# Patient Record
Sex: FEMALE | Race: WHITE | Employment: OTHER | URBAN - METROPOLITAN AREA
[De-identification: names, ages, dates, MRNs, and addresses within clinical notes are randomized per-mention and may not be internally consistent; named-entity substitution may affect disease eponyms.]

---

## 2017-01-05 ENCOUNTER — OFFICE VISIT (OUTPATIENT)
Dept: FAMILY MEDICINE CLINIC | Age: 62
End: 2017-01-05

## 2017-01-05 VITALS
TEMPERATURE: 97.7 F | HEART RATE: 76 BPM | HEIGHT: 65 IN | DIASTOLIC BLOOD PRESSURE: 100 MMHG | BODY MASS INDEX: 28.89 KG/M2 | SYSTOLIC BLOOD PRESSURE: 142 MMHG | WEIGHT: 173.4 LBS

## 2017-01-05 DIAGNOSIS — J01.00 ACUTE NON-RECURRENT MAXILLARY SINUSITIS: Primary | ICD-10-CM

## 2017-01-05 DIAGNOSIS — E78.5 HYPERLIPIDEMIA, UNSPECIFIED HYPERLIPIDEMIA TYPE: ICD-10-CM

## 2017-01-05 DIAGNOSIS — I10 ESSENTIAL HYPERTENSION: ICD-10-CM

## 2017-01-05 PROCEDURE — 99214 OFFICE O/P EST MOD 30 MIN: CPT | Performed by: FAMILY MEDICINE

## 2017-01-05 RX ORDER — ATENOLOL AND CHLORTHALIDONE TABLET 50; 25 MG/1; MG/1
1 TABLET ORAL DAILY
Qty: 90 TABLET | Refills: 0 | Status: SHIPPED | OUTPATIENT
Start: 2017-01-05 | End: 2017-05-01

## 2017-01-05 RX ORDER — SIMVASTATIN 40 MG
40 TABLET ORAL NIGHTLY
Qty: 90 TABLET | Refills: 0 | Status: SHIPPED | OUTPATIENT
Start: 2017-01-05 | End: 2017-02-01 | Stop reason: SDUPTHER

## 2017-01-05 RX ORDER — AMOXICILLIN AND CLAVULANATE POTASSIUM 875; 125 MG/1; MG/1
1 TABLET, FILM COATED ORAL 2 TIMES DAILY
Qty: 20 TABLET | Refills: 0 | Status: SHIPPED | OUTPATIENT
Start: 2017-01-05 | End: 2017-02-01 | Stop reason: SDUPTHER

## 2017-01-05 ASSESSMENT — ENCOUNTER SYMPTOMS
SINUS PAIN: 1
CHEST TIGHTNESS: 0
SHORTNESS OF BREATH: 0
COUGH: 1
SORE THROAT: 1
RHINORRHEA: 1

## 2017-02-01 ENCOUNTER — OFFICE VISIT (OUTPATIENT)
Dept: FAMILY MEDICINE CLINIC | Age: 62
End: 2017-02-01

## 2017-02-01 ENCOUNTER — TELEPHONE (OUTPATIENT)
Dept: FAMILY MEDICINE CLINIC | Age: 62
End: 2017-02-01

## 2017-02-01 VITALS
WEIGHT: 173.8 LBS | DIASTOLIC BLOOD PRESSURE: 98 MMHG | BODY MASS INDEX: 28.96 KG/M2 | HEIGHT: 65 IN | TEMPERATURE: 98.1 F | HEART RATE: 68 BPM | SYSTOLIC BLOOD PRESSURE: 128 MMHG

## 2017-02-01 DIAGNOSIS — J01.01 ACUTE RECURRENT MAXILLARY SINUSITIS: ICD-10-CM

## 2017-02-01 DIAGNOSIS — Z12.39 SCREENING BREAST EXAMINATION: ICD-10-CM

## 2017-02-01 DIAGNOSIS — Z12.11 SCREEN FOR COLON CANCER: ICD-10-CM

## 2017-02-01 DIAGNOSIS — Z13.1 SCREENING FOR DIABETES MELLITUS: ICD-10-CM

## 2017-02-01 DIAGNOSIS — I10 ESSENTIAL HYPERTENSION: ICD-10-CM

## 2017-02-01 DIAGNOSIS — E78.5 HYPERLIPIDEMIA, UNSPECIFIED HYPERLIPIDEMIA TYPE: ICD-10-CM

## 2017-02-01 DIAGNOSIS — E78.5 HYPERLIPIDEMIA, UNSPECIFIED HYPERLIPIDEMIA TYPE: Primary | ICD-10-CM

## 2017-02-01 DIAGNOSIS — Z11.59 NEED FOR HEPATITIS C SCREENING TEST: ICD-10-CM

## 2017-02-01 LAB
CHOLESTEROL, TOTAL: 191 MG/DL (ref 0–199)
HDLC SERPL-MCNC: 37 MG/DL (ref 40–60)
LDL CHOLESTEROL CALCULATED: 111 MG/DL
TRIGL SERPL-MCNC: 215 MG/DL (ref 0–150)
TSH SERPL DL<=0.05 MIU/L-ACNC: 2.34 UIU/ML (ref 0.27–4.2)
VLDLC SERPL CALC-MCNC: 43 MG/DL

## 2017-02-01 PROCEDURE — 36415 COLL VENOUS BLD VENIPUNCTURE: CPT | Performed by: FAMILY MEDICINE

## 2017-02-01 PROCEDURE — 99214 OFFICE O/P EST MOD 30 MIN: CPT | Performed by: FAMILY MEDICINE

## 2017-02-01 PROCEDURE — 93000 ELECTROCARDIOGRAM COMPLETE: CPT | Performed by: FAMILY MEDICINE

## 2017-02-01 RX ORDER — AMOXICILLIN AND CLAVULANATE POTASSIUM 875; 125 MG/1; MG/1
1 TABLET, FILM COATED ORAL 2 TIMES DAILY
Qty: 14 TABLET | Refills: 0 | Status: SHIPPED | OUTPATIENT
Start: 2017-02-01 | End: 2019-11-05 | Stop reason: SDUPTHER

## 2017-02-01 RX ORDER — SIMVASTATIN 40 MG
40 TABLET ORAL NIGHTLY
Qty: 90 TABLET | Refills: 3 | Status: SHIPPED | OUTPATIENT
Start: 2017-02-01 | End: 2018-02-16

## 2017-02-01 RX ORDER — AMOXICILLIN AND CLAVULANATE POTASSIUM 875; 125 MG/1; MG/1
1 TABLET, FILM COATED ORAL 2 TIMES DAILY
Qty: 14 TABLET | Refills: 0 | Status: SHIPPED | OUTPATIENT
Start: 2017-02-01 | End: 2017-02-01 | Stop reason: SDUPTHER

## 2017-02-01 RX ORDER — ATENOLOL AND CHLORTHALIDONE TABLET 100; 25 MG/1; MG/1
1 TABLET ORAL DAILY
Qty: 90 TABLET | Refills: 0 | Status: SHIPPED | OUTPATIENT
Start: 2017-02-01 | End: 2017-05-01 | Stop reason: SDUPTHER

## 2017-02-01 ASSESSMENT — PATIENT HEALTH QUESTIONNAIRE - PHQ9
1. LITTLE INTEREST OR PLEASURE IN DOING THINGS: 0
SUM OF ALL RESPONSES TO PHQ QUESTIONS 1-9: 0
2. FEELING DOWN, DEPRESSED OR HOPELESS: 0
SUM OF ALL RESPONSES TO PHQ9 QUESTIONS 1 & 2: 0

## 2017-02-01 ASSESSMENT — ENCOUNTER SYMPTOMS
SINUS PAIN: 1
COUGH: 1
SHORTNESS OF BREATH: 0
RHINORRHEA: 1

## 2017-02-02 LAB
ESTIMATED AVERAGE GLUCOSE: 111.2 MG/DL
HBA1C MFR BLD: 5.5 %
HEPATITIS C ANTIBODY INTERPRETATION: NORMAL

## 2017-02-24 ENCOUNTER — OFFICE VISIT (OUTPATIENT)
Dept: FAMILY MEDICINE CLINIC | Age: 62
End: 2017-02-24

## 2017-02-24 VITALS
TEMPERATURE: 98.1 F | HEIGHT: 65 IN | DIASTOLIC BLOOD PRESSURE: 84 MMHG | WEIGHT: 174.4 LBS | HEART RATE: 74 BPM | BODY MASS INDEX: 29.06 KG/M2 | SYSTOLIC BLOOD PRESSURE: 128 MMHG

## 2017-02-24 DIAGNOSIS — J20.9 ACUTE BRONCHITIS, UNSPECIFIED ORGANISM: Primary | ICD-10-CM

## 2017-02-24 DIAGNOSIS — Z72.0 TOBACCO ABUSE: ICD-10-CM

## 2017-02-24 PROCEDURE — 99213 OFFICE O/P EST LOW 20 MIN: CPT | Performed by: FAMILY MEDICINE

## 2017-02-24 RX ORDER — VARENICLINE TARTRATE 25 MG
KIT ORAL
Qty: 42 TABLET | Refills: 0 | Status: SHIPPED | OUTPATIENT
Start: 2017-02-24 | End: 2017-11-07

## 2017-02-24 RX ORDER — AZITHROMYCIN 250 MG/1
TABLET, FILM COATED ORAL
Qty: 1 PACKET | Refills: 0 | Status: SHIPPED | OUTPATIENT
Start: 2017-02-24 | End: 2017-03-06

## 2017-02-24 RX ORDER — VARENICLINE TARTRATE 1 MG/1
1 TABLET, FILM COATED ORAL 2 TIMES DAILY
Qty: 60 TABLET | Refills: 5 | Status: SHIPPED | OUTPATIENT
Start: 2017-02-24 | End: 2017-11-07

## 2017-02-24 ASSESSMENT — ENCOUNTER SYMPTOMS: COUGH: 1

## 2017-03-21 ENCOUNTER — HOSPITAL ENCOUNTER (OUTPATIENT)
Dept: GENERAL RADIOLOGY | Age: 62
Discharge: HOME | End: 2017-03-21
Attending: FAMILY MEDICINE | Admitting: FAMILY MEDICINE

## 2017-03-21 ENCOUNTER — OFFICE VISIT (OUTPATIENT)
Dept: FAMILY MEDICINE CLINIC | Age: 62
End: 2017-03-21

## 2017-03-21 ENCOUNTER — TELEPHONE (OUTPATIENT)
Dept: FAMILY MEDICINE CLINIC | Age: 62
End: 2017-03-21

## 2017-03-21 VITALS
BODY MASS INDEX: 29.26 KG/M2 | HEART RATE: 78 BPM | SYSTOLIC BLOOD PRESSURE: 120 MMHG | WEIGHT: 175.6 LBS | HEIGHT: 65 IN | TEMPERATURE: 98.4 F | DIASTOLIC BLOOD PRESSURE: 78 MMHG | RESPIRATION RATE: 20 BRPM

## 2017-03-21 DIAGNOSIS — B00.1 COLD SORE: ICD-10-CM

## 2017-03-21 DIAGNOSIS — J20.9 ACUTE BRONCHITIS, UNSPECIFIED ORGANISM: Primary | ICD-10-CM

## 2017-03-21 DIAGNOSIS — R06.2 WHEEZING: ICD-10-CM

## 2017-03-21 DIAGNOSIS — J20.9 ACUTE BRONCHITIS, UNSPECIFIED ORGANISM: ICD-10-CM

## 2017-03-21 DIAGNOSIS — R04.0 EPISTAXIS: ICD-10-CM

## 2017-03-21 PROCEDURE — 99213 OFFICE O/P EST LOW 20 MIN: CPT | Performed by: FAMILY MEDICINE

## 2017-03-21 RX ORDER — LEVOFLOXACIN 500 MG/1
500 TABLET, FILM COATED ORAL DAILY
Qty: 10 TABLET | Refills: 0 | Status: SHIPPED | OUTPATIENT
Start: 2017-03-21 | End: 2017-03-31

## 2017-03-21 ASSESSMENT — ENCOUNTER SYMPTOMS
WHEEZING: 1
SHORTNESS OF BREATH: 0
COUGH: 1

## 2017-04-06 ENCOUNTER — TELEPHONE (OUTPATIENT)
Dept: GASTROENTEROLOGY | Age: 62
End: 2017-04-06

## 2017-04-13 ENCOUNTER — TELEPHONE (OUTPATIENT)
Dept: FAMILY MEDICINE CLINIC | Age: 62
End: 2017-04-13

## 2017-05-01 ENCOUNTER — OFFICE VISIT (OUTPATIENT)
Dept: FAMILY MEDICINE CLINIC | Age: 62
End: 2017-05-01

## 2017-05-01 VITALS
HEART RATE: 70 BPM | BODY MASS INDEX: 29.89 KG/M2 | WEIGHT: 179.4 LBS | HEIGHT: 65 IN | SYSTOLIC BLOOD PRESSURE: 112 MMHG | DIASTOLIC BLOOD PRESSURE: 70 MMHG

## 2017-05-01 DIAGNOSIS — Z91.09 ENVIRONMENTAL ALLERGIES: ICD-10-CM

## 2017-05-01 DIAGNOSIS — J42 CHRONIC BRONCHITIS, UNSPECIFIED CHRONIC BRONCHITIS TYPE (HCC): ICD-10-CM

## 2017-05-01 DIAGNOSIS — I10 ESSENTIAL HYPERTENSION: ICD-10-CM

## 2017-05-01 DIAGNOSIS — E78.5 HYPERLIPIDEMIA, UNSPECIFIED HYPERLIPIDEMIA TYPE: Primary | ICD-10-CM

## 2017-05-01 PROCEDURE — 99214 OFFICE O/P EST MOD 30 MIN: CPT | Performed by: FAMILY MEDICINE

## 2017-05-01 RX ORDER — ATENOLOL AND CHLORTHALIDONE TABLET 100; 25 MG/1; MG/1
1 TABLET ORAL DAILY
Qty: 90 TABLET | Refills: 1 | Status: SHIPPED | OUTPATIENT
Start: 2017-05-01 | End: 2017-11-07 | Stop reason: SDUPTHER

## 2017-05-01 RX ORDER — ALBUTEROL SULFATE 90 UG/1
2 AEROSOL, METERED RESPIRATORY (INHALATION) EVERY 6 HOURS PRN
Qty: 1 INHALER | Refills: 2 | Status: SHIPPED | OUTPATIENT
Start: 2017-05-01 | End: 2017-11-07 | Stop reason: SDUPTHER

## 2017-05-01 ASSESSMENT — ENCOUNTER SYMPTOMS
COUGH: 1
RHINORRHEA: 1
SHORTNESS OF BREATH: 0

## 2017-07-17 ENCOUNTER — TELEPHONE (OUTPATIENT)
Dept: FAMILY MEDICINE CLINIC | Age: 62
End: 2017-07-17

## 2017-07-17 DIAGNOSIS — I10 ESSENTIAL HYPERTENSION: Primary | ICD-10-CM

## 2017-07-17 RX ORDER — ATENOLOL AND CHLORTHALIDONE TABLET 100; 25 MG/1; MG/1
1 TABLET ORAL DAILY
Qty: 30 TABLET | Refills: 0 | Status: SHIPPED | OUTPATIENT
Start: 2017-07-17 | End: 2017-11-07

## 2017-10-30 ENCOUNTER — HOSPITAL ENCOUNTER (OUTPATIENT)
Dept: MAMMOGRAPHY | Age: 62
Discharge: HOME | End: 2017-10-31
Attending: FAMILY MEDICINE | Admitting: FAMILY MEDICINE

## 2017-10-30 DIAGNOSIS — Z12.39 SCREENING BREAST EXAMINATION: ICD-10-CM

## 2017-11-07 ENCOUNTER — OFFICE VISIT (OUTPATIENT)
Dept: FAMILY MEDICINE CLINIC | Age: 62
End: 2017-11-07

## 2017-11-07 VITALS
HEART RATE: 60 BPM | DIASTOLIC BLOOD PRESSURE: 86 MMHG | WEIGHT: 180 LBS | HEIGHT: 65 IN | BODY MASS INDEX: 29.99 KG/M2 | TEMPERATURE: 98 F | SYSTOLIC BLOOD PRESSURE: 134 MMHG

## 2017-11-07 DIAGNOSIS — Z23 NEEDS FLU SHOT: ICD-10-CM

## 2017-11-07 DIAGNOSIS — R04.2 HEMOPTYSIS: Primary | ICD-10-CM

## 2017-11-07 DIAGNOSIS — I10 ESSENTIAL HYPERTENSION: ICD-10-CM

## 2017-11-07 DIAGNOSIS — Z12.2 ENCOUNTER FOR SCREENING FOR LUNG CANCER: ICD-10-CM

## 2017-11-07 DIAGNOSIS — J44.1 COPD EXACERBATION (HCC): ICD-10-CM

## 2017-11-07 DIAGNOSIS — Z23 NEED FOR PNEUMOCOCCAL VACCINE: ICD-10-CM

## 2017-11-07 DIAGNOSIS — J42 CHRONIC BRONCHITIS, UNSPECIFIED CHRONIC BRONCHITIS TYPE (HCC): ICD-10-CM

## 2017-11-07 DIAGNOSIS — Z72.0 TOBACCO ABUSE: ICD-10-CM

## 2017-11-07 PROCEDURE — 99214 OFFICE O/P EST MOD 30 MIN: CPT | Performed by: FAMILY MEDICINE

## 2017-11-07 PROCEDURE — 90686 IIV4 VACC NO PRSV 0.5 ML IM: CPT | Performed by: FAMILY MEDICINE

## 2017-11-07 PROCEDURE — 90471 IMMUNIZATION ADMIN: CPT | Performed by: FAMILY MEDICINE

## 2017-11-07 PROCEDURE — 94640 AIRWAY INHALATION TREATMENT: CPT | Performed by: FAMILY MEDICINE

## 2017-11-07 PROCEDURE — 90732 PPSV23 VACC 2 YRS+ SUBQ/IM: CPT | Performed by: FAMILY MEDICINE

## 2017-11-07 PROCEDURE — 90472 IMMUNIZATION ADMIN EACH ADD: CPT | Performed by: FAMILY MEDICINE

## 2017-11-07 RX ORDER — ATENOLOL AND CHLORTHALIDONE TABLET 100; 25 MG/1; MG/1
1 TABLET ORAL DAILY
Qty: 90 TABLET | Refills: 1 | Status: SHIPPED | OUTPATIENT
Start: 2017-11-07 | End: 2018-06-18 | Stop reason: SDUPTHER

## 2017-11-07 RX ORDER — ALBUTEROL SULFATE 90 UG/1
2 AEROSOL, METERED RESPIRATORY (INHALATION) EVERY 6 HOURS PRN
Qty: 1 INHALER | Refills: 2 | Status: SHIPPED | OUTPATIENT
Start: 2017-11-07 | End: 2018-02-16

## 2017-11-07 RX ORDER — ALBUTEROL SULFATE 90 UG/1
2 AEROSOL, METERED RESPIRATORY (INHALATION) EVERY 6 HOURS PRN
Qty: 1 INHALER | Refills: 2 | Status: SHIPPED | OUTPATIENT
Start: 2017-11-07 | End: 2017-11-07 | Stop reason: SDUPTHER

## 2017-11-07 RX ORDER — AZITHROMYCIN 250 MG/1
TABLET, FILM COATED ORAL
Qty: 1 PACKET | Refills: 0 | Status: SHIPPED | OUTPATIENT
Start: 2017-11-07 | End: 2017-11-17

## 2017-11-07 RX ORDER — ALBUTEROL SULFATE 2.5 MG/3ML
2.5 SOLUTION RESPIRATORY (INHALATION) EVERY 6 HOURS PRN
Qty: 30 EACH | Refills: 2 | Status: SHIPPED | OUTPATIENT
Start: 2017-11-07 | End: 2018-06-18 | Stop reason: SDUPTHER

## 2017-11-07 RX ORDER — ALBUTEROL SULFATE 2.5 MG/3ML
2.5 SOLUTION RESPIRATORY (INHALATION) ONCE
Status: COMPLETED | OUTPATIENT
Start: 2017-11-07 | End: 2017-11-07

## 2017-11-07 RX ORDER — GUAIFENESIN 600 MG/1
600 TABLET, EXTENDED RELEASE ORAL 2 TIMES DAILY
Qty: 25 TABLET | Refills: 0 | Status: SHIPPED | OUTPATIENT
Start: 2017-11-07 | End: 2018-04-23

## 2017-11-07 RX ADMIN — ALBUTEROL SULFATE 2.5 MG: 2.5 SOLUTION RESPIRATORY (INHALATION) at 11:02

## 2017-11-07 ASSESSMENT — COPD QUESTIONNAIRES: COPD: 1

## 2017-11-07 ASSESSMENT — ENCOUNTER SYMPTOMS: SINUS COMPLAINT: 1

## 2017-11-08 ENCOUNTER — TELEPHONE (OUTPATIENT)
Dept: FAMILY MEDICINE CLINIC | Age: 62
End: 2017-11-08

## 2017-11-08 ASSESSMENT — ENCOUNTER SYMPTOMS
SINUS PRESSURE: 1
COUGH: 1
DIFFICULTY BREATHING: 1

## 2017-11-08 NOTE — TELEPHONE ENCOUNTER
Called and spoke with [facility] Mammography Department [phone]. Patient was at the Mobile mammogram unit and they are waiting for the mammogram results from [facility] to compare results.

## 2017-11-09 NOTE — PROGRESS NOTES
Pharmacy did receive and insurance preferred [employer] so that is what was filled.
Vaccine Information Sheet, \"Influenza - Inactivated\"  given to Niharika Peguero, or parent/legal guardian of  Niharika Peguero and verbalized understanding. Patient responses:    Have you ever had a reaction to a flu vaccine? No  Are you able to eat eggs without adverse effects? Yes  Do you have any current illness? Yes  Have you ever had Guillian [place] Syndrome? No    Flu vaccine given per order. Please see immunization tab.
4. Chronic bronchitis, unspecified chronic bronchitis type (HCC)  albuterol sulfate HFA (VENTOLIN HFA) 108 (90 Base) MCG/ACT inhaler    guaiFENesin (MUCINEX) 600 MG extended release tablet    azithromycin (ZITHROMAX) 250 MG tablet    albuterol (PROVENTIL) (2.5 MG/3ML) 0.083% nebulizer solution    Nebulizers (AIRIAL COMPACT MINI NEBULIZER) MISC    DISCONTINUED: albuterol sulfate HFA (VENTOLIN HFA) 108 (90 Base) MCG/ACT inhaler   5. Essential hypertension  atenolol-chlorthalidone (TENORETIC) 100-25 MG per tablet   6. Needs flu shot  INFLUENZA, QUADV, 3 YRS AND OLDER, IM, PF, PREFILL SYR OR SDV, 0.5ML (FLUZONE QUADV, PF)   7.  Need for pneumococcal vaccine  Pneumococcal polysaccharide vaccine 23-valent >= 3yo subcutaneous/IM (PNEUMOVAX 23)           Plan:      See orders    Recommend she quit smoking    See orders    Hypertension stable continue current medication

## 2017-11-13 ENCOUNTER — TELEPHONE (OUTPATIENT)
Dept: FAMILY MEDICINE CLINIC | Age: 62
End: 2017-11-13

## 2017-11-14 PROBLEM — R92.8 ABNORMAL MAMMOGRAM OF LEFT BREAST: Status: ACTIVE | Noted: 2017-11-14

## 2017-11-15 ENCOUNTER — TELEPHONE (OUTPATIENT)
Dept: FAMILY MEDICINE CLINIC | Age: 62
End: 2017-11-15

## 2017-11-15 NOTE — TELEPHONE ENCOUNTER
An order was faxed to [facility] today for Ct lung Screening. Per [facility], patient notified them was coughing up blood.

## 2017-11-15 NOTE — TELEPHONE ENCOUNTER
Ct lung screening can only be done if patient is asymptomatic. Patient is having symptoms.     [facility]-[phone]            NFP-[phone]

## 2017-11-15 NOTE — TELEPHONE ENCOUNTER
----- Message from Wiliam Wesley MD sent at 11/15/2017  3:02 PM EST -----  I ordered xray chest instead

## 2017-11-16 ENCOUNTER — TELEPHONE (OUTPATIENT)
Dept: FAMILY MEDICINE CLINIC | Age: 62
End: 2017-11-16

## 2017-11-16 NOTE — TELEPHONE ENCOUNTER
In regards to her mammogram results that she asked about yesterday, looks like 2 densities were seen in her left breast.  They will be calling her back to get further views. They may have called her by the time of this telephone call.

## 2017-11-20 ENCOUNTER — TELEPHONE (OUTPATIENT)
Dept: FAMILY MEDICINE CLINIC | Age: 62
End: 2017-11-20

## 2017-11-20 DIAGNOSIS — R91.8 MASS OF MIDDLE LOBE OF RIGHT LUNG: Primary | ICD-10-CM

## 2017-11-20 NOTE — TELEPHONE ENCOUNTER
Called [facility], they are pre-certing  CT. All information faxed to [facility]. They will call patient to schedule once pre-cert done.

## 2017-11-20 NOTE — TELEPHONE ENCOUNTER
Called patient. Gave her results of probable lung cancer on xray lungs. She understands    Will order CT chest [facility]. Please set this up and have her come in for f/u the next day.

## 2017-11-21 ENCOUNTER — TELEPHONE (OUTPATIENT)
Dept: FAMILY MEDICINE CLINIC | Age: 62
End: 2017-11-21

## 2017-11-21 DIAGNOSIS — J20.9 ACUTE BRONCHITIS, UNSPECIFIED ORGANISM: Primary | ICD-10-CM

## 2017-11-21 RX ORDER — LEVOFLOXACIN 500 MG/1
500 TABLET, FILM COATED ORAL DAILY
Qty: 10 TABLET | Refills: 0 | Status: SHIPPED | OUTPATIENT
Start: 2017-11-21 | End: 2017-12-01

## 2017-11-21 RX ORDER — LEVOFLOXACIN 500 MG/1
500 TABLET, FILM COATED ORAL DAILY
Qty: 10 TABLET | Refills: 0 | Status: SHIPPED | OUTPATIENT
Start: 2017-11-21 | End: 2017-11-21 | Stop reason: SDUPTHER

## 2017-11-30 ENCOUNTER — TELEPHONE (OUTPATIENT)
Dept: FAMILY MEDICINE CLINIC | Age: 62
End: 2017-11-30

## 2017-11-30 LAB
BUN / CREAT RATIO: NORMAL
BUN BLDV-MCNC: 20 MG/DL
CREAT SERPL-MCNC: 1.4 MG/DL

## 2017-11-30 NOTE — TELEPHONE ENCOUNTER
Valencia with [facility] called stating patient's GFR was too low and Creatinine too high to do CT with contrast so they went ahead and so they went ahead and did it without contrast.  We should expect results of labs and Ct scan today or tomorrow.

## 2017-12-01 ENCOUNTER — OFFICE VISIT (OUTPATIENT)
Dept: FAMILY MEDICINE CLINIC | Age: 62
End: 2017-12-01

## 2017-12-01 VITALS
DIASTOLIC BLOOD PRESSURE: 82 MMHG | BODY MASS INDEX: 29.32 KG/M2 | HEART RATE: 63 BPM | WEIGHT: 176 LBS | HEIGHT: 65 IN | SYSTOLIC BLOOD PRESSURE: 120 MMHG

## 2017-12-01 DIAGNOSIS — R79.89 ABNORMAL SERUM CREATININE LEVEL: ICD-10-CM

## 2017-12-01 DIAGNOSIS — R91.8 LUNG MASS: Primary | ICD-10-CM

## 2017-12-01 PROCEDURE — 99213 OFFICE O/P EST LOW 20 MIN: CPT | Performed by: FAMILY MEDICINE

## 2017-12-01 ASSESSMENT — ENCOUNTER SYMPTOMS: COUGH: 1

## 2017-12-01 NOTE — PROGRESS NOTES
Patient ID: Kenn Neighbours 1955      HPI here for results of CAT scan: Concerned since she is a smoker and has coughed up blood in the past.  She is also concerned that she was told her creatinine was abnormal.  The technician told her that this was probably because she was dehydrated. The end result was that they could not do the CAT scan with contrast yesterday. Patient admits that she was probably dehydrated. She has since been drinking a lot more water. Patient Active Problem List   Diagnosis    Hyperlipidemia    Osteoarthritis of finger    Sciatica    Shingles (herpes zoster) polyneuropathy    Essential hypertension    Abnormal mammogram of left breast    Mass of middle lobe of right lung       Past Medical History:   Diagnosis Date    Anxiety     Chronic back pain     low back    Diverticulosis 6/2013    Extensive per CT    HTN (hypertension) 7/29/2013    Hyperlipidemia 2009    Hypertension 2009       Past Surgical History:   Procedure Laterality Date    ELBOW SURGERY      FOOT SURGERY      TONSILLECTOMY      TUBAL LIGATION         Review of Systems   Constitutional: Negative. Respiratory: Positive for cough. Objective:   Physical Exam   Constitutional: She appears well-developed. No distress. Neurological:   No facial droop. Moving all extremities   Psychiatric: Her mood appears anxious. Cognition and memory are normal.     Vitals:    12/01/17 1033   BP: 120/82   Site: Right Arm   Position: Sitting   Cuff Size: Large Adult   Pulse: 63   Weight: 176 lb (79.8 kg)   Height: 5' 4.5\" (1.638 m)     Body mass index is 29.74 kg/m². Wt Readings from Last 3 Encounters:   12/01/17 176 lb (79.8 kg)   11/07/17 180 lb (81.6 kg)   05/01/17 179 lb 6.4 oz (81.4 kg)     BP Readings from Last 3 Encounters:   12/01/17 120/82   11/07/17 134/86   05/01/17 112/70      See scanned CAT scan      Assessment:      1. Lung mass  [facility] Pulmonology- Rosamaria Barajas MD   2. Abnormal serum creatinine level  Creatinine    BUN           Plan:      Discussed results. I think it is best that she get a bronchoscopy. I agree with the technician that she was probably dehydrated. I'm asking her to drink more fluids and we'll recheck her blood tests next week.

## 2017-12-06 ENCOUNTER — INITIAL CONSULT (OUTPATIENT)
Dept: PULMONOLOGY | Age: 62
End: 2017-12-06

## 2017-12-06 VITALS
HEART RATE: 56 BPM | RESPIRATION RATE: 20 BRPM | SYSTOLIC BLOOD PRESSURE: 130 MMHG | HEIGHT: 66 IN | WEIGHT: 177 LBS | OXYGEN SATURATION: 94 % | DIASTOLIC BLOOD PRESSURE: 76 MMHG | BODY MASS INDEX: 28.45 KG/M2

## 2017-12-06 DIAGNOSIS — J44.9 MODERATE COPD (CHRONIC OBSTRUCTIVE PULMONARY DISEASE) (HCC): ICD-10-CM

## 2017-12-06 DIAGNOSIS — R04.2 HEMOPTYSIS: ICD-10-CM

## 2017-12-06 DIAGNOSIS — R91.8 MASS OF MIDDLE LOBE OF RIGHT LUNG: Primary | ICD-10-CM

## 2017-12-06 LAB
DLCO %PRED: NORMAL
DLCO PRE: NORMAL
FEF 25-75%-POST: 1.08
FEF 25-75%-PRE: 0.91
FEV1-POST: 1.64
FEV1-PRE: 1.61
FEV1/FVC-POST: 59.3
FEV1/FVC-PRE: 60.4
FVC-POST: 2.77
FVC-PRE: 2.67
MEP: NORMAL
MIP: NORMAL
TLC %PRED: NORMAL
TLC PRE: NORMAL

## 2017-12-06 PROCEDURE — 99204 OFFICE O/P NEW MOD 45 MIN: CPT | Performed by: INTERNAL MEDICINE

## 2017-12-06 ASSESSMENT — PULMONARY FUNCTION TESTS
FVC_PRE: 2.67
FEV1/FVC_POST: 59.3
FVC_POST: 2.77
FEV1/FVC_PRE: 60.4
FEV1_PRE: 1.61
FEV1_POST: 1.64

## 2017-12-06 NOTE — PROGRESS NOTES
Subjective:   CHIEF COMPLAINT / HPI: Angeli Moon is a 70-year-old female referred here with an abnormal CT chest and hemoptysis. She states that almost a year ago she developed very bad nosebleed and continue to have problems with nosebleed through the early portion of 2017. In March 2017 she was diagnosed with either bronchitis or pneumonia and treated with antibiotics. She had no problems for a good portion year but then started noticing increasing cough and chest congestion and had blood streaking or sputum this fall and internal early winter. She has had some mild right sided lateral thoracic pain which is at times mildly pleuritic. This pain was in the same area where she had shingles several years ago. Although she is not lost weight she does complain of mild anorexia. She is on albuterol but does not have a previous diagnosis of COPD or asthma. She was a half pack a day smoker most of her adult life but quit smoking several years ago. 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   She is not aware of a family history of chronic lung disease or lung cancer  She does carry history of anxiety, chronic back pain and diverticulosis         Past Medical History:  Past Medical History:   Diagnosis Date    Anxiety     Chronic back pain     low back    Diverticulosis 6/2013    Extensive per CT    Hemoptysis 12/6/2017    HTN (hypertension) 7/29/2013    Hyperlipidemia 2009    Hypertension 2009    Moderate COPD [facility] obstructive [facility] disease) ([facility] 12/6/2017       Current Medications:    Current Facility-Administered Medications: promethazine (PHENERGAN) injection 25 mg, 25 mg, Intramuscular, Q6H PRN    Allergies   Allergen Reactions    Paroxetine Nausea Only    Vicodin [Hydrocodone-Acetaminophen] Nausea Only    Lipitor Other (See Comments)     Muscle aches       Social History:    Social History     Social History    Marital status:      Spouse name: N/A    Number of children: N/A    Years of education: N/A     Social History Main Topics    Smoking status: Former Smoker     Packs/day: 0.50     Years: 25.00     Types: Cigarettes     Quit date: 3/10/2014    Smokeless tobacco: Never Used      Comment: electronic cigarette    Alcohol use None    Drug use: No    Sexual activity: Not Asked     Other Topics Concern    None     Social History Narrative    None       Family History:    Family History   Problem Relation Age of Onset    Osteoporosis Mother     Stroke Father          REVIEW OF SYSTEMS:    CONSTITUTIONAL:  negative for fevers, chills, diaphoresis, activity change,  night sweats and unexpected weight change.    HEENT:  negative for hearing loss,  sinus pressure, nasal congestion  RESPIRATORY:  See HPI  CARDIOVASCULAR:  Negative for  palpitations, exertional chest pressure/discomfort, edema, syncope  GASTROINTESTINAL: negative for nausea, vomiting, diarrhea, constipation, blood in stool and abdominal pain  GENITOURINARY:  negative for frequency, dysuria and hematuria  HEMATOLOGIC/LYMPHATIC:  negative for easy bruising, bleeding and lymphadenopathy  ALLERGIC/IMMUNOLOGIC:  negative for recurrent infections, angioedema, anaphylaxis and drug reaction  MUSCULOSKELETAL:  negative for  pain, joint swelling, decreased range of motion and muscle weakness    Objective:   PHYSICAL EXAM:      VITALS:    Vitals:    12/06/17 1016   BP: 130/76   Pulse: 56   Resp: 20   SpO2: 94%   Weight: 177 lb (80.3 kg)   Height: 5' 5.5\" (1.664 m)         CONSTITUTIONAL:  awake, alert, cooperative, no apparent distress, and appears stated age  NECK:  Supple and nontender,  trachea midline, no adenopathy, thyroid nl, no JVD, no wheezing or stridor over neck  CHEST: Chest expansion equal and symmetrical, no intercostal retraction, no increased work of breathing  No chest wall tenderness on palpation  LUNGS: No wheezing or rhonchi noted and there are no pleural rubs   CARDIOVASCULAR: Normal S1 and S2 , no murmurs or gallops ,no pericardial rubs  ABDOMEN:  normal bowel sounds, non-distended and no masses palpated, and no tenderness to palpation. No hepatospleenomegaly  LYMPHADENOPATHY:  no axillary or supraclavicular adenopathy. No cervical adnenopathy  RIGHT AND LEFT LOWER EXTREMITIES: No edema, no inflammation, no tenderness. RADIOLOGY: Chest x-ray at [facility] on 11/17/17 demonstrated right peritracheal density most concerning for medial lung mass or adenopathy along with a medial right upper lung density likely related to atelectasis or partial consolidation  Chest CT at [facility] on 11/30/17Demonstrated an endobronchial mass obstructing the right upper lobe rhonchus resulting in postobstructive collapse of the right upper lobe along with right peritracheal and suprahilar adenopathy  PFT: Office spirometry demonstrates a moderate obstructive defect. Post Bronchodilator studies demonstrate a significant response in her small airways    Assessment:     1. Mass of middle lobe of right lung    2. Hemoptysis    3. Moderate COPD (chronic obstructive pulmonary disease) ([facility]        Plan:   I have recommended and scheduled her for bronchoscopy in the very near future. I suspect this does represent a neoplastic process consistent with lung cancer. I also will start her on Symbicort 160/4.5 2 puffs every 12 hours and then gargle for her moderate COPD and encouraged her to use her rescue albuterol inhaler in between as needed. I will continue to follow her  Return in about 1 week (around 12/13/2017) for Hemoptysis, recheck for lung mass. This dictation was performed with a verbal recognition program and it was checked for errors. It is possible that there are still dictated errors within this office note. Any errors should be brought immediately to my attention for correction. All efforts were made to ensure that this office note is accurate.

## 2017-12-11 ENCOUNTER — OFFICE VISIT (OUTPATIENT)
Dept: PULMONOLOGY | Age: 62
End: 2017-12-11

## 2017-12-11 VITALS
OXYGEN SATURATION: 98 % | HEIGHT: 65 IN | SYSTOLIC BLOOD PRESSURE: 114 MMHG | DIASTOLIC BLOOD PRESSURE: 76 MMHG | WEIGHT: 176 LBS | BODY MASS INDEX: 29.32 KG/M2 | HEART RATE: 69 BPM

## 2017-12-11 DIAGNOSIS — J44.9 MODERATE COPD (CHRONIC OBSTRUCTIVE PULMONARY DISEASE) (HCC): ICD-10-CM

## 2017-12-11 DIAGNOSIS — C34.91 SQUAMOUS CELL LUNG CANCER, RIGHT (HCC): Primary | ICD-10-CM

## 2017-12-11 PROCEDURE — 99213 OFFICE O/P EST LOW 20 MIN: CPT | Performed by: INTERNAL MEDICINE

## 2017-12-11 RX ORDER — ALBUTEROL SULFATE 90 UG/1
2 AEROSOL, METERED RESPIRATORY (INHALATION) EVERY 6 HOURS PRN
Qty: 1 INHALER | Refills: 3 | Status: SHIPPED | OUTPATIENT
Start: 2017-12-11 | End: 2018-02-16

## 2017-12-11 NOTE — PROGRESS NOTES
SUBJECTIVE:Chief complaintsquamous cell lung cancer with complete obstruction right upper lobe bronchus     Diana Aguillon underwent bronchoscopy several days ago and I noted a large exophytic mass completely obstructing the right upper lobe bronchus with extension into the distal portion of the right mainstem bronchus. The right middle lobe and right lower lobe bronchus was visualized and open. Multiple biopsies demonstrated squamous cell carcinoma of the lung. She's been made aware of the diagnosis and referral to oncology and radiation oncology/therapy. OBJECTIVE:  /76 (Site: Right Arm, Position: Sitting, Cuff Size: Medium Adult)   Pulse 69   Ht 5' 5\" (1.651 m)   Wt 176 lb (79.8 kg)   LMP 01/06/2009   SpO2 98%   Breastfeeding? No   BMI 29.29 kg/m²      Physical Exam:  Constitutional:  She appears well developed and well-nourished. Neck:  Supple, No palpable lymphadenopathy, No JVD  Cardiovascular:  S1, S2 Normal, Regular rhythm, no murmurs or gallops, No pericardial  rubs. Pulmonary:  No change on chest exam with slightly diminished breath sounds in the right compared to left and a few scattered expiratory wheezes  Abdomen: No epigastric pain, No distention. No organomegaly   Extremities: no edema, No DVT  Neurologic:  Awake and Alert, No focal deficits    Radiology: Post-bronchoscopy chest x-ray 12/7/17  FINDINGS:   Normal cardiac size.  Increased opacity medial right upper lobe paratracheal   region.  No pleural effusion or pneumothorax.  Bones grossly intact.           Impression   New or worsened medial right upper lobe opacity.  Contrast-enhanced CT chest   suggested. PFT: Office spirometry demonstrated a moderate obstructive defect with a significant response to bronchodilators in her small airways        ASSESSMENT:    1. Squamous cell lung cancer, right ([address]    2.  Moderate COPD (chronic obstructive pulmonary disease) (HCC)          PLAN:  I have made a referral to both oncology and radiation oncology. I think she'll be a candidate for radiation therapy to the right upper lobe obstructing mass/Squamous cell carcinoma. She will need to undergo a metastatic workup. I also continued her Symbicort 160/4.5 2 puffs every 12 hours and called in Proventil HFA 2 puffs every 4-6 hours as needed. I will continue to follow her  Return in about 3 months (around 3/11/2018) for Recheck for [address] [place] [address] for COPD. This dictation was performed with a verbal recognition program and it was checked for errors. It is possible that there are still dictated errors within this office note. Any errors should be brought immediately to my attention for correction. All efforts were made to ensure that this office note is accurate.

## 2017-12-12 ENCOUNTER — HOSPITAL ENCOUNTER (OUTPATIENT)
Dept: OTHER | Age: 62
Discharge: HOME | End: 2017-12-12
Attending: INTERNAL MEDICINE | Admitting: INTERNAL MEDICINE

## 2017-12-12 LAB
ALBUMIN SERPL-MCNC: 4.5 GM/DL (ref 3.4–5)
ALP BLD-CCNC: 106 IU/L (ref 40–129)
ALT SERPL-CCNC: 14 U/L (ref 10–40)
ANION GAP SERPL CALCULATED.3IONS-SCNC: 14 MMOL/L (ref 4–16)
AST SERPL-CCNC: 15 IU/L (ref 15–37)
BILIRUB SERPL-MCNC: 0.3 MG/DL (ref 0–1)
BUN BLDV-MCNC: 15 MG/DL (ref 6–23)
CALCIUM SERPL-MCNC: 9.8 MG/DL (ref 8.3–10.6)
CHLORIDE BLD-SCNC: 96 MMOL/L (ref 99–110)
CO2: 27 MMOL/L (ref 21–32)
CREAT SERPL-MCNC: 1.2 MG/DL (ref 0.6–1.1)
GFR AFRICAN AMERICAN: 55 ML/MIN/1.73M2
GFR NON-AFRICAN AMERICAN: 46 ML/MIN/1.73M2
GLUCOSE BLD-MCNC: 84 MG/DL (ref 70–99)
POTASSIUM SERPL-SCNC: 4.8 MMOL/L (ref 3.5–5.1)
SODIUM BLD-SCNC: 137 MMOL/L (ref 135–145)
TOTAL PROTEIN: 7.6 GM/DL (ref 6.4–8.2)

## 2018-01-22 ENCOUNTER — HOSPITAL ENCOUNTER (OUTPATIENT)
Dept: PET IMAGING | Age: 63
Discharge: HOME | End: 2018-01-22
Attending: INTERNAL MEDICINE | Admitting: INTERNAL MEDICINE

## 2018-01-22 ENCOUNTER — HOSPITAL ENCOUNTER (OUTPATIENT)
Dept: CT IMAGING | Age: 63
Discharge: HOME | End: 2018-01-22
Attending: INTERNAL MEDICINE | Admitting: INTERNAL MEDICINE

## 2018-01-22 DIAGNOSIS — C34.11 MALIGNANT NEOPLASM OF RIGHT UPPER LOBE OF LUNG (HCC): ICD-10-CM

## 2018-01-22 DIAGNOSIS — C34.11 CANCER OF BRONCHUS OF RIGHT UPPER LOBE (HCC): ICD-10-CM

## 2018-01-22 DIAGNOSIS — C34.11 MALIGNANT NEOPLASM OF UPPER LOBE, RIGHT BRONCHUS OR LUNG (HCC): ICD-10-CM

## 2018-01-22 LAB
GFR AFRICAN AMERICAN: 39 ML/MIN/1.73M2
GFR NON-AFRICAN AMERICAN: 32 ML/MIN/1.73M2
POC CREATININE: 1.6 MG/DL (ref 0.6–1.1)

## 2018-01-25 ENCOUNTER — TELEPHONE (OUTPATIENT)
Dept: FAMILY MEDICINE CLINIC | Age: 63
End: 2018-01-25

## 2018-01-25 NOTE — TELEPHONE ENCOUNTER
Tried to call [facility] and held about [facility]. Kept getting a recording that my call would be answered in approximately 1 minute but was never answered.

## 2018-01-26 NOTE — TELEPHONE ENCOUNTER
[place] will need an order faxed for diagnostic and ultrasound of left breast and also wants the 10-3-17 mammo report faxed with it.

## 2018-01-29 DIAGNOSIS — R92.8 ABNORMAL MAMMOGRAM OF LEFT BREAST: Primary | ICD-10-CM

## 2018-02-16 ENCOUNTER — HOSPITAL ENCOUNTER (OUTPATIENT)
Dept: PREADMISSION TESTING | Age: 63
Discharge: HOME | End: 2018-02-16
Attending: THORACIC SURGERY (CARDIOTHORACIC VASCULAR SURGERY) | Admitting: THORACIC SURGERY (CARDIOTHORACIC VASCULAR SURGERY)

## 2018-02-16 VITALS
WEIGHT: 176.25 LBS | OXYGEN SATURATION: 99 % | SYSTOLIC BLOOD PRESSURE: 135 MMHG | HEART RATE: 56 BPM | HEIGHT: 64 IN | BODY MASS INDEX: 30.09 KG/M2 | RESPIRATION RATE: 16 BRPM | TEMPERATURE: 97.2 F | DIASTOLIC BLOOD PRESSURE: 63 MMHG

## 2018-02-16 LAB
ANION GAP SERPL CALCULATED.3IONS-SCNC: 13 MMOL/L (ref 4–16)
APTT: 30.8 SECONDS (ref 21.2–33)
BACTERIA: ABNORMAL /HPF
BASOPHILS ABSOLUTE: 0.1 K/CU MM
BASOPHILS RELATIVE PERCENT: 0.6 % (ref 0–1)
BILIRUBIN URINE: NEGATIVE MG/DL
BLOOD, URINE: NEGATIVE
BUN BLDV-MCNC: 21 MG/DL (ref 6–23)
CHLORIDE BLD-SCNC: 100 MMOL/L (ref 99–110)
CLARITY: ABNORMAL
CO2: 31 MMOL/L (ref 21–32)
COLOR: YELLOW
CREAT SERPL-MCNC: 1.3 MG/DL (ref 0.6–1.1)
DIFFERENTIAL TYPE: ABNORMAL
EKG ATRIAL RATE: 52 BPM
EKG DIAGNOSIS: NORMAL
EKG P AXIS: 53 DEGREES
EKG P-R INTERVAL: 170 MS
EKG Q-T INTERVAL: 414 MS
EKG QRS DURATION: 88 MS
EKG QTC CALCULATION (BAZETT): 385 MS
EKG R AXIS: 10 DEGREES
EKG T AXIS: 51 DEGREES
EKG VENTRICULAR RATE: 52 BPM
EOSINOPHILS ABSOLUTE: 0.1 K/CU MM
EOSINOPHILS RELATIVE PERCENT: 1.5 % (ref 0–3)
GFR AFRICAN AMERICAN: 51 ML/MIN/1.73M2
GFR NON-AFRICAN AMERICAN: 42 ML/MIN/1.73M2
GLUCOSE BLD-MCNC: 133 MG/DL (ref 70–99)
GLUCOSE, URINE: NEGATIVE MG/DL
HCT VFR BLD CALC: 37 % (ref 37–47)
HEMOGLOBIN: 11.6 GM/DL (ref 12.5–16)
IMMATURE NEUTROPHIL %: 0.3 % (ref 0–0.43)
INR BLD: 1.08 INDEX
KETONES, URINE: NEGATIVE MG/DL
LEUKOCYTE ESTERASE, URINE: ABNORMAL
LYMPHOCYTES ABSOLUTE: 2.7 K/CU MM
LYMPHOCYTES RELATIVE PERCENT: 28.2 % (ref 24–44)
MCH RBC QN AUTO: 30.8 PG (ref 27–31)
MCHC RBC AUTO-ENTMCNC: 31.4 % (ref 32–36)
MCV RBC AUTO: 98.1 FL (ref 78–100)
MONOCYTES ABSOLUTE: 0.6 K/CU MM
MONOCYTES RELATIVE PERCENT: 6.7 % (ref 0–4)
MUCUS: ABNORMAL HPF
NITRITE URINE, QUANTITATIVE: NEGATIVE
NUCLEATED RBC %: 0 %
PDW BLD-RTO: 13.2 % (ref 11.7–14.9)
PH, URINE: 7 (ref 5–8)
PLATELET # BLD: 378 K/CU MM (ref 140–440)
PMV BLD AUTO: 9.5 FL (ref 7.5–11.1)
POTASSIUM SERPL-SCNC: 4.3 MMOL/L (ref 3.5–5.1)
PROTEIN UA: NEGATIVE MG/DL
PROTHROMBIN TIME: 12.3 SECONDS (ref 9.12–12.5)
RBC # BLD: 3.77 M/CU MM (ref 4.2–5.4)
RBC URINE: 1 /HPF (ref 0–6)
SEGMENTED NEUTROPHILS ABSOLUTE COUNT: 5.9 K/CU MM
SEGMENTED NEUTROPHILS RELATIVE PERCENT: 62.7 % (ref 36–66)
SODIUM BLD-SCNC: 144 MMOL/L (ref 135–145)
SPECIFIC GRAVITY UA: 1.02 (ref 1–1.03)
SQUAMOUS EPITHELIAL: 10 /HPF
TOTAL IMMATURE NEUTOROPHIL: 0.03 K/CU MM
TOTAL NUCLEATED RBC: 0 K/CU MM
TRICHOMONAS: ABNORMAL /HPF
UROBILINOGEN, URINE: NORMAL MG/DL (ref 0.2–1)
WBC # BLD: 9.4 K/CU MM (ref 4–10.5)
WBC UA: 1 /HPF (ref 0–5)

## 2018-02-16 RX ORDER — BUDESONIDE AND FORMOTEROL FUMARATE DIHYDRATE 160; 4.5 UG/1; UG/1
2 AEROSOL RESPIRATORY (INHALATION) 2 TIMES DAILY
COMMUNITY
End: 2018-06-18

## 2018-02-16 RX ORDER — ALBUTEROL SULFATE 90 UG/1
2 AEROSOL, METERED RESPIRATORY (INHALATION) PRN
COMMUNITY
End: 2018-12-11 | Stop reason: SDUPTHER

## 2018-02-16 RX ORDER — ASPIRIN 325 MG
325 TABLET ORAL PRN
COMMUNITY
End: 2018-03-27

## 2018-02-16 RX ORDER — ONDANSETRON HYDROCHLORIDE 8 MG/1
8 TABLET, FILM COATED ORAL PRN
COMMUNITY
End: 2019-06-04

## 2018-02-16 ASSESSMENT — PAIN SCALES - GENERAL: PAINLEVEL_OUTOF10: 0

## 2018-02-19 ENCOUNTER — OFFICE VISIT (OUTPATIENT)
Dept: FAMILY MEDICINE CLINIC | Age: 63
End: 2018-02-19

## 2018-02-19 VITALS
SYSTOLIC BLOOD PRESSURE: 100 MMHG | HEART RATE: 69 BPM | BODY MASS INDEX: 29.59 KG/M2 | WEIGHT: 177.6 LBS | HEIGHT: 65 IN | DIASTOLIC BLOOD PRESSURE: 60 MMHG

## 2018-02-19 DIAGNOSIS — F41.9 ANXIETY: Primary | ICD-10-CM

## 2018-02-19 DIAGNOSIS — F41.8 SITUATIONAL ANXIETY: ICD-10-CM

## 2018-02-19 DIAGNOSIS — C34.91 SQUAMOUS CELL LUNG CANCER, RIGHT (HCC): ICD-10-CM

## 2018-02-19 DIAGNOSIS — F51.01 PRIMARY INSOMNIA: ICD-10-CM

## 2018-02-19 PROCEDURE — 99213 OFFICE O/P EST LOW 20 MIN: CPT | Performed by: FAMILY MEDICINE

## 2018-02-19 RX ORDER — ZOLPIDEM TARTRATE 10 MG/1
10 TABLET ORAL NIGHTLY PRN
Qty: 14 TABLET | Refills: 0 | Status: SHIPPED | OUTPATIENT
Start: 2018-02-19 | End: 2018-03-05

## 2018-02-19 NOTE — PROGRESS NOTES
Patient ID: Heladio Overall 1955      HPI lung cancer: Has been seeing her pulmonologist and her oncologist for several months now. She has not started any treatment as of yet. Per patient, there concerned about her lung cancer being very close to her bronchial tube. She is scheduled for an outpatient bronchoscopy on Wednesday with possible biopsy for lymph nodes check. She is nervous about this. She is not sure if she wants to get chemotherapy and radiation yet. She is having chest pain at the site of her lung cancer. She is been taking ibuprofen and Advil for the pain there. But has been told she could not take anymore until after her surgical procedure. She was told that she would be going home after her procedure. Anxiety/insomnia: Has difficulty staying asleep since being diagnosed with lung cancer. Is able to fall asleep easily. There is nervous about her upcoming treatments. Review of Systems   Psychiatric/Behavioral: Positive for sleep disturbance. The patient is nervous/anxious.         Patient Active Problem List   Diagnosis    Hyperlipidemia    Osteoarthritis of finger    Sciatica    Shingles (herpes zoster) polyneuropathy    Essential hypertension    Abnormal mammogram of left breast    Mass of upper lobe of right lung    Hemoptysis    Moderate COPD (chronic obstructive pulmonary disease) (HCC)    Squamous cell lung cancer, right (HCC)    Situational anxiety    Primary insomnia       Past Medical History:   Diagnosis Date    Anxiety     Arthritis     \"Fingers, Back\"    Bronchitis Last Episode 11-17    Chronic back pain     COPD (chronic obstructive pulmonary disease) ([facility]     Sees Dr. Neeru Castillo    Depression     Diverticulosis 06/2013    Extensive per CT    Hemoptysis 12/06/2017    Hyperlipidemia 2009    Hypertension 2009    Lung mass     rul- scheduled to bronch 12/74/2017    Panic attacks     Pneumonia Dx 1-17    Right Lung Cancer Dx 10-17    Shingles Dx

## 2018-03-07 ENCOUNTER — HOSPITAL ENCOUNTER (OUTPATIENT)
Dept: NUCLEAR MEDICINE | Age: 63
Discharge: HOME | End: 2018-03-07
Attending: THORACIC SURGERY (CARDIOTHORACIC VASCULAR SURGERY) | Admitting: THORACIC SURGERY (CARDIOTHORACIC VASCULAR SURGERY)

## 2018-03-07 DIAGNOSIS — C34.91 MALIGNANT NEOPLASM OF UNSPECIFIED PART OF RIGHT BRONCHUS OR LUNG (HCC): ICD-10-CM

## 2018-03-07 DIAGNOSIS — C34.91 PRIMARY LUNG CANCER WITH METASTASIS FROM LUNG TO OTHER SITE, RIGHT (HCC): ICD-10-CM

## 2018-03-07 RX ORDER — KIT FOR THE PREPARATION OF TECHNETIUM TC 99M PENTETATE 20 MG/1
3 INJECTION, POWDER, LYOPHILIZED, FOR SOLUTION INTRAVENOUS; RESPIRATORY (INHALATION)
Status: COMPLETED | OUTPATIENT
Start: 2018-03-07 | End: 2018-03-07

## 2018-03-07 RX ADMIN — Medication 5 MILLICURIE: at 12:10

## 2018-03-07 RX ADMIN — KIT FOR THE PREPARATION OF TECHNETIUM TC 99M PENTETATE 3 MILLICURIE: 20 INJECTION, POWDER, LYOPHILIZED, FOR SOLUTION INTRAVENOUS; RESPIRATORY (INHALATION) at 11:50

## 2018-03-12 ENCOUNTER — OFFICE VISIT (OUTPATIENT)
Dept: PULMONOLOGY | Age: 63
End: 2018-03-12

## 2018-03-12 VITALS
OXYGEN SATURATION: 95 % | DIASTOLIC BLOOD PRESSURE: 80 MMHG | HEART RATE: 65 BPM | RESPIRATION RATE: 20 BRPM | HEIGHT: 65 IN | WEIGHT: 175 LBS | SYSTOLIC BLOOD PRESSURE: 126 MMHG | BODY MASS INDEX: 29.16 KG/M2

## 2018-03-12 DIAGNOSIS — J44.9 MODERATE COPD (CHRONIC OBSTRUCTIVE PULMONARY DISEASE) (HCC): ICD-10-CM

## 2018-03-12 DIAGNOSIS — C34.91 SQUAMOUS CELL LUNG CANCER, RIGHT (HCC): Primary | ICD-10-CM

## 2018-03-12 PROCEDURE — 99213 OFFICE O/P EST LOW 20 MIN: CPT | Performed by: INTERNAL MEDICINE

## 2018-03-12 NOTE — PROGRESS NOTES
SUBJECTIVE: Chief complaintsquamous cell carcinoma lung breath upper lobe with moderate COPD    Fortunately Mrs. Alcantar has not had any recent bronchitic infections. She denies hemoptysis or chest pain. She is still being evaluated for possible surgical intervention which would probably include a total pneumonectomy on the right side. She has not received preoperative chemo or radiation therapy. She has undergone EBUS home mediastinal lymph node biopsy which is negative for metastatic cancer  OBJECTIVE:  /80   Pulse 65   Resp 20   Ht 5' 5\" (1.651 m)   Wt 175 lb (79.4 kg)   LMP 01/06/2009   SpO2 95%   BMI 29.12 kg/m²      Physical Exam:  Constitutional:  She appears well developed and well-nourished. Neck:  Supple, No palpable lymphadenopathy, No JVD  Cardiovascular:  S1, S2 Normal, Regular rhythm, no murmurs or gallops, No pericardial  rubs. Pulmonary:  Breath sounds are mildly diminished on the right as compared to left but not much change. There are a few scattered bilateral rhonchi. No wheezing is noted  Abdomen: No epigastric pain, No distention. No organomegaly   Extremities: no edema, No DVT  Neurologic:  Awake and Alert, No focal deficits    Radiology: Chest x-ray 3/7/18  FINDINGS:   Previously noted right upper lobe opacity has improved with improving   aeration.  The left lung is clear.  Stable cardiomediastinal silhouette.  No   acute fracture, dislocation, or bony destructive process.           Impression   Improving right upper lobe mass with volume loss. PFT: Spirometry done at [facility] on 2/26/18 demonstrated a moderate obstructive airway disease with no significant response to bronchodilators        ASSESSMENT:    1. Squamous cell lung cancer, right ([address]    2.  Moderate COPD (chronic obstructive pulmonary disease) (HCC)          PLAN:  I do think she could tolerate a right-sided pneumonectomy since she has an FEV1 of 1.99 L even though she does show a moderate to severe

## 2018-03-13 ENCOUNTER — HOSPITAL ENCOUNTER (OUTPATIENT)
Dept: NUCLEAR MEDICINE | Age: 63
Discharge: HOME | End: 2018-03-13
Admitting: THORACIC SURGERY (CARDIOTHORACIC VASCULAR SURGERY)

## 2018-03-13 DIAGNOSIS — C34.90 MALIGNANT NEOPLASM OF LUNG, UNSPECIFIED LATERALITY, UNSPECIFIED PART OF LUNG (HCC): ICD-10-CM

## 2018-03-27 ENCOUNTER — HOSPITAL ENCOUNTER (OUTPATIENT)
Dept: PREADMISSION TESTING | Age: 63
Discharge: HOME | End: 2018-03-27
Attending: THORACIC SURGERY (CARDIOTHORACIC VASCULAR SURGERY) | Admitting: THORACIC SURGERY (CARDIOTHORACIC VASCULAR SURGERY)

## 2018-03-27 VITALS
DIASTOLIC BLOOD PRESSURE: 74 MMHG | WEIGHT: 179.5 LBS | HEART RATE: 62 BPM | RESPIRATION RATE: 16 BRPM | BODY MASS INDEX: 31.8 KG/M2 | TEMPERATURE: 97.6 F | SYSTOLIC BLOOD PRESSURE: 141 MMHG | HEIGHT: 63 IN | OXYGEN SATURATION: 99 %

## 2018-03-27 LAB
ANION GAP SERPL CALCULATED.3IONS-SCNC: 10 MMOL/L (ref 4–16)
APTT: 29.1 SECONDS (ref 21.2–33)
BACTERIA: ABNORMAL /HPF
BASOPHILS ABSOLUTE: 0.1 K/CU MM
BASOPHILS RELATIVE PERCENT: 0.9 % (ref 0–1)
BILIRUBIN URINE: NEGATIVE MG/DL
BLOOD, URINE: NEGATIVE
BUN BLDV-MCNC: 24 MG/DL (ref 6–23)
CHLORIDE BLD-SCNC: 101 MMOL/L (ref 99–110)
CLARITY: CLEAR
CO2: 30 MMOL/L (ref 21–32)
COLOR: YELLOW
CREAT SERPL-MCNC: 1.2 MG/DL (ref 0.6–1.1)
DIFFERENTIAL TYPE: ABNORMAL
EKG ATRIAL RATE: 54 BPM
EKG DIAGNOSIS: NORMAL
EKG P AXIS: 56 DEGREES
EKG P-R INTERVAL: 182 MS
EKG Q-T INTERVAL: 410 MS
EKG QRS DURATION: 84 MS
EKG QTC CALCULATION (BAZETT): 388 MS
EKG R AXIS: -2 DEGREES
EKG T AXIS: 38 DEGREES
EKG VENTRICULAR RATE: 54 BPM
EOSINOPHILS ABSOLUTE: 0.1 K/CU MM
EOSINOPHILS RELATIVE PERCENT: 1.5 % (ref 0–3)
GFR AFRICAN AMERICAN: 55 ML/MIN/1.73M2
GFR NON-AFRICAN AMERICAN: 46 ML/MIN/1.73M2
GLUCOSE BLD-MCNC: 107 MG/DL (ref 70–99)
GLUCOSE, URINE: NEGATIVE MG/DL
HCT VFR BLD CALC: 40.8 % (ref 37–47)
HEMOGLOBIN: 13.2 GM/DL (ref 12.5–16)
IMMATURE NEUTROPHIL %: 0.2 % (ref 0–0.43)
INR BLD: 1.05 INDEX
KETONES, URINE: NEGATIVE MG/DL
LEUKOCYTE ESTERASE, URINE: NEGATIVE
LYMPHOCYTES ABSOLUTE: 2.7 K/CU MM
LYMPHOCYTES RELATIVE PERCENT: 31 % (ref 24–44)
MCH RBC QN AUTO: 31.6 PG (ref 27–31)
MCHC RBC AUTO-ENTMCNC: 32.4 % (ref 32–36)
MCV RBC AUTO: 97.6 FL (ref 78–100)
MONOCYTES ABSOLUTE: 0.5 K/CU MM
MONOCYTES RELATIVE PERCENT: 6 % (ref 0–4)
NITRITE URINE, QUANTITATIVE: NEGATIVE
NUCLEATED RBC %: 0 %
PDW BLD-RTO: 13.8 % (ref 11.7–14.9)
PH, URINE: 6 (ref 5–8)
PLATELET # BLD: 429 K/CU MM (ref 140–440)
PMV BLD AUTO: 8.9 FL (ref 7.5–11.1)
POTASSIUM SERPL-SCNC: 5 MMOL/L (ref 3.5–5.1)
PROTEIN UA: NEGATIVE MG/DL
PROTHROMBIN TIME: 12 SECONDS (ref 9.12–12.5)
RBC # BLD: 4.18 M/CU MM (ref 4.2–5.4)
RBC URINE: <1 /HPF (ref 0–6)
SEGMENTED NEUTROPHILS ABSOLUTE COUNT: 5.2 K/CU MM
SEGMENTED NEUTROPHILS RELATIVE PERCENT: 60.4 % (ref 36–66)
SODIUM BLD-SCNC: 141 MMOL/L (ref 135–145)
SPECIFIC GRAVITY UA: 1.01 (ref 1–1.03)
SQUAMOUS EPITHELIAL: <1 /HPF
TOTAL IMMATURE NEUTOROPHIL: 0.02 K/CU MM
TOTAL NUCLEATED RBC: 0 K/CU MM
TRICHOMONAS: ABNORMAL /HPF
UROBILINOGEN, URINE: NORMAL MG/DL (ref 0.2–1)
WBC # BLD: 8.6 K/CU MM (ref 4–10.5)
WBC UA: ABNORMAL /HPF (ref 0–5)

## 2018-03-27 ASSESSMENT — PAIN SCALES - GENERAL: PAINLEVEL_OUTOF10: 0

## 2018-03-27 NOTE — PRE-PROCEDURE INSTRUCTIONS
See scanned instruction form, instructed to bring Inhalers, instructed to take Tenoretic 100-25 mg with just a sip of water only the morning of surgery per anesthesia department

## 2018-04-11 PROBLEM — C34.11 PRIMARY CANCER OF RIGHT UPPER LOBE OF LUNG (HCC): Status: ACTIVE | Noted: 2018-04-11

## 2018-04-18 ENCOUNTER — TELEPHONE (OUTPATIENT)
Dept: FAMILY MEDICINE CLINIC | Age: 63
End: 2018-04-18

## 2018-04-18 ENCOUNTER — CARE COORDINATION (OUTPATIENT)
Dept: CASE MANAGEMENT | Age: 63
End: 2018-04-18

## 2018-04-18 DIAGNOSIS — C34.11 PRIMARY CANCER OF RIGHT UPPER LOBE OF LUNG (HCC): Primary | ICD-10-CM

## 2018-04-23 ENCOUNTER — OFFICE VISIT (OUTPATIENT)
Dept: FAMILY MEDICINE CLINIC | Age: 63
End: 2018-04-23

## 2018-04-23 VITALS
HEIGHT: 63 IN | SYSTOLIC BLOOD PRESSURE: 130 MMHG | HEART RATE: 54 BPM | DIASTOLIC BLOOD PRESSURE: 80 MMHG | WEIGHT: 179 LBS | BODY MASS INDEX: 31.71 KG/M2

## 2018-04-23 DIAGNOSIS — Z09 HOSPITAL DISCHARGE FOLLOW-UP: Primary | ICD-10-CM

## 2018-04-23 DIAGNOSIS — C34.91 SQUAMOUS CELL LUNG CANCER, RIGHT (HCC): ICD-10-CM

## 2018-04-23 DIAGNOSIS — Z91.048 ALLERGY TO ADHESIVE TAPE: ICD-10-CM

## 2018-04-23 PROCEDURE — 1111F DSCHRG MED/CURRENT MED MERGE: CPT | Performed by: FAMILY MEDICINE

## 2018-04-23 PROCEDURE — 99215 OFFICE O/P EST HI 40 MIN: CPT | Performed by: FAMILY MEDICINE

## 2018-04-23 ASSESSMENT — PATIENT HEALTH QUESTIONNAIRE - PHQ9
2. FEELING DOWN, DEPRESSED OR HOPELESS: 1
1. LITTLE INTEREST OR PLEASURE IN DOING THINGS: 1
SUM OF ALL RESPONSES TO PHQ QUESTIONS 1-9: 2
SUM OF ALL RESPONSES TO PHQ9 QUESTIONS 1 & 2: 2

## 2018-04-27 ENCOUNTER — HOSPITAL ENCOUNTER (OUTPATIENT)
Dept: GENERAL RADIOLOGY | Age: 63
Discharge: HOME | End: 2018-04-27
Attending: PHYSICIAN ASSISTANT | Admitting: PHYSICIAN ASSISTANT

## 2018-04-27 DIAGNOSIS — C34.91 SQUAMOUS CELL LUNG CANCER, RIGHT (HCC): ICD-10-CM

## 2018-05-03 NOTE — TELEPHONE ENCOUNTER
Left message at imaging [phone] [facility] mobile unit to call back regarding results. (2) very limited

## 2018-06-01 ENCOUNTER — HOSPITAL ENCOUNTER (OUTPATIENT)
Dept: GENERAL RADIOLOGY | Age: 63
Discharge: HOME | End: 2018-06-01
Attending: FAMILY MEDICINE | Admitting: THORACIC SURGERY (CARDIOTHORACIC VASCULAR SURGERY)

## 2018-06-01 DIAGNOSIS — R91.8 MASS OF UPPER LOBE OF RIGHT LUNG: ICD-10-CM

## 2018-06-07 ENCOUNTER — TELEPHONE (OUTPATIENT)
Dept: FAMILY MEDICINE CLINIC | Age: 63
End: 2018-06-07

## 2018-06-11 ENCOUNTER — OFFICE VISIT (OUTPATIENT)
Dept: PULMONOLOGY | Age: 63
End: 2018-06-11

## 2018-06-11 VITALS
WEIGHT: 185.2 LBS | HEIGHT: 65 IN | HEART RATE: 71 BPM | BODY MASS INDEX: 30.86 KG/M2 | SYSTOLIC BLOOD PRESSURE: 136 MMHG | DIASTOLIC BLOOD PRESSURE: 74 MMHG | OXYGEN SATURATION: 98 %

## 2018-06-11 DIAGNOSIS — C34.11 PRIMARY CANCER OF RIGHT UPPER LOBE OF LUNG (HCC): Primary | ICD-10-CM

## 2018-06-11 DIAGNOSIS — C34.91 SQUAMOUS CELL LUNG CANCER, RIGHT (HCC): ICD-10-CM

## 2018-06-11 DIAGNOSIS — J44.9 MODERATE COPD (CHRONIC OBSTRUCTIVE PULMONARY DISEASE) (HCC): ICD-10-CM

## 2018-06-11 PROCEDURE — 99213 OFFICE O/P EST LOW 20 MIN: CPT | Performed by: INTERNAL MEDICINE

## 2018-06-18 ENCOUNTER — OFFICE VISIT (OUTPATIENT)
Dept: FAMILY MEDICINE CLINIC | Age: 63
End: 2018-06-18

## 2018-06-18 VITALS
BODY MASS INDEX: 31.28 KG/M2 | DIASTOLIC BLOOD PRESSURE: 84 MMHG | HEART RATE: 82 BPM | WEIGHT: 188 LBS | SYSTOLIC BLOOD PRESSURE: 122 MMHG

## 2018-06-18 DIAGNOSIS — I10 ESSENTIAL HYPERTENSION: ICD-10-CM

## 2018-06-18 DIAGNOSIS — C34.91 SQUAMOUS CELL LUNG CANCER, RIGHT (HCC): ICD-10-CM

## 2018-06-18 DIAGNOSIS — M54.50 CHRONIC RIGHT-SIDED LOW BACK PAIN WITHOUT SCIATICA: ICD-10-CM

## 2018-06-18 DIAGNOSIS — G89.29 CHRONIC RIGHT-SIDED LOW BACK PAIN WITHOUT SCIATICA: ICD-10-CM

## 2018-06-18 DIAGNOSIS — Z23 NEED FOR SHINGLES VACCINE: ICD-10-CM

## 2018-06-18 DIAGNOSIS — J44.9 MODERATE COPD (CHRONIC OBSTRUCTIVE PULMONARY DISEASE) (HCC): Primary | ICD-10-CM

## 2018-06-18 PROBLEM — F41.8 SITUATIONAL ANXIETY: Status: RESOLVED | Noted: 2018-02-19 | Resolved: 2018-06-18

## 2018-06-18 PROBLEM — R04.2 HEMOPTYSIS: Status: RESOLVED | Noted: 2017-12-06 | Resolved: 2018-06-18

## 2018-06-18 PROCEDURE — 99214 OFFICE O/P EST MOD 30 MIN: CPT | Performed by: FAMILY MEDICINE

## 2018-06-18 RX ORDER — IBUPROFEN 800 MG/1
800 TABLET ORAL EVERY 6 HOURS PRN
Qty: 60 TABLET | Refills: 1 | Status: SHIPPED | OUTPATIENT
Start: 2018-06-18 | End: 2018-12-11 | Stop reason: SDUPTHER

## 2018-06-18 RX ORDER — ALBUTEROL SULFATE 2.5 MG/3ML
2.5 SOLUTION RESPIRATORY (INHALATION) EVERY 6 HOURS PRN
Qty: 30 EACH | Refills: 2 | Status: SHIPPED | OUTPATIENT
Start: 2018-06-18 | End: 2019-06-04 | Stop reason: SDUPTHER

## 2018-06-18 RX ORDER — ATENOLOL AND CHLORTHALIDONE TABLET 100; 25 MG/1; MG/1
1 TABLET ORAL DAILY
Qty: 90 TABLET | Refills: 1 | Status: SHIPPED | OUTPATIENT
Start: 2018-06-18 | End: 2018-12-11 | Stop reason: SDUPTHER

## 2018-06-18 ASSESSMENT — ENCOUNTER SYMPTOMS
BACK PAIN: 1
DIFFICULTY BREATHING: 1
SHORTNESS OF BREATH: 1

## 2018-06-18 ASSESSMENT — COPD QUESTIONNAIRES: COPD: 1

## 2018-06-19 ENCOUNTER — TELEPHONE (OUTPATIENT)
Dept: PULMONOLOGY | Age: 63
End: 2018-06-19

## 2018-06-19 RX ORDER — FLUTICASONE FUROATE AND VILANTEROL 100; 25 UG/1; UG/1
1 POWDER RESPIRATORY (INHALATION) DAILY
Qty: 1 EACH | Refills: 11 | Status: SHIPPED | OUTPATIENT
Start: 2018-06-19 | End: 2019-06-08 | Stop reason: SDUPTHER

## 2018-06-29 ENCOUNTER — HOSPITAL ENCOUNTER (OUTPATIENT)
Dept: OTHER | Age: 63
Discharge: HOME | End: 2018-06-30
Attending: THORACIC SURGERY (CARDIOTHORACIC VASCULAR SURGERY) | Admitting: THORACIC SURGERY (CARDIOTHORACIC VASCULAR SURGERY)

## 2018-07-01 ENCOUNTER — HOSPITAL ENCOUNTER (OUTPATIENT)
Dept: OTHER | Age: 63
Discharge: HOME | End: 2018-07-31
Attending: THORACIC SURGERY (CARDIOTHORACIC VASCULAR SURGERY) | Admitting: THORACIC SURGERY (CARDIOTHORACIC VASCULAR SURGERY)

## 2018-07-05 ENCOUNTER — HOSPITAL ENCOUNTER (OUTPATIENT)
Dept: OTHER | Age: 63
Discharge: HOME | End: 2018-07-05
Attending: INTERNAL MEDICINE | Admitting: INTERNAL MEDICINE

## 2018-07-05 LAB
ALBUMIN SERPL-MCNC: 4.2 GM/DL (ref 3.4–5)
ALP BLD-CCNC: 113 IU/L (ref 40–128)
ALT SERPL-CCNC: 14 U/L (ref 10–40)
ANION GAP SERPL CALCULATED.3IONS-SCNC: 17 MMOL/L (ref 4–16)
AST SERPL-CCNC: 17 IU/L (ref 15–37)
BILIRUB SERPL-MCNC: 0.2 MG/DL (ref 0–1)
BUN BLDV-MCNC: 20 MG/DL (ref 6–23)
CALCIUM SERPL-MCNC: 9.9 MG/DL (ref 8.3–10.6)
CHLORIDE BLD-SCNC: 101 MMOL/L (ref 99–110)
CO2: 25 MMOL/L (ref 21–32)
CREAT SERPL-MCNC: 1.2 MG/DL (ref 0.6–1.1)
GFR AFRICAN AMERICAN: 55 ML/MIN/1.73M2
GFR NON-AFRICAN AMERICAN: 46 ML/MIN/1.73M2
GLUCOSE BLD-MCNC: 127 MG/DL (ref 70–99)
POTASSIUM SERPL-SCNC: 4.6 MMOL/L (ref 3.5–5.1)
SODIUM BLD-SCNC: 143 MMOL/L (ref 135–145)
TOTAL PROTEIN: 6.9 GM/DL (ref 6.4–8.2)

## 2018-07-10 ENCOUNTER — HOSPITAL ENCOUNTER (OUTPATIENT)
Dept: OTHER | Age: 63
Discharge: HOME | End: 2018-07-10
Attending: NURSE PRACTITIONER | Admitting: NURSE PRACTITIONER

## 2018-07-10 LAB
ALBUMIN SERPL-MCNC: 4.3 GM/DL (ref 3.4–5)
ALP BLD-CCNC: 111 IU/L (ref 40–128)
ALT SERPL-CCNC: 14 U/L (ref 10–40)
ANION GAP SERPL CALCULATED.3IONS-SCNC: 14 MMOL/L (ref 4–16)
AST SERPL-CCNC: 18 IU/L (ref 15–37)
BILIRUB SERPL-MCNC: 0.2 MG/DL (ref 0–1)
BUN BLDV-MCNC: 16 MG/DL (ref 6–23)
CALCIUM SERPL-MCNC: 9.5 MG/DL (ref 8.3–10.6)
CHLORIDE BLD-SCNC: 102 MMOL/L (ref 99–110)
CO2: 27 MMOL/L (ref 21–32)
CREAT SERPL-MCNC: 1.1 MG/DL (ref 0.6–1.1)
GFR AFRICAN AMERICAN: >60 ML/MIN/1.73M2
GFR NON-AFRICAN AMERICAN: 50 ML/MIN/1.73M2
GLUCOSE BLD-MCNC: 91 MG/DL (ref 70–99)
POTASSIUM SERPL-SCNC: 4.8 MMOL/L (ref 3.5–5.1)
SODIUM BLD-SCNC: 143 MMOL/L (ref 135–145)
TOTAL PROTEIN: 6.9 GM/DL (ref 6.4–8.2)

## 2018-07-24 ENCOUNTER — HOSPITAL ENCOUNTER (OUTPATIENT)
Dept: OTHER | Age: 63
Discharge: HOME | End: 2018-07-24
Attending: INTERNAL MEDICINE | Admitting: INTERNAL MEDICINE

## 2018-07-24 LAB
ALBUMIN SERPL-MCNC: 4.4 GM/DL (ref 3.4–5)
ALP BLD-CCNC: 94 IU/L (ref 40–128)
ALT SERPL-CCNC: 26 U/L (ref 10–40)
ANION GAP SERPL CALCULATED.3IONS-SCNC: 16 MMOL/L (ref 4–16)
AST SERPL-CCNC: 20 IU/L (ref 15–37)
BILIRUB SERPL-MCNC: 0.2 MG/DL (ref 0–1)
BUN BLDV-MCNC: 20 MG/DL (ref 6–23)
CALCIUM SERPL-MCNC: 9.2 MG/DL (ref 8.3–10.6)
CHLORIDE BLD-SCNC: 103 MMOL/L (ref 99–110)
CO2: 24 MMOL/L (ref 21–32)
CREAT SERPL-MCNC: 1.3 MG/DL (ref 0.6–1.1)
GFR AFRICAN AMERICAN: 50 ML/MIN/1.73M2
GFR NON-AFRICAN AMERICAN: 42 ML/MIN/1.73M2
GLUCOSE BLD-MCNC: 119 MG/DL (ref 70–99)
POTASSIUM SERPL-SCNC: 4.2 MMOL/L (ref 3.5–5.1)
SODIUM BLD-SCNC: 143 MMOL/L (ref 135–145)
TOTAL PROTEIN: 7.1 GM/DL (ref 6.4–8.2)

## 2018-08-14 ENCOUNTER — HOSPITAL ENCOUNTER (OUTPATIENT)
Dept: OTHER | Age: 63
Discharge: HOME | End: 2018-08-14
Attending: INTERNAL MEDICINE | Admitting: INTERNAL MEDICINE

## 2018-08-14 LAB
ALBUMIN SERPL-MCNC: 4 GM/DL (ref 3.4–5)
ALP BLD-CCNC: 114 IU/L (ref 40–128)
ALT SERPL-CCNC: 13 U/L (ref 10–40)
ANION GAP SERPL CALCULATED.3IONS-SCNC: 17 MMOL/L (ref 4–16)
AST SERPL-CCNC: 15 IU/L (ref 15–37)
BILIRUB SERPL-MCNC: 0.3 MG/DL (ref 0–1)
BUN BLDV-MCNC: 25 MG/DL (ref 6–23)
CALCIUM SERPL-MCNC: 9.7 MG/DL (ref 8.3–10.6)
CHLORIDE BLD-SCNC: 102 MMOL/L (ref 99–110)
CO2: 24 MMOL/L (ref 21–32)
CREAT SERPL-MCNC: 1.1 MG/DL (ref 0.6–1.1)
GFR AFRICAN AMERICAN: >60 ML/MIN/1.73M2
GFR NON-AFRICAN AMERICAN: 50 ML/MIN/1.73M2
GLUCOSE BLD-MCNC: 127 MG/DL (ref 70–99)
POTASSIUM SERPL-SCNC: 4.3 MMOL/L (ref 3.5–5.1)
SODIUM BLD-SCNC: 143 MMOL/L (ref 135–145)
TOTAL PROTEIN: 6.4 GM/DL (ref 6.4–8.2)

## 2018-08-21 ENCOUNTER — HOSPITAL ENCOUNTER (OUTPATIENT)
Dept: GENERAL RADIOLOGY | Age: 63
Discharge: HOME | End: 2018-08-21
Attending: NURSE PRACTITIONER | Admitting: NURSE PRACTITIONER

## 2018-08-21 DIAGNOSIS — R06.02 SOB (SHORTNESS OF BREATH): ICD-10-CM

## 2018-08-21 DIAGNOSIS — C34.11 CANCER OF BRONCHUS OF RIGHT UPPER LOBE (HCC): ICD-10-CM

## 2018-08-23 ENCOUNTER — HOSPITAL ENCOUNTER (OUTPATIENT)
Dept: OTHER | Age: 63
Discharge: HOME | End: 2018-08-23
Attending: INTERNAL MEDICINE | Admitting: INTERNAL MEDICINE

## 2018-08-23 LAB
ALBUMIN SERPL-MCNC: 4.3 GM/DL (ref 3.4–5)
ALP BLD-CCNC: 92 IU/L (ref 40–128)
ALT SERPL-CCNC: 22 U/L (ref 10–40)
ANION GAP SERPL CALCULATED.3IONS-SCNC: 21 MMOL/L (ref 4–16)
AST SERPL-CCNC: 18 IU/L (ref 15–37)
BILIRUB SERPL-MCNC: 0.2 MG/DL (ref 0–1)
BUN BLDV-MCNC: 27 MG/DL (ref 6–23)
CALCIUM SERPL-MCNC: 9.9 MG/DL (ref 8.3–10.6)
CHLORIDE BLD-SCNC: 95 MMOL/L (ref 99–110)
CO2: 24 MMOL/L (ref 21–32)
CREAT SERPL-MCNC: 1.4 MG/DL (ref 0.6–1.1)
GFR AFRICAN AMERICAN: 46 ML/MIN/1.73M2
GFR NON-AFRICAN AMERICAN: 38 ML/MIN/1.73M2
GLUCOSE BLD-MCNC: 126 MG/DL (ref 70–99)
POTASSIUM SERPL-SCNC: 4.3 MMOL/L (ref 3.5–5.1)
SODIUM BLD-SCNC: 140 MMOL/L (ref 135–145)
TOTAL PROTEIN: 6.6 GM/DL (ref 6.4–8.2)

## 2018-10-08 ENCOUNTER — HOSPITAL ENCOUNTER (OUTPATIENT)
Dept: CARDIAC REHAB | Age: 63
Setting detail: THERAPIES SERIES
Discharge: HOME | End: 2018-10-08
Payer: COMMERCIAL

## 2018-10-08 PROCEDURE — G0424 PULMONARY REHAB W EXER: HCPCS

## 2018-10-09 ENCOUNTER — HOSPITAL ENCOUNTER (OUTPATIENT)
Dept: CARDIAC REHAB | Age: 63
Setting detail: THERAPIES SERIES
Discharge: HOME | End: 2018-10-09
Payer: COMMERCIAL

## 2018-10-09 PROCEDURE — G0424 PULMONARY REHAB W EXER: HCPCS

## 2018-10-11 ENCOUNTER — APPOINTMENT (OUTPATIENT)
Dept: CARDIAC REHAB | Age: 63
End: 2018-10-11
Payer: COMMERCIAL

## 2018-10-15 ENCOUNTER — HOSPITAL ENCOUNTER (OUTPATIENT)
Dept: CARDIAC REHAB | Age: 63
Setting detail: THERAPIES SERIES
Discharge: HOME | End: 2018-10-15
Payer: COMMERCIAL

## 2018-10-15 PROCEDURE — G0424 PULMONARY REHAB W EXER: HCPCS

## 2018-10-16 ENCOUNTER — HOSPITAL ENCOUNTER (OUTPATIENT)
Dept: CARDIAC REHAB | Age: 63
Setting detail: THERAPIES SERIES
Discharge: HOME | End: 2018-10-16
Payer: COMMERCIAL

## 2018-10-16 PROCEDURE — G0424 PULMONARY REHAB W EXER: HCPCS

## 2018-10-18 ENCOUNTER — HOSPITAL ENCOUNTER (OUTPATIENT)
Dept: CARDIAC REHAB | Age: 63
Setting detail: THERAPIES SERIES
Discharge: HOME | End: 2018-10-18
Payer: COMMERCIAL

## 2018-10-18 PROCEDURE — G0424 PULMONARY REHAB W EXER: HCPCS

## 2018-10-22 ENCOUNTER — HOSPITAL ENCOUNTER (OUTPATIENT)
Dept: CARDIAC REHAB | Age: 63
Setting detail: THERAPIES SERIES
Discharge: HOME | End: 2018-10-22
Payer: COMMERCIAL

## 2018-10-22 PROCEDURE — 93798 PHYS/QHP OP CAR RHAB W/ECG: CPT

## 2018-10-22 PROCEDURE — G0424 PULMONARY REHAB W EXER: HCPCS

## 2018-10-23 ENCOUNTER — HOSPITAL ENCOUNTER (OUTPATIENT)
Dept: CARDIAC REHAB | Age: 63
Setting detail: THERAPIES SERIES
Discharge: HOME | End: 2018-10-23
Payer: COMMERCIAL

## 2018-10-23 PROCEDURE — G0424 PULMONARY REHAB W EXER: HCPCS

## 2018-10-25 ENCOUNTER — APPOINTMENT (OUTPATIENT)
Dept: CARDIAC REHAB | Age: 63
End: 2018-10-25
Payer: COMMERCIAL

## 2018-10-29 ENCOUNTER — HOSPITAL ENCOUNTER (OUTPATIENT)
Dept: CARDIAC REHAB | Age: 63
Setting detail: THERAPIES SERIES
Discharge: HOME | End: 2018-10-29
Payer: COMMERCIAL

## 2018-10-29 PROCEDURE — G0424 PULMONARY REHAB W EXER: HCPCS

## 2018-10-30 ENCOUNTER — HOSPITAL ENCOUNTER (OUTPATIENT)
Dept: CARDIAC REHAB | Age: 63
Setting detail: THERAPIES SERIES
Discharge: HOME | End: 2018-10-30
Payer: COMMERCIAL

## 2018-10-30 PROCEDURE — G0424 PULMONARY REHAB W EXER: HCPCS

## 2018-11-01 ENCOUNTER — HOSPITAL ENCOUNTER (OUTPATIENT)
Dept: PET IMAGING | Age: 63
Discharge: HOME | End: 2018-11-01
Payer: COMMERCIAL

## 2018-11-05 ENCOUNTER — HOSPITAL ENCOUNTER (OUTPATIENT)
Dept: PET IMAGING | Age: 63
Discharge: HOME | End: 2018-11-05
Payer: COMMERCIAL

## 2018-11-05 ENCOUNTER — APPOINTMENT (OUTPATIENT)
Dept: CARDIAC REHAB | Age: 63
End: 2018-11-05
Payer: COMMERCIAL

## 2018-11-05 DIAGNOSIS — C34.11 CANCER OF BRONCHUS OF RIGHT UPPER LOBE (HCC): ICD-10-CM

## 2018-11-05 PROCEDURE — A9552 F18 FDG: HCPCS | Performed by: INTERNAL MEDICINE

## 2018-11-05 PROCEDURE — 3430000000 HC RX DIAGNOSTIC RADIOPHARMACEUTICAL: Performed by: INTERNAL MEDICINE

## 2018-11-05 PROCEDURE — 78815 PET IMAGE W/CT SKULL-THIGH: CPT

## 2018-11-05 RX ORDER — FLUDEOXYGLUCOSE F 18 200 MCI/ML
14.88 INJECTION, SOLUTION INTRAVENOUS
Status: COMPLETED | OUTPATIENT
Start: 2018-11-05 | End: 2018-11-05

## 2018-11-05 RX ADMIN — FLUDEOXYGLUCOSE F 18 14.88 MILLICURIE: 200 INJECTION, SOLUTION INTRAVENOUS at 08:06

## 2018-11-06 ENCOUNTER — APPOINTMENT (OUTPATIENT)
Dept: CARDIAC REHAB | Age: 63
End: 2018-11-06
Payer: COMMERCIAL

## 2018-11-08 ENCOUNTER — HOSPITAL ENCOUNTER (OUTPATIENT)
Dept: CARDIAC REHAB | Age: 63
Setting detail: THERAPIES SERIES
End: 2018-11-08
Payer: COMMERCIAL

## 2018-11-12 ENCOUNTER — HOSPITAL ENCOUNTER (OUTPATIENT)
Dept: CARDIAC REHAB | Age: 63
Setting detail: THERAPIES SERIES
Discharge: HOME | End: 2018-11-12
Payer: COMMERCIAL

## 2018-11-12 PROCEDURE — G0424 PULMONARY REHAB W EXER: HCPCS

## 2018-11-13 ENCOUNTER — HOSPITAL ENCOUNTER (OUTPATIENT)
Dept: CARDIAC REHAB | Age: 63
Setting detail: THERAPIES SERIES
Discharge: HOME | End: 2018-11-13
Payer: COMMERCIAL

## 2018-11-13 PROCEDURE — G0424 PULMONARY REHAB W EXER: HCPCS

## 2018-11-15 ENCOUNTER — APPOINTMENT (OUTPATIENT)
Dept: CARDIAC REHAB | Age: 63
End: 2018-11-15
Payer: COMMERCIAL

## 2018-11-19 ENCOUNTER — APPOINTMENT (OUTPATIENT)
Dept: CARDIAC REHAB | Age: 63
End: 2018-11-19
Payer: COMMERCIAL

## 2018-11-20 ENCOUNTER — HOSPITAL ENCOUNTER (OUTPATIENT)
Dept: CARDIAC REHAB | Age: 63
Setting detail: THERAPIES SERIES
Discharge: HOME | End: 2018-11-20
Payer: COMMERCIAL

## 2018-11-22 ENCOUNTER — APPOINTMENT (OUTPATIENT)
Dept: CARDIAC REHAB | Age: 63
End: 2018-11-22
Payer: COMMERCIAL

## 2018-11-26 ENCOUNTER — APPOINTMENT (OUTPATIENT)
Dept: CARDIAC REHAB | Age: 63
End: 2018-11-26
Payer: COMMERCIAL

## 2018-11-27 ENCOUNTER — APPOINTMENT (OUTPATIENT)
Dept: CARDIAC REHAB | Age: 63
End: 2018-11-27
Payer: COMMERCIAL

## 2018-11-29 ENCOUNTER — APPOINTMENT (OUTPATIENT)
Dept: CARDIAC REHAB | Age: 63
End: 2018-11-29
Payer: COMMERCIAL

## 2018-12-11 ENCOUNTER — OFFICE VISIT (OUTPATIENT)
Dept: FAMILY MEDICINE CLINIC | Age: 63
End: 2018-12-11
Payer: COMMERCIAL

## 2018-12-11 VITALS
SYSTOLIC BLOOD PRESSURE: 120 MMHG | BODY MASS INDEX: 33.19 KG/M2 | DIASTOLIC BLOOD PRESSURE: 70 MMHG | HEART RATE: 53 BPM | TEMPERATURE: 98.1 F | WEIGHT: 194.4 LBS | HEIGHT: 64 IN

## 2018-12-11 DIAGNOSIS — Z12.11 SCREEN FOR COLON CANCER: ICD-10-CM

## 2018-12-11 DIAGNOSIS — N81.89 OTHER FEMALE GENITAL PROLAPSE: ICD-10-CM

## 2018-12-11 DIAGNOSIS — I10 ESSENTIAL HYPERTENSION: ICD-10-CM

## 2018-12-11 DIAGNOSIS — Z23 NEEDS FLU SHOT: ICD-10-CM

## 2018-12-11 DIAGNOSIS — J20.9 ACUTE BRONCHITIS, UNSPECIFIED ORGANISM: Primary | ICD-10-CM

## 2018-12-11 PROCEDURE — 90471 IMMUNIZATION ADMIN: CPT | Performed by: FAMILY MEDICINE

## 2018-12-11 PROCEDURE — 90686 IIV4 VACC NO PRSV 0.5 ML IM: CPT | Performed by: FAMILY MEDICINE

## 2018-12-11 PROCEDURE — 99214 OFFICE O/P EST MOD 30 MIN: CPT | Performed by: FAMILY MEDICINE

## 2018-12-11 RX ORDER — ONDANSETRON 4 MG/1
1 TABLET, FILM COATED ORAL DAILY PRN
Refills: 2 | COMMUNITY
Start: 2018-12-05 | End: 2018-12-11

## 2018-12-11 RX ORDER — AZITHROMYCIN 250 MG/1
250 TABLET, FILM COATED ORAL SEE ADMIN INSTRUCTIONS
Qty: 6 TABLET | Refills: 0 | Status: SHIPPED | OUTPATIENT
Start: 2018-12-11 | End: 2018-12-16

## 2018-12-11 RX ORDER — DULOXETIN HYDROCHLORIDE 30 MG/1
CAPSULE, DELAYED RELEASE ORAL
Refills: 0 | COMMUNITY
Start: 2018-10-25 | End: 2019-05-14

## 2018-12-11 RX ORDER — DEXAMETHASONE 4 MG/1
1 TABLET ORAL DAILY
Refills: 0 | COMMUNITY
Start: 2018-10-25 | End: 2019-05-14

## 2018-12-11 RX ORDER — IBUPROFEN 800 MG/1
800 TABLET ORAL EVERY 6 HOURS PRN
Qty: 60 TABLET | Refills: 1 | Status: SHIPPED | OUTPATIENT
Start: 2018-12-11 | End: 2019-07-09

## 2018-12-11 RX ORDER — ATENOLOL AND CHLORTHALIDONE TABLET 100; 25 MG/1; MG/1
1 TABLET ORAL DAILY
Qty: 90 TABLET | Refills: 1 | Status: SHIPPED | OUTPATIENT
Start: 2018-12-11 | End: 2019-06-04 | Stop reason: SDUPTHER

## 2018-12-11 RX ORDER — GABAPENTIN 100 MG/1
1 CAPSULE ORAL 3 TIMES DAILY
Refills: 3 | COMMUNITY
Start: 2018-11-10 | End: 2019-12-04 | Stop reason: SDUPTHER

## 2018-12-11 RX ORDER — ALBUTEROL SULFATE 90 UG/1
2 AEROSOL, METERED RESPIRATORY (INHALATION) PRN
Qty: 1 INHALER | Refills: 2 | Status: SHIPPED | OUTPATIENT
Start: 2018-12-11 | End: 2019-06-04 | Stop reason: SDUPTHER

## 2018-12-11 RX ORDER — BENZONATATE 100 MG/1
1 CAPSULE ORAL 3 TIMES DAILY PRN
Refills: 1 | COMMUNITY
Start: 2018-12-05 | End: 2019-05-14

## 2018-12-11 RX ORDER — MIRTAZAPINE 15 MG/1
1 TABLET, FILM COATED ORAL EVERY EVENING
Refills: 0 | COMMUNITY
Start: 2018-10-15 | End: 2019-11-05

## 2018-12-11 ASSESSMENT — ENCOUNTER SYMPTOMS
COUGH: 1
CONSTIPATION: 0
BLOOD IN STOOL: 0
DIFFICULTY BREATHING: 1
CHEST TIGHTNESS: 0
SHORTNESS OF BREATH: 1
DIARRHEA: 0

## 2018-12-11 ASSESSMENT — COPD QUESTIONNAIRES: COPD: 1

## 2018-12-11 NOTE — PROGRESS NOTES
Vaccine Information Sheet, \"Influenza - Inactivated\"  given to Lesly Hillman, or parent/legal guardian of  Lesly Hillman and verbalized understanding. Patient responses:    Have you ever had a reaction to a flu vaccine? No  Are you able to eat eggs without adverse effects? Yes  Do you have any current illness? Yes  Have you ever had Guillian [place] Syndrome? No    Flu vaccine given per order. Please see immunization tab.

## 2018-12-11 NOTE — PROGRESS NOTES
Patient ID: Francis Marti 1955    . Hypertension   This is a chronic problem. The current episode started more than 1 year ago. The problem is uncontrolled. Associated symptoms include shortness of breath. Pertinent negatives include no chest pain or palpitations. Risk factors for coronary artery disease include post-menopausal state and dyslipidemia. Past treatments include beta blockers and diuretics. There are no compliance problems. COPD   She complains of cough, difficulty breathing and shortness of breath. This is a chronic problem. The problem has been gradually improving. Pertinent negatives include no chest pain. Her symptoms are aggravated by strenuous activity. Her symptoms are not alleviated by steroid inhaler and beta-agonist. Her past medical history is significant for COPD. Cough   This is a new problem. Episode onset: 2 weeks. Associated symptoms include shortness of breath. Pertinent negatives include no chest pain. Her past medical history is significant for COPD. Lung cancer: states she is now \"cancer free\"    Genital prolapse: Now with incontinence when she coughs. Knows this is due to her pelvic prolapse (dx) at a previous pelvic exam    Review of Systems   Respiratory: Positive for cough and shortness of breath. Negative for chest tightness. Cardiovascular: Negative for chest pain and palpitations. Gastrointestinal: Negative for blood in stool, constipation and diarrhea.    Genitourinary:        Urinary incont       Patient Active Problem List   Diagnosis    Hyperlipidemia    Osteoarthritis of finger    Sciatica    Essential hypertension    Abnormal mammogram of left breast    Mass of upper lobe of right lung    Moderate COPD (chronic obstructive pulmonary disease) (HCC)    Squamous cell lung cancer, right (HCC)    Primary insomnia    Primary cancer of right upper lobe of lung ([address]    Other female genital prolapse       Past Medical History:   Diagnosis Date Glucose 08/14/2018 127*    Calcium 08/14/2018 9.7     Alb 08/14/2018 4.0     Total Protein 08/14/2018 6.4     Total Bilirubin 08/14/2018 0.3     ALT 08/14/2018 13     AST 08/14/2018 15     Alkaline Phosphatase 08/14/2018 114     GFR Non- 08/14/2018 50*    GFR  08/14/2018 >60     Anion Gap 08/14/2018 17*   Admission on 07/31/2018, Discharged on 07/31/2018   Component Date Value    WBC 07/31/2018 7.1     RBC 07/31/2018 3.91*    Hemoglobin 07/31/2018 12.1*    Hematocrit 07/31/2018 37.1     MCV 07/31/2018 94.9     MCH 07/31/2018 30.9     MCHC 07/31/2018 32.6     RDW 07/31/2018 13.4     Platelets 62/38/0017 339     MPV 07/31/2018 8.6    Hospital Outpatient Visit on 07/24/2018   Component Date Value    Sodium 07/24/2018 143     Potassium 07/24/2018 4.2     Chloride 07/24/2018 103     CO2 07/24/2018 24     BUN 07/24/2018 20     CREATININE 07/24/2018 1.3*    Glucose 07/24/2018 119*    Calcium 07/24/2018 9.2     Alb 07/24/2018 4.4     Total Protein 07/24/2018 7.1     Total Bilirubin 07/24/2018 0.2     ALT 07/24/2018 26     AST 07/24/2018 20     Alkaline Phosphatase 07/24/2018 94     GFR Non- 07/24/2018 42*    GFR  07/24/2018 50*    Anion Gap 07/24/2018 [address] Outpatient Visit on 07/10/2018   Component Date Value    Sodium 07/10/2018 143     Potassium 07/10/2018 4.8     Chloride 07/10/2018 102     CO2 07/10/2018 27     BUN 07/10/2018 16     CREATININE 07/10/2018 1.1     Glucose 07/10/2018 91     Calcium 07/10/2018 9.5     Alb 07/10/2018 4.3     Total Protein 07/10/2018 6.9     Total Bilirubin 07/10/2018 0.2     ALT 07/10/2018 14     AST 07/10/2018 18     Alkaline Phosphatase 07/10/2018 111     GFR Non- 07/10/2018 50*    GFR  07/10/2018 >60     Anion Gap 07/10/2018 14    Hospital Outpatient Visit on 07/05/2018   Component Date Value    Sodium 07/05/2018 143    

## 2019-01-24 ENCOUNTER — TELEPHONE (OUTPATIENT)
Dept: FAMILY MEDICINE CLINIC | Age: 64
End: 2019-01-24

## 2019-05-01 ENCOUNTER — HOSPITAL ENCOUNTER (OUTPATIENT)
Age: 64
Setting detail: SPECIMEN
Discharge: HOME | End: 2019-05-01
Payer: COMMERCIAL

## 2019-05-01 LAB
ALBUMIN SERPL-MCNC: 4.3 GM/DL (ref 3.4–5)
ALP BLD-CCNC: 99 IU/L (ref 40–129)
ALT SERPL-CCNC: 11 U/L (ref 10–40)
ANION GAP SERPL CALCULATED.3IONS-SCNC: 13 MMOL/L (ref 4–16)
AST SERPL-CCNC: 14 IU/L (ref 15–37)
BILIRUB SERPL-MCNC: 0.2 MG/DL (ref 0–1)
BUN BLDV-MCNC: 19 MG/DL (ref 6–23)
CALCIUM SERPL-MCNC: 9.5 MG/DL (ref 8.3–10.6)
CHLORIDE BLD-SCNC: 101 MMOL/L (ref 99–110)
CO2: 25 MMOL/L (ref 21–32)
CREAT SERPL-MCNC: 1.2 MG/DL (ref 0.6–1.1)
GFR AFRICAN AMERICAN: 55 ML/MIN/1.73M2
GFR NON-AFRICAN AMERICAN: 46 ML/MIN/1.73M2
GLUCOSE BLD-MCNC: 97 MG/DL (ref 70–99)
POTASSIUM SERPL-SCNC: 4.2 MMOL/L (ref 3.5–5.1)
SODIUM BLD-SCNC: 139 MMOL/L (ref 135–145)
TOTAL PROTEIN: 6.6 GM/DL (ref 6.4–8.2)

## 2019-05-01 PROCEDURE — 80053 COMPREHEN METABOLIC PANEL: CPT

## 2019-05-02 ENCOUNTER — HOSPITAL ENCOUNTER (OUTPATIENT)
Dept: CT IMAGING | Age: 64
Discharge: HOME | End: 2019-05-02
Payer: COMMERCIAL

## 2019-05-02 DIAGNOSIS — C34.11 CANCER OF BRONCHUS OF RIGHT UPPER LOBE (HCC): ICD-10-CM

## 2019-05-02 LAB
GFR AFRICAN AMERICAN: 49 ML/MIN/1.73M2
GFR NON-AFRICAN AMERICAN: 41 ML/MIN/1.73M2
POC CREATININE: 1.3 MG/DL (ref 0.6–1.1)

## 2019-05-02 PROCEDURE — 71260 CT THORAX DX C+: CPT

## 2019-05-02 PROCEDURE — 6360000004 HC RX CONTRAST MEDICATION: Performed by: INTERNAL MEDICINE

## 2019-05-02 RX ADMIN — IOPAMIDOL 75 ML: 755 INJECTION, SOLUTION INTRAVENOUS at 09:16

## 2019-05-15 ENCOUNTER — ANESTHESIA EVENT (OUTPATIENT)
Dept: OPERATING ROOM | Age: 64
End: 2019-05-15
Payer: COMMERCIAL

## 2019-05-16 ENCOUNTER — ANESTHESIA (OUTPATIENT)
Dept: OPERATING ROOM | Age: 64
End: 2019-05-16
Payer: COMMERCIAL

## 2019-05-16 ENCOUNTER — HOSPITAL ENCOUNTER (OUTPATIENT)
Age: 64
Setting detail: OUTPATIENT SURGERY
Discharge: HOME | End: 2019-05-16
Attending: THORACIC SURGERY (CARDIOTHORACIC VASCULAR SURGERY) | Admitting: THORACIC SURGERY (CARDIOTHORACIC VASCULAR SURGERY)
Payer: COMMERCIAL

## 2019-05-16 VITALS
RESPIRATION RATE: 17 BRPM | TEMPERATURE: 97.6 F | DIASTOLIC BLOOD PRESSURE: 64 MMHG | SYSTOLIC BLOOD PRESSURE: 122 MMHG | HEIGHT: 63 IN | OXYGEN SATURATION: 100 % | WEIGHT: 186 LBS | BODY MASS INDEX: 32.96 KG/M2 | HEART RATE: 64 BPM

## 2019-05-16 VITALS — SYSTOLIC BLOOD PRESSURE: 108 MMHG | DIASTOLIC BLOOD PRESSURE: 61 MMHG | OXYGEN SATURATION: 95 %

## 2019-05-16 LAB
HCT VFR BLD CALC: 38.4 % (ref 37–47)
HEMOGLOBIN: 12.1 GM/DL (ref 12.5–16)
MCH RBC QN AUTO: 29.7 PG (ref 27–31)
MCHC RBC AUTO-ENTMCNC: 31.5 % (ref 32–36)
MCV RBC AUTO: 94.3 FL (ref 78–100)
PDW BLD-RTO: 13.3 % (ref 11.7–14.9)
PLATELET # BLD: 379 K/CU MM (ref 140–440)
PMV BLD AUTO: 9.2 FL (ref 7.5–11.1)
RBC # BLD: 4.07 M/CU MM (ref 4.2–5.4)
WBC # BLD: 6 K/CU MM (ref 4–10.5)

## 2019-05-16 PROCEDURE — 85027 COMPLETE CBC AUTOMATED: CPT

## 2019-05-16 PROCEDURE — 3700000000 HC ANESTHESIA ATTENDED CARE: Performed by: THORACIC SURGERY (CARDIOTHORACIC VASCULAR SURGERY)

## 2019-05-16 PROCEDURE — 2580000003 HC RX 258: Performed by: THORACIC SURGERY (CARDIOTHORACIC VASCULAR SURGERY)

## 2019-05-16 PROCEDURE — 6360000002 HC RX W HCPCS: Performed by: THORACIC SURGERY (CARDIOTHORACIC VASCULAR SURGERY)

## 2019-05-16 PROCEDURE — 2580000003 HC RX 258

## 2019-05-16 PROCEDURE — 3600000012 HC SURGERY LEVEL 2 ADDTL 15MIN: Performed by: THORACIC SURGERY (CARDIOTHORACIC VASCULAR SURGERY)

## 2019-05-16 PROCEDURE — 2580000003 HC RX 258: Performed by: ANESTHESIOLOGY

## 2019-05-16 PROCEDURE — 6360000002 HC RX W HCPCS: Performed by: NURSE ANESTHETIST, CERTIFIED REGISTERED

## 2019-05-16 PROCEDURE — 7100000010 HC PHASE II RECOVERY - FIRST 15 MIN: Performed by: THORACIC SURGERY (CARDIOTHORACIC VASCULAR SURGERY)

## 2019-05-16 PROCEDURE — 3600000002 HC SURGERY LEVEL 2 BASE: Performed by: THORACIC SURGERY (CARDIOTHORACIC VASCULAR SURGERY)

## 2019-05-16 PROCEDURE — 2500000003 HC RX 250 WO HCPCS: Performed by: THORACIC SURGERY (CARDIOTHORACIC VASCULAR SURGERY)

## 2019-05-16 PROCEDURE — 3700000001 HC ADD 15 MINUTES (ANESTHESIA): Performed by: THORACIC SURGERY (CARDIOTHORACIC VASCULAR SURGERY)

## 2019-05-16 PROCEDURE — 2709999900 HC NON-CHARGEABLE SUPPLY: Performed by: THORACIC SURGERY (CARDIOTHORACIC VASCULAR SURGERY)

## 2019-05-16 PROCEDURE — 7100000011 HC PHASE II RECOVERY - ADDTL 15 MIN: Performed by: THORACIC SURGERY (CARDIOTHORACIC VASCULAR SURGERY)

## 2019-05-16 RX ORDER — CEFAZOLIN SODIUM 2 G/50ML
SOLUTION INTRAVENOUS PRN
Status: DISCONTINUED | OUTPATIENT
Start: 2019-05-16 | End: 2019-05-16 | Stop reason: SDUPTHER

## 2019-05-16 RX ORDER — SODIUM CHLORIDE, SODIUM LACTATE, POTASSIUM CHLORIDE, CALCIUM CHLORIDE 600; 310; 30; 20 MG/100ML; MG/100ML; MG/100ML; MG/100ML
500 INJECTION, SOLUTION INTRAVENOUS CONTINUOUS
Status: DISCONTINUED | OUTPATIENT
Start: 2019-05-16 | End: 2019-05-16 | Stop reason: HOSPADM

## 2019-05-16 RX ORDER — LIDOCAINE HYDROCHLORIDE 10 MG/ML
INJECTION, SOLUTION EPIDURAL; INFILTRATION; INTRACAUDAL; PERINEURAL
Status: COMPLETED | OUTPATIENT
Start: 2019-05-16 | End: 2019-05-16

## 2019-05-16 RX ORDER — SODIUM CHLORIDE, SODIUM LACTATE, POTASSIUM CHLORIDE, CALCIUM CHLORIDE 600; 310; 30; 20 MG/100ML; MG/100ML; MG/100ML; MG/100ML
INJECTION, SOLUTION INTRAVENOUS
Status: COMPLETED
Start: 2019-05-16 | End: 2019-05-16

## 2019-05-16 RX ORDER — ONDANSETRON 2 MG/ML
INJECTION INTRAMUSCULAR; INTRAVENOUS PRN
Status: DISCONTINUED | OUTPATIENT
Start: 2019-05-16 | End: 2019-05-16 | Stop reason: SDUPTHER

## 2019-05-16 RX ORDER — LIDOCAINE HYDROCHLORIDE 20 MG/ML
INJECTION, SOLUTION INTRAVENOUS PRN
Status: DISCONTINUED | OUTPATIENT
Start: 2019-05-16 | End: 2019-05-16 | Stop reason: SDUPTHER

## 2019-05-16 RX ORDER — PROPOFOL 10 MG/ML
INJECTION, EMULSION INTRAVENOUS PRN
Status: DISCONTINUED | OUTPATIENT
Start: 2019-05-16 | End: 2019-05-16 | Stop reason: SDUPTHER

## 2019-05-16 RX ORDER — FENTANYL CITRATE 50 UG/ML
INJECTION, SOLUTION INTRAMUSCULAR; INTRAVENOUS PRN
Status: DISCONTINUED | OUTPATIENT
Start: 2019-05-16 | End: 2019-05-16 | Stop reason: SDUPTHER

## 2019-05-16 RX ADMIN — SODIUM CHLORIDE, POTASSIUM CHLORIDE, SODIUM LACTATE AND CALCIUM CHLORIDE: 600; 310; 30; 20 INJECTION, SOLUTION INTRAVENOUS at 12:29

## 2019-05-16 RX ADMIN — PROPOFOL 40 MG: 10 INJECTION, EMULSION INTRAVENOUS at 12:22

## 2019-05-16 RX ADMIN — PROPOFOL 40 MG: 10 INJECTION, EMULSION INTRAVENOUS at 12:19

## 2019-05-16 RX ADMIN — SODIUM CHLORIDE, POTASSIUM CHLORIDE, SODIUM LACTATE AND CALCIUM CHLORIDE 1000 ML: 600; 310; 30; 20 INJECTION, SOLUTION INTRAVENOUS at 08:45

## 2019-05-16 RX ADMIN — PROPOFOL 30 MG: 10 INJECTION, EMULSION INTRAVENOUS at 12:17

## 2019-05-16 RX ADMIN — CEFAZOLIN SODIUM 2 G: 2 SOLUTION INTRAVENOUS at 12:20

## 2019-05-16 RX ADMIN — FENTANYL CITRATE 50 MCG: 50 INJECTION INTRAMUSCULAR; INTRAVENOUS at 12:22

## 2019-05-16 RX ADMIN — FENTANYL CITRATE 25 MCG: 50 INJECTION INTRAMUSCULAR; INTRAVENOUS at 12:19

## 2019-05-16 RX ADMIN — LIDOCAINE HYDROCHLORIDE 100 MG: 20 INJECTION, SOLUTION INTRAVENOUS at 12:17

## 2019-05-16 RX ADMIN — ONDANSETRON 4 MG: 2 INJECTION INTRAMUSCULAR; INTRAVENOUS at 12:17

## 2019-05-16 RX ADMIN — FENTANYL CITRATE 25 MCG: 50 INJECTION INTRAMUSCULAR; INTRAVENOUS at 12:17

## 2019-05-16 ASSESSMENT — PULMONARY FUNCTION TESTS
PIF_VALUE: 0
PIF_VALUE: 1
PIF_VALUE: 1
PIF_VALUE: 0
PIF_VALUE: 0

## 2019-05-16 ASSESSMENT — ENCOUNTER SYMPTOMS: SHORTNESS OF BREATH: 1

## 2019-05-16 NOTE — ANESTHESIA PRE PROCEDURE
Department of Anesthesiology  Preprocedure Note       Name:  Luzmaria Baptiste   Age:  60 y.o.  :  1955                                          MRN:  [id number]         Date:  2019      Surgeon: Rachelle Oshea):  Pipe Bruce MD    Procedure: PORT REMOVAL (N/A )    Medications prior to admission:   Prior to Admission medications    Medication Sig Start Date End Date Taking? Authorizing Provider   rivaroxaban (XARELTO) 15 MG TABS tablet Take 15 mg by mouth 2 times daily (with meals)   Yes Historical Provider, MD   mirtazapine (REMERON) 15 MG tablet Take 1 tablet by mouth every evening 10/15/18  Yes Historical Provider, MD   gabapentin (NEURONTIN) 100 MG capsule Take 1 capsule by mouth 3 times daily. . 11/10/18  Yes Historical Provider, MD   atenolol-chlorthalidone (TENORETIC) 100-25 MG per tablet Take 1 tablet by mouth daily 18  Yes Janessa Jensen MD   albuterol sulfate HFA (PROAIR HFA) 108 (90 Base) MCG/ACT inhaler Inhale 2 puffs into the lungs as needed for Wheezing 18  Yes Janessa Jensen MD   fluticasone-vilanterol (BREO ELLIPTA) 100-25 MCG/INH AEPB inhaler Inhale 1 puff into the lungs daily After inhalation then gargle 18  Yes Tahir Tripp MD   albuterol (PROVENTIL) (2.5 MG/3ML) 0.083% nebulizer solution Take 3 mLs by nebulization every 6 hours as needed for Wheezing 18  Yes Janessa Jensen MD   Acetaminophen (TYLENOL PO) Take by mouth as needed Over The Counter   Yes Historical Provider, MD   ibuprofen (ADVIL;MOTRIN) 800 MG tablet Take 1 tablet by mouth every 6 hours as needed for Pain 18   Janessa Jensen MD   ondansetron (ZOFRAN) 8 MG tablet Take 8 mg by mouth as needed for Nausea or Vomiting    Historical Provider, MD       Current medications:    Current Facility-Administered Medications   Medication Dose Route Frequency Provider Last Rate Last Dose    lactated ringers infusion 500 mL  500 mL Intravenous Continuous Clotilda Franklin, DO 50 mL/hr at 19 0845 1,000 mL at 05/16/19 0845       Allergies:     Allergies   Allergen Reactions    Lipitor      \"Makes Me Hurt So Bad I Can't Stand It\"    Vicodin [Hydrocodone-Acetaminophen] Nausea Only    Adhesive Tape Rash       Problem List:    Patient Active Problem List   Diagnosis Code    Hyperlipidemia E78.5    Osteoarthritis of finger M19.049    Sciatica M54.30    Essential hypertension I10    Abnormal mammogram of left breast R92.8    Mass of upper lobe of right lung R91.8    Moderate COPD (chronic obstructive pulmonary disease) ([facility]) J44.9    Squamous cell lung cancer, right ([facility]) C34.91    Primary insomnia F51.01    Primary cancer of right upper lobe of lung ([facility] [address] C34.11    Other female genital prolapse N81.89       Past Medical History:        Diagnosis Date    Anemia     Anxiety     Arthritis     \"Fingers, Back\"    Bronchitis Last Episode 11-17    Chronic back pain     COPD (chronic obstructive pulmonary disease) ([facility] [address]     Sees Dr. Daria Najera Depression     Diverticulosis 06/2013    Extensive per CT    Hemoptysis 12/06/2017    Hx of blood clots     \"found I have a clot near my mediport so they are taking it out 5/16/2019- put me on Xarelto    Hyperlipidemia 2009    Hypertension 2009    Low back pain     \"better than it use to be- had therapy in the past- originally hurt back at work 20+ yrs ago\"    Lung mass     rul- scheduled to bronch 12/74/2017    Panic attacks     Pneumonia Dx 1-17    Right Lung Cancer Dx 10-17    tx with chemo following with Dr Linda Galdamez Dx 2014    \"Right Side\"    Shortness of breath on exertion     Teeth missing     Upper And Lower    Urinary tract infection In Past    No Current Symptoms    Wears dentures     Full Upper, Partial Lower    Wears glasses        Past Surgical History:        Procedure Laterality Date    BRONCHOSCOPY  12/07/2017 2/21/2018- done     BUNIONECTOMY Bilateral 1990's    \"Done At Different Times\"    COLONOSCOPY  2000's    DILATION AND CURETTAGE OF UTERUS      ELBOW SURGERY Left     \"Tendon Repair\"    LUNG SURGERY  2018    per old chart had thoracoscopy and RUL lobectomy , bronchoscopy and lymph node bx     MEDIASTINOSCOPY  2018    Bronchoscopy    TONSILLECTOMY  1978    TUBAL LIGATION  1988    TUNNELED VENOUS PORT PLACEMENT Left 2018       Social History:    Social History     Tobacco Use    Smoking status: Former Smoker     Packs/day: 0.50     Years: 30.00     Pack years: 15.00     Types: Cigarettes     Start date:      Last attempt to quit: 2017     Years since quittin.7    Smokeless tobacco: Never Used   Substance Use Topics    Alcohol use: No                                Counseling given: Not Answered      Vital Signs (Current):   Vitals:    19 1111 19 0830   BP:  (!) 156/80   Pulse:  66   Resp:  17   Temp:  36.6 °C (97.9 °F)   TempSrc:  Temporal   SpO2:  97%   Weight: 186 lb (84.4 kg) 186 lb (84.4 kg)   Height: 5' 3\" (1.6 m) 5' 3\" (1.6 m)                                              BP Readings from Last 3 Encounters:   19 (!) 156/80   18 120/70   18 126/74       NPO Status: Time of last liquid consumption:                         Time of last solid consumption:                         Date of last liquid consumption: 19                        Date of last solid food consumption: 05/15/19    BMI:   Wt Readings from Last 3 Encounters:   19 186 lb (84.4 kg)   18 194 lb 6.4 oz (88.2 kg)   18 184 lb (83.5 kg)     Body mass index is 32.95 kg/m².     CBC:   Lab Results   Component Value Date    WBC 6.0 2019    RBC 4.07 2019    HGB 12.1 2019    HCT 38.4 2019    MCV 94.3 2019    RDW 13.3 2019     2019       CMP:   Lab Results   Component Value Date     2019    K 4.2 2019     2019    CO2 25 2019    BUN 19 2019    CREATININE 1.3 2019    CREATININE 1.2 05/01/2019    GFRAA 49 05/02/2019    AGRATIO 1.8 12/30/2013    LABGLOM 41 05/02/2019    GLUCOSE 97 05/01/2019    PROT 6.6 05/01/2019    CALCIUM 9.5 05/01/2019    BILITOT 0.2 05/01/2019    ALKPHOS 99 05/01/2019    AST 14 05/01/2019    ALT 11 05/01/2019       POC Tests: No results for input(s): POCGLU, POCNA, POCK, POCCL, POCBUN, POCHEMO, POCHCT in the last 72 hours. Coags:   Lab Results   Component Value Date    PROTIME 12.0 03/27/2018    INR 1.05 03/27/2018    APTT 29.1 03/27/2018       HCG (If Applicable): No results found for: PREGTESTUR, PREGSERUM, HCG, HCGQUANT     ABGs: No results found for: PHART, PO2ART, QXT3JDF, WKJ9HUE, BEART, W9WKAUGT     Type & Screen (If Applicable):  No results found for: LABABO, [address]    Anesthesia Evaluation  Patient summary reviewed  Airway: Mallampati: II  TM distance: >3 FB   Neck ROM: full  Mouth opening: > = 3 FB Dental:    (+) edentulous      Pulmonary:normal exam    (+) COPD:  shortness of breath:                             Cardiovascular:  Exercise tolerance: good (>4 METS),   (+) hypertension:, BLACK:,       ECG reviewed               Beta Blocker:  Not on Beta Blocker         Neuro/Psych:   (+) depression/anxiety             GI/Hepatic/Renal: Neg GI/Hepatic/Renal ROS            Endo/Other: Negative Endo/Other ROS             Pt had no PAT visit       Abdominal:   (+) obese,         Vascular:                                        Anesthesia Plan      MAC     ASA 3       Induction: intravenous. Anesthetic plan and risks discussed with patient. Plan discussed with CRNA.                   Sander Whalen, APRN - CRNA   5/16/2019

## 2019-05-16 NOTE — ANESTHESIA POSTPROCEDURE EVALUATION
Department of Anesthesiology  Postprocedure Note    Patient: Aris Crews  MRN: [id number]  YOB: 1955  Date of evaluation: 5/16/2019  Time:  1:18 PM     Procedure Summary     Date:  05/16/19 Room / Location:  [address] / [facility] OR    Anesthesia Start:  8209 Anesthesia Stop:  1243    Procedure:  PORT REMOVAL (Left Chest) Diagnosis:  (blood clot)    Surgeon:  Kailey Zuleta MD Responsible Provider:  Ander Gilliland MD    Anesthesia Type:  MAC ASA Status:  3          Anesthesia Type: MAC    Papo Phase I:      Papo Phase II:      Last vitals: Reviewed and per EMR flowsheets.        Anesthesia Post Evaluation    Patient location during evaluation: bedside  Patient participation: complete - patient participated  Level of consciousness: awake and alert  Pain score: 0  Airway patency: patent  Nausea & Vomiting: no nausea and no vomiting  Complications: no  Cardiovascular status: hemodynamically stable  Respiratory status: acceptable  Hydration status: euvolemic

## 2019-05-16 NOTE — H&P
Subjective:   CC: need for port removal    Patient is a 60 y.o.  female who presents for port removal.  She is now cancer free but was found to have a clot over the brachiocephalic vein.  Pt seen at the request of oncology  Regarding port removal     Patient Active Problem List    Diagnosis Date Noted    Primary insomnia 02/19/2018     Priority: High    Other female genital prolapse 12/11/2018    Primary cancer of right upper lobe of lung ([facility] [address] 04/11/2018    Squamous cell lung cancer, right ([address] 12/11/2017    Moderate COPD (chronic obstructive pulmonary disease) ([address] 12/06/2017    Mass of upper lobe of right lung 11/20/2017    Abnormal mammogram of left breast 11/14/2017    Essential hypertension 02/01/2017    Osteoarthritis of finger 05/05/2014    Sciatica 05/05/2014    Hyperlipidemia 06/19/2013     Past Medical History:   Diagnosis Date    Anemia     Anxiety     Arthritis     \"Fingers, Back\"    Bronchitis Last Episode 11-17    Chronic back pain     COPD (chronic obstructive pulmonary disease) ([facility] [address]     Sees Dr. Beatriz Benitez Diverticulosis 06/2013    Extensive per CT    Hemoptysis 12/06/2017    Hx of blood clots     \"found I have a clot near my mediport so they are taking it out 5/16/2019- put me on Xarelto    Hyperlipidemia 2009    Hypertension 2009    Low back pain     \"better than it use to be- had therapy in the past- originally hurt back at work 20+ yrs ago\"    Lung mass     rul- scheduled to bronch 12/74/2017    Panic attacks     Pneumonia Dx 1-17    Right Lung Cancer Dx 10-17    tx with chemo following with Dr Jason Pain Dx 2014    \"Right Side\"    Shortness of breath on exertion     Teeth missing     Upper And Lower    Urinary tract infection In Past    No Current Symptoms    Wears dentures     Full Upper, Partial Lower    Wears glasses       Past Surgical History:   Procedure Laterality Date    BRONCHOSCOPY  12/07/2017 2/21/2018- done     BUNIONECTOMY Bilateral     \"Done At Different Times\"    COLONOSCOPY      DILATION AND CURETTAGE OF UTERUS      ELBOW SURGERY Left     \"Tendon Repair\"    LUNG SURGERY  2018    per old chart had thoracoscopy and RUL lobectomy , bronchoscopy and lymph node bx     MEDIASTINOSCOPY  2018    Bronchoscopy    TONSILLECTOMY  1978    TUBAL LIGATION  1988    TUNNELED VENOUS PORT PLACEMENT Left 2018      Medications Prior to Admission: rivaroxaban (XARELTO) 15 MG TABS tablet, Take 15 mg by mouth 2 times daily (with meals)  mirtazapine (REMERON) 15 MG tablet, Take 1 tablet by mouth every evening  gabapentin (NEURONTIN) 100 MG capsule, Take 1 capsule by mouth 3 times daily. [facility]   atenolol-chlorthalidone (TENORETIC) 100-25 MG per tablet, Take 1 tablet by mouth daily  albuterol sulfate HFA (PROAIR HFA) 108 (90 Base) MCG/ACT inhaler, Inhale 2 puffs into the lungs as needed for Wheezing  fluticasone-vilanterol (BREO ELLIPTA) 100-25 MCG/INH AEPB inhaler, Inhale 1 puff into the lungs daily After inhalation then gargle  albuterol (PROVENTIL) (2.5 MG/3ML) 0.083% nebulizer solution, Take 3 mLs by nebulization every 6 hours as needed for Wheezing  Acetaminophen (TYLENOL PO), Take by mouth as needed Over The Counter  ibuprofen (ADVIL;MOTRIN) 800 MG tablet, Take 1 tablet by mouth every 6 hours as needed for Pain  ondansetron (ZOFRAN) 8 MG tablet, Take 8 mg by mouth as needed for Nausea or Vomiting  Allergies   Allergen Reactions    Lipitor      \"Makes Me Hurt So Bad I Can't Stand It\"    Vicodin [Hydrocodone-Acetaminophen] Nausea Only    Adhesive Tape Rash      Social History     Tobacco Use    Smoking status: Former Smoker     Packs/day: 0.50     Years: 30.00     Pack years: 15.00     Types: Cigarettes     Start date:      Last attempt to quit: 2017     Years since quittin.7    Smokeless tobacco: Never Used   Substance Use Topics    Alcohol use: No      Family History   Problem Relation Age of Onset    Cancer Mother         Multiple Myeloma    Depression Mother     Diabetes Mother     High Blood Pressure Mother     High Cholesterol Mother     Heart Disease Father         CHF      Review of Systems  10 point review of systems performed with all pertinent positives noted in HPI      Objective:     Patient Vitals for the past 8 hrs:   BP Temp Temp src Pulse Resp SpO2 Height Weight   05/16/19 0830 (!) 156/80 97.9 °F (36.6 °C) Temporal 66 17 97 % 5' 3\" (1.6 m) 186 lb (84.4 kg)     CONSTITUTIONAL:  awake, alert, cooperative, no apparent distress, and appears stated age  NECK:  Supple, symmetrical, trachea midline, no adenopathy, thyroid symmetric, not enlarged and no tenderness, skin normal  HEMATOLOGIC/LYMPHATICS:  no cervical lymphadenopathy and no supraclavicular lymphadenopathy  LUNGS:  No increased work of breathing, good air exchange, clear to auscultation bilaterally, no crackles or wheezing  CARDIOVASCULAR:  Normal apical impulse, regular rate and rhythm, normal S1 and S2, no S3 or S4, and no murmur noted  CHEST/BREASTS:  symmetric  ABDOMEN: soft nt nd, no pulsitile masses  MUSCULOSKELETAL:  There is no redness, warmth, or swelling of the joints. Full range of motion noted. Motor strength is 5 out of 5 all extremities bilaterally. Tone is normal.  NEUROLOGIC:  Awake, alert, oriented to name, place and time. Cranial nerves II-XII are grossly intact. Motor is 5 out of 5 bilaterally.      SKIN:  no bruising or bleeding and normal skin color, texture, turgor  VASC: 1+ femoral pulses        Data Review  CBC:   Lab Results   Component Value Date    WBC 6.0 05/16/2019    RBC 4.07 05/16/2019     BMP:   Lab Results   Component Value Date    GLUCOSE 97 05/01/2019    CO2 25 05/01/2019    BUN 19 05/01/2019    CREATININE 1.3 05/02/2019    CREATININE 1.2 05/01/2019    CALCIUM 9.5 05/01/2019     Coagulation:   Lab Results   Component Value Date    INR 1.05 03/27/2018    APTT 29.1 03/27/2018

## 2019-05-17 NOTE — OP NOTE
[address] [place] [address], [address] [place] [address]                                OPERATIVE REPORT    PATIENT NAME: Hannah Martin                   :        1955  MED REC NO:   [id number]                          ROOM:  ACCOUNT NO:   [de-identified]                           ADMIT DATE: 2019  PROVIDER:     Hazel Fonseca MD    DATE OF PROCEDURE:  2019    PREOPERATIVE DIAGNOSIS:  Pericatheter clot. POSTOPERATIVE DIAGNOSIS:  Pericatheter clot. PROCEDURE:  Removal of left subclavian Infusaport. SURGEON:  Hazel Fonseca MD    ASSISTANT:  Kenny Hylton PA-C    PREOP:  This is a 72-year-old female who underwent lobectomy and  postoperative chemotherapy. She did excellent from all of this and is  now cancer free. She presents for removal of her Infusaport. Her last  CT scan did note that there was a clot in the brachiocephalic vein  around her catheter and it was recommended that the port be removed. Risks and benefits of procedure were discussed in detail with the  patient. She understood and agreed to proceed. FINDINGS:  The clot was removed completely intact. DESCRIPTION OF PROCEDURE:  The patient was identified and brought to the  operating room and laid in supine position. She was sedated, prepped  and draped in the normal sterile fashion. Prior to incision, a time-out  was performed and antibiotics were given. We anesthetized the location,  incised the previous incision, dissected down onto the Infusaport. Infusaport was grasped with a hemostat. Sutures were removed and the  catheter was removed completely intact. A suture was placed at the  tunnel site of the port. The wound was irrigated copiously and  hemostasis was achieved and wound was closed in a multilayer fashion. Sponge count, needle count, instrument count were correct. The patient  tolerated the procedure well.   The plan is to transport her to the  recovery area in stable condition.         Thelbert Runner, MD    D: 05/16/2019 12:36:37       T: 05/16/2019 21:59:36     [email or web address]  Job#: [id number]     Doc#: [id number]    CC:  <>

## 2019-06-04 ENCOUNTER — OFFICE VISIT (OUTPATIENT)
Dept: FAMILY MEDICINE CLINIC | Age: 64
End: 2019-06-04
Payer: COMMERCIAL

## 2019-06-04 VITALS
TEMPERATURE: 99 F | SYSTOLIC BLOOD PRESSURE: 128 MMHG | HEART RATE: 56 BPM | BODY MASS INDEX: 33.46 KG/M2 | DIASTOLIC BLOOD PRESSURE: 76 MMHG | WEIGHT: 196 LBS | HEIGHT: 64 IN

## 2019-06-04 DIAGNOSIS — R79.89 CREATININE ELEVATION: ICD-10-CM

## 2019-06-04 DIAGNOSIS — J44.9 MODERATE COPD (CHRONIC OBSTRUCTIVE PULMONARY DISEASE) (HCC): ICD-10-CM

## 2019-06-04 DIAGNOSIS — Z85.118 HISTORY OF LUNG CANCER: ICD-10-CM

## 2019-06-04 DIAGNOSIS — I10 ESSENTIAL HYPERTENSION: Primary | ICD-10-CM

## 2019-06-04 PROBLEM — R91.8 MASS OF UPPER LOBE OF RIGHT LUNG: Status: RESOLVED | Noted: 2017-11-20 | Resolved: 2019-06-04

## 2019-06-04 PROCEDURE — 99214 OFFICE O/P EST MOD 30 MIN: CPT | Performed by: FAMILY MEDICINE

## 2019-06-04 RX ORDER — ATENOLOL AND CHLORTHALIDONE TABLET 100; 25 MG/1; MG/1
1 TABLET ORAL DAILY
Qty: 90 TABLET | Refills: 1 | Status: SHIPPED | OUTPATIENT
Start: 2019-06-04 | End: 2019-12-04 | Stop reason: SDUPTHER

## 2019-06-04 RX ORDER — ALBUTEROL SULFATE 90 UG/1
2 AEROSOL, METERED RESPIRATORY (INHALATION) PRN
Qty: 1 INHALER | Refills: 2 | Status: SHIPPED | OUTPATIENT
Start: 2019-06-04 | End: 2019-12-04 | Stop reason: SDUPTHER

## 2019-06-04 RX ORDER — ALBUTEROL SULFATE 2.5 MG/3ML
2.5 SOLUTION RESPIRATORY (INHALATION) EVERY 6 HOURS PRN
Qty: 30 EACH | Refills: 2 | Status: SHIPPED | OUTPATIENT
Start: 2019-06-04 | End: 2020-06-17 | Stop reason: SDUPTHER

## 2019-06-04 ASSESSMENT — PATIENT HEALTH QUESTIONNAIRE - PHQ9
1. LITTLE INTEREST OR PLEASURE IN DOING THINGS: 1
SUM OF ALL RESPONSES TO PHQ QUESTIONS 1-9: 1
2. FEELING DOWN, DEPRESSED OR HOPELESS: 0
SUM OF ALL RESPONSES TO PHQ QUESTIONS 1-9: 1
SUM OF ALL RESPONSES TO PHQ9 QUESTIONS 1 & 2: 1

## 2019-06-04 ASSESSMENT — ENCOUNTER SYMPTOMS
SHORTNESS OF BREATH: 1
COUGH: 1
DIFFICULTY BREATHING: 1

## 2019-06-04 ASSESSMENT — COPD QUESTIONNAIRES: COPD: 1

## 2019-06-04 NOTE — PROGRESS NOTES
06/2013    Extensive per CT    Hemoptysis 12/06/2017    Hx of blood clots     \"found I have a clot near my mediport so they are taking it out 5/16/2019- put me on Xarelto    Hyperlipidemia 2009    Hypertension 2009    Low back pain     \"better than it use to be- had therapy in the past- originally hurt back at work 20+ yrs ago\"    Lung mass     rul- scheduled to bronch 12/74/2017    Panic attacks     Pneumonia Dx 1-17    Right Lung Cancer Dx 10-17    tx with chemo following with Dr Alejandro Godfrey Dx 2014    \"Right Side\"    Shortness of breath on exertion     Teeth missing     Upper And Lower    Urinary tract infection In Past    No Current Symptoms    Wears dentures     Full Upper, Partial Lower    Wears glasses        Past Surgical History:   Procedure Laterality Date    BRONCHOSCOPY  12/07/2017 2/21/2018- done     BUNIONECTOMY Bilateral 1990's    \"Done At Different Times\"    CATHETER REMOVAL Left 5/16/2019    PORT REMOVAL [facility]  [facility]'s   [address] Left 1980    \"Tendon Repair\"    LUNG SURGERY  04/12/2018    per old chart had thoracoscopy and RUL lobectomy , bronchoscopy and lymph node bx     MEDIASTINOSCOPY  02/21/2018    Bronchoscopy    TONSILLECTOMY  1978    TUBAL LIGATION  1988    TUNNELED VENOUS PORT PLACEMENT Left 07/31/2018       Family History   Problem Relation Age of Onset    Cancer Mother         Multiple Myeloma    Depression Mother     Diabetes Mother     High Blood Pressure Mother     High Cholesterol Mother     Heart Disease Father         CHF       Current Outpatient Medications on File Prior to Visit   Medication Sig Dispense Refill    mirtazapine (REMERON) 15 MG tablet Take 1 tablet by mouth every evening  0    gabapentin (NEURONTIN) 100 MG capsule Take 1 capsule by mouth 3 times daily. .  3    fluticasone-vilanterol (BREO ELLIPTA) 100-25 MCG/INH AEPB inhaler Inhale 1 puff into the lungs daily After inhalation then gargle 1 each 11    Acetaminophen (TYLENOL PO) Take by mouth as needed Over The Counter      ibuprofen (ADVIL;MOTRIN) 800 MG tablet Take 1 tablet by mouth every 6 hours as needed for Pain 60 tablet 1     No current facility-administered medications on file prior to visit. Objective:     Physical Exam   Constitutional: She is oriented to person, place, and time. She appears well-developed and well-nourished. No distress. Obese   HENT:   Head: Normocephalic and atraumatic. Neck: Neck supple. Cardiovascular: Normal rate, regular rhythm, S1 normal, S2 normal and normal heart sounds. Pulmonary/Chest: Effort normal and breath sounds normal. No respiratory distress. She has no wheezes. Musculoskeletal:        Right lower leg: She exhibits no edema. Left lower leg: She exhibits no edema. Neurological: She is alert and oriented to person, place, and time. .     Skin: Skin is warm, dry and intact. Psychiatric: She has a normal mood and affect. Her speech is normal and behavior is normal. Judgment and thought content normal. She is not actively hallucinating. Cognition and memory are normal. She is attentive. Nursing note and vitals reviewed. Vitals:    06/04/19 0834   BP: 128/76   Pulse: 56   Temp: 99 °F (37.2 °C)   Weight: 196 lb (88.9 kg)   Height: 5' 4\" (1.626 m)     Body mass index is 33.64 kg/m². Vitals:    06/04/19 0834   BP: 128/76   Pulse: 56   Temp: 99 °F (37.2 °C)   Weight: 196 lb (88.9 kg)   Height: 5' 4\" (1.626 m)     Body mass index is 33.64 kg/m². Wt Readings from Last 3 Encounters:   06/04/19 196 lb (88.9 kg)   05/16/19 186 lb (84.4 kg)   12/11/18 194 lb 6.4 oz (88.2 kg)     BP Readings from Last 3 Encounters:   06/04/19 128/76   05/16/19 108/61   05/16/19 122/64            No results found for this visit on 06/04/19.   The 10-year ASCVD risk score (Tahira Montoya, et al., 2013) is: 6.6%    Values used to calculate the score:      Age: 60 years      Sex: Female      Is Non- : No      Diabetic: No      Tobacco smoker: No      Systolic Blood Pressure: 409 mmHg      Is BP treated: Yes      HDL Cholesterol: 37 mg/dL      Total Cholesterol: 191 mg/dL  Lab Review   Admission on 05/16/2019, Discharged on 05/16/2019   Component Date Value    WBC 05/16/2019 6.0     RBC 05/16/2019 4.07*    Hemoglobin 05/16/2019 12.1*    Hematocrit 05/16/2019 38.4     MCV 05/16/2019 94.3     MCH 05/16/2019 29.7     MCHC 05/16/2019 31.5*    RDW 05/16/2019 13.3     Platelets 33/17/9371 379     MPV 05/16/2019 9.2    Hospital Outpatient Visit on 05/02/2019   Component Date Value    POC Creatinine 05/02/2019 1.3*    GFR Non- 05/02/2019 41*    GFR  05/02/2019 [address] Outpatient Visit on 05/01/2019   Component Date Value    Sodium 05/01/2019 139     Potassium 05/01/2019 4.2     Chloride 05/01/2019 101     CO2 05/01/2019 25     BUN 05/01/2019 19     CREATININE 05/01/2019 1.2*    Glucose 05/01/2019 97     Calcium 05/01/2019 9.5     Alb 05/01/2019 4.3     Total Protein 05/01/2019 6.6     Total Bilirubin 05/01/2019 0.2     ALT 05/01/2019 11     AST 05/01/2019 14*    Alkaline Phosphatase 05/01/2019 99     GFR Non- 05/01/2019 46*    GFR  05/01/2019 55*    Anion Gap 05/01/2019 13        Status post right upper lobectomy.  No evidence of recurrent or residual   disease.       Abnormal filling defect in the left brachiocephalic vein compatible with   pericatheter thrombus. Assessment:       Diagnosis Orders   1. Essential hypertension  atenolol-chlorthalidone (TENORETIC) 100-25 MG per tablet   2. Moderate COPD (chronic obstructive pulmonary disease) ([facility])  albuterol (PROVENTIL) (2.5 MG/3ML) 0.083% nebulizer solution   3. Creatinine elevation     4. History of lung cancer             Plan:      No NSAIDS!!!    HTN stable. Continue current medications    Remains tobacco free--continue meds for COPD as needed.     30 minutes brisk walking per day    Needs pap soon    Shingles vaccine needed at pharmacy

## 2019-06-04 NOTE — PATIENT INSTRUCTIONS
Patient Education        Learning About Eating More Fruits and Vegetables  What are some quick tips for eating more fruits and vegetables? We're all encouraged to eat more fruits and vegetables. Yet it can seem like one more chore on the daily to-do list. But you can add color and crunch to your meals--and lots of nutrition--with these quick tips. · Brighten up your breakfast.  ? Add sliced fruit or frozen berries to your yogurt, pancakes, or cereal.  ? Blend fresh or frozen fruit, veggies, and yogurt with a little fruit juice, and you've got a tasty smoothie. ? Make your scrambled eggs a gourmet treat by adding onions, celery, and bell peppers. ? Bake up some bran muffins with grated carrots added into the mix. · Make a livelier lunch. ? Jazz up tuna or chicken salad with apple chunks, celery, or grapes--or all of them! ? Add cucumbers, avocado slices, tomatoes, and lettuce to your sandwiches. ? Kick up the flavor of grilled cheese sandwiches with spinach and tomatoes. ? Puree some potatoes or squash to add to tomato soup. · Add delicious veggies to dinner. ? Give more color and taste to salads. Stir in red cabbage, carrots, and bell peppers. Top salads with dried cranberries or raisins. \"Frost\" your salad with orange sections or strawberries. ? Keep a bag or two of frozen vegetables ready to pull out of the freezer for a side dish. ? Spice up spaghetti and meatballs with mushrooms and bell peppers. ? Roast vegetables like cauliflower or squash in the oven with olive oil to bring out their flavor. ? Season your veggie dish with herbs like basil and ana maria and a splash of lemon juice and olive oil. ? If you've got a main dish in the oven, stick in a potato to round out your meal.  · Grab some healthy snacks on the go. ? Scoop up an apple, banana, or plum for a quick snack. ? Cut up raw fruits and veggies to keep in your fridge. Grapes, oranges, carrots, and celery are great choices.  They'll be ready for a quick snack or an after-school treat. ? Dip raw vegetables in hummus or peanut butter. ? Keep dried fruit on hand for an easy \"take with you\" snack. · Make something sweet--and healthy. ? Try baked apples or pears topped with cinnamon and honey for a delicious dessert. ? Make chocolate chip cookies even better with grated carrots added to the mix. Where can you learn more? Go to https://Sproutkin.Enpocket. org and sign in to your [employer] account. Enter F050 in the [employer] box to learn more about \"Learning About Eating More Fruits and Vegetables. \"     If you do not have an account, please click on the \"Sign Up Now\" link. Current as of: November 7, 2018  Content Version: 12.0  © 7078-2164 [employer]. Care instructions adapted under license by [facility] ([facility]). If you have questions about a medical condition or this instruction, always ask your healthcare professional. [place] [address] any warranty or liability for your use of this information. Patient Education        Learning About Dietary Guidelines  What are the Dietary Guidelines for Americans? Dietary Guidelines for Americans provide tips for eating well and staying healthy. This helps reduce the risk for long-term (chronic) diseases. These adult guidelines from [place] recommend that you:  · Eat lots of fruits, vegetables, whole grains, and low-fat or nonfat dairy products. · Try to balance your eating with your activity. This helps you stay at a healthy weight. · Drink alcohol in moderation, if at all. · Limit foods high in salt, saturated fat, trans fat, and added sugar. What is MyPlate? MyPlate is the U.[place] government's food guide. It can help you make your own well-balanced eating plan. A balanced eating plan means that you eat enough, but not too much, and that your food gives you the nutrients you need to stay healthy.   MyPlate focuses on eating plenty of whole grains, fruits, and vegetables, and on limiting fat and sugar. It is available online at www. ChooseMyPlate.gov. How can you get started? MyPlate suggests that most adults eat certain amounts from the different food groups:  Grains  Eat 5 to 8 ounces of grains each day. Half of those should be whole grains. Choose whole-grain breads, cold and cooked cereals and grains, pasta (without creamy sauces), hard rolls, or low-fat or fat-free crackers. Vegetables  Eat 2 to 3 cups of vegetables every day. They contain little if any fat. And they have lots of nutrients that help protect against heart disease. Fruits  Eat 1½ to 2 cups of fruits every day. Fruits contain very little fat but lots of nutrients. Protein foods  Most adults need 5 to 6½ ounces each day. Choose fish and lean poultry more often. Eat red meat and fried meats less often. Dried beans, tofu, and nuts are also good sources of protein. Dairy  Most adults need 3 cups of milk and milk products a day. Choose low-fat or fat-free products from this food group. If you have problems digesting milk, try eating cheese or yogurt instead. Limit fats and oils, including those used in cooking. When you do use fats, choose oils that are liquid at room temperature (unsaturated fats). These include canola oil and olive oil. Avoid foods with trans fats, such as many fried foods, cookies, and snack foods. Where can you learn more? Go to https://MediVisioncachorro.healthMuzooka. org and sign in to your [employer] account. Enter V021 in the [facility] box to learn more about \"Learning About Dietary Guidelines. \"     If you do not have an account, please click on the \"Sign Up Now\" link. Current as of: November 7, 2018  Content Version: 12.0  © 5390-1614 [employer]. Care instructions adapted under license by [facility] ([facility]).  If you have questions about a medical condition or this instruction, always ask your healthcare professional. [employer] disclaims any warranty or liability for your use of this information.

## 2019-06-12 RX ORDER — FLUTICASONE FUROATE AND VILANTEROL TRIFENATATE 100; 25 UG/1; UG/1
POWDER RESPIRATORY (INHALATION)
Qty: 1 EACH | Refills: 11 | Status: SHIPPED | OUTPATIENT
Start: 2019-06-12 | End: 2020-06-17 | Stop reason: SDUPTHER

## 2019-07-09 ENCOUNTER — OFFICE VISIT (OUTPATIENT)
Dept: FAMILY MEDICINE CLINIC | Age: 64
End: 2019-07-09
Payer: COMMERCIAL

## 2019-07-09 VITALS
WEIGHT: 193.2 LBS | HEART RATE: 61 BPM | TEMPERATURE: 98.2 F | SYSTOLIC BLOOD PRESSURE: 120 MMHG | RESPIRATION RATE: 16 BRPM | DIASTOLIC BLOOD PRESSURE: 84 MMHG | HEIGHT: 64 IN | OXYGEN SATURATION: 97 % | BODY MASS INDEX: 32.98 KG/M2

## 2019-07-09 DIAGNOSIS — J01.00 ACUTE MAXILLARY SINUSITIS, RECURRENCE NOT SPECIFIED: Primary | ICD-10-CM

## 2019-07-09 DIAGNOSIS — J01.10 ACUTE NON-RECURRENT FRONTAL SINUSITIS: ICD-10-CM

## 2019-07-09 PROCEDURE — 99213 OFFICE O/P EST LOW 20 MIN: CPT | Performed by: FAMILY MEDICINE

## 2019-07-09 RX ORDER — DULOXETIN HYDROCHLORIDE 60 MG/1
1 CAPSULE, DELAYED RELEASE ORAL DAILY
COMMUNITY
Start: 2019-05-10 | End: 2019-11-05

## 2019-07-09 RX ORDER — AMOXICILLIN 875 MG/1
875 TABLET, COATED ORAL 2 TIMES DAILY
Qty: 20 TABLET | Refills: 0 | Status: SHIPPED | OUTPATIENT
Start: 2019-07-09 | End: 2019-07-19

## 2019-07-09 RX ORDER — BENZONATATE 200 MG/1
200 CAPSULE ORAL 3 TIMES DAILY PRN
Qty: 30 CAPSULE | Refills: 0 | Status: SHIPPED | OUTPATIENT
Start: 2019-07-09 | End: 2020-06-17

## 2019-07-09 NOTE — PROGRESS NOTES
OFFICE VISIT      Patient ID: Mary Vangie 1955  Chief Complaint   Patient presents with    URI     1 week neck pain, sinus pain, cough, wheezing, running and stuffy nose, ear pain, sore throat,        HPI . HPIper chief complaint    Review of Systems   Constitutional: Positive for diaphoresis. Negative for chills and fever. .  ROS per HPI.   ROS is otherwise negative    Patient Active Problem List   Diagnosis    Osteoarthritis of finger    Sciatica    Essential hypertension    Abnormal mammogram of left breast    Moderate COPD (chronic obstructive pulmonary disease) (HCC)    Squamous cell lung cancer, right ([address]    Primary insomnia    Primary cancer of right upper lobe of lung ([address]    Other female genital prolapse    Creatinine elevation    History of lung cancer       Past Medical History:   Diagnosis Date    Anemia     Anxiety     Arthritis     \"Fingers, Back\"    Bronchitis Last Episode 11-17    Chronic back pain     COPD [facility]chronic obstructive pulmonary disease) [facility]     Sees Dr. Ho Ramirez Depression     Diverticulosis 06/2013    Extensive per CT    Hemoptysis 12/06/2017    Hx of blood clots     \"found I have a clot near my mediport so they are taking it out 5/16/2019- put me on Xarelto    Hyperlipidemia 2009    Hypertension 2009    Low back pain     \"better than it use to be- had therapy in the past- originally hurt back at work 20+ yrs ago\"    Lung mass     rul- scheduled to bronch 12/74/2017    Panic attacks     Pneumonia Dx 1-17    Right Lung Cancer Dx 10-17    tx with chemo following with Dr Elier Mandujano Dx 2014    \"Right Side\"    Shortness of breath on exertion     Teeth missing     Upper And Lower    Urinary tract infection In Past    No Current Symptoms    Wears dentures     Full Upper, Partial Lower    Wears glasses        Past Surgical History:   Procedure Laterality Date    BRONCHOSCOPY  12/07/2017 2/21/2018- done     BUNIONECTOMY Bilateral (TESSALON) 200 MG capsule   2. Acute non-recurrent frontal sinusitis  amoxicillin (AMOXIL) 875 MG tablet    benzonatate (TESSALON) 200 MG capsule           Plan:              Increase fluids. Get plenty of rest.  Tylenol or Ibuprofen for fever or muscle aches. Wash hands frequently since you are contagious.

## 2019-07-09 NOTE — PATIENT INSTRUCTIONS
Walking for Exercise: Care Instructions  Your Care Instructions    Walking is one of the easiest ways to get the exercise you need for good health. A brisk, 30-minute walk each day can help you feel better and have more energy. It can help you lower your risk of disease. Walking can help you keep your bones strong and your heart healthy. Check with your doctor before you start a walking plan if you have heart problems, other health issues, or you have not been active in a long time. Follow your doctor's instructions for safe levels of exercise. Follow-up care is a key part of your treatment and safety. Be sure to make and go to all appointments, and call your doctor if you are having problems. It's also a good idea to know your test results and keep a list of the medicines you take. How can you care for yourself at home? Getting started  · Start slowly and set a short-term goal. For example, walk for 5 or 10 minutes every day. · Bit by bit, increase the amount you walk every day. Try for at least 30 minutes on most days of the week. You also may want to swim, bike, or do other activities. · If finding enough time is a problem, it is fine to be active in blocks of 10 minutes or more throughout your day and week. · To get the heart-healthy benefits of walking, you need to walk briskly enough to increase your heart rate and breathing, but not so fast that you cannot talk comfortably. · Wear comfortable shoes that fit well and provide good support for your feet and ankles. Staying with your plan  · After you've made walking a habit, set a longer-term goal. You may want to set a goal of walking briskly for longer or walking farther. Experts say to do 2½ hours of moderate activity a week. A faster heartbeat is what defines moderate-level activity. · To stay motivated, walk with friends, coworkers, or pets. · Use a phone vilma or pedometer to track your steps each day. Set a goal to increase your steps.  Once

## 2019-08-12 ENCOUNTER — OFFICE VISIT (OUTPATIENT)
Dept: FAMILY MEDICINE CLINIC | Age: 64
End: 2019-08-12
Payer: COMMERCIAL

## 2019-08-12 ENCOUNTER — HOSPITAL ENCOUNTER (OUTPATIENT)
Dept: GENERAL RADIOLOGY | Age: 64
Discharge: HOME | End: 2019-08-12
Payer: COMMERCIAL

## 2019-08-12 ENCOUNTER — HOSPITAL ENCOUNTER (OUTPATIENT)
Age: 64
Discharge: HOME | End: 2019-08-12
Payer: COMMERCIAL

## 2019-08-12 VITALS
HEART RATE: 67 BPM | BODY MASS INDEX: 32.98 KG/M2 | WEIGHT: 193.2 LBS | HEIGHT: 64 IN | DIASTOLIC BLOOD PRESSURE: 80 MMHG | TEMPERATURE: 98.2 F | SYSTOLIC BLOOD PRESSURE: 110 MMHG

## 2019-08-12 DIAGNOSIS — R14.3 FLATULENCE: ICD-10-CM

## 2019-08-12 DIAGNOSIS — R14.0 FLATULENCE/GAS PAIN/BELCHING: Primary | ICD-10-CM

## 2019-08-12 DIAGNOSIS — R07.89 OTHER CHEST PAIN: ICD-10-CM

## 2019-08-12 PROCEDURE — 93000 ELECTROCARDIOGRAM COMPLETE: CPT | Performed by: FAMILY MEDICINE

## 2019-08-12 PROCEDURE — 74018 RADEX ABDOMEN 1 VIEW: CPT

## 2019-08-12 PROCEDURE — 99213 OFFICE O/P EST LOW 20 MIN: CPT | Performed by: FAMILY MEDICINE

## 2019-08-12 RX ORDER — SIMETHICONE 80 MG
80 TABLET,CHEWABLE ORAL 4 TIMES DAILY PRN
Qty: 120 TABLET | Refills: 0 | Status: SHIPPED | OUTPATIENT
Start: 2019-08-12 | End: 2019-11-05

## 2019-08-12 RX ORDER — DICYCLOMINE HCL 20 MG
20 TABLET ORAL 3 TIMES DAILY PRN
Qty: 40 TABLET | Refills: 0 | Status: SHIPPED | OUTPATIENT
Start: 2019-08-12 | End: 2019-11-05

## 2019-08-15 ENCOUNTER — OFFICE VISIT (OUTPATIENT)
Dept: FAMILY MEDICINE CLINIC | Age: 64
End: 2019-08-15
Payer: COMMERCIAL

## 2019-08-15 VITALS
SYSTOLIC BLOOD PRESSURE: 130 MMHG | WEIGHT: 192.8 LBS | BODY MASS INDEX: 32.91 KG/M2 | HEART RATE: 74 BPM | DIASTOLIC BLOOD PRESSURE: 80 MMHG | HEIGHT: 64 IN

## 2019-08-15 DIAGNOSIS — R10.13 EPIGASTRIC PAIN: Primary | ICD-10-CM

## 2019-08-15 PROCEDURE — 99213 OFFICE O/P EST LOW 20 MIN: CPT | Performed by: FAMILY MEDICINE

## 2019-08-15 RX ORDER — OMEPRAZOLE 40 MG/1
40 CAPSULE, DELAYED RELEASE ORAL DAILY
Qty: 30 CAPSULE | Refills: 0 | Status: SHIPPED | OUTPATIENT
Start: 2019-08-15 | End: 2019-08-26 | Stop reason: SDUPTHER

## 2019-08-15 RX ORDER — TRAMADOL HYDROCHLORIDE 50 MG/1
50 TABLET ORAL EVERY 4 HOURS PRN
Qty: 30 TABLET | Refills: 0 | Status: SHIPPED | OUTPATIENT
Start: 2019-08-15 | End: 2019-08-22

## 2019-08-15 ASSESSMENT — ENCOUNTER SYMPTOMS
ABDOMINAL PAIN: 1
SHORTNESS OF BREATH: 0
CONSTIPATION: 0
DIARRHEA: 0

## 2019-08-15 NOTE — PATIENT INSTRUCTIONS
The diagnoses and medications listed in this after visit summary may not be accurate at the time of check out. Please check MY CHART in 28-48 hours for possible corrections. Late cancellation policy: So that we can better accommodate people who are sick, please give our office 24 hour notice for an appointment cancellation. Thank you. Missed appointments: Your care is very important to us. It is important that you keep your scheduled appointments. Multiple missed appointments may lead to a dismissal from the office. Later arrival policy: If you are more than 10 minutes late for your appointment, you may be asked to reschedule. Please allow 5-7 business days for paperwork to be processed. Thank you.

## 2019-08-15 NOTE — PROGRESS NOTES
capsule Take 1 capsule by mouth 3 times daily. .  3    Acetaminophen (TYLENOL PO) Take by mouth as needed Over The Counter      benzonatate (TESSALON) 200 MG capsule Take 1 capsule by mouth 3 times daily as needed for Cough (Patient not taking: Reported on 8/15/2019) 30 capsule 0    albuterol (PROVENTIL) (2.5 MG/3ML) 0.083% nebulizer solution Take 3 mLs by nebulization every 6 hours as needed for Wheezing (Patient not taking: Reported on 8/12/2019) 30 each 2     No current facility-administered medications on file prior to visit. Objective:           Physical Exam   Constitutional: She appears well-developed and well-nourished. HENT:   Head: Normocephalic and atraumatic. Neck: Neck supple. No tracheal deviation present. No thyromegaly present. Cardiovascular: Normal rate, regular rhythm and normal heart sounds. Pulmonary/Chest: Effort normal and breath sounds normal. No respiratory distress. She has no wheezes. Abdominal: Soft. She exhibits no distension and no mass. There is no tenderness. Musculoskeletal: She exhibits no edema. Neurological: She is alert. Skin: Skin is warm, dry and intact. Psychiatric: She has a normal mood and affect. Nursing note and vitals reviewed. Vitals:    08/15/19 1626   BP: 130/80   Pulse: 74   Weight: 192 lb 12.8 oz (87.5 kg)   Height: 5' 4\" (1.626 m)     Body mass index is 33.09 kg/m². Wt Readings from Last 3 Encounters:   08/15/19 192 lb 12.8 oz (87.5 kg)   08/12/19 193 lb 3.2 oz (87.6 kg)   07/09/19 193 lb 3.2 oz (87.6 kg)     BP Readings from Last 3 Encounters:   08/15/19 130/80   08/12/19 110/80   07/09/19 120/84          No results found for this visit on 08/15/19. Assessment:       Diagnosis Orders   1. Epigastric pain  US GALLBLADDER RUQ    traMADol (ULTRAM) 50 MG tablet    omeprazole (PRILOSEC) 40 MG delayed release capsule           Plan:      See orders.   F/u after the ultrasound  Stop the Bentyl since not helping

## 2019-08-21 ENCOUNTER — HOSPITAL ENCOUNTER (OUTPATIENT)
Dept: ULTRASOUND IMAGING | Age: 64
Discharge: HOME | End: 2019-08-21
Payer: COMMERCIAL

## 2019-08-21 DIAGNOSIS — R10.13 EPIGASTRIC PAIN: ICD-10-CM

## 2019-08-21 PROCEDURE — 76705 ECHO EXAM OF ABDOMEN: CPT

## 2019-08-22 PROBLEM — N26.1 ATROPHY OF RIGHT KIDNEY: Status: ACTIVE | Noted: 2019-08-22

## 2019-08-26 ENCOUNTER — OFFICE VISIT (OUTPATIENT)
Dept: FAMILY MEDICINE CLINIC | Age: 64
End: 2019-08-26
Payer: COMMERCIAL

## 2019-08-26 VITALS
DIASTOLIC BLOOD PRESSURE: 80 MMHG | BODY MASS INDEX: 33.29 KG/M2 | WEIGHT: 195 LBS | HEIGHT: 64 IN | HEART RATE: 67 BPM | SYSTOLIC BLOOD PRESSURE: 122 MMHG

## 2019-08-26 DIAGNOSIS — N26.1 ATROPHY OF RIGHT KIDNEY: ICD-10-CM

## 2019-08-26 DIAGNOSIS — M54.31 RIGHT SCIATIC NERVE PAIN: Primary | ICD-10-CM

## 2019-08-26 DIAGNOSIS — R10.13 EPIGASTRIC PAIN: ICD-10-CM

## 2019-08-26 PROCEDURE — 99214 OFFICE O/P EST MOD 30 MIN: CPT | Performed by: FAMILY MEDICINE

## 2019-08-26 RX ORDER — TRAMADOL HYDROCHLORIDE 50 MG/1
50 TABLET ORAL EVERY 6 HOURS PRN
Qty: 28 TABLET | Refills: 0 | Status: SHIPPED | OUTPATIENT
Start: 2019-08-26 | End: 2019-09-02

## 2019-08-26 RX ORDER — GABAPENTIN 300 MG/1
300 CAPSULE ORAL 2 TIMES DAILY
Qty: 60 CAPSULE | Refills: 1 | Status: SHIPPED | OUTPATIENT
Start: 2019-08-26 | End: 2019-12-04 | Stop reason: DRUGHIGH

## 2019-08-26 RX ORDER — OMEPRAZOLE 40 MG/1
40 CAPSULE, DELAYED RELEASE ORAL DAILY
Qty: 90 CAPSULE | Refills: 3 | Status: SHIPPED | OUTPATIENT
Start: 2019-08-26 | End: 2020-06-17 | Stop reason: SDUPTHER

## 2019-08-26 ASSESSMENT — ENCOUNTER SYMPTOMS
ABDOMINAL PAIN: 1
CONSTIPATION: 0
BACK PAIN: 1
DIARRHEA: 0

## 2019-08-26 NOTE — PATIENT INSTRUCTIONS
The diagnoses and medications listed in this after visit summary may not be accurate at the time of check out. Please check MY CHART in 28-48 hours for possible corrections. Late cancellation policy: So that we can better accommodate people who are sick, please give our office 24 hour notice for an appointment cancellation. Thank you. Missed appointments: Your care is very important to us. It is important that you keep your scheduled appointments. Multiple missed appointments may lead to a dismissal from the office. Later arrival policy: If you are more than 10 minutes late for your appointment, you may be asked to reschedule. Please allow 5-7 business days for paperwork to be processed. Thank you. Patient Education        Gastroesophageal Reflux Disease (GERD): Care Instructions  Your Care Instructions    Gastroesophageal reflux disease (GERD) is the backward flow of stomach acid into the esophagus. The esophagus is the tube that leads from your throat to your stomach. A one-way valve prevents the stomach acid from moving up into this tube. When you have GERD, this valve does not close tightly enough. If you have mild GERD symptoms including heartburn, you may be able to control the problem with antacids or over-the-counter medicine. Changing your diet, losing weight, and making other lifestyle changes can also help reduce symptoms. Follow-up care is a key part of your treatment and safety. Be sure to make and go to all appointments, and call your doctor if you are having problems. It's also a good idea to know your test results and keep a list of the medicines you take. How can you care for yourself at home? · Take your medicines exactly as prescribed. Call your doctor if you think you are having a problem with your medicine. · Your doctor may recommend over-the-counter medicine. For mild or occasional indigestion, antacids, such as Tums, Gaviscon, Mylanta, or Maalox, may help.

## 2019-11-05 ENCOUNTER — OFFICE VISIT (OUTPATIENT)
Dept: FAMILY MEDICINE CLINIC | Age: 64
End: 2019-11-05
Payer: COMMERCIAL

## 2019-11-05 VITALS
OXYGEN SATURATION: 96 % | BODY MASS INDEX: 33.09 KG/M2 | HEIGHT: 64 IN | DIASTOLIC BLOOD PRESSURE: 88 MMHG | WEIGHT: 193.8 LBS | SYSTOLIC BLOOD PRESSURE: 130 MMHG | TEMPERATURE: 97.9 F | HEART RATE: 64 BPM

## 2019-11-05 DIAGNOSIS — J01.01 ACUTE RECURRENT MAXILLARY SINUSITIS: ICD-10-CM

## 2019-11-05 PROCEDURE — 99213 OFFICE O/P EST LOW 20 MIN: CPT | Performed by: FAMILY MEDICINE

## 2019-11-05 RX ORDER — LORATADINE 10 MG/1
10 TABLET ORAL DAILY
COMMUNITY
End: 2019-12-04

## 2019-11-05 RX ORDER — AMOXICILLIN AND CLAVULANATE POTASSIUM 875; 125 MG/1; MG/1
1 TABLET, FILM COATED ORAL 2 TIMES DAILY
Qty: 20 TABLET | Refills: 0 | Status: SHIPPED | OUTPATIENT
Start: 2019-11-05 | End: 2019-11-25

## 2019-11-13 ENCOUNTER — HOSPITAL ENCOUNTER (OUTPATIENT)
Dept: CT IMAGING | Age: 64
Discharge: HOME | End: 2019-11-13
Payer: COMMERCIAL

## 2019-11-13 DIAGNOSIS — C34.11 CANCER OF BRONCHUS OF RIGHT UPPER LOBE (HCC): ICD-10-CM

## 2019-11-13 PROCEDURE — 71250 CT THORAX DX C-: CPT

## 2019-11-14 ENCOUNTER — HOSPITAL ENCOUNTER (OUTPATIENT)
Age: 64
Setting detail: SPECIMEN
Discharge: HOME | End: 2019-11-14
Payer: COMMERCIAL

## 2019-11-14 LAB
ALBUMIN SERPL-MCNC: 4.6 GM/DL (ref 3.4–5)
ALP BLD-CCNC: 105 IU/L (ref 40–129)
ALT SERPL-CCNC: 11 U/L (ref 10–40)
ANION GAP SERPL CALCULATED.3IONS-SCNC: 13 MMOL/L (ref 4–16)
AST SERPL-CCNC: 14 IU/L (ref 15–37)
BILIRUB SERPL-MCNC: 0.2 MG/DL (ref 0–1)
BUN BLDV-MCNC: 16 MG/DL (ref 6–23)
CALCIUM SERPL-MCNC: 10 MG/DL (ref 8.3–10.6)
CHLORIDE BLD-SCNC: 100 MMOL/L (ref 99–110)
CO2: 27 MMOL/L (ref 21–32)
CREAT SERPL-MCNC: 1.3 MG/DL (ref 0.6–1.1)
GFR AFRICAN AMERICAN: 50 ML/MIN/1.73M2
GFR NON-AFRICAN AMERICAN: 42 ML/MIN/1.73M2
GLUCOSE BLD-MCNC: 138 MG/DL (ref 70–99)
POTASSIUM SERPL-SCNC: 4.7 MMOL/L (ref 3.5–5.1)
SODIUM BLD-SCNC: 140 MMOL/L (ref 135–145)
TOTAL PROTEIN: 7.2 GM/DL (ref 6.4–8.2)

## 2019-11-14 PROCEDURE — 80053 COMPREHEN METABOLIC PANEL: CPT

## 2019-11-15 ENCOUNTER — IMMUNIZATION (OUTPATIENT)
Dept: FAMILY MEDICINE CLINIC | Age: 64
End: 2019-11-15
Payer: COMMERCIAL

## 2019-11-15 DIAGNOSIS — Z23 NEED FOR INFLUENZA VACCINATION: Primary | ICD-10-CM

## 2019-11-15 PROCEDURE — 90686 IIV4 VACC NO PRSV 0.5 ML IM: CPT | Performed by: FAMILY MEDICINE

## 2019-11-15 PROCEDURE — 90471 IMMUNIZATION ADMIN: CPT | Performed by: FAMILY MEDICINE

## 2019-12-04 ENCOUNTER — TELEPHONE (OUTPATIENT)
Dept: FAMILY MEDICINE CLINIC | Age: 64
End: 2019-12-04

## 2019-12-04 ENCOUNTER — OFFICE VISIT (OUTPATIENT)
Dept: FAMILY MEDICINE CLINIC | Age: 64
End: 2019-12-04
Payer: COMMERCIAL

## 2019-12-04 VITALS
HEART RATE: 63 BPM | HEIGHT: 65 IN | DIASTOLIC BLOOD PRESSURE: 80 MMHG | BODY MASS INDEX: 31.65 KG/M2 | WEIGHT: 190 LBS | SYSTOLIC BLOOD PRESSURE: 122 MMHG

## 2019-12-04 DIAGNOSIS — R79.89 CREATININE ELEVATION: ICD-10-CM

## 2019-12-04 DIAGNOSIS — M54.31 RIGHT SCIATIC NERVE PAIN: ICD-10-CM

## 2019-12-04 DIAGNOSIS — I10 ESSENTIAL HYPERTENSION: Primary | ICD-10-CM

## 2019-12-04 DIAGNOSIS — J44.9 MODERATE COPD (CHRONIC OBSTRUCTIVE PULMONARY DISEASE) (HCC): ICD-10-CM

## 2019-12-04 DIAGNOSIS — K21.9 GASTROESOPHAGEAL REFLUX DISEASE, ESOPHAGITIS PRESENCE NOT SPECIFIED: ICD-10-CM

## 2019-12-04 PROCEDURE — 99214 OFFICE O/P EST MOD 30 MIN: CPT | Performed by: FAMILY MEDICINE

## 2019-12-04 RX ORDER — GABAPENTIN 100 MG/1
100 CAPSULE ORAL 3 TIMES DAILY
Qty: 90 CAPSULE | Refills: 4 | Status: SHIPPED | OUTPATIENT
Start: 2020-01-04 | End: 2020-12-09

## 2019-12-04 RX ORDER — ALBUTEROL SULFATE 90 UG/1
2 AEROSOL, METERED RESPIRATORY (INHALATION) PRN
Qty: 1 INHALER | Refills: 11 | Status: SHIPPED | OUTPATIENT
Start: 2019-12-04 | End: 2020-12-18 | Stop reason: SDUPTHER

## 2019-12-04 RX ORDER — ATENOLOL AND CHLORTHALIDONE TABLET 100; 25 MG/1; MG/1
1 TABLET ORAL DAILY
Qty: 90 TABLET | Refills: 1 | Status: SHIPPED | OUTPATIENT
Start: 2019-12-04 | End: 2020-06-17 | Stop reason: SDUPTHER

## 2019-12-04 ASSESSMENT — ENCOUNTER SYMPTOMS
SHORTNESS OF BREATH: 1
BACK PAIN: 1
DIFFICULTY BREATHING: 1

## 2019-12-04 ASSESSMENT — COPD QUESTIONNAIRES: COPD: 1

## 2019-12-04 NOTE — TELEPHONE ENCOUNTER
Regarding the albuterol inhaler it depends on the weather but she uses it a lot, usually 2-3 times a week. Also the Gabapentin is 100mg three times daily.

## 2019-12-04 NOTE — TELEPHONE ENCOUNTER
Patient left voicemail that Dr. Tacos Chavez forgot to send in refill for her albuterol sulfate inhaler.

## 2019-12-04 NOTE — TELEPHONE ENCOUNTER
I called pharmacy about the inhaler written in June, she filled it June, August, and September. I left a message for patient to call.

## 2020-02-10 ENCOUNTER — OFFICE VISIT (OUTPATIENT)
Dept: FAMILY MEDICINE CLINIC | Age: 65
End: 2020-02-10
Payer: COMMERCIAL

## 2020-02-10 ENCOUNTER — TELEPHONE (OUTPATIENT)
Dept: FAMILY MEDICINE CLINIC | Age: 65
End: 2020-02-10

## 2020-02-10 VITALS
WEIGHT: 183 LBS | HEART RATE: 105 BPM | SYSTOLIC BLOOD PRESSURE: 118 MMHG | BODY MASS INDEX: 30.49 KG/M2 | TEMPERATURE: 99.9 F | RESPIRATION RATE: 14 BRPM | DIASTOLIC BLOOD PRESSURE: 70 MMHG | OXYGEN SATURATION: 96 % | HEIGHT: 65 IN

## 2020-02-10 LAB
BILIRUBIN, POC: ABNORMAL
BLOOD URINE, POC: ABNORMAL
CLARITY, POC: ABNORMAL
COLOR, POC: ABNORMAL
GLUCOSE URINE, POC: NEGATIVE
INFLUENZA VIRUS A RNA: NEGATIVE
INFLUENZA VIRUS B RNA: NEGATIVE
KETONES, POC: ABNORMAL
LEUKOCYTE EST, POC: ABNORMAL
NITRITE, POC: NEGATIVE
PH, POC: 6
PROTEIN, POC: ABNORMAL
SPECIFIC GRAVITY, POC: 1.02
UROBILINOGEN, POC: ABNORMAL

## 2020-02-10 PROCEDURE — 87502 INFLUENZA DNA AMP PROBE: CPT | Performed by: FAMILY MEDICINE

## 2020-02-10 PROCEDURE — 99213 OFFICE O/P EST LOW 20 MIN: CPT | Performed by: FAMILY MEDICINE

## 2020-02-10 PROCEDURE — 81002 URINALYSIS NONAUTO W/O SCOPE: CPT | Performed by: FAMILY MEDICINE

## 2020-02-10 RX ORDER — SULFAMETHOXAZOLE AND TRIMETHOPRIM 800; 160 MG/1; MG/1
1 TABLET ORAL 2 TIMES DAILY
Qty: 10 TABLET | Refills: 0 | Status: SHIPPED | OUTPATIENT
Start: 2020-02-10 | End: 2020-02-15

## 2020-02-10 ASSESSMENT — PATIENT HEALTH QUESTIONNAIRE - PHQ9
SUM OF ALL RESPONSES TO PHQ QUESTIONS 1-9: 0
SUM OF ALL RESPONSES TO PHQ QUESTIONS 1-9: 0
2. FEELING DOWN, DEPRESSED OR HOPELESS: 0
1. LITTLE INTEREST OR PLEASURE IN DOING THINGS: 0
SUM OF ALL RESPONSES TO PHQ9 QUESTIONS 1 & 2: 0

## 2020-02-10 NOTE — PROGRESS NOTES
OFFICE VISIT      Patient ID: Vanessa Kyle 1955  Chief Complaint   Patient presents with    URI     fever, chills, cough, body ache, fatigue,  x 3 days    Urinary Tract Infection     buring with urination x 3 days       HPI . HPI is per chief complaint    Review of Systems   Constitutional: Positive for activity change. .  ROS per HPI.   ROS is otherwise negative    Patient Active Problem List   Diagnosis    Osteoarthritis of finger    Sciatica    Essential hypertension    Abnormal mammogram of left breast    Moderate COPD (chronic obstructive pulmonary disease) (HCC)    Squamous cell lung cancer, right ([address]    Primary insomnia    Primary cancer of right upper lobe of lung ([address]    Other female genital prolapse    Creatinine elevation    History of lung cancer    Atrophy of right kidney       Past Medical History:   Diagnosis Date    Anemia     Anxiety     Arthritis     \"Fingers, Back\"    Bronchitis Last Episode 11-17    Chronic back pain     COPD [facility] obstructive [facility] disease) [facility]     Sees Dr. Nikhil Wright    Depression     Diverticulosis 06/2013    Extensive per CT    Hemoptysis 12/06/2017    Hx of blood clots     \"found I have a clot near my mediport so they are taking it out 5/16/2019- put me on Xarelto    Hyperlipidemia 2009    Hypertension 2009    Low back pain     \"better than it use to be- had therapy in the past- originally hurt back at work 20+ yrs ago\"    Lung mass     rul- scheduled to bronch 12/74/2017    Panic attacks     Pneumonia Dx 1-17    Right Lung Cancer Dx 10-17    tx with chemo following with Dr Tayla Mina Dx 2014    \"Right Side\"    Shortness of breath on exertion     Teeth missing     Upper And Lower    Urinary tract infection In Past    No Current Symptoms    Wears dentures     Full Upper, Partial Lower    Wears glasses        Past Surgical History:   Procedure Laterality Date    BRONCHOSCOPY  12/07/2017 2/21/2018- done     BUNIONECTOMY Bilateral 1990's    \"Done At Different Times\"    CATHETER REMOVAL Left 5/16/2019    PORT REMOVAL performed by Bettie Cabral MD at [facility]  2000's    DILATION AND CURETTAGE OF UTERUS  1989    ELBOW SURGERY Left 1980    \"Tendon Repair\"    LUNG SURGERY  04/12/2018    per old chart had thoracoscopy and RUL lobectomy , bronchoscopy and lymph node bx     MEDIASTINOSCOPY  02/21/2018    Bronchoscopy    TONSILLECTOMY  1978    TUBAL LIGATION  1988    TUNNELED VENOUS PORT PLACEMENT Left 07/31/2018       Family History   Problem Relation Age of Onset    Cancer Mother         Multiple Myeloma    Depression Mother     Diabetes Mother     High Blood Pressure Mother     High Cholesterol Mother     Heart Disease Father         CHF       Current Outpatient Medications on File Prior to Visit   Medication Sig Dispense Refill    atenolol-chlorthalidone (TENORETIC) 100-25 MG per tablet Take 1 tablet by mouth daily 90 tablet 1    gabapentin (NEURONTIN) 100 MG capsule Take 1 capsule by mouth 3 times daily for 90 days. 90 capsule 4    albuterol sulfate HFA (PROAIR HFA) 108 (90 Base) MCG/ACT inhaler Inhale 2 puffs into the lungs as needed for Wheezing 1 Inhaler 11    omeprazole (PRILOSEC) 40 MG delayed release capsule Take 1 capsule by mouth daily 90 capsule 3    benzonatate (TESSALON) 200 MG capsule Take 1 capsule by mouth 3 times daily as needed for Cough 30 capsule 0    BREO ELLIPTA 100-25 MCG/INH AEPB inhaler INHALE 1 PUFF INTO THE LUNGS EVERY DAY GARGLE AFTER USE 1 each 11    albuterol (PROVENTIL) (2.5 MG/3ML) 0.083% nebulizer solution Take 3 mLs by nebulization every 6 hours as needed for Wheezing 30 each 2     No current facility-administered medications on file prior to visit. Objective:         Physical Exam  Vitals signs and nursing note reviewed. Constitutional:       General: She is not in acute distress. Appearance: She is well-developed.    HENT:      Head: Normocephalic and atraumatic. Right Ear: Hearing, tympanic membrane and external ear normal.      Left Ear: Hearing, tympanic membrane and external ear normal.      Nose: Nose normal. No nasal deformity, laceration, mucosal edema or rhinorrhea. Right Sinus: No maxillary sinus tenderness or frontal sinus tenderness. Left Sinus: No maxillary sinus tenderness or frontal sinus tenderness. Mouth/Throat:      Pharynx: Posterior oropharyngeal erythema present. No oropharyngeal exudate. Eyes:      Conjunctiva/sclera: Conjunctivae normal.   Neck:      Musculoskeletal: Neck supple. Thyroid: No thyromegaly. Trachea: No tracheal deviation. Cardiovascular:      Rate and Rhythm: Normal rate and regular rhythm. Heart sounds: Normal heart sounds, S1 normal and S2 normal. No friction rub. No gallop. Pulmonary:      Effort: Pulmonary effort is normal. No respiratory distress. Breath sounds: Normal breath sounds. No wheezing or rales. Abdominal:      General: There is no distension. Palpations: Abdomen is soft. There is no mass. Tenderness: There is no abdominal tenderness. Lymphadenopathy:      Head:      Right side of head: No submental, submandibular or posterior auricular adenopathy. Left side of head: No submental, submandibular or posterior auricular adenopathy. Cervical:      Right cervical: No superficial, deep or posterior cervical adenopathy. Left cervical: No superficial, deep or posterior cervical adenopathy. Skin:     General: Skin is warm and dry. Neurological:      Mental Status: She is alert. Psychiatric:         Behavior: Behavior normal.         Body mass index is 30.45 kg/m².      Wt Readings from Last 3 Encounters:   02/10/20 183 lb (83 kg)   12/04/19 190 lb (86.2 kg)   11/05/19 193 lb 12.8 oz (87.9 kg)     BP Readings from Last 3 Encounters:   02/10/20 118/70   12/04/19 122/80   11/05/19 130/88          Results for orders placed or performed in visit on 02/10/20   POCT Urinalysis no Micro   Result Value Ref Range    Color, UA dhruv     Clarity, UA cloudy     Glucose, UA POC negative     Bilirubin, UA small     Ketones, UA trace     Spec Grav, UA 1.025     Blood, UA POC moderate     pH, UA 6.0     Protein, UA  mg/dL     Urobilinogen, UA 4.0 E.U./dL     Leukocytes, UA small     Nitrite, UA negative    POCT Influenza A/B DNA (Alere i)   Result Value Ref Range    Influenza virus A RNA negative     Influenza virus B RNA negative            Assessment:       Diagnosis Orders   1. Burning with urination  POCT Urinalysis no Micro    URINE CULTURE    sulfamethoxazole-trimethoprim (BACTRIM DS) 800-160 MG per tablet   2. Flu-like symptoms  POCT Influenza A/B DNA (Alere i)           Plan:      Return if symptoms worsen or fail to improve. Increase fluids. Get plenty of rest.  Tylenol or Ibuprofen for fever or muscle aches. Wash hands frequently since you are contagious.

## 2020-02-12 ENCOUNTER — TELEPHONE (OUTPATIENT)
Dept: FAMILY MEDICINE CLINIC | Age: 65
End: 2020-02-12

## 2020-02-12 LAB
ORGANISM: ABNORMAL
URINE CULTURE, ROUTINE: ABNORMAL

## 2020-02-12 RX ORDER — NITROFURANTOIN MACROCRYSTALS 100 MG/1
100 CAPSULE ORAL 4 TIMES DAILY
Qty: 20 CAPSULE | Refills: 0 | Status: SHIPPED | OUTPATIENT
Start: 2020-02-12 | End: 2020-02-17

## 2020-02-12 NOTE — TELEPHONE ENCOUNTER
Let her know that the urine infection that she has does not respond to the antibiotics that were given. I have sent new antibiotics to the pharmacy.

## 2020-03-29 ENCOUNTER — TELEPHONE (OUTPATIENT)
Dept: FAMILY MEDICINE CLINIC | Age: 65
End: 2020-03-29

## 2020-03-30 RX ORDER — GABAPENTIN 100 MG/1
100 CAPSULE ORAL 3 TIMES DAILY
Qty: 270 CAPSULE | Refills: 1 | OUTPATIENT
Start: 2020-03-30 | End: 2020-06-28

## 2020-05-29 ENCOUNTER — HOSPITAL ENCOUNTER (OUTPATIENT)
Dept: INFUSION THERAPY | Age: 65
Discharge: HOME | End: 2020-05-29
Payer: COMMERCIAL

## 2020-05-29 ENCOUNTER — HOSPITAL ENCOUNTER (OUTPATIENT)
Dept: CT IMAGING | Age: 65
Discharge: HOME | End: 2020-05-29
Payer: COMMERCIAL

## 2020-05-29 LAB
ALBUMIN SERPL-MCNC: 4.5 GM/DL (ref 3.4–5)
ALP BLD-CCNC: 122 IU/L (ref 40–128)
ALT SERPL-CCNC: 15 U/L (ref 10–40)
ANION GAP SERPL CALCULATED.3IONS-SCNC: 14 MMOL/L (ref 4–16)
AST SERPL-CCNC: 16 IU/L (ref 15–37)
BASOPHILS ABSOLUTE: 0.1 K/CU MM
BASOPHILS RELATIVE PERCENT: 0.6 % (ref 0–1)
BILIRUB SERPL-MCNC: 0.3 MG/DL (ref 0–1)
BUN BLDV-MCNC: 12 MG/DL (ref 6–23)
CALCIUM SERPL-MCNC: 10 MG/DL (ref 8.3–10.6)
CHLORIDE BLD-SCNC: 104 MMOL/L (ref 99–110)
CO2: 26 MMOL/L (ref 21–32)
CREAT SERPL-MCNC: 1.1 MG/DL (ref 0.6–1.1)
DIFFERENTIAL TYPE: ABNORMAL
EOSINOPHILS ABSOLUTE: 0.2 K/CU MM
EOSINOPHILS RELATIVE PERCENT: 2.3 % (ref 0–3)
GFR AFRICAN AMERICAN: >60 ML/MIN/1.73M2
GFR NON-AFRICAN AMERICAN: 50 ML/MIN/1.73M2
GLUCOSE BLD-MCNC: 96 MG/DL (ref 70–99)
HCT VFR BLD CALC: 41.2 % (ref 37–47)
HEMOGLOBIN: 13.2 GM/DL (ref 12.5–16)
LYMPHOCYTES ABSOLUTE: 2.4 K/CU MM
LYMPHOCYTES RELATIVE PERCENT: 28.7 % (ref 24–44)
MCH RBC QN AUTO: 29.9 PG (ref 27–31)
MCHC RBC AUTO-ENTMCNC: 32 % (ref 32–36)
MCV RBC AUTO: 93.4 FL (ref 78–100)
MONOCYTES ABSOLUTE: 0.5 K/CU MM
MONOCYTES RELATIVE PERCENT: 5.7 % (ref 0–4)
PDW BLD-RTO: 14.1 % (ref 11.7–14.9)
PLATELET # BLD: 375 K/CU MM (ref 140–440)
PMV BLD AUTO: 9.2 FL (ref 7.5–11.1)
POTASSIUM SERPL-SCNC: 4.5 MMOL/L (ref 3.5–5.1)
RBC # BLD: 4.41 M/CU MM (ref 4.2–5.4)
SEGMENTED NEUTROPHILS ABSOLUTE COUNT: 5.1 K/CU MM
SEGMENTED NEUTROPHILS RELATIVE PERCENT: 62.7 % (ref 36–66)
SODIUM BLD-SCNC: 144 MMOL/L (ref 135–145)
TOTAL PROTEIN: 7.1 GM/DL (ref 6.4–8.2)
WBC # BLD: 8.2 K/CU MM (ref 4–10.5)

## 2020-05-29 PROCEDURE — 71250 CT THORAX DX C-: CPT

## 2020-05-29 PROCEDURE — 80053 COMPREHEN METABOLIC PANEL: CPT

## 2020-05-29 PROCEDURE — 36415 COLL VENOUS BLD VENIPUNCTURE: CPT

## 2020-05-29 PROCEDURE — 85025 COMPLETE CBC W/AUTO DIFF WBC: CPT

## 2020-06-03 ENCOUNTER — HOSPITAL ENCOUNTER (OUTPATIENT)
Dept: INFUSION THERAPY | Age: 65
Discharge: HOME | End: 2020-06-03
Payer: COMMERCIAL

## 2020-06-03 PROCEDURE — 99211 OFF/OP EST MAY X REQ PHY/QHP: CPT

## 2020-06-10 RX ORDER — ATENOLOL AND CHLORTHALIDONE TABLET 100; 25 MG/1; MG/1
TABLET ORAL
Qty: 90 TABLET | Refills: 1 | OUTPATIENT
Start: 2020-06-10

## 2020-06-17 ENCOUNTER — TELEPHONE (OUTPATIENT)
Dept: FAMILY MEDICINE CLINIC | Age: 65
End: 2020-06-17

## 2020-06-17 ENCOUNTER — VIRTUAL VISIT (OUTPATIENT)
Dept: FAMILY MEDICINE CLINIC | Age: 65
End: 2020-06-17
Payer: COMMERCIAL

## 2020-06-17 PROCEDURE — 99214 OFFICE O/P EST MOD 30 MIN: CPT | Performed by: FAMILY MEDICINE

## 2020-06-17 RX ORDER — FLUTICASONE PROPIONATE 50 MCG
2 SPRAY, SUSPENSION (ML) NASAL DAILY
Qty: 1 BOTTLE | Refills: 1 | Status: SHIPPED | OUTPATIENT
Start: 2020-06-17 | End: 2020-12-18 | Stop reason: SDUPTHER

## 2020-06-17 RX ORDER — FLUTICASONE FUROATE AND VILANTEROL TRIFENATATE 100; 25 UG/1; UG/1
1 POWDER RESPIRATORY (INHALATION) DAILY
Qty: 1 EACH | Refills: 11 | Status: SHIPPED | OUTPATIENT
Start: 2020-06-17 | End: 2021-07-08

## 2020-06-17 RX ORDER — AMLODIPINE BESYLATE 5 MG/1
5 TABLET ORAL DAILY
Qty: 90 TABLET | Refills: 1 | Status: SHIPPED | OUTPATIENT
Start: 2020-06-17 | End: 2020-12-18

## 2020-06-17 RX ORDER — ALBUTEROL SULFATE 2.5 MG/3ML
2.5 SOLUTION RESPIRATORY (INHALATION) EVERY 6 HOURS PRN
Qty: 30 EACH | Refills: 2 | Status: SHIPPED | OUTPATIENT
Start: 2020-06-17 | End: 2022-02-16 | Stop reason: SDUPTHER

## 2020-06-17 RX ORDER — OMEPRAZOLE 40 MG/1
40 CAPSULE, DELAYED RELEASE ORAL DAILY
Qty: 90 CAPSULE | Refills: 3 | Status: SHIPPED | OUTPATIENT
Start: 2020-06-17 | End: 2021-07-08 | Stop reason: SDUPTHER

## 2020-06-17 RX ORDER — ATENOLOL AND CHLORTHALIDONE TABLET 100; 25 MG/1; MG/1
1 TABLET ORAL DAILY
Qty: 90 TABLET | Refills: 1 | Status: SHIPPED | OUTPATIENT
Start: 2020-06-17 | End: 2020-12-18 | Stop reason: SDUPTHER

## 2020-06-17 ASSESSMENT — ENCOUNTER SYMPTOMS
VOMITING: 0
DIARRHEA: 0
SHORTNESS OF BREATH: 0
COUGH: 1
NAUSEA: 0

## 2020-07-09 RX ORDER — FLUTICASONE PROPIONATE 50 MCG
SPRAY, SUSPENSION (ML) NASAL
Qty: 1 BOTTLE | Refills: 1 | OUTPATIENT
Start: 2020-07-09

## 2020-08-28 RX ORDER — BENZONATATE 100 MG/1
CAPSULE ORAL
Qty: 30 CAPSULE | Refills: 1 | Status: SHIPPED | OUTPATIENT
Start: 2020-08-28 | End: 2020-12-18

## 2020-10-08 RX ORDER — FLUTICASONE PROPIONATE 50 MCG
SPRAY, SUSPENSION (ML) NASAL
Qty: 1 BOTTLE | Refills: 1 | OUTPATIENT
Start: 2020-10-08

## 2020-12-07 PROBLEM — B37.0 CANDIDAL STOMATITIS: Status: ACTIVE | Noted: 2020-12-07

## 2020-12-07 PROBLEM — G25.81 RESTLESS LEGS SYNDROME: Status: ACTIVE | Noted: 2020-12-07

## 2020-12-07 PROBLEM — R53.83 OTHER FATIGUE: Status: ACTIVE | Noted: 2020-12-07

## 2020-12-07 PROBLEM — C34.11 MALIGNANT NEOPLASM OF UPPER LOBE, RIGHT BRONCHUS OR LUNG (HCC): Status: ACTIVE | Noted: 2020-12-07

## 2020-12-07 PROBLEM — R06.00 DYSPNEA, UNSPECIFIED: Status: ACTIVE | Noted: 2020-12-07

## 2020-12-09 RX ORDER — GABAPENTIN 100 MG/1
CAPSULE ORAL
Qty: 270 CAPSULE | Refills: 0 | Status: SHIPPED | OUTPATIENT
Start: 2020-12-09 | End: 2021-03-11 | Stop reason: SDUPTHER

## 2020-12-09 RX ORDER — FLUTICASONE PROPIONATE 50 MCG
SPRAY, SUSPENSION (ML) NASAL
Qty: 1 BOTTLE | Refills: 1 | OUTPATIENT
Start: 2020-12-09

## 2020-12-18 ENCOUNTER — OFFICE VISIT (OUTPATIENT)
Dept: FAMILY MEDICINE CLINIC | Age: 65
End: 2020-12-18
Payer: COMMERCIAL

## 2020-12-18 ENCOUNTER — TELEPHONE (OUTPATIENT)
Dept: FAMILY MEDICINE CLINIC | Age: 65
End: 2020-12-18

## 2020-12-18 VITALS
WEIGHT: 193.6 LBS | HEIGHT: 64 IN | TEMPERATURE: 97 F | SYSTOLIC BLOOD PRESSURE: 148 MMHG | BODY MASS INDEX: 33.05 KG/M2 | DIASTOLIC BLOOD PRESSURE: 74 MMHG | OXYGEN SATURATION: 98 % | HEART RATE: 59 BPM

## 2020-12-18 PROCEDURE — 99214 OFFICE O/P EST MOD 30 MIN: CPT | Performed by: FAMILY MEDICINE

## 2020-12-18 PROCEDURE — 90686 IIV4 VACC NO PRSV 0.5 ML IM: CPT | Performed by: FAMILY MEDICINE

## 2020-12-18 PROCEDURE — 90471 IMMUNIZATION ADMIN: CPT | Performed by: FAMILY MEDICINE

## 2020-12-18 RX ORDER — ALBUTEROL SULFATE 90 UG/1
2 AEROSOL, METERED RESPIRATORY (INHALATION) PRN
Qty: 1 INHALER | Refills: 11 | Status: SHIPPED | OUTPATIENT
Start: 2020-12-18 | End: 2022-02-16 | Stop reason: SDUPTHER

## 2020-12-18 RX ORDER — IPRATROPIUM BROMIDE 42 UG/1
2 SPRAY, METERED NASAL 4 TIMES DAILY
Qty: 1 BOTTLE | Refills: 0 | Status: SHIPPED | OUTPATIENT
Start: 2020-12-18 | End: 2021-02-02

## 2020-12-18 RX ORDER — LISINOPRIL 20 MG/1
20 TABLET ORAL DAILY
Qty: 30 TABLET | Refills: 0 | Status: SHIPPED | OUTPATIENT
Start: 2020-12-18 | End: 2021-01-12 | Stop reason: SDUPTHER

## 2020-12-18 RX ORDER — AMOXICILLIN AND CLAVULANATE POTASSIUM 875; 125 MG/1; MG/1
1 TABLET, FILM COATED ORAL 2 TIMES DAILY
Qty: 20 TABLET | Refills: 0 | Status: SHIPPED | OUTPATIENT
Start: 2020-12-18 | End: 2020-12-28

## 2020-12-18 RX ORDER — ATENOLOL AND CHLORTHALIDONE TABLET 100; 25 MG/1; MG/1
1 TABLET ORAL DAILY
Qty: 90 TABLET | Refills: 0 | Status: SHIPPED | OUTPATIENT
Start: 2020-12-18 | End: 2021-06-17 | Stop reason: SDUPTHER

## 2020-12-18 RX ORDER — FLUTICASONE PROPIONATE 50 MCG
2 SPRAY, SUSPENSION (ML) NASAL DAILY
Qty: 1 BOTTLE | Refills: 1 | Status: SHIPPED | OUTPATIENT
Start: 2020-12-18 | End: 2021-07-08 | Stop reason: SDUPTHER

## 2020-12-18 ASSESSMENT — ENCOUNTER SYMPTOMS
RHINORRHEA: 1
SHORTNESS OF BREATH: 0
CHEST TIGHTNESS: 0
COUGH: 1

## 2020-12-18 NOTE — PATIENT INSTRUCTIONS
PLEASE BRING YOUR MEDICATIONS TO ALL APPOINTMENTS    The diagnoses and medications listed in this after visit summary may not be accurate at the time of check out. Please check MY CHART in 28-48 hours for possible corrections. Late cancellation policy: So that we can better accommodate people who are sick, please give our office 24 hour notice for an appointment cancellation. Thank you. Missed appointments: Your care is very important to us. It is important that you keep your scheduled appointments. Multiple missed appointments will lead to a dismissal from the office. Later arrival policy: If you are more than 10 minutes late for your appointment, you will be asked to reschedule. Please allow 5-7 business days for paperwork to be processed. It is important that you check your MY Chart messages, as they include appointment reminders, test results, and other important information. If you have forgotten your password, please call [phone].          1. Take your blood pressure medications at night. This reduces your chance of cardiovascular event by half  2. Fever in kids: It's best to give both Tylenol and Ibuprofen at the same time rather than staggering them which is confusing  3. Pediatric obesity is decreased by less exposure to antibiotics, consuming whole milk instead of skim milk, watching public TV instead of regular TV, and experiencing fewer adverse childhood events  4. 1 egg per day is good for your heart  5. Alternate day fasting does promote weight loss. 6. Skipping breakfast increases your risk of obesity  7. Artificially sweetened drinks increase all cause mortality (strokes, BMI, cardiovascular)  8. Kale consumption can reduce onset of dementia  9. Walking at least 8000 steps per day and resistance exercise 2-3 x per week are good for your heart  10.  Covid 19:  Wearing gloves is not that helpful Having low vitamin D levels increases risk of infection (take 2-4000 units of D3 per day until our covid crisis is resolved)  11.  Brushing teeth 3 times per day can decrease chance of getting diabetes

## 2020-12-18 NOTE — PROGRESS NOTES
Patient ID: Leticia Pavon 1955    . Chief Complaint   Patient presents with    Hypertension    Immunizations     Flu Vaccine         Hypertension  This is a chronic problem. The current episode started more than 1 year ago. The problem is uncontrolled. Associated symptoms include headaches. Pertinent negatives include no chest pain, palpitations or shortness of breath. Risk factors for coronary artery disease include obesity. Past treatments include calcium channel blockers, beta blockers and diuretics. The current treatment provides no improvement. Compliance problems include psychosocial issues (Quit taking the 5 mg amlodipine because it made her dizzy. ). Sinusitis:  Tried sinus medications. X 1 week. Coughing from the drainage. Has sinus pressure above her eyes. Review of Systems   HENT: Positive for postnasal drip and rhinorrhea. Respiratory: Positive for cough. Negative for chest tightness and shortness of breath. Cardiovascular: Negative for chest pain, palpitations and leg swelling. Neurological: Positive for headaches.        Patient Active Problem List   Diagnosis    Osteoarthritis of finger    Sciatica    Essential hypertension    Abnormal mammogram of left breast    Moderate COPD (chronic obstructive pulmonary disease) (HCC)    Squamous cell lung cancer, right ([address]    Primary insomnia    Primary cancer of right upper lobe of lung ([address]    Other female genital prolapse    Creatinine elevation    History of lung cancer    Atrophy of right kidney    Malignant neoplasm of upper lobe, right bronchus or lung (HCC)    Other fatigue    Candidal stomatitis    Restless legs syndrome    Dyspnea, unspecified       Past Surgical History:   Procedure Laterality Date    BRONCHOSCOPY  12/07/2017 2/21/2018- done     BUNIONECTOMY Bilateral 1990's    \"Done At Different Times\"    CATHETER REMOVAL Left 5/16/2019    PORT REMOVAL performed by Hannah Baugh MD at [place] [address] Diagnosis Orders   1. Essential hypertension  lisinopril (PRINIVIL;ZESTRIL) 20 MG tablet    atenolol-chlorthalidone (TENORETIC) 100-25 MG per tablet   2. Need for influenza vaccination  INFLUENZA, QUADV, 3 YRS AND OLDER, IM PF, PREFILL SYR OR SDV, 0.5ML (AFLURIA QUADV, PF)   3. Encounter for screening mammogram for malignant neoplasm of breast  KWASI Screening Bilateral   4. Post-nasal drip  fluticasone (FLONASE) 50 MCG/ACT nasal spray   5. Moderate COPD (chronic obstructive pulmonary disease) (HCC)  albuterol sulfate HFA (PROAIR HFA) 108 (90 Base) MCG/ACT inhaler   6. Acute non-recurrent frontal sinusitis  ipratropium (ATROVENT) 0.06 % nasal spray    amoxicillin-clavulanate (AUGMENTIN) 875-125 MG per tablet    COVID-19 Ambulatory           Plan:      I deferred doing more detailed exam regarding sinus infection due to current corona pandemic. Was not aware patient was having sinus symptoms upon entering the room. COVID order placed, but we were not able to instruct patient as to how to properly obtain this as the labs were not answering their phones. I attempted to call patient 5 hours later, but no answer. I might try to drop off order at patient's home        Hypertension uncontrolled. Adding on lisinopril. She needs to recheck in 2 weeks. Return in about 10 days (around 12/28/2020) for HTN, In office.

## 2021-01-12 DIAGNOSIS — I10 ESSENTIAL HYPERTENSION: ICD-10-CM

## 2021-01-12 RX ORDER — LISINOPRIL 20 MG/1
20 TABLET ORAL DAILY
Qty: 30 TABLET | Refills: 0 | Status: SHIPPED | OUTPATIENT
Start: 2021-01-12 | End: 2021-07-08 | Stop reason: SDUPTHER

## 2021-01-20 ENCOUNTER — TELEPHONE (OUTPATIENT)
Dept: ONCOLOGY | Age: 66
End: 2021-01-20

## 2021-01-20 NOTE — TELEPHONE ENCOUNTER
Pt is aware of scan scheduled for 1/26 at 11:00-- no prep for test-- at [facility]

## 2021-01-26 NOTE — PROGRESS NOTES
Patient Name: Luca Andres  Patient : 1955  Patient MRN: [id number]     Primary Oncologist: Lolis Bearden MD  Referring Provider: Evette Ariza MD     Date of Service: 2021      Chief Complaint:   Chief Complaint   Patient presents with    Follow-up    Results     ct scan     She came in for follow-up visit. Patient Active Problem List:     Osteoarthritis of finger     Sciatica     Essential hypertension     Abnormal mammogram of left breast     Moderate COPD (chronic obstructive pulmonary disease) (HCC)     Squamous cell lung cancer, right (HCC)     Primary insomnia     Primary cancer of right upper lobe of lung ([address]     Other female genital prolapse     Creatinine elevation     History of lung cancer     Atrophy of right kidney     Malignant neoplasm of upper lobe, right bronchus or lung (HCC)     Other fatigue     Candidal stomatitis     Restless legs syndrome     Dyspnea, unspecified     HPI:   Veola Kussmaul is a pleasant 57-year-old  female patient who was diagnosed with squamous cell carcinoma of right upper lung, status post bronchoscopy on 2017. She initially presented with bronchitis in 2017 and had a chest x-ray. She did not feel much better after a course of antibiotics and had persistent cough. In 2017, she started having occasional hemoptysis. She went to see her family doctor and had a chest x-ray, eventually CAT scan of the chest.  Due to the insurance, it took about one month to get her CAT scan, as per patient. CT of the chest on 2017, revealed soft tissue mass obstructing the right upper lobe bronchus, concerning for endobronchial tumor and postobstructive collapse of the right upper lobe of the lung accounting or the paratracheal soft tissue density seen on the chest x-ray. Right paratracheal and suprahilar adenopathy could be obscured by the consolidated and collapsed right upper lung.       Mammogram on  2017, revealed benign study. She underwent bronchoscopy on December 7, 2017. The procedure note revealed fungating exophytic mass lesions obstructed the takeoff of the right upper lobe bronchus. The pathology report from the bronchial washing and brushing of the right upper lobe revealed squamous cell carcinoma. Pulmonary function test revealed moderate obstructive defects and the post bronchodilator study demonstrated significant response in the small airways. She was referred for further evaluation and possible chemoradiation versus surgery. She stated she was exposed to chemicals when she worked at [employer] and also asbestos. CT head was negative. PET CT scan in January 2018 revealed activity to the right perihilar mass and no evidence of metastatic disease anywhere else. I refer her to see thoracic surgeon for the discussion of possible resection and mediastinoscopy. She was seen by the radiation oncologist in January 2018. She underwent right upper lobectomy on April 11, 2018. Pathology reports revealed keratinizing invasive squamous cell carcinoma with positive bronchial margins negative lymph node at level 9, level 10. Tumor was 4.2 cm moderately differentiated with in situ component. No visible pleural invasions. No lymphovascular invasion. The squamous cell carcinoma in situ involved the bronchus intermedius part of the distal and carinal/tracheal site. Pathologically stage T2b, N0 MX. I went over NCCN guideline. I offered her regimen therapy plus minus chemo. She is agreeable to see an oncologist for the radiation therapy. She will think about the chemotherapy. VQ scan in March 2018 revealed low probability for pulmonary embolism. She was seen by RT onc and not recommended to have adjuvant RT. She is agreeable to Beryl and taxol. She started adjuvant chemo carbo/taxol on July 11, 2018. She requested to split the dose to day 1 day 8 every 3 weeks due to the increased fatigue.      CXR in June 2018 was non-acute. PET CT scan in November 2018  was negative. She decided not to pursue last dose of chemo due to side effects. I reminded about mammogram due in October 2018. CT 5/2/2019 showed no evidence of recurrent or residual disease. 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 She has FOB in March 2019 which was negative. She is agreeable to have follow up CT chest in November 2019. CT 5/29/2020 showed IMPRESSION: 1. Right upper lobectomy. No evidence of local recurrence or metastases in the chest.     On February 2, 2020 when she came in for follow-up visit. CT chest on January 28, 2021 showed stable exam.  No new findings of recurrent or metastatic disease in the chest.    She has chronic dry cough and I recommend to follow up with Dr Isma Carl. She needs refill for tessalon perle and gabapentin for neuropathy. She has chronic cough secondary to PND. She was given atrovent nasal spray. She is agreeable to have CT chest prior to next visit in 10 months. She has no symptoms concerning for recurrence. She denied acute pain. No NVD. No fever or chills. No headache or dizziness. She will have mammogram in March 2021 and [place] in June 2021. She had stool occult 2 years ago. She will have COVID vaccine when it becomes available for her. Past Medical History  Anxiety, chronic low back pain, diverticulosis diagnosed in June 2013, hypertension, dyslipidemia, moderate COPD. Surgical History  She had colonoscopy a while ago, bronchoscopy in December 2017. Social History  She smoked about a half-pack per day for 25 years and quit in March 2014. She denied any alcohol or illicit drug use. She has one daughter who is a nurse. Family History  Mother had multiple myeloma, father had lung cancer and he was a smoker. Review of Systems: \"Per interval history; otherwise 10 point ROS is negative. \"     Vital Signs:  BP (!) 147/88 (Site: Right Upper Arm, Position: Sitting, Cuff Size: Medium Adult)   Pulse 70   Temp 97 °F (36.1 °C) (Infrared)   Resp 16   Ht 5' 4\" (1.626 m)   Wt 190 lb (86.2 kg)   LMP 01/06/2009 (LMP Unknown)   SpO2 95%   BMI 32.61 kg/m²     Physical Exam:  CONSTITUTIONAL: awake, alert, cooperative, no apparent distress   EYES: pupils equal, round and reactive to light, sclera clear and conjunctiva normal  ENT: Normocephalic, without obvious abnormality, atraumatic  NECK: supple, symmetrical, no jugular venous distension and no carotid bruits   HEMATOLOGIC/LYMPHATIC: no cervical, supraclavicular or axillary lymphadenopathy   LUNGS: no increased work of breathing and clear to auscultation   CARDIOVASCULAR: regular rate and rhythm, normal S1 and S2, no murmur noted  ABDOMEN: normal bowel sounds x 4, soft, non-distended, non-tender, no masses palpated, no hepatosplenomegaly   MUSCULOSKELETAL: full range of motion noted, tone is normal  NEUROLOGIC: awake, alert, oriented to name, place and time. Motor skills grossly intact. SKIN: Normal skin color, texture, turgor and no jaundice. appears intact   EXTREMITIES: no LE edema or cyanosis.     Labs:  Hematology:  Lab Results   Component Value Date    WBC 8.2 05/29/2020    RBC 4.41 05/29/2020    HGB 13.2 05/29/2020    HCT 41.2 05/29/2020    MCV 93.4 05/29/2020    MCH 29.9 05/29/2020    MCHC 32.0 05/29/2020    RDW 14.1 05/29/2020     05/29/2020    MPV 9.2 05/29/2020    SEGSPCT 62.7 05/29/2020    EOSRELPCT 2.3 05/29/2020    BASOPCT 0.6 05/29/2020    LYMPHOPCT 28.7 05/29/2020    MONOPCT 5.7 (H) 05/29/2020    SEGSABS 5.1 05/29/2020    EOSABS 0.2 05/29/2020    BASOSABS 0.1 05/29/2020    LYMPHSABS 2.4 05/29/2020    MONOSABS 0.5 05/29/2020    DIFFTYPE AUTOMATED DIFFERENTIAL 05/29/2020     Chemistry:  Lab Results   Component Value Date     05/29/2020    K 4.5 05/29/2020     05/29/2020    CO2 26 05/29/2020    BUN 12 05/29/2020    CREATININE 1.1 05/29/2020    GLUCOSE 96 05/29/2020    CALCIUM 10.0 05/29/2020    PROT 7.1 05/29/2020    LABALBU 4.5 05/29/2020    BILITOT 0.3 05/29/2020    ALKPHOS 122 05/29/2020    AST 16 05/29/2020    ALT 15 05/29/2020    LABGLOM 50 (L) 05/29/2020    GFRAA >60 05/29/2020    AGRATIO 1.8 12/30/2013    GLOB 2.6 12/30/2013     Immunology:  Lab Results   Component Value Date    PROT 7.1 05/29/2020     Coagulation Panel:  Lab Results   Component Value Date    PROTIME 12.0 03/27/2018    INR 1.05 03/27/2018    APTT 29.1 03/27/2018      Observations:  No data recorded      Assessment & Plan:    1. She was diagnosed with squamous cell carcinoma of the right upper lung with collapse of the right upper lung. Pulmonary function tests reveal moderate obstructive lung defects. I reviewed CBC and CMP in January 2018. CT of the head was negative. PET CT scan In January 2018 revealed no evidence of metastatic disease elsewhere. She has pathological stage T2b, N0, MX moderately differentiated invasive Squamous cell carcinoma status post right upper lobectomy in April 2018 with positive bronchial margin of the in situ component. She was not recommended to have adjuvant RT by rad onc. In the first 2- 3 years she will have follow-up imaging study every 6-12 months. She started adjuvant chemo in July 2018 and completed October 25, 2018. She decided not to pursue the last dose of chemo. PET CT scan in November 2018 was negative. CT 5/2/2019 showed no evidence of recurrent or residual disease. CT scan 11/13/2019 showed no evidence of recurrence or residual disease. CT chest 5/2020 showed no evidence of recurrence or residual disease. CT chest on January 28, 2021 showed no evidence of recurrent disease. She is agreeable to continue with surveillance. I ordered CT chest prior to next OV. 2.  She had a pericatheter thrombus and was started on Xarelto 15 mg BID x 21. She had mediport removal in June 2019.    3.   Mammogram in October 2017 was benign. Colonoscopy was a while ago. I reminded to have mammogram. FOB in March 2019 was negative. 4.   She quit smoking in 2014. I recommend to follow up with Dr Jey Wong for chronic cough. I gave refill of tessalon perle. 4.  We discussed about lifestyle and a healthy diet. Depression. I referred to psychiatrist.    5. 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 I gave refill of gabapentin. All of her questions have been answered for today. Return to clinic in 10 months or sooner. Recent imaging and labs were reviewed and discussed with the patient.       Electronically signed by Yasmany Jarrett MD on 1/26/21 at 8:33 AM EST

## 2021-01-28 ENCOUNTER — HOSPITAL ENCOUNTER (OUTPATIENT)
Dept: CT IMAGING | Age: 66
Discharge: HOME | End: 2021-01-28
Payer: COMMERCIAL

## 2021-01-28 DIAGNOSIS — C34.11 MALIGNANT NEOPLASM OF UPPER LOBE OF RIGHT LUNG (HCC): ICD-10-CM

## 2021-01-28 PROCEDURE — 71250 CT THORAX DX C-: CPT

## 2021-02-02 ENCOUNTER — OFFICE VISIT (OUTPATIENT)
Dept: ONCOLOGY | Age: 66
End: 2021-02-02
Payer: COMMERCIAL

## 2021-02-02 ENCOUNTER — HOSPITAL ENCOUNTER (OUTPATIENT)
Dept: INFUSION THERAPY | Age: 66
Discharge: HOME | End: 2021-02-02
Payer: COMMERCIAL

## 2021-02-02 VITALS
HEIGHT: 64 IN | TEMPERATURE: 97 F | SYSTOLIC BLOOD PRESSURE: 147 MMHG | OXYGEN SATURATION: 95 % | HEART RATE: 70 BPM | RESPIRATION RATE: 16 BRPM | WEIGHT: 190 LBS | BODY MASS INDEX: 32.44 KG/M2 | DIASTOLIC BLOOD PRESSURE: 88 MMHG

## 2021-02-02 DIAGNOSIS — C34.90 MALIGNANT NEOPLASM OF LUNG, UNSPECIFIED LATERALITY, UNSPECIFIED PART OF LUNG (HCC): ICD-10-CM

## 2021-02-02 DIAGNOSIS — M54.31 RIGHT SCIATIC NERVE PAIN: ICD-10-CM

## 2021-02-02 DIAGNOSIS — C34.90 MALIGNANT NEOPLASM OF LUNG, UNSPECIFIED LATERALITY, UNSPECIFIED PART OF LUNG (HCC): Primary | ICD-10-CM

## 2021-02-02 LAB
ALBUMIN SERPL-MCNC: 4.3 GM/DL (ref 3.4–5)
ALP BLD-CCNC: 117 IU/L (ref 40–128)
ALT SERPL-CCNC: 13 U/L (ref 10–40)
ANION GAP SERPL CALCULATED.3IONS-SCNC: 13 MMOL/L (ref 4–16)
AST SERPL-CCNC: 15 IU/L (ref 15–37)
BASOPHILS ABSOLUTE: 0.1 K/CU MM
BASOPHILS RELATIVE PERCENT: 0.6 % (ref 0–1)
BILIRUB SERPL-MCNC: 0.3 MG/DL (ref 0–1)
BUN BLDV-MCNC: 14 MG/DL (ref 6–23)
CALCIUM SERPL-MCNC: 9.1 MG/DL (ref 8.3–10.6)
CHLORIDE BLD-SCNC: 99 MMOL/L (ref 99–110)
CO2: 25 MMOL/L (ref 21–32)
CREAT SERPL-MCNC: 1.2 MG/DL (ref 0.6–1.1)
DIFFERENTIAL TYPE: ABNORMAL
EOSINOPHILS ABSOLUTE: 0.2 K/CU MM
EOSINOPHILS RELATIVE PERCENT: 1.9 % (ref 0–3)
GFR AFRICAN AMERICAN: 55 ML/MIN/1.73M2
GFR NON-AFRICAN AMERICAN: 45 ML/MIN/1.73M2
GLUCOSE BLD-MCNC: 109 MG/DL (ref 70–99)
HCT VFR BLD CALC: 39.4 % (ref 37–47)
HEMOGLOBIN: 12.8 GM/DL (ref 12.5–16)
LYMPHOCYTES ABSOLUTE: 2.5 K/CU MM
LYMPHOCYTES RELATIVE PERCENT: 27.5 % (ref 24–44)
MCH RBC QN AUTO: 30.3 PG (ref 27–31)
MCHC RBC AUTO-ENTMCNC: 32.5 % (ref 32–36)
MCV RBC AUTO: 93.1 FL (ref 78–100)
MONOCYTES ABSOLUTE: 0.5 K/CU MM
MONOCYTES RELATIVE PERCENT: 5.4 % (ref 0–4)
PDW BLD-RTO: 14.1 % (ref 11.7–14.9)
PLATELET # BLD: 399 K/CU MM (ref 140–440)
PMV BLD AUTO: 9.2 FL (ref 7.5–11.1)
POTASSIUM SERPL-SCNC: 4.6 MMOL/L (ref 3.5–5.1)
RBC # BLD: 4.23 M/CU MM (ref 4.2–5.4)
SEGMENTED NEUTROPHILS ABSOLUTE COUNT: 5.8 K/CU MM
SEGMENTED NEUTROPHILS RELATIVE PERCENT: 64.6 % (ref 36–66)
SODIUM BLD-SCNC: 137 MMOL/L (ref 135–145)
TOTAL PROTEIN: 6.9 GM/DL (ref 6.4–8.2)
WBC # BLD: 9 K/CU MM (ref 4–10.5)

## 2021-02-02 PROCEDURE — 99213 OFFICE O/P EST LOW 20 MIN: CPT | Performed by: INTERNAL MEDICINE

## 2021-02-02 PROCEDURE — 36415 COLL VENOUS BLD VENIPUNCTURE: CPT

## 2021-02-02 PROCEDURE — 85025 COMPLETE CBC W/AUTO DIFF WBC: CPT

## 2021-02-02 PROCEDURE — 80053 COMPREHEN METABOLIC PANEL: CPT

## 2021-02-02 PROCEDURE — 99211 OFF/OP EST MAY X REQ PHY/QHP: CPT

## 2021-02-02 RX ORDER — BENZONATATE 100 MG/1
100 CAPSULE ORAL 3 TIMES DAILY PRN
Qty: 30 CAPSULE | Refills: 2 | Status: SHIPPED | OUTPATIENT
Start: 2021-02-02 | End: 2021-02-12

## 2021-02-02 RX ORDER — GABAPENTIN 300 MG/1
300 CAPSULE ORAL 2 TIMES DAILY
Qty: 60 CAPSULE | Refills: 1 | Status: SHIPPED | OUTPATIENT
Start: 2021-02-02 | End: 2021-03-11 | Stop reason: CLARIF

## 2021-02-02 NOTE — PROGRESS NOTES
MA Rooming Questions  Patient: Mikey Langley  MRN: [id number]    Date: 2/2/2021        1. Do you have any new issues?   no         2. Do you need any refills on medications? yes - Tessalon pearls, Gabapentin    3. Have you had any imaging done since your last visit? yes - ct scan    4. Have you been hospitalized or seen in the emergency room since your last visit here?   no    5. Did the patient have a depression screening completed today?  No    No data recorded     PHQ-9 Given to (if applicable):               PHQ-9 Score (if applicable):                     [] Positive     []  Negative              Does question #9 need addressed (if applicable)                     [] Yes    []  No               Ailyn Antony MA

## 2021-02-18 DIAGNOSIS — I10 ESSENTIAL HYPERTENSION: ICD-10-CM

## 2021-02-23 RX ORDER — LISINOPRIL 20 MG/1
TABLET ORAL
Qty: 30 TABLET | Refills: 0 | OUTPATIENT
Start: 2021-02-23

## 2021-02-26 DIAGNOSIS — I10 ESSENTIAL HYPERTENSION: ICD-10-CM

## 2021-03-01 RX ORDER — LISINOPRIL 20 MG/1
TABLET ORAL
Qty: 30 TABLET | Refills: 0 | OUTPATIENT
Start: 2021-03-01

## 2021-03-11 DIAGNOSIS — M54.31 RIGHT SCIATIC NERVE PAIN: Primary | ICD-10-CM

## 2021-03-11 RX ORDER — GABAPENTIN 100 MG/1
CAPSULE ORAL
Qty: 270 CAPSULE | Refills: 0 | Status: SHIPPED | OUTPATIENT
Start: 2021-03-11 | End: 2021-06-07

## 2021-04-15 DIAGNOSIS — R09.82 POST-NASAL DRIP: ICD-10-CM

## 2021-04-15 RX ORDER — FLUTICASONE PROPIONATE 50 MCG
SPRAY, SUSPENSION (ML) NASAL
Qty: 1 BOTTLE | Refills: 1 | OUTPATIENT
Start: 2021-04-15

## 2021-06-06 DIAGNOSIS — M54.31 RIGHT SCIATIC NERVE PAIN: ICD-10-CM

## 2021-06-07 RX ORDER — GABAPENTIN 100 MG/1
CAPSULE ORAL
Qty: 90 CAPSULE | Refills: 1 | Status: SHIPPED | OUTPATIENT
Start: 2021-06-07 | End: 2021-08-30

## 2021-06-17 DIAGNOSIS — I10 ESSENTIAL HYPERTENSION: ICD-10-CM

## 2021-06-17 RX ORDER — ATENOLOL AND CHLORTHALIDONE TABLET 100; 25 MG/1; MG/1
1 TABLET ORAL DAILY
Qty: 14 TABLET | Refills: 0 | Status: SHIPPED | OUTPATIENT
Start: 2021-06-17 | End: 2021-07-08 | Stop reason: SDUPTHER

## 2021-07-08 ENCOUNTER — OFFICE VISIT (OUTPATIENT)
Dept: FAMILY MEDICINE CLINIC | Age: 66
End: 2021-07-08

## 2021-07-08 VITALS
HEART RATE: 86 BPM | OXYGEN SATURATION: 96 % | SYSTOLIC BLOOD PRESSURE: 148 MMHG | DIASTOLIC BLOOD PRESSURE: 100 MMHG | WEIGHT: 192.8 LBS | BODY MASS INDEX: 33.09 KG/M2 | TEMPERATURE: 97.8 F

## 2021-07-08 DIAGNOSIS — Z12.11 SCREEN FOR COLON CANCER: Primary | ICD-10-CM

## 2021-07-08 DIAGNOSIS — K21.9 GASTROESOPHAGEAL REFLUX DISEASE, UNSPECIFIED WHETHER ESOPHAGITIS PRESENT: ICD-10-CM

## 2021-07-08 DIAGNOSIS — J20.9 ACUTE BRONCHITIS, UNSPECIFIED ORGANISM: ICD-10-CM

## 2021-07-08 DIAGNOSIS — R09.82 POST-NASAL DRIP: ICD-10-CM

## 2021-07-08 DIAGNOSIS — J44.9 MODERATE COPD (CHRONIC OBSTRUCTIVE PULMONARY DISEASE) (HCC): ICD-10-CM

## 2021-07-08 DIAGNOSIS — I10 ESSENTIAL HYPERTENSION: ICD-10-CM

## 2021-07-08 PROCEDURE — 99214 OFFICE O/P EST MOD 30 MIN: CPT | Performed by: FAMILY MEDICINE

## 2021-07-08 RX ORDER — FLUTICASONE PROPIONATE 50 MCG
2 SPRAY, SUSPENSION (ML) NASAL DAILY
Qty: 1 BOTTLE | Refills: 1 | Status: SHIPPED | OUTPATIENT
Start: 2021-07-08 | End: 2022-02-16 | Stop reason: SDUPTHER

## 2021-07-08 RX ORDER — OMEPRAZOLE 40 MG/1
40 CAPSULE, DELAYED RELEASE ORAL DAILY
Qty: 90 CAPSULE | Refills: 3 | Status: SHIPPED | OUTPATIENT
Start: 2021-07-08 | End: 2022-08-26 | Stop reason: SDUPTHER

## 2021-07-08 RX ORDER — DOXYCYCLINE HYCLATE 100 MG
100 TABLET ORAL 2 TIMES DAILY
Qty: 14 TABLET | Refills: 0 | Status: SHIPPED | OUTPATIENT
Start: 2021-07-08 | End: 2021-07-15

## 2021-07-08 RX ORDER — LISINOPRIL 20 MG/1
20 TABLET ORAL DAILY
Qty: 30 TABLET | Refills: 0 | Status: SHIPPED | OUTPATIENT
Start: 2021-07-08 | End: 2022-02-16 | Stop reason: SDUPTHER

## 2021-07-08 RX ORDER — BUDESONIDE AND FORMOTEROL FUMARATE DIHYDRATE 160; 4.5 UG/1; UG/1
2 AEROSOL RESPIRATORY (INHALATION) 2 TIMES DAILY
Qty: 3 INHALER | Refills: 1 | Status: SHIPPED | OUTPATIENT
Start: 2021-07-08 | End: 2022-02-16 | Stop reason: SDUPTHER

## 2021-07-08 RX ORDER — ATENOLOL AND CHLORTHALIDONE TABLET 100; 25 MG/1; MG/1
1 TABLET ORAL DAILY
Qty: 30 TABLET | Refills: 0 | Status: SHIPPED | OUTPATIENT
Start: 2021-07-08 | End: 2022-02-16 | Stop reason: SDUPTHER

## 2021-07-08 ASSESSMENT — PATIENT HEALTH QUESTIONNAIRE - PHQ9
SUM OF ALL RESPONSES TO PHQ9 QUESTIONS 1 & 2: 1
SUM OF ALL RESPONSES TO PHQ QUESTIONS 1-9: 1
1. LITTLE INTEREST OR PLEASURE IN DOING THINGS: 0
SUM OF ALL RESPONSES TO PHQ QUESTIONS 1-9: 1
2. FEELING DOWN, DEPRESSED OR HOPELESS: 1
SUM OF ALL RESPONSES TO PHQ QUESTIONS 1-9: 1

## 2021-07-08 ASSESSMENT — ENCOUNTER SYMPTOMS
SHORTNESS OF BREATH: 0
COUGH: 1

## 2021-07-08 NOTE — PATIENT INSTRUCTIONS
Need help scheduling your covid vaccine? [address] [phone]       PLEASE BRING YOUR MEDICATIONS TO ALL APPOINTMENTS    The diagnoses and medications listed in this after visit summary may not be accurate at the time of check out. Please check MY CHART in 28-48 hours for possible corrections. Late cancellation policy: So that we can better accommodate people who are sick, please give our office 24 hour notice for an appointment cancellation. Thank you. Missed appointments: Your care is very important to us. It is important that you keep your scheduled appointments. Multiple missed appointments will lead to a dismissal from the office. Later arrival policy: If you are more than 10 minutes late for your appointment, you will be asked to re-schedule. Please allow 5-7 business days for paperwork to be processed. It is important that you check your MY Chart messages, as they include appointment reminders, test results, and other important information. If you have forgotten your password, please call [phone]. HERE ARE SOME LIFE CHANGING TIPS      1. Take your blood pressure medications at bedtime. This reduces your chance of cardiovascular event by half  2. Fever in kids:  Give both Tylenol and Ibuprofen at the same time rather than staggering them which is confusing  3. Follow these tips to reduce childhood obesity: Reduce unnecessary exposure to antibiotics, consume whole milk instead of skim milk, watch public TV instead of regular TV (less exposure to junk food commercials), and reduce traumatic experiences. 4. 1 egg per day is good for your heart  5. Alternate day fasting does promote weight loss. Skipping breakfast increases your risk of obesity  6. Artificially sweetened drinks increase all cause mortality (strokes, BMI, cardiovascular)  7. Kale consumption can reduce onset of dementia  8.  Walking at least 8000 steps per day and resistance exercise 2-3 x per week are good for your heart  9.  Brushing teeth 3 times per day can decrease chance of getting diabetes

## 2021-07-08 NOTE — PROGRESS NOTES
by Shekhar Roy MD at [facility]  2000's    DILATION AND CURETTAGE OF UTERUS  1989    ELBOW SURGERY Left 1980    \"Tendon Repair\"    LUNG SURGERY  04/12/2018    per old chart had thoracoscopy and RUL lobectomy , bronchoscopy and lymph node bx     MEDIASTINOSCOPY  02/21/2018    Bronchoscopy    TONSILLECTOMY  1978    TUBAL LIGATION  1988    TUNNELED VENOUS PORT PLACEMENT Left 07/31/2018       Family History   Problem Relation Age of Onset    Cancer Mother         Multiple Myeloma    Depression Mother     Diabetes Mother     High Blood Pressure Mother     High Cholesterol Mother     Heart Disease Father         CHF       Current Outpatient Medications on File Prior to Visit   Medication Sig Dispense Refill    gabapentin (NEURONTIN) 100 MG capsule TAKE 1 TABLETS 3 TIMES DAILY. 90 capsule 1    albuterol sulfate HFA (PROAIR HFA) 108 (90 Base) MCG/ACT inhaler Inhale 2 puffs into the lungs as needed for Wheezing 1 Inhaler 11    albuterol (PROVENTIL) (2.5 MG/3ML) 0.083% nebulizer solution Take 3 mLs by nebulization every 6 hours as needed for Wheezing 30 each 2     No current facility-administered medications on file prior to visit. Objective:         Physical Exam  Vitals and nursing note reviewed. Constitutional:       General: She is not in acute distress. Appearance: She is well-developed. HENT:      Head: Normocephalic and atraumatic. Right Ear: Hearing, tympanic membrane and external ear normal.      Left Ear: Hearing, tympanic membrane and external ear normal.      Nose: Nose normal. No nasal deformity, laceration, mucosal edema or rhinorrhea. Right Sinus: No maxillary sinus tenderness or frontal sinus tenderness. Left Sinus: No maxillary sinus tenderness or frontal sinus tenderness. Mouth/Throat:      Pharynx: No oropharyngeal exudate or posterior oropharyngeal erythema.    Eyes:      Conjunctiva/sclera: Conjunctivae normal.   Neck:      Thyroid: No thyromegaly. Trachea: No tracheal deviation. Cardiovascular:      Rate and Rhythm: Normal rate and regular rhythm. Heart sounds: Normal heart sounds, S1 normal and S2 normal. No friction rub. No gallop. Pulmonary:      Effort: No respiratory distress. Breath sounds: Decreased breath sounds, wheezing and rhonchi present. No rales. Lymphadenopathy:      Head:      Right side of head: No submental, submandibular or posterior auricular adenopathy. Left side of head: No submental, submandibular or posterior auricular adenopathy. Cervical:      Right cervical: No superficial, deep or posterior cervical adenopathy. Left cervical: No superficial, deep or posterior cervical adenopathy. Skin:     General: Skin is warm and dry. Psychiatric:         Attention and Perception: She is attentive. Speech: Speech normal.         Behavior: Behavior normal.       Vitals:    07/08/21 1052 07/08/21 1106   BP: (!) 148/100 (!) 148/100   Site: Left Upper Arm    Position: Sitting    Cuff Size: Medium Adult    Pulse: 86    Temp: 97.8 °F (36.6 °C)    TempSrc: Infrared    SpO2: 96%    Weight: 192 lb 12.8 oz (87.5 kg)      Body mass index is 33.09 kg/m². Wt Readings from Last 3 Encounters:   07/08/21 192 lb 12.8 oz (87.5 kg)   02/02/21 190 lb (86.2 kg)   12/18/20 193 lb 9.6 oz (87.8 kg)     BP Readings from Last 3 Encounters:   07/08/21 (!) 148/100   02/02/21 (!) 147/88   12/18/20 (!) 148/74          No results found for this visit on 07/08/21. The ASCVD Risk score (Bette Shah, et al., 2013) failed to calculate for the following reasons:    Cannot find a previous HDL lab    Cannot find a previous total cholesterol lab  Lab Review   No visits with results within 2 Month(s) from this visit.    Latest known visit with results is:   Hospital Outpatient Visit on 02/02/2021   Component Date Value    WBC 02/02/2021 9.0     RBC 02/02/2021 4.23     Hemoglobin 02/02/2021 12.8     Hematocrit 02/02/2021 39.4     MCV 02/02/2021 93.1     MCH 02/02/2021 30.3     MCHC 02/02/2021 32.5     RDW 02/02/2021 14.1     Platelets 01/94/0993 399     MPV 02/02/2021 9.2     Differential Type 02/02/2021 AUTOMATED DIFFERENTIAL     Segs Relative 02/02/2021 64.6     Lymphocytes % 02/02/2021 27.5     Monocytes % 02/02/2021 5.4*    Eosinophils % 02/02/2021 1.9     Basophils % 02/02/2021 0.6     Segs Absolute 02/02/2021 5.8     Lymphocytes Absolute 02/02/2021 2.5     Monocytes Absolute 02/02/2021 0.5     Eosinophils Absolute 02/02/2021 0.2     Basophils Absolute 02/02/2021 0.1     Sodium 02/02/2021 137     Potassium 02/02/2021 4.6     Chloride 02/02/2021 99     CO2 02/02/2021 25     BUN 02/02/2021 14     CREATININE 02/02/2021 1.2*    Glucose 02/02/2021 109*    Calcium 02/02/2021 9.1     Albumin 02/02/2021 4.3     Total Protein 02/02/2021 6.9     Total Bilirubin 02/02/2021 0.3     ALT 02/02/2021 13     AST 02/02/2021 15     Alkaline Phosphatase 02/02/2021 117     GFR Non- 02/02/2021 45*    GFR  02/02/2021 55*    Anion Gap 02/02/2021 13            Assessment:       Diagnosis Orders   1. Screen for colon cancer     2. Moderate COPD (chronic obstructive pulmonary disease) (HCC)  budesonide-formoterol (SYMBICORT) 160-4.5 MCG/ACT AERO   3. Essential hypertension  atenolol-chlorthalidone (TENORETIC) 100-25 MG per tablet    lisinopril (PRINIVIL;ZESTRIL) 20 MG tablet   4. Post-nasal drip  fluticasone (FLONASE) 50 MCG/ACT nasal spray   5. Acute bronchitis, unspecified organism  doxycycline hyclate (VIBRA-TABS) 100 MG tablet   6. Gastroesophageal reflux disease, unspecified whether esophagitis present  omeprazole (PRILOSEC) 40 MG delayed release capsule           Plan:       Patient is due for colon cancer screening. .  Is preferring to have stool testing done rather than get a colonoscopy.  The patient does not have a family history of colon cancer and does not have bleeding when they have bowel movements. Therefore, fit test  was given to patient. The patient will be contacted in 2 weeks if the fit test has not been returned. If the fit test is positive, then the patient will be referred for a colonoscopy. Blood pressure uncontrolled due to noncompliance with taking her medication.   We are asking her to take her medication as directed    Doxycycline for the bronchitis    Urged her to get her mammogram    Recheck in 2 weeks

## 2021-07-13 DIAGNOSIS — Z12.31 ENCOUNTER FOR SCREENING MAMMOGRAM FOR MALIGNANT NEOPLASM OF BREAST: Primary | ICD-10-CM

## 2021-07-31 DIAGNOSIS — I10 ESSENTIAL HYPERTENSION: ICD-10-CM

## 2021-07-31 DIAGNOSIS — R09.82 POST-NASAL DRIP: ICD-10-CM

## 2021-08-02 RX ORDER — LISINOPRIL 20 MG/1
TABLET ORAL
Qty: 30 TABLET | Refills: 0 | OUTPATIENT
Start: 2021-08-02

## 2021-08-02 RX ORDER — ATENOLOL AND CHLORTHALIDONE TABLET 100; 25 MG/1; MG/1
TABLET ORAL
Qty: 30 TABLET | Refills: 0 | OUTPATIENT
Start: 2021-08-02

## 2021-08-02 RX ORDER — FLUTICASONE PROPIONATE 50 MCG
SPRAY, SUSPENSION (ML) NASAL
Qty: 1 BOTTLE | Refills: 1 | OUTPATIENT
Start: 2021-08-02

## 2021-08-28 DIAGNOSIS — I10 ESSENTIAL HYPERTENSION: ICD-10-CM

## 2021-08-29 DIAGNOSIS — M54.31 RIGHT SCIATIC NERVE PAIN: ICD-10-CM

## 2021-08-30 RX ORDER — GABAPENTIN 100 MG/1
CAPSULE ORAL
Qty: 90 CAPSULE | Refills: 1 | Status: SHIPPED | OUTPATIENT
Start: 2021-08-30 | End: 2021-11-01

## 2021-08-30 RX ORDER — ATENOLOL AND CHLORTHALIDONE TABLET 100; 25 MG/1; MG/1
TABLET ORAL
Qty: 30 TABLET | Refills: 0 | OUTPATIENT
Start: 2021-08-30

## 2021-08-30 RX ORDER — LISINOPRIL 20 MG/1
TABLET ORAL
Qty: 30 TABLET | Refills: 0 | OUTPATIENT
Start: 2021-08-30

## 2021-10-04 ENCOUNTER — TELEPHONE (OUTPATIENT)
Dept: ONCOLOGY | Age: 66
End: 2021-10-04

## 2021-10-04 DIAGNOSIS — C34.90 MALIGNANT NEOPLASM OF LUNG, UNSPECIFIED LATERALITY, UNSPECIFIED PART OF LUNG (HCC): ICD-10-CM

## 2021-10-04 DIAGNOSIS — C34.11 MALIGNANT NEOPLASM OF UPPER LOBE, RIGHT BRONCHUS OR LUNG (HCC): Primary | ICD-10-CM

## 2021-10-04 NOTE — TELEPHONE ENCOUNTER
Pt is going to [facility] on 11/10 900 arrival for a 930 appt--pt is aware and stated understanding

## 2021-10-17 NOTE — PROGRESS NOTES
Patient Name: Irene Taylor  Patient : 1955  Patient MRN: [id number]     Primary Oncologist: Gordo Diego MD  Referring Provider: Sydni Fermni MD     Date of Service: 2021      Chief Complaint:   Chief Complaint   Patient presents with    Follow-up     She came in for follow-up visit. Patient Active Problem List:     Osteoarthritis of finger     Sciatica     Essential hypertension     Abnormal mammogram of left breast     Moderate COPD (chronic obstructive pulmonary disease)      Squamous cell lung cancer, right (HCC)     Primary insomnia     Primary cancer of right upper lobe of lung (HCC)     Other female genital prolapse     Creatinine elevation     History of lung cancer     Atrophy of right kidney     Malignant neoplasm of upper lobe, right bronchus or lung      Other fatigue     Candidal stomatitis     Restless legs syndrome     Dyspnea, unspecified     HPI:   Delio Mixon is a pleasant 61-year-old  female patient who was diagnosed with squamous cell carcinoma of right upper lung, status post bronchoscopy on 2017. She initially presented with bronchitis in 2017 and had a chest x-ray. She did not feel much better after a course of antibiotics and had persistent cough. In 2017, she started having occasional hemoptysis. She went to see her family doctor and had a chest x-ray, eventually CAT scan of the chest.  Due to the insurance, it took about one month to get her CAT scan, as per patient. CT of the chest on 2017, revealed soft tissue mass obstructing the right upper lobe bronchus, concerning for endobronchial tumor and postobstructive collapse of the right upper lobe of the lung accounting or the paratracheal soft tissue density seen on the chest x-ray. Right paratracheal and suprahilar adenopathy could be obscured by the consolidated and collapsed right upper lung. Mammogram on 2017, revealed benign study.   She underwent bronchoscopy on December 7, 2017. The procedure note revealed fungating exophytic mass lesions obstructed the takeoff of the right upper lobe bronchus. The pathology report from the bronchial washing and brushing of the right upper lobe revealed squamous cell carcinoma. Pulmonary function test revealed moderate obstructive defects and the post bronchodilator study demonstrated significant response in the small airways. She was referred for further evaluation and possible chemoradiation versus surgery. She stated she was exposed to chemicals when she worked at [employer] and also asbestos. CT head was negative. PET CT scan in January 2018 revealed activity to the right perihilar mass and no evidence of metastatic disease anywhere else. I refer her to see thoracic surgeon for the discussion of possible resection and mediastinoscopy. She was seen by the radiation oncologist in January 2018. She underwent right upper lobectomy on April 11, 2018. Pathology reports revealed keratinizing invasive squamous cell carcinoma with positive bronchial margins negative lymph node at level 9, level 10. Tumor was 4.2 cm moderately differentiated with in situ component. No visible pleural invasions. No lymphovascular invasion. The squamous cell carcinoma in situ involved the bronchus intermedius part of the distal and carinal/tracheal site. Pathologically stage T2b, N0 MX. I went over NCCN guideline. I offered her regimen therapy plus minus chemo. She is agreeable to see an oncologist for the radiation therapy. She will think about the chemotherapy. VQ scan in March 2018 revealed low probability for pulmonary embolism. She was seen by RT onc and not recommended to have adjuvant RT. She is agreeable to Beryl and taxol. She started adjuvant chemo carbo/taxol on July 11, 2018. She requested to split the dose to day 1 day 8 every 3 weeks due to the increased fatigue. CXR in June 2018 was non-acute.    PET CT scan in November 2018  was negative. She decided not to pursue last dose of chemo due to side effects. I reminded about mammogram due in October 2018. CT 5/2/2019 showed no evidence of recurrent or residual disease. 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 She has FOB in March 2019 which was negative. She is agreeable to have follow up CT chest in November 2019. CT 5/29/2020 showed right upper lobectomy. No evidence of local recurrence or metastases in the chest.     CT chest on January 28, 2021 showed stable exam.  No new findings of recurrent or metastatic disease in the chest.    She has chronic dry cough and I recommend to follow up with Dr Miguel Mullen. She will have mammogram in March 2021 and [facility] in June 2021. She had stool occult 2 years ago. On November 16, 2021 she came in for follow-up visit she is complaining of postnasal drip, and productive cough. She has been taking Flonase. I will add Claritin. She also take Robitussin. She is on TapFame. I recommend she follow-up also with family doctor. I offered ENT referral and she wants to wait. CT chest on November 10, 2021 showed  1. Stable postsurgical changes right upper lobe. 2. No new or worrisome lung nodules noted.  No thoracic adenopathy  She denies any symptoms to suggest recurrent lung cancer. She is agreeable to continue with surveillance. She had COVID vaccine booster. She has mammogram in November 2021. No acute pain. Denied any nausea, vomiting or diarrhea. No fever or chills. No chest pain, shortness of breath or palpitation. No headache or dizzy spell. No specific bone pain. No melena or hematochezia. Denied any dysuria or hematuria. Past Medical History  Anxiety, chronic low back pain, diverticulosis diagnosed in June 2013, hypertension, dyslipidemia, moderate COPD.       Surgical History  She had colonoscopy a while ago, bronchoscopy in December 2017.    Social History  She smoked about a half-pack per day for 25 years and quit in March 2014. She denied any alcohol or illicit drug use. She has one daughter who is a nurse. Family History  Mother had multiple myeloma, father had lung cancer and he was a smoker. Review of Systems: \"Per interval history; otherwise 10 point ROS is negative. \"     Vital Signs:  BP (!) 161/79 (Site: Right Upper Arm, Position: Sitting, Cuff Size: Medium Adult)   Pulse 63   Temp 96.9 °F (36.1 °C) (Temporal)   Ht 5' 5\" (1.651 m)   Wt 187 lb (84.8 kg)   LMP 01/06/2009 (LMP Unknown)   SpO2 94%   BMI 31.12 kg/m²     Physical Exam:  CONSTITUTIONAL: awake, alert, cooperative, no apparent distress   EYES: pupils equal, round and reactive to light, sclera clear and conjunctiva normal  ENT: Normocephalic, without obvious abnormality, atraumatic  NECK: supple, symmetrical, no jugular venous distension and no carotid bruits   HEMATOLOGIC/LYMPHATIC: no cervical, supraclavicular or axillary lymphadenopathy   LUNGS: no increased work of breathing and clear to auscultation   CARDIOVASCULAR: regular rate and rhythm, normal S1 and S2, no murmur  ABDOMEN: normal bowel sound, soft, non-distended, non-tender, no masses palpated, no hepatosplenomegaly   MUSCULOSKELETAL: full range of motion noted, tone is normal  NEUROLOGIC: awake, alert, oriented to name, place and time. Motor skills grossly intact. Cranial nerves II through XII grossly intact. SKIN: Normal skin color, texture, turgor and no jaundice. appears intact   EXTREMITIES: no LE edema or cyanosis.     Labs:  Hematology:  Lab Results   Component Value Date    WBC 8.9 11/10/2021    RBC 4.11 (L) 11/10/2021    HGB 12.3 (L) 11/10/2021    HCT 37.7 11/10/2021    MCV 91.7 11/10/2021    MCH 29.9 11/10/2021    MCHC 32.6 11/10/2021    RDW 14.1 11/10/2021     11/10/2021    MPV 9.0 11/10/2021    SEGSPCT 65.2 11/10/2021    EOSRELPCT 1.7 11/10/2021    BASOPCT 0.6 11/10/2021 LYMPHOPCT 26.7 11/10/2021    MONOPCT 5.8 (H) 11/10/2021    SEGSABS 5.8 11/10/2021    EOSABS 0.2 11/10/2021    BASOSABS 0.1 11/10/2021    LYMPHSABS 2.4 11/10/2021    MONOSABS 0.5 11/10/2021    DIFFTYPE AUTOMATED DIFFERENTIAL 11/10/2021     Chemistry:  Lab Results   Component Value Date     11/10/2021    K 4.1 11/10/2021    CL 98 (L) 11/10/2021    CO2 26 11/10/2021    BUN 13 11/10/2021    CREATININE 1.0 11/10/2021    GLUCOSE 114 (H) 11/10/2021    CALCIUM 9.4 11/10/2021    PROT 7.1 11/10/2021    LABALBU 4.6 11/10/2021    BILITOT 0.3 11/10/2021    ALKPHOS 116 11/10/2021    AST 12 (L) 11/10/2021    ALT 11 11/10/2021    LABGLOM 56 (L) 11/10/2021    GFRAA >60 11/10/2021    AGRATIO 1.8 12/30/2013    GLOB 2.6 12/30/2013     Immunology:  Lab Results   Component Value Date    PROT 7.1 11/10/2021     Coagulation Panel:  Lab Results   Component Value Date    PROTIME 12.0 03/27/2018    INR 1.05 03/27/2018    APTT 29.1 03/27/2018      Observations:  PHQ-9 Total Score: 2 (11/16/2021  9:16 AM)        Assessment & Plan:    1. She was diagnosed with squamous cell carcinoma of the right upper lung with collapse of the right upper lung. Pulmonary function tests reveal moderate obstructive lung defects. I reviewed CBC and CMP in January 2018. CT of the head was negative. PET CT scan In January 2018 revealed no evidence of metastatic disease elsewhere. She has pathological stage T2b, N0, MX moderately differentiated invasive Squamous cell carcinoma status post right upper lobectomy in April 2018 with positive bronchial margin of the in situ component. She was not recommended to have adjuvant RT by rad onc. In the first 2- 3 years she will have follow-up imaging study every 6-12 months. She started adjuvant chemo in July 2018 and completed October 25, 2018. She decided not to pursue the last dose of chemo. PET CT scan in November 2018 was negative. CT 5/2/2019 showed no evidence of recurrent or residual disease.   CT scan 11/13/2019 showed no evidence of recurrence or residual disease. CT chest 5/2020 showed no evidence of recurrence or residual disease. CT chest on January 28, 2021 showed no evidence of recurrent disease. CT chest in November 2021 showed no evidence of recurrent disease. She is agreeable to have follow-up CT chest in 1 year. She is agreeable to continue with surveillance. 2.  She had a pericatheter thrombus and was started on Xarelto 15 mg BID x 21. She had mediport removal in June 2019.    3.   Mammogram in October 2017 was benign. Colonoscopy was a while ago. I reminded to have mammogram. FOB in March 2019 was negative. The patient has mammogram in November 2021.    4.   She quit smoking in 2014. I recommend to follow up with Dr Etelvina Camp for chronic cough. I gave refill of tessalon perle. 4.   Depression. I referred to psychiatrist.    5. 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 She is on gabapentin. 6.  Intermittent mild anemia. I will order anemic work-up prior to next office visit. We discussed about lifestyle and a healthy diet. 7.  She has chronic cough which could be related to postnasal drip. We discussed about ENT referral and she wants to defer. She has been using Flonase. I prescribed Claritin. She will also follow-up with her pulmonologist.    Return to clinic in 6 months or sooner. All of her questions have been answered for today. Recent imaging and labs were reviewed and discussed with the patient.

## 2021-11-10 ENCOUNTER — HOSPITAL ENCOUNTER (OUTPATIENT)
Dept: INFUSION THERAPY | Age: 66
Discharge: HOME | End: 2021-11-10
Payer: COMMERCIAL

## 2021-11-10 ENCOUNTER — HOSPITAL ENCOUNTER (OUTPATIENT)
Dept: CT IMAGING | Age: 66
Discharge: HOME | End: 2021-11-10
Payer: COMMERCIAL

## 2021-11-10 DIAGNOSIS — C34.90 MALIGNANT NEOPLASM OF LUNG, UNSPECIFIED LATERALITY, UNSPECIFIED PART OF LUNG (HCC): ICD-10-CM

## 2021-11-10 DIAGNOSIS — C34.11 MALIGNANT NEOPLASM OF UPPER LOBE, RIGHT BRONCHUS OR LUNG (HCC): ICD-10-CM

## 2021-11-10 LAB
ALBUMIN SERPL-MCNC: 4.6 GM/DL (ref 3.4–5)
ALP BLD-CCNC: 116 IU/L (ref 40–129)
ALT SERPL-CCNC: 11 U/L (ref 10–40)
ANION GAP SERPL CALCULATED.3IONS-SCNC: 11 MMOL/L (ref 4–16)
AST SERPL-CCNC: 12 IU/L (ref 15–37)
BASOPHILS ABSOLUTE: 0.1 K/CU MM
BASOPHILS RELATIVE PERCENT: 0.6 % (ref 0–1)
BILIRUB SERPL-MCNC: 0.3 MG/DL (ref 0–1)
BUN BLDV-MCNC: 13 MG/DL (ref 6–23)
CALCIUM SERPL-MCNC: 9.4 MG/DL (ref 8.3–10.6)
CHLORIDE BLD-SCNC: 98 MMOL/L (ref 99–110)
CO2: 26 MMOL/L (ref 21–32)
CREAT SERPL-MCNC: 1 MG/DL (ref 0.6–1.1)
DIFFERENTIAL TYPE: ABNORMAL
EOSINOPHILS ABSOLUTE: 0.2 K/CU MM
EOSINOPHILS RELATIVE PERCENT: 1.7 % (ref 0–3)
GFR AFRICAN AMERICAN: >60 ML/MIN/1.73M2
GFR NON-AFRICAN AMERICAN: 56 ML/MIN/1.73M2
GLUCOSE BLD-MCNC: 114 MG/DL (ref 70–99)
HCT VFR BLD CALC: 37.7 % (ref 37–47)
HEMOGLOBIN: 12.3 GM/DL (ref 12.5–16)
LYMPHOCYTES ABSOLUTE: 2.4 K/CU MM
LYMPHOCYTES RELATIVE PERCENT: 26.7 % (ref 24–44)
MCH RBC QN AUTO: 29.9 PG (ref 27–31)
MCHC RBC AUTO-ENTMCNC: 32.6 % (ref 32–36)
MCV RBC AUTO: 91.7 FL (ref 78–100)
MONOCYTES ABSOLUTE: 0.5 K/CU MM
MONOCYTES RELATIVE PERCENT: 5.8 % (ref 0–4)
PDW BLD-RTO: 14.1 % (ref 11.7–14.9)
PLATELET # BLD: 393 K/CU MM (ref 140–440)
PMV BLD AUTO: 9 FL (ref 7.5–11.1)
POTASSIUM SERPL-SCNC: 4.1 MMOL/L (ref 3.5–5.1)
RBC # BLD: 4.11 M/CU MM (ref 4.2–5.4)
SEGMENTED NEUTROPHILS ABSOLUTE COUNT: 5.8 K/CU MM
SEGMENTED NEUTROPHILS RELATIVE PERCENT: 65.2 % (ref 36–66)
SODIUM BLD-SCNC: 135 MMOL/L (ref 135–145)
TOTAL PROTEIN: 7.1 GM/DL (ref 6.4–8.2)
WBC # BLD: 8.9 K/CU MM (ref 4–10.5)

## 2021-11-10 PROCEDURE — 71250 CT THORAX DX C-: CPT

## 2021-11-10 PROCEDURE — 85025 COMPLETE CBC W/AUTO DIFF WBC: CPT

## 2021-11-10 PROCEDURE — 36415 COLL VENOUS BLD VENIPUNCTURE: CPT

## 2021-11-10 PROCEDURE — 80053 COMPREHEN METABOLIC PANEL: CPT

## 2021-11-16 ENCOUNTER — OFFICE VISIT (OUTPATIENT)
Dept: ONCOLOGY | Age: 66
End: 2021-11-16
Payer: COMMERCIAL

## 2021-11-16 ENCOUNTER — HOSPITAL ENCOUNTER (OUTPATIENT)
Dept: INFUSION THERAPY | Age: 66
Discharge: HOME | End: 2021-11-16
Payer: COMMERCIAL

## 2021-11-16 VITALS
WEIGHT: 187 LBS | DIASTOLIC BLOOD PRESSURE: 79 MMHG | BODY MASS INDEX: 31.16 KG/M2 | SYSTOLIC BLOOD PRESSURE: 161 MMHG | OXYGEN SATURATION: 94 % | HEIGHT: 65 IN | TEMPERATURE: 96.9 F | HEART RATE: 63 BPM

## 2021-11-16 DIAGNOSIS — C34.90 MALIGNANT NEOPLASM OF LUNG, UNSPECIFIED LATERALITY, UNSPECIFIED PART OF LUNG (HCC): Primary | ICD-10-CM

## 2021-11-16 PROCEDURE — 99211 OFF/OP EST MAY X REQ PHY/QHP: CPT

## 2021-11-16 PROCEDURE — 99214 OFFICE O/P EST MOD 30 MIN: CPT | Performed by: INTERNAL MEDICINE

## 2021-11-16 RX ORDER — LORATADINE AND PSEUDOEPHEDRINE SULFATE 10; 240 MG/1; MG/1
1 TABLET, EXTENDED RELEASE ORAL DAILY
Qty: 30 TABLET | Refills: 3 | Status: SHIPPED | OUTPATIENT
Start: 2021-11-16

## 2021-11-16 ASSESSMENT — PATIENT HEALTH QUESTIONNAIRE - PHQ9
1. LITTLE INTEREST OR PLEASURE IN DOING THINGS: 0
SUM OF ALL RESPONSES TO PHQ QUESTIONS 1-9: 2
SUM OF ALL RESPONSES TO PHQ9 QUESTIONS 1 & 2: 2
SUM OF ALL RESPONSES TO PHQ QUESTIONS 1-9: 2
SUM OF ALL RESPONSES TO PHQ QUESTIONS 1-9: 2
2. FEELING DOWN, DEPRESSED OR HOPELESS: 2

## 2021-11-16 NOTE — PROGRESS NOTES
MA Rooming Questions  Patient: Mariel Fernandez  MRN: [id number]    Date: 11/16/2021        1. Do you have any new issues? yes - BRONCH MAYUR, COUGH THAT WONT GO AWAY          2. Do you need any refills on medications?    no    3. Have you had any imaging done since your last visit? yes - CT SCAN AND LABS     4. Have you been hospitalized or seen in the emergency room since your last visit here?   no    5. Did the patient have a depression screening completed today?  Yes    PHQ-9 Total Score: 2 (11/16/2021  9:16 AM)       PHQ-9 Given to (if applicable):               PHQ-9 Score (if applicable):                     [] Positive     []  Negative              Does question #9 need addressed (if applicable)                     [] Yes    []  No               Michelle Carias CMA

## 2021-11-28 DIAGNOSIS — M54.31 RIGHT SCIATIC NERVE PAIN: ICD-10-CM

## 2021-11-29 RX ORDER — GABAPENTIN 100 MG/1
CAPSULE ORAL
Qty: 90 CAPSULE | Refills: 1 | Status: SHIPPED | OUTPATIENT
Start: 2021-11-29 | End: 2022-02-15 | Stop reason: SDUPTHER

## 2022-01-28 DIAGNOSIS — J44.9 MODERATE COPD (CHRONIC OBSTRUCTIVE PULMONARY DISEASE) (HCC): ICD-10-CM

## 2022-01-28 RX ORDER — ALBUTEROL SULFATE 90 UG/1
2 AEROSOL, METERED RESPIRATORY (INHALATION) PRN
Qty: 8.5 EACH | Refills: 11 | OUTPATIENT
Start: 2022-01-28

## 2022-02-15 DIAGNOSIS — M54.31 RIGHT SCIATIC NERVE PAIN: ICD-10-CM

## 2022-02-15 RX ORDER — GABAPENTIN 100 MG/1
100 CAPSULE ORAL 3 TIMES DAILY
Qty: 90 CAPSULE | Refills: 1 | Status: SHIPPED | OUTPATIENT
Start: 2022-02-15 | End: 2022-08-26 | Stop reason: SDUPTHER

## 2022-02-16 ENCOUNTER — OFFICE VISIT (OUTPATIENT)
Dept: FAMILY MEDICINE CLINIC | Age: 67
End: 2022-02-16
Payer: MEDICARE

## 2022-02-16 VITALS
HEART RATE: 63 BPM | BODY MASS INDEX: 30.66 KG/M2 | HEIGHT: 65 IN | DIASTOLIC BLOOD PRESSURE: 98 MMHG | WEIGHT: 184 LBS | SYSTOLIC BLOOD PRESSURE: 170 MMHG | OXYGEN SATURATION: 97 %

## 2022-02-16 DIAGNOSIS — I10 ESSENTIAL HYPERTENSION: Primary | ICD-10-CM

## 2022-02-16 DIAGNOSIS — J44.9 MODERATE COPD (CHRONIC OBSTRUCTIVE PULMONARY DISEASE) (HCC): ICD-10-CM

## 2022-02-16 DIAGNOSIS — Z12.31 ENCOUNTER FOR SCREENING MAMMOGRAM FOR MALIGNANT NEOPLASM OF BREAST: ICD-10-CM

## 2022-02-16 DIAGNOSIS — R09.82 POST-NASAL DRIP: ICD-10-CM

## 2022-02-16 PROCEDURE — 1036F TOBACCO NON-USER: CPT | Performed by: FAMILY MEDICINE

## 2022-02-16 PROCEDURE — G8417 CALC BMI ABV UP PARAM F/U: HCPCS | Performed by: FAMILY MEDICINE

## 2022-02-16 PROCEDURE — 99213 OFFICE O/P EST LOW 20 MIN: CPT | Performed by: FAMILY MEDICINE

## 2022-02-16 PROCEDURE — 3023F SPIROM DOC REV: CPT | Performed by: FAMILY MEDICINE

## 2022-02-16 PROCEDURE — G8484 FLU IMMUNIZE NO ADMIN: HCPCS | Performed by: FAMILY MEDICINE

## 2022-02-16 PROCEDURE — G8427 DOCREV CUR MEDS BY ELIG CLIN: HCPCS | Performed by: FAMILY MEDICINE

## 2022-02-16 PROCEDURE — 3017F COLORECTAL CA SCREEN DOC REV: CPT | Performed by: FAMILY MEDICINE

## 2022-02-16 RX ORDER — FLUTICASONE PROPIONATE 50 MCG
2 SPRAY, SUSPENSION (ML) NASAL DAILY
Qty: 3 EACH | Refills: 3 | Status: SHIPPED | OUTPATIENT
Start: 2022-02-16

## 2022-02-16 RX ORDER — LISINOPRIL 20 MG/1
20 TABLET ORAL DAILY
Qty: 30 TABLET | Refills: 0 | Status: SHIPPED | OUTPATIENT
Start: 2022-02-16 | End: 2022-06-21 | Stop reason: SDUPTHER

## 2022-02-16 RX ORDER — ALBUTEROL SULFATE 2.5 MG/3ML
2.5 SOLUTION RESPIRATORY (INHALATION) EVERY 6 HOURS PRN
Qty: 60 EACH | Refills: 3 | Status: SHIPPED | OUTPATIENT
Start: 2022-02-16

## 2022-02-16 RX ORDER — ATENOLOL AND CHLORTHALIDONE TABLET 100; 25 MG/1; MG/1
1 TABLET ORAL DAILY
Qty: 90 TABLET | Refills: 1 | Status: SHIPPED | OUTPATIENT
Start: 2022-02-16 | End: 2022-08-26 | Stop reason: SDUPTHER

## 2022-02-16 RX ORDER — BUDESONIDE AND FORMOTEROL FUMARATE DIHYDRATE 160; 4.5 UG/1; UG/1
2 AEROSOL RESPIRATORY (INHALATION) 2 TIMES DAILY
Qty: 1 EACH | Refills: 0 | Status: SHIPPED | OUTPATIENT
Start: 2022-02-16 | End: 2022-08-26

## 2022-02-16 RX ORDER — BUDESONIDE AND FORMOTEROL FUMARATE DIHYDRATE 160; 4.5 UG/1; UG/1
2 AEROSOL RESPIRATORY (INHALATION) 2 TIMES DAILY
Qty: 3 EACH | Refills: 3 | Status: SHIPPED | OUTPATIENT
Start: 2022-02-16 | End: 2022-02-17 | Stop reason: SDUPTHER

## 2022-02-16 RX ORDER — ALBUTEROL SULFATE 90 UG/1
2 AEROSOL, METERED RESPIRATORY (INHALATION) PRN
Qty: 3 EACH | Refills: 3 | Status: SHIPPED | OUTPATIENT
Start: 2022-02-16

## 2022-02-16 ASSESSMENT — COPD QUESTIONNAIRES: COPD: 1

## 2022-02-16 NOTE — PROGRESS NOTES
Patient ID: Yris Mcclelland 1955    . Chief Complaint   Patient presents with    Hypertension    COPD         Hypertension  This is a chronic problem. The current episode started more than 1 year ago. The problem is uncontrolled. Pertinent negatives include no palpitations. Risk factors for coronary artery disease include obesity. Past treatments include calcium channel blockers, beta blockers and diuretics. The current treatment provides no improvement. Compliance problems include psychosocial issues. COPD  She complains of chest tightness and cough. This is a chronic problem. Her symptoms are alleviated by beta-agonist and steroid inhaler. Her past medical history is significant for COPD. Review of Systems   Respiratory: Positive for cough. Cardiovascular: Negative for palpitations.        Patient Active Problem List   Diagnosis    Osteoarthritis of finger    Sciatica    Essential hypertension    Abnormal mammogram of left breast    Moderate COPD (chronic obstructive pulmonary disease) (HCC)    Squamous cell lung cancer, right ([address]    Primary insomnia    Primary cancer of right upper lobe of lung ([address]    Other female genital prolapse    Creatinine elevation    History of lung cancer    Atrophy of right kidney    Malignant neoplasm of upper lobe, right bronchus or lung (HCC)    Other fatigue    Candidal stomatitis    Restless legs syndrome    Dyspnea, unspecified       Past Surgical History:   Procedure Laterality Date    BRONCHOSCOPY  12/07/2017 2/21/2018- done     BUNIONECTOMY Bilateral 1990's    \"Done At Different Times\"    CATHETER REMOVAL Left 5/16/2019    PORT REMOVAL performed by Hemanth Shankar [facility] at [facility]  2000's    DILATION AND CURETTAGE OF UTERUS  1989    ELBOW SURGERY Left 1980    \"Tendon Repair\"    LUNG SURGERY  04/12/2018    per old chart had thoracoscopy and RUL lobectomy , bronchoscopy and lymph node bx     MEDIASTINOSCOPY  02/21/2018 Bronchoscopy    TONSILLECTOMY  1978    TUBAL LIGATION  1988    TUNNELED VENOUS PORT PLACEMENT Left 07/31/2018       Family History   Problem Relation Age of Onset    Cancer Mother         Multiple Myeloma    Depression Mother     Diabetes Mother     High Blood Pressure Mother     High Cholesterol Mother     Heart Disease Father         CHF       Current Outpatient Medications on File Prior to Visit   Medication Sig Dispense Refill    gabapentin (NEURONTIN) 100 MG capsule Take 1 capsule by mouth 3 times daily for 30 days. 90 capsule 1    loratadine-pseudoephedrine (CLARITIN-D 24 HOUR)  MG per extended release tablet Take 1 tablet by mouth daily 30 tablet 3    omeprazole (PRILOSEC) 40 MG delayed release capsule Take 1 capsule by mouth daily 90 capsule 3     No current facility-administered medications on file prior to visit. Objective:         Physical Exam  Vitals and nursing note reviewed. Constitutional:       Appearance: She is well-developed. HENT:      Head: Normocephalic and atraumatic. Cardiovascular:      Rate and Rhythm: Normal rate and regular rhythm. Heart sounds: Normal heart sounds, S1 normal and S2 normal.   Pulmonary:      Effort: Pulmonary effort is normal. No respiratory distress. Breath sounds: Decreased air movement present. No wheezing. Musculoskeletal:      Cervical back: Neck supple. Skin:     General: Skin is warm and dry. Neurological:      Mental Status: She is alert. Vitals:    02/16/22 1008 02/16/22 1012   BP: (!) 168/100 (!) 170/98   Site: Left Upper Arm    Position: Sitting    Cuff Size: Medium Adult    Pulse: 63    SpO2: 97%    Weight: 184 lb (83.5 kg)    Height: 5' 5\" (1.651 m)      Body mass index is 30.62 kg/m².      Wt Readings from Last 3 Encounters:   02/16/22 184 lb (83.5 kg)   11/16/21 187 lb (84.8 kg)   07/08/21 192 lb 12.8 oz (87.5 kg)     BP Readings from Last 3 Encounters:   02/16/22 (!) 170/98   11/16/21 (!) 161/79   07/08/21 (!) 148/100          No results found for this visit on 02/16/22. The ASCVD Risk score (Amilcar Guerrero., et al., 2013) failed to calculate for the following reasons:    Cannot find a previous HDL lab    Cannot find a previous total cholesterol lab  Lab Review   No visits with results within 2 Month(s) from this visit. Latest known visit with results is:   Hospital Outpatient Visit on 11/10/2021   Component Date Value    Sodium 11/10/2021 135     Potassium 11/10/2021 4.1     Chloride 11/10/2021 98*    CO2 11/10/2021 26     BUN 11/10/2021 13     CREATININE 11/10/2021 1.0     Glucose 11/10/2021 114*    Calcium 11/10/2021 9.4     Albumin 11/10/2021 4.6     Total Protein 11/10/2021 7.1     Total Bilirubin 11/10/2021 0.3     ALT 11/10/2021 11     AST 11/10/2021 12*    Alkaline Phosphatase 11/10/2021 116     GFR Non- 11/10/2021 56*    GFR  11/10/2021 >60     Anion Gap 11/10/2021 11     WBC 11/10/2021 8.9     RBC 11/10/2021 4.11*    Hemoglobin 11/10/2021 12.3*    Hematocrit 11/10/2021 37.7     MCV 11/10/2021 91.7     MCH 11/10/2021 29.9     MCHC 11/10/2021 32.6     RDW 11/10/2021 14.1     Platelets 87/85/3452 393     MPV 11/10/2021 9.0     Differential Type 11/10/2021 AUTOMATED DIFFERENTIAL     Segs Relative 11/10/2021 65.2     Lymphocytes % 11/10/2021 26.7     Monocytes % 11/10/2021 5.8*    Eosinophils % 11/10/2021 1.7     Basophils % 11/10/2021 0.6     Segs Absolute 11/10/2021 5.8     Lymphocytes Absolute 11/10/2021 2.4     Monocytes Absolute 11/10/2021 0.5     Eosinophils Absolute 11/10/2021 0.2     Basophils Absolute 11/10/2021 0.1            Assessment:       Diagnosis Orders   1. Essential hypertension  lisinopril (PRINIVIL;ZESTRIL) 20 MG tablet    atenolol-chlorthalidone (TENORETIC) 100-25 MG per tablet   2.  Moderate COPD (chronic obstructive pulmonary disease) (HCC)  albuterol sulfate HFA (PROAIR HFA) 108 (90 Base) MCG/ACT inhaler albuterol (PROVENTIL) (2.5 MG/3ML) 0.083% nebulizer solution    budesonide-formoterol (SYMBICORT) 160-4.5 MCG/ACT AERO    DISCONTINUED: budesonide-formoterol (SYMBICORT) 160-4.5 MCG/ACT AERO   3. Post-nasal drip  fluticasone (FLONASE) 50 MCG/ACT nasal spray   4. Encounter for screening mammogram for malignant neoplasm of breast  [facility] Screening Bilateral           Plan:      Pressure is uncontrolled due to noncompliance with medication. Restart her medication and recheck in 2 to 3 weeks. Will check BMP since she is starting ACE inhibitor    Reminded her to get her mammogram done. Order was placed last year. COPD is moderate. Continue the Symbicort and albuterol rescue inhaler. No follow-ups on file.

## 2022-02-16 NOTE — PATIENT INSTRUCTIONS
PLEASE BRING YOUR MEDICATIONS TO ALL APPOINTMENTS      Please check MY CHART for test results. If you have forgotten your password, please call [phone]. The diagnoses and medications listed in this after visit summary may not be up to date. Please check MY CHART in 28-48 hours for possible corrections. Late cancellation policy: So that we can better accommodate people who are sick, please give our office 24 hour notice for an appointment cancellation. Missed appointments: Your care is very important to us. It is important that you keep your scheduled appointments. Multiple missed appointments will lead to a dismissal from the office. Patients arriving late will be worked into the schedule as time permits, with patients arriving on time taken as scheduled. Late arriving patients are more than welcome to wait or reschedule their appointments. Please allow 5-7 business days for paperwork to be processed. HERE ARE SOME LIFE CHANGING TIPS      1. Take your blood pressure medications at bedtime to reduce your chance of heart attack or stroke  2. Fever in kids:  Give both Tylenol and Ibuprofen at the same time rather than staggering them   3. Follow these tips to reduce childhood obesity: Reduce unnecessary exposure to antibiotics, consume whole milk instead of skim milk, watch public TV instead of regular TV (less exposure to junk food commercials), and reduce traumatic experiences. 4. 1 egg per day is good for your heart  5. Alternate day fasting does promote weight loss. Skipping breakfast increases your risk of obesity  6. Artificially sweetened drinks increase all cause mortality (strokes, body mass index, cardiovascular disease)  7. Kale consumption can reduce onset of dementia  8. Walking at least 8000 steps per day and resistance exercise 2-3 x per week are good for your heart  9. Brushing teeth 3 times per day can decrease chance of getting diabetes  10.  Antibiotic condition, let the technician know before you have your exam. If you have menstrual periods, the procedure is more comfortable when done within 2 weeks after your period has ended. Having your breasts flattened is usually uncomfortable, but it helps the technician get the best images. How long does the test take? · The test will take about 10 to 15 minutes. You may be in the clinic for up to an hour. · You may be asked to wait a few minutes while the images are checked to make sure they don't need to be redone. What happens after the test?  · You will probably be able to go home right away. · You can go back to your usual activities right away. Follow-up care is a key part of your treatment and safety. Be sure to make and go to all appointments, and call your doctor if you are having problems. It's also a good idea to keep a list of the medicines you take. Ask your doctor when you can expect to have your test results. Where can you learn more? Go to https://SecureLink.Primeloop. org and sign in to your CultureMap account. Enter F748 in the Light Harmonic box to learn more about \"Mammogram: About This Test.\"     If you do not have an account, please click on the \"Sign Up Now\" link. Current as of: September 8, 2021               Content Version: 13.1  © 2006-2021 Healthwise, Incorporated. Care instructions adapted under license by [facility] ([facility]). If you have questions about a medical condition or this instruction, always ask your healthcare professional. [place] [address] any warranty or liability for your use of this information.                          MAMMOGRAM PHONE NUMBER: [phone] OPTION 1

## 2022-02-17 ENCOUNTER — TELEPHONE (OUTPATIENT)
Dept: FAMILY MEDICINE CLINIC | Age: 67
End: 2022-02-17

## 2022-02-17 DIAGNOSIS — J44.9 MODERATE COPD (CHRONIC OBSTRUCTIVE PULMONARY DISEASE) (HCC): ICD-10-CM

## 2022-02-17 RX ORDER — BUDESONIDE AND FORMOTEROL FUMARATE DIHYDRATE 160; 4.5 UG/1; UG/1
2 AEROSOL RESPIRATORY (INHALATION) 2 TIMES DAILY
Qty: 3 EACH | Refills: 3 | Status: SHIPPED | OUTPATIENT
Start: 2022-02-17

## 2022-02-17 ASSESSMENT — ENCOUNTER SYMPTOMS
CHEST TIGHTNESS: 1
COUGH: 1

## 2022-02-17 NOTE — TELEPHONE ENCOUNTER
----- Message from Khris Villaseñor sent at 2/17/2022  8:45 AM EST -----  Subject: Medication Problem    QUESTIONS  Name of Medication? budesonide-formoterol (SYMBICORT) 160-4.5 MCG/ACT AERO  Patient-reported dosage and instructions? Inhale 2 puffs into the lungs 2   times daily  What question or problem do you have with the medication? Patient said   that this medication was went to her mail order pharmacy and it needs to   be cancelled please. She also received a paper script and has had this   filled already at her local pharmacy, and it does any refills and would   like to know why? Preferred Pharmacy? [address] [phone] Jerardo Snow [phone]  Pharmacy phone number (if available)? [phone]  Additional Information for Provider? PLEASE CANCEL.  ---------------------------------------------------------------------------  --------------  CALL BACK INFO  What is the best way for the office to contact you? OK to leave message on   voicemail  Preferred Call Back Phone Number? [phone]  ---------------------------------------------------------------------------  --------------  SCRIPT ANSWERS  Relationship to Patient?  Self

## 2022-02-17 NOTE — TELEPHONE ENCOUNTER
Patient would like for you to cancel her Symbicort that was sent to [facility]. she stated she is in the donut hole and it is cheaper to get the Symbicort through [facility] on  [address].  Please send scripts to [facility] on [address]

## 2022-04-18 NOTE — PROGRESS NOTES
Patient Name: Jayda Obregon  Patient : 1955  Patient MRN: [id number]     Primary Oncologist: Sanjay Brandon MD  Referring Provider: Giovanny Lomax MD     Date of Service: 2022      Chief Complaint:   Chief Complaint   Patient presents with   [address]     She came in for follow-up visit. Patient Active Problem List:     Osteoarthritis of finger     Sciatica     Essential hypertension     Abnormal mammogram of left breast     Moderate COPD (chronic obstructive pulmonary disease)      Squamous cell lung cancer, right (HCC)     Primary insomnia     Primary cancer of right upper lobe of lung (HCC)     Other female genital prolapse     Creatinine elevation     History of lung cancer     Atrophy of right kidney     Malignant neoplasm of upper lobe, right bronchus or lung      Other fatigue     Candidal stomatitis     Restless legs syndrome     Dyspnea, unspecified     HPI:   Saundra Winters is a pleasant 80-year-old  female patient who was diagnosed with squamous cell carcinoma of right upper lung, status post bronchoscopy on 2017. She initially presented with bronchitis in 2017 and had a chest x-ray. She did not feel much better after a course of antibiotics and had persistent cough. In 2017, she started having occasional hemoptysis. She went to see her family doctor and had a chest x-ray, eventually CAT scan of the chest.  Due to the insurance, it took about one month to get her CAT scan, as per patient. CT of the chest on 2017, revealed soft tissue mass obstructing the right upper lobe bronchus, concerning for endobronchial tumor and postobstructive collapse of the right upper lobe of the lung accounting or the paratracheal soft tissue density seen on the chest x-ray. Right paratracheal and suprahilar adenopathy could be obscured by the consolidated and collapsed right upper lung. Mammogram on 2017, revealed benign study. She underwent bronchoscopy on December 7, 2017. The procedure note revealed fungating exophytic mass lesions obstructed the takeoff of the right upper lobe bronchus. The pathology report from the bronchial washing and brushing of the right upper lobe revealed squamous cell carcinoma. Pulmonary function test revealed moderate obstructive defects and the post bronchodilator study demonstrated significant response in the small airways. She was referred for further evaluation and possible chemoradiation versus surgery. She stated she was exposed to chemicals when she worked at [employer] and also asbestos. CT head was negative. PET CT scan in January 2018 revealed activity to the right perihilar mass and no evidence of metastatic disease anywhere else. I refer her to see thoracic surgeon for the discussion of possible resection and mediastinoscopy. She was seen by the radiation oncologist in January 2018. She underwent right upper lobectomy on April 11, 2018. Pathology reports revealed keratinizing invasive squamous cell carcinoma with positive bronchial margins negative lymph node at level 9, level 10. Tumor was 4.2 cm moderately differentiated with in situ component. No visible pleural invasions. No lymphovascular invasion. The squamous cell carcinoma in situ involved the bronchus intermedius part of the distal and carinal/tracheal site. Pathologically stage T2b, N0 MX. I went over NCCN guideline. I offered her regimen therapy plus minus chemo. She is agreeable to see an oncologist for the radiation therapy. She will think about the chemotherapy. VQ scan in March 2018 revealed low probability for pulmonary embolism. She was seen by RT onc and not recommended to have adjuvant RT. She is agreeable to Beryl and taxol. She started adjuvant chemo carbo/taxol on July 11, 2018. She requested to split the dose to day 1 day 8 every 3 weeks due to the increased fatigue. CXR in June 2018 was non-acute. PET CT scan in November 2018  was negative. She decided not to pursue last dose of chemo due to side effects. I reminded about mammogram due in October 2018. CT 5/2/2019 showed no evidence of recurrent or residual disease. 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 She has FOB in March 2019 which was negative. She is agreeable to have follow up CT chest in November 2019. CT 5/29/2020 showed right upper lobectomy. No evidence of local recurrence or metastases in the chest.     CT chest on January 28, 2021 showed stable exam.  No new findings of recurrent or metastatic disease in the chest.    She has chronic dry cough and I recommend to follow up with Dr Hunter Mlaone. She had stool occult 2 years ago. CT chest on November 10, 2021 showed  1. Stable postsurgical changes right upper lobe. 2. No new or worrisome lung nodules noted.  No thoracic adenopathy  She had COVID vaccine booster. She has mammogram in November 2021. On 5/16/2022 she came in for follow up visit. In May 2022 TSH, CBC were grossly unremarkable. Ferritin was 70, B-12 372 and folate 14.9. We discussed about her blood pressure today. I recommended she monitor her blood pressure at home and discuss with the family doctor about her elevated blood pressure. Also discussed with her about low-sodium diet. She is agreeable to continue with surveillance I will have follow-up CT chest prior to next office visit in 6 months. I will recheck CBC and iron study prior to that. She was seen by ENT who recommended to continue with Flonase. She was recommended to have potential cauterizations of the nostril. No acute pain. Denied any nausea, vomiting or diarrhea. No fever or chills. No chest pain, shortness of breath or palpitation. No headache or dizzy spell. No specific bone pain. No melena or hematochezia. Denied any dysuria or hematuria.     Past Medical History  Anxiety, chronic low back pain, diverticulosis diagnosed in June 2013, hypertension, dyslipidemia, moderate COPD. Surgical History  She had colonoscopy a while ago, bronchoscopy in December 2017. Social History  She smoked about a half-pack per day for 25 years and quit in March 2014. She denied any alcohol or illicit drug use. She has one daughter who is a nurse. Family History  Mother had multiple myeloma, father had lung cancer and he was a smoker. Review of Systems: \"Per interval history; otherwise 10 point ROS is negative. \"     Vital Signs:  BP (!) 206/85 (Site: Right Upper Arm, Position: Sitting, Cuff Size: Medium Adult)   Pulse 71   Temp 97.1 °F (36.2 °C) (Infrared)   Resp 16   Ht 5' 5\" (1.651 m)   Wt 187 lb 12.8 oz (85.2 kg)   LMP 01/06/2009 (LMP Unknown)   SpO2 97%   BMI 31.25 kg/m²     Physical Exam:  CONSTITUTIONAL: awake, alert, cooperative, no apparent distress   EYES: pupils equal, round and reactive to light, sclera clear and conjunctiva normal  ENT: Normocephalic, without obvious abnormality, atraumatic  NECK: supple, symmetrical, no jugular venous distension and no carotid bruits   HEMATOLOGIC/LYMPHATIC: no cervical, supraclavicular or axillary lymphadenopathy   LUNGS: no increased work of breathing and clear to auscultation   CARDIOVASCULAR: regular rate and rhythm, normal S1 and S2, no murmur  ABDOMEN: normal bowel sound, soft, non-distended, non-tender, no masses palpated, no hepatosplenomegaly   MUSCULOSKELETAL: full range of motion noted, tone is normal  NEUROLOGIC: Motor skills grossly intact. CN II - XII grossly intact. SKIN: Normal skin color, texture, turgor and no jaundice. appears intact   EXTREMITIES: no LE edema or cyanosis.     Labs:  Hematology:  Lab Results   Component Value Date    WBC 8.1 05/09/2022    RBC 4.45 05/09/2022    HGB 13.2 05/09/2022    HCT 42.0 05/09/2022    MCV 94.4 05/09/2022    MCH 29.7 05/09/2022    MCHC 31.4 (L) 05/09/2022    RDW 14.1 05/09/2022     05/09/2022    MPV 8.8 05/09/2022    SEGSPCT 57.1 05/09/2022    EOSRELPCT 3.2 (H) 05/09/2022    BASOPCT 0.6 05/09/2022    LYMPHOPCT 32.6 05/09/2022    MONOPCT 6.5 (H) 05/09/2022    SEGSABS 4.6 05/09/2022    EOSABS 0.3 05/09/2022    BASOSABS 0.1 05/09/2022    LYMPHSABS 2.7 05/09/2022    MONOSABS 0.5 05/09/2022    DIFFTYPE AUTOMATED DIFFERENTIAL 05/09/2022     Chemistry:  Lab Results   Component Value Date     11/10/2021    K 4.1 11/10/2021    CL 98 (L) 11/10/2021    CO2 26 11/10/2021    BUN 13 11/10/2021    CREATININE 1.0 11/10/2021    GLUCOSE 114 (H) 11/10/2021    CALCIUM 9.4 11/10/2021    PROT 7.1 11/10/2021    LABALBU 4.6 11/10/2021    BILITOT 0.3 11/10/2021    ALKPHOS 116 11/10/2021    AST 12 (L) 11/10/2021    ALT 11 11/10/2021    LABGLOM 56 (L) 11/10/2021    GFRAA >60 11/10/2021    AGRATIO 1.8 12/30/2013    GLOB 2.6 12/30/2013     Immunology:  Lab Results   Component Value Date    PROT 7.1 11/10/2021     Coagulation Panel:  Lab Results   Component Value Date    PROTIME 12.0 03/27/2018    INR 1.05 03/27/2018    APTT 29.1 03/27/2018      Observations:  PHQ-9 Total Score: 6 (5/16/2022  9:58 AM)  Thoughts that you would be better off dead, or of hurting yourself in some way: 0 (5/16/2022  9:58 AM)      Assessment & Plan:  1. She was diagnosed with squamous cell carcinoma of the right upper lung with collapse of the right upper lung. Pulmonary function tests reveal moderate obstructive lung defects. I reviewed CBC and CMP in January 2018. CT of the head was negative. PET CT scan In January 2018 revealed no evidence of metastatic disease elsewhere. She has pathological stage T2b, N0, MX moderately differentiated invasive Squamous cell carcinoma status post right upper lobectomy in April 2018 with positive bronchial margin of the in situ component. She was not recommended to have adjuvant RT by rad onc. In the first 2- 3 years she will have follow-up imaging study every 6-12 months.    She started adjuvant chemo in July 2018 and completed October 25, 2018. She decided not to pursue the last dose of chemo. PET CT scan in November 2018 was negative. CT 5/2/2019 showed no evidence of recurrent or residual disease. CT scan 11/13/2019 showed no evidence of recurrence or residual disease. CT chest 5/2020 showed no evidence of recurrence or residual disease. CT chest on January 28, 2021 showed no evidence of recurrent disease. CT chest in November 2021 showed no evidence of recurrent disease. I believe she is in remission. She denies any symptoms to suggest recurrent breast cancer. I will have follow-up CT chest in November 2022.    2.  She had a pericatheter thrombus and was started on Xarelto 15 mg BID x 21. She had mediport removal in June 2019.    3.   Mammogram in October 2017 was benign. Colonoscopy was a while ago. I reminded to have mammogram. FOB in March 2019 was negative. The patient has mammogram in November 2021.    4.   She quit smoking in 2014. I recommend to follow up with Dr Yu Serra for chronic cough. I gave refill of tessalon perle. 4.   Depression. I referred to psychiatrist.    5. 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 She is on gabapentin. 6.  Intermittent mild anemia. Anemic work-up in May 2022 was reviewed. Anemia is corrected at the present time. I will recheck labs prior to next office visit in 6 months. 7.  She has chronic cough which could be related to postnasal drip. She was seen by ENT who recommended to continue with Flonase. She was offered cauterization of the nostril. Return to clinic in 6 months or sooner. All of her questions have been answered for today. Recent imaging and labs were reviewed and discussed with the patient.

## 2022-04-26 DIAGNOSIS — I10 ESSENTIAL HYPERTENSION: ICD-10-CM

## 2022-04-26 RX ORDER — LISINOPRIL 20 MG/1
20 TABLET ORAL DAILY
Qty: 30 TABLET | Refills: 0 | OUTPATIENT
Start: 2022-04-26

## 2022-04-26 NOTE — TELEPHONE ENCOUNTER
Thank you for the response. Called and informed the patient that the request for a 30-day supply of lisinopril was refused d/t needing an appointment. Patient reports that she has been on jury duty, but will call and schedule. Thankful for the call. Will sign off.      Zoila Osorio, PharmD, [place] // Department, toll free [phone], option 1      For [facility] in place:  No   Recommendation Provided To: Provider: 1 via Note to Provider and Patient/Caregiver: 1 via Telephone   Intervention Detail: Adherence Monitorin   Gap Closed?: No    Intervention Accepted By: Provider: 0 and Patient/Caregiver: 1   Time Spent (min): 20

## 2022-04-26 NOTE — TELEPHONE ENCOUNTER
Fer Hoffmann MD    Patient is out of refills for lisinopril and is out of this medication. This medication was started on 2/16/22 for #30 0R. Patient was supposed to f/u with you on 3/16/22, but she cancelled her appt. Spoke with the patient and instructed her to reschedule her appt for a BP check and labs. Patient states that she will call and reschedule. I have pended a short supply of lisinopril so the patient can resume therapy prior to her next OV. Thank you,   Sybil Saab, PharmD, [place] // Department, toll free [phone], Option 1     ==========================================================================  POPULATION HEALTH CLINICAL PHARMACY: ADHERENCE REVIEW  Identified care gap per [place]: fills at [facility]: ACE/ARB adherence    Last Visit: 2/16/22    Patient not found in Outcomes [address] Records claims through 4/4/22 (ARTUR Hall = Filled only once; Potential Fail Date: 5/21/22): Lisinopril due to refill on 3/18/22. Last filled 30 day supply. Per chart review: lisinopril was started at [address] on 2/16/22 for #30 0R. Patient was due to repeat labs/OV in 2-3 weeks. Labs have not been completed and f/u appt on 3/16/22 was cancelled by the patient. BP Readings from Last 3 Encounters:   02/16/22 (!) 170/98   11/16/21 (!) 161/79   07/08/21 (!) 148/100     Estimated Creatinine Clearance: 60 mL/min (based on SCr of 1 mg/dL). PLAN  The following are interventions that have been identified:   - Patient overdue refilling lisinopril and active on home medication list.   - Patient needs refills for lisinopril - patient is also due for OV and labs. Reached patient to review. Patient confirms that she is out of lisinopril at home and was confused why she was only given a 1-month supply of this medication with 0 refills.  Advised pt that she was supposed to f/u in 2-3 weeks with labs and she cancelled her OV in March. Patient states that she will call and reschedule appt. Will send PCP a request for a short supply until the patient is able to be seen.      Future Appointments   Date Time Provider Sadiq Quiñonez   5/9/2022 10:00 AM SCHEDULE, JAMES MED ONC LAB [facility] MED ONC [place]   5/16/2022  9:45 AM [facility] [place]   5/16/2022 10:00 AM Felipe Pham MD [facility]       Patti Cabrera, PharmD, [place] // Department, toll free [phone], option 1

## 2022-05-09 ENCOUNTER — HOSPITAL ENCOUNTER (OUTPATIENT)
Dept: INFUSION THERAPY | Age: 67
Discharge: HOME | End: 2022-05-09
Payer: MEDICARE

## 2022-05-09 DIAGNOSIS — C34.90 MALIGNANT NEOPLASM OF LUNG, UNSPECIFIED LATERALITY, UNSPECIFIED PART OF LUNG (HCC): ICD-10-CM

## 2022-05-09 LAB
BASOPHILS ABSOLUTE: 0.1 K/CU MM
BASOPHILS RELATIVE PERCENT: 0.6 % (ref 0–1)
DIFFERENTIAL TYPE: ABNORMAL
EOSINOPHILS ABSOLUTE: 0.3 K/CU MM
EOSINOPHILS RELATIVE PERCENT: 3.2 % (ref 0–3)
FERRITIN: 70 NG/ML (ref 15–150)
HCT VFR BLD CALC: 42 % (ref 37–47)
HEMOGLOBIN: 13.2 GM/DL (ref 12.5–16)
IRON: 64 UG/DL (ref 37–145)
LYMPHOCYTES ABSOLUTE: 2.7 K/CU MM
LYMPHOCYTES RELATIVE PERCENT: 32.6 % (ref 24–44)
MCH RBC QN AUTO: 29.7 PG (ref 27–31)
MCHC RBC AUTO-ENTMCNC: 31.4 % (ref 32–36)
MCV RBC AUTO: 94.4 FL (ref 78–100)
MONOCYTES ABSOLUTE: 0.5 K/CU MM
MONOCYTES RELATIVE PERCENT: 6.5 % (ref 0–4)
PCT TRANSFERRIN: 22 % (ref 10–44)
PDW BLD-RTO: 14.1 % (ref 11.7–14.9)
PLATELET # BLD: 414 K/CU MM (ref 140–440)
PMV BLD AUTO: 8.8 FL (ref 7.5–11.1)
RBC # BLD: 4.45 M/CU MM (ref 4.2–5.4)
SEGMENTED NEUTROPHILS ABSOLUTE COUNT: 4.6 K/CU MM
SEGMENTED NEUTROPHILS RELATIVE PERCENT: 57.1 % (ref 36–66)
TOTAL IRON BINDING CAPACITY: 297 UG/DL (ref 250–450)
TSH HIGH SENSITIVITY: 1.72 UIU/ML (ref 0.27–4.2)
UNSATURATED IRON BINDING CAPACITY: 233 UG/DL (ref 110–370)
WBC # BLD: 8.1 K/CU MM (ref 4–10.5)

## 2022-05-09 PROCEDURE — 83550 IRON BINDING TEST: CPT

## 2022-05-09 PROCEDURE — 82607 VITAMIN B-12: CPT

## 2022-05-09 PROCEDURE — 84443 ASSAY THYROID STIM HORMONE: CPT

## 2022-05-09 PROCEDURE — 85025 COMPLETE CBC W/AUTO DIFF WBC: CPT

## 2022-05-09 PROCEDURE — 36415 COLL VENOUS BLD VENIPUNCTURE: CPT

## 2022-05-09 PROCEDURE — 82746 ASSAY OF FOLIC ACID SERUM: CPT

## 2022-05-09 PROCEDURE — 82728 ASSAY OF FERRITIN: CPT

## 2022-05-09 PROCEDURE — 83540 ASSAY OF IRON: CPT

## 2022-05-10 LAB
FOLATE: 14.9 NG/ML (ref 3.1–17.5)
VITAMIN B-12: 372 PG/ML (ref 211–911)

## 2022-05-16 ENCOUNTER — HOSPITAL ENCOUNTER (OUTPATIENT)
Dept: INFUSION THERAPY | Age: 67
Discharge: HOME | End: 2022-05-16

## 2022-05-16 ENCOUNTER — OFFICE VISIT (OUTPATIENT)
Dept: ONCOLOGY | Age: 67
End: 2022-05-16
Payer: MEDICARE

## 2022-05-16 VITALS
HEART RATE: 71 BPM | WEIGHT: 187.8 LBS | SYSTOLIC BLOOD PRESSURE: 186 MMHG | RESPIRATION RATE: 16 BRPM | TEMPERATURE: 97.1 F | OXYGEN SATURATION: 97 % | BODY MASS INDEX: 31.29 KG/M2 | DIASTOLIC BLOOD PRESSURE: 91 MMHG | HEIGHT: 65 IN

## 2022-05-16 DIAGNOSIS — C34.90 MALIGNANT NEOPLASM OF LUNG, UNSPECIFIED LATERALITY, UNSPECIFIED PART OF LUNG (HCC): Primary | ICD-10-CM

## 2022-05-16 PROCEDURE — G8417 CALC BMI ABV UP PARAM F/U: HCPCS | Performed by: INTERNAL MEDICINE

## 2022-05-16 PROCEDURE — 1123F ACP DISCUSS/DSCN MKR DOCD: CPT | Performed by: INTERNAL MEDICINE

## 2022-05-16 PROCEDURE — 4040F PNEUMOC VAC/ADMIN/RCVD: CPT | Performed by: INTERNAL MEDICINE

## 2022-05-16 PROCEDURE — 99214 OFFICE O/P EST MOD 30 MIN: CPT | Performed by: INTERNAL MEDICINE

## 2022-05-16 PROCEDURE — 1090F PRES/ABSN URINE INCON ASSESS: CPT | Performed by: INTERNAL MEDICINE

## 2022-05-16 PROCEDURE — G8400 PT W/DXA NO RESULTS DOC: HCPCS | Performed by: INTERNAL MEDICINE

## 2022-05-16 PROCEDURE — G8427 DOCREV CUR MEDS BY ELIG CLIN: HCPCS | Performed by: INTERNAL MEDICINE

## 2022-05-16 PROCEDURE — 1036F TOBACCO NON-USER: CPT | Performed by: INTERNAL MEDICINE

## 2022-05-16 PROCEDURE — 3017F COLORECTAL CA SCREEN DOC REV: CPT | Performed by: INTERNAL MEDICINE

## 2022-05-16 ASSESSMENT — PATIENT HEALTH QUESTIONNAIRE - PHQ9
7. TROUBLE CONCENTRATING ON THINGS, SUCH AS READING THE NEWSPAPER OR WATCHING TELEVISION: 0
9. THOUGHTS THAT YOU WOULD BE BETTER OFF DEAD, OR OF HURTING YOURSELF: 0
SUM OF ALL RESPONSES TO PHQ9 QUESTIONS 1 & 2: 2
SUM OF ALL RESPONSES TO PHQ QUESTIONS 1-9: 6
3. TROUBLE FALLING OR STAYING ASLEEP: 0
10. IF YOU CHECKED OFF ANY PROBLEMS, HOW DIFFICULT HAVE THESE PROBLEMS MADE IT FOR YOU TO DO YOUR WORK, TAKE CARE OF THINGS AT HOME, OR GET ALONG WITH OTHER PEOPLE: 1
SUM OF ALL RESPONSES TO PHQ QUESTIONS 1-9: 6
8. MOVING OR SPEAKING SO SLOWLY THAT OTHER PEOPLE COULD HAVE NOTICED. OR THE OPPOSITE, BEING SO FIGETY OR RESTLESS THAT YOU HAVE BEEN MOVING AROUND A LOT MORE THAN USUAL: 0
SUM OF ALL RESPONSES TO PHQ QUESTIONS 1-9: 6
5. POOR APPETITE OR OVEREATING: 0
4. FEELING TIRED OR HAVING LITTLE ENERGY: 3
6. FEELING BAD ABOUT YOURSELF - OR THAT YOU ARE A FAILURE OR HAVE LET YOURSELF OR YOUR FAMILY DOWN: 1
SUM OF ALL RESPONSES TO PHQ QUESTIONS 1-9: 6
1. LITTLE INTEREST OR PLEASURE IN DOING THINGS: 1
2. FEELING DOWN, DEPRESSED OR HOPELESS: 1

## 2022-05-16 NOTE — PROGRESS NOTES
MA Rooming Questions  Patient: Jayda Obregon  MRN: [id number]    Date: 5/16/2022        1. Do you have any new issues?   no         2. Do you need any refills on medications?    no    3. Have you had any imaging done since your last visit?   no    4. Have you been hospitalized or seen in the emergency room since your last visit here?   no    5. Did the patient have a depression screening completed today?  Yes    PHQ-9 Total Score: 6 (5/16/2022  9:58 AM)  Thoughts that you would be better off dead, or of hurting yourself in some way: 0 (5/16/2022  9:58 AM)       PHQ-9 Given to (if applicable):               PHQ-9 Score (if applicable):                     [] Positive     []  Negative              Does question #9 need addressed (if applicable)                     [] Yes    []  No               Jakob Patel CMA

## 2022-06-20 ENCOUNTER — TELEPHONE (OUTPATIENT)
Dept: FAMILY MEDICINE CLINIC | Age: 67
End: 2022-06-20

## 2022-06-20 NOTE — TELEPHONE ENCOUNTER
Attempted to call patient per Dr. Tijerina Do request, patient is scheduled for in office tomorrow 06/21/22  @9am need to get more information on symptoms of sinus infection, if sinus resolved and no covid symptoms then leave apt, but if symptoms visit needs to be a virtual visit. Unable to leave a voice message the mail box was full.

## 2022-06-20 NOTE — TELEPHONE ENCOUNTER
Patient called back states her sinus symptoms are pressure in nose, nose sore, sneezing and having Ha's. She also says she needs refill on her blood pressure medication.   Changed apt from in office to a virtual visit tomorrow @9am

## 2022-06-21 ENCOUNTER — TELEMEDICINE (OUTPATIENT)
Dept: FAMILY MEDICINE CLINIC | Age: 67
End: 2022-06-21
Payer: MEDICARE

## 2022-06-21 DIAGNOSIS — I10 ESSENTIAL HYPERTENSION: ICD-10-CM

## 2022-06-21 DIAGNOSIS — Z91.199 NON-COMPLIANCE: ICD-10-CM

## 2022-06-21 DIAGNOSIS — J34.89 SINUS DRAINAGE: Primary | ICD-10-CM

## 2022-06-21 PROCEDURE — 1123F ACP DISCUSS/DSCN MKR DOCD: CPT | Performed by: FAMILY MEDICINE

## 2022-06-21 PROCEDURE — 99214 OFFICE O/P EST MOD 30 MIN: CPT | Performed by: FAMILY MEDICINE

## 2022-06-21 RX ORDER — IPRATROPIUM BROMIDE 42 UG/1
2 SPRAY, METERED NASAL 4 TIMES DAILY PRN
Qty: 1 EACH | Refills: 0 | Status: SHIPPED | OUTPATIENT
Start: 2022-06-21 | End: 2022-08-26 | Stop reason: ALTCHOICE

## 2022-06-21 RX ORDER — LISINOPRIL 20 MG/1
20 TABLET ORAL DAILY
Qty: 30 TABLET | Refills: 0 | Status: SHIPPED | OUTPATIENT
Start: 2022-06-21 | End: 2022-08-26 | Stop reason: SDUPTHER

## 2022-06-21 NOTE — PATIENT INSTRUCTIONS
BRING YOUR MEDICATIONS TO ALL APPOINTMENTS    Check MY CHART for test results. If you have forgotten your password, call [phone]. The diagnoses and medications listed in this after visit summary may not be up to date. Check MY CHART in 28-48 hours forcorrections. Late cancellation policy: So that we can better accommodate people who are sick, please give our office 24 hour notice for an appointment cancellation. Missed appointments: Your care is very important to us. It is important that you keep your scheduled appointments. Multiple missed appointments will lead to a dismissal from the office. Patients arriving late will be worked into the schedule as time permits, with patients arriving on time taken as scheduled. Late arriving patients are more than welcome to wait or reschedule their appointments. Please allow 5-7 business days for paperwork to be processed. HEALTH FACTS      1. Take your blood pressure medications at bedtime to reduce your chance of heart attack or stroke  2. Follow these tips to reduce childhood obesity: Reduce unnecessary exposure to antibiotics, consume whole milk instead of skim milk, watch public TV instead of regular TV (less exposure to junk food commercials), and reduce traumatic experiences. 3. 2 eggs per day are good for your heart  4. Alternate day fasting does promote weight loss. Skipping breakfast increases your risk of obesity  5. Artificially sweetened drinks increase all cause mortality (strokes, body mass index, cardiovascular disease)  6. Kale consumption can reduce onset of dementia  7. Walking at least 8000 steps per day and resistance exercise 2-3 x per week are good for your heart  8. Brushing teeth 3 times per day can decrease chance of getting diabetes  9. Antibiotic use is associated with a lifetime increased risk of breast cancer and heart disease.

## 2022-06-21 NOTE — PROGRESS NOTES
2022    TELEHEALTH EVALUATION -- Audio/Visual (During KGEKT-81 public health emergency)    HPI:    Lorena Bedolla (:  1955) has requested an audio/video evaluation for the following concern(s):    Chief Complaint   Patient presents with    Sinusitis    Nasal Congestion     pressure around nose and eyes     Headache    Hypertension    Medication Refill     Sinusitis:  Under both eyes and forehead hurts. Nasal drainage causes sore throat. flonase helps. No F, chills, sweats. Did not get her COVID test as we had requested her to do    Hypertension: Admits to not following up as directed. She is out of her lisinopril. She is on atenolol/chlorthalidone. She admits that the last time she had a blood pressure checked at her oncologist, it was very very high. This was right after she got news that her brother had  in a car accident. She has checked it since then it has been normal.    Review of Systems    Prior to Visit Medications    Medication Sig Taking?  Authorizing Provider   ipratropium (ATROVENT) 0.06 % nasal spray 2 sprays by Each Nostril route 4 times daily as needed for Rhinitis Yes Deidra Bangura MD   lisinopril (PRINIVIL;ZESTRIL) 20 MG tablet Take 1 tablet by mouth daily Yes Deidra Bangura MD   budesonide-formoterol (SYMBICORT) 160-4.5 MCG/ACT AERO Inhale 2 puffs into the lungs 2 times daily Yes Deidra Bangura MD   atenolol-chlorthalidone (TENORETIC) 100-25 MG per tablet Take 1 tablet by mouth daily Yes Deidra Bangura MD   fluticasone (FLONASE) 50 MCG/ACT nasal spray 2 sprays by Each Nostril route daily Yes Deidra Bangura MD   albuterol sulfate HFA (PROAIR HFA) 108 (90 Base) MCG/ACT inhaler Inhale 2 puffs into the lungs as needed for Wheezing Yes Deidra Bangura MD   albuterol (PROVENTIL) (2.5 MG/3ML) 0.083% nebulizer solution Take 3 mLs by nebulization every 6 hours as needed for Wheezing or Shortness of Breath Yes Deidra Bangura MD   budesonide-formoterol (SYMBICORT) 160-4.5 MCG/ACT AERO Inhale 2 puffs into the lungs 2 times daily Yes Ibrahima Pineda MD   gabapentin (NEURONTIN) 100 MG capsule Take 1 capsule by mouth 3 times daily for 30 days. Yes Aditya Ricks MD   loratadine-pseudoephedrine (CLARITIN-D 24 HOUR)  MG per extended release tablet Take 1 tablet by mouth daily Yes Aditya Ricks MD   omeprazole (PRILOSEC) 40 MG delayed release capsule Take 1 capsule by mouth daily Yes Ibrahima Pineda MD       Social History     Tobacco Use    Smoking status: Former Smoker     Packs/day: 0.50     Years: 30.00     Pack years: 15.00     Types: Cigarettes     Start date:      Quit date: 2017     Years since quittin.8    Smokeless tobacco: Never Used   Vaping Use    Vaping Use: Never used   Substance Use Topics    Alcohol use: No    Drug use: No        Allergies   Allergen Reactions    Lipitor      \"Makes Me Hurt So Bad I Can't Stand It\"    Vicodin [Hydrocodone-Acetaminophen] Nausea Only    Adhesive Tape Rash       PHYSICAL EXAMINATION:  [ INSTRUCTIONS:  \"[x]\" Indicates a positive item  \"[]\" Indicates a negative item  -- DELETE ALL ITEMS NOT EXAMINED]  Vital Signs: (As obtained by patient/caregiver or practitioner observation)    Blood pressure-  Heart rate-    Respiratory rate-    Temperature-  Pulse oximetry-     Constitutional: [x] Appears well-developed and well-nourished [x] No apparent distress      [] Abnormal-   Mental status  [x] Alert and awake  [x] Oriented to person/place/time [x]Able to follow commands      Eyes:  EOM    [x]  Normal  [] Abnormal-  Sclera  []  Normal  [] Abnormal -         Discharge []  None visible  [] Abnormal -    HENT:   [x] Normocephalic, atraumatic.   [] Abnormal   [] Mouth/Throat: Mucous membranes are moist.     External Ears [x] Normal  [] Abnormal-     Neck: [x] No visualized mass     Pulmonary/Chest: [x] Respiratory effort normal.  [x] No visualized signs of difficulty breathing or respiratory distress        [] Abnormal- Musculoskeletal:   [x] Normal gait with no signs of ataxia         [] Normal range of motion of neck        [] Abnormal-       Neurological:        [x] No Facial Asymmetry (Cranial nerve 7 motor function) (limited exam to video visit)          [] No gaze palsy        [] Abnormal-         Skin:        [x] No significant exanthematous lesions or discoloration noted on facial skin         [] Abnormal-            Psychiatric:       [x] Normal Affect [] No Hallucinations        [] Abnormal-     Other pertinent observable physical exam findings-       There were no vitals filed for this visit. There is no height or weight on file to calculate BMI. Wt Readings from Last 3 Encounters:   05/16/22 187 lb 12.8 oz (85.2 kg)   02/16/22 184 lb (83.5 kg)   11/16/21 187 lb (84.8 kg)     BP Readings from Last 3 Encounters:   05/16/22 (!) 186/91   02/16/22 (!) 170/98   11/16/21 (!) 161/79         Diagnosis Orders   1. Sinus drainage  COVID-19    ipratropium (ATROVENT) 0.06 % nasal spray   2. Essential hypertension  lisinopril (PRINIVIL;ZESTRIL) 20 MG tablet   3. Non-compliance         Patient's an office appointment was postponed to 1 month from now. We will recheck her for hypertension at that time. I have asked her to get a COVID test.  I have also asked her to order home COVID test so that next time she is sick she can simply do a home COVID test.    She can do sinus rinses up to 6 times a day for her nasal congestion. She is to call back next week if her sinus symptoms have not resolved. Return in about 23 days (around 7/14/2022) for 11:15 , HTN. Markus Grimm, was evaluated through a synchronous (real-time) audio-video encounter. The patient (or guardian if applicable) is aware that this is a billable service, which includes applicable co-pays. This Virtual Visit was conducted with patient's (and/or legal guardian's) consent.  The visit was conducted pursuant to the emergency declaration under the [facility]3] Act and the [address] waiver authority and the [employer] and [employer] Act. Patient identification was verified, and a caregiver was present when appropriate. The patient was located at Home: [place]   [address] [facility] [facility] [phone] [phone]. Provider was located at Home ([place] [address]): [place]. Total time spent on this encounter: Not billed by time    --Sandy Brizuela MD on 6/21/2022 at 12:56 PM    An electronic signature was used to authenticate this note.

## 2022-07-28 DIAGNOSIS — K21.9 GASTROESOPHAGEAL REFLUX DISEASE, UNSPECIFIED WHETHER ESOPHAGITIS PRESENT: ICD-10-CM

## 2022-07-29 RX ORDER — OMEPRAZOLE 40 MG/1
CAPSULE, DELAYED RELEASE ORAL
Qty: 90 CAPSULE | Refills: 3 | OUTPATIENT
Start: 2022-07-29

## 2022-08-26 ENCOUNTER — OFFICE VISIT (OUTPATIENT)
Dept: FAMILY MEDICINE CLINIC | Age: 67
End: 2022-08-26
Payer: MEDICARE

## 2022-08-26 VITALS
BODY MASS INDEX: 29.92 KG/M2 | HEIGHT: 65 IN | HEART RATE: 58 BPM | SYSTOLIC BLOOD PRESSURE: 132 MMHG | WEIGHT: 179.6 LBS | DIASTOLIC BLOOD PRESSURE: 84 MMHG | OXYGEN SATURATION: 95 %

## 2022-08-26 DIAGNOSIS — T45.1X5A CHRONIC PAINFUL POLYNEUROPATHY AFTER CHEMOTHERAPY (HCC): ICD-10-CM

## 2022-08-26 DIAGNOSIS — Z12.11 SCREEN FOR COLON CANCER: ICD-10-CM

## 2022-08-26 DIAGNOSIS — I10 ESSENTIAL HYPERTENSION: Primary | ICD-10-CM

## 2022-08-26 DIAGNOSIS — J34.2 NASAL SEPTAL DEVIATION: ICD-10-CM

## 2022-08-26 DIAGNOSIS — R09.82 POST-NASAL DRIP: ICD-10-CM

## 2022-08-26 DIAGNOSIS — K21.9 GASTROESOPHAGEAL REFLUX DISEASE, UNSPECIFIED WHETHER ESOPHAGITIS PRESENT: ICD-10-CM

## 2022-08-26 DIAGNOSIS — J32.0 CHRONIC MAXILLARY SINUSITIS: ICD-10-CM

## 2022-08-26 DIAGNOSIS — M54.31 RIGHT SCIATIC NERVE PAIN: ICD-10-CM

## 2022-08-26 DIAGNOSIS — R73.09 ABNORMAL GLUCOSE: ICD-10-CM

## 2022-08-26 DIAGNOSIS — Z78.0 POST-MENOPAUSAL: ICD-10-CM

## 2022-08-26 DIAGNOSIS — G62.0 CHRONIC PAINFUL POLYNEUROPATHY AFTER CHEMOTHERAPY (HCC): ICD-10-CM

## 2022-08-26 LAB
ANION GAP SERPL CALCULATED.3IONS-SCNC: 12 MMOL/L (ref 3–16)
BUN BLDV-MCNC: 15 MG/DL (ref 7–20)
CALCIUM SERPL-MCNC: 9.5 MG/DL (ref 8.3–10.6)
CHLORIDE BLD-SCNC: 100 MMOL/L (ref 99–110)
CHOLESTEROL, TOTAL: 350 MG/DL (ref 0–199)
CO2: 26 MMOL/L (ref 21–32)
CREAT SERPL-MCNC: 1.2 MG/DL (ref 0.6–1.2)
GFR AFRICAN AMERICAN: 54
GFR NON-AFRICAN AMERICAN: 45
GLUCOSE BLD-MCNC: 111 MG/DL (ref 70–99)
HDLC SERPL-MCNC: 34 MG/DL (ref 40–60)
LDL CHOLESTEROL CALCULATED: ABNORMAL MG/DL
LDL CHOLESTEROL DIRECT: 207 MG/DL
POTASSIUM SERPL-SCNC: 4.3 MMOL/L (ref 3.5–5.1)
SODIUM BLD-SCNC: 138 MMOL/L (ref 136–145)
TRIGL SERPL-MCNC: 346 MG/DL (ref 0–150)
VLDLC SERPL CALC-MCNC: ABNORMAL MG/DL

## 2022-08-26 PROCEDURE — 99214 OFFICE O/P EST MOD 30 MIN: CPT | Performed by: FAMILY MEDICINE

## 2022-08-26 PROCEDURE — 1123F ACP DISCUSS/DSCN MKR DOCD: CPT | Performed by: FAMILY MEDICINE

## 2022-08-26 PROCEDURE — 36415 COLL VENOUS BLD VENIPUNCTURE: CPT | Performed by: FAMILY MEDICINE

## 2022-08-26 RX ORDER — LISINOPRIL 20 MG/1
20 TABLET ORAL DAILY
Qty: 90 TABLET | Refills: 1 | Status: SHIPPED | OUTPATIENT
Start: 2022-08-26

## 2022-08-26 RX ORDER — OMEPRAZOLE 40 MG/1
40 CAPSULE, DELAYED RELEASE ORAL DAILY
Qty: 90 CAPSULE | Refills: 1 | Status: SHIPPED | OUTPATIENT
Start: 2022-08-26

## 2022-08-26 RX ORDER — ATENOLOL AND CHLORTHALIDONE TABLET 100; 25 MG/1; MG/1
1 TABLET ORAL DAILY
Qty: 90 TABLET | Refills: 1 | Status: SHIPPED | OUTPATIENT
Start: 2022-08-26

## 2022-08-26 RX ORDER — GABAPENTIN 100 MG/1
100 CAPSULE ORAL NIGHTLY
Qty: 90 CAPSULE | Refills: 1 | Status: SHIPPED | OUTPATIENT
Start: 2022-08-26 | End: 2022-11-24

## 2022-08-26 SDOH — ECONOMIC STABILITY: FOOD INSECURITY: WITHIN THE PAST 12 MONTHS, YOU WORRIED THAT YOUR FOOD WOULD RUN OUT BEFORE YOU GOT MONEY TO BUY MORE.: NEVER TRUE

## 2022-08-26 SDOH — ECONOMIC STABILITY: FOOD INSECURITY: WITHIN THE PAST 12 MONTHS, THE FOOD YOU BOUGHT JUST DIDN'T LAST AND YOU DIDN'T HAVE MONEY TO GET MORE.: NEVER TRUE

## 2022-08-26 ASSESSMENT — ENCOUNTER SYMPTOMS
COUGH: 1
CHEST TIGHTNESS: 1

## 2022-08-26 ASSESSMENT — SOCIAL DETERMINANTS OF HEALTH (SDOH): HOW HARD IS IT FOR YOU TO PAY FOR THE VERY BASICS LIKE FOOD, HOUSING, MEDICAL CARE, AND HEATING?: NOT VERY HARD

## 2022-08-26 ASSESSMENT — COPD QUESTIONNAIRES: COPD: 1

## 2022-08-26 NOTE — Clinical Note
Pt was to have called back regarding name of current gyn. Please call her to find out.   Please request pap results from that office and update HM

## 2022-08-26 NOTE — PROGRESS NOTES
Patient ID: Jeanna Lipoma 1955    . Chief Complaint   Patient presents with    Hypertension    Asthma     Having issues with her Symbicort inhaler          Hypertension  This is a chronic problem. The current episode started more than 1 year ago. The problem is controlled. Pertinent negatives include no palpitations. Risk factors for coronary artery disease include obesity. Past treatments include calcium channel blockers, beta blockers and diuretics. The current treatment provides significant (took some of her 's lisinopril when her pills ran out) improvement. Compliance problems include psychosocial issues. COPD  She complains of chest tightness and cough. This is a chronic problem. Her symptoms are alleviated by beta-agonist and steroid inhaler. Her past medical history is significant for COPD. Neuropathy: From chemotherapy. Also she thinks it is from her sciatica. The gabapentin helps. She is asking for to be 90 days. She is willing to just take it once a day rather than 3 times a day. Chronic sinusitis/nasal drip/cough: Lots of recurrent sinus infections. Is always blowing her nose or having a runny nose. She coughs at night because of the nasal drip. Ongoing for years. Review of Systems   Respiratory:  Positive for cough. Cardiovascular:  Negative for palpitations.      Patient Active Problem List   Diagnosis    Osteoarthritis of finger    Right sciatic nerve pain    Essential hypertension    Abnormal mammogram of left breast    Moderate COPD (chronic obstructive pulmonary disease) (HCC)    Squamous cell lung cancer, right (HCC)    Primary insomnia    Primary cancer of right upper lobe of lung ([address]    Other female genital prolapse    Creatinine elevation    History of lung cancer    Atrophy of right kidney    Malignant neoplasm of upper lobe, right bronchus or lung (HCC)    Other fatigue    Candidal stomatitis    Restless legs syndrome    Dyspnea, unspecified 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)    Gastroesophageal reflux disease    Post-menopausal       Past Surgical History:   Procedure Laterality Date    BRONCHOSCOPY  12/07/2017 2/21/2018- done     BUNIONECTOMY Bilateral 1990's    \"Done At Different Times\"    CATHETER REMOVAL Left 5/16/2019    PORT REMOVAL performed by Alessandra Pal MD at [place] [address]  2000's    DILATION AND CURETTAGE OF [address] Left 1980    \"Tendon Repair\"    LUNG SURGERY  04/12/2018    per old chart had thoracoscopy and RUL lobectomy , bronchoscopy and lymph node bx     MEDIASTINOSCOPY  02/21/2018    Bronchoscopy    TONSILLECTOMY  1978    TUBAL LIGATION  1988    TUNNELED VENOUS PORT PLACEMENT Left 07/31/2018       Family History   Problem Relation Age of Onset    Cancer Mother         Multiple Myeloma    Depression Mother     Diabetes Mother     High Blood Pressure Mother     High Cholesterol Mother     Heart Disease Father         CHF       Current Outpatient Medications on File Prior to Visit   Medication Sig Dispense Refill    budesonide-formoterol (SYMBICORT) 160-4.5 MCG/ACT AERO Inhale 2 puffs into the lungs 2 times daily 3 each 3    fluticasone (FLONASE) 50 MCG/ACT nasal spray 2 sprays by Each Nostril route daily 3 each 3    albuterol sulfate HFA (PROAIR HFA) 108 (90 Base) MCG/ACT inhaler Inhale 2 puffs into the lungs as needed for Wheezing 3 each 3    albuterol (PROVENTIL) (2.5 MG/3ML) 0.083% nebulizer solution Take 3 mLs by nebulization every 6 hours as needed for Wheezing or Shortness of Breath 60 each 3    loratadine-pseudoephedrine (CLARITIN-D 24 HOUR)  MG per extended release tablet Take 1 tablet by mouth daily 30 tablet 3     No current facility-administered medications on file prior to visit. Objective:         Physical Exam  Vitals and nursing note reviewed. Constitutional:       General: She is not in acute distress. Appearance: She is well-developed. HENT:      Head: Normocephalic and atraumatic. Right Ear: Hearing, tympanic membrane and external ear normal.      Left Ear: Hearing, tympanic membrane and external ear normal.      Nose: Nasal deformity present. No laceration, mucosal edema or rhinorrhea. Right Sinus: Frontal sinus tenderness present. No maxillary sinus tenderness. Left Sinus: Frontal sinus tenderness present. No maxillary sinus tenderness. Mouth/Throat:      Mouth: Mucous membranes are moist.      Pharynx: No oropharyngeal exudate or posterior oropharyngeal erythema. Eyes:      Conjunctiva/sclera: Conjunctivae normal.   Neck:      Thyroid: No thyromegaly. Trachea: No tracheal deviation. Cardiovascular:      Rate and Rhythm: Normal rate and regular rhythm. Heart sounds: Normal heart sounds, S1 normal and S2 normal.     No friction rub. No gallop. Pulmonary:      Effort: No respiratory distress. Breath sounds: No wheezing or rales. Lymphadenopathy:      Head:      Right side of head: No submental, submandibular or posterior auricular adenopathy. Left side of head: No submental, submandibular or posterior auricular adenopathy. Cervical:      Right cervical: No superficial, deep or posterior cervical adenopathy. Left cervical: No superficial, deep or posterior cervical adenopathy. Skin:     General: Skin is warm and dry. Psychiatric:         Behavior: Behavior normal.     Vitals:    08/26/22 0932 08/26/22 0940   BP: (!) 146/94 132/84   Site: Left Upper Arm    Position: Sitting    Cuff Size: Medium Adult    Pulse: 58    SpO2: 95%    Weight: 179 lb 9.6 oz (81.5 kg)    Height: 5' 5\" (1.651 m)      Body mass index is 29.89 kg/m².      Wt Readings from Last 3 Encounters:   08/26/22 179 lb 9.6 oz (81.5 kg)   05/16/22 187 lb 12.8 oz (85.2 kg)   02/16/22 184 lb (83.5 kg)     BP Readings from Last 3 Encounters:   08/26/22 132/84   05/16/22 (!) 186/91   02/16/22 (!) 170/98          No results found for this visit on 08/26/22. The ASCVD Risk score (Alessandra Jerez, et al., 2013) failed to calculate for the following reasons:    Cannot find a previous HDL lab    Cannot find a previous total cholesterol lab  Lab Review   Hospital Outpatient Visit on 05/09/2022   Component Date Value    Vitamin B-12 05/09/2022 372.0     Folate 05/09/2022 14.9     TSH, High Sensitivity 05/09/2022 1.720     Iron 05/09/2022 64     UIBC 05/09/2022 233     TIBC 05/09/2022 297     Transferrin % 05/09/2022 22     Ferritin 05/09/2022 70     WBC 05/09/2022 8.1     RBC 05/09/2022 4.45     Hemoglobin 05/09/2022 13.2     Hematocrit 05/09/2022 42.0     MCV 05/09/2022 94.4     MCH 05/09/2022 29.7     MCHC 05/09/2022 31.4 (A)    RDW 05/09/2022 14.1     Platelets 47/91/2390 414     MPV 05/09/2022 8.8     Differential Type 05/09/2022 AUTOMATED DIFFERENTIAL     Segs Relative 05/09/2022 57.1     Lymphocytes % 05/09/2022 32.6     Monocytes % 05/09/2022 6.5 (A)    Eosinophils % 05/09/2022 3.2 (A)    Basophils % 05/09/2022 0.6     Segs Absolute 05/09/2022 4.6     Lymphocytes Absolute 05/09/2022 2.7     Monocytes Absolute 05/09/2022 0.5     Eosinophils Absolute 05/09/2022 0.3     Basophils Absolute 05/09/2022 0.1            Assessment:       Diagnosis Orders   1. Essential hypertension  Lipid Panel    Basic Metabolic Panel    atenolol-chlorthalidone (TENORETIC) 100-25 MG per tablet    lisinopril (PRINIVIL;ZESTRIL) 20 MG tablet      2. Abnormal glucose  Hemoglobin A1C      3. Screen for colon cancer        4. Post-menopausal  DEXA BONE DENSITY AXIAL SKELETON      5. Nasal septal deviation  Amb External Referral To ENT      6. Chronic maxillary sinusitis  Amb External Referral To ENT      7. Post-nasal drip  Amb External Referral To ENT      8. Gastroesophageal reflux disease, unspecified whether esophagitis present  omeprazole (PRILOSEC) 40 MG delayed release capsule      9. Right sciatic nerve pain  gabapentin (NEURONTIN) 100 MG capsule      10.  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 Continue taking. Hypertension stable with lisinopril and Tenoretic. Continue taking. Recheck in 6 months    Continue the gabapentin for the neuropathy. I did review with her recent updates that state gabapentin does not help with chemotherapy-induced peripheral neuropathy or sciatica. She feels it helps her and is asking to continue. We will continue with lower quantity. Instead of 3 times a day will be once at night. She is happy with that. Patient is due for colon cancer screening. .  Is preferring to have stool testing done rather than get a colonoscopy. The patient does not have a family history of colon cancer and does not have bleeding when they have bowel movements. Therefore, fit test  was given to patient. The patient will be contacted in 2 weeks if the fit test has not been returned. If the fit test is positive, then the patient will be referred for a colonoscopy. Return in about 5 months (around 1/26/2023) for Fall Flu shot (schedule now), F.card/ MA 1-2 weeks, ([facility] LPN, schedule on phone), HTN.

## 2022-08-27 LAB
ESTIMATED AVERAGE GLUCOSE: 122.6 MG/DL
HBA1C MFR BLD: 5.9 %

## 2022-08-29 ENCOUNTER — TELEPHONE (OUTPATIENT)
Dept: FAMILY MEDICINE CLINIC | Age: 67
End: 2022-08-29

## 2022-08-29 NOTE — TELEPHONE ENCOUNTER
----- Message from Darshan Silva MD sent at 8/26/2022  1:40 PM EDT -----  Pt was to have called back regarding name of current gyn. Please call her to find out.   Please request pap results from that office and update HM

## 2022-09-01 PROBLEM — R73.03 PREDIABETES: Status: ACTIVE | Noted: 2022-09-01

## 2022-09-01 PROBLEM — F51.01 PRIMARY INSOMNIA: Status: RESOLVED | Noted: 2018-02-19 | Resolved: 2022-09-01

## 2022-09-02 ENCOUNTER — TELEMEDICINE (OUTPATIENT)
Dept: FAMILY MEDICINE CLINIC | Age: 67
End: 2022-09-02
Payer: MEDICARE

## 2022-09-02 DIAGNOSIS — E78.49 OTHER HYPERLIPIDEMIA: Primary | ICD-10-CM

## 2022-09-02 DIAGNOSIS — R73.03 PREDIABETES: ICD-10-CM

## 2022-09-02 PROCEDURE — 99213 OFFICE O/P EST LOW 20 MIN: CPT | Performed by: FAMILY MEDICINE

## 2022-09-02 PROCEDURE — 1123F ACP DISCUSS/DSCN MKR DOCD: CPT | Performed by: FAMILY MEDICINE

## 2022-09-02 RX ORDER — SIMVASTATIN 40 MG
40 TABLET ORAL NIGHTLY
Qty: 90 TABLET | Refills: 3 | Status: SHIPPED | OUTPATIENT
Start: 2022-09-02

## 2022-09-02 NOTE — PROGRESS NOTES
Allergies   Allergen Reactions    Lipitor      \"Makes Me Hurt So Bad I Can't Stand It\"    Vicodin [Hydrocodone-Acetaminophen] Nausea Only    Adhesive Tape Rash       PHYSICAL EXAMINATION:  [ INSTRUCTIONS:  \"[x]\" Indicates a positive item  \"[]\" Indicates a negative item  -- DELETE ALL ITEMS NOT EXAMINED]  Vital Signs: (As obtained by patient/caregiver or practitioner observation)    Blood pressure-  Heart rate-    Respiratory rate-    Temperature-  Pulse oximetry-     Constitutional: [x] Appears well-developed and well-nourished [x] No apparent distress      [] Abnormal-   Mental status  [x] Alert and awake  [x] Oriented to person/place/time []Able to follow commands      Eyes:  EOM    [x]  Normal  [] Abnormal-  Sclera  []  Normal  [] Abnormal -         Discharge []  None visible  [] Abnormal -    HENT:   [x] Normocephalic, atraumatic.   [] Abnormal   [] Mouth/Throat: Mucous membranes are moist.     External Ears [x] Normal  [] Abnormal-     Neck: [] No visualized mass     Pulmonary/Chest: [x] Respiratory effort normal.  [x] No visualized signs of difficulty breathing or respiratory distress        [] Abnormal-      Musculoskeletal:   [x] Normal gait with no signs of ataxia         [] Normal range of motion of neck        [] Abnormal-       Neurological:        [x] No Facial Asymmetry (Cranial nerve 7 motor function) (limited exam to video visit)          [] No gaze palsy        [] Abnormal-         Skin:        [x] No significant exanthematous lesions or discoloration noted on facial skin         [] Abnormal-            Psychiatric:       [x] Normal Affect [x] No Hallucinations        [] Abnormal-   Lab Review   Office Visit on 08/26/2022   Component Date Value    Cholesterol, Total 08/26/2022 350 (A)    Triglycerides 08/26/2022 346 (A)    HDL 08/26/2022 34 (A)    LDL Calculated 08/26/2022 see below     VLDL Cholesterol Calcula* 08/26/2022 see below     Sodium 08/26/2022 138     Potassium 08/26/2022 4.3 Chloride 08/26/2022 100     CO2 08/26/2022 26     Anion Gap 08/26/2022 12     Glucose 08/26/2022 111 (A)    BUN 08/26/2022 15     Creatinine 08/26/2022 1.2     GFR Non- 08/26/2022 45 (A)    GFR  08/26/2022 54 (A)    Calcium 08/26/2022 9.5     Hemoglobin A1C 08/26/2022 5.9     eAG 08/26/2022 122.6     LDL Direct 08/26/2022 207 (A)   There were no vitals filed for this visit. There is no height or weight on file to calculate BMI. Wt Readings from Last 3 Encounters:   08/26/22 179 lb 9.6 oz (81.5 kg)   05/16/22 187 lb 12.8 oz (85.2 kg)   02/16/22 184 lb (83.5 kg)     BP Readings from Last 3 Encounters:   08/26/22 132/84   05/16/22 (!) 186/91   02/16/22 (!) 170/98          Other pertinent observable physical exam findings-      Diagnosis Orders   1. Other hyperlipidemia  simvastatin (ZOCOR) 40 MG tablet      2. Prediabetes          Discussed healthy nutrition. Reduction of foods that can raise her sugar. Start statin. Goal is to walk about 30 minutes/day. Weight loss of 10 pounds over the next 6 months. No follow-ups on file. [place], was evaluated through a synchronous (real-time) audio-video encounter. The patient (or guardian if applicable) is aware that this is a billable service, which includes applicable co-pays. This Virtual Visit was conducted with patient's (and/or legal guardian's) consent. The visit was conducted pursuant to the emergency declaration under the [facility], [address] [place] [address] authority and the [employer] General Act. Patient identification was verified, and a caregiver was present when appropriate. The patient was located at Home: [address] [id number]. Provider was located at Home ([place] [address]): [place].        Total time spent on this encounter: Not billed by time    --Debra Tillman MD on 9/2/2022 at 2:14 PM    An electronic signature was used to authenticate this note.

## 2022-09-08 ENCOUNTER — TELEPHONE (OUTPATIENT)
Dept: FAMILY MEDICINE CLINIC | Age: 67
End: 2022-09-08

## 2022-09-08 NOTE — TELEPHONE ENCOUNTER
Patients pharmacy called wanting to know if you are aware of the patients allergies to statin. Vivi Jackson would like to know if you still want the patients Simvastatin 40 mg filled.  Please return call with Dr. Ansari Show answer 1- [phone] ref# [id number]

## 2022-09-09 DIAGNOSIS — I10 ESSENTIAL HYPERTENSION: ICD-10-CM

## 2022-09-09 RX ORDER — ATENOLOL AND CHLORTHALIDONE TABLET 100; 25 MG/1; MG/1
TABLET ORAL
Qty: 90 TABLET | Refills: 1 | OUTPATIENT
Start: 2022-09-09

## 2022-10-27 ENCOUNTER — NURSE ONLY (OUTPATIENT)
Dept: FAMILY MEDICINE CLINIC | Age: 67
End: 2022-10-27
Payer: MEDICARE

## 2022-10-27 DIAGNOSIS — Z23 NEEDS FLU SHOT: Primary | ICD-10-CM

## 2022-10-27 PROCEDURE — 90694 VACC AIIV4 NO PRSRV 0.5ML IM: CPT | Performed by: FAMILY MEDICINE

## 2022-10-27 PROCEDURE — G0008 ADMIN INFLUENZA VIRUS VAC: HCPCS | Performed by: FAMILY MEDICINE

## 2022-10-27 NOTE — PROGRESS NOTES
Patient here for Ma visit for Flu HD vaccine. Patient tolerated well, no concerns.   Kassandra Graff

## 2022-11-01 ENCOUNTER — COMMUNITY OUTREACH (OUTPATIENT)
Dept: FAMILY MEDICINE CLINIC | Age: 67
End: 2022-11-01

## 2022-11-07 ENCOUNTER — COMMUNITY OUTREACH (OUTPATIENT)
Dept: FAMILY MEDICINE CLINIC | Age: 67
End: 2022-11-07

## 2022-11-09 ENCOUNTER — HOSPITAL ENCOUNTER (OUTPATIENT)
Dept: INFUSION THERAPY | Age: 67
Discharge: HOME | End: 2022-11-09
Payer: MEDICARE

## 2022-11-09 DIAGNOSIS — C34.90 MALIGNANT NEOPLASM OF LUNG, UNSPECIFIED LATERALITY, UNSPECIFIED PART OF LUNG (HCC): ICD-10-CM

## 2022-11-09 LAB
ALBUMIN SERPL-MCNC: 4.4 GM/DL (ref 3.4–5)
ALP BLD-CCNC: 111 IU/L (ref 40–129)
ALT SERPL-CCNC: 13 U/L (ref 10–40)
ANION GAP SERPL CALCULATED.3IONS-SCNC: 11 MMOL/L (ref 4–16)
AST SERPL-CCNC: 13 IU/L (ref 15–37)
BASOPHILS ABSOLUTE: 0.1 K/CU MM
BASOPHILS RELATIVE PERCENT: 0.7 % (ref 0–1)
BILIRUB SERPL-MCNC: 0.2 MG/DL (ref 0–1)
BUN BLDV-MCNC: 14 MG/DL (ref 6–23)
CALCIUM SERPL-MCNC: 9.6 MG/DL (ref 8.3–10.6)
CHLORIDE BLD-SCNC: 99 MMOL/L (ref 99–110)
CO2: 27 MMOL/L (ref 21–32)
CREAT SERPL-MCNC: 1 MG/DL (ref 0.6–1.1)
DIFFERENTIAL TYPE: ABNORMAL
EOSINOPHILS ABSOLUTE: 0.2 K/CU MM
EOSINOPHILS RELATIVE PERCENT: 2.5 % (ref 0–3)
FERRITIN: 91 NG/ML (ref 15–150)
GFR SERPL CREATININE-BSD FRML MDRD: >60 ML/MIN/1.73M2
GLUCOSE BLD-MCNC: 104 MG/DL (ref 70–99)
HCT VFR BLD CALC: 41.3 % (ref 37–47)
HEMOGLOBIN: 13 GM/DL (ref 12.5–16)
IRON: 60 UG/DL (ref 37–145)
LYMPHOCYTES ABSOLUTE: 2.2 K/CU MM
LYMPHOCYTES RELATIVE PERCENT: 30 % (ref 24–44)
MCH RBC QN AUTO: 29.8 PG (ref 27–31)
MCHC RBC AUTO-ENTMCNC: 31.5 % (ref 32–36)
MCV RBC AUTO: 94.7 FL (ref 78–100)
MONOCYTES ABSOLUTE: 0.4 K/CU MM
MONOCYTES RELATIVE PERCENT: 5.3 % (ref 0–4)
PCT TRANSFERRIN: 21 % (ref 10–44)
PDW BLD-RTO: 13.4 % (ref 11.7–14.9)
PLATELET # BLD: 426 K/CU MM (ref 140–440)
PMV BLD AUTO: 8.8 FL (ref 7.5–11.1)
POTASSIUM SERPL-SCNC: 4.5 MMOL/L (ref 3.5–5.1)
RBC # BLD: 4.36 M/CU MM (ref 4.2–5.4)
SEGMENTED NEUTROPHILS ABSOLUTE COUNT: 4.5 K/CU MM
SEGMENTED NEUTROPHILS RELATIVE PERCENT: 61.5 % (ref 36–66)
SODIUM BLD-SCNC: 137 MMOL/L (ref 135–145)
TOTAL IRON BINDING CAPACITY: 282 UG/DL (ref 250–450)
TOTAL PROTEIN: 6.7 GM/DL (ref 6.4–8.2)
UNSATURATED IRON BINDING CAPACITY: 222 UG/DL (ref 110–370)
WBC # BLD: 7.3 K/CU MM (ref 4–10.5)

## 2022-11-09 PROCEDURE — 83550 IRON BINDING TEST: CPT

## 2022-11-09 PROCEDURE — 82728 ASSAY OF FERRITIN: CPT

## 2022-11-09 PROCEDURE — 80053 COMPREHEN METABOLIC PANEL: CPT

## 2022-11-09 PROCEDURE — 83540 ASSAY OF IRON: CPT

## 2022-11-09 PROCEDURE — 85025 COMPLETE CBC W/AUTO DIFF WBC: CPT

## 2022-11-14 ENCOUNTER — HOSPITAL ENCOUNTER (OUTPATIENT)
Dept: CT IMAGING | Age: 67
Discharge: HOME | End: 2022-11-14
Payer: MEDICARE

## 2022-11-14 DIAGNOSIS — C34.90 MALIGNANT NEOPLASM OF LUNG, UNSPECIFIED LATERALITY, UNSPECIFIED PART OF LUNG (HCC): ICD-10-CM

## 2022-11-14 PROCEDURE — 71250 CT THORAX DX C-: CPT

## 2022-11-18 NOTE — PROGRESS NOTES
Patient Name: Veronica Bedolla  Patient : 1955  Patient MRN: [id number]     Primary Oncologist: Honora Saint, MD  Referring Provider: Victor Hugo Mustafa MD     Date of Service: 2022      Chief Complaint:   Chief Complaint   Patient presents with    Follow-up     She came in for follow-up visit. Patient Active Problem List:     Osteoarthritis of finger     Sciatica     Essential hypertension     Abnormal mammogram of left breast     Moderate COPD (chronic obstructive pulmonary disease)      Squamous cell lung cancer, right (HCC)     Primary insomnia     Primary cancer of right upper lobe of lung (HCC)     Other female genital prolapse     Creatinine elevation     History of lung cancer     Atrophy of right kidney     Malignant neoplasm of upper lobe, right bronchus or lung      Other fatigue     Candidal stomatitis     Restless legs syndrome     Dyspnea, unspecified     HPI:   Marino Deluna is a pleasant 22-year-old  female patient who was diagnosed with squamous cell carcinoma of right upper lung, status post bronchoscopy on 2017. She initially presented with bronchitis in 2017 and had a chest x-ray. She did not feel much better after a course of antibiotics and had persistent cough. In 2017, she started having occasional hemoptysis. She went to see her family doctor and had a chest x-ray, eventually CAT scan of the chest.  Due to the insurance, it took about one month to get her CAT scan, as per patient. CT of the chest on 2017, revealed soft tissue mass obstructing the right upper lobe bronchus, concerning for endobronchial tumor and postobstructive collapse of the right upper lobe of the lung accounting or the paratracheal soft tissue density seen on the chest x-ray. Right paratracheal and suprahilar adenopathy could be obscured by the consolidated and collapsed right upper lung. Mammogram on 2017, revealed benign study.   She underwent bronchoscopy on December 7, 2017. The procedure note revealed fungating exophytic mass lesions obstructed the takeoff of the right upper lobe bronchus. The pathology report from the bronchial washing and brushing of the right upper lobe revealed squamous cell carcinoma. Pulmonary function test revealed moderate obstructive defects and the post bronchodilator study demonstrated significant response in the small airways. She was referred for further evaluation and possible chemoradiation versus surgery. She stated she was exposed to chemicals when she worked at [employer] and also asbestos. CT head was negative. PET CT scan in January 2018 revealed activity to the right perihilar mass and no evidence of metastatic disease anywhere else. I refer her to see thoracic surgeon for the discussion of possible resection and mediastinoscopy. She was seen by the radiation oncologist in January 2018. She underwent right upper lobectomy on April 11, 2018. Pathology reports revealed keratinizing invasive squamous cell carcinoma with positive bronchial margins negative lymph node at level 9, level 10. Tumor was 4.2 cm moderately differentiated with in situ component. No visible pleural invasions. No lymphovascular invasion. The squamous cell carcinoma in situ involved the bronchus intermedius part of the distal and carinal/tracheal site. Pathologically stage T2b, N0 MX. I went over NCCN guideline. I offered her regimen therapy plus minus chemo. She is agreeable to see an oncologist for the radiation therapy. She will think about the chemotherapy. VQ scan in March 2018 revealed low probability for pulmonary embolism. She was seen by RT onc and not recommended to have adjuvant RT. She is agreeable to Beryl and taxol. She started adjuvant chemo carbo/taxol on July 11, 2018. She requested to split the dose to day 1 day 8 every 3 weeks due to the increased fatigue. CXR in June 2018 was non-acute.    PET CT scan in November 2018  was negative. She decided not to pursue last dose of chemo due to side effects. I reminded about mammogram due in October 2018. CT 5/2/2019 showed no evidence of recurrent or residual disease. 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 She has FOB in March 2019 which was negative. She is agreeable to have follow up CT chest in November 2019. CT 5/29/2020 showed right upper lobectomy. No evidence of local recurrence or metastases in the chest.     CT chest on January 28, 2021 showed stable exam.  No new findings of recurrent or metastatic disease in the chest.    She has chronic dry cough and I recommend to follow up with Dr Marino Ulrich. She had stool occult 2 years ago. CT chest on November 10, 2021 showed  1. Stable postsurgical changes right upper lobe. 2. No new or worrisome lung nodules noted. No thoracic adenopathy  She had COVID vaccine booster. She has mammogram in November 2021. On 5/16/2022 she came in for follow up visit. In May 2022 TSH, CBC grossly unremarkable. Ferritin was 70, B-12 372 and folate 14.9. We discussed about her blood pressure today. I recommended she monitor her blood pressure at home and discuss with the family doctor about her elevated blood pressure. Also discussed with her about low-sodium diet. She is agreeable to continue with surveillance I will have follow-up CT chest prior to next office visit in 6 months. I will recheck CBC and iron study prior to that. She was seen by ENT who recommended to continue with Flonase. She was recommended to have potential cauterizations of the nostril. 11/28/2022 she came in for follow up visit. 11/2022 CBC and CMP grossly stable for her. CT chest 11/14/2022  1. Postsurgical changes from right upper lobectomy.   There is a new 1.8 cm soft tissue nodule along the posterior aspect of the surgical margin within the right hilum suspicious for recurrence versus metastatic adenopathy. There is associated narrowing of the right lower lobe bronchus in this region overall increased from the prior exam.  I ordered PET scan and referred back to Dr Kristopher Martell for EBUS. I prescribed ativan prior to PET scan. No acute pain. Denied any nausea, vomiting or diarrhea. No fever or chills. No chest pain, shortness of breath or palpitation. No headache or dizzy spell. No specific bone pain. No melena or hematochezia. Denied any dysuria or hematuria. Past Medical History  Anxiety, chronic low back pain, diverticulosis diagnosed in June 2013, hypertension, dyslipidemia, moderate COPD. Surgical History  She had colonoscopy a while ago, bronchoscopy in December 2017. Social History  She smoked about a half-pack per day for 25 years and quit in March 2014. She denied any alcohol or illicit drug use. She has one daughter who is a nurse. Family History  Mother had multiple myeloma, father had lung cancer and he was a smoker. Review of Systems: \"Per interval history; otherwise 10 point ROS is negative. \"     Vital Signs:  /62 (Site: Right Upper Arm, Position: Sitting, Cuff Size: Medium Adult)   Pulse 68   Temp (!) 96.7 °F (35.9 °C) (Infrared)   Resp 18   Ht 5' 5\" (1.651 m)   Wt 177 lb (80.3 kg)   LMP 01/06/2009 (LMP Unknown)   SpO2 93%   BMI 29.45 kg/m²     Physical Exam:  CONSTITUTIONAL: awake, alert, cooperative, no apparent distress   EYES: pupils equal, round and reactive to light.  Sclera clear and conjunctiva normal  ENT: Normocephalic, without obvious abnormality, atraumatic  NECK: supple, symmetrical, no jugular venous distension and no carotid bruits   HEMATOLOGIC/LYMPHATIC: no cervical, supraclavicular or axillary lymphadenopathy   LUNGS: no increased work of breathing and clear to auscultation   CARDIOVASCULAR: regular rate and rhythm, normal S1 and S2, no murmur  ABDOMEN: normal bowel sound, soft, non-distended, non-tender, no masses palpated, no rebound or guarding. MUSCULOSKELETAL: full range of motion noted, tone is normal  NEUROLOGIC: Motor skills grossly intact. CN II - XII grossly intact. SKIN: Normal skin color, texture, turgor and no jaundice. appears intact   EXTREMITIES: no LE edema or cyanosis. Labs:  Hematology:  Lab Results   Component Value Date    WBC 7.3 11/09/2022    RBC 4.36 11/09/2022    HGB 13.0 11/09/2022    HCT 41.3 11/09/2022    MCV 94.7 11/09/2022    MCH 29.8 11/09/2022    MCHC 31.5 (L) 11/09/2022    RDW 13.4 11/09/2022     11/09/2022    MPV 8.8 11/09/2022    SEGSPCT 61.5 11/09/2022    EOSRELPCT 2.5 11/09/2022    BASOPCT 0.7 11/09/2022    LYMPHOPCT 30.0 11/09/2022    MONOPCT 5.3 (H) 11/09/2022    SEGSABS 4.5 11/09/2022    EOSABS 0.2 11/09/2022    BASOSABS 0.1 11/09/2022    LYMPHSABS 2.2 11/09/2022    MONOSABS 0.4 11/09/2022    DIFFTYPE AUTOMATED DIFFERENTIAL 11/09/2022     Chemistry:  Lab Results   Component Value Date     11/09/2022    K 4.5 11/09/2022    CL 99 11/09/2022    CO2 27 11/09/2022    BUN 14 11/09/2022    CREATININE 1.0 11/09/2022    GLUCOSE 104 (H) 11/09/2022    CALCIUM 9.6 11/09/2022    PROT 6.7 11/09/2022    LABALBU 4.4 11/09/2022    BILITOT 0.2 11/09/2022    ALKPHOS 111 11/09/2022    AST 13 (L) 11/09/2022    ALT 13 11/09/2022    LABGLOM >60 11/09/2022    GFRAA 54 (A) 08/26/2022    AGRATIO 1.8 12/30/2013    GLOB 2.6 12/30/2013     Immunology:  Lab Results   Component Value Date    PROT 6.7 11/09/2022     Coagulation Panel:  Lab Results   Component Value Date    PROTIME 12.0 03/27/2018    INR 1.05 03/27/2018    APTT 29.1 03/27/2018      Observations:  PHQ-9 Total Score: 2 (11/28/2022  3:06 PM)      Assessment & Plan:  1. She was diagnosed with squamous cell carcinoma of the right upper lung with collapse of the right upper lung. Pulmonary function tests reveal moderate obstructive lung defects. I reviewed CBC and CMP in January 2018. CT of the head was negative.   PET CT scan In January 2018 revealed no evidence of metastatic disease elsewhere. She has pathological stage T2b, N0, MX moderately differentiated invasive Squamous cell carcinoma status post right upper lobectomy in April 2018 with positive bronchial margin of the in situ component. She was not recommended to have adjuvant RT by rad onc. In the first 2- 3 years she will have follow-up imaging study every 6-12 months. She started adjuvant chemo in July 2018 and completed October 25, 2018. She decided not to pursue the last dose of chemo. PET CT scan in November 2018 was negative. CT 5/2/2019 showed no evidence of recurrent or residual disease. CT chest 11/13/2019 showed no evidence of recurrence or residual disease. CT chest 5/2020 showed no evidence of recurrence or residual disease. CT chest on January 28, 2021 showed no evidence of recurrent disease. CT chest in November 2021 showed no evidence of recurrent disease. CT chest in November 2022 reviewed with her. I ordered PET scan and referred back to Dr Herson Garcia for ? EBUS. .    2.  She had a pericatheter thrombus and was started on Xarelto. She had mediport removal in June 2019.    3.   Mammogram in October 2017 was benign. Colonoscopy was a while ago. FOB in March 2019 was negative. She has mammogram in November 2021. I recommend to keep mammogram and colon cancer screening up to date. 4.   She quit smoking in 2014. I recommend to follow up with pulmonologist.     4.   Depression. I referred to psychiatrist.    5. 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 She is on gabapentin. 6.  Intermittent mild anemia. Anemic work-up in May 2022 was reviewed. Anemia has been corrected. 7.  She has chronic cough which could be related to postnasal drip. She was seen by ENT who recommended to continue with Flonase. She was offered cauterization of the nostril. Return to clinic in 3 weeks or sooner. All of her questions have been answered for today.      Recent imaging and labs were reviewed and discussed with the patient.

## 2022-11-21 ENCOUNTER — CLINICAL DOCUMENTATION (OUTPATIENT)
Dept: ONCOLOGY | Age: 67
End: 2022-11-21

## 2022-11-21 NOTE — PROGRESS NOTES
Dr. Fan Barragan aware patient has appointment Monday 11/28/2022, Dr. Fan Barragan is satisfied with this appointment date.

## 2022-11-28 ENCOUNTER — OFFICE VISIT (OUTPATIENT)
Dept: ONCOLOGY | Age: 67
End: 2022-11-28
Payer: MEDICARE

## 2022-11-28 ENCOUNTER — HOSPITAL ENCOUNTER (OUTPATIENT)
Dept: INFUSION THERAPY | Age: 67
Discharge: HOME | End: 2022-11-28
Payer: MEDICARE

## 2022-11-28 VITALS
OXYGEN SATURATION: 93 % | BODY MASS INDEX: 29.49 KG/M2 | TEMPERATURE: 96.7 F | SYSTOLIC BLOOD PRESSURE: 128 MMHG | HEIGHT: 65 IN | RESPIRATION RATE: 18 BRPM | HEART RATE: 68 BPM | DIASTOLIC BLOOD PRESSURE: 62 MMHG | WEIGHT: 177 LBS

## 2022-11-28 DIAGNOSIS — C34.91 MALIGNANT NEOPLASM OF RIGHT LUNG, UNSPECIFIED PART OF LUNG (HCC): ICD-10-CM

## 2022-11-28 DIAGNOSIS — R91.1 LUNG NODULE: Primary | ICD-10-CM

## 2022-11-28 PROCEDURE — 99211 OFF/OP EST MAY X REQ PHY/QHP: CPT

## 2022-11-28 PROCEDURE — 1123F ACP DISCUSS/DSCN MKR DOCD: CPT | Performed by: INTERNAL MEDICINE

## 2022-11-28 PROCEDURE — 3078F DIAST BP <80 MM HG: CPT | Performed by: INTERNAL MEDICINE

## 2022-11-28 PROCEDURE — 3074F SYST BP LT 130 MM HG: CPT | Performed by: INTERNAL MEDICINE

## 2022-11-28 PROCEDURE — 99214 OFFICE O/P EST MOD 30 MIN: CPT | Performed by: INTERNAL MEDICINE

## 2022-11-28 RX ORDER — LORAZEPAM 0.5 MG/1
0.5 TABLET ORAL DAILY PRN
Qty: 2 TABLET | Refills: 0 | Status: SHIPPED | OUTPATIENT
Start: 2022-11-28 | End: 2022-11-30

## 2022-11-28 ASSESSMENT — PATIENT HEALTH QUESTIONNAIRE - PHQ9
SUM OF ALL RESPONSES TO PHQ QUESTIONS 1-9: 2
1. LITTLE INTEREST OR PLEASURE IN DOING THINGS: 0
SUM OF ALL RESPONSES TO PHQ QUESTIONS 1-9: 2
SUM OF ALL RESPONSES TO PHQ9 QUESTIONS 1 & 2: 2
2. FEELING DOWN, DEPRESSED OR HOPELESS: 2

## 2022-11-28 NOTE — PROGRESS NOTES
MA Rooming Questions  Patient: Yuliana Cornejo  MRN: [id number]    Date: 11/28/2022        1. Do you have any new issues? yes - Pt c/o fatigue and SOB. States she still has a cough since COVID. 2. Do you need any refills on medications?    no    3. Have you had any imaging done since your last visit? yes - CT Chest 11/14    4. Have you been hospitalized or seen in the emergency room since your last visit here?   no    5. Did the patient have a depression screening completed today?  Yes    No data recorded     PHQ-9 Given to (if applicable):               PHQ-9 Score (if applicable):                     [] Positive     [x]  Negative              Does question #9 need addressed (if applicable)                     [] Yes    []  No               Karina Araujo MA

## 2022-12-12 ENCOUNTER — HOSPITAL ENCOUNTER (OUTPATIENT)
Dept: PET IMAGING | Age: 67
Discharge: HOME | End: 2022-12-12
Payer: MEDICARE

## 2022-12-12 DIAGNOSIS — R91.1 LUNG NODULE: ICD-10-CM

## 2022-12-12 DIAGNOSIS — C34.91 MALIGNANT NEOPLASM OF RIGHT LUNG, UNSPECIFIED PART OF LUNG (HCC): ICD-10-CM

## 2022-12-12 DIAGNOSIS — R91.1 LUNG NODULE: Primary | ICD-10-CM

## 2022-12-12 PROCEDURE — A9552 F18 FDG: HCPCS | Performed by: INTERNAL MEDICINE

## 2022-12-12 PROCEDURE — 3430000000 HC RX DIAGNOSTIC RADIOPHARMACEUTICAL: Performed by: INTERNAL MEDICINE

## 2022-12-12 PROCEDURE — 78815 PET IMAGE W/CT SKULL-THIGH: CPT

## 2022-12-12 RX ORDER — FLUDEOXYGLUCOSE F 18 200 MCI/ML
15 INJECTION, SOLUTION INTRAVENOUS
Status: COMPLETED | OUTPATIENT
Start: 2022-12-12 | End: 2022-12-12

## 2022-12-12 RX ADMIN — FLUDEOXYGLUCOSE F 18 15 MILLICURIE: 200 INJECTION, SOLUTION INTRAVENOUS at 08:34

## 2023-01-18 NOTE — PROGRESS NOTES
Patient Name: Livan Meyers  Patient : 1955  Patient MRN: [id number]     Primary Oncologist: Sylvania Severance, MD  Referring Provider: Xochitl Parks MD     Date of Service: 2/3/2023      Chief Complaint:   Chief Complaint   Patient presents with    Follow-up     She came in for follow-up visit. Patient Active Problem List:     Osteoarthritis of finger     Sciatica     Essential hypertension     Abnormal mammogram of left breast     Moderate COPD (chronic obstructive pulmonary disease)      Squamous cell lung cancer, right (HCC)     Primary insomnia     Primary cancer of right upper lobe of lung (HCC)     Other female genital prolapse     Creatinine elevation     History of lung cancer     Atrophy of right kidney     Malignant neoplasm of upper lobe, right bronchus or lung      Other fatigue     Candidal stomatitis     Restless legs syndrome     Dyspnea, unspecified     HPI:   Yuliana Barr is a pleasant 72-year-old  female patient who was diagnosed with squamous cell carcinoma of right upper lung, status post bronchoscopy on 2017. She initially presented with bronchitis in 2017 and had a chest x-ray. She did not feel much better after a course of antibiotics and had persistent cough. In 2017, she started having occasional hemoptysis. She went to see her family doctor and had a chest x-ray, eventually CAT scan of the chest.  Due to the insurance, it took about one month to get her CAT scan, as per patient. CT of the chest on 2017, revealed soft tissue mass obstructing the right upper lobe bronchus, concerning for endobronchial tumor and postobstructive collapse of the right upper lobe of the lung accounting or the paratracheal soft tissue density seen on the chest x-ray. Right paratracheal and suprahilar adenopathy could be obscured by the consolidated and collapsed right upper lung. 10/30/2017 mammogram benign.      2017 bronchoscopy revealed fungating exophytic mass lesions obstructed the takeoff of the right upper lobe bronchus. The pathology report from the bronchial washing and brushing of the right upper lobe revealed squamous cell carcinoma. Pulmonary function test revealed moderate obstructive defects and the post bronchodilator study demonstrated significant response in the small airways. She was referred for further evaluation and possible chemoradiation versus surgery. She stated she was exposed to chemicals when she worked at [employer] and also asbestos. CT head was negative. 1/2018 PET CT revealed activity to the right perihilar mass and no evidence of metastatic disease anywhere else. I refer her to see thoracic surgeon for the discussion of possible resection and mediastinoscopy. She was seen by the radiation oncologist in January 2018. She underwent right upper lobectomy on April 11, 2018. Pathology reports revealed keratinizing invasive squamous cell carcinoma with positive bronchial margins negative lymph node at level 9, level 10. Tumor was 4.2 cm moderately differentiated with in situ component. No visible pleural invasions. No lymphovascular invasion. The squamous cell carcinoma in situ involved the bronchus intermedius part of the distal and carinal/tracheal site. Pathologically stage [id number]. I went over NCCN guideline. I offered her regimen therapy plus minus chemo. She is agreeable to see an oncologist for the radiation therapy. She will think about the chemotherapy. VQ scan in March 2018 revealed low probability for pulmonary embolism. She was seen by RT onc and not recommended to have adjuvant RT. She started adjuvant chemo carbo/taxol on July 11, 2018. She requested to split the dose to day 1 day 8 every 3 weeks due to the increased fatigue. 6/2018 CXR non-acute. 11/2018  PET CT negative. She decided not to pursue last dose of chemo due to side effects.   I reminded about mammogram due in October 2018. 5/2/2019 CT  showed no evidence of recurrent or residual disease. 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   3/2019 FOB negative. 5/29/2020 CT showed right upper lobectomy. No evidence of local recurrence or metastases in the chest.   1/28/2021 CT chest: no new findings of recurrent or metastatic disease in the chest.    She has chronic dry cough and I recommend to follow up with Dr Errol Lima. She had stool occult 2 years ago. 11/20/2021 CT chest:  1. Stable postsurgical changes right upper lobe. 2. No new or worrisome lung nodules noted. No thoracic adenopathy  She had COVID vaccine booster. She has mammogram in November 2021.    3/2022 TSH, CBC grossly unremarkable. Ferritin 70, B-12 372 and folate 14. 9.     11/2022 CBC and CMP grossly stable for her. 11/14/2022 CT chest   1. Postsurgical changes from right upper lobectomy. There is a new 1.8 cm soft tissue nodule along the posterior aspect of the surgical margin within the right hilum suspicious for recurrence versus metastatic adenopathy. There is associated narrowing of the right lower lobe bronchus in this region overall increased from the prior exam.  I ordered PET scan and referred back to Dr Trang Wood for EBUS. I prescribed ativan prior to PET scan. 2/3/2023 she came in for follow up visit. PET scan 12/2022  1. Metabolic activity associated with the new right hilar nodule described on the recent CT scan concerning for recurrent/metastatic disease. 2.  No metabolically active distant metastatic disease. Note from Dr Trang Wood: If this does return his cancer then our options would be radiation therapy versus evaluation for completion pneumonectomy. I am hesitant to say that she is a candidate for completion pneumonectomy. 1/5/2023 EBUS. Pathology shows recurrent squamous cell carcinoma    I discussed with RT onc who recommended concurrent chemo and RT.   I will order NGS study including PD-L1 and her-gordon. She will follow with Dr Mayela Lobo for mediport placement. No acute pain. Denied any nausea, vomiting or diarrhea. No fever or chills. No chest pain, shortness of breath or palpitation. No headache or dizzy spell. No specific bone pain. No melena or hematochezia. Denied any dysuria or hematuria. Past Medical History  Anxiety, chronic low back pain, diverticulosis diagnosed in June 2013, hypertension, dyslipidemia, moderate COPD. Surgical History  She had colonoscopy a while ago, bronchoscopy in December 2017. Social History  She smoked about a half-pack per day for 25 years and quit in March 2014. She denied any alcohol or illicit drug use. She has one daughter who is a nurse. Family History  Mother had multiple myeloma, father had lung cancer and he was a smoker. Review of Systems: \"Per interval history; otherwise 10 point ROS is negative. \"     Vital Signs:  /78 (Site: Right Upper Arm, Position: Sitting, Cuff Size: Medium Adult)   Pulse 80   Temp 97.3 °F (36.3 °C) (Infrared)   Ht 5' 5\" (1.651 m)   Wt 169 lb (76.7 kg)   LMP 01/06/2009 (LMP Unknown)   SpO2 99%   BMI 28.12 kg/m²     Physical Exam:  CONSTITUTIONAL: awake, alert, cooperative, no apparent distress   EYES: pupils equal, round and reactive to light. Sclera clear and conjunctiva normal  ENT: Normocephalic, without obvious abnormality, atraumatic  NECK: supple, symmetrical, no jugular venous distension and no carotid bruits   HEMATOLOGIC/LYMPHATIC: no cervical, supraclavicular or axillary lymphadenopathy   LUNGS: no increased work of breathing and clear to auscultation   CARDIOVASCULAR: regular rate and rhythm, normal S1 and S2, no murmur  ABDOMEN: normal bowel sound, soft, non-distended, non-tender, no masses palpated, no rebound or guarding. MUSCULOSKELETAL: full range of motion noted, tone is normal  NEUROLOGIC: Motor skills grossly intact. CN II - XII grossly intact.   SKIN: Normal skin color, texture, turgor and no jaundice. appears intact   EXTREMITIES: no LE edema or cyanosis. Homans negative. Labs:  Hematology:  Lab Results   Component Value Date    WBC 7.3 11/09/2022    RBC 4.42 01/19/2023    HGB 13.0 11/09/2022    HCT 41.3 11/09/2022    MCV 89.8 01/19/2023    MCH 30.2 01/19/2023    MCHC 33.7 01/19/2023    RDW 13.1 01/19/2023     11/09/2022    MPV 8.8 11/09/2022    SEGSPCT 61.5 11/09/2022    EOSRELPCT 2.5 11/09/2022    BASOPCT 0.7 11/09/2022    LYMPHOPCT 30.0 11/09/2022    MONOPCT 5.3 (H) 11/09/2022    SEGSABS 4.5 11/09/2022    EOSABS 0.2 11/09/2022    BASOSABS 0.1 11/09/2022    LYMPHSABS 2.2 11/09/2022    MONOSABS 0.4 11/09/2022    DIFFTYPE AUTOMATED DIFFERENTIAL 11/09/2022     Chemistry:  Lab Results   Component Value Date     01/19/2023    K 3.8 01/19/2023    CL 99 01/19/2023    CO2 28 01/19/2023    BUN 19 01/19/2023    CREATININE 1.0 11/09/2022    GLUCOSE 91 01/19/2023    CALCIUM 9.7 01/19/2023    PROT 6.7 11/09/2022    LABALBU 4.4 11/09/2022    BILITOT 0.2 11/09/2022    ALKPHOS 111 11/09/2022    AST 13 (L) 11/09/2022    ALT 13 11/09/2022    LABGLOM >60 11/09/2022    GFRAA 54 (A) 08/26/2022    AGRATIO 1.8 12/30/2013    GLOB 2.6 12/30/2013     Immunology:  Lab Results   Component Value Date    PROT 6.7 11/09/2022     Coagulation Panel:  Lab Results   Component Value Date    PROTIME 12.0 03/27/2018    INR 1.1 01/19/2023    APTT 35.9 01/19/2023      Observations:  No data recorded      Assessment & Plan:  1. She was diagnosed with squamous cell carcinoma of the right upper lung with collapse of the right upper lung. Pulmonary function tests reveal moderate obstructive lung defects. I reviewed CBC and CMP in January 2018. CT of the head was negative. PET CT scan In January 2018 revealed no evidence of metastatic disease elsewhere.   She has pathological stage T2b, N0, MX moderately differentiated invasive Squamous cell carcinoma status post right upper lobectomy in April 2018 with positive bronchial margin of the in situ component. She was not recommended to have adjuvant RT by rad onc. In the first 2- 3 years she will have follow-up imaging study every 6-12 months. She started adjuvant chemo in July 2018 and completed October 25, 2018. She decided not to pursue the last dose of chemo. PET CT scan in November 2018 was negative. CT 5/2/2019 showed no evidence of recurrent or residual disease. CT chest 11/13/2019 showed no evidence of recurrence or residual disease. CT chest 5/2020 showed no evidence of recurrence or residual disease. CT chest on January 28, 2021 showed no evidence of recurrent disease. CT chest in November 2021 showed no evidence of recurrent disease. CT chest in November 2022 reviewed with her. I ordered PET scan and referred back to Dr Torri Maza for ? EBUS. .  1/5/2023 EBUS. Pathology showed recurrent squamous cell carcinoma  I discussed with RT onc who recommended concurrent chemo and RT. I will order NGS study including PD-L1 and her-gordon. I will schedule chemo education. 2.  She had a pericatheter thrombus and was started on Xarelto. She had mediport removal in June 2019.    3.   Mammogram in October 2017 was benign. Colonoscopy was a while ago. FOB in March 2019 was negative. She has mammogram in November 2021. I recommend to keep mammogram and colon cancer screening up to date. 4.   She quit smoking in 2014. I recommend to follow up with pulmonologist.     4.   Depression. I referred to psychiatrist.    5. 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 She is on gabapentin. 6.  Intermittent mild anemia. Anemic work-up in May 2022 was reviewed. Anemia has been corrected. 7.  She has chronic cough which could be related to postnasal drip. She was seen by ENT who recommended to continue with Flonase. She was offered cauterization of the nostril. She will follow with Dr Torri Maza for [address] [facility]. Return to clinic in 3 weeks or sooner.  All of her questions have been answered for today. Recent imaging and labs were reviewed and discussed with the patient.

## 2023-01-19 ENCOUNTER — HOSPITAL ENCOUNTER (OUTPATIENT)
Age: 68
Setting detail: SPECIMEN
Discharge: HOME | End: 2023-01-19

## 2023-02-02 DIAGNOSIS — R91.1 LUNG NODULE: Primary | ICD-10-CM

## 2023-02-03 ENCOUNTER — OFFICE VISIT (OUTPATIENT)
Dept: ONCOLOGY | Age: 68
End: 2023-02-03
Payer: MEDICARE

## 2023-02-03 ENCOUNTER — HOSPITAL ENCOUNTER (OUTPATIENT)
Dept: RADIATION ONCOLOGY | Age: 68
Discharge: HOME | End: 2023-02-03

## 2023-02-03 ENCOUNTER — HOSPITAL ENCOUNTER (OUTPATIENT)
Dept: INFUSION THERAPY | Age: 68
Discharge: HOME | End: 2023-02-03

## 2023-02-03 VITALS
WEIGHT: 169 LBS | DIASTOLIC BLOOD PRESSURE: 78 MMHG | HEIGHT: 65 IN | BODY MASS INDEX: 28.16 KG/M2 | SYSTOLIC BLOOD PRESSURE: 133 MMHG | HEART RATE: 80 BPM | TEMPERATURE: 97.3 F | OXYGEN SATURATION: 99 %

## 2023-02-03 VITALS
TEMPERATURE: 97.3 F | HEART RATE: 80 BPM | HEIGHT: 65 IN | DIASTOLIC BLOOD PRESSURE: 78 MMHG | WEIGHT: 169 LBS | SYSTOLIC BLOOD PRESSURE: 133 MMHG | OXYGEN SATURATION: 99 % | BODY MASS INDEX: 28.16 KG/M2

## 2023-02-03 DIAGNOSIS — C34.91 MALIGNANT NEOPLASM OF RIGHT LUNG, UNSPECIFIED PART OF LUNG (HCC): Primary | ICD-10-CM

## 2023-02-03 DIAGNOSIS — N18.31 STAGE 3A CHRONIC KIDNEY DISEASE (HCC): ICD-10-CM

## 2023-02-03 DIAGNOSIS — C34.11 MALIGNANT NEOPLASM OF UPPER LOBE, RIGHT BRONCHUS OR LUNG (HCC): ICD-10-CM

## 2023-02-03 PROBLEM — N18.30 CHRONIC RENAL DISEASE, STAGE III (HCC): Status: ACTIVE | Noted: 2023-02-03

## 2023-02-03 PROCEDURE — 3075F SYST BP GE 130 - 139MM HG: CPT | Performed by: INTERNAL MEDICINE

## 2023-02-03 PROCEDURE — 3078F DIAST BP <80 MM HG: CPT | Performed by: INTERNAL MEDICINE

## 2023-02-03 PROCEDURE — 99215 OFFICE O/P EST HI 40 MIN: CPT | Performed by: INTERNAL MEDICINE

## 2023-02-03 PROCEDURE — 1123F ACP DISCUSS/DSCN MKR DOCD: CPT | Performed by: INTERNAL MEDICINE

## 2023-02-03 RX ORDER — EPINEPHRINE 1 MG/ML
0.3 INJECTION, SOLUTION, CONCENTRATE INTRAVENOUS PRN
OUTPATIENT
Start: 2023-02-13

## 2023-02-03 RX ORDER — PALONOSETRON 0.05 MG/ML
0.25 INJECTION, SOLUTION INTRAVENOUS ONCE
OUTPATIENT
Start: 2023-02-13 | End: 2023-02-13

## 2023-02-03 RX ORDER — FAMOTIDINE 10 MG/ML
20 INJECTION, SOLUTION INTRAVENOUS
OUTPATIENT
Start: 2023-02-13

## 2023-02-03 RX ORDER — ACETAMINOPHEN 325 MG/1
650 TABLET ORAL
OUTPATIENT
Start: 2023-02-13

## 2023-02-03 RX ORDER — SODIUM CHLORIDE 9 MG/ML
5-250 INJECTION, SOLUTION INTRAVENOUS PRN
OUTPATIENT
Start: 2023-02-13

## 2023-02-03 RX ORDER — SODIUM CHLORIDE 9 MG/ML
INJECTION, SOLUTION INTRAVENOUS CONTINUOUS
OUTPATIENT
Start: 2023-02-13

## 2023-02-03 RX ORDER — HEPARIN SODIUM (PORCINE) LOCK FLUSH IV SOLN 100 UNIT/ML 100 UNIT/ML
500 SOLUTION INTRAVENOUS PRN
OUTPATIENT
Start: 2023-02-13

## 2023-02-03 RX ORDER — ALBUTEROL SULFATE 90 UG/1
4 AEROSOL, METERED RESPIRATORY (INHALATION) PRN
OUTPATIENT
Start: 2023-02-13

## 2023-02-03 RX ORDER — SODIUM CHLORIDE 9 MG/ML
5-40 INJECTION INTRAVENOUS PRN
OUTPATIENT
Start: 2023-02-13

## 2023-02-03 RX ORDER — ONDANSETRON 2 MG/ML
8 INJECTION INTRAMUSCULAR; INTRAVENOUS
OUTPATIENT
Start: 2023-02-13

## 2023-02-03 RX ORDER — DIPHENHYDRAMINE HYDROCHLORIDE 50 MG/ML
50 INJECTION INTRAMUSCULAR; INTRAVENOUS
OUTPATIENT
Start: 2023-02-13

## 2023-02-03 RX ORDER — MEPERIDINE HYDROCHLORIDE 50 MG/ML
12.5 INJECTION INTRAMUSCULAR; INTRAVENOUS; SUBCUTANEOUS PRN
OUTPATIENT
Start: 2023-02-13

## 2023-02-03 RX ORDER — DIPHENHYDRAMINE HYDROCHLORIDE 50 MG/ML
50 INJECTION INTRAMUSCULAR; INTRAVENOUS ONCE
OUTPATIENT
Start: 2023-02-13 | End: 2023-02-13

## 2023-02-03 RX ORDER — FAMOTIDINE 10 MG/ML
20 INJECTION, SOLUTION INTRAVENOUS ONCE
OUTPATIENT
Start: 2023-02-13 | End: 2023-02-13

## 2023-02-03 NOTE — CONSULTS
Radiation Oncology Consultation  Encounter Date: 2/3/2023 10:27 AM    Ms. Sushant Hull is a 74 y.o. female  : 1955  MRN: [id number]  Acct Number: [de-identified]  Requesting Provider: No att. providers found        CONSULTANT: Kamryn Perez MD    PHYSICIANS:   Primary Care: Brittney Polk MD   Med Onc: Dr. Jalen Sexton    DIAGNOSIS: locally recurrent NSCLC of the R hilum; originally diagnosed SCC of the RUL of lung [id number], positive SCC in-situ margin at stump. HPI:     Ms. Yuko Canchola is a 57-year-old female who presents for consultation of radiotherapy treatment options above diagnosis. She was initially diagnosed with squamous cell carcinoma of the right upper lobe of lung back in 2017. She underwent right upper lobectomy on 2018 and pathology revealed keratinizing invasive squamous cell carcinoma with a positive bronchial margins (in-situ, not carcinoma) and negative lymph nodes at level 9 and 10. The tumor was 4.2 cm in size. Her pathologic stage was [id number]. She received adjuvant chemotherapy but was not recommended adjuvant radiation therapy. She is been followed by medical oncology since that time. CT chest on 2022 showed postsurgical changes from the right upper lobectomy as well as a new 1.8 cm soft tissue nodule along the posterior aspect of the surgical margin within the right hilum suspicious for recurrence. PET/CT done  showed metabolic activity associated with a new right hilar nodule described on her recent CT scan concerning for recurrent/metastatic disease. No distant disease noted on the scan. She underwent bronchoscopy and biopsy of the right upper lobe mass and right upper lobe stump. Pathology revealed poorly differentiated squamous cell carcinoma. She is seeing medical oncology today. She has not had radiation therapy before. She does not have a cardiac implanted device. She quit smoking in .   She denies any new shortness of breath, cough, chest pain, new lumps or bumps, new bony pain, or involuntary weight loss.       Past Medical History:   Diagnosis Date    Anemia     Anxiety     Arthritis     \"Fingers, Back\"    Bronchitis Last Episode 11-17    Chronic back pain     COPD (chronic obstructive pulmonary disease) ([facility])     Sees Dr. Salas Colon    Depression     Diverticulosis 06/2013    Extensive per CT    Hemoptysis 12/06/2017    Hx of blood clots     \"found I have a clot near my mediport so they are taking it out 5/16/2019- put me on Xarelto    Hyperlipidemia 2009    Hypertension 2009    Low back pain     \"better than it use to be- had therapy in the past- originally hurt back at work 20+ yrs ago\"    Lung mass     rul- scheduled to bronch 12/74/2017    Panic attacks     Pneumonia Dx 1-17    Right Lung Cancer Dx 10-17    tx with chemo following with Dr Army Vidal Dx 2014    \"Right Side\"    Shortness of breath on exertion     Teeth missing     Upper And Lower    Urinary tract infection In Past    No Current Symptoms    Wears dentures     Full Upper, Partial Lower    Wears glasses         Past Surgical History:   Procedure Laterality Date    BRONCHOSCOPY  12/07/2017 2/21/2018- done     BUNIONECTOMY Bilateral 1990's    \"Done At Different Times\"    CATHETER REMOVAL Left 5/16/2019    PORT REMOVAL performed by Favian Rodríguez MD at [facility]  2000's    [address] Left 1980    \"Tendon Repair\"    LUNG SURGERY  04/12/2018    per old chart had thoracoscopy and RUL lobectomy , bronchoscopy and lymph node bx     MEDIASTINOSCOPY  02/21/2018    Bronchoscopy    TONSILLECTOMY  1978    TUBAL LIGATION  1988    TUNNELED VENOUS PORT PLACEMENT Left 07/31/2018       Family History   Problem Relation Age of Onset    Cancer Mother         Multiple Myeloma    Depression Mother     Diabetes Mother     High Blood Pressure Mother     High Cholesterol Mother     Heart Disease Father         CHF       Social History Socioeconomic History    Marital status:      Spouse name: Not on file    Number of children: Not on file    Years of education: Not on file    Highest education level: Not on file   Occupational History    Not on file   Tobacco Use    Smoking status: Former     Packs/day: 0.50     Years: 30.00     Pack years: 15.00     Types: Cigarettes     Start date:      Quit date: 2017     Years since quittin.4    Smokeless tobacco: Never   Vaping Use    Vaping Use: Never used   Substance and Sexual Activity    Alcohol use: No    Drug use: No    Sexual activity: Not Currently   Other Topics Concern    Not on file   Social History Narrative    Not on file     Social Determinants of Health     Financial Resource Strain: Low Risk     Difficulty of Paying Living Expenses: Not very hard   Food Insecurity: No Food Insecurity    Worried About Running Out of Food in the Last Year: Never true    Ran Out of Food in the Last Year: Never true   Transportation Needs: Not on file   Physical Activity: Not on file   Stress: Not on file   Social Connections: Not on file   Intimate Partner Violence: Not on file   Housing Stability: Not on file     ALLERGIES:   Allergies   Allergen Reactions    Lipitor      \"Makes Me Hurt So Bad I Can't Stand It\"    Vicodin [Hydrocodone-Acetaminophen] Nausea Only    Adhesive Tape Rash        Current Outpatient Medications   Medication Sig Dispense Refill    simvastatin (ZOCOR) 40 MG tablet Take 1 tablet by mouth nightly 90 tablet 3    omeprazole (PRILOSEC) 40 MG delayed release capsule Take 1 capsule by mouth daily 90 capsule 1    atenolol-chlorthalidone (TENORETIC) 100-25 MG per tablet Take 1 tablet by mouth daily 90 tablet 1    lisinopril (PRINIVIL;ZESTRIL) 20 MG tablet Take 1 tablet by mouth daily 90 tablet 1    gabapentin (NEURONTIN) 100 MG capsule Take 1 capsule by mouth nightly for 90 days.  90 capsule 1    budesonide-formoterol (SYMBICORT) 160-4.5 MCG/ACT AERO Inhale 2 puffs into the lungs 2 times daily 3 each 3    fluticasone (FLONASE) 50 MCG/ACT nasal spray 2 sprays by Each Nostril route daily 3 each 3    albuterol sulfate HFA (PROAIR HFA) 108 (90 Base) MCG/ACT inhaler Inhale 2 puffs into the lungs as needed for Wheezing 3 each 3    albuterol (PROVENTIL) (2.5 MG/3ML) 0.083% nebulizer solution Take 3 mLs by nebulization every 6 hours as needed for Wheezing or Shortness of Breath 60 each 3     No current facility-administered medications for this encounter. LABORATORY STUDIES:   CBC:   Lab Results   Component Value Date/Time    WBC 7.3 11/09/2022 09:28 AM    RBC 4.42 01/19/2023 09:36 AM    HGB 13.0 11/09/2022 09:28 AM    HCT 41.3 11/09/2022 09:28 AM    MCV 89.8 01/19/2023 09:36 AM    MCH 30.2 01/19/2023 09:36 AM    MCHC 33.7 01/19/2023 09:36 AM    RDW 13.1 01/19/2023 09:36 AM     11/09/2022 09:28 AM    MPV 8.8 11/09/2022 09:28 AM     CMP:  Lab Results   Component Value Date/Time     01/19/2023 09:36 AM    K 3.8 01/19/2023 09:36 AM    CL 99 01/19/2023 09:36 AM    CO2 28 01/19/2023 09:36 AM    BUN 19 01/19/2023 09:36 AM    CREATININE 1.0 11/09/2022 09:28 AM    GFRAA 54 08/26/2022 10:10 AM    AGRATIO 1.8 12/30/2013 08:59 AM    LABGLOM >60 11/09/2022 09:28 AM    GLUCOSE 91 01/19/2023 09:36 AM    PROT 6.7 11/09/2022 09:28 AM    LABALBU 4.4 11/09/2022 09:28 AM    CALCIUM 9.7 01/19/2023 09:36 AM    BILITOT 0.2 11/09/2022 09:28 AM    ALKPHOS 111 11/09/2022 09:28 AM    AST 13 11/09/2022 09:28 AM    ALT 13 11/09/2022 09:28 AM     PATHOLOGY: biopsy report reviewed and noted above. RADIOLOGIC STUDIES: CT chest and PET/CT reviewed and noted above. REVIEW OF SYSTEMS:   Constitutional:  Patient denies fevers, rigors, or drenching night sweats. The patient's weight and appetite have been stable.   Eyes:  The patient denies any recent visual changes, diplopia, or cataracts  ENMT:  The patient denies any ear pain, hearing changes, or nosebleeds  Respiratory:  The patient denies any SOB, hemoptysis, or green sputum production  Cardiovascular: The patient denies any chest pain, leg edema, or fainting spells  GI:  Patient denies nausea, vomiting, diarrhea, melena, or hematochezia  : Patient denies dysuria, hematuria, or urinary incontinence. Integumentary: patient denies skin rashes, pruritis, or arm edema  Endocrine:  Patient denies any diabetic or thyroid problems  Neurologic: Patient denies headaches, focal weakness, or disorientation  Psychiatric: The patient denies any depression, anxiety, or rapid mood swings. PHYSICAL EXAMINATION:   VITAL SIGNS: /78   Pulse 80   Temp 97.3 °F (36.3 °C) (Infrared)   Ht 5' 5\" (1.651 m)   Wt 169 lb (76.7 kg)   LMP 2009 (LMP Unknown)   SpO2 99%   BMI 28.12 kg/m²   ECOG PERFORMANCE STATUS: 0  PAIN RATIN    GENERAL: Pleasant well-developed adult in no acute distress. Alert and oriented ×3  SKIN: Warm and dry, without jaundice, ecchymoses, or petechiae. HEENT: Normocephalic, atraumatic. Pupils are round and symmetric. Sclerae anicteric  NECK:  No JVD; Supple. No cervical, supraclavicular, or infraclavicular lymphadenopathy is palpable. LUNGS: Bilaterally clear to auscultation. No rales, rhonci, or wheezing are present. Good inspiratory effort, no accessory muscle use. CARDIAC: Regular rate and rhythm, without murmurs, clicks, or gallops   ABDOMEN: Normoactive bowels sounds are present. Soft. Non-tender and non-distended. No hepatosplenomegaly or masses are present. EXTREMITIES: CN II-XII intact grossly    IMPRESSION/PLAN:    Augustine Flaherty is a 74 y.o. female with history of SCC of the RUL of lung [id number] status post lobectomy and adjuvant chemotherapy now with a R lung/stump recurrence cT1N1? M0 who presents for discussion of treatment options. It is unclear if the recurrence is at the stump or hilar lymph node. Either way I recommend definitive radiation therapy with concurrent chemotherapy.   I discussed the case with medical oncology as well. The indications, risks, and benefits were discussed at length and after all questions were answered she expressed understanding of her diagnosis, prognosis, and my recommendations. The prescription will be 60Gy/30fx delivered via IMRT/VMAT. She would like to move forward with treatment. She signed consent. We will schedule her for CT simulation next available. We will aim to start her treatments in timely manner and coordinate her start date with medical oncology. She knows to call anytime with any questions or concerns may arise interim.       Electronically signed by Romeo Arroyo MD on 2/3/2023 at 10:27 AM

## 2023-02-03 NOTE — PROGRESS NOTES
MA Rooming Questions  Patient: Lauryn Mcguire  MRN: [id number]    Date: 2/3/2023        1. Do you have any new issues?   no         2. Do you need any refills on medications?    no    3. Have you had any imaging done since your last visit? yes - Pet Ct 12/12    4. Have you been hospitalized or seen in the emergency room since your last visit here?   no    5. Did the patient have a depression screening completed today?  No    No data recorded     PHQ-9 Given to (if applicable):               PHQ-9 Score (if applicable):                     [] Positive     []  Negative              Does question #9 need addressed (if applicable)                     [] Yes    []  No               Mark Osei CMA

## 2023-02-03 NOTE — PROGRESS NOTES
Kelsey Alonso Harvey  2/3/2023    CONSULT     Vitals:    02/03/23 0931   BP: 133/78   Pulse: 80   Temp: 97.3 °F (36.3 °C)   SpO2: 99%        Oxygen Therapy  SpO2: 99 %  Pulse Oximeter Device Mode: Intermittent  Pulse Oximeter Device Location: Left, Finger  O2 Device: None (Room air)  Skin Assessment: Clean, dry, & intact    Wt Readings from Last 3 Encounters:   02/03/23 169 lb (76.7 kg)   02/03/23 169 lb (76.7 kg)   11/28/22 177 lb (80.3 kg)        Pain Assessment  Pain Assessment: None - Denies Pain  Denies Need for Intervention    Fall Risk:    Not currently a fall risk  Re-Evaluate Weekly     Nutritional Alteration:  None  Mild Dysphagia  Voices Sufficient Oral Intake    Mediport: no    Pacemaker/ICD: no    Implants: no    Previous XRT: no    Assessment: Here to discuss Radiation therapy options.        Past Medical History:   Diagnosis Date    Anemia     Anxiety     Arthritis     \"Fingers, Back\"    Bronchitis Last Episode 11-17    Chronic back pain     COPD (chronic obstructive pulmonary disease) ([facility]     Sees Dr. Kadeem Patel    Depression     Diverticulosis 06/2013    Extensive per CT    Hemoptysis 12/06/2017    Hx of blood clots     \"found I have a clot near my mediport so they are taking it out 5/16/2019- put me on Xarelto    Hyperlipidemia 2009    Hypertension 2009    Low back pain     \"better than it use to be- had therapy in the past- originally hurt back at work 20+ yrs ago\"    Lung mass     rul- scheduled to bronch 12/74/2017    Panic attacks     Pneumonia Dx 1-17    Right Lung Cancer Dx 10-17    tx with chemo following with Dr Ish Gu Dx 2014    \"Right Side\"    Shortness of breath on exertion     Teeth missing     Upper And Lower    Urinary tract infection In Past    No Current Symptoms    Wears dentures     Full Upper, Partial Lower    Wears glasses        Past Surgical History:   Procedure Laterality Date    BRONCHOSCOPY  12/07/2017 2/21/2018- done     BUNIONECTOMY Bilateral 1990's    \"Done At Different Times\"    CATHETER REMOVAL Left 5/16/2019    PORT REMOVAL performed by Pepe Tripathi MD at [facility] OR    COLONOSCOPY  2000's    DILATION AND CURETTAGE OF UTERUS  1989    ELBOW SURGERY Left 1980    \"Tendon Repair\"    LUNG SURGERY  04/12/2018    per old chart had thoracoscopy and RUL lobectomy , bronchoscopy and lymph node bx     MEDIASTINOSCOPY  02/21/2018    Bronchoscopy    TONSILLECTOMY  1978    TUBAL LIGATION  1988    TUNNELED VENOUS PORT PLACEMENT Left 07/31/2018       Allergies   Allergen Reactions    Lipitor      \"Makes Me Hurt So Bad I Can't Stand It\"    Vicodin [Hydrocodone-Acetaminophen] Nausea Only    Adhesive Tape Rash          Current Outpatient Medications:     simvastatin (ZOCOR) 40 MG tablet, Take 1 tablet by mouth nightly, Disp: 90 tablet, Rfl: 3    omeprazole (PRILOSEC) 40 MG delayed release capsule, Take 1 capsule by mouth daily, Disp: 90 capsule, Rfl: 1    atenolol-chlorthalidone (TENORETIC) 100-25 MG per tablet, Take 1 tablet by mouth daily, Disp: 90 tablet, Rfl: 1    lisinopril (PRINIVIL;ZESTRIL) 20 MG tablet, Take 1 tablet by mouth daily, Disp: 90 tablet, Rfl: 1    gabapentin (NEURONTIN) 100 MG capsule, Take 1 capsule by mouth nightly for 90 days., Disp: 90 capsule, Rfl: 1    budesonide-formoterol (SYMBICORT) 160-4.5 MCG/ACT AERO, Inhale 2 puffs into the lungs 2 times daily, Disp: 3 each, Rfl: 3    fluticasone (FLONASE) 50 MCG/ACT nasal spray, 2 sprays by Each Nostril route daily, Disp: 3 each, Rfl: 3    albuterol sulfate HFA (PROAIR HFA) 108 (90 Base) MCG/ACT inhaler, Inhale 2 puffs into the lungs as needed for Wheezing, Disp: 3 each, Rfl: 3    albuterol (PROVENTIL) (2.5 MG/3ML) 0.083% nebulizer solution, Take 3 mLs by nebulization every 6 hours as needed for Wheezing or Shortness of Breath, Disp: 60 each, Rfl: 3        Electronically signed by Mitzi Rai CMA on 2/3/2023 at 9:35 AM

## 2023-02-03 NOTE — PLAN OF CARE
Radiation education and handouts given. Side effects and management reviewed with pt. All questions answered. Pt scheduled for radiation planning CT/SIM. Pt voices understanding. Nursing Care Plan for Chest Radiation    Pain related to cancer diagnosis and treatment. Interventions   Pain assessment. Monitor pharmacological pain medication. Teach relaxation and repositioning techniques. Expected Outcome   Maintain patient's acceptable pain level or <5 on pain scale. Knowledge deficit related to diagnosis and treatment plan. Interventions   Assess patient's ability to comprehend diagnosis and treatment plan. Provide educational materials and teaching regarding plan of care. Provide emotional support and continued education. Refer to psychosocial coordinator if further assistance needed. Expected Outcome   Patient voices understanding of diagnosis and treatment plan. Altered respiratory status related to diagnosis and treatment. Interventions   Assess respiratory status, note changes. Educate patient on symptoms to report to staff. Refer to smoking cessation program if needed. Expected Outcome   No respiratory complications, patient with SPO2 >90% or equal to baseline. Altered skin integrity related to treatment. Interventions   Evaluation of skin integrity at therapy site. Advise patient on appropriate skin care. Instruct patient on recommended lotions/ointments/creams/dressing changes to use on therapy site. Expected Outcome   Minimize adverse skin reaction/infection at treatment site. Altered nutritional status due to treatment process. Interventions   Initial nutritional assessment. Assess weight and intake during treatment. Management of treatment side effects. Refer to nutritional class/evaluation. Expected Outcome   Patient's weight loss <10% from initial assessment. To re-evaluate weekly during radiation treatments.

## 2023-02-08 ENCOUNTER — OFFICE VISIT (OUTPATIENT)
Dept: FAMILY MEDICINE CLINIC | Age: 68
End: 2023-02-08
Payer: MEDICARE

## 2023-02-08 VITALS
BODY MASS INDEX: 27.89 KG/M2 | SYSTOLIC BLOOD PRESSURE: 124 MMHG | HEART RATE: 67 BPM | WEIGHT: 167.6 LBS | OXYGEN SATURATION: 98 % | DIASTOLIC BLOOD PRESSURE: 78 MMHG

## 2023-02-08 DIAGNOSIS — J44.9 MODERATE COPD (CHRONIC OBSTRUCTIVE PULMONARY DISEASE) (HCC): ICD-10-CM

## 2023-02-08 DIAGNOSIS — M54.31 RIGHT SCIATIC NERVE PAIN: ICD-10-CM

## 2023-02-08 DIAGNOSIS — Z78.0 POST-MENOPAUSAL: ICD-10-CM

## 2023-02-08 DIAGNOSIS — Z12.31 ENCOUNTER FOR SCREENING MAMMOGRAM FOR MALIGNANT NEOPLASM OF BREAST: ICD-10-CM

## 2023-02-08 DIAGNOSIS — E78.49 OTHER HYPERLIPIDEMIA: ICD-10-CM

## 2023-02-08 DIAGNOSIS — R73.03 PREDIABETES: ICD-10-CM

## 2023-02-08 DIAGNOSIS — N18.31 STAGE 3A CHRONIC KIDNEY DISEASE (HCC): ICD-10-CM

## 2023-02-08 DIAGNOSIS — G62.0 CHRONIC PAINFUL POLYNEUROPATHY AFTER CHEMOTHERAPY (HCC): ICD-10-CM

## 2023-02-08 DIAGNOSIS — K21.9 GASTROESOPHAGEAL REFLUX DISEASE, UNSPECIFIED WHETHER ESOPHAGITIS PRESENT: ICD-10-CM

## 2023-02-08 DIAGNOSIS — R09.82 POST-NASAL DRIP: ICD-10-CM

## 2023-02-08 DIAGNOSIS — I10 ESSENTIAL HYPERTENSION: Primary | ICD-10-CM

## 2023-02-08 DIAGNOSIS — T45.1X5A CHRONIC PAINFUL POLYNEUROPATHY AFTER CHEMOTHERAPY (HCC): ICD-10-CM

## 2023-02-08 LAB
CHOLESTEROL, TOTAL: 274 MG/DL (ref 0–199)
HDLC SERPL-MCNC: 27 MG/DL (ref 40–60)
LDL CHOLESTEROL CALCULATED: 193 MG/DL
TRIGL SERPL-MCNC: 268 MG/DL (ref 0–150)
VLDLC SERPL CALC-MCNC: 54 MG/DL

## 2023-02-08 PROCEDURE — 36415 COLL VENOUS BLD VENIPUNCTURE: CPT | Performed by: FAMILY MEDICINE

## 2023-02-08 PROCEDURE — 1123F ACP DISCUSS/DSCN MKR DOCD: CPT | Performed by: FAMILY MEDICINE

## 2023-02-08 PROCEDURE — 3074F SYST BP LT 130 MM HG: CPT | Performed by: FAMILY MEDICINE

## 2023-02-08 PROCEDURE — 99214 OFFICE O/P EST MOD 30 MIN: CPT | Performed by: FAMILY MEDICINE

## 2023-02-08 PROCEDURE — 3078F DIAST BP <80 MM HG: CPT | Performed by: FAMILY MEDICINE

## 2023-02-08 RX ORDER — ALBUTEROL SULFATE 90 UG/1
2 AEROSOL, METERED RESPIRATORY (INHALATION) PRN
Qty: 3 EACH | Refills: 3 | Status: SHIPPED | OUTPATIENT
Start: 2023-02-08

## 2023-02-08 RX ORDER — OMEPRAZOLE 40 MG/1
40 CAPSULE, DELAYED RELEASE ORAL DAILY
Qty: 90 CAPSULE | Refills: 1 | Status: SHIPPED | OUTPATIENT
Start: 2023-02-08

## 2023-02-08 RX ORDER — ATENOLOL AND CHLORTHALIDONE TABLET 100; 25 MG/1; MG/1
1 TABLET ORAL DAILY
Qty: 90 TABLET | Refills: 1 | Status: SHIPPED | OUTPATIENT
Start: 2023-02-08

## 2023-02-08 RX ORDER — LISINOPRIL 20 MG/1
20 TABLET ORAL DAILY
Qty: 90 TABLET | Refills: 1 | Status: SHIPPED | OUTPATIENT
Start: 2023-02-08

## 2023-02-08 RX ORDER — SIMVASTATIN 20 MG
20 TABLET ORAL NIGHTLY
Qty: 90 TABLET | Refills: 1 | Status: SHIPPED | OUTPATIENT
Start: 2023-02-08

## 2023-02-08 RX ORDER — BUDESONIDE AND FORMOTEROL FUMARATE DIHYDRATE 160; 4.5 UG/1; UG/1
2 AEROSOL RESPIRATORY (INHALATION) 2 TIMES DAILY
Qty: 3 EACH | Refills: 0 | Status: SHIPPED | OUTPATIENT
Start: 2023-02-08

## 2023-02-08 RX ORDER — GABAPENTIN 100 MG/1
100 CAPSULE ORAL NIGHTLY
Qty: 90 CAPSULE | Refills: 1 | Status: SHIPPED | OUTPATIENT
Start: 2023-02-08 | End: 2023-05-09

## 2023-02-08 SDOH — ECONOMIC STABILITY: FOOD INSECURITY: WITHIN THE PAST 12 MONTHS, YOU WORRIED THAT YOUR FOOD WOULD RUN OUT BEFORE YOU GOT MONEY TO BUY MORE.: NEVER TRUE

## 2023-02-08 SDOH — ECONOMIC STABILITY: INCOME INSECURITY: HOW HARD IS IT FOR YOU TO PAY FOR THE VERY BASICS LIKE FOOD, HOUSING, MEDICAL CARE, AND HEATING?: NOT VERY HARD

## 2023-02-08 SDOH — ECONOMIC STABILITY: HOUSING INSECURITY
IN THE LAST 12 MONTHS, WAS THERE A TIME WHEN YOU DID NOT HAVE A STEADY PLACE TO SLEEP OR SLEPT IN A SHELTER (INCLUDING NOW)?: NO

## 2023-02-08 SDOH — ECONOMIC STABILITY: FOOD INSECURITY: WITHIN THE PAST 12 MONTHS, THE FOOD YOU BOUGHT JUST DIDN'T LAST AND YOU DIDN'T HAVE MONEY TO GET MORE.: NEVER TRUE

## 2023-02-08 ASSESSMENT — PATIENT HEALTH QUESTIONNAIRE - PHQ9
SUM OF ALL RESPONSES TO PHQ9 QUESTIONS 1 & 2: 3
5. POOR APPETITE OR OVEREATING: 3
8. MOVING OR SPEAKING SO SLOWLY THAT OTHER PEOPLE COULD HAVE NOTICED. OR THE OPPOSITE, BEING SO FIGETY OR RESTLESS THAT YOU HAVE BEEN MOVING AROUND A LOT MORE THAN USUAL: 2
10. IF YOU CHECKED OFF ANY PROBLEMS, HOW DIFFICULT HAVE THESE PROBLEMS MADE IT FOR YOU TO DO YOUR WORK, TAKE CARE OF THINGS AT HOME, OR GET ALONG WITH OTHER PEOPLE: 0
4. FEELING TIRED OR HAVING LITTLE ENERGY: 2
1. LITTLE INTEREST OR PLEASURE IN DOING THINGS: 2
3. TROUBLE FALLING OR STAYING ASLEEP: 0
6. FEELING BAD ABOUT YOURSELF - OR THAT YOU ARE A FAILURE OR HAVE LET YOURSELF OR YOUR FAMILY DOWN: 0
SUM OF ALL RESPONSES TO PHQ QUESTIONS 1-9: 12
2. FEELING DOWN, DEPRESSED OR HOPELESS: 1
SUM OF ALL RESPONSES TO PHQ QUESTIONS 1-9: 12
SUM OF ALL RESPONSES TO PHQ QUESTIONS 1-9: 12
7. TROUBLE CONCENTRATING ON THINGS, SUCH AS READING THE NEWSPAPER OR WATCHING TELEVISION: 2
SUM OF ALL RESPONSES TO PHQ QUESTIONS 1-9: 12
9. THOUGHTS THAT YOU WOULD BE BETTER OFF DEAD, OR OF HURTING YOURSELF: 0

## 2023-02-08 ASSESSMENT — ENCOUNTER SYMPTOMS
COUGH: 1
CHEST TIGHTNESS: 1

## 2023-02-08 ASSESSMENT — COPD QUESTIONNAIRES: COPD: 1

## 2023-02-08 NOTE — PROGRESS NOTES
Patient ID: Georgiana Wayne 1955    . Chief Complaint   Patient presents with    Hypertension         Hypertension  This is a chronic problem. The current episode started more than 1 year ago. The problem is controlled. Pertinent negatives include no palpitations. Risk factors for coronary artery disease include obesity. Past treatments include calcium channel blockers, beta blockers and diuretics. The current treatment provides significant (took some of her 's lisinopril when her pills ran out) improvement. Compliance problems include psychosocial issues. COPD  She complains of chest tightness and cough. This is a chronic (Only take the Symbicort once a day because that is all she can afford. Is requesting prescription to be written today but with no refills.) problem. Her symptoms are alleviated by beta-agonist and steroid inhaler. Her past medical history is significant for COPD. Neuropathy    Quality of pain: tingling. Additional narrative: Due to chemotherapy . Better with gabapentin    Gastroesophageal Reflux  She complains of coughing. Hyperlipidemia  This is a chronic problem. The problem is uncontrolled. Recent lipid tests were reviewed and are high. Current antihyperlipidemic treatment includes statins. Prediabetes:      Muscle aches: worse with zocor. Asking to reduce the dose    Lung cancer: It has returned. Is getting treatment through oncology again.   Fit test:    Patient Active Problem List   Diagnosis    Other hyperlipidemia    Osteoarthritis of finger    Right sciatic nerve pain    Essential hypertension    Abnormal mammogram of left breast    Moderate COPD (chronic obstructive pulmonary disease) (HCC)    Squamous cell lung cancer, right (HCC)    Primary cancer of right upper lobe of lung ([address]    Other female genital prolapse    Creatinine elevation    History of lung cancer    Atrophy of right kidney    Malignant neoplasm of upper lobe, right bronchus or lung ([address] Other fatigue    Candidal stomatitis    Restless legs syndrome    Dyspnea, unspecified    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)    Gastroesophageal reflux disease    Post-menopausal    Prediabetes    Chronic renal disease, stage III ([facility] [018086]       Past Surgical History:   Procedure Laterality Date    BRONCHOSCOPY  12/07/2017 2/21/2018- done     BUNIONECTOMY Bilateral 1990's    \"Done At Different Times\"    CATHETER REMOVAL Left 5/16/2019    PORT REMOVAL performed by Galen Stephenson MD at [facility]  2000's    DILATION AND CURETTAGE OF [address] Left 1980    \"Tendon Repair\"    LUNG SURGERY  04/12/2018    per old chart had thoracoscopy and RUL lobectomy , bronchoscopy and lymph node bx     MEDIASTINOSCOPY  02/21/2018    Bronchoscopy    TONSILLECTOMY  1978    TUBAL LIGATION  1988    TUNNELED VENOUS PORT PLACEMENT Left 07/31/2018       Family History   Problem Relation Age of Onset    Cancer Mother         Multiple Myeloma    Depression Mother     Diabetes Mother     High Blood Pressure Mother     High Cholesterol Mother     Heart Disease Father         CHF       Current Outpatient Medications on File Prior to Visit   Medication Sig Dispense Refill    fluticasone (FLONASE) 50 MCG/ACT nasal spray 2 sprays by Each Nostril route daily 3 each 3    albuterol (PROVENTIL) (2.5 MG/3ML) 0.083% nebulizer solution Take 3 mLs by nebulization every 6 hours as needed for Wheezing or Shortness of Breath 60 each 3     No current facility-administered medications on file prior to visit. Objective:         Physical Exam  Vitals and nursing note reviewed. Constitutional:       Appearance: She is well-developed. HENT:      Head: Normocephalic and atraumatic. Neck:      Thyroid: No thyromegaly. Trachea: No tracheal deviation. Cardiovascular:      Rate and Rhythm: Normal rate and regular rhythm.       Heart sounds: Normal heart sounds, S1 normal and S2 normal.   Pulmonary:      Effort: Pulmonary effort is normal. No respiratory distress.      Breath sounds: Decreased air movement present. Wheezing present.   Abdominal:      General: There is no distension.      Palpations: Abdomen is soft. There is no mass.      Tenderness: There is no abdominal tenderness.   Musculoskeletal:      Cervical back: Neck supple.      Right lower leg: No edema.      Left lower leg: No edema.   Skin:     General: Skin is warm and dry.   Neurological:      Mental Status: She is alert.     Vitals:    02/08/23 0821   BP: 124/78   Site: Left Upper Arm   Position: Sitting   Cuff Size: Medium Adult   Pulse: 67   SpO2: 98%   Weight: 167 lb 9.6 oz (76 kg)     Body mass index is 27.89 kg/m².     Wt Readings from Last 3 Encounters:   02/08/23 167 lb 9.6 oz (76 kg)   02/03/23 169 lb (76.7 kg)   02/03/23 169 lb (76.7 kg)     BP Readings from Last 3 Encounters:   02/08/23 124/78   02/03/23 133/78   02/03/23 133/78          No results found for this visit on 02/08/23.  The ASCVD Risk score (Amilcar GUIDO, et al., 2019) failed to calculate for the following reasons:    The valid total cholesterol range is 130 to 320 mg/dL  Lab Review   Orders Only on 01/19/2023   Component Date Value    Lab AP Case Report 01/19/2023 [facility]                      Case: [id number]     Lab AP Case Report 01/19/2023 Authorizing Provider:  Pepe Tripathi MD             Collected:           01/19/2023 11:45 AM     Lab AP Case Report 01/19/2023 Ordering Location[facility]     [facility]    Received:            01/19/2023 12:50 PM     Lab AP Case Report 01/19/2023                        Short Stay     Lab AP Case Report 01/19/2023 Pathologist:           Amrik Wilson MD     Lab AP Case Report 01/19/2023 Specimens:   A) - Lung,Right Upper Lobe, Right upper lobe lung mass biopsy     Lab AP Case Report 01/19/2023              B) - Lung,Right Upper Lobe, Right upper lobe lung stump biopsy     Pathology  Report Final D* 01/19/2023 A. LUNG, RIGHT UPPER LOBE MASS, BIOPSY:     Pathology Report Final D* 01/19/2023 -- POORLY DIFFERENTIATED SQUAMOUS CELL CARCINOMA. SEE NOTE. Pathology Report Final D* 01/19/2023 NULL     Pathology Report Final D* 01/19/2023 B. LUNG, RIGHT UPPER LOBE, STUMP, BIOPSY:     Pathology Report Final D* 01/19/2023 -- POORLY DIFFERENTIATED SQUAMOUS CELL CARCINOMA. Pathology Report Final D* 01/19/2023 NULL     Pathology Report Final D* 01/19/2023 NULL     Pathology Report Final D* 01/19/2023 Electronically signed by Daniel Steve MD on 1/20/2023 at  2:26 PM     Note: 01/19/2023                      Value:Sections demonstrate poorly differentiated squamous cell carcinoma. Immunohistochemical stains are performed and demonstrate the tumor cells be positive with pancytokeratin, cytokeratin 8/18, cytokeratin 5/6, p63 and p40; while negative with TTF-1, cytokeratin 20 and cytokeratin 7. This immunohistochemical pattern is supportive of the diagnosis. Note: 01/19/2023 NULL     Note: 01/19/2023 NULL     Note: 01/19/2023 This case was seen in peer review by a second pathologist for . Comment 01/19/2023 A-B. Microscopic slides reviewed. Comment 01/19/2023 NULL     Gross description: 01/19/2023 A. Received in formalin labeled \"Harvey, Lawrnce Binning - RUL mass\" are multiple fragments of brown needle cores measuring 2.0 x 0.7 x 0.1 cm in aggregate. Gross description: 01/19/2023 NULL     Gross description: 01/19/2023 S/S:  (1) HARRY Kennedy Backbone description: 01/19/2023 NULL     Gross description: 01/19/2023 B. Received in formalin labeled \"Harvey, Lawrnce Binning - RUL stump\" are multiple gray-tan needle core fragments measuring 0.5 x 0.5 x 0.1 cm in aggregate. The specimens are lightly stained with eosin and totally submitted in one cassette.      Gross description: 01/19/2023 NULL     Gross description: 01/19/2023 S/S:  (1) HARRY Kennedy Backbone description: 01/19/2023 JS/ws     Lab AP Case Report 01/19/2023 [facility] Fine Needle Aspiration                  Case: [id number]     Lab AP Case Report 01/19/2023 Authorizing Provider:  Gurwinder Almaraz MD             Collected:           01/19/2023 11:45 AM     Lab AP Case Report 01/19/2023 Ordering Location[facility]     [facility]    Received:            01/19/2023 12:33 PM     Lab AP Case Report 01/19/2023                        Short Stay     Lab AP Case Report 01/19/2023 Pathologist:           Shimon Lau MD     Lab AP Case Report 01/19/2023 Specimen:    Lung,Right Upper Lobe, RUL Stump     Lab AP Cyto Specimen Landy* 01/19/2023 Satisfactory for evaluation     Lab AP Cyto Primary Inte* 01/19/2023 MALIGNANT. Lab AP Cyto Primary Inte* 01/19/2023 See Notes     Note: 01/19/2023                      Value:Immunohistochemical stains are performed and demonstrate the tumor cells to be positive with pancytokeratin, cytokeratin 8/18, cytokeratin 5/6, p40 and p63; while negative with cytokeratin 7, cytokeratin 20 and TTF-1. This immunohistochemical pattern is supportive of the diagnosis. Gross description: 01/19/2023 RUL Lung Stump     Gross description: 01/19/2023 Received 3 alcohol fixed slides for Pap stain, 3 air dried slides for DiffQuik stain, vermilion fluid in CytoLyt processed for cell block. Gross description: 01/19/2023 NULL     Rapid Assessment 01/19/2023 Pass 1: Suspicious cells. Passes 2-3: Malignant. Rapid Assessment 01/19/2023 Jc Fair M.D.     Hospital Outpatient Visit on 12/12/2022   Component Date Value    WBC Blood, Manual 01/19/2023 9.6     RBC 01/19/2023 4.42     Hemoglobin Urine, Ql 01/19/2023 13.4     Hematocrit 01/19/2023 39.7     MCV 01/19/2023 89.8     MCH 01/19/2023 30.2     MCHC 01/19/2023 33.7     RDW 01/19/2023 13.1     Platelet Count Manual 01/19/2023 501 (A)     Sodium 01/19/2023 136     Potassium 01/19/2023 3.8     Chloride 01/19/2023 99     CO2 01/19/2023 28     Anion Gap 4 01/19/2023 9 Glucose 01/19/2023 91     BUN 01/19/2023 19     Creatinine + eGFR Panel 01/19/2023 1.35 (A)     Calcium 01/19/2023 9.7     eGFR Female 01/19/2023 44     Coag Tissue Factor Induc* 01/19/2023 12.1     INR 01/19/2023 1.1     aPTT 01/19/2023 35.9     BNP 01/19/2023 32            Assessment:       Diagnosis Orders   1. Essential hypertension  atenolol-chlorthalidone (TENORETIC) 100-25 MG per tablet    lisinopril (PRINIVIL;ZESTRIL) 20 MG tablet      2. 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 Moderate COPD (chronic obstructive pulmonary disease) ([facility])  budesonide-formoterol (SYMBICORT) 160-4.5 MCG/ACT AERO    albuterol sulfate HFA (PROAIR HFA) 108 (90 Base) MCG/ACT inhaler      4. Encounter for screening mammogram for malignant neoplasm of breast  KWASI DIGITAL SCREEN W CAD BILATERAL PER PROTOCOL      5. Prediabetes  Hemoglobin A1C      6. Stage 3a chronic kidney disease (HCC)  Microalbumin / Creatinine Urine Ratio      7. Gastroesophageal reflux disease, unspecified whether esophagitis present  omeprazole (PRILOSEC) 40 MG delayed release capsule      8. Other hyperlipidemia  Lipid Panel    simvastatin (ZOCOR) 20 MG tablet      9. Post-menopausal  DEXA BONE DENSITY AXIAL SKELETON      10. Post-nasal drip        11. Right sciatic nerve pain  gabapentin (NEURONTIN) 100 MG capsule              Plan:      See orders    We will reduce the dose of the Zocor to see if this will help with muscle aches reduction. Hypertension stable. Continue atenolol and lisinopril. GERD stable with Prilosec. Continue taking. Reminded her to get her mammogram and bone density. Reminded her to get her fit test and bring it back. Explained to patient that she has decreased air exchange. I would encourage her to use a Spiriva as directed. She is declining to do so.   Only wants it for once a day 1 puff because of cost.    Avoid NSAIDs due to the chronic kidney disease. Return in 6 months (on 8/3/2023) for HTN, Gerd, COPD, F.card/ MA 1-2 weeks, (AWV LPN, schedule on phone).

## 2023-02-08 NOTE — PATIENT INSTRUCTIONS
FOOD RESOURCES    [place] [facility]  What they offer: The hotline is a resource for individuals and families seeking information on how and where to obtain food. Website:  [employer]    Hunger Hotline Phone Number: 7-595-7-ALVINO ([phone])  Hours of Operation: Monday - Friday 7am to 10pm   [facility] Hotline also operates a texting service. Our number is [phone]. Please note that these are automated texts and we do not reply or monitor texts. SNAP (formerly Food Parkman)   What they offer: SNAP is used like cash to buy eligible food items from authorized retailers. Apply for benefits online: https://StreetSpark.[place].gov/ofAKSEL GROUP/foodstamps. stm     Apply for benefits by phone or in-person by visiting your local [employer]. Locate your [facility] Job and family services by searching the directory at [email or web address].[place].[facility]_Directory. stm     Meals on Wheels   What they offer: Meals on Wheels is a program that delivers meals to individuals who have no reliable means for maintaining a healthy diet. To Apply:   Ages 25-56:  Apply online: NetCamper.cz  Apply by phone: [phone]    Ages 60+:   [place] on Aging: [phone]    Need additional resources? Call 211 or Find Help https://www. findhelp.org/ TRANSPORTATION RESOURCES    Non-Emergency Transportation: Non-emergency medical transportation is for Medicaid members who do not have access to free transportation that suits their medical needs and need to be transported to a Medicaid-covered service performed by a Medicaid enrolled provider.    [facility]: [address]: [address] [place] [address] South: [address] [place] [address] 70 East: [address]: [facility] 85: [address]: [phone]       [address] [facility]   [place]: Madhav Antonio Chad Menendez, and [place] [address]: [phone] for non-managed Medicaid. Public Transportation Services: Small fee may be associated with service  [employer] Access: [address]: [address]: [phone], ext. [address] Transit Services: [phone]  [place]: [phone]  [facility] [address] Transportation: ([address]): [phone]    Jessica Ernandez   What they offer: [employer] also offers convenient group shopping trips. Seniors can socialize with their friends as they shop and complete other errands. Website:  https://Ala-Septicorg/60-and-better/transportation/   Phone Number: [phone]   Eligibility Requirements: Older adults (60+)  Areas Covered: Areas we currently serve include [address] [facility] [address], [address], [address] [facility], [address] [facility], [address] [facility], [facility], [address] [place] [address] and [place]. Dont see your area on our list? Contact us to see if transportation can be arranged. Senior Transportation Programs  What they offer: [address] [place] [address] on Aging or local senior center may be able to help eligible older adults make essential trips, such as for medical appointments, errands, shopping, and more. Areas on [facility] on Aging ([address])- [phone]: James Mcpherson, Jennifer Ramos, Rachel Dunn and [facility] on Aging [facility]- [phone]: Les Mclean Jari Edelson, [place], [facility], [address] Senior Services   What they offer: [employer]er[employer] Zhong provides transportation for [employer] seniors who need assistance for medical and other appointments. Website:  https://SurIDx/services/transportation/   Phone Number: [phone]  Areas Covered:   [employer]- Limited to types of services listed  Out of [address] [facility] Department of Transportation (ODOT)   What they offer: Search a listing of [address] facilities in Reach.ly. Use the topic category to filter counties by [address] or use the alphabetical buttons to filter by county name. Website:  http://tha/. [place].gov/about-us/locations/#page=1   This is a web-based search only      Medicaid Plans   Each health plan has options for transportation services. Contact your insurance provider to schedule transportation. Transportation or Member Services contact information is listed on the back of the insurance card. **Schedule 3 full business days in advance to ensure transportation. Insurance Contacts  [address]  [address] [facility]   Phone: [phone]  Website: [employer]  Phone: [phone]  Website: gogamingo/oh    [employer]: [phone]  Website: [employer]/oh    [employer]  Phone: [phone]  Website: Padinmotion/oh/plans/    [place] Medicaid   Phone: [phone]  Website: nolan. [address] [place] [address]   Phone: [phone]  Website: [facility]o [facility]  Phone: [phone]  Website: Prezacor/medicaid/[facility]   Phone: [phone]  Website: [employer]/medicaid/      Traditional Medicare   Does not pay for transportation for non-emergency medical appointments   Medicare Advantage Plans  Some plans may provide transportation. Members should contact the Member Services or Transportation number on the back of their insurance card for more information or to schedule transportation.       All [employer] Transportation Services are Private Pay  Schedule 3 business day advance     Provider Phone   [employer] Patient Transport ([facility] [employer] [place] [phone]   [employer] [phone]   [facility] [phone]   [place] Transport [phone] Transportation [phone]   [facility] Medical Transport 21  Service [phone] Ambulance [phone]   [facility] [phone] Transport [phone]       Need additional resources? Call 211 or Find Help https://www. findhelp.org/

## 2023-02-09 ENCOUNTER — TELEPHONE (OUTPATIENT)
Dept: ONCOLOGY | Age: 68
End: 2023-02-09

## 2023-02-09 ENCOUNTER — HOSPITAL ENCOUNTER (OUTPATIENT)
Dept: RADIATION ONCOLOGY | Age: 68
Discharge: HOME | End: 2023-02-09
Payer: MEDICARE

## 2023-02-09 LAB
ESTIMATED AVERAGE GLUCOSE: 125.5 MG/DL
HBA1C MFR BLD: 6 %

## 2023-02-09 PROCEDURE — 77332 RADIATION TREATMENT AID(S): CPT | Performed by: RADIOLOGY

## 2023-02-09 PROCEDURE — 77470 SPECIAL RADIATION TREATMENT: CPT | Performed by: RADIOLOGY

## 2023-02-09 PROCEDURE — 77334 RADIATION TREATMENT AID(S): CPT | Performed by: RADIOLOGY

## 2023-02-09 NOTE — TELEPHONE ENCOUNTER
This RN called patient to inform her  office was able to get patient in for [facility] Friday 2/17/2023. Patient states she can not keep that appointment because she has another appointment that day. Patient states she is waiting to hear back from Dr. Jem Hernandez office but they told her it would be after that date she was given. Patient informed she has option to receive chemo through an IV for her first treatment but patient was highly upset and refusing to do that. Patient informed if she could not keep these appointment, due to busy schedule in radiation she could be delayed a couple weeks. This RN told patient she could potentially need to get re-simmed if she had any growth or change in the tumor from today. Patient highly agitated and this RN informed patient she can take time to decide how she would like to proceed and call this RN back. Patient has this RN direct phone number to return call. Patient was given chemo education date of Thursday 2/16/2023 at 1000 and Chemo treatment Monday 2/20/2023 at 1130 with radiation at 1700.

## 2023-02-09 NOTE — TELEPHONE ENCOUNTER
Patient called this RN and states she was able to get Mediport appointment changed to Friday 2/17/2023 at 0700 so patient is able to keep the appointments that were given to her. Patient has no further needs at this time.

## 2023-02-10 ENCOUNTER — HOSPITAL ENCOUNTER (OUTPATIENT)
Dept: RADIATION ONCOLOGY | Age: 68
Discharge: HOME | End: 2023-02-10
Payer: MEDICARE

## 2023-02-10 PROCEDURE — 77300 RADIATION THERAPY DOSE PLAN: CPT | Performed by: RADIOLOGY

## 2023-02-10 PROCEDURE — 77338 DESIGN MLC DEVICE FOR IMRT: CPT | Performed by: RADIOLOGY

## 2023-02-10 PROCEDURE — 77293 RESPIRATOR MOTION MGMT SIMUL: CPT | Performed by: RADIOLOGY

## 2023-02-10 PROCEDURE — 77301 RADIOTHERAPY DOSE PLAN IMRT: CPT | Performed by: RADIOLOGY

## 2023-02-14 DIAGNOSIS — E78.49 OTHER HYPERLIPIDEMIA: Primary | ICD-10-CM

## 2023-02-14 RX ORDER — EZETIMIBE 10 MG/1
10 TABLET ORAL DAILY
Qty: 90 TABLET | Refills: 3 | Status: SHIPPED | OUTPATIENT
Start: 2023-02-14

## 2023-02-15 ENCOUNTER — TELEPHONE (OUTPATIENT)
Dept: FAMILY MEDICINE CLINIC | Age: 68
End: 2023-02-15

## 2023-02-16 ENCOUNTER — NURSE ONLY (OUTPATIENT)
Dept: ONCOLOGY | Age: 68
End: 2023-02-16
Payer: MEDICARE

## 2023-02-16 ENCOUNTER — CLINICAL DOCUMENTATION (OUTPATIENT)
Dept: RADIATION ONCOLOGY | Age: 68
End: 2023-02-16

## 2023-02-16 ENCOUNTER — HOSPITAL ENCOUNTER (OUTPATIENT)
Dept: INFUSION THERAPY | Age: 68
Discharge: HOME | End: 2023-02-16
Payer: MEDICARE

## 2023-02-16 VITALS
DIASTOLIC BLOOD PRESSURE: 70 MMHG | SYSTOLIC BLOOD PRESSURE: 114 MMHG | HEIGHT: 65 IN | WEIGHT: 170.4 LBS | BODY MASS INDEX: 28.39 KG/M2 | HEART RATE: 76 BPM | OXYGEN SATURATION: 97 % | TEMPERATURE: 96.9 F

## 2023-02-16 DIAGNOSIS — C34.91 MALIGNANT NEOPLASM OF RIGHT LUNG, UNSPECIFIED PART OF LUNG (HCC): Primary | ICD-10-CM

## 2023-02-16 DIAGNOSIS — C34.11 MALIGNANT NEOPLASM OF UPPER LOBE, RIGHT BRONCHUS OR LUNG (HCC): ICD-10-CM

## 2023-02-16 PROCEDURE — 99211 OFF/OP EST MAY X REQ PHY/QHP: CPT | Performed by: INTERNAL MEDICINE

## 2023-02-16 PROCEDURE — 99213 OFFICE O/P EST LOW 20 MIN: CPT

## 2023-02-16 PROCEDURE — 3074F SYST BP LT 130 MM HG: CPT | Performed by: INTERNAL MEDICINE

## 2023-02-16 PROCEDURE — 3078F DIAST BP <80 MM HG: CPT | Performed by: INTERNAL MEDICINE

## 2023-02-16 RX ORDER — DEXAMETHASONE 4 MG/1
4 TABLET ORAL
Qty: 2 TABLET | Refills: 0 | Status: SHIPPED | OUTPATIENT
Start: 2023-02-16 | End: 2023-02-17

## 2023-02-16 RX ORDER — ONDANSETRON HYDROCHLORIDE 8 MG/1
8 TABLET, FILM COATED ORAL EVERY 8 HOURS PRN
Qty: 30 TABLET | Refills: 1 | Status: SHIPPED | OUTPATIENT
Start: 2023-02-16

## 2023-02-16 NOTE — CARE COORDINATION
LSW met with Pt today. She is starting both chemo and radiation therapy. Pt has previously received treatment at [facility].   Pt spoke to LSW about needs for gasoline assistance and possibly food. Pt stated food gets low toward end of the month since she is on a fixed income. Pt declined a food bag from [facility] at this time. LSW provided Pt with information on [facility] food distributions and will get her their calendar for the remainder of February. Gasoline assistance was given.

## 2023-02-16 NOTE — PROGRESS NOTES
Patient arrived alone for treatment planning and chemotherapy education. Discussed treatment plan, potential side effects, side effect prevention, and symptom management. Reviewed chemotherapy education folder and drug monograph. Patient's regimen will be Carboplatin/Taxol once weekly with concurrent radiation. Patient signed chemotherapy consent for Carboplatin and Taxol  x 7 cycles. Copy of consent given to patient. Reviewed chemotherapy schedule/calendar. Rx for Zofran and Dexamethasone sent to pharmacy per this RN as per Dr. Arben Quispe order. Patient given calendar and written instructions for these medications and how/when to take. Prescriptions given as follows:   Zofran 8 mg PRN for nausea  Dexamethasone (4 mg tablets ordered)  8 mg/2 tablets PO the night before chemotherapy     Patient given on-call phone number for after office hours and weekends. Magnet given with after hours, prescription and results phone numbers. This RN direct contact information given to patient for patient to call with any questions/concerns. .    CBC and CMP drawn/will be drawn day of chemo prior to treatment. Distress thermometer given to patient to fill out - this RN reviewed with patient. Referrals placed for LSW for dietician referral. Copy given to Psychosocial Coordinator.

## 2023-02-16 NOTE — PROGRESS NOTES
MA Rooming Questions  Patient: Adenike Harvey  MRN: [id number]    Date: 2/16/2023        Treatment planning    Mitzi Rai CMA

## 2023-02-20 ENCOUNTER — HOSPITAL ENCOUNTER (OUTPATIENT)
Dept: INFUSION THERAPY | Age: 68
Discharge: HOME | End: 2023-02-20
Payer: MEDICARE

## 2023-02-20 ENCOUNTER — HOSPITAL ENCOUNTER (OUTPATIENT)
Dept: RADIATION ONCOLOGY | Age: 68
Discharge: HOME | End: 2023-02-20
Payer: MEDICARE

## 2023-02-20 VITALS
HEART RATE: 76 BPM | TEMPERATURE: 96.9 F | BODY MASS INDEX: 28.32 KG/M2 | SYSTOLIC BLOOD PRESSURE: 158 MMHG | OXYGEN SATURATION: 98 % | WEIGHT: 170.2 LBS | RESPIRATION RATE: 18 BRPM | DIASTOLIC BLOOD PRESSURE: 90 MMHG

## 2023-02-20 DIAGNOSIS — C34.11 MALIGNANT NEOPLASM OF UPPER LOBE, RIGHT BRONCHUS OR LUNG (HCC): Primary | ICD-10-CM

## 2023-02-20 LAB
ALBUMIN SERPL-MCNC: 4.3 GM/DL (ref 3.4–5)
ALP BLD-CCNC: 117 IU/L (ref 40–128)
ALT SERPL-CCNC: 9 U/L (ref 10–40)
ANION GAP SERPL CALCULATED.3IONS-SCNC: 15 MMOL/L (ref 4–16)
AST SERPL-CCNC: 14 IU/L (ref 15–37)
BASOPHILS ABSOLUTE: 0 K/CU MM
BASOPHILS RELATIVE PERCENT: 0.2 % (ref 0–1)
BILIRUB SERPL-MCNC: 0.2 MG/DL (ref 0–1)
BUN SERPL-MCNC: 18 MG/DL (ref 6–23)
CALCIUM SERPL-MCNC: 9.6 MG/DL (ref 8.3–10.6)
CHLORIDE BLD-SCNC: 95 MMOL/L (ref 99–110)
CO2: 24 MMOL/L (ref 21–32)
CREAT SERPL-MCNC: 1.2 MG/DL (ref 0.6–1.1)
DIFFERENTIAL TYPE: ABNORMAL
EGFR, POC: 46 ML/MIN/1.73M2
EOSINOPHILS ABSOLUTE: 0 K/CU MM
EOSINOPHILS RELATIVE PERCENT: 0.1 % (ref 0–3)
GFR SERPL CREATININE-BSD FRML MDRD: 50 ML/MIN/1.73M2
GLUCOSE BLD-MCNC: 170 MG/DL (ref 70–99)
GLUCOSE SERPL-MCNC: 159 MG/DL (ref 70–99)
HCT VFR BLD CALC: 36.7 % (ref 37–47)
HEMOGLOBIN: 11.9 GM/DL (ref 12.5–16)
LYMPHOCYTES ABSOLUTE: 0.6 K/CU MM
LYMPHOCYTES RELATIVE PERCENT: 5.1 % (ref 24–44)
MCH RBC QN AUTO: 30 PG (ref 27–31)
MCHC RBC AUTO-ENTMCNC: 32.4 % (ref 32–36)
MCV RBC AUTO: 92.4 FL (ref 78–100)
MONOCYTES ABSOLUTE: 0.1 K/CU MM
MONOCYTES RELATIVE PERCENT: 0.5 % (ref 0–4)
PDW BLD-RTO: 13.5 % (ref 11.7–14.9)
PLATELET # BLD: 413 K/CU MM (ref 140–440)
PMV BLD AUTO: 8.9 FL (ref 7.5–11.1)
POC BUN: 19 MG/DL (ref 8–26)
POC CALCIUM: 1.18 MMOL/L (ref 1.12–1.32)
POC CHLORIDE: 101 MMOL/L (ref 98–109)
POC CO2: 23 MMOL/L (ref 21–32)
POC CREATININE: 1.3 MG/DL (ref 0.6–1.1)
POTASSIUM SERPL-SCNC: 4.3 MMOL/L (ref 3.5–4.5)
POTASSIUM SERPL-SCNC: 4.6 MMOL/L (ref 3.5–5.1)
RBC # BLD: 3.97 M/CU MM (ref 4.2–5.4)
SEGMENTED NEUTROPHILS ABSOLUTE COUNT: 10.1 K/CU MM
SEGMENTED NEUTROPHILS RELATIVE PERCENT: 94.1 % (ref 36–66)
SODIUM BLD-SCNC: 134 MMOL/L (ref 135–145)
SODIUM BLD-SCNC: 134 MMOL/L (ref 138–146)
SOURCE, BLOOD GAS: ABNORMAL
TOTAL PROTEIN: 6.7 GM/DL (ref 6.4–8.2)
WBC # BLD: 10.8 K/CU MM (ref 4–10.5)

## 2023-02-20 PROCEDURE — 77014 CHG CT GUIDANCE RADIATION THERAPY FLDS PLACEMENT: CPT | Performed by: RADIOLOGY

## 2023-02-20 PROCEDURE — 2500000003 HC RX 250 WO HCPCS: Performed by: INTERNAL MEDICINE

## 2023-02-20 PROCEDURE — 77386 HC NTSTY MODUL RAD TX DLVR CPLX: CPT | Performed by: RADIOLOGY

## 2023-02-20 PROCEDURE — 96413 CHEMO IV INFUSION 1 HR: CPT

## 2023-02-20 PROCEDURE — 2580000003 HC RX 258: Performed by: INTERNAL MEDICINE

## 2023-02-20 PROCEDURE — 96375 TX/PRO/DX INJ NEW DRUG ADDON: CPT

## 2023-02-20 PROCEDURE — 85025 COMPLETE CBC W/AUTO DIFF WBC: CPT

## 2023-02-20 PROCEDURE — 80053 COMPREHEN METABOLIC PANEL: CPT

## 2023-02-20 PROCEDURE — 96367 TX/PROPH/DG ADDL SEQ IV INF: CPT

## 2023-02-20 PROCEDURE — 96417 CHEMO IV INFUS EACH ADDL SEQ: CPT

## 2023-02-20 PROCEDURE — 6360000002 HC RX W HCPCS: Performed by: INTERNAL MEDICINE

## 2023-02-20 RX ORDER — PALONOSETRON 0.05 MG/ML
0.25 INJECTION, SOLUTION INTRAVENOUS ONCE
Status: COMPLETED | OUTPATIENT
Start: 2023-02-20 | End: 2023-02-20

## 2023-02-20 RX ORDER — HEPARIN SODIUM (PORCINE) LOCK FLUSH IV SOLN 100 UNIT/ML 100 UNIT/ML
500 SOLUTION INTRAVENOUS PRN
Status: DISCONTINUED | OUTPATIENT
Start: 2023-02-20 | End: 2023-02-21 | Stop reason: HOSPADM

## 2023-02-20 RX ORDER — FAMOTIDINE 10 MG/ML
20 INJECTION, SOLUTION INTRAVENOUS ONCE
Status: COMPLETED | OUTPATIENT
Start: 2023-02-20 | End: 2023-02-20

## 2023-02-20 RX ORDER — DIPHENHYDRAMINE HYDROCHLORIDE 50 MG/ML
50 INJECTION INTRAMUSCULAR; INTRAVENOUS ONCE
Status: COMPLETED | OUTPATIENT
Start: 2023-02-20 | End: 2023-02-20

## 2023-02-20 RX ORDER — SODIUM CHLORIDE 9 MG/ML
5-250 INJECTION, SOLUTION INTRAVENOUS PRN
Status: DISCONTINUED | OUTPATIENT
Start: 2023-02-20 | End: 2023-02-21 | Stop reason: HOSPADM

## 2023-02-20 RX ADMIN — CARBOPLATIN 138.6 MG: 600 INJECTION, SOLUTION INTRAVENOUS at 16:40

## 2023-02-20 RX ADMIN — HEPARIN 500 UNITS: 100 SYRINGE at 17:38

## 2023-02-20 RX ADMIN — DIPHENHYDRAMINE HYDROCHLORIDE 50 MG: 50 INJECTION INTRAMUSCULAR; INTRAVENOUS at 14:56

## 2023-02-20 RX ADMIN — PACLITAXEL 78 MG: 6 INJECTION, SOLUTION INTRAVENOUS at 15:26

## 2023-02-20 RX ADMIN — SODIUM CHLORIDE 20 ML/HR: 9 INJECTION, SOLUTION INTRAVENOUS at 15:24

## 2023-02-20 RX ADMIN — PALONOSETRON 0.25 MG: 0.25 INJECTION, SOLUTION INTRAVENOUS at 14:56

## 2023-02-20 RX ADMIN — FAMOTIDINE 20 MG: 10 INJECTION, SOLUTION INTRAVENOUS at 14:56

## 2023-02-20 RX ADMIN — DEXAMETHASONE SODIUM PHOSPHATE 12 MG: 4 INJECTION INTRA-ARTICULAR; INTRALESIONAL; INTRAMUSCULAR; INTRAVENOUS; SOFT TISSUE at 15:04

## 2023-02-20 NOTE — PROGRESS NOTES
Ambulated to infusion area, here today to initiate chemotherapy. No concerns at this time. Patient reports she only took one(4mg) Decadron instead of two tomorrow. Left chest mediport accessed, positive blood return noted. Labs drawn. Labs within defined limits. Per Dr. Alok Dodson, no need to add extra Decadron today. Chemo administered as ordered. Call light within reach. Patient reported SOB after completion of Taxol, VSS. Went to Radiation at [phone], when returned, patient reported SOB better, oxygen saturation increased to 98% from 96% No further intervention needed. Tolerated remainder of infusion without incident. Discharge instructions provided. RTC 02/21 for OTV with Dr. Celestina Swan and radiation. Status appropriately assessed and documented. All required labs and results reviewed. Treatment approved by provider. Treatment orders and medications verified by 2 Registered Nurses where applicable. Treatment plan was confirmed with patient prior to administration, and educated the need to report any treatment-related symptoms      Prior to administration, when applicable, the following 8 elements of medication administration were reviewed with 2nd Registered Nurse prior to dosing: drug name, drug dose, infusion volume when prepared in a syringe, rate of administration, expiration dates and/or times, appearance and integrity of drug(s), and rate of pump for infusion. The 5 rights of medication administration have been verified.

## 2023-02-21 ENCOUNTER — HOSPITAL ENCOUNTER (OUTPATIENT)
Dept: RADIATION ONCOLOGY | Age: 68
Discharge: HOME | End: 2023-02-21
Payer: MEDICARE

## 2023-02-21 PROCEDURE — 77386 HC NTSTY MODUL RAD TX DLVR CPLX: CPT | Performed by: RADIOLOGY

## 2023-02-22 ENCOUNTER — HOSPITAL ENCOUNTER (OUTPATIENT)
Dept: RADIATION ONCOLOGY | Age: 68
Discharge: HOME | End: 2023-02-22
Payer: MEDICARE

## 2023-02-22 ENCOUNTER — CLINICAL DOCUMENTATION (OUTPATIENT)
Dept: RADIATION ONCOLOGY | Age: 68
End: 2023-02-22

## 2023-02-22 PROCEDURE — 77014 CHG CT GUIDANCE RADIATION THERAPY FLDS PLACEMENT: CPT | Performed by: RADIOLOGY

## 2023-02-22 PROCEDURE — 77386 HC NTSTY MODUL RAD TX DLVR CPLX: CPT | Performed by: RADIOLOGY

## 2023-02-23 ENCOUNTER — HOSPITAL ENCOUNTER (OUTPATIENT)
Dept: RADIATION ONCOLOGY | Age: 68
Discharge: HOME | End: 2023-02-23
Payer: MEDICARE

## 2023-02-23 PROCEDURE — 77386 HC NTSTY MODUL RAD TX DLVR CPLX: CPT | Performed by: RADIOLOGY

## 2023-02-23 PROCEDURE — 77014 CHG CT GUIDANCE RADIATION THERAPY FLDS PLACEMENT: CPT | Performed by: RADIOLOGY

## 2023-02-24 ENCOUNTER — HOSPITAL ENCOUNTER (OUTPATIENT)
Dept: RADIATION ONCOLOGY | Age: 68
Discharge: HOME | End: 2023-02-24
Payer: MEDICARE

## 2023-02-24 PROCEDURE — 77336 RADIATION PHYSICS CONSULT: CPT | Performed by: RADIOLOGY

## 2023-02-24 PROCEDURE — 77386 HC NTSTY MODUL RAD TX DLVR CPLX: CPT | Performed by: RADIOLOGY

## 2023-02-25 RX ORDER — EPINEPHRINE 1 MG/ML
0.3 INJECTION, SOLUTION, CONCENTRATE INTRAVENOUS PRN
Status: CANCELLED | OUTPATIENT
Start: 2023-02-27

## 2023-02-25 RX ORDER — DIPHENHYDRAMINE HYDROCHLORIDE 50 MG/ML
50 INJECTION INTRAMUSCULAR; INTRAVENOUS ONCE
Status: CANCELLED | OUTPATIENT
Start: 2023-02-27 | End: 2023-02-27

## 2023-02-25 RX ORDER — ALBUTEROL SULFATE 90 UG/1
4 AEROSOL, METERED RESPIRATORY (INHALATION) PRN
Status: CANCELLED | OUTPATIENT
Start: 2023-02-27

## 2023-02-25 RX ORDER — MEPERIDINE HYDROCHLORIDE 25 MG/ML
12.5 INJECTION INTRAMUSCULAR; INTRAVENOUS; SUBCUTANEOUS PRN
Status: CANCELLED | OUTPATIENT
Start: 2023-02-27

## 2023-02-25 RX ORDER — DIPHENHYDRAMINE HYDROCHLORIDE 50 MG/ML
50 INJECTION INTRAMUSCULAR; INTRAVENOUS
Status: CANCELLED | OUTPATIENT
Start: 2023-02-27

## 2023-02-25 RX ORDER — FAMOTIDINE 10 MG/ML
20 INJECTION, SOLUTION INTRAVENOUS
Status: CANCELLED | OUTPATIENT
Start: 2023-02-27

## 2023-02-25 RX ORDER — SODIUM CHLORIDE 9 MG/ML
5-250 INJECTION, SOLUTION INTRAVENOUS PRN
Status: CANCELLED | OUTPATIENT
Start: 2023-02-27

## 2023-02-25 RX ORDER — SODIUM CHLORIDE 9 MG/ML
INJECTION, SOLUTION INTRAVENOUS CONTINUOUS
Status: CANCELLED | OUTPATIENT
Start: 2023-02-27

## 2023-02-25 RX ORDER — ONDANSETRON 2 MG/ML
8 INJECTION INTRAMUSCULAR; INTRAVENOUS
Status: CANCELLED | OUTPATIENT
Start: 2023-02-27

## 2023-02-25 RX ORDER — ACETAMINOPHEN 325 MG/1
650 TABLET ORAL
Status: CANCELLED | OUTPATIENT
Start: 2023-02-27

## 2023-02-27 ENCOUNTER — HOSPITAL ENCOUNTER (OUTPATIENT)
Dept: INFUSION THERAPY | Age: 68
Discharge: HOME | End: 2023-02-27
Payer: MEDICARE

## 2023-02-27 ENCOUNTER — HOSPITAL ENCOUNTER (OUTPATIENT)
Dept: RADIATION ONCOLOGY | Age: 68
Discharge: HOME | End: 2023-02-27
Payer: MEDICARE

## 2023-02-27 ENCOUNTER — OFFICE VISIT (OUTPATIENT)
Dept: ONCOLOGY | Age: 68
End: 2023-02-27
Payer: MEDICARE

## 2023-02-27 VITALS
OXYGEN SATURATION: 97 % | TEMPERATURE: 97.3 F | HEART RATE: 63 BPM | DIASTOLIC BLOOD PRESSURE: 66 MMHG | WEIGHT: 167.2 LBS | HEIGHT: 65 IN | SYSTOLIC BLOOD PRESSURE: 135 MMHG | BODY MASS INDEX: 27.86 KG/M2

## 2023-02-27 DIAGNOSIS — C34.11 PRIMARY CANCER OF RIGHT UPPER LOBE OF LUNG (HCC): ICD-10-CM

## 2023-02-27 DIAGNOSIS — R91.1 LUNG NODULE: ICD-10-CM

## 2023-02-27 DIAGNOSIS — C34.11 MALIGNANT NEOPLASM OF UPPER LOBE, RIGHT BRONCHUS OR LUNG (HCC): Primary | ICD-10-CM

## 2023-02-27 DIAGNOSIS — C34.11 MALIGNANT NEOPLASM OF UPPER LOBE, RIGHT BRONCHUS OR LUNG (HCC): ICD-10-CM

## 2023-02-27 DIAGNOSIS — R79.89 CREATININE ELEVATION: ICD-10-CM

## 2023-02-27 DIAGNOSIS — C34.91 MALIGNANT NEOPLASM OF RIGHT LUNG, UNSPECIFIED PART OF LUNG (HCC): Primary | ICD-10-CM

## 2023-02-27 DIAGNOSIS — N18.31 STAGE 3A CHRONIC KIDNEY DISEASE (HCC): ICD-10-CM

## 2023-02-27 DIAGNOSIS — C34.91 SQUAMOUS CELL LUNG CANCER, RIGHT (HCC): ICD-10-CM

## 2023-02-27 LAB
ALBUMIN SERPL-MCNC: 4.3 GM/DL (ref 3.4–5)
ALP BLD-CCNC: 106 IU/L (ref 40–129)
ALT SERPL-CCNC: 11 U/L (ref 10–40)
ANION GAP SERPL CALCULATED.3IONS-SCNC: 9 MMOL/L (ref 4–16)
AST SERPL-CCNC: 13 IU/L (ref 15–37)
BASOPHILS ABSOLUTE: 0 K/CU MM
BASOPHILS RELATIVE PERCENT: 0.4 % (ref 0–1)
BILIRUB SERPL-MCNC: 0.3 MG/DL (ref 0–1)
BUN SERPL-MCNC: 16 MG/DL (ref 6–23)
CALCIUM SERPL-MCNC: 9.5 MG/DL (ref 8.3–10.6)
CHLORIDE BLD-SCNC: 93 MMOL/L (ref 99–110)
CO2: 28 MMOL/L (ref 21–32)
CREAT SERPL-MCNC: 1 MG/DL (ref 0.6–1.1)
DIFFERENTIAL TYPE: ABNORMAL
EGFR, POC: 56 ML/MIN/1.73M2
EOSINOPHILS ABSOLUTE: 0.3 K/CU MM
EOSINOPHILS RELATIVE PERCENT: 3.6 % (ref 0–3)
GFR SERPL CREATININE-BSD FRML MDRD: >60 ML/MIN/1.73M2
GLUCOSE BLD-MCNC: 97 MG/DL (ref 70–99)
GLUCOSE SERPL-MCNC: 92 MG/DL (ref 70–99)
HCT VFR BLD CALC: 34.3 % (ref 37–47)
HEMOGLOBIN: 11.2 GM/DL (ref 12.5–16)
LYMPHOCYTES ABSOLUTE: 1.9 K/CU MM
LYMPHOCYTES RELATIVE PERCENT: 24.9 % (ref 24–44)
MCH RBC QN AUTO: 30.2 PG (ref 27–31)
MCHC RBC AUTO-ENTMCNC: 32.7 % (ref 32–36)
MCV RBC AUTO: 92.5 FL (ref 78–100)
MONOCYTES ABSOLUTE: 0.4 K/CU MM
MONOCYTES RELATIVE PERCENT: 5.9 % (ref 0–4)
PDW BLD-RTO: 13.2 % (ref 11.7–14.9)
PLATELET # BLD: 344 K/CU MM (ref 140–440)
PMV BLD AUTO: 9.1 FL (ref 7.5–11.1)
POC BUN: 16 MG/DL (ref 8–26)
POC CALCIUM: 1.18 MMOL/L (ref 1.12–1.32)
POC CHLORIDE: 95 MMOL/L (ref 98–109)
POC CO2: 25 MMOL/L (ref 21–32)
POC CREATININE: 1.1 MG/DL (ref 0.6–1.1)
POTASSIUM SERPL-SCNC: 4 MMOL/L (ref 3.5–4.5)
POTASSIUM SERPL-SCNC: 4.3 MMOL/L (ref 3.5–5.1)
RBC # BLD: 3.71 M/CU MM (ref 4.2–5.4)
SEGMENTED NEUTROPHILS ABSOLUTE COUNT: 4.9 K/CU MM
SEGMENTED NEUTROPHILS RELATIVE PERCENT: 65.2 % (ref 36–66)
SODIUM BLD-SCNC: 130 MMOL/L (ref 135–145)
SODIUM BLD-SCNC: 132 MMOL/L (ref 138–146)
SOURCE, BLOOD GAS: ABNORMAL
TOTAL PROTEIN: 6.7 GM/DL (ref 6.4–8.2)
WBC # BLD: 7.5 K/CU MM (ref 4–10.5)

## 2023-02-27 PROCEDURE — 80053 COMPREHEN METABOLIC PANEL: CPT

## 2023-02-27 PROCEDURE — 3075F SYST BP GE 130 - 139MM HG: CPT | Performed by: NURSE PRACTITIONER

## 2023-02-27 PROCEDURE — 96413 CHEMO IV INFUSION 1 HR: CPT

## 2023-02-27 PROCEDURE — 2500000003 HC RX 250 WO HCPCS: Performed by: NURSE PRACTITIONER

## 2023-02-27 PROCEDURE — A4216 STERILE WATER/SALINE, 10 ML: HCPCS | Performed by: NURSE PRACTITIONER

## 2023-02-27 PROCEDURE — 99214 OFFICE O/P EST MOD 30 MIN: CPT | Performed by: NURSE PRACTITIONER

## 2023-02-27 PROCEDURE — 6360000002 HC RX W HCPCS: Performed by: NURSE PRACTITIONER

## 2023-02-27 PROCEDURE — 96417 CHEMO IV INFUS EACH ADDL SEQ: CPT

## 2023-02-27 PROCEDURE — 36591 DRAW BLOOD OFF VENOUS DEVICE: CPT

## 2023-02-27 PROCEDURE — 2580000003 HC RX 258: Performed by: NURSE PRACTITIONER

## 2023-02-27 PROCEDURE — 3078F DIAST BP <80 MM HG: CPT | Performed by: NURSE PRACTITIONER

## 2023-02-27 PROCEDURE — 85025 COMPLETE CBC W/AUTO DIFF WBC: CPT

## 2023-02-27 PROCEDURE — 77014 CHG CT GUIDANCE RADIATION THERAPY FLDS PLACEMENT: CPT | Performed by: RADIOLOGY

## 2023-02-27 PROCEDURE — 96375 TX/PRO/DX INJ NEW DRUG ADDON: CPT

## 2023-02-27 PROCEDURE — 77386 HC NTSTY MODUL RAD TX DLVR CPLX: CPT | Performed by: RADIOLOGY

## 2023-02-27 PROCEDURE — 1123F ACP DISCUSS/DSCN MKR DOCD: CPT | Performed by: NURSE PRACTITIONER

## 2023-02-27 PROCEDURE — 96367 TX/PROPH/DG ADDL SEQ IV INF: CPT

## 2023-02-27 RX ORDER — FAMOTIDINE 10 MG/ML
20 INJECTION, SOLUTION INTRAVENOUS ONCE
Status: COMPLETED | OUTPATIENT
Start: 2023-02-27 | End: 2023-02-27

## 2023-02-27 RX ORDER — AZITHROMYCIN 250 MG/1
250 TABLET, FILM COATED ORAL SEE ADMIN INSTRUCTIONS
Qty: 6 TABLET | Refills: 0 | Status: SHIPPED | OUTPATIENT
Start: 2023-02-27 | End: 2023-03-04

## 2023-02-27 RX ORDER — SODIUM CHLORIDE 9 MG/ML
5-40 INJECTION INTRAVENOUS PRN
Status: DISCONTINUED | OUTPATIENT
Start: 2023-02-27 | End: 2023-02-28 | Stop reason: HOSPADM

## 2023-02-27 RX ORDER — DIPHENHYDRAMINE HYDROCHLORIDE 50 MG/ML
25 INJECTION INTRAMUSCULAR; INTRAVENOUS ONCE
Status: COMPLETED | OUTPATIENT
Start: 2023-02-27 | End: 2023-02-27

## 2023-02-27 RX ORDER — PALONOSETRON 0.05 MG/ML
0.25 INJECTION, SOLUTION INTRAVENOUS ONCE
Status: COMPLETED | OUTPATIENT
Start: 2023-02-27 | End: 2023-02-27

## 2023-02-27 RX ORDER — HEPARIN SODIUM (PORCINE) LOCK FLUSH IV SOLN 100 UNIT/ML 100 UNIT/ML
500 SOLUTION INTRAVENOUS PRN
Status: DISCONTINUED | OUTPATIENT
Start: 2023-02-27 | End: 2023-02-28 | Stop reason: HOSPADM

## 2023-02-27 RX ORDER — DEXAMETHASONE 2 MG/1
TABLET ORAL
Qty: 12 TABLET | Refills: 0 | Status: SHIPPED | OUTPATIENT
Start: 2023-02-27

## 2023-02-27 RX ORDER — SODIUM CHLORIDE 9 MG/ML
5-250 INJECTION, SOLUTION INTRAVENOUS PRN
Status: DISCONTINUED | OUTPATIENT
Start: 2023-02-27 | End: 2023-02-28 | Stop reason: HOSPADM

## 2023-02-27 RX ADMIN — FAMOTIDINE 20 MG: 10 INJECTION, SOLUTION INTRAVENOUS at 13:33

## 2023-02-27 RX ADMIN — CARBOPLATIN 150 MG: 10 INJECTION, SOLUTION INTRAVENOUS at 15:55

## 2023-02-27 RX ADMIN — DEXAMETHASONE SODIUM PHOSPHATE 12 MG: 4 INJECTION INTRA-ARTICULAR; INTRALESIONAL; INTRAMUSCULAR; INTRAVENOUS; SOFT TISSUE at 13:34

## 2023-02-27 RX ADMIN — PACLITAXEL 78 MG: 6 INJECTION, SOLUTION INTRAVENOUS at 14:22

## 2023-02-27 RX ADMIN — DIPHENHYDRAMINE HYDROCHLORIDE 25 MG: 50 INJECTION INTRAMUSCULAR; INTRAVENOUS at 13:33

## 2023-02-27 RX ADMIN — HEPARIN 500 UNITS: 100 SYRINGE at 16:37

## 2023-02-27 RX ADMIN — PALONOSETRON 0.25 MG: 0.25 INJECTION, SOLUTION INTRAVENOUS at 13:33

## 2023-02-27 RX ADMIN — SODIUM CHLORIDE 20 ML: 9 INJECTION, SOLUTION INTRAMUSCULAR; INTRAVENOUS; SUBCUTANEOUS at 16:37

## 2023-02-27 ASSESSMENT — PATIENT HEALTH QUESTIONNAIRE - PHQ9
2. FEELING DOWN, DEPRESSED OR HOPELESS: 1
1. LITTLE INTEREST OR PLEASURE IN DOING THINGS: 0
SUM OF ALL RESPONSES TO PHQ QUESTIONS 1-9: 1
SUM OF ALL RESPONSES TO PHQ QUESTIONS 1-9: 1
SUM OF ALL RESPONSES TO PHQ9 QUESTIONS 1 & 2: 1
SUM OF ALL RESPONSES TO PHQ QUESTIONS 1-9: 1
SUM OF ALL RESPONSES TO PHQ QUESTIONS 1-9: 1

## 2023-02-27 NOTE — PROGRESS NOTES
Patient Name: Bhupinder Diaz  Patient : 1955  Patient MRN: [id number]     Primary Oncologist: Carlos Palomo MD  Referring Provider: Yessy Solis MD     Date of Service: 2023      Chief Complaint:   Chief Complaint   Patient presents with    Follow-up    Treatment       She came in for follow-up visit. Patient Active Problem List:     Osteoarthritis of finger     Sciatica     Essential hypertension     Abnormal mammogram of left breast     Moderate COPD (chronic obstructive pulmonary disease)      Squamous cell lung cancer, right (HCC)     Primary insomnia     Primary cancer of right upper lobe of lung (HCC)     Other female genital prolapse     Creatinine elevation     History of lung cancer     Atrophy of right kidney     Malignant neoplasm of upper lobe, right bronchus or lung      Other fatigue     Candidal stomatitis     Restless legs syndrome     Dyspnea, unspecified     HPI:   Dane Yanes is a pleasant 56-year-old  female patient who was diagnosed with squamous cell carcinoma of right upper lung, status post bronchoscopy on 2017. She initially presented with bronchitis in 2017 and had a chest x-ray. She did not feel much better after a course of antibiotics and had persistent cough. In 2017, she started having occasional hemoptysis. She went to see her family doctor and had a chest x-ray, eventually CAT scan of the chest.  Due to the insurance, it took about one month to get her CAT scan, as per patient. CT of the chest on 2017, revealed soft tissue mass obstructing the right upper lobe bronchus, concerning for endobronchial tumor and postobstructive collapse of the right upper lobe of the lung accounting or the paratracheal soft tissue density seen on the chest x-ray. Right paratracheal and suprahilar adenopathy could be obscured by the consolidated and collapsed right upper lung. 10/30/2017 mammogram benign.      2017 bronchoscopy revealed fungating exophytic mass lesions obstructed the takeoff of the right upper lobe bronchus. The pathology report from the bronchial washing and brushing of the right upper lobe revealed squamous cell carcinoma. Pulmonary function test revealed moderate obstructive defects and the post bronchodilator study demonstrated significant response in the small airways. She was referred for further evaluation and possible chemoradiation versus surgery. She stated she was exposed to chemicals when she worked at [employer] and also asbestos. CT head was negative. 1/2018 PET CT revealed activity to the right perihilar mass and no evidence of metastatic disease anywhere else. I refer her to see thoracic surgeon for the discussion of possible resection and mediastinoscopy. She was seen by the radiation oncologist in January 2018. She underwent right upper lobectomy on April 11, 2018. Pathology reports revealed keratinizing invasive squamous cell carcinoma with positive bronchial margins negative lymph node at level 9, level 10. Tumor was 4.2 cm moderately differentiated with in situ component. No visible pleural invasions. No lymphovascular invasion. The squamous cell carcinoma in situ involved the bronchus intermedius part of the distal and carinal/tracheal site. Pathologically stage [id number]. I went over NCCN guideline. I offered her regimen therapy plus minus chemo. She is agreeable to see an oncologist for the radiation therapy. She will think about the chemotherapy. VQ scan in March 2018 revealed low probability for pulmonary embolism. She was seen by RT onc and not recommended to have adjuvant RT. She started adjuvant chemo carbo/taxol on July 11, 2018. She requested to split the dose to day 1 day 8 every 3 weeks due to the increased fatigue. 6/2018 CXR non-acute. 11/2018  PET CT negative. She decided not to pursue last dose of chemo due to side effects.   I reminded about mammogram due in October 2018. 5/2/2019 CT  showed no evidence of recurrent or residual disease. 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   3/2019 FOB negative. 5/29/2020 CT showed right upper lobectomy. No evidence of local recurrence or metastases in the chest.   1/28/2021 CT chest: no new findings of recurrent or metastatic disease in the chest.    She has chronic dry cough and I recommend to follow up with Dr Sonia Cardona. She had stool occult 2 years ago. 11/20/2021 CT chest:  1. Stable postsurgical changes right upper lobe. 2. No new or worrisome lung nodules noted. No thoracic adenopathy  She had COVID vaccine booster. She has mammogram in November 2021.    3/2022 TSH, CBC grossly unremarkable. Ferritin 70, B-12 372 and folate 14. 9.     11/2022 CBC and CMP grossly stable for her. 11/14/2022 CT chest   1. Postsurgical changes from right upper lobectomy. There is a new 1.8 cm soft tissue nodule along the posterior aspect of the surgical margin within the right hilum suspicious for recurrence versus metastatic adenopathy. There is associated narrowing of the right lower lobe bronchus in this region overall increased from the prior exam.  I ordered PET scan and referred back to Dr Harman Samuel for EBUS. I prescribed ativan prior to PET scan. 2/3/2023 she came in for follow up visit. PET scan 12/2022  1. Metabolic activity associated with the new right hilar nodule described on the recent CT scan concerning for recurrent/metastatic disease. 2.  No metabolically active distant metastatic disease. Note from Dr Harman Samuel: If this does return his cancer then our options would be radiation therapy versus evaluation for completion pneumonectomy. I am hesitant to say that she is a candidate for completion pneumonectomy. 1/5/2023 EBUS. Pathology shows recurrent squamous cell carcinoma    I discussed with RT onc who recommended concurrent chemo and RT.   I will order NGS study including PD-L1 and her-gordon.  She will follow with Dr Tripathi for mediport placement.    Presented for scheduled follow up. Reports she is coughing green sputum without fever, chills. Has increased fatigue. She is given azithromycin. She is having sore mouth not relieved with baking soda/salt water rinses, magic mouthwash to pharmacy of record. Notes poor energy, she is not using steroid days 2 and 3, Rx for dex 2 mg given.   No acute pain.  Denied any nausea, vomiting or diarrhea.  No fever or chills.  No chest pain, shortness of breath or palpitation.  No headache or dizzy spell.  No specific bone pain.  No melena or hematochezia.  Denied any dysuria or hematuria.    Past Medical History  Anxiety, chronic low back pain, diverticulosis diagnosed in June 2013, hypertension, dyslipidemia, moderate COPD.      Surgical History  She had colonoscopy a while ago, bronchoscopy in December 2017.    Social History  She smoked about a half-pack per day for 25 years and quit in March 2014. She denied any alcohol or illicit drug use. She has one daughter who is a nurse.    Family History  Mother had multiple myeloma, father had lung cancer and he was a smoker.      Review of Systems:  \"Per interval history; otherwise 10 point ROS is negative.\"     Vital Signs:  /66 (Site: Right Upper Arm, Position: Sitting, Cuff Size: Medium Adult)   Pulse 63   Temp 97.3 °F (36.3 °C) (Temporal)   Ht 5' 5\" (1.651 m)   Wt 167 lb 3.2 oz (75.8 kg)   LMP 01/06/2009 (LMP Unknown)   SpO2 97%   BMI 27.82 kg/m²     Physical Exam:  CONSTITUTIONAL: awake, alert, cooperative, NAD  EYES: pupils equal, round . Sclera clear and conjunctiva normal  ENT: Normocephalic, without obvious abnormality, atraumatic  NECK: supple, symmetrical, no jugular venous distension and no carotid bruits   HEMATOLOGIC/LYMPHATIC: no cervical, supraclavicular or axillary lymphadenopathy   LUNGS: no increased work of breathing and clear to auscultation  CARDIOVASCULAR: regular rate and rhythm, normal S1 and S2, no murmur  ABDOMEN: normal bowel sound, soft, non-distended, non-tender, no masses palpated, no rebound or guarding. MUSCULOSKELETAL: full range of motion noted, tone is normal  NEUROLOGIC: Motor skills grossly intact. CN II - XII grossly intact. SKIN: Normal skin color, texture, turgor and no jaundice. appears intact   EXTREMITIES: no LE edema or cyanosis. Homans negative. Labs:  Hematology:  Lab Results   Component Value Date    WBC 7.5 02/27/2023    RBC 3.71 (L) 02/27/2023    HGB 11.2 (L) 02/27/2023    HCT 34.3 (L) 02/27/2023    MCV 92.5 02/27/2023    MCH 30.2 02/27/2023    MCHC 32.7 02/27/2023    RDW 13.2 02/27/2023     02/27/2023    MPV 9.1 02/27/2023    SEGSPCT 65.2 02/27/2023    EOSRELPCT 3.6 (H) 02/27/2023    BASOPCT 0.4 02/27/2023    LYMPHOPCT 24.9 02/27/2023    MONOPCT 5.9 (H) 02/27/2023    SEGSABS 4.9 02/27/2023    EOSABS 0.3 02/27/2023    BASOSABS 0.0 02/27/2023    LYMPHSABS 1.9 02/27/2023    MONOSABS 0.4 02/27/2023    DIFFTYPE AUTOMATED DIFFERENTIAL 02/27/2023     Chemistry:  Lab Results   Component Value Date     (L) 02/27/2023    K 4.0 02/27/2023    CL 93 (L) 02/27/2023    CO2 28 02/27/2023    BUN 16 02/27/2023    CREATININE 1.1 02/27/2023    GLUCOSE 92 02/27/2023    CALCIUM 9.5 02/27/2023    PROT 6.7 02/27/2023    LABALBU 4.3 02/27/2023    BILITOT 0.3 02/27/2023    ALKPHOS 106 02/27/2023    AST 13 (L) 02/27/2023    ALT 11 02/27/2023    LABGLOM >60 02/27/2023    GFRAA 54 (A) 08/26/2022    AGRATIO 1.8 12/30/2013    GLOB 2.6 12/30/2013    POCCA 1.18 02/27/2023    POCGLU 97 02/27/2023     Immunology:  Lab Results   Component Value Date    PROT 6.7 02/27/2023     Coagulation Panel:  Lab Results   Component Value Date    PROTIME 12.0 03/27/2018    INR 1.1 01/19/2023    APTT 35.9 01/19/2023      Observations:  PHQ-9 Total Score: 1 (2/27/2023 11:51 AM)      Assessment & Plan:  1.    She was diagnosed with squamous cell carcinoma of the right upper lung with collapse of the right upper lung. Pulmonary function tests reveal moderate obstructive lung defects. I reviewed CBC and CMP in January 2018. CT of the head was negative. PET CT scan In January 2018 revealed no evidence of metastatic disease elsewhere. She has pathological stage T2b, N0, MX moderately differentiated invasive Squamous cell carcinoma status post right upper lobectomy in April 2018 with positive bronchial margin of the in situ component. She was not recommended to have adjuvant RT by rad onc. In the first 2- 3 years she will have follow-up imaging study every 6-12 months. She started adjuvant chemo in July 2018 and completed October 25, 2018. She decided not to pursue the last dose of chemo. PET CT scan in November 2018 was negative. CT 5/2/2019 showed no evidence of recurrent or residual disease. CT chest 11/13/2019 showed no evidence of recurrence or residual disease. CT chest 5/2020 showed no evidence of recurrence or residual disease. CT chest on January 28, 2021 showed no evidence of recurrent disease. CT chest in November 2021 showed no evidence of recurrent disease. CT chest in November 2022 reviewed with her. I ordered PET scan and referred back to Dr Manjeet Pack for ? EBUS. .  1/5/2023 EBUS. Pathology showed recurrent squamous cell carcinoma  I discussed with RT onc who recommended concurrent chemo and RT. I will order NGS study including PD-L1 and her-gordon. I will schedule chemo education. 2.  She had a pericatheter thrombus and was started on Xarelto. She had mediport removal in June 2019.    3.   Mammogram in October 2017 was benign. Colonoscopy was a while ago. FOB in March 2019 was negative. She has mammogram in November 2021. I recommend to keep mammogram and colon cancer screening up to date. 4.   She quit smoking in 2014. I recommend to follow up with pulmonologist.     4.   Depression.  I referred to psychiatrist.    5. 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 She is on gabapentin. 6.  Intermittent mild anemia. Anemic work-up in May 2022 was reviewed. Anemia has been corrected. 7.  She has chronic cough which could be related to postnasal drip. She was seen by ENT who recommended to continue with Flonase. She was offered cauterization of the nostril. 8. Bronchitis- azythromcyin     She will follow with Dr Jorge A Parekh for [address] At [facility]. Return to clinic in 3 weeks or sooner. All of her questions have been answered for today. Recent imaging and labs were reviewed and discussed with the patient.

## 2023-02-27 NOTE — PROGRESS NOTES
MA Rooming Questions  Patient: Della Sykes  MRN: [id number]    Date: 2/27/2023        1. Do you have any new issues?   no         2. Do you need any refills on medications?    no    3. Have you had any imaging done since your last visit?   no    4. Have you been hospitalized or seen in the emergency room since your last visit here?   no    5. Did the patient have a depression screening completed today?  Yes    PHQ-9 Total Score: 1 (2/27/2023 11:51 AM)       PHQ-9 Given to (if applicable):               PHQ-9 Score (if applicable):                     [] Positive     []  Negative              Does question #9 need addressed (if applicable)                     [] Yes    []  No               Jagruti Cardenas CMA
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
## 2023-02-28 ENCOUNTER — HOSPITAL ENCOUNTER (OUTPATIENT)
Dept: RADIATION ONCOLOGY | Age: 68
Discharge: HOME | End: 2023-02-28
Payer: MEDICARE

## 2023-02-28 PROCEDURE — 77386 HC NTSTY MODUL RAD TX DLVR CPLX: CPT | Performed by: RADIOLOGY

## 2023-02-28 RX ORDER — SODIUM CHLORIDE 1000 MG
1 TABLET, SOLUBLE MISCELLANEOUS DAILY
Qty: 30 TABLET | Refills: 0 | Status: SHIPPED | OUTPATIENT
Start: 2023-02-28

## 2023-02-28 NOTE — PROGRESS NOTES
RX for NACL tablets 1x daily PO sent into [facility] pharmacy on [address] in [facility]. Patient informed of new RX r/t NA being low. As per Kemi Young CNP. Patient is to take once daily x7 days and labs to be rechecked. Patient states she was unable to afford Magic Mouthwash Rx. This RN spoke with [place] [employer] to see if any assistance available for patient. Annette to look into situation and will get back to this RN.  Patient aware and instructed this RN or Anuradha Acosta will call tomorrow

## 2023-03-01 ENCOUNTER — HOSPITAL ENCOUNTER (OUTPATIENT)
Dept: RADIATION ONCOLOGY | Age: 68
Discharge: HOME | End: 2023-03-01
Payer: MEDICARE

## 2023-03-01 PROCEDURE — 77386 HC NTSTY MODUL RAD TX DLVR CPLX: CPT | Performed by: RADIOLOGY

## 2023-03-02 ENCOUNTER — HOSPITAL ENCOUNTER (OUTPATIENT)
Dept: RADIATION ONCOLOGY | Age: 68
Discharge: HOME | End: 2023-03-02
Payer: MEDICARE

## 2023-03-02 PROCEDURE — 77386 HC NTSTY MODUL RAD TX DLVR CPLX: CPT | Performed by: RADIOLOGY

## 2023-03-02 NOTE — PROGRESS NOTES
Rx placed for Magic Mouthwash and sent into [facility] for patient. [facility] to help patient obtain. Patient called and notified and voices understanding.

## 2023-03-03 ENCOUNTER — HOSPITAL ENCOUNTER (OUTPATIENT)
Dept: RADIATION ONCOLOGY | Age: 68
Discharge: HOME | End: 2023-03-03
Payer: MEDICARE

## 2023-03-03 PROCEDURE — 77336 RADIATION PHYSICS CONSULT: CPT | Performed by: RADIOLOGY

## 2023-03-03 PROCEDURE — 77386 HC NTSTY MODUL RAD TX DLVR CPLX: CPT | Performed by: RADIOLOGY

## 2023-03-03 RX ORDER — FAMOTIDINE 10 MG/ML
20 INJECTION, SOLUTION INTRAVENOUS ONCE
Status: CANCELLED | OUTPATIENT
Start: 2023-03-06 | End: 2023-03-06

## 2023-03-03 RX ORDER — ALBUTEROL SULFATE 90 UG/1
4 AEROSOL, METERED RESPIRATORY (INHALATION) PRN
Status: CANCELLED | OUTPATIENT
Start: 2023-03-06

## 2023-03-03 RX ORDER — MEPERIDINE HYDROCHLORIDE 25 MG/ML
12.5 INJECTION INTRAMUSCULAR; INTRAVENOUS; SUBCUTANEOUS PRN
Status: CANCELLED | OUTPATIENT
Start: 2023-03-06

## 2023-03-03 RX ORDER — DIPHENHYDRAMINE HYDROCHLORIDE 50 MG/ML
25 INJECTION INTRAMUSCULAR; INTRAVENOUS ONCE
Status: CANCELLED | OUTPATIENT
Start: 2023-03-06 | End: 2023-03-06

## 2023-03-03 RX ORDER — FAMOTIDINE 10 MG/ML
20 INJECTION, SOLUTION INTRAVENOUS
Status: CANCELLED | OUTPATIENT
Start: 2023-03-06

## 2023-03-03 RX ORDER — ONDANSETRON 2 MG/ML
8 INJECTION INTRAMUSCULAR; INTRAVENOUS
Status: CANCELLED | OUTPATIENT
Start: 2023-03-06

## 2023-03-03 RX ORDER — SODIUM CHLORIDE 9 MG/ML
5-250 INJECTION, SOLUTION INTRAVENOUS PRN
Status: CANCELLED | OUTPATIENT
Start: 2023-03-06

## 2023-03-03 RX ORDER — DIPHENHYDRAMINE HYDROCHLORIDE 50 MG/ML
50 INJECTION INTRAMUSCULAR; INTRAVENOUS
Status: CANCELLED | OUTPATIENT
Start: 2023-03-06

## 2023-03-03 RX ORDER — EPINEPHRINE 1 MG/ML
0.3 INJECTION, SOLUTION, CONCENTRATE INTRAVENOUS PRN
Status: CANCELLED | OUTPATIENT
Start: 2023-03-06

## 2023-03-03 RX ORDER — ACETAMINOPHEN 325 MG/1
650 TABLET ORAL
Status: CANCELLED | OUTPATIENT
Start: 2023-03-06

## 2023-03-03 RX ORDER — HEPARIN SODIUM (PORCINE) LOCK FLUSH IV SOLN 100 UNIT/ML 100 UNIT/ML
500 SOLUTION INTRAVENOUS PRN
Status: CANCELLED | OUTPATIENT
Start: 2023-03-06

## 2023-03-03 RX ORDER — SODIUM CHLORIDE 9 MG/ML
INJECTION, SOLUTION INTRAVENOUS CONTINUOUS
Status: CANCELLED | OUTPATIENT
Start: 2023-03-06

## 2023-03-03 RX ORDER — SODIUM CHLORIDE 9 MG/ML
5-40 INJECTION INTRAVENOUS PRN
Status: CANCELLED | OUTPATIENT
Start: 2023-03-06

## 2023-03-03 RX ORDER — PALONOSETRON 0.05 MG/ML
0.25 INJECTION, SOLUTION INTRAVENOUS ONCE
Status: CANCELLED | OUTPATIENT
Start: 2023-03-06 | End: 2023-03-06

## 2023-03-06 ENCOUNTER — APPOINTMENT (OUTPATIENT)
Dept: GENERAL RADIOLOGY | Age: 68
DRG: 916 | End: 2023-03-06
Payer: MEDICARE

## 2023-03-06 ENCOUNTER — HOSPITAL ENCOUNTER (OUTPATIENT)
Dept: INFUSION THERAPY | Age: 68
Discharge: HOME | DRG: 916 | End: 2023-03-06
Payer: MEDICARE

## 2023-03-06 ENCOUNTER — HOSPITAL ENCOUNTER (INPATIENT)
Age: 68
LOS: 1 days | Discharge: HOME | DRG: 916 | End: 2023-03-07
Attending: EMERGENCY MEDICINE | Admitting: STUDENT IN AN ORGANIZED HEALTH CARE EDUCATION/TRAINING PROGRAM
Payer: MEDICARE

## 2023-03-06 ENCOUNTER — APPOINTMENT (OUTPATIENT)
Dept: RADIATION ONCOLOGY | Age: 68
End: 2023-03-06
Payer: MEDICARE

## 2023-03-06 VITALS
HEART RATE: 74 BPM | DIASTOLIC BLOOD PRESSURE: 65 MMHG | OXYGEN SATURATION: 99 % | BODY MASS INDEX: 28.36 KG/M2 | HEIGHT: 65 IN | RESPIRATION RATE: 16 BRPM | WEIGHT: 170.2 LBS | TEMPERATURE: 97.2 F | SYSTOLIC BLOOD PRESSURE: 127 MMHG

## 2023-03-06 DIAGNOSIS — C34.91 MALIGNANT NEOPLASM OF RIGHT LUNG, UNSPECIFIED PART OF LUNG (HCC): ICD-10-CM

## 2023-03-06 DIAGNOSIS — T78.2XXA ANAPHYLAXIS, INITIAL ENCOUNTER: Primary | ICD-10-CM

## 2023-03-06 DIAGNOSIS — C34.11 MALIGNANT NEOPLASM OF UPPER LOBE, RIGHT BRONCHUS OR LUNG (HCC): Primary | ICD-10-CM

## 2023-03-06 DIAGNOSIS — J96.01 ACUTE RESPIRATORY FAILURE WITH HYPOXIA (HCC): ICD-10-CM

## 2023-03-06 LAB
ALBUMIN SERPL-MCNC: 4 GM/DL (ref 3.4–5)
ALBUMIN SERPL-MCNC: 4 GM/DL (ref 3.4–5)
ALP BLD-CCNC: 101 IU/L (ref 40–128)
ALP BLD-CCNC: 101 IU/L (ref 40–129)
ALT SERPL-CCNC: 12 U/L (ref 10–40)
ALT SERPL-CCNC: 13 U/L (ref 10–40)
ANION GAP SERPL CALCULATED.3IONS-SCNC: 13 MMOL/L (ref 4–16)
ANION GAP SERPL CALCULATED.3IONS-SCNC: 14 MMOL/L (ref 4–16)
AST SERPL-CCNC: 14 IU/L (ref 15–37)
AST SERPL-CCNC: 15 IU/L (ref 15–37)
BASOPHILS ABSOLUTE: 0 K/CU MM
BASOPHILS ABSOLUTE: 0 K/CU MM
BASOPHILS RELATIVE PERCENT: 0.1 % (ref 0–1)
BASOPHILS RELATIVE PERCENT: 0.4 % (ref 0–1)
BILIRUB SERPL-MCNC: 0.2 MG/DL (ref 0–1)
BILIRUB SERPL-MCNC: 0.2 MG/DL (ref 0–1)
BUN SERPL-MCNC: 16 MG/DL (ref 6–23)
BUN SERPL-MCNC: 18 MG/DL (ref 6–23)
CALCIUM SERPL-MCNC: 8.2 MG/DL (ref 8.3–10.6)
CALCIUM SERPL-MCNC: 8.5 MG/DL (ref 8.3–10.6)
CHLORIDE BLD-SCNC: 95 MMOL/L (ref 99–110)
CHLORIDE BLD-SCNC: 95 MMOL/L (ref 99–110)
CO2: 23 MMOL/L (ref 21–32)
CO2: 25 MMOL/L (ref 21–32)
CREAT SERPL-MCNC: 1.1 MG/DL (ref 0.6–1.1)
CREAT SERPL-MCNC: 1.1 MG/DL (ref 0.6–1.1)
DIFFERENTIAL TYPE: ABNORMAL
DIFFERENTIAL TYPE: ABNORMAL
EGFR, POC: 41 ML/MIN/1.73M2
EOSINOPHILS ABSOLUTE: 0 K/CU MM
EOSINOPHILS ABSOLUTE: 0.2 K/CU MM
EOSINOPHILS RELATIVE PERCENT: 0.2 % (ref 0–3)
EOSINOPHILS RELATIVE PERCENT: 2.1 % (ref 0–3)
GFR SERPL CREATININE-BSD FRML MDRD: 55 ML/MIN/1.73M2
GFR SERPL CREATININE-BSD FRML MDRD: 55 ML/MIN/1.73M2
GLUCOSE BLD-MCNC: 96 MG/DL (ref 70–99)
GLUCOSE SERPL-MCNC: 249 MG/DL (ref 70–99)
GLUCOSE SERPL-MCNC: 90 MG/DL (ref 70–99)
HCT VFR BLD CALC: 31.8 % (ref 37–47)
HCT VFR BLD CALC: 33.8 % (ref 37–47)
HEMOGLOBIN: 10.4 GM/DL (ref 12.5–16)
HEMOGLOBIN: 10.6 GM/DL (ref 12.5–16)
IMMATURE NEUTROPHIL %: 1.1 % (ref 0–0.43)
LYMPHOCYTES ABSOLUTE: 0.7 K/CU MM
LYMPHOCYTES ABSOLUTE: 1.5 K/CU MM
LYMPHOCYTES RELATIVE PERCENT: 20.3 % (ref 24–44)
LYMPHOCYTES RELATIVE PERCENT: 6.3 % (ref 24–44)
MCH RBC QN AUTO: 30 PG (ref 27–31)
MCH RBC QN AUTO: 30.2 PG (ref 27–31)
MCHC RBC AUTO-ENTMCNC: 31.4 % (ref 32–36)
MCHC RBC AUTO-ENTMCNC: 32.7 % (ref 32–36)
MCV RBC AUTO: 92.4 FL (ref 78–100)
MCV RBC AUTO: 95.8 FL (ref 78–100)
MONOCYTES ABSOLUTE: 0.1 K/CU MM
MONOCYTES ABSOLUTE: 0.4 K/CU MM
MONOCYTES RELATIVE PERCENT: 0.8 % (ref 0–4)
MONOCYTES RELATIVE PERCENT: 5 % (ref 0–4)
NUCLEATED RBC %: 0 %
PDW BLD-RTO: 13 % (ref 11.7–14.9)
PDW BLD-RTO: 13.8 % (ref 11.7–14.9)
PLATELET # BLD: 313 K/CU MM (ref 140–440)
PLATELET # BLD: 356 K/CU MM (ref 140–440)
PMV BLD AUTO: 9.1 FL (ref 7.5–11.1)
PMV BLD AUTO: 9.1 FL (ref 7.5–11.1)
POC BUN: 15 MG/DL (ref 8–26)
POC CALCIUM: 1.09 MMOL/L (ref 1.12–1.32)
POC CHLORIDE: 100 MMOL/L (ref 98–109)
POC CO2: 22 MMOL/L (ref 21–32)
POC CREATININE: 1.4 MG/DL (ref 0.6–1.1)
POTASSIUM SERPL-SCNC: 3.2 MMOL/L (ref 3.5–5.1)
POTASSIUM SERPL-SCNC: 3.6 MMOL/L (ref 3.5–4.5)
POTASSIUM SERPL-SCNC: 3.9 MMOL/L (ref 3.5–5.1)
PRO-BNP: 106.2 PG/ML
RBC # BLD: 3.44 M/CU MM (ref 4.2–5.4)
RBC # BLD: 3.53 M/CU MM (ref 4.2–5.4)
SEGMENTED NEUTROPHILS ABSOLUTE COUNT: 5.2 K/CU MM
SEGMENTED NEUTROPHILS ABSOLUTE COUNT: 9.6 K/CU MM
SEGMENTED NEUTROPHILS RELATIVE PERCENT: 72.2 % (ref 36–66)
SEGMENTED NEUTROPHILS RELATIVE PERCENT: 91.5 % (ref 36–66)
SODIUM BLD-SCNC: 132 MMOL/L (ref 135–145)
SODIUM BLD-SCNC: 133 MMOL/L (ref 135–145)
SODIUM BLD-SCNC: 134 MMOL/L (ref 138–146)
SOURCE, BLOOD GAS: ABNORMAL
TOTAL IMMATURE NEUTOROPHIL: 0.12 K/CU MM
TOTAL NUCLEATED RBC: 0 K/CU MM
TOTAL PROTEIN: 6.5 GM/DL (ref 6.4–8.2)
TOTAL PROTEIN: 6.8 GM/DL (ref 6.4–8.2)
TROPONIN T: <0.01 NG/ML
WBC # BLD: 10.5 K/CU MM (ref 4–10.5)
WBC # BLD: 7.2 K/CU MM (ref 4–10.5)

## 2023-03-06 PROCEDURE — 2700000000 HC OXYGEN THERAPY PER DAY

## 2023-03-06 PROCEDURE — 2000000000 HC ICU R&B

## 2023-03-06 PROCEDURE — 2580000003 HC RX 258: Performed by: PHYSICIAN ASSISTANT

## 2023-03-06 PROCEDURE — 96372 THER/PROPH/DIAG INJ SC/IM: CPT

## 2023-03-06 PROCEDURE — 83880 ASSAY OF NATRIURETIC PEPTIDE: CPT

## 2023-03-06 PROCEDURE — 96413 CHEMO IV INFUSION 1 HR: CPT

## 2023-03-06 PROCEDURE — 96375 TX/PRO/DX INJ NEW DRUG ADDON: CPT

## 2023-03-06 PROCEDURE — 2580000003 HC RX 258: Performed by: NURSE PRACTITIONER

## 2023-03-06 PROCEDURE — 71045 X-RAY EXAM CHEST 1 VIEW: CPT

## 2023-03-06 PROCEDURE — 2500000003 HC RX 250 WO HCPCS: Performed by: NURSE PRACTITIONER

## 2023-03-06 PROCEDURE — 80053 COMPREHEN METABOLIC PANEL: CPT

## 2023-03-06 PROCEDURE — 6370000000 HC RX 637 (ALT 250 FOR IP): Performed by: STUDENT IN AN ORGANIZED HEALTH CARE EDUCATION/TRAINING PROGRAM

## 2023-03-06 PROCEDURE — 94640 AIRWAY INHALATION TREATMENT: CPT

## 2023-03-06 PROCEDURE — 6370000000 HC RX 637 (ALT 250 FOR IP): Performed by: EMERGENCY MEDICINE

## 2023-03-06 PROCEDURE — 6360000002 HC RX W HCPCS: Performed by: NURSE PRACTITIONER

## 2023-03-06 PROCEDURE — 84484 ASSAY OF TROPONIN QUANT: CPT

## 2023-03-06 PROCEDURE — 6360000002 HC RX W HCPCS

## 2023-03-06 PROCEDURE — 85025 COMPLETE CBC W/AUTO DIFF WBC: CPT

## 2023-03-06 PROCEDURE — 99285 EMERGENCY DEPT VISIT HI MDM: CPT

## 2023-03-06 PROCEDURE — 2580000003 HC RX 258: Performed by: STUDENT IN AN ORGANIZED HEALTH CARE EDUCATION/TRAINING PROGRAM

## 2023-03-06 PROCEDURE — 94664 DEMO&/EVAL PT USE INHALER: CPT

## 2023-03-06 RX ORDER — EPINEPHRINE 0.3 MG/.3ML
INJECTION SUBCUTANEOUS
Status: COMPLETED
Start: 2023-03-06 | End: 2023-03-06

## 2023-03-06 RX ORDER — GABAPENTIN 100 MG/1
100 CAPSULE ORAL NIGHTLY
Status: DISCONTINUED | OUTPATIENT
Start: 2023-03-06 | End: 2023-03-07 | Stop reason: HOSPADM

## 2023-03-06 RX ORDER — EPINEPHRINE 1 MG/ML
0.3 INJECTION, SOLUTION, CONCENTRATE INTRAVENOUS PRN
Status: DISCONTINUED | OUTPATIENT
Start: 2023-03-06 | End: 2023-03-07 | Stop reason: HOSPADM

## 2023-03-06 RX ORDER — 0.9 % SODIUM CHLORIDE 0.9 %
500 INTRAVENOUS SOLUTION INTRAVENOUS ONCE
Status: COMPLETED | OUTPATIENT
Start: 2023-03-06 | End: 2023-03-06

## 2023-03-06 RX ORDER — FAMOTIDINE 10 MG/ML
20 INJECTION, SOLUTION INTRAVENOUS
Status: COMPLETED | OUTPATIENT
Start: 2023-03-06 | End: 2023-03-06

## 2023-03-06 RX ORDER — SODIUM CHLORIDE 0.9 % (FLUSH) 0.9 %
5-40 SYRINGE (ML) INJECTION EVERY 12 HOURS SCHEDULED
Status: DISCONTINUED | OUTPATIENT
Start: 2023-03-06 | End: 2023-03-07 | Stop reason: HOSPADM

## 2023-03-06 RX ORDER — EPINEPHRINE 1 MG/ML
INJECTION, SOLUTION, CONCENTRATE INTRAVENOUS
Status: COMPLETED
Start: 2023-03-06 | End: 2023-03-06

## 2023-03-06 RX ORDER — ACETAMINOPHEN 650 MG/1
650 SUPPOSITORY RECTAL EVERY 6 HOURS PRN
Status: DISCONTINUED | OUTPATIENT
Start: 2023-03-06 | End: 2023-03-07 | Stop reason: HOSPADM

## 2023-03-06 RX ORDER — ONDANSETRON 4 MG/1
4 TABLET, ORALLY DISINTEGRATING ORAL EVERY 8 HOURS PRN
Status: DISCONTINUED | OUTPATIENT
Start: 2023-03-06 | End: 2023-03-07 | Stop reason: HOSPADM

## 2023-03-06 RX ORDER — BENZONATATE 100 MG/1
100 CAPSULE ORAL 3 TIMES DAILY
Status: DISCONTINUED | OUTPATIENT
Start: 2023-03-06 | End: 2023-03-07 | Stop reason: HOSPADM

## 2023-03-06 RX ORDER — POTASSIUM CHLORIDE 7.45 MG/ML
10 INJECTION INTRAVENOUS PRN
Status: DISCONTINUED | OUTPATIENT
Start: 2023-03-06 | End: 2023-03-07 | Stop reason: HOSPADM

## 2023-03-06 RX ORDER — DIPHENHYDRAMINE HYDROCHLORIDE 50 MG/ML
25 INJECTION INTRAMUSCULAR; INTRAVENOUS EVERY 6 HOURS
Status: DISCONTINUED | OUTPATIENT
Start: 2023-03-06 | End: 2023-03-07 | Stop reason: HOSPADM

## 2023-03-06 RX ORDER — BUDESONIDE AND FORMOTEROL FUMARATE DIHYDRATE 160; 4.5 UG/1; UG/1
2 AEROSOL RESPIRATORY (INHALATION) 2 TIMES DAILY
Status: DISCONTINUED | OUTPATIENT
Start: 2023-03-06 | End: 2023-03-07 | Stop reason: HOSPADM

## 2023-03-06 RX ORDER — FAMOTIDINE 10 MG/ML
20 INJECTION, SOLUTION INTRAVENOUS ONCE
Status: COMPLETED | OUTPATIENT
Start: 2023-03-06 | End: 2023-03-06

## 2023-03-06 RX ORDER — ONDANSETRON 2 MG/ML
4 INJECTION INTRAMUSCULAR; INTRAVENOUS EVERY 6 HOURS PRN
Status: DISCONTINUED | OUTPATIENT
Start: 2023-03-06 | End: 2023-03-07 | Stop reason: HOSPADM

## 2023-03-06 RX ORDER — ENOXAPARIN SODIUM 100 MG/ML
40 INJECTION SUBCUTANEOUS EVERY EVENING
Status: DISCONTINUED | OUTPATIENT
Start: 2023-03-07 | End: 2023-03-07 | Stop reason: HOSPADM

## 2023-03-06 RX ORDER — ATORVASTATIN CALCIUM 10 MG/1
10 TABLET, FILM COATED ORAL DAILY
Status: DISCONTINUED | OUTPATIENT
Start: 2023-03-07 | End: 2023-03-06

## 2023-03-06 RX ORDER — SIMVASTATIN 20 MG
20 TABLET ORAL NIGHTLY
Status: DISCONTINUED | OUTPATIENT
Start: 2023-03-06 | End: 2023-03-07 | Stop reason: HOSPADM

## 2023-03-06 RX ORDER — SODIUM CHLORIDE FOR INHALATION 0.9 %
3 VIAL, NEBULIZER (ML) INHALATION EVERY 4 HOURS PRN
Status: DISCONTINUED | OUTPATIENT
Start: 2023-03-06 | End: 2023-03-07 | Stop reason: HOSPADM

## 2023-03-06 RX ORDER — ALBUTEROL SULFATE 2.5 MG/3ML
2.5 SOLUTION RESPIRATORY (INHALATION) EVERY 6 HOURS PRN
Status: DISCONTINUED | OUTPATIENT
Start: 2023-03-06 | End: 2023-03-07 | Stop reason: HOSPADM

## 2023-03-06 RX ORDER — ACETAMINOPHEN 325 MG/1
650 TABLET ORAL EVERY 6 HOURS PRN
Status: DISCONTINUED | OUTPATIENT
Start: 2023-03-06 | End: 2023-03-07 | Stop reason: HOSPADM

## 2023-03-06 RX ORDER — SODIUM CHLORIDE 9 MG/ML
INJECTION, SOLUTION INTRAVENOUS PRN
Status: DISCONTINUED | OUTPATIENT
Start: 2023-03-06 | End: 2023-03-07 | Stop reason: HOSPADM

## 2023-03-06 RX ORDER — EPINEPHRINE 1 MG/ML
0.3 INJECTION, SOLUTION, CONCENTRATE INTRAVENOUS EVERY 5 MIN PRN
Status: DISCONTINUED | OUTPATIENT
Start: 2023-03-06 | End: 2023-03-07 | Stop reason: HOSPADM

## 2023-03-06 RX ORDER — POLYETHYLENE GLYCOL 3350 17 G/17G
17 POWDER, FOR SOLUTION ORAL DAILY PRN
Status: DISCONTINUED | OUTPATIENT
Start: 2023-03-06 | End: 2023-03-07 | Stop reason: HOSPADM

## 2023-03-06 RX ORDER — SODIUM CHLORIDE 9 MG/ML
5-250 INJECTION, SOLUTION INTRAVENOUS PRN
Status: DISCONTINUED | OUTPATIENT
Start: 2023-03-06 | End: 2023-03-07 | Stop reason: HOSPADM

## 2023-03-06 RX ORDER — HEPARIN SODIUM (PORCINE) LOCK FLUSH IV SOLN 100 UNIT/ML 100 UNIT/ML
500 SOLUTION INTRAVENOUS PRN
Status: DISCONTINUED | OUTPATIENT
Start: 2023-03-06 | End: 2023-03-07 | Stop reason: HOSPADM

## 2023-03-06 RX ORDER — LORAZEPAM 2 MG/ML
1 INJECTION INTRAMUSCULAR 2 TIMES DAILY PRN
Status: DISCONTINUED | OUTPATIENT
Start: 2023-03-06 | End: 2023-03-07 | Stop reason: HOSPADM

## 2023-03-06 RX ORDER — PALONOSETRON 0.05 MG/ML
0.25 INJECTION, SOLUTION INTRAVENOUS ONCE
Status: COMPLETED | OUTPATIENT
Start: 2023-03-06 | End: 2023-03-06

## 2023-03-06 RX ORDER — MAGNESIUM SULFATE IN WATER 40 MG/ML
2000 INJECTION, SOLUTION INTRAVENOUS PRN
Status: DISCONTINUED | OUTPATIENT
Start: 2023-03-06 | End: 2023-03-07 | Stop reason: HOSPADM

## 2023-03-06 RX ORDER — IPRATROPIUM BROMIDE AND ALBUTEROL SULFATE 2.5; .5 MG/3ML; MG/3ML
1 SOLUTION RESPIRATORY (INHALATION) ONCE
Status: DISCONTINUED | OUTPATIENT
Start: 2023-03-06 | End: 2023-03-06

## 2023-03-06 RX ORDER — SODIUM CHLORIDE, SODIUM LACTATE, POTASSIUM CHLORIDE, CALCIUM CHLORIDE 600; 310; 30; 20 MG/100ML; MG/100ML; MG/100ML; MG/100ML
INJECTION, SOLUTION INTRAVENOUS CONTINUOUS
Status: DISPENSED | OUTPATIENT
Start: 2023-03-06 | End: 2023-03-07

## 2023-03-06 RX ORDER — SODIUM CHLORIDE 0.9 % (FLUSH) 0.9 %
5-40 SYRINGE (ML) INJECTION PRN
Status: DISCONTINUED | OUTPATIENT
Start: 2023-03-06 | End: 2023-03-07 | Stop reason: HOSPADM

## 2023-03-06 RX ORDER — DIPHENHYDRAMINE HYDROCHLORIDE 50 MG/ML
25 INJECTION INTRAMUSCULAR; INTRAVENOUS ONCE
Status: COMPLETED | OUTPATIENT
Start: 2023-03-06 | End: 2023-03-06

## 2023-03-06 RX ORDER — SODIUM CHLORIDE 1000 MG
1 TABLET, SOLUBLE MISCELLANEOUS DAILY
Status: DISCONTINUED | OUTPATIENT
Start: 2023-03-07 | End: 2023-03-07 | Stop reason: HOSPADM

## 2023-03-06 RX ORDER — GUAIFENESIN 600 MG/1
600 TABLET, EXTENDED RELEASE ORAL 2 TIMES DAILY PRN
Status: DISCONTINUED | OUTPATIENT
Start: 2023-03-06 | End: 2023-03-07 | Stop reason: HOSPADM

## 2023-03-06 RX ORDER — EZETIMIBE 10 MG/1
10 TABLET ORAL DAILY
Status: DISCONTINUED | OUTPATIENT
Start: 2023-03-07 | End: 2023-03-07 | Stop reason: HOSPADM

## 2023-03-06 RX ORDER — PANTOPRAZOLE SODIUM 40 MG/1
40 TABLET, DELAYED RELEASE ORAL
Status: DISCONTINUED | OUTPATIENT
Start: 2023-03-07 | End: 2023-03-06

## 2023-03-06 RX ORDER — IPRATROPIUM BROMIDE AND ALBUTEROL SULFATE 2.5; .5 MG/3ML; MG/3ML
1 SOLUTION RESPIRATORY (INHALATION) ONCE
Status: CANCELLED | OUTPATIENT
Start: 2023-03-06

## 2023-03-06 RX ADMIN — SODIUM CHLORIDE, POTASSIUM CHLORIDE, SODIUM LACTATE AND CALCIUM CHLORIDE: 600; 310; 30; 20 INJECTION, SOLUTION INTRAVENOUS at 22:44

## 2023-03-06 RX ADMIN — ACETAMINOPHEN 650 MG: 325 TABLET ORAL at 22:37

## 2023-03-06 RX ADMIN — PALONOSETRON 0.25 MG: 0.25 INJECTION, SOLUTION INTRAVENOUS at 14:22

## 2023-03-06 RX ADMIN — DIPHENHYDRAMINE HYDROCHLORIDE 25 MG: 50 INJECTION INTRAMUSCULAR; INTRAVENOUS at 14:22

## 2023-03-06 RX ADMIN — FAMOTIDINE 20 MG: 10 INJECTION, SOLUTION INTRAVENOUS at 16:26

## 2023-03-06 RX ADMIN — FAMOTIDINE 20 MG: 10 INJECTION, SOLUTION INTRAVENOUS at 14:22

## 2023-03-06 RX ADMIN — BUDESONIDE AND FORMOTEROL FUMARATE DIHYDRATE 2 PUFF: 160; 4.5 AEROSOL RESPIRATORY (INHALATION) at 21:21

## 2023-03-06 RX ADMIN — BENZONATATE 100 MG: 100 CAPSULE ORAL at 22:37

## 2023-03-06 RX ADMIN — GUAIFENESIN 600 MG: 600 TABLET, EXTENDED RELEASE ORAL at 22:37

## 2023-03-06 RX ADMIN — SODIUM CHLORIDE 500 ML: 9 INJECTION, SOLUTION INTRAVENOUS at 14:14

## 2023-03-06 RX ADMIN — EPINEPHRINE: 0.3 INJECTION INTRAMUSCULAR at 16:30

## 2023-03-06 RX ADMIN — EPINEPHRINE 0.3 MG: 1 INJECTION, SOLUTION, CONCENTRATE INTRAVENOUS at 16:21

## 2023-03-06 RX ADMIN — IPRATROPIUM BROMIDE AND ALBUTEROL SULFATE 1 AMPULE: .5; 3 SOLUTION RESPIRATORY (INHALATION) at 17:54

## 2023-03-06 RX ADMIN — SODIUM CHLORIDE, PRESERVATIVE FREE 10 ML: 5 INJECTION INTRAVENOUS at 22:44

## 2023-03-06 RX ADMIN — DEXAMETHASONE SODIUM PHOSPHATE 12 MG: 4 INJECTION INTRA-ARTICULAR; INTRALESIONAL; INTRAMUSCULAR; INTRAVENOUS; SOFT TISSUE at 14:29

## 2023-03-06 RX ADMIN — EPINEPHRINE: 1 INJECTION, SOLUTION, CONCENTRATE INTRAVENOUS at 16:23

## 2023-03-06 RX ADMIN — PACLITAXEL 78 MG: 6 INJECTION, SOLUTION INTRAVENOUS at 14:59

## 2023-03-06 RX ADMIN — CARBOPLATIN 131 MG: 10 INJECTION, SOLUTION INTRAVENOUS at 16:06

## 2023-03-06 RX ADMIN — GABAPENTIN 100 MG: 100 CAPSULE ORAL at 22:37

## 2023-03-06 ASSESSMENT — PAIN - FUNCTIONAL ASSESSMENT: PAIN_FUNCTIONAL_ASSESSMENT: NONE - DENIES PAIN

## 2023-03-06 ASSESSMENT — PAIN SCALES - GENERAL: PAINLEVEL_OUTOF10: 8

## 2023-03-06 ASSESSMENT — LIFESTYLE VARIABLES
HOW MANY STANDARD DRINKS CONTAINING ALCOHOL DO YOU HAVE ON A TYPICAL DAY: PATIENT DOES NOT DRINK
HOW OFTEN DO YOU HAVE A DRINK CONTAINING ALCOHOL: NEVER

## 2023-03-06 ASSESSMENT — PAIN DESCRIPTION - LOCATION: LOCATION: HEAD

## 2023-03-06 NOTE — PROGRESS NOTES
Ambulated to infusion area, here today for chemotherapy. Patient reports she has ongoing cough,is on azithromycin which has one day left of, and is helping. Also using Flonase. Left chest mediport accessed, positive blood return noted. Labs drawn. Creat 1.4, discussed with REGINA Cifuentes, will give 500mL NS today with treatment. Chemo administered as ordered. Call light within reach. During Carboplatin administration patient c/o feeling hot, flushed, and short of breath. This nurse to chairside to assess, patient hot, flushed, short of breath and reporting that she cannot breathe. Patient clammy. 1618: Infusion stopped, fluids opened  1620 Dr Mechelle Mack @ Chairside, VS: P 94, 84% on RA, placed on 2L oxygen. 100mg Solucortef given. 1621 1st Epipen dose given  1623, 2nd dose Epipen given  1624: VS: P 92, o2 82% on 2L  1625: Increased oxygen to 5L, patient reports unable to breathe still; 2nd dose 100mg Solucortef given with no improvement  1626: pepcid 20mg given, patient feeling nauseated, VS P 132, /104, o2 82% on 5L  1630: 3rd dose of Epi given per Dr. Mechelle Mack, squad called. 1638: Squad at chairside, report given and care handed off to patient. Belongings given to patient's . 1647: ER called to update charge nurse.

## 2023-03-07 ENCOUNTER — HOSPITAL ENCOUNTER (OUTPATIENT)
Dept: RADIATION ONCOLOGY | Age: 68
End: 2023-03-07
Payer: MEDICARE

## 2023-03-07 VITALS
HEART RATE: 81 BPM | BODY MASS INDEX: 28.1 KG/M2 | DIASTOLIC BLOOD PRESSURE: 78 MMHG | RESPIRATION RATE: 22 BRPM | WEIGHT: 168.65 LBS | TEMPERATURE: 97.9 F | OXYGEN SATURATION: 98 % | SYSTOLIC BLOOD PRESSURE: 126 MMHG | HEIGHT: 65 IN

## 2023-03-07 LAB
ANION GAP SERPL CALCULATED.3IONS-SCNC: 15 MMOL/L (ref 4–16)
ANISOCYTOSIS: ABNORMAL
BANDED NEUTROPHILS ABSOLUTE COUNT: 0.08 K/CU MM
BANDED NEUTROPHILS RELATIVE PERCENT: 1 % (ref 5–11)
BUN SERPL-MCNC: 15 MG/DL (ref 6–23)
CALCIUM SERPL-MCNC: 8.6 MG/DL (ref 8.3–10.6)
CHLORIDE BLD-SCNC: 97 MMOL/L (ref 99–110)
CO2: 24 MMOL/L (ref 21–32)
CREAT SERPL-MCNC: 1 MG/DL (ref 0.6–1.1)
DIFFERENTIAL TYPE: ABNORMAL
GFR SERPL CREATININE-BSD FRML MDRD: >60 ML/MIN/1.73M2
GLUCOSE SERPL-MCNC: 128 MG/DL (ref 70–99)
HCT VFR BLD CALC: 30.4 % (ref 37–47)
HEMOGLOBIN: 10.1 GM/DL (ref 12.5–16)
MACROCYTES: ABNORMAL
MAGNESIUM: 1.5 MG/DL (ref 1.8–2.4)
MCH RBC QN AUTO: 30.5 PG (ref 27–31)
MCHC RBC AUTO-ENTMCNC: 33.2 % (ref 32–36)
MCV RBC AUTO: 91.8 FL (ref 78–100)
MICROCYTES: ABNORMAL
MYELOCYTE PERCENT: 1 %
MYELOCYTES ABSOLUTE COUNT: 0.08 K/CU MM
PDW BLD-RTO: 12.9 % (ref 11.7–14.9)
PHOSPHORUS: 3.5 MG/DL (ref 2.5–4.9)
PLATELET # BLD: 273 K/CU MM (ref 140–440)
PMV BLD AUTO: 8.9 FL (ref 7.5–11.1)
POLYCHROMASIA: ABNORMAL
POTASSIUM SERPL-SCNC: 3.8 MMOL/L (ref 3.5–5.1)
RBC # BLD: 3.31 M/CU MM (ref 4.2–5.4)
SEGMENTED NEUTROPHILS ABSOLUTE COUNT: 7.3 K/CU MM
SEGMENTED NEUTROPHILS RELATIVE PERCENT: 98 % (ref 36–66)
SODIUM BLD-SCNC: 136 MMOL/L (ref 135–145)
WBC # BLD: 7.5 K/CU MM (ref 4–10.5)

## 2023-03-07 PROCEDURE — 6370000000 HC RX 637 (ALT 250 FOR IP): Performed by: SPECIALIST

## 2023-03-07 PROCEDURE — 85007 BL SMEAR W/DIFF WBC COUNT: CPT

## 2023-03-07 PROCEDURE — 6370000000 HC RX 637 (ALT 250 FOR IP): Performed by: STUDENT IN AN ORGANIZED HEALTH CARE EDUCATION/TRAINING PROGRAM

## 2023-03-07 PROCEDURE — 84100 ASSAY OF PHOSPHORUS: CPT

## 2023-03-07 PROCEDURE — 2580000003 HC RX 258: Performed by: STUDENT IN AN ORGANIZED HEALTH CARE EDUCATION/TRAINING PROGRAM

## 2023-03-07 PROCEDURE — 94640 AIRWAY INHALATION TREATMENT: CPT

## 2023-03-07 PROCEDURE — 85027 COMPLETE CBC AUTOMATED: CPT

## 2023-03-07 PROCEDURE — 83735 ASSAY OF MAGNESIUM: CPT

## 2023-03-07 PROCEDURE — 94761 N-INVAS EAR/PLS OXIMETRY MLT: CPT

## 2023-03-07 PROCEDURE — 6360000002 HC RX W HCPCS: Performed by: STUDENT IN AN ORGANIZED HEALTH CARE EDUCATION/TRAINING PROGRAM

## 2023-03-07 PROCEDURE — 2500000003 HC RX 250 WO HCPCS: Performed by: STUDENT IN AN ORGANIZED HEALTH CARE EDUCATION/TRAINING PROGRAM

## 2023-03-07 PROCEDURE — A4216 STERILE WATER/SALINE, 10 ML: HCPCS | Performed by: STUDENT IN AN ORGANIZED HEALTH CARE EDUCATION/TRAINING PROGRAM

## 2023-03-07 PROCEDURE — 80048 BASIC METABOLIC PNL TOTAL CA: CPT

## 2023-03-07 RX ORDER — PREDNISONE 20 MG/1
40 TABLET ORAL DAILY
Qty: 8 TABLET | Refills: 0 | Status: SHIPPED | OUTPATIENT
Start: 2023-03-08 | End: 2023-03-12

## 2023-03-07 RX ORDER — BENZONATATE 100 MG/1
100 CAPSULE ORAL 3 TIMES DAILY
Qty: 20 CAPSULE | Refills: 0 | Status: SHIPPED | OUTPATIENT
Start: 2023-03-07 | End: 2023-03-14

## 2023-03-07 RX ORDER — GUAIFENESIN 600 MG/1
600 TABLET, EXTENDED RELEASE ORAL 2 TIMES DAILY PRN
Qty: 20 TABLET | Refills: 0 | Status: SHIPPED | OUTPATIENT
Start: 2023-03-07

## 2023-03-07 RX ORDER — PREDNISONE 20 MG/1
40 TABLET ORAL DAILY
Status: DISCONTINUED | OUTPATIENT
Start: 2023-03-07 | End: 2023-03-07 | Stop reason: HOSPADM

## 2023-03-07 RX ADMIN — FAMOTIDINE 20 MG: 10 INJECTION, SOLUTION INTRAVENOUS at 08:35

## 2023-03-07 RX ADMIN — GUAIFENESIN 600 MG: 600 TABLET, EXTENDED RELEASE ORAL at 05:08

## 2023-03-07 RX ADMIN — ACETAMINOPHEN 650 MG: 325 TABLET ORAL at 05:08

## 2023-03-07 RX ADMIN — EZETIMIBE 10 MG: 10 TABLET ORAL at 08:35

## 2023-03-07 RX ADMIN — SODIUM CHLORIDE, PRESERVATIVE FREE 10 ML: 5 INJECTION INTRAVENOUS at 08:35

## 2023-03-07 RX ADMIN — BUDESONIDE AND FORMOTEROL FUMARATE DIHYDRATE 2 PUFF: 160; 4.5 AEROSOL RESPIRATORY (INHALATION) at 08:21

## 2023-03-07 RX ADMIN — BENZONATATE 100 MG: 100 CAPSULE ORAL at 08:35

## 2023-03-07 RX ADMIN — ONDANSETRON 4 MG: 2 INJECTION INTRAMUSCULAR; INTRAVENOUS at 02:18

## 2023-03-07 RX ADMIN — DIPHENHYDRAMINE HYDROCHLORIDE 25 MG: 50 INJECTION, SOLUTION INTRAMUSCULAR; INTRAVENOUS at 02:16

## 2023-03-07 RX ADMIN — MAGNESIUM SULFATE HEPTAHYDRATE 2000 MG: 2 INJECTION, SOLUTION INTRAVENOUS at 06:20

## 2023-03-07 RX ADMIN — PREDNISONE 40 MG: 20 TABLET ORAL at 08:35

## 2023-03-07 RX ADMIN — FAMOTIDINE 20 MG: 10 INJECTION, SOLUTION INTRAVENOUS at 00:11

## 2023-03-07 RX ADMIN — SODIUM CHLORIDE TAB 1 GM 1 G: 1 TAB at 08:35

## 2023-03-07 ASSESSMENT — PAIN DESCRIPTION - LOCATION
LOCATION: HEAD
LOCATION: HEAD

## 2023-03-07 ASSESSMENT — ENCOUNTER SYMPTOMS
CHEST TIGHTNESS: 1
ALLERGIC/IMMUNOLOGIC NEGATIVE: 1
SHORTNESS OF BREATH: 1
WHEEZING: 1
EYES NEGATIVE: 1
GASTROINTESTINAL NEGATIVE: 1

## 2023-03-07 ASSESSMENT — PAIN SCALES - GENERAL
PAINLEVEL_OUTOF10: 0
PAINLEVEL_OUTOF10: 0
PAINLEVEL_OUTOF10: 6
PAINLEVEL_OUTOF10: 5
PAINLEVEL_OUTOF10: 3
PAINLEVEL_OUTOF10: 0

## 2023-03-07 NOTE — PLAN OF CARE
Problem: Pain  Goal: Verbalizes/displays adequate comfort level or baseline comfort level  3/7/2023 1248 by Ramesh Mccartney RN  Outcome: Completed  3/7/2023 1247 by Ramesh Mccartney RN  Outcome: Adequate for Discharge  3/7/2023 0940 by Ramesh Mccartney RN  Outcome: Progressing  3/6/2023 2339 by Kyle Meehan RN  Outcome: Progressing     Problem: Chronic Conditions and Co-morbidities  Goal: Patient's chronic conditions and co-morbidity symptoms are monitored and maintained or improved  3/7/2023 1248 by Ramesh Mccartney RN  Outcome: Completed  3/7/2023 1247 by Ramesh Mccartney RN  Outcome: Adequate for Discharge  3/7/2023 0940 by Ramesh Mccartney RN  Outcome: Progressing     Problem: Safety - Adult  Goal: Free from fall injury  3/7/2023 1248 by Ramesh Mccartney RN  Outcome: Completed  3/7/2023 1247 by Ramesh Mccartney RN  Outcome: Adequate for Discharge  3/7/2023 0940 by Ramesh Mccartney RN  Outcome: Progressing     Problem: ABCDS Injury Assessment  Goal: Absence of physical injury  3/7/2023 1248 by Ramesh Mccartney RN  Outcome: Completed  3/7/2023 1247 by Ramesh Mccartney RN  Outcome: Adequate for Discharge  3/7/2023 0940 by Ramesh Mccartney RN  Outcome: Progressing     Problem: Skin/Tissue Integrity  Goal: Absence of new skin breakdown  Description: 1. Monitor for areas of redness and/or skin breakdown  2. Assess vascular access sites hourly  3. Every 4-6 hours minimum:  Change oxygen saturation probe site  4. Every 4-6 hours:  If on nasal continuous positive airway pressure, respiratory therapy assess nares and determine need for appliance change or resting period.   3/7/2023 1248 by Ramesh Mccartney RN  Outcome: Completed  3/7/2023 1247 by Ramesh Mccartney RN  Outcome: Adequate for Discharge  3/7/2023 0940 by Ramesh Mccartney RN  Outcome: Progressing     Problem: Discharge Planning  Goal: Discharge to home or other facility with appropriate resources  3/7/2023 1248 by Ramesh Mccartney RN  Outcome: Completed  3/7/2023 1247 by Leandra eKlly RN  Outcome: Adequate for Discharge  3/7/2023 0940 by Leandra Kelly RN  Outcome: Progressing  Flowsheets  Taken 3/7/2023 0815 by Leandra Kelly RN  Discharge to home or other facility with appropriate resources:   Identify barriers to discharge with patient and caregiver   Refer to discharge planning if patient needs post-hospital services based on physician order or complex needs related to functional status, cognitive ability or social support system  Taken 3/6/2023 2131 by Wes Pagan RN  Discharge to home or other facility with appropriate resources: Refer to discharge planning if patient needs post-hospital services based on physician order or complex needs related to functional status, cognitive ability or social support system

## 2023-03-07 NOTE — CONSULTS
V2.0  [facility] Tele-Critical Care Consult Note      Name:  Almaz Walker /Age/Sex: 1955  (79 y.o. female)   MRN & CSN:  [id number] & [id number] Encounter Date/Time: 3/6/2023 9:49 PM EST   Location:  -A PCP: MD Samina Ghotra MD  Consulting Provider: Les Jimenez Day: 1    This is a telemedicine visit  Phys[place] Location[place] from home   Patient Location: [facility] at [address] is a 79 y.o. female with past medical history of SCC of RUL on chemo and RT, HTN who presents with Anaphylactic reaction, initial encounter    Hospital Problems             Last Modified POA    * (Principal) Anaphylactic reaction, initial encounter 3/6/2023 Yes       Recommendations:    Problem list  Anaphylactic reaction  Airway watch  HTN  Squamous cell carcinoma of the RUL  Moderate COPD        Neuro: Alert oriented, following commands, pain controlled on current multimodal regimen. Monitor and adjust as needed  Cardio:  /tachcyardic from epinephrine. Presented with anaphyctic reaction to chemo infusion. HDS at this time, monitor and adjust med therapy as needed. Resp: Presented with anaphylactic reaction after chemo, with symptoms of severe shortness of breath that improved after treatment with epi, famotidine, benadryl and steroids. Continue airway watch PRN racemic epi  GI: NPO, ADAT when able and if remains stable. Gi ppx, famotidine around the clock  : Cr 1.1 At baseline. Monitor electrolytes and replenish as needed  Heme:  Hgb stable. DVT ppx. ID: No clinical indication for Abx at this time.    Endo: POC BG PRN ISS  MSK: No acute issues    Tubes/Lines/Drains:  PIV    Code Status: Full           Diet Diet NPO Exceptions are: Ice Chips, Sips of Water with Meds   DVT Prophylaxis    PUD Prophylaxis  [] Lovenox, []  Heparin, [] SCDs, [] Ambulation,  [] Eliquis, [] Xarelto  [] Pepcid  [] Protonix   Code Status Full Code   Surrogate Decision Maker/ POA  Self   Hi  History Obtained From:    patient, electronic medical record    History of Present Illness:     Chief Complaint: Anaphylactic reaction, initial encounter    Agustin Sullivan is a 79 y.o. female who is seen today by the intensivist with a Chief Complaint of Allergic reaction after administration of chemotherapy drug. Patient presented to the ED from infusion center after suddenly developing throat swelling and difficulty breathing along with generalized itching following initiation of chemotherapy regimen. At the infusion center she received Epi x3, benadryl, famotidine and steroids x2 and was subsequently transported to the emergency department where she endorsed significant improvement in her breathing compared to prior. Patient was seen via tele in the ICU, she stated that already feels better compared to prior. She understands that she will require some monitoring overnight to ensure that her symptoms do not worsen. She otherwise denies any fever, chills, nausea, vomiting, headache, dizziness, lightheadedness, Fnd, chest pain, shortness of breath, abdominal pain, urinary or bowel symptoms. Review of Systems:    Review of Systems    Negative except stated above    Objective:   No intake or output data in the 24 hours ending 03/06/23 2149   Vitals:   Vitals:    03/06/23 1831 03/06/23 1902 03/06/23 1930 03/06/23 2121   BP: 139/78 127/76 (!) 142/93    Pulse: (!) 101 99 96 87   Resp: 14 19 20 19   Temp:  97.7 °F (36.5 °C)     TempSrc:  Oral     SpO2: 98% 97% 97% 94%   Weight:       Height:           Medications Prior to Admission     Prior to Admission medications    Medication Sig Start Date End Date Taking? Authorizing Provider   Magic Mouthwash (MIRACLE MOUTHWASH) Equal parts Benadryl, Maalox, 2% Viscous Xylocaine and Dexamethasone. Take 5 mL 4 times daily.  3/2/23   GRACE Sanchez CNP   sodium chloride 1 g tablet Take 1 tablet by mouth daily 2/28/23   GRACE Shafer CNP   dexamethasone (DECADRON) 2 MG tablet 1 tablet PO daily for 2 days starting the day after chemotherapy. 2/27/23   GRACE Shafer CNP   nystatin (MYCOSTATIN) 560535 UNIT/ML suspension Take 5 mLs by mouth 4 times daily for 10 days Retain in mouth as long as possible  Patient not taking: Reported on 3/6/2023 2/27/23 3/9/23  GRACE Shafer CNP   ondansetron (ZOFRAN) 8 MG tablet Take 1 tablet by mouth every 8 hours as needed for Nausea or Vomiting Take 1 tablet by mouth every 8 hours as needed for nausea or vomiting. 2/16/23   Jonas Mederos MD   ezetimibe (ZETIA) 10 MG tablet Take 1 tablet by mouth daily 2/14/23   Othelia Boeck, MD   simvastatin (ZOCOR) 20 MG tablet Take 1 tablet by mouth nightly  Patient not taking: Reported on 3/6/2023 2/8/23   Othelia Boeck, MD   omeprazole (PRILOSEC) 40 MG delayed release capsule Take 1 capsule by mouth daily 2/8/23   Othelia Boeck, MD   atenolol-chlorthalidone (TENORETIC) 100-25 MG per tablet Take 1 tablet by mouth daily 2/8/23   Othelia Boeck, MD   lisinopril (PRINIVIL;ZESTRIL) 20 MG tablet Take 1 tablet by mouth daily 2/8/23   Othelia Boeck, MD   budesonide-formoterol [facility]) 160-4.5 MCG/ACT AERO Inhale 2 puffs into the lungs 2 times daily 2/8/23   Othelia Boeck, MD   albuterol sulfate HFA (PROAIR HFA) 108 (90 Base) MCG/ACT inhaler Inhale 2 puffs into the lungs as needed for Wheezing 2/8/23   Othelia Boeck, MD   gabapentin (NEURONTIN) 100 MG capsule Take 1 capsule by mouth nightly for 90 days.  2/8/23 5/9/23  Othelia Boeck, MD   fluticasone [employer]) 50 MCG/ACT nasal spray 2 sprays by Each Nostril route daily 2/16/22   Othelia Boeck, MD   albuterol (PROVENTIL) (2.5 MG/3ML) 0.083% nebulizer solution Take 3 mLs by nebulization every 6 hours as needed for Wheezing or Shortness of Breath 2/16/22   Othelia Boeck, MD       Physical Exam: Need 8 Elements   Exam is performed through direct observation by video conferencing along with assistance from the bedside nurse and a bluetooth stethoscope. General: NAD  Eyes: EOMI  ENT: neck supple  Cardiovascular: Regular rate. tachycardic  Respiratory: Clear to auscultation  Gastrointestinal: Soft, non tender  Genitourinary: no suprapubic tenderness  Musculoskeletal: No edema  Skin: warm, dry, no noted hives   Neuro: Alert. CN II-XII grossly intact. Moving all extremities. Psych: Mood appropriate. Good judgement, anxious      Past Medical History:   PMHx   Past Medical History:   Diagnosis Date    Anemia     Anxiety     Arthritis     \"Fingers, Back\"    Bronchitis Last Episode 11-17    Chronic back pain     COPD (chronic obstructive pulmonary disease) ([facility])     Sees Dr. Etelvina Camp    Depression     Diverticulosis 06/2013    Extensive per CT    Hemoptysis 12/06/2017    Hx of blood clots     \"found I have a clot near my mediport so they are taking it out 5/16/2019- put me on Xarelto    Hyperlipidemia 2009    Hypertension 2009    Low back pain     \"better than it use to be- had therapy in the past- originally hurt back at work 20+ yrs ago\"    Lung mass     rul- scheduled to bronch 12/74/2017    Panic attacks     Pneumonia Dx 1-17    Right Lung Cancer Dx 10-17    tx with chemo following with Dr King Caba Dx 2014    \"Right Side\"    Shortness of breath on exertion     Teeth missing     Upper And Lower    Urinary tract infection In Past    No Current Symptoms    Wears dentures     Full Upper, Partial Lower    Wears glasses      PSHX:  has a past surgical history that includes Tonsillectomy (1978); Tubal ligation (1988); Elbow surgery (Left, 1980); Dilation and curettage of uterus (1989); Colonoscopy (2000's); bronchoscopy (12/07/2017); Bunionectomy (Bilateral, 1990's); Mediastinoscopy (02/21/2018); Lung surgery (04/12/2018); Tunneled venous port placement (Left, 07/31/2018); and Catheter Removal (Left, 5/16/2019). Allergies:    Allergies   Allergen Reactions    Lipitor \"Makes Me Hurt So Bad I Can't Stand It\"    Vicodin [Hydrocodone-Acetaminophen] Nausea Only    Hydrocodone      Other reaction(s): Gastrointestinal Upset    Adhesive Tape Rash     Fam HX:family history includes Cancer in her mother; Depression in her mother; Diabetes in her mother; Heart Disease in her father; High Blood Pressure in her mother; High Cholesterol in her mother.   Soc HX:   Social History     Socioeconomic History    Marital status:      Spouse name: None    Number of children: None    Years of education: None    Highest education level: None   Tobacco Use    Smoking status: Former     Packs/day: 0.50     Years: 30.00     Pack years: 15.00     Types: Cigarettes     Start date:      Quit date: 2017     Years since quittin.5    Smokeless tobacco: Never   Vaping Use    Vaping Use: Never used   Substance and Sexual Activity    Alcohol use: No    Drug use: No    Sexual activity: Not Currently     Social Determinants of Health     Financial Resource Strain: Low Risk     Difficulty of Paying Living Expenses: Not very hard   Food Insecurity: No Food Insecurity    Worried About Running Out of Food in the Last Year: Never true    Ran Out of Food in the Last Year: Never true   Transportation Needs: Unknown    Lack of Transportation (Non-Medical): No   Housing Stability: Unknown    Unstable Housing in the Last Year: No       Medications:   Medications:    benzonatate  100 mg Oral TID    budesonide-formoterol  2 puff Inhalation BID    [START ON 3/7/2023] ezetimibe  10 mg Oral Daily    gabapentin  100 mg Oral Nightly    [START ON 3/7/2023] pantoprazole  40 mg Oral QAM AC    [START ON 3/7/2023] sodium chloride  1 g Oral Daily    sodium chloride flush  5-40 mL IntraVENous 2 times per day    [START ON 3/7/2023] enoxaparin  40 mg SubCUTAneous QPM    diphenhydrAMINE  25 mg IntraVENous Q6H    [START ON 3/7/2023] famotidine (PEPCID) injection  20 mg IntraVENous Daily    simvastatin  20 mg Oral Nightly Infusions:    lactated ringers IV soln      sodium chloride       PRN Meds: albuterol, 2.5 mg, Q6H PRN  sodium chloride flush, 5-40 mL, PRN  sodium chloride, , PRN  potassium chloride, 10 mEq, PRN  magnesium sulfate, 2,000 mg, PRN  ondansetron, 4 mg, Q8H PRN   Or  ondansetron, 4 mg, Q6H PRN  polyethylene glycol, 17 g, Daily PRN  acetaminophen, 650 mg, Q6H PRN   Or  acetaminophen, 650 mg, Q6H PRN  guaiFENesin, 600 mg, BID PRN  EPINEPHrine, 0.3 mg, Q5 Min PRN  LORazepam, 1 mg, BID PRN        Labs and Imaging   XR CHEST PORTABLE    Result Date: 3/6/2023  EXAMINATION: ONE XRAY VIEW OF THE CHEST 3/6/2023 6:09 pm COMPARISON: 08/21/2018 HISTORY: ORDERING SYSTEM PROVIDED HISTORY: SOB, allergic reacton TECHNOLOGIST PROVIDED HISTORY: Reason for exam:->SOB, allergic reacton Reason for Exam: SOB, allergic reacton Additional signs and symptoms: NA Relevant Medical/Surgical History: COPD, hypertension, right lung cancer FINDINGS: Left-sided central venous catheter remains place. The lungs are without acute focal process. There is no effusion or pneumothorax. The cardiomediastinal silhouette is stable. The osseous structures are stable. No acute process.        CBC:   Recent Labs     03/06/23  1251 03/06/23  1814   WBC 7.2 10.5   HGB 10.6* 10.4*    356     BMP:    Recent Labs     03/06/23  1251 03/06/23  1355 03/06/23  1814   * 134* 132*   K 3.9 3.6 3.2*   CL 95*  --  95*   CO2 25  --  23   BUN 16  --  18   CREATININE 1.1 1.4* 1.1   GLUCOSE 90  --  249*     Hepatic:   Recent Labs     03/06/23  1251 03/06/23  1814   AST 14* 15   ALT 12 13   BILITOT 0.2 0.2   ALKPHOS 101 101     Lipids:   Lab Results   Component Value Date/Time    CHOL 274 02/08/2023 08:57 AM    HDL 27 02/08/2023 08:57 AM    TRIG 268 02/08/2023 08:57 AM     Hemoglobin A1C:   Lab Results   Component Value Date/Time    LABA1C 6.0 02/08/2023 08:57 AM     TSH:   Lab Results   Component Value Date/Time    TSH 2.34 02/01/2017 09:51 AM     Troponin: Lab Results   Component Value Date/Time    TROPONINT <0.010 03/06/2023 06:14 PM     Lactic Acid: No results for input(s): LACTA in the last 72 hours. BNP:   Recent Labs     03/06/23  1814   PROBNP 106.2     UA:  Lab Results   Component Value Date/Time    NITRU NEGATIVE 03/27/2018 11:00 AM    COLORU dhruv 02/10/2020 10:12 AM    COLORU YELLOW 03/27/2018 11:00 AM    PHUR 6.0 02/10/2020 10:12 AM    WBCUA NONE SEEN 03/27/2018 11:00 AM    RBCUA <1 03/27/2018 11:00 AM    MUCUS RARE 02/16/2018 12:00 PM    TRICHOMONAS NONE SEEN 03/27/2018 11:00 AM    BACTERIA RARE 03/27/2018 11:00 AM    CLARITYU cloudy 02/10/2020 10:12 AM    CLARITYU CLEAR 03/27/2018 11:00 AM    SPECGRAV 1.025 02/10/2020 10:12 AM    SPECGRAV 1.008 03/27/2018 11:00 AM    LEUKOCYTESUR small 02/10/2020 10:12 AM    LEUKOCYTESUR NEGATIVE 03/27/2018 11:00 AM    UROBILINOGEN NORMAL 03/27/2018 11:00 AM    BILIRUBINUR small 02/10/2020 10:12 AM    BLOODU moderate 02/10/2020 10:12 AM    BLOODU NEGATIVE 03/27/2018 11:00 AM    GLUCOSEU negative 02/10/2020 10:12 AM    KETUA trace 02/10/2020 10:12 AM    KETUA NEGATIVE 03/27/2018 11:00 AM     Urine Cultures:   Lab Results   Component Value Date/Time    LABURIN >100,000 CFU/ml 02/10/2020 10:30 AM     Blood Cultures: No results found for: BC  No results found for: BLOODCULT2  Organism:   Lab Results   Component Value Date/Time    ORG Escherichia coli 02/10/2020 10:30 AM       Personally reviewed Lab Studies, Imaging, and discussed with Hospitalist and bedside nurse    Critical care attestation:    I spent 32 cumulative minutes (excluding procedures), in full attention to this critically ill patient. I have personally reviewed the patient's history and interval events in the EMR, along with vitals, labs and radiology imaging. Critical care time was spent personally providing care for this patient, excluding billable procedures, and no overlapping with any other provider.   This includes documenting my assessment and plan of care and developing the care plan to treat the problems below. The high probability of deterioration required my full and direct attention, intervention, and personal management due to do to underlying diagnosis and clinical problems including the treatment of active or impending:  [] Neurological monitoring and treatment  [x] Respiratory failure -assessment of ventilator support, adjustment, ventilator weaning  [] Hemodynamic and volume assessment with volume resuscitation  [] Mechanical and/or chemical support of the circulation,  [] Frequent vasoactive agent adjustment,  [] Renal replacement therapy,  [x] For rapid decompensation,  [] Electrolyte instability  [] Suctioning every 2 hours  [] Every hour neuro checks  [] Every hour neurovascular checks  [x] Frequent evaluation of patient's response to treatment and titration of therapies,  [x] Interpretation of laboratory and radiological data,  [x] Application and interpretation of advanced monitoring technologies,  [x] Extensive interpretation of multiple databases,  [x] Development of treatment plan with patient, surrogate, or consultants.   [] Others:     Electronically signed by Bethel Uribe MD on 3/6/2023 at 9:49 PM

## 2023-03-07 NOTE — DISCHARGE SUMMARY
Discharge Summary    Name:  Adenike Harvey /Age/Sex: 1955  (68 y.o. female)   MRN & CSN:  [id number] & [id number] Admission Date/Time: 3/6/2023  4:52 PM   Attending:  Lauren Ga MD Discharging Physician: Lauren Ga MD       Discharge Diagnosis:  Anaphylactic reaction secondary to chemotherapy  Essential hypertension  Squamous cell carcinoma of RUL  Moderate COPD      Discharge Exam  Physical Exam  Constitutional:       Appearance: Normal appearance.   HENT:      Head: Normocephalic and atraumatic.      Mouth/Throat:      Mouth: Mucous membranes are moist.      Pharynx: Oropharynx is clear.   Eyes:      Extraocular Movements: Extraocular movements intact.      Pupils: Pupils are equal, round, and reactive to light.   Cardiovascular:      Rate and Rhythm: Normal rate and regular rhythm.      Pulses: Normal pulses.      Heart sounds: Normal heart sounds.   Pulmonary:      Effort: Pulmonary effort is normal.      Breath sounds: Normal breath sounds.   Abdominal:      General: Abdomen is flat.      Palpations: Abdomen is soft.   Musculoskeletal:         General: Normal range of motion.      Cervical back: Normal range of motion and neck supple.   Skin:     General: Skin is warm and dry.   Neurological:      General: No focal deficit present.      Mental Status: She is alert and oriented to person, place, and time.   Psychiatric:         Mood and Affect: Mood normal.         Behavior: Behavior normal.     Vitals:    23 0900 23 1000 23 1100 23 1200   BP: 136/87 (!) 119/50 137/80 126/78   Pulse: 81 80 78 81   Resp: 16 17 15 22   Temp:       TempSrc:       SpO2: 98% 100% 99% 98%   Weight:       Height:            Hospital Course:   Adenike Harvey is a 68 y.o.  female  who presents with Anaphylactic reaction, initial encounter    # Anaphylactic reaction to chemotherapy  - Suddenly developed throat swelling with difficulty breathing and generalized pruritus following  initiation of chemotherapy. Received Epi x3, Benadryl, Pepcid and Steroids x2 at infusion center.   - Requiring 2L NC (not on oxygen at baseline). Labs stable. CXR non-acute. -Admitted to ICU for closer monitoring. Patient currently on p.o. steroids. Plan to treat for 5 days total.  -Patient has been stable since hospital presentation without requiring any additional epinephrine. The swelling has improved as well. Discharged follow-up with PCP and oncology. # Essential hypertension  - Home Atenolol-Chlorthalidone and Lisinopril resumed upon discharge      # Squamous cell carcinoma of RUL  - Followed by H/O.  - Currently chemotherapy and RT. # Moderate COPD  - Continue Symbicort with prn Albuterol. #Cough  -Continue Tessalon Perles and mucinex PRN     The patient expressed appropriate understanding of and agreement with the discharge recommendations, medications, and plan.      Consults this admission:  None      Discharge Instruction:   Handoff to PCP:     Follow up appointments: PCP  Primary care physician:     Diet:  regular diet   Activity: activity as tolerated  Disposition: Discharged to:   [x]Home, [][facility], []SNF, []Acute Rehab, []Hospice   Condition on discharge: Stable    Discharge Medications:        Medication List        START taking these medications      benzonatate 100 MG capsule  Commonly known as: TESSALON  Take 1 capsule by mouth in the morning, at noon, and at bedtime for 20 doses     guaiFENesin 600 MG extended release tablet  Commonly known as: MUCINEX  Take 1 tablet by mouth 2 times daily as needed for Congestion     predniSONE 20 MG tablet  Commonly known as: DELTASONE  Take 2 tablets by mouth daily for 4 doses  Start taking on: March 8, 2023            CONTINUE taking these medications      * albuterol (2.5 MG/3ML) 0.083% nebulizer solution  Commonly known as: PROVENTIL  Take 3 mLs by nebulization every 6 hours as needed for Wheezing or Shortness of Breath     * albuterol sulfate  (90 Base) MCG/ACT inhaler  Commonly known as: ProAir HFA  Inhale 2 puffs into the lungs as needed for Wheezing     atenolol-chlorthalidone 100-25 MG per tablet  Commonly known as: TENORETIC  Take 1 tablet by mouth daily     budesonide-formoterol 160-4.5 MCG/ACT Aero  Commonly known as: Symbicort  Inhale 2 puffs into the lungs 2 times daily     dexamethasone 2 MG tablet  Commonly known as: DECADRON  1 tablet PO daily for 2 days starting the day after chemotherapy.     ezetimibe 10 MG tablet  Commonly known as: Zetia  Take 1 tablet by mouth daily     fluticasone 50 MCG/ACT nasal spray  Commonly known as: FLONASE  2 sprays by Each Nostril route daily     gabapentin 100 MG capsule  Commonly known as: NEURONTIN  Take 1 capsule by mouth nightly for 90 days.     lisinopril 20 MG tablet  Commonly known as: PRINIVIL;ZESTRIL  Take 1 tablet by mouth daily     Magic Mouthwash  Commonly known as: Miracle Mouthwash  Equal parts Benadryl, Maalox, 2% Viscous Xylocaine and Dexamethasone.  Take 5 mL 4 times daily.     omeprazole 40 MG delayed release capsule  Commonly known as: PriLOSEC  Take 1 capsule by mouth daily     ondansetron 8 MG tablet  Commonly known as: Zofran  Take 1 tablet by mouth every 8 hours as needed for Nausea or Vomiting Take 1 tablet by mouth every 8 hours as needed for nausea or vomiting.     sodium chloride 1 g tablet  Take 1 tablet by mouth daily           * This list has 2 medication(s) that are the same as other medications prescribed for you. Read the directions carefully, and ask your doctor or other care provider to review them with you.                STOP taking these medications      nystatin 801653 UNIT/ML suspension  Commonly known as: MYCOSTATIN     simvastatin 20 MG tablet  Commonly known as: ZOCOR               Where to Get Your Medications        These medications were sent to [address] - P [phone] - K  Fany Casas, [facility] [address]      Phone: [phone]   benzonatate 100 MG capsule  guaiFENesin 600 MG extended release tablet  predniSONE 20 MG tablet         Objective Findings at Discharge:       BMP/CBC  Recent Labs     03/06/23  1251 03/06/23  1355 03/06/23  1814 03/07/23  0514   * 134* 132* 136   K 3.9 3.6 3.2* 3.8   CL 95*  --  95* 97*   CO2 25  --  23 24   BUN 16  --  18 15   CREATININE 1.1 1.4* 1.1 1.0   WBC 7.2  --  10.5 7.5   HCT 33.8*  --  31.8* 30.4*     --  356 273       IMAGING:      Additional Information: Patient seen and examined day of discharge.  For more information regarding patient's care please contact [facility] records [phone]    Discharge Time of 35 minutes    Electronically signed by Rocio Thomas MD on 3/7/2023 at 1:09 PM

## 2023-03-07 NOTE — PROGRESS NOTES
Inpatient Progress Note 3/7/2023        Noe Martin  1955  [id number]      Assessment/Plan:    Anaphylactic type reaction presumably to chemotherapy  History of bronchogenic malignancy  COPD      The patient currently notes resolution of symptoms prompting hospital admission, there is no compromise of airway hemodynamics. Continuation of current medical therapy for treatment of COPD. From my perspective the Patient can be released from the hospital.  Care transferred to hospital medicine service. Management reviewed during critical care rounds    22 minutes    Subjective:    She notes significant improvement sense of wellbeing and breathlessness. She still notes mild wheeze but denies cough sputum production. Review of Systems   Constitutional:  Positive for fatigue. HENT: Negative. Eyes: Negative. Respiratory:  Positive for chest tightness, shortness of breath and wheezing. Cardiovascular: Negative. Gastrointestinal: Negative. Endocrine: Negative. Genitourinary: Negative. Musculoskeletal: Negative. Skin: Negative. Allergic/Immunologic: Negative. Neurological: Negative. Hematological: Negative. Psychiatric/Behavioral: Negative. Physical Exam:          /80   Pulse 78   Temp 97.9 °F (36.6 °C) (Oral)   Resp 15   Ht 5' 5\" (1.651 m)   Wt 168 lb 10.4 oz (76.5 kg)   LMP 01/06/2009 (LMP Unknown)   SpO2 99%   BMI 28.07 kg/m²       General: Female appears stated age awake alert no distress vitals reviewed  Eyes: Pupils normal motor intact  ENT: Hearing intact. Absence of nasal oral lesions. Neck supple, absence of stridor. Cardiovascular: Regular rate. Respiratory: Wheeze midlung zones bilaterally right greater than left. Left upper anterior chest [id number]. Gastrointestinal: Soft, non tender  Genitourinary: no suprapubic tenderness  Musculoskeletal: No edema. Skin: warm, dry  Neuro: Awake alert appropriately interactive. No focal findings. Psych: Mood appropriate. Current Medications:   predniSONE  40 mg Oral Daily    benzonatate  100 mg Oral TID    budesonide-formoterol  2 puff Inhalation BID    ezetimibe  10 mg Oral Daily    gabapentin  100 mg Oral Nightly    sodium chloride  1 g Oral Daily    sodium chloride flush  5-40 mL IntraVENous 2 times per day    enoxaparin  40 mg SubCUTAneous QPM    diphenhydrAMINE  25 mg IntraVENous Q6H    simvastatin  20 mg Oral Nightly    famotidine (PEPCID) injection  20 mg IntraVENous BID       Labs, Imaging and Studies reviewed:    XR CHEST PORTABLE    Result Date: 3/6/2023  EXAMINATION: ONE XRAY VIEW OF THE CHEST 3/6/2023 6:09 pm COMPARISON: 08/21/2018 HISTORY: ORDERING SYSTEM PROVIDED HISTORY: SOB, allergic reacton TECHNOLOGIST PROVIDED HISTORY: Reason for exam:->SOB, allergic reacton Reason for Exam: SOB, allergic reacton Additional signs and symptoms: NA Relevant Medical/Surgical History: COPD, hypertension, right lung cancer FINDINGS: Left-sided central venous catheter remains place. The lungs are without acute focal process. There is no effusion or pneumothorax. The cardiomediastinal silhouette is stable. The osseous structures are stable. No acute process.        Recent Results (from the past 24 hour(s))   CBC with Auto Differential    Collection Time: 03/06/23 12:51 PM   Result Value Ref Range    WBC 7.2 4.0 - 10.5 K/CU MM    RBC 3.53 (L) 4.2 - 5.4 M/CU MM    Hemoglobin 10.6 (L) 12.5 - 16.0 GM/DL    Hematocrit 33.8 (L) 37 - 47 %    MCV 95.8 78 - 100 FL    MCH 30.0 27 - 31 PG    MCHC 31.4 (L) 32.0 - 36.0 %    RDW 13.8 11.7 - 14.9 %    Platelets 616 166 - 043 K/CU MM    MPV 9.1 7.5 - 11.1 FL    Differential Type AUTOMATED DIFFERENTIAL     Segs Relative 72.2 (H) 36 - 66 %    Lymphocytes % 20.3 (L) 24 - 44 %    Monocytes % 5.0 (H) 0 - 4 %    Eosinophils % 2.1 0 - 3 %    Basophils % 0.4 0 - 1 %    Segs Absolute 5.2 K/CU MM    Lymphocytes Absolute 1.5 K/CU MM    Monocytes Absolute 0.4 K/CU MM    Eosinophils Absolute 0.2 K/CU MM    Basophils Absolute 0.0 K/CU MM   Comprehensive Metabolic Panel    Collection Time: 03/06/23 12:51 PM   Result Value Ref Range    Sodium 133 (L) 135 - 145 MMOL/L    Potassium 3.9 3.5 - 5.1 MMOL/L    Chloride 95 (L) 99 - 110 mMol/L    CO2 25 21 - 32 MMOL/L    BUN 16 6 - 23 MG/DL    Creatinine 1.1 0.6 - 1.1 MG/DL    Est, Glom Filt Rate 55 (L) >60 mL/min/1.73m2    Glucose 90 70 - 99 MG/DL    Calcium 8.5 8.3 - 10.6 MG/DL    Albumin 4.0 3.4 - 5.0 GM/DL    Total Protein 6.5 6.4 - 8.2 GM/DL    Total Bilirubin 0.2 0.0 - 1.0 MG/DL    ALT 12 10 - 40 U/L    AST 14 (L) 15 - 37 IU/L    Alkaline Phosphatase 101 40 - 128 IU/L    Anion Gap 13 4 - 16   Chemistry Panel, [facility],     Collection Time: 03/06/23  1:55 PM   Result Value Ref Range    Sodium 134 (L) 138 - 146 MMOL/L    Potassium 3.6 3.5 - 4.5 MMOL/L    POC CALCIUM 1.09 (L) 1.12 - 1.32 MMOL/L    POC Glucose 96 70 - 99 MG/DL    POC BUN 15 8 - 26 MG/DL    POC Chloride 100 98 - 109 MMOL/L    POC Creatinine 1.4 (H) 0.6 - 1.1 MG/DL    POC CO2 22 21 - 32 MMOL/L    eGFR, POC 41 (L) >60 mL/min/1.73m2    Source: Venous    CBC with Auto Differential    Collection Time: 03/06/23  6:14 PM   Result Value Ref Range    WBC 10.5 4.0 - 10.5 K/CU MM    RBC 3.44 (L) 4.2 - 5.4 M/CU MM    Hemoglobin 10.4 (L) 12.5 - 16.0 GM/DL    Hematocrit 31.8 (L) 37 - 47 %    MCV 92.4 78 - 100 FL    MCH 30.2 27 - 31 PG    MCHC 32.7 32.0 - 36.0 %    RDW 13.0 11.7 - 14.9 %    Platelets 460 329 - 784 K/CU MM    MPV 9.1 7.5 - 11.1 FL    Differential Type AUTOMATED DIFFERENTIAL     Segs Relative 91.5 (H) 36 - 66 %    Lymphocytes % 6.3 (L) 24 - 44 %    Monocytes % 0.8 0 - 4 %    Eosinophils % 0.2 0 - 3 %    Basophils % 0.1 0 - 1 %    Segs Absolute 9.6 K/CU MM    Lymphocytes Absolute 0.7 K/CU MM    Monocytes Absolute 0.1 K/CU MM    Eosinophils Absolute 0.0 K/CU MM    Basophils Absolute 0.0 K/CU MM    Nucleated RBC % 0.0 %    Total Nucleated RBC 0.0 K/CU MM    Total Immature Neutrophil 0.12 K/CU MM    Immature Neutrophil % 1.1 (H) 0 - 0.43 %   Comprehensive Metabolic Panel    Collection Time: 03/06/23  6:14 PM   Result Value Ref Range    Sodium 132 (L) 135 - 145 MMOL/L    Potassium 3.2 (L) 3.5 - 5.1 MMOL/L    Chloride 95 (L) 99 - 110 mMol/L    CO2 23 21 - 32 MMOL/L    BUN 18 6 - 23 MG/DL    Creatinine 1.1 0.6 - 1.1 MG/DL    Est, Glom Filt Rate 55 (L) >60 mL/min/1.73m2    Glucose 249 (H) 70 - 99 MG/DL    Calcium 8.2 (L) 8.3 - 10.6 MG/DL    Albumin 4.0 3.4 - 5.0 GM/DL    Total Protein 6.8 6.4 - 8.2 GM/DL    Total Bilirubin 0.2 0.0 - 1.0 MG/DL    ALT 13 10 - 40 U/L    AST 15 15 - 37 IU/L    Alkaline Phosphatase 101 40 - 129 IU/L    Anion Gap 14 4 - 16   Troponin    Collection Time: 03/06/23  6:14 PM   Result Value Ref Range    Troponin T <0.010 <0.01 NG/ML   Brain Natriuretic Peptide    Collection Time: 03/06/23  6:14 PM   Result Value Ref Range    Pro-.2 <300 PG/ML   CBC with Auto Differential    Collection Time: 03/07/23  5:14 AM   Result Value Ref Range    WBC 7.5 4.0 - 10.5 K/CU MM    RBC 3.31 (L) 4.2 - 5.4 M/CU MM    Hemoglobin 10.1 (L) 12.5 - 16.0 GM/DL    Hematocrit 30.4 (L) 37 - 47 %    MCV 91.8 78 - 100 FL    MCH 30.5 27 - 31 PG    MCHC 33.2 32.0 - 36.0 %    RDW 12.9 11.7 - 14.9 %    Platelets 606 469 - 318 K/CU MM    MPV 8.9 7.5 - 11.1 FL    Differential Type MANUAL DIFFERENTIAL     Segs Relative 98.0 (H) 36 - 66 %    Segs Absolute 7.3 K/CU MM    Myelocyte Percent 1 (H) 0.0 %    Bands Relative 1 (L) 5 - 11 %    Myelocytes Absolute 0.08 K/CU MM    Bands Absolute 0.08 K/CU MM    Anisocytosis 1+     Macrocytes 1+     Microcytes 1+     Polychromasia 1+    Critical Care Panel    Collection Time: 03/07/23  5:14 AM   Result Value Ref Range    Sodium 136 135 - 145 MMOL/L    Potassium 3.8 3.5 - 5.1 MMOL/L    Chloride 97 (L) 99 - 110 mMol/L    CO2 24 21 - 32 MMOL/L    Anion Gap 15 4 - 16    BUN 15 6 - 23 MG/DL    Creatinine 1.0 0.6 - 1.1 MG/DL    Est, Glom Filt Rate >60 >60 mL/min/1.73m2    Glucose 128 (H) 70 - 99 MG/DL    Calcium 8.6 8.3 - 10.6 MG/DL    Phosphorus 3.5 2.5 - 4.9 MG/DL    Magnesium 1.5 (L) 1.8 - 2.4 mg/dl       Electronically signed by Helene Stapleton DO on 3/7/2023 at 11:49 AM

## 2023-03-07 NOTE — PLAN OF CARE
Problem: Discharge Planning  Goal: Discharge to home or other facility with appropriate resources  Outcome: Progressing  Flowsheets  Taken 3/7/2023 0815 by Oliverio Aguilar RN  Discharge to home or other facility with appropriate resources:   Identify barriers to discharge with patient and caregiver   Refer to discharge planning if patient needs post-hospital services based on physician order or complex needs related to functional status, cognitive ability or social support system  Taken 3/6/2023 2131 by Al Vaz RN  Discharge to home or other facility with appropriate resources: Refer to discharge planning if patient needs post-hospital services based on physician order or complex needs related to functional status, cognitive ability or social support system     Problem: Pain  Goal: Verbalizes/displays adequate comfort level or baseline comfort level  3/7/2023 0940 by Oliverio Aguilar RN  Outcome: Progressing  3/6/2023 2339 by Rosangela Hutchinson RN  Outcome: Progressing     Problem: Chronic Conditions and Co-morbidities  Goal: Patient's chronic conditions and co-morbidity symptoms are monitored and maintained or improved  Outcome: Progressing     Problem: Safety - Adult  Goal: Free from fall injury  Outcome: Progressing     Problem: ABCDS Injury Assessment  Goal: Absence of physical injury  Outcome: Progressing     Problem: Skin/Tissue Integrity  Goal: Absence of new skin breakdown  Description: 1. Monitor for areas of redness and/or skin breakdown  2. Assess vascular access sites hourly  3. Every 4-6 hours minimum:  Change oxygen saturation probe site  4. Every 4-6 hours:  If on nasal continuous positive airway pressure, respiratory therapy assess nares and determine need for appliance change or resting period.   Outcome: Progressing

## 2023-03-07 NOTE — ED NOTES
ED TO INPATIENT SBAR HANDOFF    Patient Name: Zofia José   :  1955  79 y.o. MRN:  [id number]  Preferred Name  Nelsy Mariscal  ED Room #:  ED31/ED-31  Family/Caregiver Present yes   Restraints no   Sitter no   Sepsis Risk Score Sepsis Risk Score: 3.78    Situation  Code Status: Prior No additional code details. Allergies: Lipitor, Vicodin [hydrocodone-acetaminophen], Hydrocodone, and Adhesive tape  Weight: Patient Vitals for the past 96 hrs (Last 3 readings):   Weight   23 1700 168 lb (76.2 kg)     Arrived from: home  Chief Complaint:   Chief Complaint   Patient presents with    Allergic Reaction     Allergic reaction to chemo carbotaxol - SOB with 84% on 2L (does not wear o2 at baseline), 3 IM doses of epi, 200mg solumedrol, and 20mg pepcid given at [facility]. Pt being treated for lung cancer. Redness noted to upper extremities and chest.     Hospital Problem/Diagnosis:  Active Problems:    * No active hospital problems. *  Resolved Problems:    * No resolved hospital problems. *    Imaging:   XR CHEST PORTABLE   Final Result   No acute process. Abnormal labs:   Abnormal Labs Reviewed   CBC WITH AUTO DIFFERENTIAL - Abnormal; Notable for the following components:       Result Value    RBC 3.44 (*)     Hemoglobin 10.4 (*)     Hematocrit 31.8 (*)     Segs Relative 91.5 (*)     Lymphocytes % 6.3 (*)     Immature Neutrophil % 1.1 (*)     All other components within normal limits   COMPREHENSIVE METABOLIC PANEL - Abnormal; Notable for the following components:    Sodium 132 (*)     Potassium 3.2 (*)     Chloride 95 (*)     Est, Glom Filt Rate 55 (*)     Glucose 249 (*)     Calcium 8.2 (*)     All other components within normal limits     Critical values: no     Abnormal Assessment Findings: Pt to ED from [facility] with allergic reaction to chemo - today was third dose of this chemo and patient was about an hour into treatment when she began to feel short of breath.  Pt was 84% on 2L nasal cannula. 3 IM doses of epi, 200mg solumedrol, and 20mg pepcid given at cancer center. Pt A&Ox4 and states that she is feeling much better at this time.      Background  History:   Past Medical History:   Diagnosis Date    Anemia     Anxiety     Arthritis     \"Fingers, Back\"    Bronchitis Last Episode 11-17    Chronic back pain     COPD (chronic obstructive pulmonary disease) ([address]     Sees Dr. Miguel Mullen    Depression     Diverticulosis 06/2013    Extensive per CT    Hemoptysis 12/06/2017    Hx of blood clots     \"found I have a clot near my mediport so they are taking it out 5/16/2019- put me on Xarelto    Hyperlipidemia 2009    Hypertension 2009    Low back pain     \"better than it use to be- had therapy in the past- originally hurt back at work 20+ yrs ago\"    Lung mass     rul- scheduled to bronch 12/74/2017    Panic attacks     Pneumonia Dx 1-17    Right Lung Cancer Dx 10-17    tx with chemo following with Dr Pipo Ferguson Dx 2014    \"Right Side\"    Shortness of breath on exertion     Teeth missing     Upper And Lower    Urinary tract infection In Past    No Current Symptoms    Wears dentures     Full Upper, Partial Lower    Wears glasses        Assessment    Vitals/MEWS: MEWS Score: 1  Level of Consciousness: Alert (0)   Vitals:    03/06/23 1754 03/06/23 1801 03/06/23 1831 03/06/23 1902   BP:  (!) 169/85 139/78 127/76   Pulse:  97 (!) 101 99   Resp:  24 14 19   Temp:    97.7 °F (36.5 °C)   TempSrc:    Oral   SpO2: 99% 99% 98% 97%   Weight:       Height:         FiO2 (%): nasal cannula  O2 Flow Rate: O2 Device: Nasal cannula O2 Flow Rate (L/min): 5 L/min  Cardiac Rhythm:     Pain Assessment:  [x] Verbal [] Valerie Mynor Scale  Pain Scale: Pain Assessment  Pain Assessment: None - Denies Pain  Last documented pain score (0-10 scale)    Last documented pain medication administered:   Mental Status: oriented and alert  NIH Score: NIH     C-SSRS: Risk of Suicide: No Risk  Bedside swallow:    Jaqueline Coma Scale (GCS): Jaqueline Coma Scale  Eye Opening: Spontaneous  Best Verbal Response: Oriented  Best Motor Response: Obeys commands  [place] Coma Scale Score: 15  Active LDA's:    PO Status: Regular  Pertinent or High Risk Medications/Drips: no   If Yes, please provide details: n/a  Pending Blood Product Administration: no     You may also review the ED PT Care Timeline found under the Summary Nursing Index tab. Recommendation    Pending orders n/a  Plan for Discharge (if known):    Additional Comments: n/a   If any further questions, please call Sending RN at [phone]    Electronically signed by: Electronically signed by Martha Buchanan RN on 3/6/2023 at 7:22 PM       Martha Buchanan RN  03/06/23 9307

## 2023-03-07 NOTE — PROGRESS NOTES
2100 - Patient arrived on the unit. A&Ox4. Follow commands. On room air. Call light within reach. Vital signs stable. No distress noted.  4 eyes completed - no wounds    1400 Eliot Trotter MD notified of patient arrival

## 2023-03-07 NOTE — CARE COORDINATION
Met with patient for discharge needs. Patient stated that she lived at home with  and was independent. She has a PCP and insurance. She politely ended conversation with \"I don't need a thing but to be discharged\". Plan is home - no needs.  Lulu Lee RN

## 2023-03-07 NOTE — PROGRESS NOTES
Informed Pt that she is going to be discharged to go home today and she voiced understanding of this. She stated she is ready to go home and her  will take her home.

## 2023-03-07 NOTE — ED PROVIDER NOTES
Emergency Department Encounter    Patient: Angel Luis Parra  MRN: [id number]  : 1955  Date of Evaluation: 3/6/2023  ED Provider:  Tosha Escamilla MD    Triage Chief Complaint:   Allergic Reaction (Allergic reaction to chemo carbotaxol - SOB with 84% on 2L (does not wear o2 at baseline), 3 IM doses of epi, 200mg solumedrol, and 20mg pepcid given at cancer center. Pt being treated for lung cancer. Redness noted to upper extremities and chest.)    [place]:  Angel Luis Parra is a 79 y.o. female that presents with complaint of an allergic reaction. She was getting a new chemo agent. She became extremely short of breath, felt like she could not breathe, started flushing and getting hives on her arms. She dropped her blood pressures. She was given epinephrine x3 IM at the [facility] center, was also placed on oxygen, still had to continue titrating that up, also received Solu-Cortef, was brought in by EMS. She is starting to feel better but feels \"jittery\". She does have lung cancer, not typically on oxygen at baseline    ROS - see HPI, below listed is current ROS at time of my eval:  10 systems reviewed and negative except as above.      Past Medical History:   Diagnosis Date    Anemia     Anxiety     Arthritis     \"Fingers, Back\"    Bronchitis Last Episode -    Chronic back pain     COPD (chronic obstructive pulmonary disease) ([facility] [address]     Sees Dr. Lolly Alston    Depression     Diverticulosis 2013    Extensive per CT    Hemoptysis 2017    Hx of blood clots     \"found I have a clot near my mediport so they are taking it out 2019- put me on Xarelto    Hyperlipidemia 2009    Hypertension 2009    Low back pain     \"better than it use to be- had therapy in the past- originally hurt back at work 20+ yrs ago\"    Lung mass     rul- scheduled to bronch     Panic attacks     Pneumonia Dx 1-17    Right Lung Cancer Dx 10-17    tx with chemo following with Dr Colton Gitelman Dx     \"Right Side\" Shortness of breath on exertion     Teeth missing     Upper And Lower    Urinary tract infection In Past    No Current Symptoms    Wears dentures     Full Upper, Partial Lower    Wears glasses      Past Surgical History:   Procedure Laterality Date    BRONCHOSCOPY  2017- done     BUNIONECTOMY Bilateral     \"Done At Different Times\"    CATHETER REMOVAL Left 2019    PORT REMOVAL performed by Trudy Montes MD at [address]    DILATION AND CURETTAGE OF [address] Left     \"Tendon Repair\"    LUNG SURGERY  2018    per old chart had thoracoscopy and RUL lobectomy , bronchoscopy and lymph node bx     MEDIASTINOSCOPY  2018    Bronchoscopy    TONSILLECTOMY  1978    TUBAL LIGATION  1988    TUNNELED VENOUS PORT PLACEMENT Left 2018     Family History   Problem Relation Age of Onset    Cancer Mother         Multiple Myeloma    Depression Mother     Diabetes Mother     High Blood Pressure Mother     High Cholesterol Mother     Heart Disease Father         CHF     Social History     Socioeconomic History    Marital status:      Spouse name: Not on file    Number of children: Not on file    Years of education: Not on file    Highest education level: Not on file   Occupational History    Not on file   Tobacco Use    Smoking status: Former     Packs/day: 0.50     Years: 30.00     Pack years: 15.00     Types: Cigarettes     Start date:      Quit date: 2017     Years since quittin.5    Smokeless tobacco: Never   Vaping Use    Vaping Use: Never used   Substance and Sexual Activity    Alcohol use: No    Drug use: No    Sexual activity: Not Currently   Other Topics Concern    Not on file   Social History Narrative    Not on file     Social Determinants of Health     Financial Resource Strain: Low Risk     Difficulty of Paying Living Expenses: Not very hard   Food Insecurity: No Food Insecurity    Worried About Running Out of Food in the Last Year: Never true    [address] in the Last Year: Never true   Transportation Needs: Unknown    Lack of Transportation (Medical): Not on file    Lack of Transportation (Non-Medical):  No   Physical Activity: Not on file   Stress: Not on file   Social Connections: Not on file   Intimate Partner Violence: Not on file   Housing Stability: Unknown    Unable to Pay for Housing in the Last Year: Not on file    Number of Places Lived in the Last Year: Not on file    Unstable Housing in the Last Year: No     Current Facility-Administered Medications   Medication Dose Route Frequency Provider Last Rate Last Admin    albuterol (PROVENTIL) nebulizer solution 2.5 mg  2.5 mg Nebulization Q6H PRN Sherlyn Lagunas MD        benzonatate (TESSALON) capsule 100 mg  100 mg Oral TID Sherlyn Lagunas MD   100 mg at 03/06/23 2237    budesonide-formoterol (SYMBICORT) 160-4.5 MCG/ACT inhaler 2 puff  2 puff Inhalation BID Sherlyn Lagunas MD   2 puff at 03/06/23 2121    [START ON 3/7/2023] ezetimibe (ZETIA) tablet 10 mg  10 mg Oral Daily Sherlyn Lagunas MD        gabapentin (NEURONTIN) capsule 100 mg  100 mg Oral Nightly Sherlyn Lagunas MD   100 mg at 03/06/23 2237    [START ON 3/7/2023] sodium chloride tablet 1 g  1 g Oral Daily Sherlyn Lagunas MD        lactated ringers IV soln infusion   IntraVENous Continuous Sherlyn Lagunas  mL/hr at 03/06/23 2244 New Bag at 03/06/23 2244    sodium chloride flush 0.9 % injection 5-40 mL  5-40 mL IntraVENous 2 times per day Sherlyn Lagunas MD   10 mL at 03/06/23 2244    sodium chloride flush 0.9 % injection 5-40 mL  5-40 mL IntraVENous PRN Sherlyn Lagunas MD        0.9 % sodium chloride infusion   IntraVENous PRN Sherlyn Lagunas MD        potassium chloride 10 mEq/100 mL IVPB (Peripheral Line)  10 mEq IntraVENous PRN Sherlyn Lagunas MD        magnesium sulfate 2000 mg in 50 mL IVPB premix  2,000 mg IntraVENous PRN Sherlyn Lagunas MD        [START ON 3/7/2023] enoxaparin (LOVENOX) injection 40 mg  40 mg SubCUTAneous QPM Elyssa Singh MD        ondansetron (ZOFRAN-ODT) disintegrating tablet 4 mg  4 mg Oral Q8H PRN Elyssa Singh MD        Or    ondansetron (ZOFRAN) injection 4 mg  4 mg IntraVENous Q6H PRN Elyssa Singh MD        polyethylene glycol (GLYCOLAX) packet 17 g  17 g Oral Daily PRN Elyssa Singh MD        acetaminophen (TYLENOL) tablet 650 mg  650 mg Oral Q6H PRN Elyssa Singh MD   650 mg at 03/06/23 2237    Or    acetaminophen (TYLENOL) suppository 650 mg  650 mg Rectal Q6H PRN Elyssa Singh MD        guaiFENesin (MUCINEX) extended release tablet 600 mg  600 mg Oral BID PRN Elyssa Singh MD   600 mg at 03/06/23 2237    diphenhydrAMINE (BENADRYL) injection 25 mg  25 mg IntraVENous Lurdes Tracey MD        EPINEPHrine PF 1 MG/ML injection (Anaphylaxis) 0.3 mg  0.3 mg IntraMUSCular Q5 Min PRN Elyssa Singh MD        LORazepam (ATIVAN) injection 1 mg  1 mg IntraVENous BID PRN Elyssa Singh MD        simvastatin (ZOCOR) tablet 20 mg (Patient Supplied)  20 mg Oral Nightly Elyssa Singh MD        famotidine (PEPCID) 20 mg in sodium chloride (PF) 0.9 % 10 mL injection  20 mg IntraVENous BID Una Ruffin MD        racepinephrine HCl (VAPONEFPRIN) 2.25 % nebulizer solution NEBU 11.25 mg  11.25 mg Nebulization Q4H PRN Una Ruffin MD        sodium chloride nebulizer 0.9 % solution 3 mL  3 mL Nebulization Q4H PRN Una Ruffin MD         Facility-Administered Medications Ordered in Other Encounters   Medication Dose Route Frequency Provider Last Rate Last Admin    0.9 % sodium chloride infusion  5-250 mL/hr IntraVENous PRN GRACE Ruiz CNP        heparin flush 100 UNIT/ML injection 500 Units  500 Units IntraCATHeter PRN GRACE Ruiz CNP        EPINEPHrine PF 1 MG/ML injection (Anaphylaxis) 0.3 mg  0.3 mg IntraMUSCular PRN GRACE Ruiz CNP   0.3 mg at 03/06/23 1621     Allergies   Allergen Reactions Lipitor      \"Makes Me Hurt So Bad I Can't Stand It\"    Vicodin [Hydrocodone-Acetaminophen] Nausea Only    Hydrocodone      Other reaction(s): Gastrointestinal Upset    Adhesive Tape Rash       Nursing Notes Reviewed    Physical Exam:  Triage VS:    ED Triage Vitals [03/06/23 1700]   Enc Vitals Group      BP (!) 141/92      Heart Rate 97      Resp 22      Temp 97.6 °F (36.4 °C)      Temp Source Oral      SpO2 96 %      Weight 168 lb (76.2 kg)      Height 5' 5\" (1.651 m)      Head Circumference       Peak Flow       Pain Score       Pain Loc       Pain Edu? Excl. in [address]? My pulse ox interpretation is - normal- on 2L NC    General appearance: slightly anxious, jittery. Skin:  Warm. Dry. Flushed. Eye:  Extraocular movements intact. Ears, nose, mouth and throat:  Oral mucosa moist   Neck:  Trachea midline. Extremity:  No swelling. Normal ROM     Heart:  tachycardic with regular rhythm, normal S1 & S2, no extra heart sounds. Perfusion:  intact  Respiratory: faint wheeze, no tachypnea, on 2L NC  Abdominal:   Soft. Nontender. Non distended.   Neurological:  Alert and oriented           Psychiatric:  Appropriate    I have reviewed and interpreted all of the currently available lab results from this visit (if applicable):  Results for orders placed or performed during the hospital encounter of 03/06/23   CBC with Auto Differential   Result Value Ref Range    WBC 10.5 4.0 - 10.5 K/CU MM    RBC 3.44 (L) 4.2 - 5.4 M/CU MM    Hemoglobin 10.4 (L) 12.5 - 16.0 GM/DL    Hematocrit 31.8 (L) 37 - 47 %    MCV 92.4 78 - 100 FL    MCH 30.2 27 - 31 PG    MCHC 32.7 32.0 - 36.0 %    RDW 13.0 11.7 - 14.9 %    Platelets 975 055 - 462 K/CU MM    MPV 9.1 7.5 - 11.1 FL    Differential Type AUTOMATED DIFFERENTIAL     Segs Relative 91.5 (H) 36 - 66 %    Lymphocytes % 6.3 (L) 24 - 44 %    Monocytes % 0.8 0 - 4 %    Eosinophils % 0.2 0 - 3 %    Basophils % 0.1 0 - 1 %    Segs Absolute 9.6 K/CU MM    Lymphocytes Absolute 0.7 K/CU MM    Monocytes Absolute 0.1 K/CU MM    Eosinophils Absolute 0.0 K/CU MM    Basophils Absolute 0.0 K/CU MM    Nucleated RBC % 0.0 %    Total Nucleated RBC 0.0 K/CU MM    Total Immature Neutrophil 0.12 K/CU MM    Immature Neutrophil % 1.1 (H) 0 - 0.43 %   Comprehensive Metabolic Panel   Result Value Ref Range    Sodium 132 (L) 135 - 145 MMOL/L    Potassium 3.2 (L) 3.5 - 5.1 MMOL/L    Chloride 95 (L) 99 - 110 mMol/L    CO2 23 21 - 32 MMOL/L    BUN 18 6 - 23 MG/DL    Creatinine 1.1 0.6 - 1.1 MG/DL    Est, Glom Filt Rate 55 (L) >60 mL/min/1.73m2    Glucose 249 (H) 70 - 99 MG/DL    Calcium 8.2 (L) 8.3 - 10.6 MG/DL    Albumin 4.0 3.4 - 5.0 GM/DL    Total Protein 6.8 6.4 - 8.2 GM/DL    Total Bilirubin 0.2 0.0 - 1.0 MG/DL    ALT 13 10 - 40 U/L    AST 15 15 - 37 IU/L    Alkaline Phosphatase 101 40 - 129 IU/L    Anion Gap 14 4 - 16   Troponin   Result Value Ref Range    Troponin T <0.010 <0.01 NG/ML   Brain Natriuretic Peptide   Result Value Ref Range    Pro-.2 <300 PG/ML      Radiographs (if obtained):  Radiologist's Report Reviewed:  XR CHEST PORTABLE    Result Date: 3/6/2023  EXAMINATION: ONE XRAY VIEW OF THE CHEST 3/6/2023 6:09 pm COMPARISON: 08/21/2018 HISTORY: ORDERING SYSTEM PROVIDED HISTORY: SOB, allergic reacton TECHNOLOGIST PROVIDED HISTORY: Reason for exam:->SOB, allergic reacton Reason for Exam: SOB, allergic reacton Additional signs and symptoms: NA Relevant Medical/Surgical History: COPD, hypertension, right lung cancer FINDINGS: Left-sided central venous catheter remains place. The lungs are without acute focal process.  There is no effusion or pneumothorax. The cardiomediastinal silhouette is stable. The osseous structures are stable.     No acute process.         MDM:      CC/HPI Summary, DDx, ED Course, and Reassessment: 68-year-old female presented from outpatient office with concern for anaphylaxis, certainly sounds like anaphylaxis it is very concerning, she received 3 epi in rapid succession  so we are keeping her on telemetry. She is doing well on 2 L. Chest x-ray and labs are very reassuring, she is still requiring oxygen and so we will plan for admission for monitoring, she did require those 3 epinephrine and not clear how long will take her to clear the cisplatin. She was agreeable to this. I did review the progress notes from chemo earlier in the day, and does appear that she had received fluids initially, placed on oxygen, Solu-Cortef, epi x3, and was increasing oxygen requirement while there.          History from : Patient    Limitations to history : None    Patient was given the following medications:  Medications   albuterol (PROVENTIL) nebulizer solution 2.5 mg (has no administration in time range)   benzonatate (TESSALON) capsule 100 mg (100 mg Oral Given 3/6/23 2237)   budesonide-formoterol (SYMBICORT) 160-4.5 MCG/ACT inhaler 2 puff (2 puffs Inhalation Given 3/6/23 2121)   ezetimibe (ZETIA) tablet 10 mg (has no administration in time range)   gabapentin (NEURONTIN) capsule 100 mg (100 mg Oral Given 3/6/23 2237)   sodium chloride tablet 1 g (has no administration in time range)   lactated ringers IV soln infusion ( IntraVENous New Bag 3/6/23 2244)   sodium chloride flush 0.9 % injection 5-40 mL (10 mLs IntraVENous Given 3/6/23 2244)   sodium chloride flush 0.9 % injection 5-40 mL (has no administration in time range)   0.9 % sodium chloride infusion (has no administration in time range)   potassium chloride 10 mEq/100 mL IVPB (Peripheral Line) (has no administration in time range)   magnesium sulfate 2000 mg in 50 mL IVPB premix (has no administration in time range)   enoxaparin (LOVENOX) injection 40 mg (has no administration in time range)   ondansetron (ZOFRAN-ODT) disintegrating tablet 4 mg (has no administration in time range)     Or   ondansetron (ZOFRAN) injection 4 mg (has no administration in time range)   polyethylene glycol (GLYCOLAX) packet 17 g (has no administration in time range)   acetaminophen (TYLENOL) tablet 650 mg (650 mg Oral Given 3/6/23 2237)     Or   acetaminophen (TYLENOL) suppository 650 mg ( Rectal See Alternative 3/6/23 2237)   guaiFENesin (MUCINEX) extended release tablet 600 mg (600 mg Oral Given 3/6/23 2237)   diphenhydrAMINE (BENADRYL) injection 25 mg (has no administration in time range)   EPINEPHrine PF 1 MG/ML injection (Anaphylaxis) 0.3 mg (has no administration in time range)   LORazepam (ATIVAN) injection 1 mg (has no administration in time range)   simvastatin (ZOCOR) tablet 20 mg (Patient Supplied) (20 mg Oral Not Given 3/6/23 2239)   famotidine (PEPCID) 20 mg in sodium chloride (PF) 0.9 % 10 mL injection (has no administration in time range)   racepinephrine HCl (VAPONEFPRIN) 2.25 % nebulizer solution NEBU 11.25 mg (has no administration in time range)   sodium chloride nebulizer 0.9 % solution 3 mL (has no administration in time range)           Chronic conditions affecting care: Cancer on active chemo    Discussion with Other Profesionals : Admitting Team      Social Determinants : None    Records Reviewed : Outpatient Notes      Disposition Considerations (tests considered but not done, Shared Decision Making, Pt Expectation of Test or Tx.):   admission    I am the Primary Clinician of Record. Total critical care time today provided was 32 minutes. This excludes seperately billable procedure. Critical care time provided for anahylaxis that required close evaluation and/or intervention with concern for patient decompensation. Clinical Impression:  1. Anaphylaxis, initial encounter    2. Acute respiratory failure with hypoxia (HCC)      Disposition referral (if applicable):  No follow-up provider specified.   Disposition medications (if applicable):  Current Discharge Medication List        ED Provider Disposition Time  DISPOSITION Admitted 03/06/2023 07:50:39 PM      Comment: Please note this report has been produced using speech recognition software and may contain errors related to that system including errors in grammar, punctuation, and spelling, as well as words and phrases that may be inappropriate. Efforts were made to edit the dictations.        Kade Garcias MD  03/06/23 2299

## 2023-03-07 NOTE — H&P
History and Physical      Name:  Jose Manuel Mary /Age/Sex: 1955  (79 y.o. female)   MRN & CSN:  [id number] & [id number] Encounter Date/Time: 3/6/2023 7:55 PM EST   Location:  ED31/ED-31 PCP: Colette Almaraz MD       Hospital Day: 1    Assessment and Plan:     Patient is a 51-year-old female who presented after having reaction to chemotherapy. # Anaphylactic reaction to chemotherapy  - Suddenly developed throat swelling with difficulty breathing and generalized pruritus following initiation of chemotherapy. Received Epi x3, Benadryl, Pepcid and Steroids x2 at infusion center.   - Requiring 2L NC (not on oxygen at baseline). Labs stable. CXR non-acute. - Pepcid/Benadryl with prn Epi/steroids.   - Admit to the ICU for closer monitoring, discussed with tele[facility] staff. # Essential hypertension  - Hold home Atenolol-Chlorthalidone and Lisinopril for now, resume when appropriate. # Squamous cell carcinoma of RUL  - Followed by H/O.  - Currently chemotherapy and RT. # Moderate COPD  - Continue Symbicort with prn Albuterol. Checklist:  Advanced directive: full  Catheter: none  DVT ppx: Lovenox    Disposition: patient requires continued admission due to anaphylactic reaction. MDM: moderate. History of Present Illness:     Chief Complaint: Trouble breathing    Patient is a 51-year-old female with a PMHx of SCC of RUL (on chemotherapy and RT) and HTN who presented to the ED from infusion center after suddenly developing throat swelling with difficulty breathing and generalized pruritus following initiation of chemotherapy. Received Epi x3, Benadryl, Pepcid and Steroids x2 at infusion center. In the ED, endorse having about 75% improvement in breathing. Denied any pruritus or hives. No presyncope, syncope or CP.     ROS:     Ten point ROS reviewed negative, unless as noted above.     Objective:     No intake or output data in the 24 hours ending 23     Vitals:   Vitals:    23 021 821 37 16 03/06/23 1801 03/06/23 1831 03/06/23 1902   BP:  (!) 169/85 139/78 127/76   Pulse:  97 (!) 101 99   Resp:  24 14 19   Temp:    97.7 °F (36.5 °C)   TempSrc:    Oral   SpO2: 99% 99% 98% 97%   Weight:       Height:         BMI: Body mass index is 27.96 kg/m². General: Awake. WDWN. AAOx3. HEENT: PERRLA. Vision grossly intact. Hearing grossly intact. Oropharynx clear. Neck: Supple. No JVD. CV: Tachycardic. NL S1/S2. No BLE edema. Pulm: Increased effort on 2L NC. Fine bilateral expiratory wheezing. GI: +BS x4. Soft. : No CVA or suprapubic tenderness. No Vieira catheter. Skin: Intact. No hives appreciated. MSK: No gross joint deformities. Full ROM. Neuro: CNs grossly intact. Normal speech. No focal deficit. Psych: Good judgement and reason. Anxious. Past History:      PMHx:   Past Medical History:   Diagnosis Date    Anemia     Anxiety     Arthritis     \"Fingers, Back\"    Bronchitis Last Episode 11-17    Chronic back pain     COPD (chronic obstructive pulmonary disease) ([facility] [address]     Sees Dr. Jacquelyn Whitman    Depression     Diverticulosis 06/2013    Extensive per CT    Hemoptysis 12/06/2017    Hx of blood clots     \"found I have a clot near my mediport so they are taking it out 5/16/2019- put me on Xarelto    Hyperlipidemia 2009    Hypertension 2009    Low back pain     \"better than it use to be- had therapy in the past- originally hurt back at work 20+ yrs ago\"    Lung mass     rul- scheduled to bronch 12/74/2017    Panic attacks     Pneumonia Dx 1-17    Right Lung Cancer Dx 10-17    tx with chemo following with Dr Jose M Matos Dx 2014    \"Right Side\"    Shortness of breath on exertion     Teeth missing     Upper And Lower    Urinary tract infection In Past    No Current Symptoms    Wears dentures     Full Upper, Partial Lower    Wears glasses        PSHx:   Past Surgical History:   Procedure Laterality Date    BRONCHOSCOPY  12/07/2017 2/21/2018- done     BUNIONECTOMY Bilateral 1990's    \"Done At Different Times\"    CATHETER REMOVAL Left 2019    PORT REMOVAL performed by Grady Go MD at [place] [address]  's    DILATION AND CURETTAGE OF UTERUS      ELBOW SURGERY Left     \"Tendon Repair\"    LUNG SURGERY  2018    per old chart had thoracoscopy and RUL lobectomy , bronchoscopy and lymph node bx     MEDIASTINOSCOPY  2018    Bronchoscopy    TONSILLECTOMY  1978    TUBAL LIGATION  1988    TUNNELED VENOUS PORT PLACEMENT Left 2018       Allergies: Allergies   Allergen Reactions    Lipitor      \"Makes Me Hurt So Bad I Can't Stand It\"    Vicodin [Hydrocodone-Acetaminophen] Nausea Only    Hydrocodone      Other reaction(s): Gastrointestinal Upset    Adhesive Tape Rash       FHx: family history includes Cancer in her mother; Depression in her mother; Diabetes in her mother; Heart Disease in her father; High Blood Pressure in her mother; High Cholesterol in her mother.     SHx:   Social History     Socioeconomic History    Marital status:      Spouse name: None    Number of children: None    Years of education: None    Highest education level: None   Tobacco Use    Smoking status: Former     Packs/day: 0.50     Years: 30.00     Pack years: 15.00     Types: Cigarettes     Start date:      Quit date: 2017     Years since quittin.5    Smokeless tobacco: Never   Vaping Use    Vaping Use: Never used   Substance and Sexual Activity    Alcohol use: No    Drug use: No    Sexual activity: Not Currently     Social Determinants of Health     Financial Resource Strain: Low Risk     Difficulty of Paying Living Expenses: Not very hard   Food Insecurity: No Food Insecurity    Worried About Running Out of Food in the Last Year: Never true    Ran Out of Food in the Last Year: Never true   Transportation Needs: Unknown    Lack of Transportation (Non-Medical): No   Housing Stability: Unknown    Unstable Housing in the Last Year: No       Medications Prior to Admission     Prior to Admission medications    Medication Sig Start Date End Date Taking? Authorizing Provider   Magic Mouthwash (MIRACLE MOUTHWASH) Equal parts Benadryl, Maalox, 2% Viscous Xylocaine and Dexamethasone. Take 5 mL 4 times daily. 3/2/23   GRACE Nath CNP   sodium chloride 1 g tablet Take 1 tablet by mouth daily 2/28/23   GRACE Nath CNP   dexamethasone (DECADRON) 2 MG tablet 1 tablet PO daily for 2 days starting the day after chemotherapy. 2/27/23   GRACE Nath CNP   nystatin (MYCOSTATIN) 048501 UNIT/ML suspension Take 5 mLs by mouth 4 times daily for 10 days Retain in mouth as long as possible 2/27/23 3/9/23  GRACE Nath CNP   ondansetron (ZOFRAN) 8 MG tablet Take 1 tablet by mouth every 8 hours as needed for Nausea or Vomiting Take 1 tablet by mouth every 8 hours as needed for nausea or vomiting. 2/16/23   Bertha Coelho MD   ezetimibe (ZETIA) 10 MG tablet Take 1 tablet by mouth daily 2/14/23   Katherine Esteves MD   simvastatin (ZOCOR) 20 MG tablet Take 1 tablet by mouth nightly 2/8/23   Katherine Esteves MD   omeprazole (PRILOSEC) 40 MG delayed release capsule Take 1 capsule by mouth daily 2/8/23   aKtherine Esteves MD   atenolol-chlorthalidone (TENORETIC) 100-25 MG per tablet Take 1 tablet by mouth daily 2/8/23   Katherine Esteves MD   lisinopril (PRINIVIL;ZESTRIL) 20 MG tablet Take 1 tablet by mouth daily 2/8/23   Katherine Esteves MD   budesonide-formoterol [facility]) 160-4.5 MCG/ACT AERO Inhale 2 puffs into the lungs 2 times daily 2/8/23   Katherine Esteves MD   albuterol sulfate HFA (PROAIR HFA) 108 (90 Base) MCG/ACT inhaler Inhale 2 puffs into the lungs as needed for Wheezing 2/8/23   Katherine Esteves MD   gabapentin (NEURONTIN) 100 MG capsule Take 1 capsule by mouth nightly for 90 days.  2/8/23 5/9/23  Katherine Esteves MD   fluticasone [facility]) 50 MCG/ACT nasal spray 2 sprays by Each Nostril route daily 2/16/22   Katherine Esteves MD   albuterol (PROVENTIL) (2.5 MG/3ML) 0.083% nebulizer solution Take 3 mLs by nebulization every 6 hours as needed for Wheezing or Shortness of Breath 2/16/22   Colette Almaraz MD       Data:     CBC:   Recent Labs     03/06/23  1251 03/06/23  1814   WBC 7.2 10.5   HGB 10.6* 10.4*    356   MCV 95.8 92.4   RDW 13.8 13.0   LYMPHOPCT 20.3* 6.3*   MONOPCT 5.0* 0.8   BASOPCT 0.4 0.1   MONOSABS 0.4 0.1   LYMPHSABS 1.5 0.7   EOSABS 0.2 0.0   BASOSABS 0.0 0.0     CMP:    Recent Labs     03/06/23  1251 03/06/23  1355 03/06/23  1814   * 134* 132*   K 3.9 3.6 3.2*   CL 95*  --  95*   CO2 25  --  23   BUN 16  --  18   CREATININE 1.1 1.4* 1.1   GLUCOSE 90  --  249*   LABALBU 4.0  --  4.0   CALCIUM 8.5  --  8.2*   BILITOT 0.2  --  0.2   ALKPHOS 101  --  101   AST 14*  --  15   ALT 12  --  13     Lipids:   Lab Results   Component Value Date/Time    CHOL 274 02/08/2023 08:57 AM    HDL 27 02/08/2023 08:57 AM    TRIG 268 02/08/2023 08:57 AM     Hemoglobin A1C:   Lab Results   Component Value Date/Time    LABA1C 6.0 02/08/2023 08:57 AM     TSH:   Lab Results   Component Value Date/Time    TSH 2.34 02/01/2017 09:51 AM     Troponin:   Lab Results   Component Value Date/Time    TROPONINT <0.010 03/06/2023 06:14 PM     BNP:   Recent Labs     03/06/23  1814   PROBNP 106.2     Lactic Acid: No results for input(s): LACTA in the last 72 hours.   UA:  Lab Results   Component Value Date/Time    NITRU NEGATIVE 03/27/2018 11:00 AM    COLORU dhruv 02/10/2020 10:12 AM    COLORU YELLOW 03/27/2018 11:00 AM    PHUR 6.0 02/10/2020 10:12 AM    WBCUA NONE SEEN 03/27/2018 11:00 AM    RBCUA <1 03/27/2018 11:00 AM    MUCUS RARE 02/16/2018 12:00 PM    TRICHOMONAS NONE SEEN 03/27/2018 11:00 AM    BACTERIA RARE 03/27/2018 11:00 AM    CLARITYU cloudy 02/10/2020 10:12 AM    CLARITYU CLEAR 03/27/2018 11:00 AM    SPECGRAV 1.025 02/10/2020 10:12 AM    SPECGRAV 1.008 03/27/2018 11:00 AM    LEUKOCYTESUR small 02/10/2020 10:12 AM    LEUKOCYTESUR NEGATIVE 03/27/2018 11:00 AM    UROBILINOGEN NORMAL 03/27/2018 11:00 AM BILIRUBINUR small 02/10/2020 10:12 AM    BLOODU moderate 02/10/2020 10:12 AM    BLOODU NEGATIVE 03/27/2018 11:00 AM    GLUCOSEU negative 02/10/2020 10:12 AM    KETUA trace 02/10/2020 10:12 AM    KETUA NEGATIVE 03/27/2018 11:00 AM     Urine Cultures:   Lab Results   Component Value Date/Time    LABURIN >100,000 CFU/ml 02/10/2020 10:30 AM     Blood Cultures: No results found for: BC  No results found for: BLOODCULT2  Organism:   Lab Results   Component Value Date/Time    ORG Escherichia coli 02/10/2020 10:30 AM       Radiology results:  XR CHEST PORTABLE   Final Result   No acute process.              Medications:     Medications:    diphenhydrAMINE  25 mg IntraVENous Q6H    famotidine (PEPCID) injection  20 mg IntraVENous Daily        Infusions:       PRN Meds:   EPINEPHrine, 0.3 mg, Q5 Min PRN  LORazepam, 1 mg, BID PRN        Catina Mcgee MD  03/06/23 7:55 PM

## 2023-03-07 NOTE — PROGRESS NOTES
Went over discharge instructions with Pt and she voiced understanding of them. Pt is alert & oriented x 4 and she states she is currently pain free. Her respirations are easy & unlabored on room air and her skin is warm and dry. She was escorted down to the Outpatient Pharmacy to  her medications and then out to her car where her  was waiting for her per wheelchair per this RN's assist. She had all of her belongings with her when she was discharged to go home. Pt's condition was stable and she appeared to be in no acute distress upon discharge.

## 2023-03-07 NOTE — PLAN OF CARE
Problem: Discharge Planning  Goal: Discharge to home or other facility with appropriate resources  3/7/2023 1247 by Radha Salter RN  Outcome: Adequate for Discharge  3/7/2023 0940 by Radha Salter RN  Outcome: Progressing  Flowsheets  Taken 3/7/2023 0815 by Radha Salter RN  Discharge to home or other facility with appropriate resources:   Identify barriers to discharge with patient and caregiver   Refer to discharge planning if patient needs post-hospital services based on physician order or complex needs related to functional status, cognitive ability or social support system  Taken 3/6/2023 2131 by Ean Guerra RN  Discharge to home or other facility with appropriate resources: Refer to discharge planning if patient needs post-hospital services based on physician order or complex needs related to functional status, cognitive ability or social support system     Problem: Pain  Goal: Verbalizes/displays adequate comfort level or baseline comfort level  3/7/2023 1247 by Radha Salter RN  Outcome: Adequate for Discharge  3/7/2023 0940 by Radha Salter RN  Outcome: Progressing  3/6/2023 2339 by Carrie Silverman RN  Outcome: Progressing     Problem: Chronic Conditions and Co-morbidities  Goal: Patient's chronic conditions and co-morbidity symptoms are monitored and maintained or improved  3/7/2023 1247 by Radha Salter RN  Outcome: Adequate for Discharge  3/7/2023 0940 by Radha Salter RN  Outcome: Progressing     Problem: Safety - Adult  Goal: Free from fall injury  3/7/2023 1247 by Radha Salter RN  Outcome: Adequate for Discharge  3/7/2023 0940 by Radha Salter RN  Outcome: Progressing     Problem: ABCDS Injury Assessment  Goal: Absence of physical injury  3/7/2023 1247 by Radha Salter RN  Outcome: Adequate for Discharge  3/7/2023 0940 by Radha Salter RN  Outcome: Progressing     Problem: Skin/Tissue Integrity  Goal: Absence of new skin breakdown  Description: 1.   Monitor for areas of redness and/or skin breakdown  2. Assess vascular access sites hourly  3. Every 4-6 hours minimum:  Change oxygen saturation probe site  4. Every 4-6 hours:  If on nasal continuous positive airway pressure, respiratory therapy assess nares and determine need for appliance change or resting period.   3/7/2023 1247 by Owen Glover RN  Outcome: Adequate for Discharge  3/7/2023 0940 by Owen Glover RN  Outcome: Progressing

## 2023-03-08 ENCOUNTER — CARE COORDINATION (OUTPATIENT)
Dept: CASE MANAGEMENT | Age: 68
End: 2023-03-08

## 2023-03-08 ENCOUNTER — HOSPITAL ENCOUNTER (OUTPATIENT)
Dept: RADIATION ONCOLOGY | Age: 68
Discharge: HOME | End: 2023-03-08
Payer: MEDICARE

## 2023-03-08 DIAGNOSIS — C34.91 SQUAMOUS CELL LUNG CANCER, RIGHT (HCC): Primary | ICD-10-CM

## 2023-03-08 PROCEDURE — 77014 CHG CT GUIDANCE RADIATION THERAPY FLDS PLACEMENT: CPT | Performed by: RADIOLOGY

## 2023-03-08 PROCEDURE — 1111F DSCHRG MED/CURRENT MED MERGE: CPT | Performed by: FAMILY MEDICINE

## 2023-03-08 PROCEDURE — 77386 HC NTSTY MODUL RAD TX DLVR CPLX: CPT | Performed by: RADIOLOGY

## 2023-03-08 NOTE — CARE COORDINATION
[facility] Care Transitions Initial Follow Up Call    Call within 2 business days of discharge: Yes    Patient Current Location:  Home: [facility] Transition Nurse contacted the patient by telephone to perform post hospital discharge assessment. Verified name and  with patient as identifiers. Provided introduction to self, and explanation of the Care Transition Nurse role. Patient: Asa Berry Patient : 1955   MRN: [id number]  Reason for Admission: Anaphylactic reaction secondary to chemotherapy, Squamous cell carcinoma of RUL. Discharge Date: 3/7/23 RARS: Readmission Risk Score: 13.4      Last Discharge  Street       Date Complaint Diagnosis Description Type Department Provider    3/6/23 Allergic Reaction Anaphylaxis, initial encounter . .. ED to Hosp-Admission (Discharged) (ADMITTED) [facility] ICU Masha Schaffer MD; Karyna Salgado. .. Challenges to be reviewed by the provider   Additional needs identified to be addressed with provider: No  none               Method of communication with provider: none. Spoke with Becky Ryne, says she is ok,, very lethargic, fatigues very easily. No SOB @ present. C/o cnp cough, taking Benzonaate , says it is helping. She had an allergic rxn to Chemo-Carbotaxol (NSCLC) She says the rash & hives have resolved. She says she gets winded with activity, so is just taking it easy,  is helping her out @ home. Is not on home O2 @home @ this time. O2 Sat-95% on room air. She doesn't feel like eating much, appetite not very good. No N/V/D. No chest pain, pressure/heart palpitations. Reviewed AVS& DC meds. New meds are Prednisone, Mucinex & Benzonatate, received these meds @ the [address] @ [place]. Taking all meds as prescribed. Emphasized importance of HFU appt needed within 7 days. Has appt today with Dr Rio Fields, Radiation Oncologist for XRT . Chemo is due to start back up on 3/13/23.       Care Transition Nurse reviewed discharge instructions, medical action plan, and red flags with patient who verbalized understanding. The patient was given an opportunity to ask questions and does not have any further questions or concerns at this time. Were discharge instructions available to patient? Yes. Reviewed appropriate site of care based on symptoms and resources available to patient including: PCP  Specialist  Urgent care clinics  When to call [address] The patient agrees to contact the PCP office for questions related to their healthcare. Advance Care Planning:   Does patient have an Advance Directive: not on file; education provided. Medication reconciliation was performed with patient, who verbalizes understanding of administration of home medications. Medications reviewed, 1111F entered: yes    Was patient discharged with a pulse oximeter? yes, discussed and confirmed pulse oximeter discharge instructions and when to notify provider or seek emergency care. Non-face-to-face services provided:  Obtained and reviewed discharge summary and/or continuity of care documents  Education of patient/family/caregiver/guardian to support self-management-s/s to be alerted to. Call MD for worseing s/s. Assessment and support for treatment adherence and medication management-med review    Offered patient enrollment in the Remote Patient Monitoring (RPM) program for in-home monitoring: Patient declined.     Care Transitions 24 Hour Call    Schedule Follow Up Appointment with PCP: Completed  Do you have a copy of your discharge instructions?: Yes  Do you have all of your prescriptions and are they filled?: Yes  Have you been contacted by a [id number] [employer] Pharmacist?: No  Have you scheduled your follow up appointment?: Yes  How are you going to get to your appointment?: Car - family or friend to transport  Do you feel like you have everything you need to keep you well at home?: Yes  Care Transitions Interventions Transportation Support: Declined      DME Assistance: Declined   Disease Association: Completed               Discussed follow-up appointments. If no appointment was previously scheduled, appointment scheduling offered: Yes. Is follow up appointment scheduled within 7 days of discharge?  Yes. 3/8/23    Follow Up  Future Appointments   Date Time Provider Sadiq Quiñonez   3/8/2023  5:00 PM SCHEDULE, [address] [facility] RAD ONC [place]   3/8/2023  5:15 PM Shalini Morales MD 1200 [facility] RAD ON[facility] [place]   3/9/2023  5:00 PM SCHEDULE, 1200 [facility] RAD ONC [place] 1 1200 [facility] RAD ON[facility] [place]   3/10/2023  5:00 PM SCHEDULE, 1200 [facility] RAD ONC [place] 1 1200 [facility] RAD ONC [place]   3/13/2023  1:00 PM SCHEDULE, [address] [facility] [place] [facility] [address]   3/13/2023  5:00 PM SCHEDULE, 1200 [facility] RAD ONC [place] 1 1200 [facility] RAD ONC [place]   3/14/2023  5:00 PM SCHEDULE, [facility] RAD ONC [place] 1 1200 [facility] RAD ONC [place]   3/15/2023  8:00 AM S[facility]x [facility]   8/10/6709  5:00 PM SCHEDULE, 1200 [facility] RAD ONC [place] 1 [facility] RAD ONC [place]   3/16/2023  5:00 PM SCHEDULE, 1200 [facility] RAD ONC [place] 1 [facility] RAD ONC [place]   3/17/2023  5:00 PM SCHEDULE, 1200 [facility] RAD ONC [place] 1 [facility] RAD ONC [place]   3/20/2023  5:00 PM SCHEDULE, 1200 [facility] RAD ONC [place] 1 [facility] RAD ONC [place]   3/21/2023  5:00 PM SCHEDULE, [facility] RAD ONC LIN[place] 1 [facility] ON[facility] [place]   3/22/2023  5:00 PM SCHEDULE, [facility] RAD ONC [place] 1 [facility] ON[facility] [place]   3/23/2023  5:00 PM SCHEDULE, [facility] RAD ONC LIN[place] 1 [facility] [place]   3/24/2023  5:00 PM SCHEDULE, [facility] RAD ONC [place] 1 [facility] [place]   3/27/2023  5:00 PM SCHEDULE, [facility] RAD ONC LIN[place] 1 [facility] [place]   3/28/2023  5:00 PM SCHEDULE, [facility] RAD ONC LIN[place] 1 [facility] ON[facility] [place]   3/29/2023  5:00 PM SCHEDULE, [facility] RAD ONC LIN[place] 1 [facility] ON[facility] [place]   3/30/2023  5:00 PM SCHEDULE, [facility] RAD ONC LIN[place] 1 [facility] RAD ON[facility] [place]   3/31/2023  5:00 PM SCHEDULE, [facility] RAD ONC LIN[place] 1 [facility] RAD ONC [place]   4/3/2023  5:00 PM SCHEDULE, [facility] RAD ONC [place] 1 [facility] RAD ONC [place]   4/4/2023  5:00 PM SCHEDULE, 1200 [facility] RAD ONC [place] 1 [facility] Nurse provided contact information. Plan for follow-up call in 5-7 days based on severity of symptoms and risk factors. Plan for next call: symptom management-sib/lethargy/ appetite?  Chest pain/pressure?   follow-up appointment-3/8 with Rad Onc, ongoing XRT treatments?   medication management-any change in meds    Debbie Person RN

## 2023-03-09 ENCOUNTER — HOSPITAL ENCOUNTER (OUTPATIENT)
Dept: RADIATION ONCOLOGY | Age: 68
Discharge: HOME | End: 2023-03-09
Payer: MEDICARE

## 2023-03-09 PROCEDURE — 77386 HC NTSTY MODUL RAD TX DLVR CPLX: CPT | Performed by: RADIOLOGY

## 2023-03-09 PROCEDURE — 77014 CHG CT GUIDANCE RADIATION THERAPY FLDS PLACEMENT: CPT | Performed by: RADIOLOGY

## 2023-03-10 ENCOUNTER — HOSPITAL ENCOUNTER (OUTPATIENT)
Dept: RADIATION ONCOLOGY | Age: 68
Discharge: HOME | End: 2023-03-10
Payer: MEDICARE

## 2023-03-10 ENCOUNTER — CLINICAL DOCUMENTATION (OUTPATIENT)
Dept: ONCOLOGY | Age: 68
End: 2023-03-10

## 2023-03-10 PROCEDURE — 77386 HC NTSTY MODUL RAD TX DLVR CPLX: CPT | Performed by: RADIOLOGY

## 2023-03-10 NOTE — PROGRESS NOTES
Letter of Medical Necessity faxed to [facility] @ [phone] as requested.

## 2023-03-13 ENCOUNTER — APPOINTMENT (OUTPATIENT)
Dept: RADIATION ONCOLOGY | Age: 68
End: 2023-03-13
Payer: MEDICARE

## 2023-03-14 ENCOUNTER — HOSPITAL ENCOUNTER (OUTPATIENT)
Dept: RADIATION ONCOLOGY | Age: 68
Discharge: HOME | End: 2023-03-14
Payer: MEDICARE

## 2023-03-14 PROCEDURE — 77386 HC NTSTY MODUL RAD TX DLVR CPLX: CPT | Performed by: RADIOLOGY

## 2023-03-14 PROCEDURE — 77014 CHG CT GUIDANCE RADIATION THERAPY FLDS PLACEMENT: CPT | Performed by: RADIOLOGY

## 2023-03-15 ENCOUNTER — TELEPHONE (OUTPATIENT)
Dept: FAMILY MEDICINE CLINIC | Age: 68
End: 2023-03-15

## 2023-03-15 ENCOUNTER — CARE COORDINATION (OUTPATIENT)
Dept: CASE MANAGEMENT | Age: 68
End: 2023-03-15

## 2023-03-15 ENCOUNTER — HOSPITAL ENCOUNTER (OUTPATIENT)
Dept: RADIATION ONCOLOGY | Age: 68
Discharge: HOME | End: 2023-03-15
Payer: MEDICARE

## 2023-03-15 PROCEDURE — 77014 CHG CT GUIDANCE RADIATION THERAPY FLDS PLACEMENT: CPT | Performed by: RADIOLOGY

## 2023-03-15 PROCEDURE — 77386 HC NTSTY MODUL RAD TX DLVR CPLX: CPT | Performed by: RADIOLOGY

## 2023-03-15 PROCEDURE — 77427 RADIATION TX MANAGEMENT X5: CPT | Performed by: RADIOLOGY

## 2023-03-15 PROCEDURE — 77336 RADIATION PHYSICS CONSULT: CPT | Performed by: RADIOLOGY

## 2023-03-15 NOTE — CARE COORDINATION
[facility] Care Transitions Follow Up Call    Patient Current Location:  Home: [address] [facility] Transition Nurse contacted the patient by telephone leaving Hipaa VM to follow up after admission. Pt did return my call. Verified name and  with patient as identifiers. Patient: Alice Dennis  Patient : 1955   MRN: <[id number]>  Reason for Admission: 3/6/2023 - 3/7/2023 [address] Anaphylactic reaction secondary to chemotherapy, Squamous cell carcinoma of RUL, Essential hypertension, Moderate COPD. Discharge Date: 3/7/23 RARS: Readmission Risk Score: 13.4      Needs to be reviewed by the provider   Additional needs identified to be addressed with provider: No  none             Method of communication with provider: none. Alisson Douglas returned this writers call. States she is no longer lethargic. Pt alert and answers questions appropriately. In very good spirits. States she continues to have daily fatigue. Appetite is fair and she is eating nutritious choices and feels she is hydrating well. Finds some foods taste strange. Pt uses Magic Mouthwash TID. Admits throat pain from radiation and rates 6/10. No stridor symptoms. States she has occasional wheeze but feels her respiratory is stable. No home oxygen use. States she sats 100% RA. Reports nasal congestion w/ a little blood streaking and discussed sinus rinse. No tooth discomfort or oral sores. States she gets radiation 5 days/wk and weekly chemotherapy on Monday. States rash and previous hives completely gone. No skin concerns. Reports /61 and notes stable wts fluctuating between 1-2 lbs. No edema concerns. No home or med refill needs. Addressed changes since last contact:  none  Discussed follow-up appointments. If no appointment was previously scheduled, appointment scheduling offered: Yes. Is follow up appointment scheduled within 7 days of discharge?  Pt saw Rad yesterday and see's HemOnc every 3 wks w/ next appt next wk. Follow Up  Future Appointments   Date Time Provider Sadiq Olamide   3/15/2023  4:45 PM SCHEDULE, [address] [facility] RAD ONC LIN[place] 1 1200 [facility] RAD ONC [place]   3/16/2023  5:00 PM SCHEDULE, 1200 [facility] RAD ONC LINAC 1 1200 [facility] RAD ONC [place]   3/17/2023  5:00 PM SCHEDULE, 1200 [facility] RAD ONC LINAC 1 [facility] RAD ONC [place]   3/20/2023  1:30 PM SCHEDULE, [facility] MED ONC TREATMENT [facility] MED ONC [place]   3/20/2023  5:00 PM SCHEDULE, 1200 [facility] RAD ONC LINAC 1 [facility] RAD ONC [place]   3/21/2023  5:00 PM SCHEDULE, 1200 [facility] RAD ONC LINAC 1 [facility] RAD ONC [place]   3/22/2023  5:00 PM SCHEDULE, 1200 [facility] RAD ONC LINAC 1 [facility] RAD ONC [place]   3/23/2023  5:00 PM SCHEDULE, 1200 [facility] RAD ONC LINAC 1 [facility] RAD ONC [place]   3/24/2023  5:00 PM SCHEDULE, [facility] RAD ONC LINAC 1 [facility] RAD ONC [place]   3/27/2023  1:30 PM SCHEDULE, 1200 [facility] MED ONC TREATMENT [facility] MED ONC [place]   3/27/2023  5:00 PM SCHEDULE, [facility] RAD ONC LINAC 1 [facility] RAD ONC [place]   3/28/2023  5:00 PM SCHEDULE, [facility] RAD ONC LINAC 1 [facility] RAD ONC [place]   3/29/2023  5:00 PM SCHEDULE, [facility] RAD ONC LINAC 1 [facility] RAD ONC [place]   3/30/2023  5:00 PM SCHEDULE, [facility] RAD ONC LINAC 1 [facility] RAD ONC [place]   3/31/2023  5:00 PM SCHEDULE, [facility] RAD ONC LINAC 1 [facility] RAD ONC [place]   4/3/2023  1:30 PM SCHEDULE, 1200 [facility] MED ONC TREATMENT [facility] MED ONC [place]   4/3/2023  5:00 PM SCHEDULE, [facility] RAD ONC LINAC 1 [facility] RAD ONC [place]   4/4/2023  5:00 PM SCHEDULE, [facility] RAD ONC LINAC 1 [facility] RAD ONC [place]   4/5/2023  5:00 PM SCHEDULE, [facility] RAD ONC LINAC 1 [facility] RAD ONC [place]     Non-[facility] follow up appointment(s):     Care Transition Nurse reviewed red flags with patient and discussed any barriers to care and/or understanding of plan of care after discharge. Discussed appropriate site of care based on symptoms and resources available to patient including: When to call 911.  The patient agrees to contact the PCP office for questions related to their healthcare. Advance Care Planning:   reviewed and current. Patients top risk factors for readmission:  Throat pain  Interventions to address risk factors:  Pt advised to call 911 for stridor, difficulty breathing, increasing SOB or throat swelling. Advised to notify physician for any new rashes/hive development. Offered patient enrollment in the Remote Patient Monitoring (RPM) program for in-home monitoring:  Not reviewed this call. .     Care Transitions Subsequent and Final Call    Schedule Follow Up Appointment with PCP: Declined  Subsequent and Final Calls  Do you have any ongoing symptoms?: Yes  Patient-reported symptoms: Fatigue, Other, Pain  Have your medications changed?: Yes  Patient Reports: States she is on a new chemo (she has tried before)  Do you have any questions related to your medications?: No  Do you have any needs or concerns that I can assist you with?: No  Identified Barriers: Lack of Education  Care Transitions Interventions     Transportation Support: Declined      DME Assistance: Declined   Disease Association: Completed      Other Interventions:             Care Transition Nurse provided contact information for future needs. Plan for follow-up call in 5-7 days based on severity of symptoms and risk factors. Plan for next call:  CT FU, Check throat pain/swallow, check resp symptoms.     Rosita Medina RN

## 2023-03-16 ENCOUNTER — HOSPITAL ENCOUNTER (OUTPATIENT)
Dept: RADIATION ONCOLOGY | Age: 68
Discharge: HOME | End: 2023-03-16
Payer: MEDICARE

## 2023-03-16 DIAGNOSIS — C34.11 MALIGNANT NEOPLASM OF UPPER LOBE, RIGHT BRONCHUS OR LUNG (HCC): Primary | ICD-10-CM

## 2023-03-16 PROCEDURE — 77014 CHG CT GUIDANCE RADIATION THERAPY FLDS PLACEMENT: CPT | Performed by: RADIOLOGY

## 2023-03-16 PROCEDURE — 77386 HC NTSTY MODUL RAD TX DLVR CPLX: CPT | Performed by: RADIOLOGY

## 2023-03-17 ENCOUNTER — HOSPITAL ENCOUNTER (OUTPATIENT)
Dept: RADIATION ONCOLOGY | Age: 68
Discharge: HOME | End: 2023-03-17
Payer: MEDICARE

## 2023-03-17 PROCEDURE — 77386 HC NTSTY MODUL RAD TX DLVR CPLX: CPT | Performed by: RADIOLOGY

## 2023-03-20 ENCOUNTER — HOSPITAL ENCOUNTER (OUTPATIENT)
Dept: RADIATION ONCOLOGY | Age: 68
Discharge: HOME | End: 2023-03-20
Payer: MEDICARE

## 2023-03-20 ENCOUNTER — HOSPITAL ENCOUNTER (OUTPATIENT)
Dept: INFUSION THERAPY | Age: 68
Discharge: HOME | End: 2023-03-20
Payer: MEDICARE

## 2023-03-20 VITALS
SYSTOLIC BLOOD PRESSURE: 123 MMHG | TEMPERATURE: 97.5 F | DIASTOLIC BLOOD PRESSURE: 86 MMHG | HEIGHT: 65 IN | HEART RATE: 89 BPM | WEIGHT: 162 LBS | BODY MASS INDEX: 26.99 KG/M2

## 2023-03-20 VITALS — WEIGHT: 164.8 LBS | BODY MASS INDEX: 27.42 KG/M2

## 2023-03-20 DIAGNOSIS — C34.11 MALIGNANT NEOPLASM OF UPPER LOBE, RIGHT BRONCHUS OR LUNG (HCC): Primary | ICD-10-CM

## 2023-03-20 DIAGNOSIS — C34.91 MALIGNANT NEOPLASM OF RIGHT LUNG, UNSPECIFIED PART OF LUNG (HCC): ICD-10-CM

## 2023-03-20 LAB
ALBUMIN SERPL-MCNC: 3.9 GM/DL (ref 3.4–5)
ALP BLD-CCNC: 110 IU/L (ref 40–128)
ALT SERPL-CCNC: 18 U/L (ref 10–40)
ANION GAP SERPL CALCULATED.3IONS-SCNC: 9 MMOL/L (ref 4–16)
AST SERPL-CCNC: 14 IU/L (ref 15–37)
BASOPHILS ABSOLUTE: 0 K/CU MM
BASOPHILS RELATIVE PERCENT: 0.7 % (ref 0–1)
BILIRUB SERPL-MCNC: 0.2 MG/DL (ref 0–1)
BUN SERPL-MCNC: 10 MG/DL (ref 6–23)
CALCIUM SERPL-MCNC: 9.1 MG/DL (ref 8.3–10.6)
CHLORIDE BLD-SCNC: 98 MMOL/L (ref 99–110)
CO2: 26 MMOL/L (ref 21–32)
CREAT SERPL-MCNC: 0.8 MG/DL (ref 0.6–1.1)
DIFFERENTIAL TYPE: ABNORMAL
EOSINOPHILS ABSOLUTE: 0.1 K/CU MM
EOSINOPHILS RELATIVE PERCENT: 1.6 % (ref 0–3)
GFR SERPL CREATININE-BSD FRML MDRD: >60 ML/MIN/1.73M2
GLUCOSE SERPL-MCNC: 82 MG/DL (ref 70–99)
HCT VFR BLD CALC: 33.2 % (ref 37–47)
HEMOGLOBIN: 10.5 GM/DL (ref 12.5–16)
LYMPHOCYTES ABSOLUTE: 0.9 K/CU MM
LYMPHOCYTES RELATIVE PERCENT: 16 % (ref 24–44)
MCH RBC QN AUTO: 30.3 PG (ref 27–31)
MCHC RBC AUTO-ENTMCNC: 31.6 % (ref 32–36)
MCV RBC AUTO: 95.7 FL (ref 78–100)
MONOCYTES ABSOLUTE: 0.5 K/CU MM
MONOCYTES RELATIVE PERCENT: 8.3 % (ref 0–4)
PDW BLD-RTO: 14.8 % (ref 11.7–14.9)
PLATELET # BLD: 261 K/CU MM (ref 140–440)
PMV BLD AUTO: 8.4 FL (ref 7.5–11.1)
POTASSIUM SERPL-SCNC: 4 MMOL/L (ref 3.5–5.1)
RBC # BLD: 3.47 M/CU MM (ref 4.2–5.4)
SEGMENTED NEUTROPHILS ABSOLUTE COUNT: 4.1 K/CU MM
SEGMENTED NEUTROPHILS RELATIVE PERCENT: 73.4 % (ref 36–66)
SODIUM BLD-SCNC: 133 MMOL/L (ref 135–145)
TOTAL PROTEIN: 6.4 GM/DL (ref 6.4–8.2)
WBC # BLD: 5.6 K/CU MM (ref 4–10.5)

## 2023-03-20 PROCEDURE — 77014 CHG CT GUIDANCE RADIATION THERAPY FLDS PLACEMENT: CPT | Performed by: RADIOLOGY

## 2023-03-20 PROCEDURE — 77386 HC NTSTY MODUL RAD TX DLVR CPLX: CPT | Performed by: RADIOLOGY

## 2023-03-20 PROCEDURE — 85025 COMPLETE CBC W/AUTO DIFF WBC: CPT

## 2023-03-20 PROCEDURE — 2580000003 HC RX 258: Performed by: NURSE PRACTITIONER

## 2023-03-20 PROCEDURE — 6360000002 HC RX W HCPCS: Performed by: NURSE PRACTITIONER

## 2023-03-20 PROCEDURE — 96375 TX/PRO/DX INJ NEW DRUG ADDON: CPT

## 2023-03-20 PROCEDURE — 2500000003 HC RX 250 WO HCPCS: Performed by: NURSE PRACTITIONER

## 2023-03-20 PROCEDURE — 80053 COMPREHEN METABOLIC PANEL: CPT

## 2023-03-20 PROCEDURE — 96413 CHEMO IV INFUSION 1 HR: CPT

## 2023-03-20 RX ORDER — PALONOSETRON 0.05 MG/ML
0.25 INJECTION, SOLUTION INTRAVENOUS ONCE
Status: DISCONTINUED | OUTPATIENT
Start: 2023-03-20 | End: 2023-03-20

## 2023-03-20 RX ORDER — SODIUM CHLORIDE 9 MG/ML
5-40 INJECTION INTRAVENOUS PRN
Status: DISCONTINUED | OUTPATIENT
Start: 2023-03-20 | End: 2023-03-21 | Stop reason: HOSPADM

## 2023-03-20 RX ORDER — FAMOTIDINE 10 MG/ML
20 INJECTION, SOLUTION INTRAVENOUS ONCE
Status: COMPLETED | OUTPATIENT
Start: 2023-03-20 | End: 2023-03-20

## 2023-03-20 RX ORDER — DIPHENHYDRAMINE HYDROCHLORIDE 50 MG/ML
25 INJECTION INTRAMUSCULAR; INTRAVENOUS ONCE
Status: COMPLETED | OUTPATIENT
Start: 2023-03-20 | End: 2023-03-20

## 2023-03-20 RX ORDER — HEPARIN SODIUM (PORCINE) LOCK FLUSH IV SOLN 100 UNIT/ML 100 UNIT/ML
500 SOLUTION INTRAVENOUS PRN
Status: DISCONTINUED | OUTPATIENT
Start: 2023-03-20 | End: 2023-03-21 | Stop reason: HOSPADM

## 2023-03-20 RX ORDER — SODIUM CHLORIDE 9 MG/ML
5-250 INJECTION, SOLUTION INTRAVENOUS PRN
Status: DISCONTINUED | OUTPATIENT
Start: 2023-03-20 | End: 2023-03-21 | Stop reason: HOSPADM

## 2023-03-20 RX ADMIN — DEXAMETHASONE SODIUM PHOSPHATE 12 MG: 4 INJECTION INTRA-ARTICULAR; INTRALESIONAL; INTRAMUSCULAR; INTRAVENOUS; SOFT TISSUE at 10:58

## 2023-03-20 RX ADMIN — DIPHENHYDRAMINE HYDROCHLORIDE 25 MG: 50 INJECTION INTRAMUSCULAR; INTRAVENOUS at 10:54

## 2023-03-20 RX ADMIN — HEPARIN 500 UNITS: 100 SYRINGE at 12:49

## 2023-03-20 RX ADMIN — FAMOTIDINE 20 MG: 10 INJECTION, SOLUTION INTRAVENOUS at 10:54

## 2023-03-20 RX ADMIN — SODIUM CHLORIDE, PRESERVATIVE FREE 20 ML: 5 INJECTION INTRAVENOUS at 12:49

## 2023-03-20 RX ADMIN — SODIUM CHLORIDE 20 ML/HR: 9 INJECTION, SOLUTION INTRAVENOUS at 10:57

## 2023-03-20 RX ADMIN — PACLITAXEL 78 MG: 6 INJECTION, SOLUTION, CONCENTRATE INTRAVENOUS at 11:34

## 2023-03-20 ASSESSMENT — PAIN SCALES - GENERAL: PAINLEVEL_OUTOF10: 0

## 2023-03-20 NOTE — PROGRESS NOTES
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 TREATMENT COURSE:     With weekly chemo      Site: R lung and LNs   Current Total Radiation Dose: 36 Gy  Fraction: 18/30    Pt doing well. Energy fairly good. No skin itching/soreness. Breathing stable.  No dysphagia/odynophagia  Getting chemo today       EXAM  Wt Readings from Last 3 Encounters:   03/14/23 167 lb (75.8 kg)   03/07/23 168 lb 10.4 oz (76.5 kg)   03/06/23 170 lb 3.2 oz (77.2 kg)     NAD  L: BCTA    Setup images, chart, plan reviewed    A/P:   Tolerating RT well  Continue RT as planned      Electronically signed by Laquita Edwards MD on 3/20/2023 at 9:25 AM

## 2023-03-20 NOTE — PLAN OF CARE
Care plan reviewed. _Pt with increasing fatigue, tolerating ADLs without difficulty. _Pt c/o still having esophagitis, eating softer foods, using  BMX as prescribed. _Pt with shortness of breathe with minimal exertion, improved with rest. Productive cough thick white phlegm at times.

## 2023-03-20 NOTE — PROGRESS NOTES
Patient arrived to treatment suite for Mediport access, blood draw, pre-medications and chemotherapy infusion. Left chest mediport accessed and blood drawn from site and sent to lab for processing. Patient has no questions or concerns for the doctor at this time. Treatment approved and given. Patient tolerated well  Left treatment suite ambulatory with spouse. Discharge instructions provided. Patient's status assessed and documented appropriately. All labs and required results were also reviewed today. Treatment parameters have been reviewed. Today's treatment has been approved by the provider. Treatment orders and medication sequencing (when applicable) was verified by 2 registered nurses. The treatment plan was confirmed with the patient prior to administration, and the patient understands the need to report any treatment-related symptoms. Prior to administration, when applicable, the following 8 elements of medication administration were reviewed with 2nd Registered Nurse prior to dosing: drug name, drug dose, infusion volume when prepared in a syringe, rate of administration, expiration dates and/or times, appearance and integrity of drug(s), and rate of pump for infusion. The 5 rights of medication administration have been verified.

## 2023-03-20 NOTE — PROGRESS NOTES
Per , due to severe reaction with previous carboplatin dose, discontinue carboplatin from treatment plan.

## 2023-03-21 ENCOUNTER — HOSPITAL ENCOUNTER (OUTPATIENT)
Dept: RADIATION ONCOLOGY | Age: 68
Discharge: HOME | End: 2023-03-21
Payer: MEDICARE

## 2023-03-21 PROCEDURE — 77386 HC NTSTY MODUL RAD TX DLVR CPLX: CPT | Performed by: RADIOLOGY

## 2023-03-21 PROCEDURE — 77014 CHG CT GUIDANCE RADIATION THERAPY FLDS PLACEMENT: CPT | Performed by: RADIOLOGY

## 2023-03-22 ENCOUNTER — HOSPITAL ENCOUNTER (OUTPATIENT)
Dept: RADIATION ONCOLOGY | Age: 68
Discharge: HOME | End: 2023-03-22
Payer: MEDICARE

## 2023-03-22 ENCOUNTER — CARE COORDINATION (OUTPATIENT)
Dept: CASE MANAGEMENT | Age: 68
End: 2023-03-22

## 2023-03-22 PROCEDURE — 77336 RADIATION PHYSICS CONSULT: CPT | Performed by: RADIOLOGY

## 2023-03-22 PROCEDURE — 77386 HC NTSTY MODUL RAD TX DLVR CPLX: CPT | Performed by: RADIOLOGY

## 2023-03-22 PROCEDURE — 77014 CHG CT GUIDANCE RADIATION THERAPY FLDS PLACEMENT: CPT | Performed by: RADIOLOGY

## 2023-03-22 PROCEDURE — 77427 RADIATION TX MANAGEMENT X5: CPT | Performed by: RADIOLOGY

## 2023-03-22 NOTE — CARE COORDINATION
[address] Care Transitions Follow Up Call    Patient Current Location: [address]  Transition Nurse contacted the patient by telephone to follow up after admission on 3/6/23. Verified name and  with patient as identifiers. Patient: Mak Morataya  Patient : 1955   MRN: [id number]  Reason for Admission: Anaphylactic reaction secondary to chemotherapy, Squamous cell carcinoma of RUL. Discharge Date: 3/7/23 RARS: Readmission Risk Score: 13.4      Contacted patient for care transition follow up. Spoke very briefly with patient who states she is doing okay. Lots of background noise noted. Reports that she is not at home right now. Requesting call back at a later time.         Follow Up  Future Appointments   Date Time Provider Sadiq Quiñonez   3/23/2023  9:15 AM SCHEDULE, 1200 [facility] RAD ONC LIN[facility] 1 1200 [facility] RAD ONC [place]   3/24/2023  9:15 AM SCHEDULE, 1200 [facility] RAD ONC LINAC 1 [facility] RAD ONC [place]   3/27/2023  9:15 AM SCHEDULE, [facility] RAD ONC LINAC 1 [facility] RAD ONC [place]   3/27/2023  9:30 AM SCHEDULE, [facility] MED ONC TREATMENT [facility] MED ONC [place]   3/27/2023 10:30 AM GRACE Lewis - CNP [facility]   3/28/2023  9:15 AM SCHEDULE, [facility] RAD ONC LINAC 1 [facility] RAD ONC [place]   3/29/2023  9:15 AM SCHEDULE, [facility] RAD ONC LINAC 1 [facility] RAD ONC [place]   3/30/2023  9:15 AM SCHEDULE, [facility] RAD ONC LINAC 1 [facility] RAD ONC [place]   3/31/2023  9:15 AM SCHEDULE, [facility] RAD ONC LINAC 1 [facility] RAD ONC [place]   4/3/2023  9:15 AM SCHEDULE, [facility] RAD ONC LINAC 1 [facility] RAD ONC [place]   4/3/2023  9:30 AM SCHEDULE, [facility] MED ONC TREATMENT [facility] ONC [place]   2023  9:15 AM SCHEDULE, [facility] RAD ONC LINAC 1 [facility] RAD ONC [place]   2023  9:15 AM SCHEDULE, [facility] RAD ONC LINAC 1 [facility] RAD ONC [place]       Keyonna Rush RN

## 2023-03-23 ENCOUNTER — HOSPITAL ENCOUNTER (OUTPATIENT)
Dept: RADIATION ONCOLOGY | Age: 68
Discharge: HOME | End: 2023-03-23
Payer: MEDICARE

## 2023-03-23 PROCEDURE — 77386 HC NTSTY MODUL RAD TX DLVR CPLX: CPT | Performed by: RADIOLOGY

## 2023-03-24 ENCOUNTER — HOSPITAL ENCOUNTER (OUTPATIENT)
Dept: RADIATION ONCOLOGY | Age: 68
Discharge: HOME | End: 2023-03-24
Payer: MEDICARE

## 2023-03-24 PROCEDURE — 77386 HC NTSTY MODUL RAD TX DLVR CPLX: CPT | Performed by: RADIOLOGY

## 2023-03-27 ENCOUNTER — OFFICE VISIT (OUTPATIENT)
Dept: ONCOLOGY | Age: 68
End: 2023-03-27
Payer: MEDICARE

## 2023-03-27 ENCOUNTER — HOSPITAL ENCOUNTER (OUTPATIENT)
Dept: INFUSION THERAPY | Age: 68
Discharge: HOME | End: 2023-03-27
Payer: MEDICARE

## 2023-03-27 ENCOUNTER — HOSPITAL ENCOUNTER (OUTPATIENT)
Dept: RADIATION ONCOLOGY | Age: 68
Discharge: HOME | End: 2023-03-27
Payer: MEDICARE

## 2023-03-27 VITALS
SYSTOLIC BLOOD PRESSURE: 123 MMHG | HEART RATE: 89 BPM | TEMPERATURE: 97.5 F | RESPIRATION RATE: 22 BRPM | BODY MASS INDEX: 27.82 KG/M2 | HEIGHT: 65 IN | DIASTOLIC BLOOD PRESSURE: 86 MMHG | WEIGHT: 167 LBS | OXYGEN SATURATION: 98 %

## 2023-03-27 VITALS — WEIGHT: 167 LBS | BODY MASS INDEX: 27.79 KG/M2

## 2023-03-27 DIAGNOSIS — C34.91 MALIGNANT NEOPLASM OF RIGHT LUNG, UNSPECIFIED PART OF LUNG (HCC): Primary | ICD-10-CM

## 2023-03-27 DIAGNOSIS — C34.11 MALIGNANT NEOPLASM OF UPPER LOBE, RIGHT BRONCHUS OR LUNG (HCC): ICD-10-CM

## 2023-03-27 DIAGNOSIS — K20.90 ESOPHAGITIS: Primary | ICD-10-CM

## 2023-03-27 DIAGNOSIS — R53.83 OTHER FATIGUE: ICD-10-CM

## 2023-03-27 DIAGNOSIS — C34.11 PRIMARY CANCER OF RIGHT UPPER LOBE OF LUNG (HCC): ICD-10-CM

## 2023-03-27 DIAGNOSIS — C34.11 MALIGNANT NEOPLASM OF UPPER LOBE, RIGHT BRONCHUS OR LUNG (HCC): Primary | ICD-10-CM

## 2023-03-27 LAB
BASOPHILS ABSOLUTE: 0 K/CU MM
BASOPHILS RELATIVE PERCENT: 0.3 % (ref 0–1)
DIFFERENTIAL TYPE: ABNORMAL
EOSINOPHILS ABSOLUTE: 0.2 K/CU MM
EOSINOPHILS RELATIVE PERCENT: 2 % (ref 0–3)
HCT VFR BLD CALC: 33.7 % (ref 37–47)
HEMOGLOBIN: 10.9 GM/DL (ref 12.5–16)
LYMPHOCYTES ABSOLUTE: 1 K/CU MM
LYMPHOCYTES RELATIVE PERCENT: 12.5 % (ref 24–44)
MCH RBC QN AUTO: 31.1 PG (ref 27–31)
MCHC RBC AUTO-ENTMCNC: 32.3 % (ref 32–36)
MCV RBC AUTO: 96 FL (ref 78–100)
MONOCYTES ABSOLUTE: 0.4 K/CU MM
MONOCYTES RELATIVE PERCENT: 5.4 % (ref 0–4)
PDW BLD-RTO: 15.2 % (ref 11.7–14.9)
PLATELET # BLD: 369 K/CU MM (ref 140–440)
PMV BLD AUTO: 8.5 FL (ref 7.5–11.1)
RBC # BLD: 3.51 M/CU MM (ref 4.2–5.4)
SEGMENTED NEUTROPHILS ABSOLUTE COUNT: 6.3 K/CU MM
SEGMENTED NEUTROPHILS RELATIVE PERCENT: 79.8 % (ref 36–66)
WBC # BLD: 7.9 K/CU MM (ref 4–10.5)

## 2023-03-27 PROCEDURE — 96413 CHEMO IV INFUSION 1 HR: CPT

## 2023-03-27 PROCEDURE — 85025 COMPLETE CBC W/AUTO DIFF WBC: CPT

## 2023-03-27 PROCEDURE — 2580000003 HC RX 258: Performed by: NURSE PRACTITIONER

## 2023-03-27 PROCEDURE — 1123F ACP DISCUSS/DSCN MKR DOCD: CPT | Performed by: NURSE PRACTITIONER

## 2023-03-27 PROCEDURE — 6360000002 HC RX W HCPCS: Performed by: NURSE PRACTITIONER

## 2023-03-27 PROCEDURE — 2500000003 HC RX 250 WO HCPCS: Performed by: NURSE PRACTITIONER

## 2023-03-27 PROCEDURE — 77386 HC NTSTY MODUL RAD TX DLVR CPLX: CPT | Performed by: RADIOLOGY

## 2023-03-27 PROCEDURE — 96375 TX/PRO/DX INJ NEW DRUG ADDON: CPT

## 2023-03-27 PROCEDURE — 99215 OFFICE O/P EST HI 40 MIN: CPT | Performed by: NURSE PRACTITIONER

## 2023-03-27 PROCEDURE — 96367 TX/PROPH/DG ADDL SEQ IV INF: CPT

## 2023-03-27 PROCEDURE — 77014 CHG CT GUIDANCE RADIATION THERAPY FLDS PLACEMENT: CPT | Performed by: RADIOLOGY

## 2023-03-27 RX ORDER — HYDROCODONE BITARTRATE AND ACETAMINOPHEN 5; 325 MG/1; MG/1
1 TABLET ORAL EVERY 8 HOURS PRN
Qty: 42 TABLET | Refills: 0 | Status: SHIPPED | OUTPATIENT
Start: 2023-03-27 | End: 2023-04-10

## 2023-03-27 RX ORDER — DIPHENHYDRAMINE HYDROCHLORIDE 50 MG/ML
50 INJECTION INTRAMUSCULAR; INTRAVENOUS
Status: CANCELLED | OUTPATIENT
Start: 2023-03-27

## 2023-03-27 RX ORDER — ACETAMINOPHEN 325 MG/1
650 TABLET ORAL
Status: CANCELLED | OUTPATIENT
Start: 2023-03-27

## 2023-03-27 RX ORDER — EPINEPHRINE 1 MG/ML
0.3 INJECTION, SOLUTION, CONCENTRATE INTRAVENOUS PRN
Status: CANCELLED | OUTPATIENT
Start: 2023-03-27

## 2023-03-27 RX ORDER — ONDANSETRON 2 MG/ML
8 INJECTION INTRAMUSCULAR; INTRAVENOUS
Status: CANCELLED | OUTPATIENT
Start: 2023-03-27

## 2023-03-27 RX ORDER — SUCRALFATE ORAL 1 G/10ML
1 SUSPENSION ORAL 4 TIMES DAILY
Qty: 420 ML | Refills: 1 | Status: SHIPPED | OUTPATIENT
Start: 2023-03-27

## 2023-03-27 RX ORDER — SODIUM CHLORIDE 9 MG/ML
5-40 INJECTION INTRAVENOUS PRN
Status: DISCONTINUED | OUTPATIENT
Start: 2023-03-27 | End: 2023-03-28 | Stop reason: HOSPADM

## 2023-03-27 RX ORDER — FAMOTIDINE 10 MG/ML
20 INJECTION, SOLUTION INTRAVENOUS ONCE
Status: COMPLETED | OUTPATIENT
Start: 2023-03-27 | End: 2023-03-27

## 2023-03-27 RX ORDER — ALBUTEROL SULFATE 90 UG/1
4 AEROSOL, METERED RESPIRATORY (INHALATION) PRN
Status: CANCELLED | OUTPATIENT
Start: 2023-03-27

## 2023-03-27 RX ORDER — MEPERIDINE HYDROCHLORIDE 25 MG/ML
12.5 INJECTION INTRAMUSCULAR; INTRAVENOUS; SUBCUTANEOUS PRN
Status: CANCELLED | OUTPATIENT
Start: 2023-03-27

## 2023-03-27 RX ORDER — SODIUM CHLORIDE 9 MG/ML
5-250 INJECTION, SOLUTION INTRAVENOUS PRN
Status: CANCELLED | OUTPATIENT
Start: 2023-03-27

## 2023-03-27 RX ORDER — FAMOTIDINE 10 MG/ML
20 INJECTION, SOLUTION INTRAVENOUS
Status: CANCELLED | OUTPATIENT
Start: 2023-03-27

## 2023-03-27 RX ORDER — HEPARIN SODIUM (PORCINE) LOCK FLUSH IV SOLN 100 UNIT/ML 100 UNIT/ML
500 SOLUTION INTRAVENOUS PRN
Status: DISCONTINUED | OUTPATIENT
Start: 2023-03-27 | End: 2023-03-28 | Stop reason: HOSPADM

## 2023-03-27 RX ORDER — DEXAMETHASONE 2 MG/1
TABLET ORAL
Qty: 12 TABLET | Refills: 0 | Status: SHIPPED | OUTPATIENT
Start: 2023-03-27

## 2023-03-27 RX ORDER — SODIUM CHLORIDE 9 MG/ML
INJECTION, SOLUTION INTRAVENOUS CONTINUOUS
Status: CANCELLED | OUTPATIENT
Start: 2023-03-27

## 2023-03-27 RX ORDER — DIPHENHYDRAMINE HYDROCHLORIDE 50 MG/ML
25 INJECTION INTRAMUSCULAR; INTRAVENOUS ONCE
Status: COMPLETED | OUTPATIENT
Start: 2023-03-27 | End: 2023-03-27

## 2023-03-27 RX ORDER — SODIUM CHLORIDE 9 MG/ML
5-250 INJECTION, SOLUTION INTRAVENOUS PRN
Status: DISCONTINUED | OUTPATIENT
Start: 2023-03-27 | End: 2023-03-28 | Stop reason: HOSPADM

## 2023-03-27 RX ADMIN — DIPHENHYDRAMINE HYDROCHLORIDE 25 MG: 50 INJECTION INTRAMUSCULAR; INTRAVENOUS at 10:58

## 2023-03-27 RX ADMIN — HEPARIN 500 UNITS: 100 SYRINGE at 13:14

## 2023-03-27 RX ADMIN — DEXAMETHASONE SODIUM PHOSPHATE 12 MG: 4 INJECTION INTRA-ARTICULAR; INTRALESIONAL; INTRAMUSCULAR; INTRAVENOUS; SOFT TISSUE at 10:59

## 2023-03-27 RX ADMIN — SODIUM CHLORIDE 20 ML/HR: 9 INJECTION, SOLUTION INTRAVENOUS at 10:58

## 2023-03-27 RX ADMIN — PACLITAXEL 72 MG: 6 INJECTION, SOLUTION, CONCENTRATE INTRAVENOUS at 12:00

## 2023-03-27 RX ADMIN — FAMOTIDINE 20 MG: 10 INJECTION, SOLUTION INTRAVENOUS at 10:58

## 2023-03-27 RX ADMIN — SODIUM CHLORIDE, PRESERVATIVE FREE 10 ML: 5 INJECTION INTRAVENOUS at 13:14

## 2023-03-27 ASSESSMENT — PAIN DESCRIPTION - FREQUENCY: FREQUENCY: INTERMITTENT

## 2023-03-27 ASSESSMENT — PAIN DESCRIPTION - PAIN TYPE: TYPE: ACUTE PAIN

## 2023-03-27 ASSESSMENT — PAIN - FUNCTIONAL ASSESSMENT: PAIN_FUNCTIONAL_ASSESSMENT: PREVENTS OR INTERFERES SOME ACTIVE ACTIVITIES AND ADLS

## 2023-03-27 ASSESSMENT — PAIN DESCRIPTION - DESCRIPTORS: DESCRIPTORS: BURNING;SORE

## 2023-03-27 ASSESSMENT — PAIN DESCRIPTION - LOCATION: LOCATION: THROAT;CHEST

## 2023-03-27 ASSESSMENT — PAIN SCALES - GENERAL: PAINLEVEL_OUTOF10: 4

## 2023-03-27 NOTE — PROGRESS NOTES
Patient arrived to treatment suite for blood draw, pre-medications and chemotherapy infusion. Left chest mediport accessed and blood drawn from site after leaning patient back in chair and having her cough, and sent to lab for processing. Patient states this will be her last chemo because she requested not to take her last treatment. Some numbness in her hands and feet, some dizziness and shortness of breath. Treatment approved and given. Patient tolerated well. Left treatment suite ambulatory. Discharge instructions provided. Patient's status assessed and documented appropriately. All labs and required results were also reviewed today. Treatment parameters have been reviewed. Today's treatment has been approved by the provider. Treatment orders and medication sequencing (when applicable) was verified by 2 registered nurses. The treatment plan was confirmed with the patient prior to administration, and the patient understands the need to report any treatment-related symptoms. Prior to administration, when applicable, the following 8 elements of medication administration were reviewed with 2nd Registered Nurse prior to dosing: drug name, drug dose, infusion volume when prepared in a syringe, rate of administration, expiration dates and/or times, appearance and integrity of drug(s), and rate of pump for infusion. The 5 rights of medication administration have been verified.

## 2023-03-27 NOTE — PLAN OF CARE
Care plan reviewed. _Pt with increasing fatigue, tolerating ADLs without difficulty. _Pt using  BMX and Tylenol for sore throat/esophagitis, not really effective. Rx for Norco sent per Dr. Laurence Fields. Pt encoraged to cont eating softer foods and liquids. Supplement with Ensure PRN. _Pt with shortness of breathe with minimal exertion, improved with rest. In no resp distress at this time. Productive cough thick white phlegm at times.

## 2023-03-27 NOTE — PROGRESS NOTES
Weekly Radiation Treatment Progress Note    DATE OF SERVICE: 3/27/2023     DIAGNOSIS:  Cancer Staging  No matching staging information was found for the patient. TREATMENT COURSE:   Oncology History   Malignant neoplasm of upper lobe, right bronchus or lung ([address]   2/20/2023 -  Chemotherapy    OP CARBOplatin AUC 2 + PACLitaxel 45 mg/m2 Q7D  Plan Provider: Liliana Castro MD  Treatment goal: Curative  Line of treatment: Induction             Site: R Lung   Current Total Radiation Dose: 4600 cGy    Pt doing well. Energy fairly good. Mild skin itching/soreness ant chest. Breathing stable. + dysphagia/odynophagia, BMX no longer enough. EXAM  Wt Readings from Last 3 Encounters:   03/20/23 162 lb (73.5 kg)   03/20/23 164 lb 12.8 oz (74.8 kg)   03/14/23 167 lb (75.8 kg)     NAD  Mild skin erythema. No desquamation. Setup images, chart, plan reviewed    A/P:   Tolerating RT well  Continue RT as planned  Continue BMX. Add Norco and Carafate. Start Aquaphor.       Electronically signed by Leonidas Antonio MD on 3/27/2023 at 9:51 AM

## 2023-03-27 NOTE — PROGRESS NOTES
MA Rooming Questions  Patient: Dov Gabriel  MRN: [id number]    Date: 3/27/2023        1. Do you have any new issues? yes - Still having coughing issues          2. Do you need any refills on medications? yes - Decadron 2 mg    3. Have you had any imaging done since your last visit? yes - Chest xray    4. Have you been hospitalized or seen in the emergency room since your last visit here?   yes - 3/6/23-3/7/23    5. Did the patient have a depression screening completed today?  No    No data recorded     PHQ-9 Given to (if applicable):               PHQ-9 Score (if applicable):                     [] Positive     []  Negative              Does question #9 need addressed (if applicable)                     [] Yes    []  No               Charlie Cohn MA
revealed fungating exophytic mass lesions obstructed the takeoff of the right upper lobe bronchus. The pathology report from the bronchial washing and brushing of the right upper lobe revealed squamous cell carcinoma. Pulmonary function test revealed moderate obstructive defects and the post bronchodilator study demonstrated significant response in the small airways. She was referred for further evaluation and possible chemoradiation versus surgery. 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 CT head was negative. 1/2018 PET CT revealed activity to the right perihilar mass and no evidence of metastatic disease anywhere else. I refer her to see thoracic surgeon for the discussion of possible resection and mediastinoscopy. She was seen by the radiation oncologist in January 2018. She underwent right upper lobectomy on April 11, 2018. Pathology reports revealed keratinizing invasive squamous cell carcinoma with positive bronchial margins negative lymph node at level 9, level 10. Tumor was 4.2 cm moderately differentiated with in situ component. No visible pleural invasions. No lymphovascular invasion. The squamous cell carcinoma in situ involved the bronchus intermedius part of the distal and carinal/tracheal site. Pathologically stage [id number]. I went over NCCN guideline. I offered her regimen therapy plus minus chemo. She is agreeable to see an oncologist for the radiation therapy. She will think about the chemotherapy. VQ scan in March 2018 revealed low probability for pulmonary embolism. She was seen by RT onc and not recommended to have adjuvant RT. She started adjuvant chemo carbo/taxol on July 11, 2018. She requested to split the dose to day 1 day 8 every 3 weeks due to the increased fatigue. 6/2018 CXR non-acute. 11/2018  PET CT negative. She decided not to pursue last dose of chemo due to side effects.   I reminded about mammogram due in

## 2023-03-28 ENCOUNTER — HOSPITAL ENCOUNTER (OUTPATIENT)
Dept: RADIATION ONCOLOGY | Age: 68
Discharge: HOME | End: 2023-03-28
Payer: MEDICARE

## 2023-03-28 ENCOUNTER — CARE COORDINATION (OUTPATIENT)
Dept: CASE MANAGEMENT | Age: 68
End: 2023-03-28

## 2023-03-28 PROCEDURE — 77014 CHG CT GUIDANCE RADIATION THERAPY FLDS PLACEMENT: CPT | Performed by: RADIOLOGY

## 2023-03-28 PROCEDURE — 77386 HC NTSTY MODUL RAD TX DLVR CPLX: CPT | Performed by: RADIOLOGY

## 2023-03-28 NOTE — CARE COORDINATION
[facility] Care Transitions Follow Up Call    Patient Current Location:  Home: [address] [place] [address] contacted the patient by telephone to follow up after admission on 3/6-3/7. Verified name and  with patient as identifiers. Patient: Easton Foster  Patient : 1955   MRN: [id number]  Reason for Admission: anaphylactic reaction, HTN, squamous cell carcinoma RUL, moderate COPD  Discharge Date: 3/7/23 RARS: Readmission Risk Score: 13.4      Needs to be reviewed by the provider   Additional needs identified to be addressed with provider: No  none             Method of communication with provider: none. LPN CC spoke with patient. States she is doing ok. Energy level is \"nonexistent. \" Has a lump and some pain in her throat r/t radiation therapy. Has been prescribed hydrocodone, Carafate, and dexamethasone. Patient to transfer Carafate to [facility] from [facility] d/t cost. Discomfort to her throat has caused a diminished appetite. Patient is supplementing with protein shakes now as she has lost 5 pounds recently. Reports BLACK, cough with occ sputum production (white in color). SPO2 97%, /70 \"something\" yesterday. Had some diarrhea that has since resolved. Denies needs at this time. Anderson Sterling LPN CC  Care Transitions  [phone]    Addressed changes since last contact:  none  Discussed follow-up appointments. If no appointment was previously scheduled, appointment scheduling offered: Yes. Is follow up appointment scheduled within 7 days of discharge? Yes.     Follow Up  Future Appointments   Date Time Provider Sadiq Quiñonez   3/29/2023  9:15 AM SCHEDULE, [facility] RAD ONC LINAC 1 [facility] RAD ONC [place]   3/30/2023  9:15 AM SCHEDULE, [facility] RAD ONC LINAC 1 [facility] RAD ONC [place]   3/31/2023  9:15 AM SCHEDULE, [facility] RAD ONC LINAC 1 [facility] RAD ONC [place]   4/3/2023  9:15 AM SCHEDULE, [facility] RAD ONC LINAC 1 [facility] RAD ONC [place]   2023  9:15 AM SCHEDULE, [facility]

## 2023-03-29 ENCOUNTER — HOSPITAL ENCOUNTER (OUTPATIENT)
Dept: RADIATION ONCOLOGY | Age: 68
Discharge: HOME | End: 2023-03-29
Payer: MEDICARE

## 2023-03-29 PROCEDURE — 77336 RADIATION PHYSICS CONSULT: CPT | Performed by: RADIOLOGY

## 2023-03-29 PROCEDURE — 77014 CHG CT GUIDANCE RADIATION THERAPY FLDS PLACEMENT: CPT | Performed by: RADIOLOGY

## 2023-03-29 PROCEDURE — 77386 HC NTSTY MODUL RAD TX DLVR CPLX: CPT | Performed by: RADIOLOGY

## 2023-03-29 PROCEDURE — 77427 RADIATION TX MANAGEMENT X5: CPT | Performed by: RADIOLOGY

## 2023-03-30 ENCOUNTER — HOSPITAL ENCOUNTER (OUTPATIENT)
Dept: RADIATION ONCOLOGY | Age: 68
Discharge: HOME | End: 2023-03-30
Payer: MEDICARE

## 2023-03-30 PROCEDURE — 77014 CHG CT GUIDANCE RADIATION THERAPY FLDS PLACEMENT: CPT | Performed by: RADIOLOGY

## 2023-03-30 PROCEDURE — 77386 HC NTSTY MODUL RAD TX DLVR CPLX: CPT | Performed by: RADIOLOGY

## 2023-03-30 NOTE — PROGRESS NOTES
Weekly Treatment Note    Patient's Name: Aakash Pugh  : 1955      Today's Date: 3/30/2023    Patient's Diagnosis: Right Lung cancer  Cancer Staging  No matching staging information was found for the patient. Aakash Pugh was seen today at a total dose of 5200 cGy out of 6000 cGy planned to the right lung mass. Side effects: In the week and examined. She asked to be seen today because she is contemplating stopping treatment tomorrow 3D treatment shy of her planned complete course. Physical Exam: no new findings    The following studies were reviewed: Dosimetry, Films, and Radiation Plans    ASSESSMENT: The patient is tolerating treatment. She wants to stop treatment because of esophagitis. We had a long discussion and I made some adjustments in her current medication. She is decided to return tomorrow, take Monday off and return Tuesday, Wednesday, and Thursday to complete her final 3 treatments. PLAN: Will continue with radiation therapy. Gretel Johnson M.D. Radiation Oncology  Director, Division of Integrative Medicine    [facility] ORTHOPEDIC AND SPINE [facility] AT [address].   [address] [place] [address]

## 2023-03-31 ENCOUNTER — HOSPITAL ENCOUNTER (OUTPATIENT)
Dept: RADIATION ONCOLOGY | Age: 68
Discharge: HOME | End: 2023-03-31
Payer: MEDICARE

## 2023-03-31 PROCEDURE — 77386 HC NTSTY MODUL RAD TX DLVR CPLX: CPT | Performed by: RADIOLOGY

## 2023-04-03 ENCOUNTER — APPOINTMENT (OUTPATIENT)
Dept: RADIATION ONCOLOGY | Age: 68
End: 2023-04-03
Payer: MEDICARE

## 2023-04-04 ENCOUNTER — HOSPITAL ENCOUNTER (OUTPATIENT)
Dept: RADIATION ONCOLOGY | Age: 68
Discharge: HOME | End: 2023-04-04
Payer: MEDICARE

## 2023-04-04 ENCOUNTER — CARE COORDINATION (OUTPATIENT)
Dept: CASE MANAGEMENT | Age: 68
End: 2023-04-04

## 2023-04-04 ENCOUNTER — HOSPITAL ENCOUNTER (OUTPATIENT)
Dept: RADIATION ONCOLOGY | Age: 68
Discharge: HOME | End: 2023-04-04

## 2023-04-04 VITALS — BODY MASS INDEX: 27.79 KG/M2 | WEIGHT: 167 LBS

## 2023-04-04 DIAGNOSIS — C34.11 PRIMARY CANCER OF RIGHT UPPER LOBE OF LUNG (HCC): Primary | ICD-10-CM

## 2023-04-04 DIAGNOSIS — C34.91 SQUAMOUS CELL LUNG CANCER, RIGHT (HCC): ICD-10-CM

## 2023-04-04 PROCEDURE — 77386 HC NTSTY MODUL RAD TX DLVR CPLX: CPT | Performed by: RADIOLOGY

## 2023-04-04 PROCEDURE — 77014 CHG CT GUIDANCE RADIATION THERAPY FLDS PLACEMENT: CPT | Performed by: RADIOLOGY

## 2023-04-04 ASSESSMENT — PAIN - FUNCTIONAL ASSESSMENT: PAIN_FUNCTIONAL_ASSESSMENT: ACTIVITIES ARE NOT PREVENTED

## 2023-04-04 ASSESSMENT — PAIN DESCRIPTION - LOCATION: LOCATION: THROAT

## 2023-04-04 ASSESSMENT — PAIN SCALES - GENERAL: PAINLEVEL_OUTOF10: 3

## 2023-04-04 ASSESSMENT — PAIN DESCRIPTION - PAIN TYPE: TYPE: ACUTE PAIN

## 2023-04-04 ASSESSMENT — PAIN DESCRIPTION - DESCRIPTORS: DESCRIPTORS: SORE

## 2023-04-04 ASSESSMENT — PAIN DESCRIPTION - FREQUENCY: FREQUENCY: INTERMITTENT

## 2023-04-04 NOTE — PLAN OF CARE
Care plan reviewed. _Pt still fatigued, tolerating ADLs without difficulty. _Pt using  BMX and pain meds PRN for sore throat/esophagitis, not really effective. Continue to encourage eating softer foods and liquids. Supplement with Ensure PRN. _Pt with shortness of breathe with minimal exertion, improved with rest. In no resp distress at this time.

## 2023-04-04 NOTE — CARE COORDINATION
[facility] Care Transitions Follow Up Call        Patient: Marian Barrera  Patient : 1955   MRN: [id number]  Reason for Admission:  anaphylactic reaction, HTN, squamous cell carcinoma RUL, moderate COPD  Discharge Date: 3/7/23 RARS: Readmission Risk Score: 13.4          Pt was not available to talk and asked for a call back tomorrow. Make note of time of call due to radiation appt.      Follow Up  Future Appointments   Date Time Provider Sadiq Quiñonez   2023  9:15 AM SCHEDULE, 1200 [facility] RAD ONC LINAC 1 1200 [facility] RAD ONC [place]   2023  9:15 AM SCHEDULE, 1200 [facility] RAD ONC LINAC 1 1200 [facility] RAD ONC [place]   2023 10:00 AM Suzette Ann MD [address] [facility]   2023  9:30 AM SCHEDULE, 1200 [facility] MED ONC INJECTION [address]         Daphine Snellen, RN

## 2023-04-05 ENCOUNTER — CARE COORDINATION (OUTPATIENT)
Dept: CASE MANAGEMENT | Age: 68
End: 2023-04-05

## 2023-04-05 ENCOUNTER — HOSPITAL ENCOUNTER (OUTPATIENT)
Dept: RADIATION ONCOLOGY | Age: 68
Discharge: HOME | End: 2023-04-05

## 2023-04-05 PROCEDURE — 77386 HC NTSTY MODUL RAD TX DLVR CPLX: CPT | Performed by: RADIOLOGY

## 2023-04-05 NOTE — CARE COORDINATION
[facility] Transitions Follow Up Call    2023    Patient: Nando Obando  Patient : 1955   MRN: [id number]  Reason for Admission: anaphylactic reaction, HTN, squamous cell carcinoma RUL, moderate COPD  Discharge Date: 3/7/23 RARS: Readmission Risk Score: 13.4         Spoke with: Nando Obando who stated that she was currently busy and asked for a call back tomorrow afternoon. Care Transitions Subsequent and Final Call    Subsequent and Final Calls  Care Transitions Interventions     Transportation Support: Declined      DME Assistance: Declined   Disease Association: Completed      Other Interventions:              Follow Up  Future Appointments   Date Time Provider Sadiq Quiñonez   2023  9:15 AM SCHEDULE, 1200 [facility] RAD ONC LINAC 1 1200 [facility] RAD ONC [place]   2023 10:00 AM Irvin Chapa MD [address] [facility]   2023  9:30 AM SCHEDULE, 200 Lora Noland LPN

## 2023-04-06 ENCOUNTER — HOSPITAL ENCOUNTER (OUTPATIENT)
Dept: RADIATION ONCOLOGY | Age: 68
Discharge: HOME | End: 2023-04-06

## 2023-04-06 PROCEDURE — 77386 HC NTSTY MODUL RAD TX DLVR CPLX: CPT | Performed by: RADIOLOGY

## 2023-04-06 PROCEDURE — 77336 RADIATION PHYSICS CONSULT: CPT | Performed by: RADIOLOGY

## 2023-04-06 NOTE — PROGRESS NOTES
Radiation Oncology  Treatment Completion Summary  Encounter Date: 2023 9:10 AM    Ms. Amada Perez is a 79 y.o. 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 TREATMENT COURSE:   Oncology History   Malignant neoplasm of upper lobe, right bronchus or lung ([address]   2023 -  Chemotherapy    OP CARBOplatin AUC 2 + PACLitaxel 45 mg/m2 Q7D  Plan Provider: Vaishnavi Auguste MD  Treatment goal: Curative  Line of treatment: Induction       2023 -  Chemotherapy    OP durvalumab Q14D  Plan Provider: Pasco Koyanagi, APRN - CNP  Treatment goal: Curative  Line of treatment: 1st Line           HISTORY:  Amada Perez is a 79 y.o. female with the above referenced diagnosis. Complete details on history of present illness please see my initial consultation note. The patient has recently completed a course of  radiation therapy and what follows is a description of the treatments received:    ANATOMIC SITE: R hilar tumor   BEAM ORIENTATION: VMAT IMRT dual arc  ENERGY: 6MV photons  DOSE PER FRACTION: 200cGy  TOTAL DOSE: 6000cGy    ELAPSED DAYS: 23 - 23    TREATMENT TOLERANCE:   Overall, the patient tolerated radiation therapy quite well. The patient's energy level was fairly well-maintained throughout the course of treatments. No significant skin erythema developed. Breathing was well-maintained throughout the course of treatments. No significant dysphagia or odynophagia developed. FOLLOW-UP PLANS:   The patient is to return to see me in 1 month with a chest CT prior or to return sooner as needed.       Electronically signed by Marla Posadas MD on 2023 at 9:10 AM

## 2023-04-07 ENCOUNTER — CARE COORDINATION (OUTPATIENT)
Dept: CASE MANAGEMENT | Age: 68
End: 2023-04-07

## 2023-04-07 NOTE — CARE COORDINATION
[facility] Transitions Follow Up Call    2023    Patient: Chucky Mcfadden  Patient : 1955   MRN: [id number]  Reason for Admission: anaphylactic reaction, HTN, squamous cell carcinoma RUL, moderate COPD  Discharge Date: 3/7/23 RARS: Readmission Risk Score: 13.4         Spoke with: Chucky Mcfadden who reported that she is hanging in there. Patient reported that she had her last radiation Tx yesterday. She stated that her throat is still raw and sore. She stated that she is able to eat but not much and she eats soft foods. She stated that she is drinking fluids. Patient report that appetite and fluid intake is good and denied any problems with bowel or bladder. Patient reported that she is taking all medications as ordered. Patient denied any other needs at this time. Patient instructed to continue to monitor s/s, reporting any that may present to MD immediately for early intervention. Patient notified that this will be our last outreach and she is agreeable. [facility] provided contact information for future needs. Episode closed. Care Transitions Subsequent and Final Call    Subsequent and Final Calls  Do you have any ongoing symptoms?: No  Have your medications changed?: No  Do you have any questions related to your medications?: No  Do you currently have any active services?: No  Do you have any needs or concerns that I can assist you with?: No  Care Transitions Interventions  No Identified Needs     Transportation Support: Declined      DME Assistance: Declined   Disease Association: Completed      Other Interventions:              Follow Up  Future Appointments   Date Time Provider Sadiq Quiñonez   2023 10:00 AM Janelle Juares MD [facility] CC [facility]   2023 11:00 AM Jono Burnett MD [facility] RAD [facility]   2023  9:30 AM SCHEDULE, [facility] MED ONC INJECTION [address] Rashid LPN

## 2023-04-17 ENCOUNTER — OFFICE VISIT (OUTPATIENT)
Dept: ONCOLOGY | Age: 68
End: 2023-04-17
Payer: MEDICARE

## 2023-04-17 ENCOUNTER — HOSPITAL ENCOUNTER (OUTPATIENT)
Dept: INFUSION THERAPY | Age: 68
Discharge: HOME | End: 2023-04-17
Payer: MEDICARE

## 2023-04-17 VITALS
BODY MASS INDEX: 26.99 KG/M2 | WEIGHT: 162 LBS | HEART RATE: 83 BPM | HEIGHT: 65 IN | DIASTOLIC BLOOD PRESSURE: 73 MMHG | TEMPERATURE: 97.7 F | SYSTOLIC BLOOD PRESSURE: 122 MMHG | OXYGEN SATURATION: 99 %

## 2023-04-17 DIAGNOSIS — C34.91 MALIGNANT NEOPLASM OF RIGHT LUNG, UNSPECIFIED PART OF LUNG (HCC): ICD-10-CM

## 2023-04-17 DIAGNOSIS — C34.91 MALIGNANT NEOPLASM OF RIGHT LUNG, UNSPECIFIED PART OF LUNG (HCC): Primary | ICD-10-CM

## 2023-04-17 DIAGNOSIS — D64.9 ANEMIA, UNSPECIFIED TYPE: ICD-10-CM

## 2023-04-17 LAB
ALBUMIN SERPL-MCNC: 4.1 GM/DL (ref 3.4–5)
ALP BLD-CCNC: 109 IU/L (ref 40–129)
ALT SERPL-CCNC: 14 U/L (ref 10–40)
ANION GAP SERPL CALCULATED.3IONS-SCNC: 13 MMOL/L (ref 4–16)
AST SERPL-CCNC: 15 IU/L (ref 15–37)
BASOPHILS ABSOLUTE: 0.1 K/CU MM
BASOPHILS RELATIVE PERCENT: 0.7 % (ref 0–1)
BILIRUB SERPL-MCNC: 0.2 MG/DL (ref 0–1)
BUN SERPL-MCNC: 10 MG/DL (ref 6–23)
CALCIUM SERPL-MCNC: 9.5 MG/DL (ref 8.3–10.6)
CHLORIDE BLD-SCNC: 99 MMOL/L (ref 99–110)
CO2: 27 MMOL/L (ref 21–32)
CREAT SERPL-MCNC: 1.1 MG/DL (ref 0.6–1.1)
DIFFERENTIAL TYPE: ABNORMAL
EOSINOPHILS ABSOLUTE: 0.1 K/CU MM
EOSINOPHILS RELATIVE PERCENT: 1.5 % (ref 0–3)
FERRITIN: 232 NG/ML (ref 15–150)
GFR SERPL CREATININE-BSD FRML MDRD: 55 ML/MIN/1.73M2
GLUCOSE SERPL-MCNC: 129 MG/DL (ref 70–99)
HCT VFR BLD CALC: 35.5 % (ref 37–47)
HEMOGLOBIN: 11.2 GM/DL (ref 12.5–16)
IRON: 47 UG/DL (ref 37–145)
LYMPHOCYTES ABSOLUTE: 0.8 K/CU MM
LYMPHOCYTES RELATIVE PERCENT: 11.3 % (ref 24–44)
MCH RBC QN AUTO: 31 PG (ref 27–31)
MCHC RBC AUTO-ENTMCNC: 31.5 % (ref 32–36)
MCV RBC AUTO: 98.3 FL (ref 78–100)
MONOCYTES ABSOLUTE: 0.6 K/CU MM
MONOCYTES RELATIVE PERCENT: 8 % (ref 0–4)
PCT TRANSFERRIN: 19 % (ref 10–44)
PDW BLD-RTO: 16 % (ref 11.7–14.9)
PLATELET # BLD: 468 K/CU MM (ref 140–440)
PMV BLD AUTO: 8.4 FL (ref 7.5–11.1)
POTASSIUM SERPL-SCNC: 4.4 MMOL/L (ref 3.5–5.1)
RBC # BLD: 3.61 M/CU MM (ref 4.2–5.4)
SEGMENTED NEUTROPHILS ABSOLUTE COUNT: 5.6 K/CU MM
SEGMENTED NEUTROPHILS RELATIVE PERCENT: 78.5 % (ref 36–66)
SODIUM BLD-SCNC: 139 MMOL/L (ref 135–145)
TOTAL IRON BINDING CAPACITY: 247 UG/DL (ref 250–450)
TOTAL PROTEIN: 6.6 GM/DL (ref 6.4–8.2)
UNSATURATED IRON BINDING CAPACITY: 200 UG/DL (ref 110–370)
WBC # BLD: 7.1 K/CU MM (ref 4–10.5)

## 2023-04-17 PROCEDURE — 82728 ASSAY OF FERRITIN: CPT

## 2023-04-17 PROCEDURE — 3074F SYST BP LT 130 MM HG: CPT | Performed by: INTERNAL MEDICINE

## 2023-04-17 PROCEDURE — 3078F DIAST BP <80 MM HG: CPT | Performed by: INTERNAL MEDICINE

## 2023-04-17 PROCEDURE — 99213 OFFICE O/P EST LOW 20 MIN: CPT | Performed by: INTERNAL MEDICINE

## 2023-04-17 PROCEDURE — 85025 COMPLETE CBC W/AUTO DIFF WBC: CPT

## 2023-04-17 PROCEDURE — 36415 COLL VENOUS BLD VENIPUNCTURE: CPT

## 2023-04-17 PROCEDURE — 80053 COMPREHEN METABOLIC PANEL: CPT

## 2023-04-17 PROCEDURE — 1123F ACP DISCUSS/DSCN MKR DOCD: CPT | Performed by: INTERNAL MEDICINE

## 2023-04-17 PROCEDURE — 83540 ASSAY OF IRON: CPT

## 2023-04-17 PROCEDURE — 99211 OFF/OP EST MAY X REQ PHY/QHP: CPT

## 2023-04-17 PROCEDURE — 83550 IRON BINDING TEST: CPT

## 2023-04-17 NOTE — PROGRESS NOTES
MA Rooming Questions  Patient: Rebecca Hobson  MRN: [id number]    Date: 4/17/2023        1. Do you have any new issues? yes - throat sore         2. Do you need any refills on medications?    no    3. Have you had any imaging done since your last visit?   no    4. Have you been hospitalized or seen in the emergency room since your last visit here?   no    5. Did the patient have a depression screening completed today?  No    No data recorded     PHQ-9 Given to (if applicable):               PHQ-9 Score (if applicable):                     [] Positive     []  Negative              Does question #9 need addressed (if applicable)                     [] Yes    []  No               Brigid Hernandez CMA

## 2023-04-27 ENCOUNTER — TELEPHONE (OUTPATIENT)
Dept: ONCOLOGY | Age: 68
End: 2023-04-27

## 2023-04-27 NOTE — TELEPHONE ENCOUNTER
Patient called to cancel appt for tomorrow, is going to call back to r/s new tx, may have to go out of town d/t family member not doing well, gave her my direct number to call me, patient states understanding

## 2023-04-28 ENCOUNTER — TELEPHONE (OUTPATIENT)
Dept: ONCOLOGY | Age: 68
End: 2023-04-28

## 2023-04-28 DIAGNOSIS — C34.91 MALIGNANT NEOPLASM OF RIGHT LUNG, UNSPECIFIED PART OF LUNG (HCC): Primary | ICD-10-CM

## 2023-04-28 NOTE — PROGRESS NOTES
CT chest order change to CT without contrast as per patient request. Patient states her kidney function is not good. This RN reviewed kidney function labs with patient and discussed doctor would rather have CT with contrast due to more optimal images patient then states \"I have a small kidney and I'm not doing it\" Dr. Lucrezia Phalen notified an new orders received for CT chest without contrast. Patient aware.

## 2023-04-28 NOTE — TELEPHONE ENCOUNTER
4/28/23 - spoke w/ pt for the 5/4/23 CT scan at [facility] arrival time of 10:00 am. The Ct scan was changed to without contrast at th pt request.(Per Terra Hobby)

## 2023-05-04 ENCOUNTER — HOSPITAL ENCOUNTER (OUTPATIENT)
Dept: CT IMAGING | Age: 68
Discharge: HOME | End: 2023-05-04
Payer: MEDICARE

## 2023-05-04 DIAGNOSIS — C34.91 MALIGNANT NEOPLASM OF RIGHT LUNG, UNSPECIFIED PART OF LUNG (HCC): ICD-10-CM

## 2023-05-04 PROCEDURE — 71250 CT THORAX DX C-: CPT

## 2023-05-10 ENCOUNTER — TELEPHONE (OUTPATIENT)
Dept: ONCOLOGY | Age: 68
End: 2023-05-10

## 2023-05-10 NOTE — TELEPHONE ENCOUNTER
Tried calling patient to reschedule education and/or treatment, no answer and unable to leave a  d/t mailbox full, will try to call another day

## 2023-05-11 ENCOUNTER — HOSPITAL ENCOUNTER (OUTPATIENT)
Dept: RADIATION ONCOLOGY | Age: 68
Discharge: HOME | End: 2023-05-11

## 2023-05-11 ENCOUNTER — HOSPITAL ENCOUNTER (OUTPATIENT)
Dept: INFUSION THERAPY | Age: 68
Discharge: HOME | End: 2023-05-11
Payer: MEDICARE

## 2023-05-11 ENCOUNTER — OFFICE VISIT (OUTPATIENT)
Dept: ONCOLOGY | Age: 68
End: 2023-05-11
Payer: MEDICARE

## 2023-05-11 VITALS
RESPIRATION RATE: 18 BRPM | WEIGHT: 163.8 LBS | DIASTOLIC BLOOD PRESSURE: 75 MMHG | HEART RATE: 72 BPM | OXYGEN SATURATION: 98 % | BODY MASS INDEX: 27.29 KG/M2 | SYSTOLIC BLOOD PRESSURE: 128 MMHG | TEMPERATURE: 97.7 F | HEIGHT: 65 IN

## 2023-05-11 VITALS
DIASTOLIC BLOOD PRESSURE: 75 MMHG | OXYGEN SATURATION: 98 % | TEMPERATURE: 97.7 F | BODY MASS INDEX: 27.29 KG/M2 | HEART RATE: 72 BPM | HEIGHT: 65 IN | RESPIRATION RATE: 18 BRPM | SYSTOLIC BLOOD PRESSURE: 128 MMHG | WEIGHT: 163.8 LBS

## 2023-05-11 DIAGNOSIS — C34.91 MALIGNANT NEOPLASM OF RIGHT LUNG, UNSPECIFIED PART OF LUNG (HCC): Primary | ICD-10-CM

## 2023-05-11 PROCEDURE — 3078F DIAST BP <80 MM HG: CPT | Performed by: INTERNAL MEDICINE

## 2023-05-11 PROCEDURE — 99211 OFF/OP EST MAY X REQ PHY/QHP: CPT

## 2023-05-11 PROCEDURE — 1123F ACP DISCUSS/DSCN MKR DOCD: CPT | Performed by: INTERNAL MEDICINE

## 2023-05-11 PROCEDURE — 3074F SYST BP LT 130 MM HG: CPT | Performed by: INTERNAL MEDICINE

## 2023-05-11 PROCEDURE — 99214 OFFICE O/P EST MOD 30 MIN: CPT | Performed by: INTERNAL MEDICINE

## 2023-05-11 NOTE — PROGRESS NOTES
[address] [facility]  [address] [facility], [address]  Phone: [phone]  Fax: [phone]    RADIATION ONCOLOGY FOLLOW UP REPORT    PATIENT NAME:  Fabby Lopez              : 1955  MEDICAL RECORD NO: [id number]    [facility] NO: [id number]        PROVIDER: Angelia Erazo MD      DATE OF SERVICE: 2023     FOLLOW UP PHYSICIANS:  Med Onc: Dr. 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 TREATMENT COURSE:   RUL lobectomy 18; [id number] with close margins  Adjuvant chemotherapy  Recurrence 22  Definitive radiation therapy 60Gy/30fx with concurrent chemo completed 23    HPI:   Fabby Lopez is a 79 y.o. female who has a history as above, who returns today for a routine follow-up visit. The patient completed radiation with chemo 23. She was doing okay at that time. CT chest 23 showed no evidence of recurrence or progression compared to prior scans. Currently, the patient reports she's been doing well. Her energy level has been fairly good overall. She denies any fevers, chills, or drenching night sweats. Her weight and appetite have been stable. She denies any chest pain or shortness of breath. She has not noticed any new lumps or bumps anywhere throughout her body. She denies the new onset of bony pain. RADIOLOGIC STUDIES:      CT Chest w/o contrast: Results for orders placed during the hospital encounter of 23    CT CHEST WO CONTRAST    Narrative  EXAMINATION:  CT OF THE CHEST WITHOUT CONTRAST 2023 10:09 am    TECHNIQUE:  CT of the chest was performed without the administration of intravenous  contrast. Multiplanar reformatted images are provided for review.  Automated  exposure control, iterative reconstruction, and/or weight based adjustment of  the mA/kV was utilized to reduce the

## 2023-05-11 NOTE — PROGRESS NOTES
Mike Greenberg  5/11/2023    Patient is seen today for follow up. Vitals:    05/11/23 1028   BP: 128/75   Pulse: 72   Resp: 18   Temp: 97.7 °F (36.5 °C)   SpO2: 98%        Oxygen Therapy  SpO2: 98 %  Pulse Oximeter Device Mode: Intermittent  Pulse Oximeter Device Location: Finger  Oxygen Therapy: None (Room air)    Wt Readings from Last 3 Encounters:   05/11/23 163 lb 12.8 oz (74.3 kg)   05/11/23 163 lb 12.8 oz (74.3 kg)   04/17/23 162 lb (73.5 kg)       Pain Assessment  Pain Assessment: None - Denies Pain  Denies Need for Intervention       Allergies   Allergen Reactions    Lipitor      \"Makes Me Hurt So Bad I Can't Stand It\"    Vicodin [Hydrocodone-Acetaminophen] Nausea Only    Carboplatin Other (See Comments)     Required epi x 3, hospitalization overnight     Hydrocodone      Other reaction(s): Gastrointestinal Upset    Adhesive Tape Rash        Current Outpatient Medications   Medication Sig Dispense Refill    sucralfate (CARAFATE) 1 GM/10ML suspension Take 10 mLs by mouth 4 times daily 420 mL 1    dexamethasone (DECADRON) 2 MG tablet 1 tablet PO daily for 2 days starting the day after chemotherapy. (Patient not taking: Reported on 4/17/2023) 12 tablet 0    guaiFENesin (MUCINEX) 600 MG extended release tablet Take 1 tablet by mouth 2 times daily as needed for Congestion 20 tablet 0    Magic Mouthwash (MIRACLE MOUTHWASH) Equal parts Benadryl, Maalox, 2% Viscous Xylocaine and Dexamethasone. Take 5 mL 4 times daily. (Patient not taking: Reported on 4/17/2023) 240 mL 2    sodium chloride 1 g tablet Take 1 tablet by mouth daily (Patient not taking: Reported on 4/17/2023) 30 tablet 0    ondansetron (ZOFRAN) 8 MG tablet Take 1 tablet by mouth every 8 hours as needed for Nausea or Vomiting Take 1 tablet by mouth every 8 hours as needed for nausea or vomiting.  30 tablet 1    ezetimibe (ZETIA) 10 MG tablet Take 1 tablet by mouth daily 90 tablet 3    omeprazole (PRILOSEC) 40 MG delayed release capsule Take 1

## 2023-05-12 DIAGNOSIS — R53.83 OTHER FATIGUE: Primary | ICD-10-CM

## 2023-05-16 ENCOUNTER — TELEPHONE (OUTPATIENT)
Dept: ONCOLOGY | Age: 68
End: 2023-05-16

## 2023-05-16 NOTE — TELEPHONE ENCOUNTER
Called patient to f/u after her visit w/Dr Shorty Guo about scheduling her new tx w/Keytruda, patient decline to schedule, she wants to see how much she is going to pay for these tx's before she will schedule, states she already has a large balance to pay and doesn't really want anymore bills, advised her I would reach out to our financial navigator, Franky uBcio to see if she could be some assistance, patient states understanding and she states she will call me back to schedule when she decides if she is going to proceed w/tx

## 2023-06-05 ENCOUNTER — OFFICE VISIT (OUTPATIENT)
Dept: ONCOLOGY | Age: 68
End: 2023-06-05
Payer: MEDICARE

## 2023-06-05 ENCOUNTER — HOSPITAL ENCOUNTER (OUTPATIENT)
Dept: INFUSION THERAPY | Age: 68
Discharge: HOME | End: 2023-06-05
Payer: MEDICARE

## 2023-06-05 VITALS
HEIGHT: 65 IN | TEMPERATURE: 97.1 F | BODY MASS INDEX: 27.72 KG/M2 | OXYGEN SATURATION: 100 % | HEART RATE: 67 BPM | DIASTOLIC BLOOD PRESSURE: 79 MMHG | SYSTOLIC BLOOD PRESSURE: 128 MMHG | WEIGHT: 166.4 LBS

## 2023-06-05 DIAGNOSIS — C34.11 PRIMARY CANCER OF RIGHT UPPER LOBE OF LUNG (HCC): ICD-10-CM

## 2023-06-05 DIAGNOSIS — C34.91 MALIGNANT NEOPLASM OF RIGHT LUNG, UNSPECIFIED PART OF LUNG (HCC): Primary | ICD-10-CM

## 2023-06-05 DIAGNOSIS — R53.83 OTHER FATIGUE: ICD-10-CM

## 2023-06-05 PROCEDURE — 99211 OFF/OP EST MAY X REQ PHY/QHP: CPT

## 2023-06-05 PROCEDURE — 99214 OFFICE O/P EST MOD 30 MIN: CPT | Performed by: NURSE PRACTITIONER

## 2023-06-05 PROCEDURE — 3078F DIAST BP <80 MM HG: CPT | Performed by: NURSE PRACTITIONER

## 2023-06-05 PROCEDURE — 1123F ACP DISCUSS/DSCN MKR DOCD: CPT | Performed by: NURSE PRACTITIONER

## 2023-06-05 PROCEDURE — 3074F SYST BP LT 130 MM HG: CPT | Performed by: NURSE PRACTITIONER

## 2023-06-05 ASSESSMENT — PATIENT HEALTH QUESTIONNAIRE - PHQ9
2. FEELING DOWN, DEPRESSED OR HOPELESS: 0
SUM OF ALL RESPONSES TO PHQ QUESTIONS 1-9: 0
1. LITTLE INTEREST OR PLEASURE IN DOING THINGS: 0
SUM OF ALL RESPONSES TO PHQ QUESTIONS 1-9: 0
SUM OF ALL RESPONSES TO PHQ9 QUESTIONS 1 & 2: 0
SUM OF ALL RESPONSES TO PHQ QUESTIONS 1-9: 0
SUM OF ALL RESPONSES TO PHQ QUESTIONS 1-9: 0

## 2023-06-08 ENCOUNTER — TELEPHONE (OUTPATIENT)
Dept: ONCOLOGY | Age: 68
End: 2023-06-08

## 2023-06-08 ENCOUNTER — CLINICAL DOCUMENTATION (OUTPATIENT)
Dept: RADIATION ONCOLOGY | Age: 68
End: 2023-06-08

## 2023-06-08 NOTE — TELEPHONE ENCOUNTER
Called patient regarding education and start date of new tx, patient is scheduled for education on Thurs.  06/15 @ 1000 with tx @ 1100, advised them that 1 visitor allowed at time of education and day of their treatments, patient states understanding

## 2023-06-13 PROBLEM — Z79.899 ENCOUNTER FOR LONG-TERM (CURRENT) USE OF HIGH-RISK MEDICATION: Status: ACTIVE | Noted: 2023-06-13

## 2023-06-15 ENCOUNTER — HOSPITAL ENCOUNTER (OUTPATIENT)
Dept: INFUSION THERAPY | Age: 68
Discharge: HOME | End: 2023-06-15
Payer: MEDICARE

## 2023-06-15 DIAGNOSIS — Z79.899 ENCOUNTER FOR LONG-TERM (CURRENT) USE OF HIGH-RISK MEDICATION: Primary | ICD-10-CM

## 2023-06-15 DIAGNOSIS — N26.1 ATROPHY OF RIGHT KIDNEY: ICD-10-CM

## 2023-06-15 DIAGNOSIS — C34.91 SQUAMOUS CELL LUNG CANCER, RIGHT (HCC): ICD-10-CM

## 2023-06-15 DIAGNOSIS — Z76.89 PERSONS ENCOUNTERING HEALTH SERVICES IN OTHER SPECIFIED CIRCUMSTANCES: ICD-10-CM

## 2023-06-15 DIAGNOSIS — C34.11 MALIGNANT NEOPLASM OF UPPER LOBE, RIGHT BRONCHUS OR LUNG (HCC): ICD-10-CM

## 2023-06-15 DIAGNOSIS — Z11.59 ENCOUNTER FOR SCREENING FOR OTHER VIRAL DISEASES: ICD-10-CM

## 2023-06-15 DIAGNOSIS — R53.83 OTHER FATIGUE: ICD-10-CM

## 2023-06-15 LAB
ALBUMIN SERPL-MCNC: 4.2 GM/DL (ref 3.4–5)
ALP BLD-CCNC: 113 IU/L (ref 40–128)
ALT SERPL-CCNC: 12 U/L (ref 10–40)
ANION GAP SERPL CALCULATED.3IONS-SCNC: 13 MMOL/L (ref 4–16)
AST SERPL-CCNC: 16 IU/L (ref 15–37)
BASOPHILS ABSOLUTE: 0 K/CU MM
BASOPHILS RELATIVE PERCENT: 0.4 % (ref 0–1)
BILIRUB SERPL-MCNC: 0.2 MG/DL (ref 0–1)
BUN SERPL-MCNC: 20 MG/DL (ref 6–23)
CALCIUM SERPL-MCNC: 9 MG/DL (ref 8.3–10.6)
CHLORIDE BLD-SCNC: 96 MMOL/L (ref 99–110)
CO2: 25 MMOL/L (ref 21–32)
CORTISOL, PLASMA: 9.41
CREAT SERPL-MCNC: 1.1 MG/DL (ref 0.6–1.1)
DIFFERENTIAL TYPE: ABNORMAL
EOSINOPHILS ABSOLUTE: 0.2 K/CU MM
EOSINOPHILS RELATIVE PERCENT: 2.5 % (ref 0–3)
GFR SERPL CREATININE-BSD FRML MDRD: 55 ML/MIN/1.73M2
GLUCOSE SERPL-MCNC: 129 MG/DL (ref 70–99)
HBV CORE IGM SERPL QL IA: NON REACTIVE
HBV SURFACE AB SERPL IA-ACNC: 13.17 M[IU]/ML
HCT VFR BLD CALC: 35.6 % (ref 37–47)
HEMOGLOBIN: 11.4 GM/DL (ref 12.5–16)
LYMPHOCYTES ABSOLUTE: 1 K/CU MM
LYMPHOCYTES RELATIVE PERCENT: 10.4 % (ref 24–44)
MCH RBC QN AUTO: 31.3 PG (ref 27–31)
MCHC RBC AUTO-ENTMCNC: 32 % (ref 32–36)
MCV RBC AUTO: 97.8 FL (ref 78–100)
MONOCYTES ABSOLUTE: 0.6 K/CU MM
MONOCYTES RELATIVE PERCENT: 6.4 % (ref 0–4)
PDW BLD-RTO: 13.4 % (ref 11.7–14.9)
PLATELET # BLD: 417 K/CU MM (ref 140–440)
PMV BLD AUTO: 8.9 FL (ref 7.5–11.1)
POTASSIUM SERPL-SCNC: 4.2 MMOL/L (ref 3.5–5.1)
RBC # BLD: 3.64 M/CU MM (ref 4.2–5.4)
SEGMENTED NEUTROPHILS ABSOLUTE COUNT: 7.5 K/CU MM
SEGMENTED NEUTROPHILS RELATIVE PERCENT: 80.3 % (ref 36–66)
SODIUM BLD-SCNC: 134 MMOL/L (ref 135–145)
TOTAL PROTEIN: 6.6 GM/DL (ref 6.4–8.2)
TSH SERPL DL<=0.005 MIU/L-ACNC: 1.93 UIU/ML (ref 0.27–4.2)
TSH SERPL DL<=0.005 MIU/L-ACNC: 1.96 UIU/ML (ref 0.27–4.2)
WBC # BLD: 9.3 K/CU MM (ref 4–10.5)

## 2023-06-15 PROCEDURE — 96413 CHEMO IV INFUSION 1 HR: CPT

## 2023-06-15 PROCEDURE — 82533 TOTAL CORTISOL: CPT

## 2023-06-15 PROCEDURE — 86705 HEP B CORE ANTIBODY IGM: CPT

## 2023-06-15 PROCEDURE — 86706 HEP B SURFACE ANTIBODY: CPT

## 2023-06-15 PROCEDURE — 84443 ASSAY THYROID STIM HORMONE: CPT

## 2023-06-15 PROCEDURE — 80053 COMPREHEN METABOLIC PANEL: CPT

## 2023-06-15 PROCEDURE — 6360000002 HC RX W HCPCS: Performed by: INTERNAL MEDICINE

## 2023-06-15 PROCEDURE — 86704 HEP B CORE ANTIBODY TOTAL: CPT

## 2023-06-15 PROCEDURE — 2580000003 HC RX 258: Performed by: INTERNAL MEDICINE

## 2023-06-15 PROCEDURE — 85025 COMPLETE CBC W/AUTO DIFF WBC: CPT

## 2023-06-15 RX ORDER — SODIUM CHLORIDE 9 MG/ML
5-250 INJECTION, SOLUTION INTRAVENOUS PRN
Status: DISCONTINUED | OUTPATIENT
Start: 2023-06-15 | End: 2023-06-16 | Stop reason: HOSPADM

## 2023-06-15 RX ORDER — SODIUM CHLORIDE 0.9 % (FLUSH) 0.9 %
5-40 SYRINGE (ML) INJECTION PRN
Status: DISCONTINUED | OUTPATIENT
Start: 2023-06-15 | End: 2023-06-16 | Stop reason: HOSPADM

## 2023-06-15 RX ORDER — HEPARIN SODIUM 100 [USP'U]/ML
500 INJECTION, SOLUTION INTRAVENOUS PRN
Status: DISCONTINUED | OUTPATIENT
Start: 2023-06-15 | End: 2023-06-16 | Stop reason: HOSPADM

## 2023-06-15 RX ADMIN — SODIUM CHLORIDE 200 MG: 9 INJECTION, SOLUTION INTRAVENOUS at 12:09

## 2023-06-15 RX ADMIN — HEPARIN 500 UNITS: 100 SYRINGE at 12:49

## 2023-06-15 NOTE — PROGRESS NOTES
Ambulated to infusion area. Here to initiate new treatment. Patient had treatment planning and consents. All questions answered. Labs drawn. CBC results reviewed with patient. Keytruda administered as ordered. Call light within reach. Instructed patient to notify nursing staff with any issues or concerns. Tolerated infusions without incident. Instructed patient to notify office with any questions or unmanageable issues. AVS provided. Discharged in stable condition.

## 2023-06-17 LAB — HBV CORE AB SERPL QL IA: NEGATIVE

## 2023-06-26 DIAGNOSIS — R53.83 OTHER FATIGUE: Primary | ICD-10-CM

## 2023-07-05 ENCOUNTER — HOSPITAL ENCOUNTER (OUTPATIENT)
Dept: INFUSION THERAPY | Age: 68
Discharge: HOME | End: 2023-07-05
Payer: MEDICARE

## 2023-07-05 ENCOUNTER — CLINICAL DOCUMENTATION (OUTPATIENT)
Dept: RADIATION ONCOLOGY | Age: 68
End: 2023-07-05

## 2023-07-05 DIAGNOSIS — Z79.899 ENCOUNTER FOR LONG-TERM (CURRENT) USE OF HIGH-RISK MEDICATION: Primary | ICD-10-CM

## 2023-07-05 DIAGNOSIS — Z79.899 ENCOUNTER FOR LONG-TERM (CURRENT) USE OF HIGH-RISK MEDICATION: ICD-10-CM

## 2023-07-05 DIAGNOSIS — C34.91 SQUAMOUS CELL LUNG CANCER, RIGHT (HCC): ICD-10-CM

## 2023-07-05 DIAGNOSIS — R53.83 OTHER FATIGUE: ICD-10-CM

## 2023-07-05 DIAGNOSIS — C34.11 MALIGNANT NEOPLASM OF UPPER LOBE, RIGHT BRONCHUS OR LUNG (HCC): ICD-10-CM

## 2023-07-05 LAB
ALBUMIN SERPL-MCNC: 4.5 GM/DL (ref 3.4–5)
ALP BLD-CCNC: 113 IU/L (ref 40–128)
ALT SERPL-CCNC: 11 U/L (ref 10–40)
ANION GAP SERPL CALCULATED.3IONS-SCNC: 13 MMOL/L (ref 4–16)
AST SERPL-CCNC: 17 IU/L (ref 15–37)
BASOPHILS ABSOLUTE: 0.1 K/CU MM
BASOPHILS RELATIVE PERCENT: 0.9 % (ref 0–1)
BILIRUB SERPL-MCNC: 0.2 MG/DL (ref 0–1)
BUN SERPL-MCNC: 16 MG/DL (ref 6–23)
CALCIUM SERPL-MCNC: 9.6 MG/DL (ref 8.3–10.6)
CHLORIDE BLD-SCNC: 101 MMOL/L (ref 99–110)
CO2: 26 MMOL/L (ref 21–32)
CREAT SERPL-MCNC: 1.2 MG/DL (ref 0.6–1.1)
DIFFERENTIAL TYPE: ABNORMAL
EOSINOPHILS ABSOLUTE: 0.3 K/CU MM
EOSINOPHILS RELATIVE PERCENT: 4.2 % (ref 0–3)
GFR SERPL CREATININE-BSD FRML MDRD: 50 ML/MIN/1.73M2
GLUCOSE SERPL-MCNC: 89 MG/DL (ref 70–99)
HCT VFR BLD CALC: 39.7 % (ref 37–47)
HEMOGLOBIN: 12.4 GM/DL (ref 12.5–16)
LYMPHOCYTES ABSOLUTE: 0.9 K/CU MM
LYMPHOCYTES RELATIVE PERCENT: 13.6 % (ref 24–44)
MCH RBC QN AUTO: 30.6 PG (ref 27–31)
MCHC RBC AUTO-ENTMCNC: 31.2 % (ref 32–36)
MCV RBC AUTO: 98 FL (ref 78–100)
MONOCYTES ABSOLUTE: 0.5 K/CU MM
MONOCYTES RELATIVE PERCENT: 7.4 % (ref 0–4)
PDW BLD-RTO: 13.2 % (ref 11.7–14.9)
PLATELET # BLD: 377 K/CU MM (ref 140–440)
PMV BLD AUTO: 8.8 FL (ref 7.5–11.1)
POTASSIUM SERPL-SCNC: 4.3 MMOL/L (ref 3.5–5.1)
RBC # BLD: 4.05 M/CU MM (ref 4.2–5.4)
SEGMENTED NEUTROPHILS ABSOLUTE COUNT: 4.7 K/CU MM
SEGMENTED NEUTROPHILS RELATIVE PERCENT: 73.9 % (ref 36–66)
SODIUM BLD-SCNC: 140 MMOL/L (ref 135–145)
TOTAL PROTEIN: 6.8 GM/DL (ref 6.4–8.2)
TSH SERPL DL<=0.005 MIU/L-ACNC: 2.37 UIU/ML (ref 0.27–4.2)
WBC # BLD: 6.4 K/CU MM (ref 4–10.5)

## 2023-07-05 PROCEDURE — 80053 COMPREHEN METABOLIC PANEL: CPT

## 2023-07-05 PROCEDURE — 36415 COLL VENOUS BLD VENIPUNCTURE: CPT

## 2023-07-05 PROCEDURE — 85025 COMPLETE CBC W/AUTO DIFF WBC: CPT

## 2023-07-05 PROCEDURE — 84443 ASSAY THYROID STIM HORMONE: CPT

## 2023-07-05 RX ORDER — SODIUM CHLORIDE 0.9 % (FLUSH) 0.9 %
5-40 SYRINGE (ML) INJECTION PRN
Status: CANCELLED | OUTPATIENT
Start: 2023-07-06

## 2023-07-05 RX ORDER — SODIUM CHLORIDE 9 MG/ML
INJECTION, SOLUTION INTRAVENOUS CONTINUOUS
Status: CANCELLED | OUTPATIENT
Start: 2023-07-06

## 2023-07-05 RX ORDER — ALBUTEROL SULFATE 90 UG/1
4 AEROSOL, METERED RESPIRATORY (INHALATION) PRN
Status: CANCELLED | OUTPATIENT
Start: 2023-09-07

## 2023-07-05 RX ORDER — MEPERIDINE HYDROCHLORIDE 50 MG/ML
12.5 INJECTION INTRAMUSCULAR; INTRAVENOUS; SUBCUTANEOUS PRN
OUTPATIENT
Start: 2023-07-27

## 2023-07-05 RX ORDER — DIPHENHYDRAMINE HYDROCHLORIDE 50 MG/ML
50 INJECTION INTRAMUSCULAR; INTRAVENOUS
Status: CANCELLED | OUTPATIENT
Start: 2023-07-06

## 2023-07-05 RX ORDER — ONDANSETRON 2 MG/ML
8 INJECTION INTRAMUSCULAR; INTRAVENOUS
OUTPATIENT
Start: 2023-07-27

## 2023-07-05 RX ORDER — SODIUM CHLORIDE 0.9 % (FLUSH) 0.9 %
5-40 SYRINGE (ML) INJECTION PRN
Status: CANCELLED | OUTPATIENT
Start: 2023-08-17

## 2023-07-05 RX ORDER — SODIUM CHLORIDE 9 MG/ML
INJECTION, SOLUTION INTRAVENOUS CONTINUOUS
OUTPATIENT
Start: 2023-07-27

## 2023-07-05 RX ORDER — FAMOTIDINE 10 MG/ML
20 INJECTION, SOLUTION INTRAVENOUS
OUTPATIENT
Start: 2023-07-27

## 2023-07-05 RX ORDER — HEPARIN SODIUM (PORCINE) LOCK FLUSH IV SOLN 100 UNIT/ML 100 UNIT/ML
500 SOLUTION INTRAVENOUS PRN
Status: CANCELLED | OUTPATIENT
Start: 2023-08-17

## 2023-07-05 RX ORDER — ACETAMINOPHEN 325 MG/1
650 TABLET ORAL
OUTPATIENT
Start: 2023-09-28

## 2023-07-05 RX ORDER — SODIUM CHLORIDE 9 MG/ML
INJECTION, SOLUTION INTRAVENOUS CONTINUOUS
OUTPATIENT
Start: 2023-09-28

## 2023-07-05 RX ORDER — EPINEPHRINE 1 MG/ML
0.3 INJECTION, SOLUTION, CONCENTRATE INTRAVENOUS PRN
Status: CANCELLED | OUTPATIENT
Start: 2023-08-17

## 2023-07-05 RX ORDER — ALBUTEROL SULFATE 90 UG/1
4 AEROSOL, METERED RESPIRATORY (INHALATION) PRN
Status: CANCELLED | OUTPATIENT
Start: 2023-07-06

## 2023-07-05 RX ORDER — FAMOTIDINE 10 MG/ML
20 INJECTION, SOLUTION INTRAVENOUS
OUTPATIENT
Start: 2023-09-28

## 2023-07-05 RX ORDER — ALBUTEROL SULFATE 90 UG/1
4 AEROSOL, METERED RESPIRATORY (INHALATION) PRN
OUTPATIENT
Start: 2023-09-28

## 2023-07-05 RX ORDER — ONDANSETRON 2 MG/ML
8 INJECTION INTRAMUSCULAR; INTRAVENOUS
Status: CANCELLED | OUTPATIENT
Start: 2023-07-06

## 2023-07-05 RX ORDER — DIPHENHYDRAMINE HYDROCHLORIDE 50 MG/ML
50 INJECTION INTRAMUSCULAR; INTRAVENOUS
OUTPATIENT
Start: 2023-07-27

## 2023-07-05 RX ORDER — SODIUM CHLORIDE 0.9 % (FLUSH) 0.9 %
5-40 SYRINGE (ML) INJECTION PRN
OUTPATIENT
Start: 2023-07-27

## 2023-07-05 RX ORDER — MEPERIDINE HYDROCHLORIDE 50 MG/ML
12.5 INJECTION INTRAMUSCULAR; INTRAVENOUS; SUBCUTANEOUS PRN
Status: CANCELLED | OUTPATIENT
Start: 2023-07-06

## 2023-07-05 RX ORDER — MEPERIDINE HYDROCHLORIDE 50 MG/ML
12.5 INJECTION INTRAMUSCULAR; INTRAVENOUS; SUBCUTANEOUS PRN
OUTPATIENT
Start: 2023-09-28

## 2023-07-05 RX ORDER — SODIUM CHLORIDE 0.9 % (FLUSH) 0.9 %
5-40 SYRINGE (ML) INJECTION PRN
Status: CANCELLED | OUTPATIENT
Start: 2023-09-07

## 2023-07-05 RX ORDER — MEPERIDINE HYDROCHLORIDE 50 MG/ML
12.5 INJECTION INTRAMUSCULAR; INTRAVENOUS; SUBCUTANEOUS PRN
Status: CANCELLED | OUTPATIENT
Start: 2023-08-17

## 2023-07-05 RX ORDER — SODIUM CHLORIDE 9 MG/ML
5-250 INJECTION, SOLUTION INTRAVENOUS PRN
OUTPATIENT
Start: 2023-09-28

## 2023-07-05 RX ORDER — SODIUM CHLORIDE 0.9 % (FLUSH) 0.9 %
5-40 SYRINGE (ML) INJECTION PRN
OUTPATIENT
Start: 2023-09-28

## 2023-07-05 RX ORDER — EPINEPHRINE 1 MG/ML
0.3 INJECTION, SOLUTION, CONCENTRATE INTRAVENOUS PRN
Status: CANCELLED | OUTPATIENT
Start: 2023-07-06

## 2023-07-05 RX ORDER — ALBUTEROL SULFATE 90 UG/1
4 AEROSOL, METERED RESPIRATORY (INHALATION) PRN
OUTPATIENT
Start: 2023-07-27

## 2023-07-05 RX ORDER — DIPHENHYDRAMINE HYDROCHLORIDE 50 MG/ML
50 INJECTION INTRAMUSCULAR; INTRAVENOUS
OUTPATIENT
Start: 2023-09-28

## 2023-07-05 RX ORDER — SODIUM CHLORIDE 9 MG/ML
5-250 INJECTION, SOLUTION INTRAVENOUS PRN
Status: CANCELLED | OUTPATIENT
Start: 2023-07-06

## 2023-07-05 RX ORDER — FAMOTIDINE 10 MG/ML
20 INJECTION, SOLUTION INTRAVENOUS
Status: CANCELLED | OUTPATIENT
Start: 2023-09-07

## 2023-07-05 RX ORDER — ALBUTEROL SULFATE 90 UG/1
4 AEROSOL, METERED RESPIRATORY (INHALATION) PRN
Status: CANCELLED | OUTPATIENT
Start: 2023-08-17

## 2023-07-05 RX ORDER — EPINEPHRINE 1 MG/ML
0.3 INJECTION, SOLUTION, CONCENTRATE INTRAVENOUS PRN
OUTPATIENT
Start: 2023-09-28

## 2023-07-05 RX ORDER — SODIUM CHLORIDE 9 MG/ML
INJECTION, SOLUTION INTRAVENOUS CONTINUOUS
Status: CANCELLED | OUTPATIENT
Start: 2023-09-07

## 2023-07-05 RX ORDER — SODIUM CHLORIDE 9 MG/ML
5-250 INJECTION, SOLUTION INTRAVENOUS PRN
OUTPATIENT
Start: 2023-07-27

## 2023-07-05 RX ORDER — ONDANSETRON 2 MG/ML
8 INJECTION INTRAMUSCULAR; INTRAVENOUS
OUTPATIENT
Start: 2023-09-28

## 2023-07-05 RX ORDER — ACETAMINOPHEN 325 MG/1
650 TABLET ORAL
Status: CANCELLED | OUTPATIENT
Start: 2023-09-07

## 2023-07-05 RX ORDER — EPINEPHRINE 1 MG/ML
0.3 INJECTION, SOLUTION, CONCENTRATE INTRAVENOUS PRN
Status: CANCELLED | OUTPATIENT
Start: 2023-09-07

## 2023-07-05 RX ORDER — ONDANSETRON 2 MG/ML
8 INJECTION INTRAMUSCULAR; INTRAVENOUS
Status: CANCELLED | OUTPATIENT
Start: 2023-08-17

## 2023-07-05 RX ORDER — SODIUM CHLORIDE 9 MG/ML
INJECTION, SOLUTION INTRAVENOUS CONTINUOUS
Status: CANCELLED | OUTPATIENT
Start: 2023-08-17

## 2023-07-05 RX ORDER — ONDANSETRON 2 MG/ML
8 INJECTION INTRAMUSCULAR; INTRAVENOUS
Status: CANCELLED | OUTPATIENT
Start: 2023-09-07

## 2023-07-05 RX ORDER — HEPARIN SODIUM (PORCINE) LOCK FLUSH IV SOLN 100 UNIT/ML 100 UNIT/ML
500 SOLUTION INTRAVENOUS PRN
OUTPATIENT
Start: 2023-07-27

## 2023-07-05 RX ORDER — SODIUM CHLORIDE 9 MG/ML
5-250 INJECTION, SOLUTION INTRAVENOUS PRN
Status: CANCELLED | OUTPATIENT
Start: 2023-08-17

## 2023-07-05 RX ORDER — EPINEPHRINE 1 MG/ML
0.3 INJECTION, SOLUTION, CONCENTRATE INTRAVENOUS PRN
OUTPATIENT
Start: 2023-07-27

## 2023-07-05 RX ORDER — SODIUM CHLORIDE 9 MG/ML
5-250 INJECTION, SOLUTION INTRAVENOUS PRN
Status: CANCELLED | OUTPATIENT
Start: 2023-09-07

## 2023-07-05 RX ORDER — HEPARIN SODIUM (PORCINE) LOCK FLUSH IV SOLN 100 UNIT/ML 100 UNIT/ML
500 SOLUTION INTRAVENOUS PRN
Status: CANCELLED | OUTPATIENT
Start: 2023-07-06

## 2023-07-05 RX ORDER — HEPARIN SODIUM (PORCINE) LOCK FLUSH IV SOLN 100 UNIT/ML 100 UNIT/ML
500 SOLUTION INTRAVENOUS PRN
Status: CANCELLED | OUTPATIENT
Start: 2023-09-07

## 2023-07-05 RX ORDER — MEPERIDINE HYDROCHLORIDE 50 MG/ML
12.5 INJECTION INTRAMUSCULAR; INTRAVENOUS; SUBCUTANEOUS PRN
Status: CANCELLED | OUTPATIENT
Start: 2023-09-07

## 2023-07-05 RX ORDER — DIPHENHYDRAMINE HYDROCHLORIDE 50 MG/ML
50 INJECTION INTRAMUSCULAR; INTRAVENOUS
Status: CANCELLED | OUTPATIENT
Start: 2023-08-17

## 2023-07-05 RX ORDER — ACETAMINOPHEN 325 MG/1
650 TABLET ORAL
Status: CANCELLED | OUTPATIENT
Start: 2023-08-17

## 2023-07-05 RX ORDER — FAMOTIDINE 10 MG/ML
20 INJECTION, SOLUTION INTRAVENOUS
Status: CANCELLED | OUTPATIENT
Start: 2023-07-06

## 2023-07-05 RX ORDER — ACETAMINOPHEN 325 MG/1
650 TABLET ORAL
OUTPATIENT
Start: 2023-07-27

## 2023-07-05 RX ORDER — ACETAMINOPHEN 325 MG/1
650 TABLET ORAL
Status: CANCELLED | OUTPATIENT
Start: 2023-07-06

## 2023-07-05 RX ORDER — FAMOTIDINE 10 MG/ML
20 INJECTION, SOLUTION INTRAVENOUS
Status: CANCELLED | OUTPATIENT
Start: 2023-08-17

## 2023-07-05 RX ORDER — HEPARIN SODIUM (PORCINE) LOCK FLUSH IV SOLN 100 UNIT/ML 100 UNIT/ML
500 SOLUTION INTRAVENOUS PRN
OUTPATIENT
Start: 2023-09-28

## 2023-07-05 RX ORDER — DIPHENHYDRAMINE HYDROCHLORIDE 50 MG/ML
50 INJECTION INTRAMUSCULAR; INTRAVENOUS
Status: CANCELLED | OUTPATIENT
Start: 2023-09-07

## 2023-07-05 NOTE — CARE COORDINATION
LSW spoke to Pt regarding concerns noted on Distress Screening. Pt stated she is not feeling good and struggles with fatigue. She has just started [facility] and has regular contact with her medical oncologist. Pt knows how to contact LSW. She denies any needs at this time.

## 2023-07-06 ENCOUNTER — HOSPITAL ENCOUNTER (OUTPATIENT)
Dept: INFUSION THERAPY | Age: 68
Discharge: HOME | End: 2023-07-06
Payer: MEDICARE

## 2023-07-06 ENCOUNTER — OFFICE VISIT (OUTPATIENT)
Dept: ONCOLOGY | Age: 68
End: 2023-07-06

## 2023-07-06 VITALS
BODY MASS INDEX: 27.82 KG/M2 | TEMPERATURE: 97.5 F | SYSTOLIC BLOOD PRESSURE: 157 MMHG | DIASTOLIC BLOOD PRESSURE: 71 MMHG | OXYGEN SATURATION: 97 % | HEIGHT: 65 IN | HEART RATE: 68 BPM | WEIGHT: 167 LBS

## 2023-07-06 VITALS
OXYGEN SATURATION: 97 % | DIASTOLIC BLOOD PRESSURE: 71 MMHG | SYSTOLIC BLOOD PRESSURE: 157 MMHG | TEMPERATURE: 97.5 F | HEIGHT: 65 IN | WEIGHT: 167.6 LBS | HEART RATE: 68 BPM | BODY MASS INDEX: 27.92 KG/M2

## 2023-07-06 DIAGNOSIS — C34.91 SQUAMOUS CELL LUNG CANCER, RIGHT (HCC): ICD-10-CM

## 2023-07-06 DIAGNOSIS — C34.11 MALIGNANT NEOPLASM OF UPPER LOBE, RIGHT BRONCHUS OR LUNG (HCC): ICD-10-CM

## 2023-07-06 DIAGNOSIS — Z79.899 ENCOUNTER FOR LONG-TERM (CURRENT) USE OF HIGH-RISK MEDICATION: Primary | ICD-10-CM

## 2023-07-06 PROCEDURE — 2580000003 HC RX 258: Performed by: INTERNAL MEDICINE

## 2023-07-06 PROCEDURE — 6360000002 HC RX W HCPCS: Performed by: INTERNAL MEDICINE

## 2023-07-06 PROCEDURE — 96413 CHEMO IV INFUSION 1 HR: CPT

## 2023-07-06 RX ORDER — SODIUM CHLORIDE 9 MG/ML
5-250 INJECTION, SOLUTION INTRAVENOUS PRN
Status: DISCONTINUED | OUTPATIENT
Start: 2023-07-06 | End: 2023-07-07 | Stop reason: HOSPADM

## 2023-07-06 RX ORDER — HEPARIN SODIUM 100 [USP'U]/ML
500 INJECTION, SOLUTION INTRAVENOUS PRN
Status: DISCONTINUED | OUTPATIENT
Start: 2023-07-06 | End: 2023-07-07 | Stop reason: HOSPADM

## 2023-07-06 RX ORDER — SODIUM CHLORIDE 0.9 % (FLUSH) 0.9 %
5-40 SYRINGE (ML) INJECTION PRN
Status: DISCONTINUED | OUTPATIENT
Start: 2023-07-06 | End: 2023-07-07 | Stop reason: HOSPADM

## 2023-07-06 RX ADMIN — HEPARIN 500 UNITS: 100 SYRINGE at 10:21

## 2023-07-06 RX ADMIN — SODIUM CHLORIDE, PRESERVATIVE FREE 10 ML: 5 INJECTION INTRAVENOUS at 10:21

## 2023-07-06 RX ADMIN — SODIUM CHLORIDE 200 MG: 9 INJECTION, SOLUTION INTRAVENOUS at 09:41

## 2023-07-06 RX ADMIN — SODIUM CHLORIDE 20 ML/HR: 9 INJECTION, SOLUTION INTRAVENOUS at 09:41

## 2023-07-06 NOTE — PROGRESS NOTES
Ambulated to infusion area. Here for treatment and office visit. Patient had labs drawn yesterday. Lab results verified and reviewed with patient. Mediport accessed, positive blood return noted. Keytruda administered as ordered. Call light within reach. Tolerated infusion without incident. Patient escorted to front clinic for office visit.

## 2023-07-13 ENCOUNTER — TELEPHONE (OUTPATIENT)
Dept: INFUSION THERAPY | Age: 68
End: 2023-07-13

## 2023-07-13 NOTE — TELEPHONE ENCOUNTER
7/13/23 - spoke w/ pt for the 8/2/23 CT scan at [facility] arrival time of 10:30 - no contrast- no prep.

## 2023-07-25 ENCOUNTER — HOSPITAL ENCOUNTER (OUTPATIENT)
Dept: INFUSION THERAPY | Age: 68
Discharge: HOME | End: 2023-07-25
Payer: MEDICARE

## 2023-07-25 DIAGNOSIS — Z79.899 ENCOUNTER FOR LONG-TERM (CURRENT) USE OF HIGH-RISK MEDICATION: ICD-10-CM

## 2023-07-25 DIAGNOSIS — C34.91 SQUAMOUS CELL LUNG CANCER, RIGHT (HCC): ICD-10-CM

## 2023-07-25 DIAGNOSIS — C34.11 MALIGNANT NEOPLASM OF UPPER LOBE, RIGHT BRONCHUS OR LUNG (HCC): ICD-10-CM

## 2023-07-25 LAB
ALBUMIN SERPL-MCNC: 4.4 GM/DL (ref 3.4–5)
ALP BLD-CCNC: 113 IU/L (ref 40–128)
ALT SERPL-CCNC: 10 U/L (ref 10–40)
ANION GAP SERPL CALCULATED.3IONS-SCNC: 12 MMOL/L (ref 4–16)
AST SERPL-CCNC: 16 IU/L (ref 15–37)
BASOPHILS ABSOLUTE: 0.1 K/CU MM
BASOPHILS RELATIVE PERCENT: 1 % (ref 0–1)
BILIRUB SERPL-MCNC: 0.3 MG/DL (ref 0–1)
BUN SERPL-MCNC: 14 MG/DL (ref 6–23)
CALCIUM SERPL-MCNC: 9.7 MG/DL (ref 8.3–10.6)
CHLORIDE BLD-SCNC: 96 MMOL/L (ref 99–110)
CO2: 27 MMOL/L (ref 21–32)
CREAT SERPL-MCNC: 1.1 MG/DL (ref 0.6–1.1)
DIFFERENTIAL TYPE: ABNORMAL
EOSINOPHILS ABSOLUTE: 0.3 K/CU MM
EOSINOPHILS RELATIVE PERCENT: 4.8 % (ref 0–3)
GFR SERPL CREATININE-BSD FRML MDRD: 55 ML/MIN/1.73M2
GLUCOSE SERPL-MCNC: 96 MG/DL (ref 70–99)
HCT VFR BLD CALC: 39.9 % (ref 37–47)
HEMOGLOBIN: 12.5 GM/DL (ref 12.5–16)
LYMPHOCYTES ABSOLUTE: 1.1 K/CU MM
LYMPHOCYTES RELATIVE PERCENT: 15.3 % (ref 24–44)
MCH RBC QN AUTO: 29.8 PG (ref 27–31)
MCHC RBC AUTO-ENTMCNC: 31.3 % (ref 32–36)
MCV RBC AUTO: 95.2 FL (ref 78–100)
MONOCYTES ABSOLUTE: 0.5 K/CU MM
MONOCYTES RELATIVE PERCENT: 7.6 % (ref 0–4)
PDW BLD-RTO: 13.3 % (ref 11.7–14.9)
PLATELET # BLD: 382 K/CU MM (ref 140–440)
PMV BLD AUTO: 8.7 FL (ref 7.5–11.1)
POTASSIUM SERPL-SCNC: 4.3 MMOL/L (ref 3.5–5.1)
RBC # BLD: 4.19 M/CU MM (ref 4.2–5.4)
SEGMENTED NEUTROPHILS ABSOLUTE COUNT: 4.9 K/CU MM
SEGMENTED NEUTROPHILS RELATIVE PERCENT: 71.3 % (ref 36–66)
SODIUM BLD-SCNC: 135 MMOL/L (ref 135–145)
TOTAL PROTEIN: 6.8 GM/DL (ref 6.4–8.2)
TSH SERPL DL<=0.005 MIU/L-ACNC: 2.89 UIU/ML (ref 0.27–4.2)
WBC # BLD: 6.9 K/CU MM (ref 4–10.5)

## 2023-07-25 PROCEDURE — 85025 COMPLETE CBC W/AUTO DIFF WBC: CPT

## 2023-07-25 PROCEDURE — 84443 ASSAY THYROID STIM HORMONE: CPT

## 2023-07-25 PROCEDURE — 80053 COMPREHEN METABOLIC PANEL: CPT

## 2023-07-25 PROCEDURE — 36415 COLL VENOUS BLD VENIPUNCTURE: CPT

## 2023-07-27 ENCOUNTER — HOSPITAL ENCOUNTER (OUTPATIENT)
Dept: INFUSION THERAPY | Age: 68
Discharge: HOME | End: 2023-07-27
Payer: MEDICARE

## 2023-07-27 VITALS
SYSTOLIC BLOOD PRESSURE: 107 MMHG | BODY MASS INDEX: 27.99 KG/M2 | HEART RATE: 70 BPM | OXYGEN SATURATION: 99 % | DIASTOLIC BLOOD PRESSURE: 64 MMHG | WEIGHT: 168 LBS | HEIGHT: 65 IN | TEMPERATURE: 97.6 F

## 2023-07-27 DIAGNOSIS — C34.91 SQUAMOUS CELL LUNG CANCER, RIGHT (HCC): ICD-10-CM

## 2023-07-27 DIAGNOSIS — Z79.899 ENCOUNTER FOR LONG-TERM (CURRENT) USE OF HIGH-RISK MEDICATION: Primary | ICD-10-CM

## 2023-07-27 DIAGNOSIS — C34.11 MALIGNANT NEOPLASM OF UPPER LOBE, RIGHT BRONCHUS OR LUNG (HCC): ICD-10-CM

## 2023-07-27 PROCEDURE — 2580000003 HC RX 258: Performed by: INTERNAL MEDICINE

## 2023-07-27 PROCEDURE — 96413 CHEMO IV INFUSION 1 HR: CPT

## 2023-07-27 PROCEDURE — 6360000002 HC RX W HCPCS: Performed by: INTERNAL MEDICINE

## 2023-07-27 RX ORDER — SODIUM CHLORIDE 0.9 % (FLUSH) 0.9 %
5-40 SYRINGE (ML) INJECTION PRN
Status: DISCONTINUED | OUTPATIENT
Start: 2023-07-27 | End: 2023-07-28 | Stop reason: HOSPADM

## 2023-07-27 RX ORDER — SODIUM CHLORIDE 9 MG/ML
5-250 INJECTION, SOLUTION INTRAVENOUS PRN
Status: DISCONTINUED | OUTPATIENT
Start: 2023-07-27 | End: 2023-07-28 | Stop reason: HOSPADM

## 2023-07-27 RX ORDER — HEPARIN 100 UNIT/ML
500 SYRINGE INTRAVENOUS PRN
Status: DISCONTINUED | OUTPATIENT
Start: 2023-07-27 | End: 2023-07-28 | Stop reason: HOSPADM

## 2023-07-27 RX ADMIN — SODIUM CHLORIDE 20 ML/HR: 9 INJECTION, SOLUTION INTRAVENOUS at 09:21

## 2023-07-27 RX ADMIN — HEPARIN 500 UNITS: 100 SYRINGE at 09:59

## 2023-07-27 RX ADMIN — SODIUM CHLORIDE, PRESERVATIVE FREE 10 ML: 5 INJECTION INTRAVENOUS at 09:59

## 2023-07-27 RX ADMIN — SODIUM CHLORIDE 200 MG: 9 INJECTION, SOLUTION INTRAVENOUS at 09:22

## 2023-07-27 NOTE — PROGRESS NOTES
Pt here for Keytruda infusion. Port accessed with blood return noted. CMP  CBC reviewed from 7/25 and within defined limits. Pt states she has worsening neuropathy to bilateral hands and feet which the physician was aware of at last [address] on 7/6. No other concerns noted. Patient's status assessed and documented appropriately. All labs and required results were also reviewed today. Treatment parameters have been reviewed. Today's treatment has been approved by the provider. Treatment orders and medication sequencing (when applicable) was verified by 2 registered nurses. The treatment plan was confirmed with the patient prior to administration, and the patient understands the need to report any treatment-related symptoms. Prior to administration, when applicable, the following 8 elements of medication administration were reviewed with 2nd Registered Nurse prior to dosing: drug name, drug dose, infusion volume when prepared in a syringe, rate of administration, expiration dates and/or times, appearance and integrity of drug(s), and rate of pump for infusion. The 5 rights of medication administration have been verified.        Pt tolerated tx without incident left ambulatory discharge instructions given

## 2023-08-11 ENCOUNTER — HOSPITAL ENCOUNTER (OUTPATIENT)
Dept: CT IMAGING | Age: 68
Discharge: HOME | End: 2023-08-11
Attending: INTERNAL MEDICINE
Payer: MEDICARE

## 2023-08-11 DIAGNOSIS — Z79.899 ENCOUNTER FOR LONG-TERM (CURRENT) USE OF HIGH-RISK MEDICATION: ICD-10-CM

## 2023-08-11 DIAGNOSIS — C34.91 SQUAMOUS CELL LUNG CANCER, RIGHT (HCC): ICD-10-CM

## 2023-08-11 DIAGNOSIS — C34.11 MALIGNANT NEOPLASM OF UPPER LOBE, RIGHT BRONCHUS OR LUNG (HCC): ICD-10-CM

## 2023-08-11 PROCEDURE — 71250 CT THORAX DX C-: CPT

## 2023-08-15 ENCOUNTER — HOSPITAL ENCOUNTER (OUTPATIENT)
Dept: INFUSION THERAPY | Age: 68
Discharge: HOME | End: 2023-08-15
Payer: MEDICARE

## 2023-08-15 DIAGNOSIS — Z79.899 ENCOUNTER FOR LONG-TERM (CURRENT) USE OF HIGH-RISK MEDICATION: ICD-10-CM

## 2023-08-15 DIAGNOSIS — C34.11 MALIGNANT NEOPLASM OF UPPER LOBE, RIGHT BRONCHUS OR LUNG (HCC): ICD-10-CM

## 2023-08-15 DIAGNOSIS — C34.91 SQUAMOUS CELL LUNG CANCER, RIGHT (HCC): ICD-10-CM

## 2023-08-15 LAB
ALBUMIN SERPL-MCNC: 4.4 GM/DL (ref 3.4–5)
ALP BLD-CCNC: 116 IU/L (ref 40–128)
ALT SERPL-CCNC: 11 U/L (ref 10–40)
ANION GAP SERPL CALCULATED.3IONS-SCNC: 11 MMOL/L (ref 4–16)
AST SERPL-CCNC: 15 IU/L (ref 15–37)
BASOPHILS ABSOLUTE: 0.1 K/CU MM
BASOPHILS RELATIVE PERCENT: 1.1 % (ref 0–1)
BILIRUB SERPL-MCNC: 0.2 MG/DL (ref 0–1)
BUN SERPL-MCNC: 20 MG/DL (ref 6–23)
CALCIUM SERPL-MCNC: 9.7 MG/DL (ref 8.3–10.6)
CHLORIDE BLD-SCNC: 98 MMOL/L (ref 99–110)
CO2: 28 MMOL/L (ref 21–32)
CREAT SERPL-MCNC: 1.2 MG/DL (ref 0.6–1.1)
DIFFERENTIAL TYPE: ABNORMAL
EOSINOPHILS ABSOLUTE: 0.3 K/CU MM
EOSINOPHILS RELATIVE PERCENT: 4.6 % (ref 0–3)
GFR SERPL CREATININE-BSD FRML MDRD: 50 ML/MIN/1.73M2
GLUCOSE SERPL-MCNC: 95 MG/DL (ref 70–99)
HCT VFR BLD CALC: 40.6 % (ref 37–47)
HEMOGLOBIN: 13 GM/DL (ref 12.5–16)
LYMPHOCYTES ABSOLUTE: 0.9 K/CU MM
LYMPHOCYTES RELATIVE PERCENT: 14.4 % (ref 24–44)
MCH RBC QN AUTO: 30 PG (ref 27–31)
MCHC RBC AUTO-ENTMCNC: 32 % (ref 32–36)
MCV RBC AUTO: 93.8 FL (ref 78–100)
MONOCYTES ABSOLUTE: 0.4 K/CU MM
MONOCYTES RELATIVE PERCENT: 6.6 % (ref 0–4)
PDW BLD-RTO: 13.5 % (ref 11.7–14.9)
PLATELET # BLD: 392 K/CU MM (ref 140–440)
PMV BLD AUTO: 8.8 FL (ref 7.5–11.1)
POTASSIUM SERPL-SCNC: 4.2 MMOL/L (ref 3.5–5.1)
RBC # BLD: 4.33 M/CU MM (ref 4.2–5.4)
SEGMENTED NEUTROPHILS ABSOLUTE COUNT: 4.8 K/CU MM
SEGMENTED NEUTROPHILS RELATIVE PERCENT: 73.3 % (ref 36–66)
SODIUM BLD-SCNC: 137 MMOL/L (ref 135–145)
TOTAL PROTEIN: 6.7 GM/DL (ref 6.4–8.2)
WBC # BLD: 6.6 K/CU MM (ref 4–10.5)

## 2023-08-15 PROCEDURE — 36415 COLL VENOUS BLD VENIPUNCTURE: CPT

## 2023-08-15 PROCEDURE — 85025 COMPLETE CBC W/AUTO DIFF WBC: CPT

## 2023-08-15 PROCEDURE — 80053 COMPREHEN METABOLIC PANEL: CPT

## 2023-08-17 ENCOUNTER — HOSPITAL ENCOUNTER (OUTPATIENT)
Dept: INFUSION THERAPY | Age: 68
Discharge: HOME | End: 2023-08-17
Payer: MEDICARE

## 2023-08-17 ENCOUNTER — OFFICE VISIT (OUTPATIENT)
Dept: ONCOLOGY | Age: 68
End: 2023-08-17
Payer: MEDICARE

## 2023-08-17 VITALS
DIASTOLIC BLOOD PRESSURE: 67 MMHG | HEIGHT: 65 IN | HEART RATE: 75 BPM | BODY MASS INDEX: 28.09 KG/M2 | OXYGEN SATURATION: 97 % | SYSTOLIC BLOOD PRESSURE: 131 MMHG | WEIGHT: 168.6 LBS | TEMPERATURE: 96.2 F

## 2023-08-17 VITALS
SYSTOLIC BLOOD PRESSURE: 131 MMHG | HEIGHT: 65 IN | OXYGEN SATURATION: 97 % | WEIGHT: 168.6 LBS | TEMPERATURE: 96.2 F | BODY MASS INDEX: 28.09 KG/M2 | DIASTOLIC BLOOD PRESSURE: 67 MMHG | HEART RATE: 75 BPM

## 2023-08-17 DIAGNOSIS — C34.91 SQUAMOUS CELL LUNG CANCER, RIGHT (HCC): ICD-10-CM

## 2023-08-17 DIAGNOSIS — C34.11 PRIMARY CANCER OF RIGHT UPPER LOBE OF LUNG (HCC): Primary | ICD-10-CM

## 2023-08-17 DIAGNOSIS — Z79.899 ENCOUNTER FOR LONG-TERM (CURRENT) USE OF HIGH-RISK MEDICATION: Primary | ICD-10-CM

## 2023-08-17 DIAGNOSIS — C34.11 MALIGNANT NEOPLASM OF UPPER LOBE, RIGHT BRONCHUS OR LUNG (HCC): ICD-10-CM

## 2023-08-17 PROCEDURE — 1123F ACP DISCUSS/DSCN MKR DOCD: CPT | Performed by: INTERNAL MEDICINE

## 2023-08-17 PROCEDURE — 96413 CHEMO IV INFUSION 1 HR: CPT

## 2023-08-17 PROCEDURE — 2580000003 HC RX 258: Performed by: INTERNAL MEDICINE

## 2023-08-17 PROCEDURE — 99213 OFFICE O/P EST LOW 20 MIN: CPT | Performed by: INTERNAL MEDICINE

## 2023-08-17 PROCEDURE — 3075F SYST BP GE 130 - 139MM HG: CPT | Performed by: INTERNAL MEDICINE

## 2023-08-17 PROCEDURE — 3078F DIAST BP <80 MM HG: CPT | Performed by: INTERNAL MEDICINE

## 2023-08-17 PROCEDURE — 6360000002 HC RX W HCPCS: Performed by: INTERNAL MEDICINE

## 2023-08-17 RX ORDER — SODIUM CHLORIDE 9 MG/ML
5-250 INJECTION, SOLUTION INTRAVENOUS PRN
Status: DISCONTINUED | OUTPATIENT
Start: 2023-08-17 | End: 2023-08-18 | Stop reason: HOSPADM

## 2023-08-17 RX ORDER — SODIUM CHLORIDE 0.9 % (FLUSH) 0.9 %
5-40 SYRINGE (ML) INJECTION PRN
Status: DISCONTINUED | OUTPATIENT
Start: 2023-08-17 | End: 2023-08-18 | Stop reason: HOSPADM

## 2023-08-17 RX ORDER — HEPARIN 100 UNIT/ML
500 SYRINGE INTRAVENOUS PRN
Status: DISCONTINUED | OUTPATIENT
Start: 2023-08-17 | End: 2023-08-18 | Stop reason: HOSPADM

## 2023-08-17 RX ADMIN — SODIUM CHLORIDE 200 MG: 9 INJECTION, SOLUTION INTRAVENOUS at 08:50

## 2023-08-17 RX ADMIN — SODIUM CHLORIDE, PRESERVATIVE FREE 10 ML: 5 INJECTION INTRAVENOUS at 09:31

## 2023-08-17 RX ADMIN — HEPARIN 500 UNITS: 100 SYRINGE at 09:31

## 2023-08-17 RX ADMIN — SODIUM CHLORIDE 20 ML/HR: 9 INJECTION, SOLUTION INTRAVENOUS at 08:50

## 2023-08-17 NOTE — PROGRESS NOTES
MA Rooming Questions  Patient: Chavo Pinto  MRN: [id number]    Date: 8/17/2023        1. Do you have any new issues?   no         2. Do you need any refills on medications?    no    3. Have you had any imaging done since your last visit?   no    4. Have you been hospitalized or seen in the emergency room since your last visit here?   no    5. Did the patient have a depression screening completed today?  No    No data recorded     PHQ-9 Given to (if applicable):               PHQ-9 Score (if applicable):                     [] Positive     []  Negative              Does question #9 need addressed (if applicable)                     [] Yes    []  No               Ann Kelly CMA
about mammogram due in October 2018. 5/2/2019 CT  showed no evidence of recurrent or residual disease. 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   3/2019 FOB negative. 5/29/2020 CT showed right upper lobectomy. No evidence of local recurrence or metastases in the chest.   1/28/2021 CT chest: no new findings of recurrent or metastatic disease in the chest.    She has chronic dry cough and I recommend to follow up with Dr Messi Rivero. She had stool occult 2 years ago. 11/20/2021 CT chest:  1. Stable postsurgical changes right upper lobe. 2. No new or worrisome lung nodules noted. No thoracic adenopathy  She had COVID vaccine booster. She has mammogram in November 2021.    3/2022 TSH, CBC grossly unremarkable. Ferritin 70, B-12 372 and folate 14. 9.     11/2022 CBC and CMP grossly stable for her. 11/14/2022 CT chest   1. Postsurgical changes from right upper lobectomy. There is a new 1.8 cm soft tissue nodule along the posterior aspect of the surgical margin within the right hilum suspicious for recurrence versus metastatic adenopathy. There is associated narrowing of the right lower lobe bronchus in this region overall increased from the prior exam.    PET scan 12/2022  1. Metabolic activity associated with the new right hilar nodule described on the recent CT scan concerning for recurrent/metastatic disease. 2.  No metabolically active distant metastatic disease. Note from Dr Raulito Mendiola: If this does return his cancer then our options would be radiation therapy versus evaluation for completion pneumonectomy. I am hesitant to say that she is a candidate for completion pneumonectomy. 1/5/2023 EBUS. Pathology showed recurrent squamous cell carcinoma    I discussed with RT onc who recommended concurrent chemo and RT. I ordered NGS study including PD-L1 and her-gordon.   Review of pathology with Her2-gordon equivocal by IHC  ALK negative  PD-L1 expressed >1%

## 2023-08-17 NOTE — PROGRESS NOTES
Pt here for Keytruda infusion and OV. Port accessed with blood return noted. CBC CMP reviewed from 8/15 and within defined limits. Pt has no concerns at this time for physician. Pt states she has intermittent fatigue. Pt encouraged to take rest periods during the day. Patient's status assessed and documented appropriately. All labs and required results were also reviewed today. Treatment parameters have been reviewed. Today's treatment has been approved by the provider. Treatment orders and medication sequencing (when applicable) was verified by 2 registered nurses. The treatment plan was confirmed with the patient prior to administration, and the patient understands the need to report any treatment-related symptoms. Prior to administration, when applicable, the following 8 elements of medication administration were reviewed with 2nd Registered Nurse prior to dosing: drug name, drug dose, infusion volume when prepared in a syringe, rate of administration, expiration dates and/or times, appearance and integrity of drug(s), and rate of pump for infusion. The 5 rights of medication administration have been verified.        Pt tolerated tx without incident left ambulatory instructed to stop at check out desk for next OV and discharge paperwork

## 2023-09-05 ENCOUNTER — HOSPITAL ENCOUNTER (OUTPATIENT)
Dept: INFUSION THERAPY | Age: 68
Discharge: HOME | End: 2023-09-05
Payer: MEDICARE

## 2023-09-05 DIAGNOSIS — C34.11 MALIGNANT NEOPLASM OF UPPER LOBE, RIGHT BRONCHUS OR LUNG (HCC): ICD-10-CM

## 2023-09-05 DIAGNOSIS — C34.91 SQUAMOUS CELL LUNG CANCER, RIGHT (HCC): ICD-10-CM

## 2023-09-05 DIAGNOSIS — Z79.899 ENCOUNTER FOR LONG-TERM (CURRENT) USE OF HIGH-RISK MEDICATION: ICD-10-CM

## 2023-09-05 LAB
ALBUMIN SERPL-MCNC: 4.6 GM/DL (ref 3.4–5)
ALP BLD-CCNC: 114 IU/L (ref 40–129)
ALT SERPL-CCNC: 10 U/L (ref 10–40)
ANION GAP SERPL CALCULATED.3IONS-SCNC: 12 MMOL/L (ref 4–16)
AST SERPL-CCNC: 16 IU/L (ref 15–37)
BASOPHILS ABSOLUTE: 0.1 K/CU MM
BASOPHILS RELATIVE PERCENT: 0.8 % (ref 0–1)
BILIRUB SERPL-MCNC: 0.2 MG/DL (ref 0–1)
BUN SERPL-MCNC: 14 MG/DL (ref 6–23)
CALCIUM SERPL-MCNC: 9.5 MG/DL (ref 8.3–10.6)
CHLORIDE BLD-SCNC: 100 MMOL/L (ref 99–110)
CO2: 26 MMOL/L (ref 21–32)
CREAT SERPL-MCNC: 1.1 MG/DL (ref 0.6–1.1)
DIFFERENTIAL TYPE: ABNORMAL
EOSINOPHILS ABSOLUTE: 0.4 K/CU MM
EOSINOPHILS RELATIVE PERCENT: 6.6 % (ref 0–3)
GFR SERPL CREATININE-BSD FRML MDRD: 55 ML/MIN/1.73M2
GLUCOSE SERPL-MCNC: 111 MG/DL (ref 70–99)
HCT VFR BLD CALC: 39.4 % (ref 37–47)
HEMOGLOBIN: 12.3 GM/DL (ref 12.5–16)
LYMPHOCYTES ABSOLUTE: 1 K/CU MM
LYMPHOCYTES RELATIVE PERCENT: 15.8 % (ref 24–44)
MCH RBC QN AUTO: 29.8 PG (ref 27–31)
MCHC RBC AUTO-ENTMCNC: 31.2 % (ref 32–36)
MCV RBC AUTO: 95.4 FL (ref 78–100)
MONOCYTES ABSOLUTE: 0.5 K/CU MM
MONOCYTES RELATIVE PERCENT: 8.2 % (ref 0–4)
PDW BLD-RTO: 14.2 % (ref 11.7–14.9)
PLATELET # BLD: 372 K/CU MM (ref 140–440)
PMV BLD AUTO: 8.9 FL (ref 7.5–11.1)
POTASSIUM SERPL-SCNC: 4.4 MMOL/L (ref 3.5–5.1)
RBC # BLD: 4.13 M/CU MM (ref 4.2–5.4)
SEGMENTED NEUTROPHILS ABSOLUTE COUNT: 4.5 K/CU MM
SEGMENTED NEUTROPHILS RELATIVE PERCENT: 68.6 % (ref 36–66)
SODIUM BLD-SCNC: 138 MMOL/L (ref 135–145)
TOTAL PROTEIN: 6.6 GM/DL (ref 6.4–8.2)
TSH SERPL DL<=0.005 MIU/L-ACNC: 2.58 UIU/ML (ref 0.27–4.2)
WBC # BLD: 6.5 K/CU MM (ref 4–10.5)

## 2023-09-05 PROCEDURE — 84443 ASSAY THYROID STIM HORMONE: CPT

## 2023-09-05 PROCEDURE — 36415 COLL VENOUS BLD VENIPUNCTURE: CPT

## 2023-09-05 PROCEDURE — 80053 COMPREHEN METABOLIC PANEL: CPT

## 2023-09-05 PROCEDURE — 85025 COMPLETE CBC W/AUTO DIFF WBC: CPT

## 2023-09-07 ENCOUNTER — HOSPITAL ENCOUNTER (OUTPATIENT)
Dept: INFUSION THERAPY | Age: 68
Discharge: HOME | End: 2023-09-07
Payer: MEDICARE

## 2023-09-07 VITALS
OXYGEN SATURATION: 97 % | SYSTOLIC BLOOD PRESSURE: 106 MMHG | HEIGHT: 65 IN | WEIGHT: 169.8 LBS | TEMPERATURE: 97.3 F | BODY MASS INDEX: 28.29 KG/M2 | HEART RATE: 70 BPM | DIASTOLIC BLOOD PRESSURE: 73 MMHG

## 2023-09-07 DIAGNOSIS — C34.11 MALIGNANT NEOPLASM OF UPPER LOBE, RIGHT BRONCHUS OR LUNG (HCC): ICD-10-CM

## 2023-09-07 DIAGNOSIS — C34.91 SQUAMOUS CELL LUNG CANCER, RIGHT (HCC): ICD-10-CM

## 2023-09-07 DIAGNOSIS — Z79.899 ENCOUNTER FOR LONG-TERM (CURRENT) USE OF HIGH-RISK MEDICATION: Primary | ICD-10-CM

## 2023-09-07 PROCEDURE — 96413 CHEMO IV INFUSION 1 HR: CPT

## 2023-09-07 PROCEDURE — 2580000003 HC RX 258: Performed by: INTERNAL MEDICINE

## 2023-09-07 PROCEDURE — 6360000002 HC RX W HCPCS: Performed by: INTERNAL MEDICINE

## 2023-09-07 RX ORDER — HEPARIN 100 UNIT/ML
500 SYRINGE INTRAVENOUS PRN
Status: DISCONTINUED | OUTPATIENT
Start: 2023-09-07 | End: 2023-09-08 | Stop reason: HOSPADM

## 2023-09-07 RX ORDER — SODIUM CHLORIDE 9 MG/ML
5-250 INJECTION, SOLUTION INTRAVENOUS PRN
Status: DISCONTINUED | OUTPATIENT
Start: 2023-09-07 | End: 2023-09-08 | Stop reason: HOSPADM

## 2023-09-07 RX ORDER — SODIUM CHLORIDE 0.9 % (FLUSH) 0.9 %
5-40 SYRINGE (ML) INJECTION PRN
Status: DISCONTINUED | OUTPATIENT
Start: 2023-09-07 | End: 2023-09-08 | Stop reason: HOSPADM

## 2023-09-07 RX ADMIN — SODIUM CHLORIDE 20 ML/HR: 9 INJECTION, SOLUTION INTRAVENOUS at 09:14

## 2023-09-07 RX ADMIN — SODIUM CHLORIDE, PRESERVATIVE FREE 10 ML: 5 INJECTION INTRAVENOUS at 09:54

## 2023-09-07 RX ADMIN — SODIUM CHLORIDE 200 MG: 9 INJECTION, SOLUTION INTRAVENOUS at 09:14

## 2023-09-07 RX ADMIN — HEPARIN 500 UNITS: 100 SYRINGE at 09:55

## 2023-09-07 NOTE — PROGRESS NOTES
Pt here for tx. Port accessed with blood return noted. CBC CMP TSH reviewed from 9/5 and within defined limits. Pt has no concerns or issues to discuss with physician at this time. Patient's status assessed and documented appropriately. All labs and required results were also reviewed today. Treatment parameters have been reviewed. Today's treatment has been approved by the provider. Treatment orders and medication sequencing (when applicable) was verified by 2 registered nurses. The treatment plan was confirmed with the patient prior to administration, and the patient understands the need to report any treatment-related symptoms. Prior to administration, when applicable, the following 8 elements of medication administration were reviewed with 2nd Registered Nurse prior to dosing: drug name, drug dose, infusion volume when prepared in a syringe, rate of administration, expiration dates and/or times, appearance and integrity of drug(s), and rate of pump for infusion. The 5 rights of medication administration have been verified.        Pt tolerated tx without incident left ambulatory discharge instructions given

## 2023-09-15 ENCOUNTER — HOSPITAL ENCOUNTER (OUTPATIENT)
Dept: RADIATION ONCOLOGY | Age: 68
Discharge: HOME | End: 2023-09-15

## 2023-09-15 VITALS
TEMPERATURE: 97.3 F | HEIGHT: 65 IN | DIASTOLIC BLOOD PRESSURE: 63 MMHG | OXYGEN SATURATION: 96 % | SYSTOLIC BLOOD PRESSURE: 135 MMHG | WEIGHT: 172.2 LBS | BODY MASS INDEX: 28.69 KG/M2 | HEART RATE: 73 BPM

## 2023-09-15 ASSESSMENT — PAIN SCALES - GENERAL: PAINLEVEL_OUTOF10: 0

## 2023-09-15 NOTE — PROGRESS NOTES
[place] [address] [facility], [address] [facility]  Phone: [phone]  Fax: [phone]    RADIATION ONCOLOGY FOLLOW UP REPORT    PATIENT NAME:  Ness Mejia              : 1955  MEDICAL RECORD NO: [id number]    [facility] NO: [id number]        PROVIDER: Nehemias Castillo MD      DATE OF SERVICE: 9/15/2023     FOLLOW UP PHYSICIANS:  Med onc: Dr. 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 TREATMENT COURSE:   RUL lobectomy 18; [id number] with close margins  Adjuvant chemotherapy  Recurrence 22  Definitive radiation therapy 60Gy/30fx with concurrent chemo completed 23  Immunotherapy ongoing      HPI:   Ness Mejia is a 79 y.o. female who has a history as above, who returns today for a routine follow-up visit. The patient was last seen in clinic 2023. CT chest 23 showed unchanged post treatment findings perihilar R lung and no overt evidence of recurrent or new disease. Tolerating immunotherapy. Pain sometimes R chestwall that is intermittent. No new shortness of breath, cough, hemoptysis, or involuntary weight loss. Currently, the patient reports she's been doing well. Her energy level has been fairly good overall. She denies any fevers, chills, or drenching night sweats. Her weight and appetite have been stable. She denies any chest pain or shortness of breath. She has not noticed any new lumps or bumps anywhere throughout her body. She denies the new onset of bony pain.         RADIOLOGIC STUDIES:    CT Chest w/o contrast: Results for orders placed during the hospital encounter of 23    CT CHEST WO CONTRAST    Narrative  EXAMINATION:  CT OF THE CHEST WITHOUT CONTRAST 2023 8:01 am    TECHNIQUE:  CT of the chest was performed without the administration of intravenous  contrast. Multiplanar

## 2023-09-26 ENCOUNTER — HOSPITAL ENCOUNTER (OUTPATIENT)
Dept: INFUSION THERAPY | Age: 68
Discharge: HOME | End: 2023-09-26
Payer: MEDICARE

## 2023-09-26 DIAGNOSIS — C34.91 SQUAMOUS CELL LUNG CANCER, RIGHT (HCC): ICD-10-CM

## 2023-09-26 DIAGNOSIS — Z79.899 ENCOUNTER FOR LONG-TERM (CURRENT) USE OF HIGH-RISK MEDICATION: ICD-10-CM

## 2023-09-26 DIAGNOSIS — C34.11 MALIGNANT NEOPLASM OF UPPER LOBE, RIGHT BRONCHUS OR LUNG (HCC): ICD-10-CM

## 2023-09-26 LAB
ALBUMIN SERPL-MCNC: 4.6 GM/DL (ref 3.4–5)
ALP BLD-CCNC: 121 IU/L (ref 40–128)
ALT SERPL-CCNC: 10 U/L (ref 10–40)
ANION GAP SERPL CALCULATED.3IONS-SCNC: 13 MMOL/L (ref 4–16)
AST SERPL-CCNC: 14 IU/L (ref 15–37)
BASOPHILS ABSOLUTE: 0.1 K/CU MM
BASOPHILS RELATIVE PERCENT: 0.8 % (ref 0–1)
BILIRUB SERPL-MCNC: 0.3 MG/DL (ref 0–1)
BUN SERPL-MCNC: 18 MG/DL (ref 6–23)
CALCIUM SERPL-MCNC: 9.5 MG/DL (ref 8.3–10.6)
CHLORIDE BLD-SCNC: 101 MMOL/L (ref 99–110)
CO2: 25 MMOL/L (ref 21–32)
CREAT SERPL-MCNC: 1.1 MG/DL (ref 0.6–1.1)
DIFFERENTIAL TYPE: ABNORMAL
EOSINOPHILS ABSOLUTE: 0.3 K/CU MM
EOSINOPHILS RELATIVE PERCENT: 5.2 % (ref 0–3)
GFR SERPL CREATININE-BSD FRML MDRD: 55 ML/MIN/1.73M2
GLUCOSE SERPL-MCNC: 106 MG/DL (ref 70–99)
HCT VFR BLD CALC: 40.2 % (ref 37–47)
HEMOGLOBIN: 12.7 GM/DL (ref 12.5–16)
LYMPHOCYTES ABSOLUTE: 1 K/CU MM
LYMPHOCYTES RELATIVE PERCENT: 17.1 % (ref 24–44)
MCH RBC QN AUTO: 29.7 PG (ref 27–31)
MCHC RBC AUTO-ENTMCNC: 31.6 % (ref 32–36)
MCV RBC AUTO: 93.9 FL (ref 78–100)
MONOCYTES ABSOLUTE: 0.3 K/CU MM
MONOCYTES RELATIVE PERCENT: 5.3 % (ref 0–4)
PDW BLD-RTO: 14.1 % (ref 11.7–14.9)
PLATELET # BLD: 392 K/CU MM (ref 140–440)
PMV BLD AUTO: 8.8 FL (ref 7.5–11.1)
POTASSIUM SERPL-SCNC: 4.4 MMOL/L (ref 3.5–5.1)
RBC # BLD: 4.28 M/CU MM (ref 4.2–5.4)
SEGMENTED NEUTROPHILS ABSOLUTE COUNT: 4.3 K/CU MM
SEGMENTED NEUTROPHILS RELATIVE PERCENT: 71.6 % (ref 36–66)
SODIUM BLD-SCNC: 139 MMOL/L (ref 135–145)
TOTAL PROTEIN: 6.8 GM/DL (ref 6.4–8.2)
WBC # BLD: 6 K/CU MM (ref 4–10.5)

## 2023-09-26 PROCEDURE — 80053 COMPREHEN METABOLIC PANEL: CPT

## 2023-09-26 PROCEDURE — 36415 COLL VENOUS BLD VENIPUNCTURE: CPT

## 2023-09-26 PROCEDURE — 85025 COMPLETE CBC W/AUTO DIFF WBC: CPT

## 2023-09-28 ENCOUNTER — OFFICE VISIT (OUTPATIENT)
Dept: ONCOLOGY | Age: 68
End: 2023-09-28
Payer: MEDICARE

## 2023-09-28 ENCOUNTER — HOSPITAL ENCOUNTER (OUTPATIENT)
Dept: INFUSION THERAPY | Age: 68
Discharge: HOME | End: 2023-09-28
Payer: MEDICARE

## 2023-09-28 ENCOUNTER — CLINICAL DOCUMENTATION (OUTPATIENT)
Dept: RADIATION ONCOLOGY | Age: 68
End: 2023-09-28

## 2023-09-28 VITALS
SYSTOLIC BLOOD PRESSURE: 127 MMHG | WEIGHT: 172 LBS | DIASTOLIC BLOOD PRESSURE: 61 MMHG | OXYGEN SATURATION: 99 % | BODY MASS INDEX: 28.66 KG/M2 | HEART RATE: 67 BPM | TEMPERATURE: 97 F | HEIGHT: 65 IN

## 2023-09-28 VITALS
HEIGHT: 65 IN | SYSTOLIC BLOOD PRESSURE: 127 MMHG | TEMPERATURE: 97 F | WEIGHT: 172 LBS | RESPIRATION RATE: 16 BRPM | BODY MASS INDEX: 28.66 KG/M2 | HEART RATE: 67 BPM | DIASTOLIC BLOOD PRESSURE: 61 MMHG | OXYGEN SATURATION: 99 %

## 2023-09-28 DIAGNOSIS — Z79.899 ENCOUNTER FOR LONG-TERM (CURRENT) USE OF HIGH-RISK MEDICATION: Primary | ICD-10-CM

## 2023-09-28 DIAGNOSIS — C34.91 SQUAMOUS CELL LUNG CANCER, RIGHT (HCC): ICD-10-CM

## 2023-09-28 DIAGNOSIS — C34.11 MALIGNANT NEOPLASM OF UPPER LOBE, RIGHT BRONCHUS OR LUNG (HCC): ICD-10-CM

## 2023-09-28 PROCEDURE — 1123F ACP DISCUSS/DSCN MKR DOCD: CPT | Performed by: INTERNAL MEDICINE

## 2023-09-28 PROCEDURE — 6360000002 HC RX W HCPCS: Performed by: INTERNAL MEDICINE

## 2023-09-28 PROCEDURE — 2580000003 HC RX 258: Performed by: INTERNAL MEDICINE

## 2023-09-28 PROCEDURE — 99213 OFFICE O/P EST LOW 20 MIN: CPT | Performed by: INTERNAL MEDICINE

## 2023-09-28 PROCEDURE — 3078F DIAST BP <80 MM HG: CPT | Performed by: INTERNAL MEDICINE

## 2023-09-28 PROCEDURE — 3074F SYST BP LT 130 MM HG: CPT | Performed by: INTERNAL MEDICINE

## 2023-09-28 PROCEDURE — 96413 CHEMO IV INFUSION 1 HR: CPT

## 2023-09-28 RX ORDER — SODIUM CHLORIDE 9 MG/ML
5-250 INJECTION, SOLUTION INTRAVENOUS PRN
OUTPATIENT
Start: 2023-10-19

## 2023-09-28 RX ORDER — ONDANSETRON 2 MG/ML
8 INJECTION INTRAMUSCULAR; INTRAVENOUS
OUTPATIENT
Start: 2023-10-19

## 2023-09-28 RX ORDER — EPINEPHRINE 1 MG/ML
0.3 INJECTION, SOLUTION, CONCENTRATE INTRAVENOUS PRN
OUTPATIENT
Start: 2023-10-19

## 2023-09-28 RX ORDER — HEPARIN SODIUM (PORCINE) LOCK FLUSH IV SOLN 100 UNIT/ML 100 UNIT/ML
500 SOLUTION INTRAVENOUS PRN
OUTPATIENT
Start: 2023-10-19

## 2023-09-28 RX ORDER — ACETAMINOPHEN 325 MG/1
650 TABLET ORAL
OUTPATIENT
Start: 2023-11-09

## 2023-09-28 RX ORDER — ALBUTEROL SULFATE 90 UG/1
4 AEROSOL, METERED RESPIRATORY (INHALATION) PRN
OUTPATIENT
Start: 2023-10-19

## 2023-09-28 RX ORDER — HEPARIN 100 UNIT/ML
500 SYRINGE INTRAVENOUS PRN
Status: DISCONTINUED | OUTPATIENT
Start: 2023-09-28 | End: 2023-09-29 | Stop reason: HOSPADM

## 2023-09-28 RX ORDER — SODIUM CHLORIDE 0.9 % (FLUSH) 0.9 %
5-40 SYRINGE (ML) INJECTION PRN
OUTPATIENT
Start: 2023-11-09

## 2023-09-28 RX ORDER — SODIUM CHLORIDE 9 MG/ML
INJECTION, SOLUTION INTRAVENOUS CONTINUOUS
OUTPATIENT
Start: 2023-11-09

## 2023-09-28 RX ORDER — DIPHENHYDRAMINE HYDROCHLORIDE 50 MG/ML
50 INJECTION INTRAMUSCULAR; INTRAVENOUS
OUTPATIENT
Start: 2023-11-09

## 2023-09-28 RX ORDER — SODIUM CHLORIDE 0.9 % (FLUSH) 0.9 %
5-40 SYRINGE (ML) INJECTION PRN
Status: DISCONTINUED | OUTPATIENT
Start: 2023-09-28 | End: 2023-09-29 | Stop reason: HOSPADM

## 2023-09-28 RX ORDER — ALBUTEROL SULFATE 90 UG/1
4 AEROSOL, METERED RESPIRATORY (INHALATION) PRN
OUTPATIENT
Start: 2023-11-09

## 2023-09-28 RX ORDER — FAMOTIDINE 10 MG/ML
20 INJECTION, SOLUTION INTRAVENOUS
OUTPATIENT
Start: 2023-10-19

## 2023-09-28 RX ORDER — SODIUM CHLORIDE 9 MG/ML
INJECTION, SOLUTION INTRAVENOUS CONTINUOUS
OUTPATIENT
Start: 2023-10-19

## 2023-09-28 RX ORDER — ONDANSETRON 2 MG/ML
8 INJECTION INTRAMUSCULAR; INTRAVENOUS
OUTPATIENT
Start: 2023-11-09

## 2023-09-28 RX ORDER — SODIUM CHLORIDE 9 MG/ML
5-250 INJECTION, SOLUTION INTRAVENOUS PRN
OUTPATIENT
Start: 2023-11-09

## 2023-09-28 RX ORDER — MEPERIDINE HYDROCHLORIDE 50 MG/ML
12.5 INJECTION INTRAMUSCULAR; INTRAVENOUS; SUBCUTANEOUS PRN
OUTPATIENT
Start: 2023-11-09

## 2023-09-28 RX ORDER — DIPHENHYDRAMINE HYDROCHLORIDE 50 MG/ML
50 INJECTION INTRAMUSCULAR; INTRAVENOUS
OUTPATIENT
Start: 2023-10-19

## 2023-09-28 RX ORDER — HEPARIN SODIUM (PORCINE) LOCK FLUSH IV SOLN 100 UNIT/ML 100 UNIT/ML
500 SOLUTION INTRAVENOUS PRN
OUTPATIENT
Start: 2023-11-09

## 2023-09-28 RX ORDER — SODIUM CHLORIDE 0.9 % (FLUSH) 0.9 %
5-40 SYRINGE (ML) INJECTION PRN
OUTPATIENT
Start: 2023-10-19

## 2023-09-28 RX ORDER — MEPERIDINE HYDROCHLORIDE 50 MG/ML
12.5 INJECTION INTRAMUSCULAR; INTRAVENOUS; SUBCUTANEOUS PRN
OUTPATIENT
Start: 2023-10-19

## 2023-09-28 RX ORDER — EPINEPHRINE 1 MG/ML
0.3 INJECTION, SOLUTION, CONCENTRATE INTRAVENOUS PRN
OUTPATIENT
Start: 2023-11-09

## 2023-09-28 RX ORDER — ACETAMINOPHEN 325 MG/1
650 TABLET ORAL
OUTPATIENT
Start: 2023-10-19

## 2023-09-28 RX ORDER — SIMVASTATIN 20 MG
TABLET ORAL
COMMUNITY
Start: 2023-08-21

## 2023-09-28 RX ORDER — SODIUM CHLORIDE 9 MG/ML
5-250 INJECTION, SOLUTION INTRAVENOUS PRN
Status: DISCONTINUED | OUTPATIENT
Start: 2023-09-28 | End: 2023-09-29 | Stop reason: HOSPADM

## 2023-09-28 RX ORDER — FAMOTIDINE 10 MG/ML
20 INJECTION, SOLUTION INTRAVENOUS
OUTPATIENT
Start: 2023-11-09

## 2023-09-28 RX ADMIN — HEPARIN 500 UNITS: 100 SYRINGE at 10:29

## 2023-09-28 RX ADMIN — SODIUM CHLORIDE 200 MG: 9 INJECTION, SOLUTION INTRAVENOUS at 09:48

## 2023-09-28 RX ADMIN — SODIUM CHLORIDE, PRESERVATIVE FREE 20 ML: 5 INJECTION INTRAVENOUS at 10:29

## 2023-09-28 ASSESSMENT — PATIENT HEALTH QUESTIONNAIRE - PHQ9
SUM OF ALL RESPONSES TO PHQ QUESTIONS 1-9: 0
1. LITTLE INTEREST OR PLEASURE IN DOING THINGS: 0
SUM OF ALL RESPONSES TO PHQ QUESTIONS 1-9: 0
SUM OF ALL RESPONSES TO PHQ9 QUESTIONS 1 & 2: 0
SUM OF ALL RESPONSES TO PHQ QUESTIONS 1-9: 0
2. FEELING DOWN, DEPRESSED OR HOPELESS: 0
SUM OF ALL RESPONSES TO PHQ QUESTIONS 1-9: 0

## 2023-09-28 NOTE — PROGRESS NOTES
Ambulated to infusion area. Here for treatment and office visit. Reports feeling really tired but staying active. Lab results verified and reviewed with patient. Mediport accessed, positive blood return noted. Keytruda administered as ordered. Call light within reach. Tolerated infusion without incident. Patient reports having follow up appts.

## 2023-09-28 NOTE — PROGRESS NOTES
Patient provided an Authorization number upon checking in for her appt today, for cont of care for [employer] to stay with [facility] for the next 90 days. [id number]. Patient states that Swapna stated that they will send us this information as well.

## 2023-09-28 NOTE — PROGRESS NOTES
MA Rooming Questions  Patient: Dylan Perkins  MRN: [id number]    Date: 9/28/2023        1. Do you have any new issues? yes - tired and bruising easy          2. Do you need any refills on medications?    no    3. Have you had any imaging done since your last visit?   no    4. Have you been hospitalized or seen in the emergency room since your last visit here?   no    5. Did the patient have a depression screening completed today?  Yes    PHQ-9 Total Score: 0 (9/28/2023  9:51 AM)       PHQ-9 Given to (if applicable):               PHQ-9 Score (if applicable):                     [] Positive     []  Negative              Does question #9 need addressed (if applicable)                     [] Yes    []  No               Maame Foster CMA
due for Keytruda. Denied grade III AE. She is agreeable to FU CT chest in 11/2023 at [facility]. 2. She had a pericatheter thrombus and was started on Xarelto. She had mediport removal in June 2019.    3.   Mammogram in October 2017 was benign. Colonoscopy was a while ago. FOB in March 2019 was negative. She has mammogram in November 2021. I recommend to keep mammogram and colon cancer screening up to date. 4.   She quit smoking in 2014. I recommend to follow up with pulmonologist.     4.   Depression. I referred to psychiatrist.    5. 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 She is on gabapentin. Worsening neuropathy to fingers/feet. Dose reduced by 10% today. Did discuss increasing gabapentin which she declined. 6.  Intermittent mild anemia. Anemic work-up in May 2022 was reviewed. Anemia has been corrected. 7.  She has chronic cough which could be related to postnasal drip. She was seen by ENT who recommended to continue with Flonase. She was offered cauterization of the nostril. Return to clinic in 6 weeks or sooner. All of her questions have been answered for today. Recent imaging and labs were reviewed and discussed with the patient.

## 2023-10-17 ENCOUNTER — HOSPITAL ENCOUNTER (OUTPATIENT)
Dept: INFUSION THERAPY | Age: 68
Discharge: HOME | End: 2023-10-17
Payer: MEDICARE

## 2023-10-17 DIAGNOSIS — C34.11 MALIGNANT NEOPLASM OF UPPER LOBE, RIGHT BRONCHUS OR LUNG (HCC): ICD-10-CM

## 2023-10-17 DIAGNOSIS — Z79.899 ENCOUNTER FOR LONG-TERM (CURRENT) USE OF HIGH-RISK MEDICATION: ICD-10-CM

## 2023-10-17 DIAGNOSIS — C34.91 SQUAMOUS CELL LUNG CANCER, RIGHT (HCC): ICD-10-CM

## 2023-10-17 LAB
ALBUMIN SERPL-MCNC: 4.5 GM/DL (ref 3.4–5)
ALP BLD-CCNC: 117 IU/L (ref 40–128)
ALT SERPL-CCNC: 8 U/L (ref 10–40)
ANION GAP SERPL CALCULATED.3IONS-SCNC: 14 MMOL/L (ref 4–16)
AST SERPL-CCNC: 14 IU/L (ref 15–37)
BASOPHILS ABSOLUTE: 0.1 K/CU MM
BASOPHILS RELATIVE PERCENT: 0.7 % (ref 0–1)
BILIRUB SERPL-MCNC: 0.3 MG/DL (ref 0–1)
BUN SERPL-MCNC: 20 MG/DL (ref 6–23)
CALCIUM SERPL-MCNC: 9.5 MG/DL (ref 8.3–10.6)
CHLORIDE BLD-SCNC: 100 MMOL/L (ref 99–110)
CO2: 25 MMOL/L (ref 21–32)
CREAT SERPL-MCNC: 1.2 MG/DL (ref 0.6–1.1)
DIFFERENTIAL TYPE: ABNORMAL
EOSINOPHILS ABSOLUTE: 0.3 K/CU MM
EOSINOPHILS RELATIVE PERCENT: 3.6 % (ref 0–3)
GFR SERPL CREATININE-BSD FRML MDRD: 50 ML/MIN/1.73M2
GLUCOSE SERPL-MCNC: 87 MG/DL (ref 70–99)
HCT VFR BLD CALC: 40.7 % (ref 37–47)
HEMOGLOBIN: 12.7 GM/DL (ref 12.5–16)
LYMPHOCYTES ABSOLUTE: 1.3 K/CU MM
LYMPHOCYTES RELATIVE PERCENT: 15.5 % (ref 24–44)
MCH RBC QN AUTO: 29.7 PG (ref 27–31)
MCHC RBC AUTO-ENTMCNC: 31.2 % (ref 32–36)
MCV RBC AUTO: 95.1 FL (ref 78–100)
MONOCYTES ABSOLUTE: 0.6 K/CU MM
MONOCYTES RELATIVE PERCENT: 7 % (ref 0–4)
PDW BLD-RTO: 14.5 % (ref 11.7–14.9)
PLATELET # BLD: 390 K/CU MM (ref 140–440)
PMV BLD AUTO: 8.7 FL (ref 7.5–11.1)
POTASSIUM SERPL-SCNC: 4.4 MMOL/L (ref 3.5–5.1)
RBC # BLD: 4.28 M/CU MM (ref 4.2–5.4)
SEGMENTED NEUTROPHILS ABSOLUTE COUNT: 6.1 K/CU MM
SEGMENTED NEUTROPHILS RELATIVE PERCENT: 73.2 % (ref 36–66)
SODIUM BLD-SCNC: 139 MMOL/L (ref 135–145)
TOTAL PROTEIN: 6.6 GM/DL (ref 6.4–8.2)
TSH SERPL DL<=0.005 MIU/L-ACNC: 1.68 UIU/ML (ref 0.27–4.2)
WBC # BLD: 8.3 K/CU MM (ref 4–10.5)

## 2023-10-17 PROCEDURE — 85025 COMPLETE CBC W/AUTO DIFF WBC: CPT

## 2023-10-17 PROCEDURE — 36415 COLL VENOUS BLD VENIPUNCTURE: CPT

## 2023-10-17 PROCEDURE — 80053 COMPREHEN METABOLIC PANEL: CPT

## 2023-10-17 PROCEDURE — 84443 ASSAY THYROID STIM HORMONE: CPT

## 2023-10-20 ENCOUNTER — HOSPITAL ENCOUNTER (OUTPATIENT)
Dept: INFUSION THERAPY | Age: 68
Discharge: HOME | End: 2023-10-20
Payer: MEDICARE

## 2023-10-20 VITALS
SYSTOLIC BLOOD PRESSURE: 127 MMHG | DIASTOLIC BLOOD PRESSURE: 64 MMHG | OXYGEN SATURATION: 96 % | WEIGHT: 176.4 LBS | HEIGHT: 65 IN | HEART RATE: 68 BPM | TEMPERATURE: 97.5 F | BODY MASS INDEX: 29.39 KG/M2

## 2023-10-20 DIAGNOSIS — Z79.899 ENCOUNTER FOR LONG-TERM (CURRENT) USE OF HIGH-RISK MEDICATION: Primary | ICD-10-CM

## 2023-10-20 DIAGNOSIS — C34.91 SQUAMOUS CELL LUNG CANCER, RIGHT (HCC): ICD-10-CM

## 2023-10-20 DIAGNOSIS — C34.11 MALIGNANT NEOPLASM OF UPPER LOBE, RIGHT BRONCHUS OR LUNG (HCC): ICD-10-CM

## 2023-10-20 PROCEDURE — 96413 CHEMO IV INFUSION 1 HR: CPT

## 2023-10-20 PROCEDURE — 2580000003 HC RX 258: Performed by: INTERNAL MEDICINE

## 2023-10-20 PROCEDURE — 6360000002 HC RX W HCPCS: Performed by: INTERNAL MEDICINE

## 2023-10-20 RX ORDER — HEPARIN 100 UNIT/ML
500 SYRINGE INTRAVENOUS PRN
Status: DISCONTINUED | OUTPATIENT
Start: 2023-10-20 | End: 2023-10-21 | Stop reason: HOSPADM

## 2023-10-20 RX ORDER — SODIUM CHLORIDE 9 MG/ML
5-250 INJECTION, SOLUTION INTRAVENOUS PRN
Status: DISCONTINUED | OUTPATIENT
Start: 2023-10-20 | End: 2023-10-21 | Stop reason: HOSPADM

## 2023-10-20 RX ORDER — SODIUM CHLORIDE 0.9 % (FLUSH) 0.9 %
5-40 SYRINGE (ML) INJECTION PRN
Status: DISCONTINUED | OUTPATIENT
Start: 2023-10-20 | End: 2023-10-21 | Stop reason: HOSPADM

## 2023-10-20 RX ADMIN — SODIUM CHLORIDE 200 MG: 9 INJECTION, SOLUTION INTRAVENOUS at 15:19

## 2023-10-20 RX ADMIN — HEPARIN 500 UNITS: 100 SYRINGE at 15:59

## 2023-10-20 RX ADMIN — SODIUM CHLORIDE 20 ML/HR: 9 INJECTION, SOLUTION INTRAVENOUS at 15:19

## 2023-10-20 RX ADMIN — SODIUM CHLORIDE, PRESERVATIVE FREE 10 ML: 5 INJECTION INTRAVENOUS at 15:59

## 2023-10-20 NOTE — PROGRESS NOTES
Pt here for tx. Port accessed with blood return noted. CBC CMP reviewed from 10/17 and within defined limits. Pt's creatinine 1.2, which is at her baseline of 1.1-1.2. pt as no concerns or issues to discuss with physician at this time. Pt instructed to increase water intake at home as pt states she is not drinking water consistently. Gloria TAPIA made aware of pt's creatinine and I instructed pt to increase water intake. Patient's status assessed and documented appropriately. All labs and required results were also reviewed today. Treatment parameters have been reviewed. Today's treatment has been approved by the provider. Treatment orders and medication sequencing (when applicable) was verified by 2 registered nurses. The treatment plan was confirmed with the patient prior to administration, and the patient understands the need to report any treatment-related symptoms. Prior to administration, when applicable, the following 8 elements of medication administration were reviewed with 2nd Registered Nurse prior to dosing: drug name, drug dose, infusion volume when prepared in a syringe, rate of administration, expiration dates and/or times, appearance and integrity of drug(s), and rate of pump for infusion. The 5 rights of medication administration have been verified.        Pt tolerated tx without incident left ambulatory discharge instructions given

## 2023-10-26 ENCOUNTER — TELEPHONE (OUTPATIENT)
Dept: ONCOLOGY | Age: 68
End: 2023-10-26

## 2023-10-26 NOTE — TELEPHONE ENCOUNTER
10/26/23 faxed office notes and CT chest order to [facility] for scheduling. Dr Kortney Chance would like a comparison study done w/ The CT chest done at [address] [place] [address] in 8/23. Shelly at [address] [place] [address] radiology pushing results to [facility].  Successful fax

## 2023-11-07 ENCOUNTER — HOSPITAL ENCOUNTER (OUTPATIENT)
Dept: INFUSION THERAPY | Age: 68
Discharge: HOME | End: 2023-11-07
Payer: MEDICARE

## 2023-11-07 DIAGNOSIS — Z79.899 ENCOUNTER FOR LONG-TERM (CURRENT) USE OF HIGH-RISK MEDICATION: ICD-10-CM

## 2023-11-07 DIAGNOSIS — C34.11 MALIGNANT NEOPLASM OF UPPER LOBE, RIGHT BRONCHUS OR LUNG (HCC): ICD-10-CM

## 2023-11-07 DIAGNOSIS — C34.91 SQUAMOUS CELL LUNG CANCER, RIGHT (HCC): ICD-10-CM

## 2023-11-07 LAB
ALBUMIN SERPL-MCNC: 4.5 GM/DL (ref 3.4–5)
ALP BLD-CCNC: 120 IU/L (ref 40–129)
ALT SERPL-CCNC: 9 U/L (ref 10–40)
ANION GAP SERPL CALCULATED.3IONS-SCNC: 11 MMOL/L (ref 4–16)
AST SERPL-CCNC: 14 IU/L (ref 15–37)
BASOPHILS ABSOLUTE: 0.1 K/CU MM
BASOPHILS RELATIVE PERCENT: 1 % (ref 0–1)
BILIRUB SERPL-MCNC: 0.3 MG/DL (ref 0–1)
BUN SERPL-MCNC: 22 MG/DL (ref 6–23)
CALCIUM SERPL-MCNC: 9.8 MG/DL (ref 8.3–10.6)
CHLORIDE BLD-SCNC: 101 MMOL/L (ref 99–110)
CO2: 25 MMOL/L (ref 21–32)
CREAT SERPL-MCNC: 1.2 MG/DL (ref 0.6–1.1)
DIFFERENTIAL TYPE: ABNORMAL
EOSINOPHILS ABSOLUTE: 0.2 K/CU MM
EOSINOPHILS RELATIVE PERCENT: 3.8 % (ref 0–3)
GFR SERPL CREATININE-BSD FRML MDRD: 50 ML/MIN/1.73M2
GLUCOSE SERPL-MCNC: 104 MG/DL (ref 70–99)
HCT VFR BLD CALC: 41.3 % (ref 37–47)
HEMOGLOBIN: 13 GM/DL (ref 12.5–16)
LYMPHOCYTES ABSOLUTE: 1.2 K/CU MM
LYMPHOCYTES RELATIVE PERCENT: 18.4 % (ref 24–44)
MCH RBC QN AUTO: 29.7 PG (ref 27–31)
MCHC RBC AUTO-ENTMCNC: 31.5 % (ref 32–36)
MCV RBC AUTO: 94.3 FL (ref 78–100)
MONOCYTES ABSOLUTE: 0.5 K/CU MM
MONOCYTES RELATIVE PERCENT: 7.3 % (ref 0–4)
PDW BLD-RTO: 14.7 % (ref 11.7–14.9)
PLATELET # BLD: 379 K/CU MM (ref 140–440)
PMV BLD AUTO: 8.6 FL (ref 7.5–11.1)
POTASSIUM SERPL-SCNC: 4.1 MMOL/L (ref 3.5–5.1)
RBC # BLD: 4.38 M/CU MM (ref 4.2–5.4)
SEGMENTED NEUTROPHILS ABSOLUTE COUNT: 4.4 K/CU MM
SEGMENTED NEUTROPHILS RELATIVE PERCENT: 69.5 % (ref 36–66)
SODIUM BLD-SCNC: 137 MMOL/L (ref 135–145)
TOTAL PROTEIN: 7.2 GM/DL (ref 6.4–8.2)
WBC # BLD: 6.3 K/CU MM (ref 4–10.5)

## 2023-11-07 PROCEDURE — 80053 COMPREHEN METABOLIC PANEL: CPT

## 2023-11-07 PROCEDURE — 36415 COLL VENOUS BLD VENIPUNCTURE: CPT

## 2023-11-07 PROCEDURE — 85025 COMPLETE CBC W/AUTO DIFF WBC: CPT

## 2023-11-08 RX ORDER — DIPHENHYDRAMINE HYDROCHLORIDE 50 MG/ML
50 INJECTION INTRAMUSCULAR; INTRAVENOUS
OUTPATIENT
Start: 2024-01-11

## 2023-11-08 RX ORDER — FAMOTIDINE 10 MG/ML
20 INJECTION, SOLUTION INTRAVENOUS
OUTPATIENT
Start: 2024-01-11

## 2023-11-08 RX ORDER — DIPHENHYDRAMINE HYDROCHLORIDE 50 MG/ML
50 INJECTION INTRAMUSCULAR; INTRAVENOUS
OUTPATIENT
Start: 2023-11-30

## 2023-11-08 RX ORDER — SODIUM CHLORIDE 9 MG/ML
INJECTION, SOLUTION INTRAVENOUS CONTINUOUS
OUTPATIENT
Start: 2023-12-21

## 2023-11-08 RX ORDER — DIPHENHYDRAMINE HYDROCHLORIDE 50 MG/ML
50 INJECTION INTRAMUSCULAR; INTRAVENOUS
OUTPATIENT
Start: 2023-12-21

## 2023-11-08 RX ORDER — ACETAMINOPHEN 325 MG/1
650 TABLET ORAL
OUTPATIENT
Start: 2023-11-30

## 2023-11-08 RX ORDER — FAMOTIDINE 10 MG/ML
20 INJECTION, SOLUTION INTRAVENOUS
OUTPATIENT
Start: 2023-12-21

## 2023-11-08 RX ORDER — SODIUM CHLORIDE 0.9 % (FLUSH) 0.9 %
5-40 SYRINGE (ML) INJECTION PRN
OUTPATIENT
Start: 2023-11-30

## 2023-11-08 RX ORDER — ALBUTEROL SULFATE 90 UG/1
4 AEROSOL, METERED RESPIRATORY (INHALATION) PRN
OUTPATIENT
Start: 2023-12-21

## 2023-11-08 RX ORDER — EPINEPHRINE 1 MG/ML
0.3 INJECTION, SOLUTION, CONCENTRATE INTRAVENOUS PRN
OUTPATIENT
Start: 2023-12-21

## 2023-11-08 RX ORDER — ONDANSETRON 2 MG/ML
8 INJECTION INTRAMUSCULAR; INTRAVENOUS
OUTPATIENT
Start: 2023-11-30

## 2023-11-08 RX ORDER — SODIUM CHLORIDE 9 MG/ML
5-250 INJECTION, SOLUTION INTRAVENOUS PRN
OUTPATIENT
Start: 2023-12-21

## 2023-11-08 RX ORDER — HEPARIN SODIUM (PORCINE) LOCK FLUSH IV SOLN 100 UNIT/ML 100 UNIT/ML
500 SOLUTION INTRAVENOUS PRN
OUTPATIENT
Start: 2023-12-21

## 2023-11-08 RX ORDER — ALBUTEROL SULFATE 90 UG/1
4 AEROSOL, METERED RESPIRATORY (INHALATION) PRN
OUTPATIENT
Start: 2024-01-11

## 2023-11-08 RX ORDER — ALBUTEROL SULFATE 90 UG/1
4 AEROSOL, METERED RESPIRATORY (INHALATION) PRN
OUTPATIENT
Start: 2023-11-30

## 2023-11-08 RX ORDER — ACETAMINOPHEN 325 MG/1
650 TABLET ORAL
OUTPATIENT
Start: 2023-12-21

## 2023-11-08 RX ORDER — SODIUM CHLORIDE 9 MG/ML
INJECTION, SOLUTION INTRAVENOUS CONTINUOUS
OUTPATIENT
Start: 2024-01-11

## 2023-11-08 RX ORDER — SODIUM CHLORIDE 9 MG/ML
INJECTION, SOLUTION INTRAVENOUS CONTINUOUS
OUTPATIENT
Start: 2023-11-30

## 2023-11-08 RX ORDER — SODIUM CHLORIDE 9 MG/ML
5-250 INJECTION, SOLUTION INTRAVENOUS PRN
OUTPATIENT
Start: 2023-11-30

## 2023-11-08 RX ORDER — FAMOTIDINE 10 MG/ML
20 INJECTION, SOLUTION INTRAVENOUS
OUTPATIENT
Start: 2023-11-30

## 2023-11-08 RX ORDER — ONDANSETRON 2 MG/ML
8 INJECTION INTRAMUSCULAR; INTRAVENOUS
OUTPATIENT
Start: 2023-12-21

## 2023-11-08 RX ORDER — HEPARIN SODIUM (PORCINE) LOCK FLUSH IV SOLN 100 UNIT/ML 100 UNIT/ML
500 SOLUTION INTRAVENOUS PRN
OUTPATIENT
Start: 2024-01-11

## 2023-11-08 RX ORDER — MEPERIDINE HYDROCHLORIDE 50 MG/ML
12.5 INJECTION INTRAMUSCULAR; INTRAVENOUS; SUBCUTANEOUS PRN
OUTPATIENT
Start: 2024-01-11

## 2023-11-08 RX ORDER — ONDANSETRON 2 MG/ML
8 INJECTION INTRAMUSCULAR; INTRAVENOUS
OUTPATIENT
Start: 2024-01-11

## 2023-11-08 RX ORDER — HEPARIN SODIUM (PORCINE) LOCK FLUSH IV SOLN 100 UNIT/ML 100 UNIT/ML
500 SOLUTION INTRAVENOUS PRN
OUTPATIENT
Start: 2023-11-30

## 2023-11-08 RX ORDER — EPINEPHRINE 1 MG/ML
0.3 INJECTION, SOLUTION, CONCENTRATE INTRAVENOUS PRN
OUTPATIENT
Start: 2023-11-30

## 2023-11-08 RX ORDER — EPINEPHRINE 1 MG/ML
0.3 INJECTION, SOLUTION, CONCENTRATE INTRAVENOUS PRN
OUTPATIENT
Start: 2024-01-11

## 2023-11-08 RX ORDER — SODIUM CHLORIDE 0.9 % (FLUSH) 0.9 %
5-40 SYRINGE (ML) INJECTION PRN
OUTPATIENT
Start: 2023-12-21

## 2023-11-08 RX ORDER — SODIUM CHLORIDE 0.9 % (FLUSH) 0.9 %
5-40 SYRINGE (ML) INJECTION PRN
OUTPATIENT
Start: 2024-01-11

## 2023-11-08 RX ORDER — MEPERIDINE HYDROCHLORIDE 50 MG/ML
12.5 INJECTION INTRAMUSCULAR; INTRAVENOUS; SUBCUTANEOUS PRN
OUTPATIENT
Start: 2023-12-21

## 2023-11-08 RX ORDER — SODIUM CHLORIDE 9 MG/ML
5-250 INJECTION, SOLUTION INTRAVENOUS PRN
OUTPATIENT
Start: 2024-01-11

## 2023-11-08 RX ORDER — MEPERIDINE HYDROCHLORIDE 50 MG/ML
12.5 INJECTION INTRAMUSCULAR; INTRAVENOUS; SUBCUTANEOUS PRN
OUTPATIENT
Start: 2023-11-30

## 2023-11-08 RX ORDER — ACETAMINOPHEN 325 MG/1
650 TABLET ORAL
OUTPATIENT
Start: 2024-01-11

## 2023-11-09 ENCOUNTER — OFFICE VISIT (OUTPATIENT)
Dept: ONCOLOGY | Age: 68
End: 2023-11-09
Payer: MEDICARE

## 2023-11-09 ENCOUNTER — HOSPITAL ENCOUNTER (OUTPATIENT)
Dept: INFUSION THERAPY | Age: 68
Discharge: HOME | End: 2023-11-09
Payer: MEDICARE

## 2023-11-09 VITALS
BODY MASS INDEX: 29.06 KG/M2 | TEMPERATURE: 96.8 F | DIASTOLIC BLOOD PRESSURE: 61 MMHG | HEIGHT: 65 IN | WEIGHT: 174.4 LBS | OXYGEN SATURATION: 100 % | SYSTOLIC BLOOD PRESSURE: 117 MMHG | HEART RATE: 67 BPM

## 2023-11-09 VITALS
WEIGHT: 174.4 LBS | HEART RATE: 67 BPM | OXYGEN SATURATION: 100 % | SYSTOLIC BLOOD PRESSURE: 117 MMHG | TEMPERATURE: 96.8 F | DIASTOLIC BLOOD PRESSURE: 61 MMHG | BODY MASS INDEX: 29.02 KG/M2

## 2023-11-09 DIAGNOSIS — Z79.899 ENCOUNTER FOR LONG-TERM (CURRENT) USE OF HIGH-RISK MEDICATION: Primary | ICD-10-CM

## 2023-11-09 DIAGNOSIS — C34.91 SQUAMOUS CELL LUNG CANCER, RIGHT (HCC): ICD-10-CM

## 2023-11-09 DIAGNOSIS — C34.11 MALIGNANT NEOPLASM OF UPPER LOBE, RIGHT BRONCHUS OR LUNG (HCC): ICD-10-CM

## 2023-11-09 PROCEDURE — 3074F SYST BP LT 130 MM HG: CPT | Performed by: INTERNAL MEDICINE

## 2023-11-09 PROCEDURE — 96417 CHEMO IV INFUS EACH ADDL SEQ: CPT

## 2023-11-09 PROCEDURE — 2580000003 HC RX 258: Performed by: INTERNAL MEDICINE

## 2023-11-09 PROCEDURE — 3078F DIAST BP <80 MM HG: CPT | Performed by: INTERNAL MEDICINE

## 2023-11-09 PROCEDURE — 6360000002 HC RX W HCPCS: Performed by: INTERNAL MEDICINE

## 2023-11-09 PROCEDURE — 96413 CHEMO IV INFUSION 1 HR: CPT

## 2023-11-09 PROCEDURE — 99213 OFFICE O/P EST LOW 20 MIN: CPT | Performed by: INTERNAL MEDICINE

## 2023-11-09 PROCEDURE — 1123F ACP DISCUSS/DSCN MKR DOCD: CPT | Performed by: INTERNAL MEDICINE

## 2023-11-09 RX ORDER — SODIUM CHLORIDE 9 MG/ML
5-250 INJECTION, SOLUTION INTRAVENOUS PRN
Status: DISCONTINUED | OUTPATIENT
Start: 2023-11-09 | End: 2023-11-10 | Stop reason: HOSPADM

## 2023-11-09 RX ORDER — HEPARIN 100 UNIT/ML
500 SYRINGE INTRAVENOUS PRN
Status: DISCONTINUED | OUTPATIENT
Start: 2023-11-09 | End: 2023-11-10 | Stop reason: HOSPADM

## 2023-11-09 RX ORDER — SODIUM CHLORIDE 0.9 % (FLUSH) 0.9 %
5-40 SYRINGE (ML) INJECTION PRN
Status: DISCONTINUED | OUTPATIENT
Start: 2023-11-09 | End: 2023-11-10 | Stop reason: HOSPADM

## 2023-11-09 RX ADMIN — HEPARIN 500 UNITS: 100 SYRINGE at 10:22

## 2023-11-09 RX ADMIN — SODIUM CHLORIDE 200 MG: 9 INJECTION, SOLUTION INTRAVENOUS at 09:41

## 2023-11-09 NOTE — PROGRESS NOTES
Pt here for tx. Port accessed with blood return noted. CBC CMP reviewed from 11/7 and within defined limits. Pt's creatinine remains at 1.2, which is at her baseline of 1.1-1.2. Pt states she is aware she should drink more water but has a hard time doing so. Recommend the use of flavor drops to encourage more intake. Pt as no concerns or issues to discuss with physician at this time. Patient's status assessed and documented appropriately. All labs and required results were also reviewed today. Treatment parameters have been reviewed. Today's treatment has been approved by the provider. Treatment orders and medication sequencing (when applicable) was verified by 2 registered nurses. The treatment plan was confirmed with the patient prior to administration, and the patient understands the need to report any treatment-related symptoms. Prior to administration, when applicable, the following 8 elements of medication administration were reviewed with 2nd Registered Nurse prior to dosing: drug name, drug dose, infusion volume when prepared in a syringe, rate of administration, expiration dates and/or times, appearance and integrity of drug(s), and rate of pump for infusion. The 5 rights of medication administration have been verified.        Pt tolerated tx without incident left ambulatory discharge instructions given

## 2023-11-14 ENCOUNTER — TELEPHONE (OUTPATIENT)
Dept: ONCOLOGY | Age: 68
End: 2023-11-14

## 2023-11-14 NOTE — TELEPHONE ENCOUNTER
Pt would like a call to clarify regarding CT completed at [facility]. Pt states that Dr. Mendel Beckman was going to speak with Dr. Diana Spencer regarding the results and contact her.

## 2023-11-15 ENCOUNTER — TELEPHONE (OUTPATIENT)
Dept: ONCOLOGY | Age: 68
End: 2023-11-15

## 2023-11-15 DIAGNOSIS — C34.91 SQUAMOUS CELL LUNG CANCER, RIGHT (HCC): Primary | ICD-10-CM

## 2023-11-15 NOTE — TELEPHONE ENCOUNTER
Dr. Diana Spencer and Dr. Mendel Beckman reviewed images. Decision made to order PET/CT scan to evaluate. Patient called and informed of new orders for PET/CT. Patient with some questions, Dr. Diana Spencer on call to answer questions. Patient verbalizes understanding and has no further questions. Patient given Central Scheduling phone number to schedule scan.

## 2023-11-15 NOTE — TELEPHONE ENCOUNTER
This Rn called and had CT chest pushed to our system for radiologist to compare as per Dr. Donovan Gomez request. Patient called and updated on POC and is satisfied with POC, patient has no further questions at this time. Will await radiologist comparison. Patients next appointment noted to be 11/30/2023, patient aware of appointment.

## 2023-11-26 ENCOUNTER — APPOINTMENT (OUTPATIENT)
Dept: CT IMAGING | Age: 68
DRG: 641 | End: 2023-11-26
Payer: MEDICARE

## 2023-11-26 ENCOUNTER — APPOINTMENT (OUTPATIENT)
Dept: GENERAL RADIOLOGY | Age: 68
DRG: 641 | End: 2023-11-26
Payer: MEDICARE

## 2023-11-26 ENCOUNTER — HOSPITAL ENCOUNTER (INPATIENT)
Age: 68
LOS: 2 days | Discharge: HOME | DRG: 641 | End: 2023-11-28
Attending: STUDENT IN AN ORGANIZED HEALTH CARE EDUCATION/TRAINING PROGRAM | Admitting: STUDENT IN AN ORGANIZED HEALTH CARE EDUCATION/TRAINING PROGRAM
Payer: MEDICARE

## 2023-11-26 DIAGNOSIS — R42 LIGHTHEADEDNESS: ICD-10-CM

## 2023-11-26 DIAGNOSIS — R05.1 ACUTE COUGH: ICD-10-CM

## 2023-11-26 DIAGNOSIS — R53.83 OTHER FATIGUE: ICD-10-CM

## 2023-11-26 DIAGNOSIS — E87.1 HYPONATREMIA: Primary | ICD-10-CM

## 2023-11-26 DIAGNOSIS — R79.89 ELEVATED TROPONIN: ICD-10-CM

## 2023-11-26 LAB
ALBUMIN SERPL-MCNC: 4.1 GM/DL (ref 3.4–5)
ALP BLD-CCNC: 123 IU/L (ref 40–129)
ALT SERPL-CCNC: 38 U/L (ref 10–40)
ANION GAP SERPL CALCULATED.3IONS-SCNC: 12 MMOL/L (ref 4–16)
ANION GAP SERPL CALCULATED.3IONS-SCNC: 14 MMOL/L (ref 4–16)
AST SERPL-CCNC: 48 IU/L (ref 15–37)
B PARAP IS1001 DNA NPH QL NAA+NON-PROBE: NOT DETECTED
B PERT.PT PRMT NPH QL NAA+NON-PROBE: NOT DETECTED
BACTERIA: ABNORMAL /HPF
BASOPHILS ABSOLUTE: 0 K/CU MM
BASOPHILS RELATIVE PERCENT: 0.8 % (ref 0–1)
BILIRUB SERPL-MCNC: 0.3 MG/DL (ref 0–1)
BILIRUBIN URINE: ABNORMAL MG/DL
BLOOD, URINE: ABNORMAL
BUN SERPL-MCNC: 7 MG/DL (ref 6–23)
BUN SERPL-MCNC: 8 MG/DL (ref 6–23)
C PNEUM DNA NPH QL NAA+NON-PROBE: NOT DETECTED
CALCIUM SERPL-MCNC: 8.4 MG/DL (ref 8.3–10.6)
CALCIUM SERPL-MCNC: 9.4 MG/DL (ref 8.3–10.6)
CHLORIDE BLD-SCNC: 78 MMOL/L (ref 99–110)
CHLORIDE BLD-SCNC: 79 MMOL/L (ref 99–110)
CLARITY: CLEAR
CO2: 24 MMOL/L (ref 21–32)
CO2: 27 MMOL/L (ref 21–32)
COLOR: YELLOW
CREAT SERPL-MCNC: 1.1 MG/DL (ref 0.6–1.1)
CREAT SERPL-MCNC: 1.1 MG/DL (ref 0.6–1.1)
CREATININE URINE: 243.2 MG/DL (ref 28–217)
D DIMER: 1.26 UG/ML (FEU)
DIFFERENTIAL TYPE: ABNORMAL
EKG ATRIAL RATE: 91 BPM
EKG DIAGNOSIS: NORMAL
EKG P AXIS: 81 DEGREES
EKG P-R INTERVAL: 228 MS
EKG Q-T INTERVAL: 350 MS
EKG QRS DURATION: 100 MS
EKG QTC CALCULATION (BAZETT): 430 MS
EKG R AXIS: -26 DEGREES
EKG T AXIS: 61 DEGREES
EKG VENTRICULAR RATE: 91 BPM
EOSINOPHILS ABSOLUTE: 0.2 K/CU MM
EOSINOPHILS RELATIVE PERCENT: 4 % (ref 0–3)
FLUAV H1 2009 PAN RNA NPH NAA+NON-PROBE: NOT DETECTED
FLUAV H1 RNA NPH QL NAA+NON-PROBE: NOT DETECTED
FLUAV H3 RNA NPH QL NAA+NON-PROBE: NOT DETECTED
FLUAV RNA NPH QL NAA+NON-PROBE: NOT DETECTED
FLUBV RNA NPH QL NAA+NON-PROBE: NOT DETECTED
GFR SERPL CREATININE-BSD FRML MDRD: 55 ML/MIN/1.73M2
GFR SERPL CREATININE-BSD FRML MDRD: 55 ML/MIN/1.73M2
GLUCOSE SERPL-MCNC: 102 MG/DL (ref 70–99)
GLUCOSE SERPL-MCNC: 117 MG/DL (ref 70–99)
GLUCOSE, URINE: NEGATIVE MG/DL
HADV DNA NPH QL NAA+NON-PROBE: NOT DETECTED
HCOV 229E RNA NPH QL NAA+NON-PROBE: NOT DETECTED
HCOV HKU1 RNA NPH QL NAA+NON-PROBE: NOT DETECTED
HCOV NL63 RNA NPH QL NAA+NON-PROBE: NOT DETECTED
HCOV OC43 RNA NPH QL NAA+NON-PROBE: NOT DETECTED
HCT VFR BLD CALC: 36.7 % (ref 37–47)
HEMOGLOBIN: 12.7 GM/DL (ref 12.5–16)
HMPV RNA NPH QL NAA+NON-PROBE: NOT DETECTED
HPIV1 RNA NPH QL NAA+NON-PROBE: NOT DETECTED
HPIV2 RNA NPH QL NAA+NON-PROBE: NOT DETECTED
HPIV3 RNA NPH QL NAA+NON-PROBE: NOT DETECTED
HPIV4 RNA NPH QL NAA+NON-PROBE: NOT DETECTED
IMMATURE NEUTROPHIL %: 0.2 % (ref 0–0.43)
INFLUENZA A ANTIGEN: NOT DETECTED
INFLUENZA B ANTIGEN: NOT DETECTED
KETONES, URINE: 15 MG/DL
LEUKOCYTE ESTERASE, URINE: ABNORMAL
LYMPHOCYTES ABSOLUTE: 0.9 K/CU MM
LYMPHOCYTES RELATIVE PERCENT: 19.4 % (ref 24–44)
M PNEUMO DNA NPH QL NAA+NON-PROBE: NOT DETECTED
MCH RBC QN AUTO: 29.7 PG (ref 27–31)
MCHC RBC AUTO-ENTMCNC: 34.6 % (ref 32–36)
MCV RBC AUTO: 85.9 FL (ref 78–100)
MONOCYTES ABSOLUTE: 0.5 K/CU MM
MONOCYTES RELATIVE PERCENT: 10.6 % (ref 0–4)
MUCUS: ABNORMAL HPF
NITRITE URINE, QUANTITATIVE: NEGATIVE
NUCLEATED RBC %: 0 %
OSMOLALITY UR: 412 MOS/L (ref 292–1090)
PDW BLD-RTO: 12 % (ref 11.7–14.9)
PH, URINE: 6 (ref 5–8)
PLATELET # BLD: 493 K/CU MM (ref 140–440)
PMV BLD AUTO: 8.5 FL (ref 7.5–11.1)
POTASSIUM SERPL-SCNC: 3.2 MMOL/L (ref 3.5–5.1)
POTASSIUM SERPL-SCNC: 3.6 MMOL/L (ref 3.5–5.1)
PRO-BNP: 123.2 PG/ML
PROT/CREAT RATIO, UR: 0.1
PROTEIN UA: ABNORMAL MG/DL
RBC # BLD: 4.27 M/CU MM (ref 4.2–5.4)
RBC URINE: 2 /HPF (ref 0–6)
RSV RNA NPH QL NAA+NON-PROBE: NOT DETECTED
RV+EV RNA NPH QL NAA+NON-PROBE: NOT DETECTED
SARS-COV-2 RDRP RESP QL NAA+PROBE: NOT DETECTED
SARS-COV-2 RNA NPH QL NAA+NON-PROBE: NOT DETECTED
SEGMENTED NEUTROPHILS ABSOLUTE COUNT: 3.1 K/CU MM
SEGMENTED NEUTROPHILS RELATIVE PERCENT: 65 % (ref 36–66)
SODIUM BLD-SCNC: 115 MMOL/L (ref 135–145)
SODIUM BLD-SCNC: 119 MMOL/L (ref 135–145)
SODIUM URINE: 10 MMOL/L (ref 35–167)
SOURCE: NORMAL
SPECIFIC GRAVITY UA: 1.01 (ref 1–1.03)
SQUAMOUS EPITHELIAL: 28 /HPF
TOTAL IMMATURE NEUTOROPHIL: 0.01 K/CU MM
TOTAL NUCLEATED RBC: 0 K/CU MM
TOTAL PROTEIN: 7.2 GM/DL (ref 6.4–8.2)
TRICHOMONAS: ABNORMAL /HPF
TROPONIN, HIGH SENSITIVITY: 15 NG/L (ref 0–14)
TROPONIN, HIGH SENSITIVITY: 15 NG/L (ref 0–14)
URINE TOTAL PROTEIN: 23.3 MG/DL
UROBILINOGEN, URINE: 0.2 MG/DL (ref 0.2–1)
WBC # BLD: 4.8 K/CU MM (ref 4–10.5)
WBC UA: 19 /HPF (ref 0–5)

## 2023-11-26 PROCEDURE — 6370000000 HC RX 637 (ALT 250 FOR IP): Performed by: STUDENT IN AN ORGANIZED HEALTH CARE EDUCATION/TRAINING PROGRAM

## 2023-11-26 PROCEDURE — 6370000000 HC RX 637 (ALT 250 FOR IP): Performed by: NURSE PRACTITIONER

## 2023-11-26 PROCEDURE — 93005 ELECTROCARDIOGRAM TRACING: CPT | Performed by: STUDENT IN AN ORGANIZED HEALTH CARE EDUCATION/TRAINING PROGRAM

## 2023-11-26 PROCEDURE — 71045 X-RAY EXAM CHEST 1 VIEW: CPT

## 2023-11-26 PROCEDURE — 84300 ASSAY OF URINE SODIUM: CPT

## 2023-11-26 PROCEDURE — 81003 URINALYSIS AUTO W/O SCOPE: CPT

## 2023-11-26 PROCEDURE — 6360000002 HC RX W HCPCS: Performed by: STUDENT IN AN ORGANIZED HEALTH CARE EDUCATION/TRAINING PROGRAM

## 2023-11-26 PROCEDURE — 80048 BASIC METABOLIC PNL TOTAL CA: CPT

## 2023-11-26 PROCEDURE — 82570 ASSAY OF URINE CREATININE: CPT

## 2023-11-26 PROCEDURE — 70450 CT HEAD/BRAIN W/O DYE: CPT

## 2023-11-26 PROCEDURE — 84295 ASSAY OF SERUM SODIUM: CPT

## 2023-11-26 PROCEDURE — 99285 EMERGENCY DEPT VISIT HI MDM: CPT

## 2023-11-26 PROCEDURE — 93010 ELECTROCARDIOGRAM REPORT: CPT | Performed by: INTERNAL MEDICINE

## 2023-11-26 PROCEDURE — 83935 ASSAY OF URINE OSMOLALITY: CPT

## 2023-11-26 PROCEDURE — 0202U NFCT DS 22 TRGT SARS-COV-2: CPT

## 2023-11-26 PROCEDURE — 87635 SARS-COV-2 COVID-19 AMP PRB: CPT

## 2023-11-26 PROCEDURE — 94640 AIRWAY INHALATION TREATMENT: CPT

## 2023-11-26 PROCEDURE — 85379 FIBRIN DEGRADATION QUANT: CPT

## 2023-11-26 PROCEDURE — 83880 ASSAY OF NATRIURETIC PEPTIDE: CPT

## 2023-11-26 PROCEDURE — 87086 URINE CULTURE/COLONY COUNT: CPT

## 2023-11-26 PROCEDURE — 2580000003 HC RX 258: Performed by: STUDENT IN AN ORGANIZED HEALTH CARE EDUCATION/TRAINING PROGRAM

## 2023-11-26 PROCEDURE — 84484 ASSAY OF TROPONIN QUANT: CPT

## 2023-11-26 PROCEDURE — 80053 COMPREHEN METABOLIC PANEL: CPT

## 2023-11-26 PROCEDURE — 85025 COMPLETE CBC W/AUTO DIFF WBC: CPT

## 2023-11-26 PROCEDURE — 81001 URINALYSIS AUTO W/SCOPE: CPT

## 2023-11-26 PROCEDURE — 94761 N-INVAS EAR/PLS OXIMETRY MLT: CPT

## 2023-11-26 PROCEDURE — 2580000003 HC RX 258: Performed by: INTERNAL MEDICINE

## 2023-11-26 PROCEDURE — 2140000000 HC CCU INTERMEDIATE R&B

## 2023-11-26 PROCEDURE — 84156 ASSAY OF PROTEIN URINE: CPT

## 2023-11-26 PROCEDURE — 36591 DRAW BLOOD OFF VENOUS DEVICE: CPT

## 2023-11-26 PROCEDURE — 87502 INFLUENZA DNA AMP PROBE: CPT

## 2023-11-26 RX ORDER — BUDESONIDE AND FORMOTEROL FUMARATE DIHYDRATE 160; 4.5 UG/1; UG/1
2 AEROSOL RESPIRATORY (INHALATION) 2 TIMES DAILY
Status: DISCONTINUED | OUTPATIENT
Start: 2023-11-26 | End: 2023-11-28 | Stop reason: HOSPADM

## 2023-11-26 RX ORDER — FLUTICASONE PROPIONATE 50 MCG
2 SPRAY, SUSPENSION (ML) NASAL DAILY
Status: DISCONTINUED | OUTPATIENT
Start: 2023-11-26 | End: 2023-11-28 | Stop reason: HOSPADM

## 2023-11-26 RX ORDER — POTASSIUM CHLORIDE 7.45 MG/ML
10 INJECTION INTRAVENOUS PRN
Status: DISCONTINUED | OUTPATIENT
Start: 2023-11-26 | End: 2023-11-28 | Stop reason: HOSPADM

## 2023-11-26 RX ORDER — ALBUTEROL SULFATE 2.5 MG/3ML
2.5 SOLUTION RESPIRATORY (INHALATION) EVERY 6 HOURS PRN
Status: DISCONTINUED | OUTPATIENT
Start: 2023-11-26 | End: 2023-11-28 | Stop reason: HOSPADM

## 2023-11-26 RX ORDER — SODIUM CHLORIDE 9 MG/ML
INJECTION, SOLUTION INTRAVENOUS CONTINUOUS
Status: DISCONTINUED | OUTPATIENT
Start: 2023-11-26 | End: 2023-11-28 | Stop reason: HOSPADM

## 2023-11-26 RX ORDER — SODIUM CHLORIDE 9 MG/ML
INJECTION, SOLUTION INTRAVENOUS PRN
Status: DISCONTINUED | OUTPATIENT
Start: 2023-11-26 | End: 2023-11-28 | Stop reason: HOSPADM

## 2023-11-26 RX ORDER — SIMVASTATIN 20 MG
20 TABLET ORAL DAILY
Status: DISCONTINUED | OUTPATIENT
Start: 2023-11-26 | End: 2023-11-28 | Stop reason: HOSPADM

## 2023-11-26 RX ORDER — ONDANSETRON 4 MG/1
4 TABLET, ORALLY DISINTEGRATING ORAL EVERY 8 HOURS PRN
Status: DISCONTINUED | OUTPATIENT
Start: 2023-11-26 | End: 2023-11-28 | Stop reason: HOSPADM

## 2023-11-26 RX ORDER — 3% SODIUM CHLORIDE 3 G/100ML
25 INJECTION, SOLUTION INTRAVENOUS ONCE
Status: DISCONTINUED | OUTPATIENT
Start: 2023-11-26 | End: 2023-11-26

## 2023-11-26 RX ORDER — POTASSIUM CHLORIDE 20 MEQ/1
40 TABLET, EXTENDED RELEASE ORAL PRN
Status: DISCONTINUED | OUTPATIENT
Start: 2023-11-26 | End: 2023-11-28 | Stop reason: HOSPADM

## 2023-11-26 RX ORDER — GABAPENTIN 100 MG/1
100 CAPSULE ORAL 3 TIMES DAILY
Status: DISCONTINUED | OUTPATIENT
Start: 2023-11-26 | End: 2023-11-28 | Stop reason: HOSPADM

## 2023-11-26 RX ORDER — MAGNESIUM SULFATE IN WATER 40 MG/ML
2000 INJECTION, SOLUTION INTRAVENOUS PRN
Status: DISCONTINUED | OUTPATIENT
Start: 2023-11-26 | End: 2023-11-28 | Stop reason: HOSPADM

## 2023-11-26 RX ORDER — ENOXAPARIN SODIUM 100 MG/ML
40 INJECTION SUBCUTANEOUS EVERY EVENING
Status: DISCONTINUED | OUTPATIENT
Start: 2023-11-26 | End: 2023-11-28 | Stop reason: HOSPADM

## 2023-11-26 RX ORDER — SODIUM CHLORIDE 0.9 % (FLUSH) 0.9 %
5-40 SYRINGE (ML) INJECTION PRN
Status: DISCONTINUED | OUTPATIENT
Start: 2023-11-26 | End: 2023-11-28 | Stop reason: HOSPADM

## 2023-11-26 RX ORDER — ONDANSETRON 2 MG/ML
4 INJECTION INTRAMUSCULAR; INTRAVENOUS EVERY 6 HOURS PRN
Status: DISCONTINUED | OUTPATIENT
Start: 2023-11-26 | End: 2023-11-28 | Stop reason: HOSPADM

## 2023-11-26 RX ORDER — POTASSIUM CHLORIDE 20 MEQ/1
40 TABLET, EXTENDED RELEASE ORAL ONCE
Status: COMPLETED | OUTPATIENT
Start: 2023-11-26 | End: 2023-11-26

## 2023-11-26 RX ORDER — ACETAMINOPHEN 325 MG/1
650 TABLET ORAL EVERY 6 HOURS PRN
Status: DISCONTINUED | OUTPATIENT
Start: 2023-11-26 | End: 2023-11-28 | Stop reason: HOSPADM

## 2023-11-26 RX ORDER — ACETAMINOPHEN 650 MG/1
650 SUPPOSITORY RECTAL EVERY 6 HOURS PRN
Status: DISCONTINUED | OUTPATIENT
Start: 2023-11-26 | End: 2023-11-28 | Stop reason: HOSPADM

## 2023-11-26 RX ORDER — SODIUM CHLORIDE 0.9 % (FLUSH) 0.9 %
5-40 SYRINGE (ML) INJECTION EVERY 12 HOURS SCHEDULED
Status: DISCONTINUED | OUTPATIENT
Start: 2023-11-26 | End: 2023-11-28 | Stop reason: HOSPADM

## 2023-11-26 RX ORDER — POLYETHYLENE GLYCOL 3350 17 G/17G
17 POWDER, FOR SOLUTION ORAL DAILY PRN
Status: DISCONTINUED | OUTPATIENT
Start: 2023-11-26 | End: 2023-11-28 | Stop reason: HOSPADM

## 2023-11-26 RX ORDER — ATENOLOL 25 MG/1
100 TABLET ORAL DAILY
Status: DISCONTINUED | OUTPATIENT
Start: 2023-11-27 | End: 2023-11-28 | Stop reason: HOSPADM

## 2023-11-26 RX ADMIN — GABAPENTIN 100 MG: 100 CAPSULE ORAL at 17:18

## 2023-11-26 RX ADMIN — GABAPENTIN 100 MG: 100 CAPSULE ORAL at 22:08

## 2023-11-26 RX ADMIN — ONDANSETRON 4 MG: 2 INJECTION INTRAMUSCULAR; INTRAVENOUS at 16:36

## 2023-11-26 RX ADMIN — SODIUM CHLORIDE: 9 INJECTION, SOLUTION INTRAVENOUS at 21:46

## 2023-11-26 RX ADMIN — SODIUM CHLORIDE: 9 INJECTION, SOLUTION INTRAVENOUS at 17:27

## 2023-11-26 RX ADMIN — FLUTICASONE PROPIONATE 2 SPRAY: 50 SPRAY, METERED NASAL at 22:09

## 2023-11-26 RX ADMIN — SODIUM CHLORIDE, PRESERVATIVE FREE 10 ML: 5 INJECTION INTRAVENOUS at 22:09

## 2023-11-26 RX ADMIN — ACETAMINOPHEN 650 MG: 325 TABLET ORAL at 17:18

## 2023-11-26 RX ADMIN — POTASSIUM CHLORIDE 40 MEQ: 1500 TABLET, EXTENDED RELEASE ORAL at 14:12

## 2023-11-26 RX ADMIN — BUDESONIDE AND FORMOTEROL FUMARATE DIHYDRATE 2 PUFF: 160; 4.5 AEROSOL RESPIRATORY (INHALATION) at 23:04

## 2023-11-26 RX ADMIN — CEFTRIAXONE 1000 MG: 1 INJECTION, POWDER, FOR SOLUTION INTRAMUSCULAR; INTRAVENOUS at 17:28

## 2023-11-26 RX ADMIN — ENOXAPARIN SODIUM 40 MG: 100 INJECTION SUBCUTANEOUS at 22:08

## 2023-11-26 RX ADMIN — ONDANSETRON 4 MG: 2 INJECTION INTRAMUSCULAR; INTRAVENOUS at 22:13

## 2023-11-26 ASSESSMENT — ENCOUNTER SYMPTOMS
DIARRHEA: 0
COLOR CHANGE: 0
SINUS PAIN: 0
BACK PAIN: 0
COUGH: 0
SORE THROAT: 0
NAUSEA: 0
ABDOMINAL PAIN: 0
CONSTIPATION: 0
VOMITING: 0
SINUS PRESSURE: 0
WHEEZING: 0
SHORTNESS OF BREATH: 0

## 2023-11-26 ASSESSMENT — PAIN SCALES - GENERAL
PAINLEVEL_OUTOF10: 3
PAINLEVEL_OUTOF10: 6
PAINLEVEL_OUTOF10: 3

## 2023-11-26 ASSESSMENT — PAIN DESCRIPTION - PAIN TYPE: TYPE: CHRONIC PAIN

## 2023-11-26 ASSESSMENT — PAIN DESCRIPTION - ORIENTATION: ORIENTATION: RIGHT;LEFT;LOWER

## 2023-11-26 ASSESSMENT — PAIN DESCRIPTION - DESCRIPTORS: DESCRIPTORS: ACHING;OTHER (COMMENT)

## 2023-11-26 ASSESSMENT — PAIN DESCRIPTION - LOCATION: LOCATION: LEG

## 2023-11-26 NOTE — ED PROVIDER NOTES
(ZOCOR) 20 MG tablet       budesonide-formoterol (SYMBICORT) 160-4.5 MCG/ACT AERO Inhale 2 puffs into the lungs 2 times daily 3 each 0    gabapentin (NEURONTIN) 100 MG capsule Take 1 capsule by mouth nightly for 90 days.  (Patient taking differently: Take 1 capsule by mouth 3 times daily.) 90 capsule 1    fluticasone (FLONASE) 50 MCG/ACT nasal spray 2 sprays by Each Nostril route daily 3 each 3    albuterol (PROVENTIL) (2.5 MG/3ML) 0.083% nebulizer solution Take 3 mLs by nebulization every 6 hours as needed for Wheezing or Shortness of Breath 60 each 3       ALLERGIES    Allergies   Allergen Reactions    Lipitor      \"Makes Me Hurt So Bad I Can't Stand It\"    Vicodin [Hydrocodone-Acetaminophen] Nausea Only    Carboplatin Other (See Comments)     Required epi x 3, hospitalization overnight     Hydrocodone      Other reaction(s): Gastrointestinal Upset    Adhesive Tape Rash       SOCIAL AND FAMILY HISTORY    Social History     Socioeconomic History    Marital status:      Spouse name: None    Number of children: None    Years of education: None    Highest education level: None   Tobacco Use    Smoking status: Former     Packs/day: 0.50     Years: 30.00     Additional pack years: 0.00     Total pack years: 15.00     Types: Cigarettes     Start date:      Quit date: 2017     Years since quittin.2    Smokeless tobacco: Never   Vaping Use    Vaping Use: Never used   Substance and Sexual Activity    Alcohol use: No    Drug use: No    Sexual activity: Not Currently     Social Determinants of Health     Financial Resource Strain: Low Risk  (2023)    Overall Financial Resource Strain (CARDIA)     Difficulty of Paying Living Expenses: Not very hard   Food Insecurity: No Food Insecurity (2023)    Hunger Vital Sign     Worried About Running Out of Food in the Last Year: Never true     Ran Out of Food in the Last Year: Never true   Transportation Needs: Unknown (2023)    PRAPARE - Transportation

## 2023-11-26 NOTE — ED NOTES
3N Charge called; SBAR in.      Ted Calix RN  11/26/23 5504
Admit;  [employer], Jan  11/26/23 3350
Pt refused cta PE study w/ contrast. Pt states she has bad kidneys, informed pt gfr was within the acceptable range for contrast. Pt still refused ct, Dorene Duverney APRN-CNP talked to pt and pt still refusing exam.
Dressing Type Transparent 11/26/23 1141   Dressing Intervention New 11/26/23 1141       Pertinent or High Risk Medications/Drips: no   If Yes, please provide details:   Blood Product Administration: no  If Yes, please provide details:     Recommendation    Incomplete orders   Additional Comments:    If any further questions, please call Sending RN at [phone]    Electronically signed by: Electronically signed by Thu Arvizu RN on 11/26/2023 at 2:23 PM     Monserrat Villanueva  11/26/23 2827

## 2023-11-26 NOTE — H&P
BMP:    Recent Labs     11/26/23  1153   *   K 3.2*   CL 78*   CO2 27   BUN 7   CREATININE 1.1   GLUCOSE 102*     Hepatic:   Recent Labs     11/26/23  1153   AST 48*   ALT 38   BILITOT 0.3   ALKPHOS 123     Lipids:   Lab Results   Component Value Date/Time    CHOL 274 02/08/2023 08:57 AM    HDL 27 02/08/2023 08:57 AM    TRIG 268 02/08/2023 08:57 AM     Hemoglobin A1C:   Lab Results   Component Value Date/Time    LABA1C 6.0 02/08/2023 08:57 AM     TSH:   Lab Results   Component Value Date/Time    TSH 2.34 02/01/2017 09:51 AM     Troponin:   Lab Results   Component Value Date/Time    TROPONINT <0.010 03/06/2023 06:14 PM     Lactic Acid: No results for input(s): \"LACTA\" in the last 72 hours.   BNP:   Recent Labs     11/26/23  1153   PROBNP 123.2     UA:  Lab Results   Component Value Date/Time    NITRU NEGATIVE 11/26/2023 02:21 PM    COLORU YELLOW 11/26/2023 02:21 PM    PHUR 6.0 02/10/2020 10:12 AM    WBCUA 19 11/26/2023 02:21 PM    RBCUA 2 11/26/2023 02:21 PM    MUCUS RARE 11/26/2023 02:21 PM    TRICHOMONAS NONE SEEN 11/26/2023 02:21 PM    BACTERIA FEW 11/26/2023 02:21 PM    CLARITYU CLEAR 11/26/2023 02:21 PM    SPECGRAV 1.015 11/26/2023 02:21 PM    LEUKOCYTESUR MODERATE NUMBER OR AMOUNT OBSERVED 11/26/2023 02:21 PM    UROBILINOGEN 0.2 11/26/2023 02:21 PM    BILIRUBINUR SMALL NUMBER OR AMOUNT OBSERVED 11/26/2023 02:21 PM    BILIRUBINUR small 02/10/2020 10:12 AM    BLOODU TRACE 11/26/2023 02:21 PM    GLUCOSEU negative 02/10/2020 10:12 AM    KETUA 15 11/26/2023 02:21 PM     Urine Cultures:   Lab Results   Component Value Date/Time    LABURIN >100,000 CFU/ml 02/10/2020 10:30 AM     Blood Cultures: No results found for: \"BC\"  No results found for: \"BLOODCULT2\"  Organism:   Lab Results   Component Value Date/Time    ORG Escherichia coli 02/10/2020 10:30 AM       Imaging/Diagnostics Last 24 Hours   CT HEAD WO CONTRAST    Result Date: 11/26/2023  EXAMINATION: CT OF THE HEAD WITHOUT CONTRAST  11/26/2023 11:50 am

## 2023-11-27 LAB
ANION GAP SERPL CALCULATED.3IONS-SCNC: 11 MMOL/L (ref 4–16)
ANION GAP SERPL CALCULATED.3IONS-SCNC: 12 MMOL/L (ref 4–16)
ANION GAP SERPL CALCULATED.3IONS-SCNC: 9 MMOL/L (ref 4–16)
BASOPHILS ABSOLUTE: 0.1 K/CU MM
BASOPHILS RELATIVE PERCENT: 1.2 % (ref 0–1)
BUN SERPL-MCNC: 5 MG/DL (ref 6–23)
BUN SERPL-MCNC: 5 MG/DL (ref 6–23)
BUN SERPL-MCNC: 6 MG/DL (ref 6–23)
CALCIUM SERPL-MCNC: 8.1 MG/DL (ref 8.3–10.6)
CALCIUM SERPL-MCNC: 8.2 MG/DL (ref 8.3–10.6)
CALCIUM SERPL-MCNC: 8.6 MG/DL (ref 8.3–10.6)
CHLORIDE BLD-SCNC: 81 MMOL/L (ref 99–110)
CHLORIDE BLD-SCNC: 83 MMOL/L (ref 99–110)
CHLORIDE BLD-SCNC: 88 MMOL/L (ref 99–110)
CO2: 23 MMOL/L (ref 21–32)
CO2: 24 MMOL/L (ref 21–32)
CO2: 24 MMOL/L (ref 21–32)
CREAT SERPL-MCNC: 0.8 MG/DL (ref 0.6–1.1)
CREAT SERPL-MCNC: 0.9 MG/DL (ref 0.6–1.1)
CREAT SERPL-MCNC: 0.9 MG/DL (ref 0.6–1.1)
DIFFERENTIAL TYPE: ABNORMAL
EOSINOPHILS ABSOLUTE: 0.2 K/CU MM
EOSINOPHILS RELATIVE PERCENT: 4.7 % (ref 0–3)
GFR SERPL CREATININE-BSD FRML MDRD: >60 ML/MIN/1.73M2
GLUCOSE SERPL-MCNC: 100 MG/DL (ref 70–99)
GLUCOSE SERPL-MCNC: 100 MG/DL (ref 70–99)
GLUCOSE SERPL-MCNC: 102 MG/DL (ref 70–99)
HCT VFR BLD CALC: 31.9 % (ref 37–47)
HEMOGLOBIN: 10.9 GM/DL (ref 12.5–16)
IMMATURE NEUTROPHIL %: 0.2 % (ref 0–0.43)
LYMPHOCYTES ABSOLUTE: 0.8 K/CU MM
LYMPHOCYTES RELATIVE PERCENT: 17.8 % (ref 24–44)
MCH RBC QN AUTO: 29.4 PG (ref 27–31)
MCHC RBC AUTO-ENTMCNC: 34.2 % (ref 32–36)
MCV RBC AUTO: 86 FL (ref 78–100)
MONOCYTES ABSOLUTE: 0.6 K/CU MM
MONOCYTES RELATIVE PERCENT: 14.2 % (ref 0–4)
NUCLEATED RBC %: 0 %
OSMOLALITY UR: 244 MOS/L (ref 280–300)
PDW BLD-RTO: 11.9 % (ref 11.7–14.9)
PLATELET # BLD: 428 K/CU MM (ref 140–440)
PMV BLD AUTO: 8.9 FL (ref 7.5–11.1)
POTASSIUM SERPL-SCNC: 3.6 MMOL/L (ref 3.5–5.1)
POTASSIUM SERPL-SCNC: 3.7 MMOL/L (ref 3.5–5.1)
POTASSIUM SERPL-SCNC: 3.7 MMOL/L (ref 3.5–5.1)
RBC # BLD: 3.71 M/CU MM (ref 4.2–5.4)
SEGMENTED NEUTROPHILS ABSOLUTE COUNT: 2.6 K/CU MM
SEGMENTED NEUTROPHILS RELATIVE PERCENT: 61.9 % (ref 36–66)
SODIUM BLD-SCNC: 116 MMOL/L (ref 135–145)
SODIUM BLD-SCNC: 116 MMOL/L (ref 135–145)
SODIUM BLD-SCNC: 118 MMOL/L (ref 135–145)
SODIUM BLD-SCNC: 121 MMOL/L (ref 135–145)
TOTAL IMMATURE NEUTOROPHIL: 0.01 K/CU MM
TOTAL NUCLEATED RBC: 0 K/CU MM
WBC # BLD: 4.2 K/CU MM (ref 4–10.5)

## 2023-11-27 PROCEDURE — 2580000003 HC RX 258: Performed by: INTERNAL MEDICINE

## 2023-11-27 PROCEDURE — 6360000002 HC RX W HCPCS: Performed by: STUDENT IN AN ORGANIZED HEALTH CARE EDUCATION/TRAINING PROGRAM

## 2023-11-27 PROCEDURE — 85025 COMPLETE CBC W/AUTO DIFF WBC: CPT

## 2023-11-27 PROCEDURE — 36415 COLL VENOUS BLD VENIPUNCTURE: CPT

## 2023-11-27 PROCEDURE — 99223 1ST HOSP IP/OBS HIGH 75: CPT | Performed by: INTERNAL MEDICINE

## 2023-11-27 PROCEDURE — 83930 ASSAY OF BLOOD OSMOLALITY: CPT

## 2023-11-27 PROCEDURE — 94640 AIRWAY INHALATION TREATMENT: CPT

## 2023-11-27 PROCEDURE — APPNB60 APP NON BILLABLE TIME 46-60 MINS: Performed by: PHYSICIAN ASSISTANT

## 2023-11-27 PROCEDURE — 6370000000 HC RX 637 (ALT 250 FOR IP): Performed by: INTERNAL MEDICINE

## 2023-11-27 PROCEDURE — 80048 BASIC METABOLIC PNL TOTAL CA: CPT

## 2023-11-27 PROCEDURE — 6370000000 HC RX 637 (ALT 250 FOR IP): Performed by: STUDENT IN AN ORGANIZED HEALTH CARE EDUCATION/TRAINING PROGRAM

## 2023-11-27 PROCEDURE — 94761 N-INVAS EAR/PLS OXIMETRY MLT: CPT

## 2023-11-27 PROCEDURE — 84295 ASSAY OF SERUM SODIUM: CPT

## 2023-11-27 PROCEDURE — 2580000003 HC RX 258: Performed by: STUDENT IN AN ORGANIZED HEALTH CARE EDUCATION/TRAINING PROGRAM

## 2023-11-27 PROCEDURE — 2140000000 HC CCU INTERMEDIATE R&B

## 2023-11-27 RX ADMIN — ONDANSETRON 4 MG: 2 INJECTION INTRAMUSCULAR; INTRAVENOUS at 08:51

## 2023-11-27 RX ADMIN — ONDANSETRON 4 MG: 2 INJECTION INTRAMUSCULAR; INTRAVENOUS at 16:42

## 2023-11-27 RX ADMIN — ENOXAPARIN SODIUM 40 MG: 100 INJECTION SUBCUTANEOUS at 18:46

## 2023-11-27 RX ADMIN — Medication 15 G: at 20:28

## 2023-11-27 RX ADMIN — BUDESONIDE AND FORMOTEROL FUMARATE DIHYDRATE 2 PUFF: 160; 4.5 AEROSOL RESPIRATORY (INHALATION) at 08:25

## 2023-11-27 RX ADMIN — GABAPENTIN 100 MG: 100 CAPSULE ORAL at 08:46

## 2023-11-27 RX ADMIN — SODIUM CHLORIDE, PRESERVATIVE FREE 10 ML: 5 INJECTION INTRAVENOUS at 08:47

## 2023-11-27 RX ADMIN — ATENOLOL 100 MG: 25 TABLET ORAL at 08:46

## 2023-11-27 RX ADMIN — GABAPENTIN 100 MG: 100 CAPSULE ORAL at 14:29

## 2023-11-27 RX ADMIN — ACETAMINOPHEN 650 MG: 325 TABLET ORAL at 20:30

## 2023-11-27 RX ADMIN — CEFTRIAXONE 1000 MG: 1 INJECTION, POWDER, FOR SOLUTION INTRAMUSCULAR; INTRAVENOUS at 16:50

## 2023-11-27 RX ADMIN — POTASSIUM BICARBONATE 20 MEQ: 782 TABLET, EFFERVESCENT ORAL at 20:29

## 2023-11-27 RX ADMIN — ACETAMINOPHEN 650 MG: 325 TABLET ORAL at 08:51

## 2023-11-27 RX ADMIN — FLUTICASONE PROPIONATE 2 SPRAY: 50 SPRAY, METERED NASAL at 08:47

## 2023-11-27 RX ADMIN — GABAPENTIN 100 MG: 100 CAPSULE ORAL at 20:30

## 2023-11-27 RX ADMIN — SODIUM CHLORIDE: 9 INJECTION, SOLUTION INTRAVENOUS at 08:58

## 2023-11-27 RX ADMIN — SODIUM CHLORIDE, PRESERVATIVE FREE 10 ML: 5 INJECTION INTRAVENOUS at 20:29

## 2023-11-27 ASSESSMENT — PAIN DESCRIPTION - DESCRIPTORS
DESCRIPTORS: ACHING
DESCRIPTORS: ACHING;THROBBING

## 2023-11-27 ASSESSMENT — PAIN DESCRIPTION - LOCATION
LOCATION: HEAD
LOCATION: HEAD

## 2023-11-27 ASSESSMENT — PAIN SCALES - GENERAL
PAINLEVEL_OUTOF10: 6
PAINLEVEL_OUTOF10: 7
PAINLEVEL_OUTOF10: 7

## 2023-11-27 ASSESSMENT — PAIN DESCRIPTION - PAIN TYPE: TYPE: ACUTE PAIN

## 2023-11-27 ASSESSMENT — PAIN - FUNCTIONAL ASSESSMENT: PAIN_FUNCTIONAL_ASSESSMENT: ACTIVITIES ARE NOT PREVENTED

## 2023-11-27 ASSESSMENT — PAIN DESCRIPTION - ORIENTATION: ORIENTATION: MID

## 2023-11-27 NOTE — CONSULTS
Hematology/Oncology Consult Note    Patient Name:  Samira Lawrence  Patient :  1955  Patient MRN:  [id number]     Primary Oncologist: Fabio Bright MD  Referring Provider: Samm Castellanos MD     Date of Service: 2023      Reason for Consult: Known to service, on immunotherapy for lung cancer     Chief Complaint:    Chief Complaint   Patient presents with    Nausea    Dizziness     States started Tuesday      Principal Problem:    Hyponatremia  Resolved Problems:    * No resolved hospital problems. *    HPI:   Samira Lawrence is a pleasant 79 y.o. 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 She had poor tolerance of concurrent chemoradiation and completed radiation only followed by checkpoint inhibitor immunotherapy only which she has been on since, last receiving C8D1 on 2023. CT imaging on 2023 noted area in RUL field concerning for local recurrence or focal atelectasis. She presented this admission with generalized malaise, fatigue, and congestion and was found to have significant hyponatremia with  dropping as low as 115. Sodium was normal in clinic earlier this month. Nephrology is following, Urine Na was low making SIADH or CPI induced hypophysitis less likely, currently renal loss d/t chlorthalidone is suspected. CXR was unremarkable, she refused CTA chest as she was worried about contrast.  She was negative for influenza and Covid. She is feeling somewhat improved overall. Diet has been poor. Reviewed that symptoms could likely be r/t hyponatremia. No fever/chills. No CP/SOB. Has cough/congestion, no sputum or hemoptysis. No new oxygen requirement. No change in bowel or bladder habits. No new HA, vision changes. No confusion or seizure. Lab data reviewed with mild normocytic anemia 10.9,WBC 4.2, Plt 428k.   May have had some level of hemoconcentration
alert awake and oriented, does not have overt nausea but still has poor appetite, she reported making some urine. There is no overt neurological symptoms at this time            Creatinine trend/ Kidney data :    Heart/ cardiac data :     Kidney imaging :     6/13/2013      CT evaluation of the left kidney demonstrates no left renal   calculi. The left kidney has no evidence of obstruction. The left   ureter is not dilated. No left ureteral calculus is identified. The left kidney has no masses. Left kidney has evidence of   extensive cortical scarring. Evaluation of the right kidney demonstrates no right renal   calculi. The right kidney has no evidence of obstruction. The   right ureter is not dilated. No right ureteral calculus is   identified. The right kidney has no masses. The right kidney has   evidence of extensive cortical scarring. Lung cancer Data :     keratinizing invasive squamous cell carcinoma right upper lung,  12/2017       Pathologically stage [id number]. She underwent right upper lobectomy on April 11, 2018. started adjuvant chemo carbo/taxol on July 11, 2018      PET scan 12/2022  1. Metabolic activity associated with the new right hilar nodule described on the recent CT scan concerning for recurrent/metastatic disease. started radiation 2/20/23 for 60Gy/30Fx via IMRT/VMAT. Chemotherapy: carbo/taxol 2/20/23. C3D1 3/6/23 allergic reaction to Beryl requiring epi x 3, solumedrol and overnight hospital stay. she is no longer agreeable to take chemotherapy. 4/13/2023 she completed radiation  6/15/2023 C1 of Keytruda    11/9/2023 due for [facility]. PMH :   Squamous  lung cancer diagnosed in 2017-she underwent surgical resection, chemotherapy currently in immune checkpoint inhibitor mainly programmed at 1 receptor blocker.   Longstanding hypertension she was taking atenolol and hydrochlorothiazide looks like since March of this year  Chronic obstructive pulmonary

## 2023-11-27 NOTE — CARE COORDINATION
11/27/23 0800   Service Assessment   Patient Orientation Alert and Oriented   Cognition Alert   History Provided By Medical Record   Primary Caregiver Spouse   Support Systems Spouse/Significant Other   Patient's Healthcare Decision Maker is: Legal Next of Kin   PCP Verified by CM Yes   Prior Functional Level Independent in ADLs/IADLs   Current Functional Level Independent in ADLs/IADLs   Can patient return to prior living arrangement Yes   Ability to make needs known: Good   Family able to assist with home care needs: Yes   Would you like for me to discuss the discharge plan with any other family members/significant others, and if so, who? No   Financial Resources [employer] None     Chart reviewed, screened for discharge planning. Pt from home with spouse. No discharge needs identified at this time.

## 2023-11-28 VITALS
DIASTOLIC BLOOD PRESSURE: 65 MMHG | HEART RATE: 75 BPM | TEMPERATURE: 98.8 F | SYSTOLIC BLOOD PRESSURE: 108 MMHG | OXYGEN SATURATION: 96 % | BODY MASS INDEX: 29.37 KG/M2 | HEIGHT: 65 IN | WEIGHT: 176.3 LBS | RESPIRATION RATE: 18 BRPM

## 2023-11-28 DIAGNOSIS — C34.91 SQUAMOUS CELL LUNG CANCER, RIGHT (HCC): Primary | ICD-10-CM

## 2023-11-28 LAB
ANION GAP SERPL CALCULATED.3IONS-SCNC: 8 MMOL/L (ref 4–16)
ANION GAP SERPL CALCULATED.3IONS-SCNC: 9 MMOL/L (ref 4–16)
BANDED NEUTROPHILS ABSOLUTE COUNT: 0.3 K/CU MM
BANDED NEUTROPHILS RELATIVE PERCENT: 9 % (ref 5–11)
BUN SERPL-MCNC: 14 MG/DL (ref 6–23)
BUN SERPL-MCNC: 20 MG/DL (ref 6–23)
CALCIUM SERPL-MCNC: 8.3 MG/DL (ref 8.3–10.6)
CALCIUM SERPL-MCNC: 8.4 MG/DL (ref 8.3–10.6)
CHLORIDE BLD-SCNC: 90 MMOL/L (ref 99–110)
CHLORIDE BLD-SCNC: 92 MMOL/L (ref 99–110)
CO2: 25 MMOL/L (ref 21–32)
CO2: 25 MMOL/L (ref 21–32)
CREAT SERPL-MCNC: 0.9 MG/DL (ref 0.6–1.1)
CREAT SERPL-MCNC: 0.9 MG/DL (ref 0.6–1.1)
CULTURE: NORMAL
DIFFERENTIAL TYPE: ABNORMAL
GFR SERPL CREATININE-BSD FRML MDRD: >60 ML/MIN/1.73M2
GFR SERPL CREATININE-BSD FRML MDRD: >60 ML/MIN/1.73M2
GLUCOSE SERPL-MCNC: 92 MG/DL (ref 70–99)
GLUCOSE SERPL-MCNC: 93 MG/DL (ref 70–99)
HCT VFR BLD CALC: 32.1 % (ref 37–47)
HEMOGLOBIN: 10.8 GM/DL (ref 12.5–16)
LYMPHOCYTES ABSOLUTE: 0.9 K/CU MM
LYMPHOCYTES RELATIVE PERCENT: 27 % (ref 24–44)
Lab: NORMAL
MAGNESIUM: 1.6 MG/DL (ref 1.8–2.4)
MCH RBC QN AUTO: 29.5 PG (ref 27–31)
MCHC RBC AUTO-ENTMCNC: 33.6 % (ref 32–36)
MCV RBC AUTO: 87.7 FL (ref 78–100)
MONOCYTES ABSOLUTE: 0.5 K/CU MM
MONOCYTES RELATIVE PERCENT: 15 % (ref 0–4)
PDW BLD-RTO: 12.3 % (ref 11.7–14.9)
PHOSPHORUS: 3.4 MG/DL (ref 2.5–4.9)
PLATELET # BLD: 449 K/CU MM (ref 140–440)
PMV BLD AUTO: 8.5 FL (ref 7.5–11.1)
POTASSIUM SERPL-SCNC: 3.9 MMOL/L (ref 3.5–5.1)
POTASSIUM SERPL-SCNC: 4.1 MMOL/L (ref 3.5–5.1)
RBC # BLD: 3.66 M/CU MM (ref 4.2–5.4)
SEGMENTED NEUTROPHILS ABSOLUTE COUNT: 1.6 K/CU MM
SEGMENTED NEUTROPHILS RELATIVE PERCENT: 49 % (ref 36–66)
SODIUM BLD-SCNC: 123 MMOL/L (ref 135–145)
SODIUM BLD-SCNC: 126 MMOL/L (ref 135–145)
SPECIMEN: NORMAL
WBC # BLD: 3.3 K/CU MM (ref 4–10.5)
WBC # BLD: ABNORMAL 10*3/UL

## 2023-11-28 PROCEDURE — 2580000003 HC RX 258: Performed by: STUDENT IN AN ORGANIZED HEALTH CARE EDUCATION/TRAINING PROGRAM

## 2023-11-28 PROCEDURE — 85007 BL SMEAR W/DIFF WBC COUNT: CPT

## 2023-11-28 PROCEDURE — 85027 COMPLETE CBC AUTOMATED: CPT

## 2023-11-28 PROCEDURE — 94640 AIRWAY INHALATION TREATMENT: CPT

## 2023-11-28 PROCEDURE — 6360000002 HC RX W HCPCS: Performed by: STUDENT IN AN ORGANIZED HEALTH CARE EDUCATION/TRAINING PROGRAM

## 2023-11-28 PROCEDURE — 80048 BASIC METABOLIC PNL TOTAL CA: CPT

## 2023-11-28 PROCEDURE — 6370000000 HC RX 637 (ALT 250 FOR IP): Performed by: INTERNAL MEDICINE

## 2023-11-28 PROCEDURE — 99231 SBSQ HOSP IP/OBS SF/LOW 25: CPT | Performed by: PHYSICIAN ASSISTANT

## 2023-11-28 PROCEDURE — 6370000000 HC RX 637 (ALT 250 FOR IP): Performed by: STUDENT IN AN ORGANIZED HEALTH CARE EDUCATION/TRAINING PROGRAM

## 2023-11-28 PROCEDURE — 2580000003 HC RX 258: Performed by: INTERNAL MEDICINE

## 2023-11-28 PROCEDURE — 84100 ASSAY OF PHOSPHORUS: CPT

## 2023-11-28 PROCEDURE — 6360000002 HC RX W HCPCS: Performed by: INTERNAL MEDICINE

## 2023-11-28 PROCEDURE — 94761 N-INVAS EAR/PLS OXIMETRY MLT: CPT

## 2023-11-28 PROCEDURE — 83735 ASSAY OF MAGNESIUM: CPT

## 2023-11-28 RX ORDER — HEPARIN 100 UNIT/ML
500 SYRINGE INTRAVENOUS PRN
Status: DISCONTINUED | OUTPATIENT
Start: 2023-11-28 | End: 2023-11-28 | Stop reason: HOSPADM

## 2023-11-28 RX ORDER — POLYETHYLENE GLYCOL 3350 17 G/17G
17 POWDER, FOR SOLUTION ORAL DAILY PRN
Qty: 527 G | Refills: 1 | Status: SHIPPED | OUTPATIENT
Start: 2023-11-28 | End: 2024-01-29

## 2023-11-28 RX ORDER — LORAZEPAM 0.5 MG/1
0.5 TABLET ORAL SEE ADMIN INSTRUCTIONS
Qty: 2 TABLET | Refills: 0 | Status: SHIPPED | OUTPATIENT
Start: 2023-11-28 | End: 2023-11-29

## 2023-11-28 RX ORDER — MAGNESIUM SULFATE IN WATER 40 MG/ML
2000 INJECTION, SOLUTION INTRAVENOUS ONCE
Status: COMPLETED | OUTPATIENT
Start: 2023-11-28 | End: 2023-11-28

## 2023-11-28 RX ORDER — ATENOLOL 100 MG/1
100 TABLET ORAL DAILY
Qty: 30 TABLET | Refills: 3 | Status: SHIPPED | OUTPATIENT
Start: 2023-11-29

## 2023-11-28 RX ORDER — ONDANSETRON 4 MG/1
4 TABLET, FILM COATED ORAL EVERY 8 HOURS PRN
Qty: 90 TABLET | Refills: 0 | Status: SHIPPED | OUTPATIENT
Start: 2023-11-28

## 2023-11-28 RX ADMIN — GABAPENTIN 100 MG: 100 CAPSULE ORAL at 08:13

## 2023-11-28 RX ADMIN — ONDANSETRON 4 MG: 2 INJECTION INTRAMUSCULAR; INTRAVENOUS at 00:58

## 2023-11-28 RX ADMIN — ATENOLOL 100 MG: 25 TABLET ORAL at 08:13

## 2023-11-28 RX ADMIN — POTASSIUM BICARBONATE 20 MEQ: 782 TABLET, EFFERVESCENT ORAL at 08:13

## 2023-11-28 RX ADMIN — ACETAMINOPHEN 650 MG: 325 TABLET ORAL at 10:21

## 2023-11-28 RX ADMIN — MAGNESIUM SULFATE HEPTAHYDRATE 2000 MG: 40 INJECTION, SOLUTION INTRAVENOUS at 08:22

## 2023-11-28 RX ADMIN — Medication 500 UNITS: at 12:07

## 2023-11-28 RX ADMIN — Medication 15 G: at 08:12

## 2023-11-28 RX ADMIN — FLUTICASONE PROPIONATE 2 SPRAY: 50 SPRAY, METERED NASAL at 08:13

## 2023-11-28 RX ADMIN — SODIUM CHLORIDE: 9 INJECTION, SOLUTION INTRAVENOUS at 04:41

## 2023-11-28 RX ADMIN — BUDESONIDE AND FORMOTEROL FUMARATE DIHYDRATE 2 PUFF: 160; 4.5 AEROSOL RESPIRATORY (INHALATION) at 08:51

## 2023-11-28 RX ADMIN — SODIUM CHLORIDE, PRESERVATIVE FREE 10 ML: 5 INJECTION INTRAVENOUS at 00:58

## 2023-11-28 ASSESSMENT — PAIN DESCRIPTION - LOCATION: LOCATION: HEAD

## 2023-11-28 ASSESSMENT — PAIN - FUNCTIONAL ASSESSMENT: PAIN_FUNCTIONAL_ASSESSMENT: ACTIVITIES ARE NOT PREVENTED

## 2023-11-28 ASSESSMENT — PAIN SCALES - GENERAL
PAINLEVEL_OUTOF10: 4
PAINLEVEL_OUTOF10: 3

## 2023-11-28 ASSESSMENT — PAIN DESCRIPTION - DESCRIPTORS: DESCRIPTORS: ACHING

## 2023-11-28 ASSESSMENT — PAIN DESCRIPTION - ORIENTATION: ORIENTATION: RIGHT;LEFT;MID

## 2023-11-28 NOTE — PLAN OF CARE
Problem: Discharge Planning  Goal: Discharge to home or other facility with appropriate resources  Outcome: Completed     Problem: Pain  Goal: Verbalizes/displays adequate comfort level or baseline comfort level  Outcome: Completed     Problem: Safety - Adult  Goal: Free from fall injury  Outcome: Completed     Problem: ABCDS Injury Assessment  Goal: Absence of physical injury  Outcome: Completed     Problem: Nutrition Deficit:  Goal: Optimize nutritional status  11/28/2023 1142 by Marla Fuchs RN  Outcome: Completed  Flowsheets (Taken 11/28/2023 1002 by Madelaine Wing RD, LD)  Nutrient intake appropriate for improving, restoring, or maintaining nutritional needs:   Assess nutritional status and recommend course of action   Recommend appropriate diets, oral nutritional supplements, and vitamin/mineral supplements   Monitor oral intake, labs, and treatment plans  11/28/2023 1002 by Madelaine Wing RD, LD  Flowsheets (Taken 11/28/2023 1002)  Nutrient intake appropriate for improving, restoring, or maintaining nutritional needs:   Assess nutritional status and recommend course of action   Recommend appropriate diets, oral nutritional supplements, and vitamin/mineral supplements   Monitor oral intake, labs, and treatment plans

## 2023-11-28 NOTE — DISCHARGE SUMMARY
V2.0  Discharge Summary    Name:  Priscila Hewitt /Age/Sex: 1955 (59 y.o. female)   Admit Date: 2023  Discharge Date: 23    MRN & CSN:  [id number] & [id number] Encounter Date and Time 23 11:34 AM EST    Attending:  Aparna Santo MD Discharging Provider: Aparna Santo MD       Hospital Course:     Brief HPI: Priscila Hewitt is a 79 y.o. female who presented with ***    Brief Problem Based Course:   ***      The patient expressed appropriate understanding of, and agreement with the discharge recommendations, medications, and plan. Consults this admission:  IP CONSULT TO NEPHROLOGY  IP CONSULT TO HEM/ONC    Discharge Diagnosis:   Hyponatremia    ***    Discharge Instruction:   Follow up appointments: ***  Primary care physician: Bubba Timmons MD within 2 weeks  Diet: {diet:47149}   Activity: {discharge activity:91266}  Disposition: Discharged to:   []Home, [][facility], []SNF, []Acute Rehab, []Hospice ***  Condition on discharge: Stable  Labs and Tests to be Followed up as an outpatient by PCP or Specialist: ***    Discharge Medications:        Medication List        START taking these medications      atenolol 100 MG tablet  Commonly known as: TENORMIN  Take 1 tablet by mouth daily  Start taking on: 2023     polyethylene glycol 17 g packet  Commonly known as: GLYCOLAX  Take 1 packet by mouth daily as needed for Constipation     urea 15 g Pack packet  Commonly known as: URE-NA  Take 15 g by mouth in the morning and at bedtime for 15 days            CHANGE how you take these medications      albuterol (2.5 MG/3ML) 0.083% nebulizer solution  Commonly known as: PROVENTIL  Take 3 mLs by nebulization every 6 hours as needed for Wheezing or Shortness of Breath  What changed: Another medication with the same name was removed.  Continue taking this medication, and follow the directions you see here.     gabapentin 100 MG capsule  Commonly known as: NEURONTIN  Take 1 capsule by mouth

## 2023-11-28 NOTE — PROGRESS NOTES
Chart reviewed remotely   Acute Hyponatremia . She was on chlorthalidone - likely caused renal na loss with subsequent hypovolemia - as urine na very low   Although check point inhibitor can cause hupophysitis / adrenal insufficiency and sq cell lung cancer can cause SIAD - both usually have higher urine na  level 30 Meq/ l or more   As floor cannot run hypertonic and will take a while to transfer    to higher unit .  Best option  is NS at 100 ml/ h with na check in am unless she has neuro sx
Comprehensive Nutrition Assessment    Type and Reason for Visit:  Initial, Positive Nutrition Screen (reported weight loss, reduced intake)    Nutrition Recommendations/Plan:   Continue regular diet with fluid restriction per order  Will offer standard high protein oral nutrition supplement, patient agreeable to try during stay and then may continue at home  Encourage meal intake, snacks as desires  Will continue to follow up during stay      Malnutrition Assessment:  Malnutrition Status: At risk for malnutrition (Comment) (recent reduced intake) (11/28/23 0911)    Context:  Chronic Illness       Nutrition Assessment:    Admit with hyponatremia, nausea. Hx of lung cancer. Able to tolerate regular diet with 1500 ml fluid restriction. Patient aware of need for fluid restriction at this time. In past week c/o reduced appetite and foods tasting differently and not as appealing. Patient is aware of need for protein and appropriate sources. More interested in cottage cheese, eggs, yogurt for protein rather than meats. Willing to try oral nutrition supplement during stay and then may continue at home. Weight loss in past year not significant. Will follow at high nutrition risk at this time with concern for adequate intake. Nutrition Related Findings:    up in bed, hx lung cancer dx 2017 with ongoing treatments, on keytruda      COPD, HTN, neuropathy        provided copy of menu to assist with ordering meals Wound Type: None       Current Nutrition Intake & Therapies:    Average Meal Intake: Unable to assess     ADULT DIET; Regular; 1500 ml    Anthropometric Measures:  Height: 165.1 cm (5' 5\")  Ideal Body Weight (IBW): 125 lbs (57 kg)    Admission Body Weight: 78.3 kg (172 lb 9.9 oz)  Current Body Weight: 80 kg (176 lb 5.9 oz), 141.1 % IBW.  Weight Source: Bed Scale  Current BMI (kg/m2): 29.3  Usual Body Weight: 80.3 kg (177 lb 0.5 oz) (per hx November 2022)  % Weight Change (Calculated): -0.4  Weight Adjustment For: No
Critical results called from lab- Na 118.  Dr Bethanie Cunningham made aware
Outpatient Pharmacy Progress Note for Meds-to-Beds    Total number of Prescriptions Filled: 2  The following medications were dispensed to the patient during the discharge process:  atenolol  polyethylene glycol    Additional Documentation:  Patient picked-up the medication(s) in the OP Pharmacy  Transferred urea to: [address] [place] [address] ([address] [place] [address])      Thank you for letting us serve your patients.   [address]    [address] [place] [address], [address] [place] [address]    Phone: [phone]    Fax: [phone]
Sodium is increasing-but slow pace-as her blood urea nitrogen is extremely low-likely has low solute intake-I would add urea powder 15 g twice a day-along with normal saline-hopefully that will raise sodium level slowly but surely--her sodium increased by 3 mEq about 12 to 14 hours-I am expecting sodium to be close to 120 by  8 p.m. today-which would be quite acceptable
V2.0    [facility] [place]      Name:  Solomon Alvarado /Age/Sex: 1955  (79 y.o. female)   MRN & CSN:  [id number] & [id number] Encounter Date/Time: 2023 10:10 AM EST   Location[facility]  [facility] PCP: Bud Chris MD     Attending:Aysha Martinez, [address] Day: 2    Assessment and Recommendations   Solomon Alvarado is a 79 y.o. female with a pmh of squamous cell lung cancer, COPD, hypertension, and atrophy of right kidney who presents with Hyponatremia      Plan:     Hyponatremia  Patient sodium came back at 119. I suspect he may have had a viral upper respiratory infection. Nephrology consulted  Urine studies sent  Fluid restriction  Will defer IV fluids to nephrology  Every 6 sodiums  Respiratory panel sent to rule out viral infections  Hold chlorthalidone     Urinary tract infection  Urine UA consistent with infection. Has had multiple the past.  IV Rocephin  Plan follow-up urinary and blood culture      Squamous cell lung cancer  Patient currently undergoing immunotherapy for this. Is due later this week. Consult placed to hematology oncology     COPD  Continue albuterol as needed and Symbicort     hypertension  Continue atenolol  Will hold chlorthalidone in setting of hyponatremia      Diet ADULT DIET;  Regular; 1500 ml   DVT Prophylaxis [] Lovenox, []  Heparin, [] SCDs, [] Ambulation,  [] Eliquis, [] Xarelto  [] Coumadin   Code Status Full Code   Disposition From: Home  Expected Disposition: Home  Estimated Date of Discharge: 2-3 days  Patient requires continued admission due to Hyponatremia    Surrogate Decision Maker/ POA       Personally reviewed Lab Studies and Imaging     Discussed management of the case with nephrology who recommended the goal is to raise the sodium safely about 8- equivalent per liter per 24 hours-but if she develops neurological symptoms then try hypotonic solution    EKG interpreted personally and results show sinus rhythm with 1st degree AV block
Impression :     Severe hyponatremia-thought to be from sodium loss with chlorthalidone and associated volume depletion-also likely has low solute intake-sodium improving with isotonic solution and urea powder  Although her gastrointestinal symptoms could have been from low sodium-but she also could have different etiology-if symptoms does not resolve with improvement of sodium-we might have to look for alternative explanation  She does have squamous cell lung cancer as well as high blood pressure and  Low magnesium likely from either for total body steroid gastrointestinal loss-replete intravenously for now    Recommendation/Plan  :     I will route the normal saline to 30 mill an hour-I will recheck the sodium level tomorrow morning-keep the urea powder and encourage high-protein diet-if there is gastrointestinal symptoms does not resolve or look for other etiology-follow clinically otherwise      Charis Quinones MD MD
11/28/2023    MG 1.6 (L) 11/28/2023    PROT 7.2 11/26/2023    LABALBU 4.1 11/26/2023    BILITOT 0.3 11/26/2023    ALKPHOS 123 11/26/2023    AST 48 (H) 11/26/2023    ALT 38 11/26/2023    LABGLOM >60 11/28/2023    GFRAA 54 (A) 08/26/2022    AGRATIO 1.8 12/30/2013    GLOB 2.6 12/30/2013    POCCA 1.09 (L) 03/06/2023    POCGLU 96 03/06/2023     Lab Results   Component Value Date    ALKPHOS 123 11/26/2023    ALT 38 11/26/2023    AST 48 (H) 11/26/2023    BILITOT 0.3 11/26/2023    PROT 7.2 11/26/2023     No results found for: \"URICACID\"  No results found for: \"LDH\"  Lab Results   Component Value Date    IRON 47 04/17/2023    TIBC 247 (L) 04/17/2023    FERRITIN 232 (H) 04/17/2023     Imaging:  CT HEAD WO CONTRAST   Final Result   No acute intracranial abnormality. XR CHEST PORTABLE   Final Result   Stable right hemithorax postsurgical change. No evidence of an acute   pneumonia. Assessment/Plan:    #Hyponatremia  -appreciate nephrology management, acute with normal baseline. Urine Na levels not consistent with SIADH r/t SCC or CPI induced hypophysitis    #Recurrent Squamous Cell Carcinoma of the Lung  -s/p RUL Lobectomy in 2018 with recurrent disease earlier this year. S/p chemoradiation however discontinued radiosensitizing chemotherapy after 2 cycles d/t poor tolerance. Has been on maintenance checkpoint inhibitor since completion of radiation  -concern for local recurrence vs focal atelectasis on most recent imaging, case was discussed at tumor board. -PET scan has been scheduled as outpatient, ordered ativan for scan. Patient was seen independently with attending physician Dr Tremaine Chatterjee available at all times for consultation. Vane Razo PA-C    Thank you for allowing me to participate in the care of this very pleasant patient. Labs, rationale, and plan of care all discussed in depth with patient and questions and concerns addressed.

## 2023-11-29 ENCOUNTER — CARE COORDINATION (OUTPATIENT)
Dept: CASE MANAGEMENT | Age: 68
End: 2023-11-29

## 2023-11-29 DIAGNOSIS — E87.1 HYPONATREMIA: Primary | ICD-10-CM

## 2023-11-29 PROCEDURE — 1111F DSCHRG MED/CURRENT MED MERGE: CPT | Performed by: INTERNAL MEDICINE

## 2023-11-29 NOTE — CARE COORDINATION
Care Transitions Initial Follow Up Call    Call within 2 business days of discharge: Yes    Patient Current Location:  Home: [facility] [employer] Transition Nurse contacted the patient by telephone to perform post hospital discharge assessment. Verified name and  with patient as identifiers. Provided introduction to self, and explanation of the Care Transition Nurse role. Patient: Ness Mejia Patient : 1955   MRN: [id number]  Reason for Admission: Hyponatremia  Discharge Date: 23 RARS: Readmission Risk Score: 12.7      Last Discharge Facility       Date Complaint Diagnosis Description Type Department Provider    23 Nausea; Dizziness Hyponatremia . .. ED to Hosp-Admission (Discharged) (ADMITTED) [address] Sherry Mack MD; Maria Teresa Sam... Was this an external facility discharge? No Discharge Facility: [facility]    Challenges to be reviewed by the provider   Additional needs identified to be addressed with provider: No  none               Method of communication with provider: none. Spoke with Tisha Last, says she is feeling better than before hospitalization, but feels tired. Says her mind seems more clear. No h/a, no confusion. No shortness of breath,  no cough, denies chest pain/pressure/palps/ No fever/chills. Checks BP @ home- 112/74 today. SPO2- 96% (room air). Appetite has not been very good, she says, no longer c/o nausea. No dizziness. No vomitting, no diarrhea. Encouragement given to drink Nutril supplement shakes, ie Boost or Ensure, says she will try. Offered RD consult, says she had spoken with RD in the hospital, declines to speak with RD now. CTN will check back with her re RD near future. Occas c/o constipation, using stool softener, Glycolax. She is urinating better now/urine is clear.  No Hematuria or dysuria    Educated Tisha Last on how to identify sx's that are worse than the baseline and the importance of early symptom

## 2023-12-04 ENCOUNTER — HOSPITAL ENCOUNTER (OUTPATIENT)
Dept: PET IMAGING | Age: 68
Discharge: HOME | End: 2023-12-04
Attending: INTERNAL MEDICINE
Payer: MEDICARE

## 2023-12-04 DIAGNOSIS — C34.91 SQUAMOUS CELL LUNG CANCER, RIGHT (HCC): ICD-10-CM

## 2023-12-04 PROCEDURE — 2580000003 HC RX 258: Performed by: INTERNAL MEDICINE

## 2023-12-04 PROCEDURE — 3430000000 HC RX DIAGNOSTIC RADIOPHARMACEUTICAL: Performed by: INTERNAL MEDICINE

## 2023-12-04 PROCEDURE — A9552 F18 FDG: HCPCS | Performed by: INTERNAL MEDICINE

## 2023-12-04 PROCEDURE — 78815 PET IMAGE W/CT SKULL-THIGH: CPT

## 2023-12-04 RX ORDER — SODIUM CHLORIDE 0.9 % (FLUSH) 0.9 %
10 SYRINGE (ML) INJECTION PRN
Status: COMPLETED | OUTPATIENT
Start: 2023-12-04 | End: 2023-12-04

## 2023-12-04 RX ORDER — FLUDEOXYGLUCOSE F 18 200 MCI/ML
15.39 INJECTION, SOLUTION INTRAVENOUS
Status: COMPLETED | OUTPATIENT
Start: 2023-12-04 | End: 2023-12-04

## 2023-12-04 RX ADMIN — FLUDEOXYGLUCOSE F 18 15.39 MILLICURIE: 200 INJECTION, SOLUTION INTRAVENOUS at 08:46

## 2023-12-04 RX ADMIN — SODIUM CHLORIDE, PRESERVATIVE FREE 10 ML: 5 INJECTION INTRAVENOUS at 08:46

## 2023-12-05 ENCOUNTER — CLINICAL DOCUMENTATION (OUTPATIENT)
Facility: HOSPITAL | Age: 68
End: 2023-12-05

## 2023-12-05 ENCOUNTER — HOSPITAL ENCOUNTER (OUTPATIENT)
Dept: INFUSION THERAPY | Age: 68
Discharge: HOME | End: 2023-12-05
Payer: MEDICARE

## 2023-12-05 DIAGNOSIS — C34.11 MALIGNANT NEOPLASM OF UPPER LOBE, RIGHT BRONCHUS OR LUNG (HCC): ICD-10-CM

## 2023-12-05 DIAGNOSIS — Z79.899 ENCOUNTER FOR LONG-TERM (CURRENT) USE OF HIGH-RISK MEDICATION: ICD-10-CM

## 2023-12-05 DIAGNOSIS — C34.91 SQUAMOUS CELL LUNG CANCER, RIGHT (HCC): Primary | ICD-10-CM

## 2023-12-05 LAB
ALBUMIN SERPL-MCNC: 3.7 GM/DL (ref 3.4–5)
ALP BLD-CCNC: 105 IU/L (ref 40–128)
ALT SERPL-CCNC: 14 U/L (ref 10–40)
ANION GAP SERPL CALCULATED.3IONS-SCNC: 10 MMOL/L (ref 4–16)
AST SERPL-CCNC: 21 IU/L (ref 15–37)
BASOPHILS ABSOLUTE: 0 K/CU MM
BASOPHILS RELATIVE PERCENT: 0.5 % (ref 0–1)
BILIRUB SERPL-MCNC: 0.4 MG/DL (ref 0–1)
BUN SERPL-MCNC: 9 MG/DL (ref 6–23)
CALCIUM SERPL-MCNC: 9.4 MG/DL (ref 8.3–10.6)
CHLORIDE BLD-SCNC: 94 MMOL/L (ref 99–110)
CO2: 24 MMOL/L (ref 21–32)
CREAT SERPL-MCNC: 1 MG/DL (ref 0.6–1.1)
DIFFERENTIAL TYPE: ABNORMAL
EOSINOPHILS ABSOLUTE: 0.4 K/CU MM
EOSINOPHILS RELATIVE PERCENT: 4 % (ref 0–3)
GFR SERPL CREATININE-BSD FRML MDRD: >60 ML/MIN/1.73M2
GLUCOSE SERPL-MCNC: 91 MG/DL (ref 70–99)
HCT VFR BLD CALC: 36.2 % (ref 37–47)
HEMOGLOBIN: 11.9 GM/DL (ref 12.5–16)
LYMPHOCYTES ABSOLUTE: 1.2 K/CU MM
LYMPHOCYTES RELATIVE PERCENT: 14 % (ref 24–44)
MCH RBC QN AUTO: 29.5 PG (ref 27–31)
MCHC RBC AUTO-ENTMCNC: 32.9 % (ref 32–36)
MCV RBC AUTO: 89.8 FL (ref 78–100)
MONOCYTES ABSOLUTE: 0.7 K/CU MM
MONOCYTES RELATIVE PERCENT: 8.4 % (ref 0–4)
PDW BLD-RTO: 12.9 % (ref 11.7–14.9)
PLATELET # BLD: 533 K/CU MM (ref 140–440)
PMV BLD AUTO: 8.3 FL (ref 7.5–11.1)
POTASSIUM SERPL-SCNC: 5 MMOL/L (ref 3.5–5.1)
RBC # BLD: 4.03 M/CU MM (ref 4.2–5.4)
SEGMENTED NEUTROPHILS ABSOLUTE COUNT: 6.5 K/CU MM
SEGMENTED NEUTROPHILS RELATIVE PERCENT: 73.1 % (ref 36–66)
SODIUM BLD-SCNC: 128 MMOL/L (ref 135–145)
T4 FREE SERPL-MCNC: 1.19 NG/DL (ref 0.9–1.8)
TOTAL PROTEIN: 5.9 GM/DL (ref 6.4–8.2)
TSH SERPL DL<=0.005 MIU/L-ACNC: 4.31 UIU/ML (ref 0.27–4.2)
WBC # BLD: 8.8 K/CU MM (ref 4–10.5)

## 2023-12-05 PROCEDURE — 84439 ASSAY OF FREE THYROXINE: CPT

## 2023-12-05 PROCEDURE — 84443 ASSAY THYROID STIM HORMONE: CPT

## 2023-12-05 PROCEDURE — 80053 COMPREHEN METABOLIC PANEL: CPT

## 2023-12-05 PROCEDURE — 85025 COMPLETE CBC W/AUTO DIFF WBC: CPT

## 2023-12-05 PROCEDURE — 36415 COLL VENOUS BLD VENIPUNCTURE: CPT

## 2023-12-05 NOTE — PROGRESS NOTES
Patient Assistance    Met with: Tan Patel    Navigator Type: Infusion  Documentation Type: Assistance Review  Contact Type: Telephone  Navigation Status: Follow-up  Status of Patient Insurance Coverage: Patient has active coverage          Additional notes: Introduced myself to Tan Patel today. I went over her insurance benefits and Tan Patel informed me that her insurance would remain the same for 2024 and I also verfied her income. . I offered to help her apply for free Keytruda supplied by [employer] and she was agreeable. She is going to sign the application at her next infusion appointment on Thursday. I also offered to assist her in enrolling in foundation assistance if one became available. I informed her that a foundation would help pay  for her out of pocket costs for her [facility] and would help her meet her out of pocket quicker. She was agreeable with me applying on her behalf. I will call her once I get a response from [employer] or if I have an approval from a foundation.     Drug Name: [facility]  Form of PAP Assistance: Free Drug - Pending

## 2023-12-07 ENCOUNTER — CARE COORDINATION (OUTPATIENT)
Dept: CASE MANAGEMENT | Age: 68
End: 2023-12-07

## 2023-12-07 NOTE — CARE COORDINATION
Care Transitions Follow Up Call    Patient Current Location:  Home: [address] [place] [address] Coordinator contacted the patient by telephone to follow up after admission on . Verified name and  with patient as identifiers. Patient: Solomon Alvarado  Patient : 1955   MRN: [id number]  Reason for Admission: Hyponatremia  Discharge Date: 23 RARS: Readmission Risk Score: 12.7      Needs to be reviewed by the provider   Additional needs identified to be addressed with provider: No  none             Method of communication with provider: none. Spoke with Solomon Alvarado who reported that she is not doing so good. She stated that she is still very tired and sleeping a lot. Patient also had some issues with getting one of the newly ordered medications, the urea. She stated that she did go on and get it but it was very expensive. Writer did not note a HFU appointment. Asked patient has she schedule a HFU and she stated that she has not because her PCP has been on vacation. Writer pulled up PCP's appointment book and got patient scheduled for  at 10:30 am. Patient stated that she is having some loose/soft stools here and there and denied any issues with bladder. Patient denied any cp, sob, cough, dizziness, headache, n/v, abdominal pains, fever, or chills. Patient reported that appetite is still not to good and she is drinking 2 meal replacement shake a day. Patient stated that fluid intake is good but the doctors at the hospital instructed her not to drink a lot of water. Writer advised patient on other drinks for example sport drinks half water half sport drink and patient vu. Patient reported that she is taking all medications as ordered. Writer offered patient to speak with our dietitian and she declined stating the she spoke to one when in the hospital. Patient denied any other needs at this time.  Patient instructed to continue to monitor s/s, reporting any that

## 2023-12-12 ENCOUNTER — HOSPITAL ENCOUNTER (EMERGENCY)
Age: 68
Discharge: HOME | End: 2023-12-12
Attending: EMERGENCY MEDICINE
Payer: MEDICARE

## 2023-12-12 ENCOUNTER — APPOINTMENT (OUTPATIENT)
Dept: GENERAL RADIOLOGY | Age: 68
End: 2023-12-12
Payer: MEDICARE

## 2023-12-12 VITALS
RESPIRATION RATE: 16 BRPM | TEMPERATURE: 97.9 F | HEIGHT: 65 IN | OXYGEN SATURATION: 98 % | BODY MASS INDEX: 26.99 KG/M2 | DIASTOLIC BLOOD PRESSURE: 79 MMHG | SYSTOLIC BLOOD PRESSURE: 96 MMHG | WEIGHT: 162 LBS | HEART RATE: 83 BPM

## 2023-12-12 DIAGNOSIS — C34.91 SQUAMOUS CELL LUNG CANCER, RIGHT (HCC): Primary | ICD-10-CM

## 2023-12-12 DIAGNOSIS — Z79.899 ENCOUNTER FOR LONG-TERM (CURRENT) USE OF HIGH-RISK MEDICATION: ICD-10-CM

## 2023-12-12 DIAGNOSIS — C34.11 MALIGNANT NEOPLASM OF UPPER LOBE, RIGHT BRONCHUS OR LUNG (HCC): ICD-10-CM

## 2023-12-12 DIAGNOSIS — R19.7 DIARRHEA, UNSPECIFIED TYPE: ICD-10-CM

## 2023-12-12 DIAGNOSIS — R11.0 NAUSEA: Primary | ICD-10-CM

## 2023-12-12 LAB
ALBUMIN SERPL-MCNC: 4 GM/DL (ref 3.4–5)
ALP BLD-CCNC: 86 IU/L (ref 40–129)
ALT SERPL-CCNC: 9 U/L (ref 10–40)
ANION GAP SERPL CALCULATED.3IONS-SCNC: 13 MMOL/L (ref 4–16)
AST SERPL-CCNC: 17 IU/L (ref 15–37)
BACTERIA: NEGATIVE /HPF
BASOPHILS ABSOLUTE: 0.1 K/CU MM
BASOPHILS RELATIVE PERCENT: 0.9 % (ref 0–1)
BILIRUB SERPL-MCNC: 0.2 MG/DL (ref 0–1)
BILIRUBIN URINE: ABNORMAL MG/DL
BLOOD, URINE: ABNORMAL
BUN SERPL-MCNC: 34 MG/DL (ref 6–23)
CALCIUM SERPL-MCNC: 9.9 MG/DL (ref 8.3–10.6)
CHLORIDE BLD-SCNC: 98 MMOL/L (ref 99–110)
CLARITY: CLEAR
CO2: 23 MMOL/L (ref 21–32)
COLOR: YELLOW
CREAT SERPL-MCNC: 1.3 MG/DL (ref 0.6–1.1)
D DIMER: 1.04 UG/ML (FEU)
DIFFERENTIAL TYPE: ABNORMAL
EOSINOPHILS ABSOLUTE: 0.4 K/CU MM
EOSINOPHILS RELATIVE PERCENT: 7.4 % (ref 0–3)
GFR SERPL CREATININE-BSD FRML MDRD: 45 ML/MIN/1.73M2
GLUCOSE SERPL-MCNC: 192 MG/DL (ref 70–99)
GLUCOSE, URINE: NEGATIVE MG/DL
HCT VFR BLD CALC: 37.6 % (ref 37–47)
HEMOGLOBIN: 12.1 GM/DL (ref 12.5–16)
IMMATURE NEUTROPHIL %: 0.2 % (ref 0–0.43)
KETONES, URINE: NEGATIVE MG/DL
LEUKOCYTE ESTERASE, URINE: ABNORMAL
LIPASE: 23 IU/L (ref 13–60)
LYMPHOCYTES ABSOLUTE: 1 K/CU MM
LYMPHOCYTES RELATIVE PERCENT: 18.2 % (ref 24–44)
MAGNESIUM: 1.8 MG/DL (ref 1.8–2.4)
MCH RBC QN AUTO: 29.1 PG (ref 27–31)
MCHC RBC AUTO-ENTMCNC: 32.2 % (ref 32–36)
MCV RBC AUTO: 90.4 FL (ref 78–100)
MONOCYTES ABSOLUTE: 0.6 K/CU MM
MONOCYTES RELATIVE PERCENT: 10 % (ref 0–4)
NITRITE URINE, QUANTITATIVE: NEGATIVE
NON SQUAM EPI CELLS: <1 /HPF
NUCLEATED RBC %: 0 %
PDW BLD-RTO: 12.7 % (ref 11.7–14.9)
PH, URINE: 6 (ref 5–8)
PLATELET # BLD: 603 K/CU MM (ref 140–440)
PMV BLD AUTO: 8.9 FL (ref 7.5–11.1)
POTASSIUM SERPL-SCNC: 4.3 MMOL/L (ref 3.5–5.1)
PROTEIN UA: NEGATIVE MG/DL
RBC # BLD: 4.16 M/CU MM (ref 4.2–5.4)
RBC URINE: 4 /HPF (ref 0–6)
SEGMENTED NEUTROPHILS ABSOLUTE COUNT: 3.6 K/CU MM
SEGMENTED NEUTROPHILS RELATIVE PERCENT: 63.3 % (ref 36–66)
SODIUM BLD-SCNC: 134 MMOL/L (ref 135–145)
SPECIFIC GRAVITY UA: <1.005 (ref 1–1.03)
SQUAMOUS EPITHELIAL: 5 /HPF
TOTAL IMMATURE NEUTOROPHIL: 0.01 K/CU MM
TOTAL NUCLEATED RBC: 0 K/CU MM
TOTAL PROTEIN: 6.9 GM/DL (ref 6.4–8.2)
TRICHOMONAS: ABNORMAL /HPF
UROBILINOGEN, URINE: 0.2 MG/DL (ref 0.2–1)
WBC # BLD: 5.7 K/CU MM (ref 4–10.5)
WBC UA: 24 /HPF (ref 0–5)

## 2023-12-12 PROCEDURE — 6370000000 HC RX 637 (ALT 250 FOR IP)

## 2023-12-12 PROCEDURE — 87086 URINE CULTURE/COLONY COUNT: CPT

## 2023-12-12 PROCEDURE — 93005 ELECTROCARDIOGRAM TRACING: CPT

## 2023-12-12 PROCEDURE — 81001 URINALYSIS AUTO W/SCOPE: CPT

## 2023-12-12 PROCEDURE — 83690 ASSAY OF LIPASE: CPT

## 2023-12-12 PROCEDURE — 99285 EMERGENCY DEPT VISIT HI MDM: CPT

## 2023-12-12 PROCEDURE — 85379 FIBRIN DEGRADATION QUANT: CPT

## 2023-12-12 PROCEDURE — 83735 ASSAY OF MAGNESIUM: CPT

## 2023-12-12 PROCEDURE — 71045 X-RAY EXAM CHEST 1 VIEW: CPT

## 2023-12-12 PROCEDURE — 81003 URINALYSIS AUTO W/O SCOPE: CPT

## 2023-12-12 PROCEDURE — 85025 COMPLETE CBC W/AUTO DIFF WBC: CPT

## 2023-12-12 PROCEDURE — 80053 COMPREHEN METABOLIC PANEL: CPT

## 2023-12-12 RX ORDER — ONDANSETRON 4 MG/1
4 TABLET, FILM COATED ORAL 3 TIMES DAILY PRN
Qty: 15 TABLET | Refills: 0 | Status: SHIPPED | OUTPATIENT
Start: 2023-12-12

## 2023-12-12 RX ORDER — DICYCLOMINE HYDROCHLORIDE 10 MG/1
10 CAPSULE ORAL 4 TIMES DAILY
Qty: 120 CAPSULE | Refills: 0 | Status: SHIPPED | OUTPATIENT
Start: 2023-12-12 | End: 2023-12-17

## 2023-12-12 RX ORDER — ONDANSETRON 4 MG/1
4 TABLET, ORALLY DISINTEGRATING ORAL ONCE
Status: COMPLETED | OUTPATIENT
Start: 2023-12-12 | End: 2023-12-12

## 2023-12-12 RX ORDER — LOPERAMIDE HYDROCHLORIDE 2 MG/1
2 CAPSULE ORAL 4 TIMES DAILY PRN
Qty: 20 CAPSULE | Refills: 0 | Status: SHIPPED | OUTPATIENT
Start: 2023-12-12 | End: 2023-12-22

## 2023-12-12 RX ADMIN — ONDANSETRON 4 MG: 4 TABLET, ORALLY DISINTEGRATING ORAL at 12:41

## 2023-12-12 ASSESSMENT — PAIN - FUNCTIONAL ASSESSMENT: PAIN_FUNCTIONAL_ASSESSMENT: NONE - DENIES PAIN

## 2023-12-12 NOTE — ED PROVIDER NOTES
[address] ENCOUNTER        Pt Name: Malone Fabry  MRN: [id number]  [address] [facility] [place] 1955  Date of evaluation: 12/12/2023  Provider: GRACE Kennedy - [place]  PCP: Se Moulton MD  Note Started: 12:49 PM EST 12/12/23       I have seen and evaluated this patient with my supervising physician Ita Cruz MD.      [address]       Chief Complaint   Patient presents with    Abdominal Pain    Diarrhea       HISTORY OF PRESENT ILLNESS: 1 or more Elements     History From: Patient    Limitations to history : None    Social Determinants Significantly Affecting Health : None    Chief Complaint: Nausea diarrhea    Malone Fabry is a 79 y.o. female with a history of COPD hypertension, squamous cell lung cancer, hyponatremia who presents to ED stating that for the first 2 days after she was discharged from the hospital on 11/26/2023 she felt better but then she began experiencing a reaction to the urea drink that she had to take for her low sodium. She stated after she took that she began feeling nauseous she would have stomach cramps and then diarrhea. She reports that this is persistent since then. Denies any fever denies any current abdominal pain. Denies any shortness of breath or chest pain. States that she does have a productive cough that is chronic in nature. Denies any bloody stools dysuria or hematuria. States that she took her last dose of her cancer immunotherapy 3 weeks prior. Patient reports just feeling increased fatigue due to these bouts of diarrhea and feels as if she cannot eat her normal diet. Nursing Notes were all reviewed and agreed with or any disagreements were addressed in the HPI. REVIEW OF SYSTEMS :      Review of Systems    Positives and Pertinent negatives as per HPI.      SURGICAL HISTORY     Past Surgical History:   Procedure Laterality Date    BRONCHOSCOPY  12/07/2017

## 2023-12-12 NOTE — ED PROVIDER NOTES
Emergency Department Encounter    Patient: Maximo Ruiz  MRN: [id number]  : 1955  Date of Evaluation: 2023  ED Provider:  Masha Tubbs MD    Triage Chief Complaint:   Abdominal Pain and Diarrhea    [place]:  Maximo Ruiz is a 79 y.o. female that presents with complaint of diarrhea, feeling nauseous. She had previous history of low sodium and she has been on the sodium medication and feels like it is causing her to have cramps and then diarrhea after. She does not feel like she tolerates it well. She does have a bit of vomiting but none the last 2 days. No shortness of breath. No blood in her stools. No chest pain. No syncope. ROS - see HPI, below listed is current ROS at time of my eval:  10 systems reviewed and negative except as above.      Past Medical History:   Diagnosis Date    Anemia     Anxiety     Arthritis     \"Fingers, Back\"    Bronchitis Last Episode     Chronic back pain     COPD (chronic obstructive pulmonary disease) ([address])     Sees Dr. Magdaleno Burrell    Depression     Diverticulosis 2013    Extensive per CT    Hemoptysis 2017    History of external beam radiation therapy 2023    RIGHT LUNG 6,000 cGy in 30 fractions    Hx of blood clots     \"found I have a clot near my mediport so they are taking it out 2019- put me on Xarelto    Hyperlipidemia     Hypertension     Low back pain     \"better than it use to be- had therapy in the past- originally hurt back at work 20+ yrs ago\"    Lung mass     rul- scheduled to bronch     Panic attacks     Pneumonia Dx 1-17    Right Lung Cancer Dx 10-17    tx with chemo following with Dr Kelsey Mercedes Dx     \"Right Side\"    Shortness of breath on exertion     Teeth missing     Upper And Lower    Urinary tract infection In Past    No Current Symptoms    Wears dentures     Full Upper, Partial Lower    Wears glasses      Past Surgical History:   Procedure Laterality Date    BRONCHOSCOPY  2017

## 2023-12-12 NOTE — ED NOTES
Pt refused peripheral IV/blood work in triage, would like port accessed when in a room.       Thu Arvizu RN  12/12/23 7406

## 2023-12-13 LAB
CULTURE: NORMAL
EKG ATRIAL RATE: 85 BPM
EKG DIAGNOSIS: NORMAL
EKG P AXIS: 70 DEGREES
EKG P-R INTERVAL: 206 MS
EKG Q-T INTERVAL: 368 MS
EKG QRS DURATION: 94 MS
EKG QTC CALCULATION (BAZETT): 437 MS
EKG R AXIS: 66 DEGREES
EKG T AXIS: 32 DEGREES
EKG VENTRICULAR RATE: 85 BPM
Lab: NORMAL
SPECIMEN: NORMAL

## 2023-12-13 PROCEDURE — 93010 ELECTROCARDIOGRAM REPORT: CPT | Performed by: INTERNAL MEDICINE

## 2023-12-14 ENCOUNTER — CARE COORDINATION (OUTPATIENT)
Dept: CASE MANAGEMENT | Age: 68
End: 2023-12-14

## 2023-12-14 NOTE — CARE COORDINATION
Care Transitions Outreach Attempt    Attempted to reach patient for transitions of care follow up. Unable to reach patient. Patient: Lorne Norton Patient : 1955 MRN: [id number]    Last Discharge Facility       Date Complaint Diagnosis Description Type Department Provider    23 Abdominal Pain; Diarrhea Nausea . ..  ED (DISCHARGE) [facility] ED Mariam Leventhal, MD            Noted following upcoming appointments from discharge chart review:   [facility] follow up appointment(s):   Future Appointments   Date Time Provider [address]   2023  8:45 AM SCHEDULE, [address] LAB [facility] MED ONC Solomon   2023 10:00 AM SCHEDULE, [facility].   2023 10:15 AM Ree Cardona MD [facility]   2024  8:45 AM Ivan Allison [facility] MED ONC LAB [facility] ONC Solomon   2024 10:45 AM SCHEDULE, [facility] MED ONC TREATMENT [facility] ONC Solomon   2024  8:45 AM SCHEDULE, [facility] MED ONC LAB [facility] MED ONC Solomon   2024  1:45 PM SCHEDULE, [facility]   3/14/2024 10:00 AM Sandra Hoffmann MD [facility] RAD ONC Solomon     Non-[facility]  follow up appointment(s): SANDRA Heard RN [phone]

## 2023-12-26 ENCOUNTER — HOSPITAL ENCOUNTER (EMERGENCY)
Age: 68
Discharge: HOME | End: 2023-12-26
Attending: EMERGENCY MEDICINE
Payer: MEDICARE

## 2023-12-26 ENCOUNTER — APPOINTMENT (OUTPATIENT)
Dept: CT IMAGING | Age: 68
End: 2023-12-26
Payer: MEDICARE

## 2023-12-26 ENCOUNTER — APPOINTMENT (OUTPATIENT)
Dept: GENERAL RADIOLOGY | Age: 68
End: 2023-12-26
Payer: MEDICARE

## 2023-12-26 VITALS
HEIGHT: 65 IN | OXYGEN SATURATION: 96 % | HEART RATE: 105 BPM | DIASTOLIC BLOOD PRESSURE: 82 MMHG | TEMPERATURE: 98 F | RESPIRATION RATE: 16 BRPM | BODY MASS INDEX: 26.99 KG/M2 | SYSTOLIC BLOOD PRESSURE: 137 MMHG | WEIGHT: 162 LBS

## 2023-12-26 DIAGNOSIS — J18.9 PNEUMONIA OF LEFT LOWER LOBE DUE TO INFECTIOUS ORGANISM: Primary | ICD-10-CM

## 2023-12-26 LAB
ALBUMIN SERPL-MCNC: 3.6 GM/DL (ref 3.4–5)
ALP BLD-CCNC: 80 IU/L (ref 40–129)
ALT SERPL-CCNC: 13 U/L (ref 10–40)
ANION GAP SERPL CALCULATED.3IONS-SCNC: 13 MMOL/L (ref 7–16)
AST SERPL-CCNC: 28 IU/L (ref 15–37)
BASOPHILS ABSOLUTE: 0.1 K/CU MM
BASOPHILS RELATIVE PERCENT: 1.1 % (ref 0–1)
BILIRUB SERPL-MCNC: 0.4 MG/DL (ref 0–1)
BUN SERPL-MCNC: 6 MG/DL (ref 6–23)
CALCIUM SERPL-MCNC: 9.6 MG/DL (ref 8.3–10.6)
CHLORIDE BLD-SCNC: 98 MMOL/L (ref 99–110)
CO2: 23 MMOL/L (ref 21–32)
CREAT SERPL-MCNC: 1 MG/DL (ref 0.6–1.1)
DIFFERENTIAL TYPE: ABNORMAL
EKG ATRIAL RATE: 106 BPM
EKG DIAGNOSIS: NORMAL
EKG P AXIS: 66 DEGREES
EKG P-R INTERVAL: 186 MS
EKG Q-T INTERVAL: 350 MS
EKG QRS DURATION: 90 MS
EKG QTC CALCULATION (BAZETT): 464 MS
EKG R AXIS: -40 DEGREES
EKG T AXIS: 41 DEGREES
EKG VENTRICULAR RATE: 106 BPM
EOSINOPHILS ABSOLUTE: 0.2 K/CU MM
EOSINOPHILS RELATIVE PERCENT: 4.2 % (ref 0–3)
GFR SERPL CREATININE-BSD FRML MDRD: >60 ML/MIN/1.73M2
GLUCOSE SERPL-MCNC: 86 MG/DL (ref 70–99)
HCT VFR BLD CALC: 37.1 % (ref 37–47)
HEMOGLOBIN: 11.8 GM/DL (ref 12.5–16)
IMMATURE NEUTROPHIL %: 0.4 % (ref 0–0.43)
LYMPHOCYTES ABSOLUTE: 1.1 K/CU MM
LYMPHOCYTES RELATIVE PERCENT: 23.6 % (ref 24–44)
MAGNESIUM: 1.5 MG/DL (ref 1.8–2.4)
MCH RBC QN AUTO: 28.9 PG (ref 27–31)
MCHC RBC AUTO-ENTMCNC: 31.8 % (ref 32–36)
MCV RBC AUTO: 90.9 FL (ref 78–100)
MONOCYTES ABSOLUTE: 0.7 K/CU MM
MONOCYTES RELATIVE PERCENT: 15.2 % (ref 0–4)
NUCLEATED RBC %: 0 %
PDW BLD-RTO: 13.1 % (ref 11.7–14.9)
PLATELET # BLD: 436 K/CU MM (ref 140–440)
PMV BLD AUTO: 9.3 FL (ref 7.5–11.1)
POTASSIUM SERPL-SCNC: 4.3 MMOL/L (ref 3.5–5.1)
PRO-BNP: 662.6 PG/ML
RBC # BLD: 4.08 M/CU MM (ref 4.2–5.4)
SEGMENTED NEUTROPHILS ABSOLUTE COUNT: 2.5 K/CU MM
SEGMENTED NEUTROPHILS RELATIVE PERCENT: 55.5 % (ref 36–66)
SODIUM BLD-SCNC: 134 MMOL/L (ref 135–145)
TOTAL CK: 34 IU/L (ref 26–140)
TOTAL IMMATURE NEUTOROPHIL: 0.02 K/CU MM
TOTAL NUCLEATED RBC: 0 K/CU MM
TOTAL PROTEIN: 6.2 GM/DL (ref 6.4–8.2)
TROPONIN, HIGH SENSITIVITY: 13 NG/L (ref 0–14)
TROPONIN, HIGH SENSITIVITY: 16 NG/L (ref 0–14)
WBC # BLD: 4.5 K/CU MM (ref 4–10.5)

## 2023-12-26 PROCEDURE — 96365 THER/PROPH/DIAG IV INF INIT: CPT

## 2023-12-26 PROCEDURE — 6360000004 HC RX CONTRAST MEDICATION: Performed by: EMERGENCY MEDICINE

## 2023-12-26 PROCEDURE — 93005 ELECTROCARDIOGRAM TRACING: CPT | Performed by: EMERGENCY MEDICINE

## 2023-12-26 PROCEDURE — 99285 EMERGENCY DEPT VISIT HI MDM: CPT

## 2023-12-26 PROCEDURE — 80053 COMPREHEN METABOLIC PANEL: CPT

## 2023-12-26 PROCEDURE — 85025 COMPLETE CBC W/AUTO DIFF WBC: CPT

## 2023-12-26 PROCEDURE — 2580000003 HC RX 258: Performed by: EMERGENCY MEDICINE

## 2023-12-26 PROCEDURE — 96361 HYDRATE IV INFUSION ADD-ON: CPT

## 2023-12-26 PROCEDURE — 96366 THER/PROPH/DIAG IV INF ADDON: CPT

## 2023-12-26 PROCEDURE — 6370000000 HC RX 637 (ALT 250 FOR IP): Performed by: EMERGENCY MEDICINE

## 2023-12-26 PROCEDURE — 6360000002 HC RX W HCPCS: Performed by: EMERGENCY MEDICINE

## 2023-12-26 PROCEDURE — 71045 X-RAY EXAM CHEST 1 VIEW: CPT

## 2023-12-26 PROCEDURE — 83735 ASSAY OF MAGNESIUM: CPT

## 2023-12-26 PROCEDURE — 84484 ASSAY OF TROPONIN QUANT: CPT

## 2023-12-26 PROCEDURE — 71275 CT ANGIOGRAPHY CHEST: CPT

## 2023-12-26 PROCEDURE — 83880 ASSAY OF NATRIURETIC PEPTIDE: CPT

## 2023-12-26 PROCEDURE — 93010 ELECTROCARDIOGRAM REPORT: CPT | Performed by: INTERNAL MEDICINE

## 2023-12-26 PROCEDURE — 82550 ASSAY OF CK (CPK): CPT

## 2023-12-26 RX ORDER — 0.9 % SODIUM CHLORIDE 0.9 %
1000 INTRAVENOUS SOLUTION INTRAVENOUS ONCE
Status: COMPLETED | OUTPATIENT
Start: 2023-12-26 | End: 2023-12-26

## 2023-12-26 RX ORDER — DOXYCYCLINE HYCLATE 100 MG
100 TABLET ORAL ONCE
Status: COMPLETED | OUTPATIENT
Start: 2023-12-26 | End: 2023-12-26

## 2023-12-26 RX ORDER — FAMOTIDINE 20 MG/1
20 TABLET, FILM COATED ORAL NIGHTLY PRN
Qty: 14 TABLET | Refills: 0 | Status: SHIPPED | OUTPATIENT
Start: 2023-12-26

## 2023-12-26 RX ORDER — DOXYCYCLINE HYCLATE 100 MG
100 TABLET ORAL 2 TIMES DAILY
Qty: 14 TABLET | Refills: 0 | Status: SHIPPED | OUTPATIENT
Start: 2023-12-26 | End: 2024-01-02

## 2023-12-26 RX ORDER — LOPERAMIDE HYDROCHLORIDE 2 MG/1
2 CAPSULE ORAL 4 TIMES DAILY PRN
Qty: 20 CAPSULE | Refills: 0 | Status: SHIPPED | OUTPATIENT
Start: 2023-12-26 | End: 2024-01-05

## 2023-12-26 RX ORDER — ACETAMINOPHEN 325 MG/1
650 TABLET ORAL ONCE
Status: COMPLETED | OUTPATIENT
Start: 2023-12-26 | End: 2023-12-26

## 2023-12-26 RX ORDER — AMOXICILLIN AND CLAVULANATE POTASSIUM 875; 125 MG/1; MG/1
1 TABLET, FILM COATED ORAL 2 TIMES DAILY
Qty: 14 TABLET | Refills: 0 | Status: SHIPPED | OUTPATIENT
Start: 2023-12-26 | End: 2024-01-02

## 2023-12-26 RX ORDER — ONDANSETRON 4 MG/1
4 TABLET, FILM COATED ORAL 3 TIMES DAILY PRN
Qty: 15 TABLET | Refills: 0 | Status: SHIPPED | OUTPATIENT
Start: 2023-12-26 | End: 2024-01-05 | Stop reason: ALTCHOICE

## 2023-12-26 RX ORDER — BENZONATATE 200 MG/1
200 CAPSULE ORAL 3 TIMES DAILY PRN
Qty: 21 CAPSULE | Refills: 0 | Status: SHIPPED | OUTPATIENT
Start: 2023-12-26 | End: 2024-01-02

## 2023-12-26 RX ORDER — MAGNESIUM SULFATE IN WATER 40 MG/ML
2000 INJECTION, SOLUTION INTRAVENOUS ONCE
Status: COMPLETED | OUTPATIENT
Start: 2023-12-26 | End: 2023-12-26

## 2023-12-26 RX ORDER — AMOXICILLIN AND CLAVULANATE POTASSIUM 875; 125 MG/1; MG/1
1 TABLET, FILM COATED ORAL ONCE
Status: COMPLETED | OUTPATIENT
Start: 2023-12-26 | End: 2023-12-26

## 2023-12-26 RX ADMIN — MAGNESIUM SULFATE 2000 MG: 2 INJECTION INTRAVENOUS at 16:16

## 2023-12-26 RX ADMIN — IOPAMIDOL 64 ML: 755 INJECTION, SOLUTION INTRAVENOUS at 17:58

## 2023-12-26 RX ADMIN — AMOXICILLIN AND CLAVULANATE POTASSIUM 1 TABLET: 875; 125 TABLET, FILM COATED ORAL at 19:08

## 2023-12-26 RX ADMIN — SODIUM CHLORIDE 1000 ML: 9 INJECTION, SOLUTION INTRAVENOUS at 17:44

## 2023-12-26 RX ADMIN — ACETAMINOPHEN 650 MG: 325 TABLET ORAL at 17:44

## 2023-12-26 RX ADMIN — DOXYCYCLINE HYCLATE 100 MG: 100 TABLET, COATED ORAL at 19:08

## 2023-12-26 ASSESSMENT — PAIN DESCRIPTION - DESCRIPTORS: DESCRIPTORS: ACHING

## 2023-12-26 ASSESSMENT — PAIN DESCRIPTION - ORIENTATION: ORIENTATION: UPPER;RIGHT;LEFT

## 2023-12-26 ASSESSMENT — PAIN DESCRIPTION - LOCATION
LOCATION: ABDOMEN;LEG
LOCATION: ABDOMEN

## 2023-12-26 ASSESSMENT — PAIN SCALES - GENERAL
PAINLEVEL_OUTOF10: 6
PAINLEVEL_OUTOF10: 6

## 2023-12-26 NOTE — ED NOTES
Pt to ED C/o a cough X 1 week. Pt denies fevers. States she has not been able to eat. States any solid eaten, she will vomit.

## 2023-12-26 NOTE — ED PROVIDER NOTES
risks and benefits of testing and possible PE, she is agreeable to proceed. History from : Patient    Patient was given the following medications:  Medications   magnesium sulfate 2000 mg in 50 mL IVPB premix (0 mg IntraVENous Stopped 12/26/23 1823)   sodium chloride 0.9 % bolus 1,000 mL (0 mLs IntraVENous Stopped 12/26/23 1855)   acetaminophen (TYLENOL) tablet 650 mg (650 mg Oral Given 12/26/23 1744)   iopamidol (ISOVUE-370) 76 % injection 65 mL (64 mLs IntraVENous Given 12/26/23 1758)   amoxicillin-clavulanate (AUGMENTIN) 875-125 MG per tablet 1 tablet (1 tablet Oral Given 12/26/23 1908)   doxycycline hyclate (VIBRA-TABS) tablet 100 mg (100 mg Oral Given 12/26/23 1908)       EKG (if obtained): (All EKG's are interpreted by myself in the absence of a cardiologist) sinus tachycardia with LAD. Poor R wave progression. Nonspecific T wave changes without ST elevation or depression. Appears similar to prior tracing. Chronic conditions affecting care: lung CA, COPD, HLD, HTN    Disposition Considerations (tests considered but not done, Shared Decision Making, Pt Expectation of Test or Tx.):     Care endorsed to Dr. Jluis Carpio pending final results of CTA and disposition. I am the Primary Clinician of Record. Final Impression:  1.  Pneumonia of left lower lobe due to infectious organism      DISPOSITION Decision To Discharge 12/26/2023 07:09:45 PM      Patient referred to:  Geraldo Cobian MD  [address]  [phone]    Schedule an appointment as soon as possible for a visit       [facility] Emergency Department  [address] [place] [id number]  [phone]    If symptoms worsen    Discharge medications:  Discharge Medication List as of 12/26/2023  6:59 PM        START taking these medications    Details   amoxicillin-clavulanate (AUGMENTIN) 875-125 MG per tablet Take 1 tablet by mouth 2 times daily for 7 days, Disp-14 tablet, R-0Normal      doxycycline hyclate (VIBRA-TABS) 100 MG tablet Take 1 tablet by mouth 2 times daily for 7 days, Disp-14 tablet, R-0Normal      ondansetron (ZOFRAN) 4 MG tablet Take 1 tablet by mouth 3 times daily as needed for Nausea or Vomiting, Disp-15 tablet, R-0Normal      famotidine (PEPCID) 20 MG tablet Take 1 tablet by mouth nightly as needed (indigestion), Disp-14 tablet, R-0Normal      loperamide (RA ANTI-DIARRHEAL) 2 MG capsule Take 1 capsule by mouth 4 times daily as needed for Diarrhea, Disp-20 capsule, R-0Normal      benzonatate (TESSALON) 200 MG capsule Take 1 capsule by mouth 3 times daily as needed for Cough, Disp-21 capsule, R-0Normal           (Please note that portions of this note may have been completed with a voice recognition program. Efforts were made to edit the dictations but occasionally words are mis-transcribed.)    DO Valentín Mayberry Jenny L, DO  12/31/23 2011

## 2023-12-26 NOTE — CARE COORDINATION
Patient possible readmission. Patient recently admitted 11/26/23 - 11/28/23 for Hyponatremia. Patient to presents to ED today with C/O Shortness of Breath and Cough. Patient from home w/spouse. Patient has PCP and medical insurance. No Acute Findings. Patient Discharged.

## 2023-12-27 NOTE — ED PROVIDER NOTES
Emergency Department Encounter  Location: [address] [place] [address]    Patient: Home Wheeler  MRN: [id number]  : 1955  Date of evaluation: 2023  ED Provider: Fabian Thomas MD    11:24 PM EST  Home Wheeler was checked out to me by  Dr. Ibrahima Jarrett. Please see his/her initial documentation for details of the patient's initial ED presentation, physical exam and completed studies. In brief, Home Wheeler is a 79 y.o. female that presented to the emergency department for evaluation of cough. The patient reports a productive cough this long over the last couple days. She states that she was just hospitalized recently for a separate issue. The patient also reports that she feels well right now. She has not noticed any chest pain or shortness of breath while she is resting in the room. She feels much better but she states that she really want something for the cough. She has not not had any bloody or bilious emesis but does report decreased appetite. Concern by prior physician was her heart rate and I did discuss with the patient as the last several hospital visits show a significantly elevated heart rate and heart rate today does appear to be improved. She states that she does have an elevated heart rate at baseline. General appearance:  No acute distress. Skin:  Warm. Dry. Eye:  Extraocular movements intact. Ears, nose, mouth and throat:  Oral mucosa moist   Neck:  Trachea midline. Extremity:  No swelling. Normal ROM     Heart:  Regular rhythm and normal rate  Perfusion:  intact  Respiratory:  Lungs clear to auscultation bilaterally. Respirations nonlabored. Abdominal:  Normal bowel sounds. Soft. Nontender. Non distended. Back:  No CVA tenderness to palpation     Neurological:  Alert and oriented times 3.   Motor and sensory grossly intact, coordination intact          Psychiatric:  Appropriate      I have reviewed and

## 2023-12-28 ENCOUNTER — CARE COORDINATION (OUTPATIENT)
Dept: CASE MANAGEMENT | Age: 68
End: 2023-12-28

## 2023-12-28 NOTE — CARE COORDINATION
Care Transitions Follow Up Call    Patient Current Location:  Home: [address] [facility] [employer] Transition Nurse contacted the patient by telephone to follow up after admission on 23. Verified name and  with patient as identifiers. Patient: Claudette Grebe  Patient : 1955   MRN: [id number]  Reason for Admission: Hyponatremia, then ED 23 PNA  Discharge Date: 23 RARS: Readmission Risk Score: 12.7      Needs to be reviewed by the provider   Additional needs identified to be addressed with provider: No  none             Method of communication with provider: none. Spoke with Tobi Paige, says she is feeling a little better. Says,\" it was rough for a while, was very tired, had a bad cough,I was in  the ED 23 with PNA, started on abx. Noted on Amox & Doxy until 24. Was prescribed Zofran for nausea. She says she has  intermittent nausea, no vomitting. She has some loose stools, but its somewhat better. Has Lomotil just in case. Taking Tessalon for cough (says it helps)  Says cough & her breathing has improved since on abx. . Denies fever/chills. No chest pain. No wheezing. She has follow up appt with PCP on 24,  will take her to appt. Reviewed PNA mgmt  Take meds as directed, do not skip any prescribed antibiotics or corticosteroids  Rest, pace activities  Drink plenty of fluids (if not contraindicated), proper nutrition  Manage fever with OTC  Notify MD immediately if noting sob, heart fluttering, thicker or discolored phlegm, increased fatigue/weakness, fever, chills, sweating, dry cough, headache. Call 911 if noting acute distress     Reviewed reason to contact providers for new/worsening s/s for early intervention. She v/u. CTN will continue to follow. Addressed changes since last contact:   in ED 23 for PNA, feeling better after abx  Discussed follow-up appointments.  If no appointment was previously scheduled, appointment

## 2024-01-05 ENCOUNTER — APPOINTMENT (OUTPATIENT)
Dept: CT IMAGING | Age: 69
End: 2024-01-05
Payer: MEDICARE

## 2024-01-05 ENCOUNTER — HOSPITAL ENCOUNTER (EMERGENCY)
Age: 69
Discharge: HOME | End: 2024-01-05
Payer: MEDICARE

## 2024-01-05 ENCOUNTER — APPOINTMENT (OUTPATIENT)
Dept: GENERAL RADIOLOGY | Age: 69
End: 2024-01-05
Payer: MEDICARE

## 2024-01-05 VITALS
OXYGEN SATURATION: 96 % | RESPIRATION RATE: 22 BRPM | SYSTOLIC BLOOD PRESSURE: 164 MMHG | TEMPERATURE: 97.7 F | HEIGHT: 65 IN | WEIGHT: 162 LBS | DIASTOLIC BLOOD PRESSURE: 105 MMHG | BODY MASS INDEX: 26.99 KG/M2 | HEART RATE: 98 BPM

## 2024-01-05 DIAGNOSIS — E87.1 HYPONATREMIA: ICD-10-CM

## 2024-01-05 DIAGNOSIS — R19.7 NAUSEA VOMITING AND DIARRHEA: Primary | ICD-10-CM

## 2024-01-05 DIAGNOSIS — R11.2 NAUSEA VOMITING AND DIARRHEA: Primary | ICD-10-CM

## 2024-01-05 DIAGNOSIS — R10.13 ABDOMINAL PAIN, EPIGASTRIC: ICD-10-CM

## 2024-01-05 DIAGNOSIS — R79.0 LOW MAGNESIUM LEVEL: ICD-10-CM

## 2024-01-05 DIAGNOSIS — E87.20 METABOLIC ACIDOSIS: ICD-10-CM

## 2024-01-05 DIAGNOSIS — E86.0 DEHYDRATION: ICD-10-CM

## 2024-01-05 LAB
ALBUMIN SERPL-MCNC: 3.5 GM/DL (ref 3.4–5)
ALP BLD-CCNC: 59 IU/L (ref 40–129)
ALT SERPL-CCNC: 9 U/L (ref 10–40)
ANION GAP SERPL CALCULATED.3IONS-SCNC: 20 MMOL/L (ref 7–16)
AST SERPL-CCNC: 29 IU/L (ref 15–37)
BACTERIA: NEGATIVE /HPF
BASE EXCESS: 8 (ref 0–3)
BASOPHILS ABSOLUTE: 0.1 K/CU MM
BASOPHILS RELATIVE PERCENT: 1.4 % (ref 0–1)
BILIRUB SERPL-MCNC: 0.3 MG/DL (ref 0–1)
BILIRUBIN URINE: ABNORMAL MG/DL
BLOOD, URINE: ABNORMAL
BUN SERPL-MCNC: 3 MG/DL (ref 6–23)
CALCIUM SERPL-MCNC: 9.3 MG/DL (ref 8.3–10.6)
CHLORIDE BLD-SCNC: 93 MMOL/L (ref 99–110)
CLARITY: CLEAR
CO2: 15 MMOL/L (ref 21–32)
COLOR: YELLOW
COMMENT: ABNORMAL
CREAT SERPL-MCNC: 0.7 MG/DL (ref 0.6–1.1)
DIFFERENTIAL TYPE: ABNORMAL
EOSINOPHILS ABSOLUTE: 0.3 K/CU MM
EOSINOPHILS RELATIVE PERCENT: 7 % (ref 0–3)
GFR SERPL CREATININE-BSD FRML MDRD: >60 ML/MIN/1.73M2
GLUCOSE SERPL-MCNC: 67 MG/DL (ref 70–99)
GLUCOSE, URINE: NEGATIVE MG/DL
HCO3 VENOUS: 19.3 MMOL/L (ref 22–29)
HCT VFR BLD CALC: 38.1 % (ref 37–47)
HEMOGLOBIN: 11.7 GM/DL (ref 12.5–16)
IMMATURE NEUTROPHIL %: 0.2 % (ref 0–0.43)
KETONES, URINE: >80 MG/DL
LACTIC ACID, SEPSIS: 0.9 MMOL/L (ref 0.4–2)
LEUKOCYTE ESTERASE, URINE: ABNORMAL
LIPASE: 13 IU/L (ref 13–60)
LYMPHOCYTES ABSOLUTE: 1.4 K/CU MM
LYMPHOCYTES RELATIVE PERCENT: 31.7 % (ref 24–44)
MAGNESIUM: 1.5 MG/DL (ref 1.8–2.4)
MCH RBC QN AUTO: 28.7 PG (ref 27–31)
MCHC RBC AUTO-ENTMCNC: 30.7 % (ref 32–36)
MCV RBC AUTO: 93.4 FL (ref 78–100)
MONOCYTES ABSOLUTE: 0.6 K/CU MM
MONOCYTES RELATIVE PERCENT: 14.3 % (ref 0–4)
MUCUS: ABNORMAL HPF
NITRITE URINE, QUANTITATIVE: NEGATIVE
NUCLEATED RBC %: 0 %
O2 SAT, VEN: 85.1 % (ref 50–70)
PCO2, VEN: 44 MMHG (ref 41–51)
PDW BLD-RTO: 13.7 % (ref 11.7–14.9)
PH VENOUS: 7.25 (ref 7.32–7.43)
PH, URINE: 6 (ref 5–8)
PLATELET # BLD: 411 K/CU MM (ref 140–440)
PMV BLD AUTO: 9.3 FL (ref 7.5–11.1)
PO2, VEN: 57 MMHG (ref 28–48)
POTASSIUM SERPL-SCNC: 4 MMOL/L (ref 3.5–5.1)
PROTEIN UA: NEGATIVE MG/DL
RBC # BLD: 4.08 M/CU MM (ref 4.2–5.4)
RBC URINE: 2 /HPF (ref 0–6)
SEGMENTED NEUTROPHILS ABSOLUTE COUNT: 2 K/CU MM
SEGMENTED NEUTROPHILS RELATIVE PERCENT: 45.4 % (ref 36–66)
SODIUM BLD-SCNC: 128 MMOL/L (ref 135–145)
SPECIFIC GRAVITY UA: >1.03 (ref 1–1.03)
SQUAMOUS EPITHELIAL: <1 /HPF
TOTAL IMMATURE NEUTOROPHIL: 0.01 K/CU MM
TOTAL NUCLEATED RBC: 0 K/CU MM
TOTAL PROTEIN: 6.3 GM/DL (ref 6.4–8.2)
TRANSITIONAL EPITHELIAL: <1 /HPF
TRICHOMONAS: ABNORMAL /HPF
UROBILINOGEN, URINE: 0.2 MG/DL (ref 0.2–1)
WBC # BLD: 4.4 K/CU MM (ref 4–10.5)
WBC UA: 21 /HPF (ref 0–5)

## 2024-01-05 PROCEDURE — 2580000003 HC RX 258: Performed by: PHYSICIAN ASSISTANT

## 2024-01-05 PROCEDURE — 83690 ASSAY OF LIPASE: CPT

## 2024-01-05 PROCEDURE — 93005 ELECTROCARDIOGRAM TRACING: CPT | Performed by: PHYSICIAN ASSISTANT

## 2024-01-05 PROCEDURE — 83605 ASSAY OF LACTIC ACID: CPT

## 2024-01-05 PROCEDURE — 81001 URINALYSIS AUTO W/SCOPE: CPT

## 2024-01-05 PROCEDURE — 80053 COMPREHEN METABOLIC PANEL: CPT

## 2024-01-05 PROCEDURE — 2500000003 HC RX 250 WO HCPCS: Performed by: PHYSICIAN ASSISTANT

## 2024-01-05 PROCEDURE — 71046 X-RAY EXAM CHEST 2 VIEWS: CPT

## 2024-01-05 PROCEDURE — 85025 COMPLETE CBC W/AUTO DIFF WBC: CPT

## 2024-01-05 PROCEDURE — 96366 THER/PROPH/DIAG IV INF ADDON: CPT

## 2024-01-05 PROCEDURE — 96365 THER/PROPH/DIAG IV INF INIT: CPT

## 2024-01-05 PROCEDURE — 83735 ASSAY OF MAGNESIUM: CPT

## 2024-01-05 PROCEDURE — 74176 CT ABD & PELVIS W/O CONTRAST: CPT

## 2024-01-05 PROCEDURE — 82805 BLOOD GASES W/O2 SATURATION: CPT

## 2024-01-05 PROCEDURE — 6360000002 HC RX W HCPCS: Performed by: PHYSICIAN ASSISTANT

## 2024-01-05 PROCEDURE — 99285 EMERGENCY DEPT VISIT HI MDM: CPT

## 2024-01-05 PROCEDURE — 96375 TX/PRO/DX INJ NEW DRUG ADDON: CPT

## 2024-01-05 RX ORDER — 0.9 % SODIUM CHLORIDE 0.9 %
1000 INTRAVENOUS SOLUTION INTRAVENOUS ONCE
Status: COMPLETED | OUTPATIENT
Start: 2024-01-05 | End: 2024-01-05

## 2024-01-05 RX ORDER — DEXTROSE MONOHYDRATE 25 G/50ML
50 INJECTION, SOLUTION INTRAVENOUS ONCE
Status: COMPLETED | OUTPATIENT
Start: 2024-01-05 | End: 2024-01-05

## 2024-01-05 RX ORDER — SODIUM CHLORIDE 9 MG/ML
INJECTION, SOLUTION INTRAVENOUS CONTINUOUS
Status: DISCONTINUED | OUTPATIENT
Start: 2024-01-05 | End: 2024-01-05

## 2024-01-05 RX ORDER — PROMETHAZINE HYDROCHLORIDE 25 MG/1
25 TABLET ORAL EVERY 6 HOURS PRN
Qty: 10 TABLET | Refills: 0 | Status: SHIPPED | OUTPATIENT
Start: 2024-01-05 | End: 2024-01-12

## 2024-01-05 RX ORDER — FENTANYL CITRATE 50 UG/ML
25 INJECTION, SOLUTION INTRAMUSCULAR; INTRAVENOUS ONCE
Status: COMPLETED | OUTPATIENT
Start: 2024-01-05 | End: 2024-01-05

## 2024-01-05 RX ORDER — SODIUM CHLORIDE 9 MG/ML
INJECTION, SOLUTION INTRAVENOUS CONTINUOUS
Status: DISCONTINUED | OUTPATIENT
Start: 2024-01-05 | End: 2024-01-06 | Stop reason: HOSPADM

## 2024-01-05 RX ORDER — ONDANSETRON 4 MG/1
4 TABLET, FILM COATED ORAL EVERY 8 HOURS PRN
Qty: 10 TABLET | Refills: 0 | Status: SHIPPED | OUTPATIENT
Start: 2024-01-05

## 2024-01-05 RX ORDER — MAGNESIUM SULFATE IN WATER 40 MG/ML
2000 INJECTION, SOLUTION INTRAVENOUS ONCE
Status: COMPLETED | OUTPATIENT
Start: 2024-01-05 | End: 2024-01-05

## 2024-01-05 RX ORDER — FAMOTIDINE 10 MG/ML
20 INJECTION, SOLUTION INTRAVENOUS ONCE
Status: COMPLETED | OUTPATIENT
Start: 2024-01-05 | End: 2024-01-05

## 2024-01-05 RX ORDER — ONDANSETRON 2 MG/ML
4 INJECTION INTRAMUSCULAR; INTRAVENOUS EVERY 30 MIN PRN
Status: DISCONTINUED | OUTPATIENT
Start: 2024-01-05 | End: 2024-01-06 | Stop reason: HOSPADM

## 2024-01-05 RX ADMIN — SODIUM CHLORIDE 1000 ML: 9 INJECTION, SOLUTION INTRAVENOUS at 21:07

## 2024-01-05 RX ADMIN — DEXTROSE MONOHYDRATE 50 ML: 25 INJECTION, SOLUTION INTRAVENOUS at 21:05

## 2024-01-05 RX ADMIN — ONDANSETRON 4 MG: 2 INJECTION INTRAMUSCULAR; INTRAVENOUS at 20:59

## 2024-01-05 RX ADMIN — FENTANYL CITRATE 25 MCG: 50 INJECTION, SOLUTION INTRAMUSCULAR; INTRAVENOUS at 20:59

## 2024-01-05 RX ADMIN — FAMOTIDINE 20 MG: 10 INJECTION, SOLUTION INTRAVENOUS at 20:59

## 2024-01-05 RX ADMIN — MAGNESIUM SULFATE HEPTAHYDRATE 2000 MG: 40 INJECTION, SOLUTION INTRAVENOUS at 21:16

## 2024-01-05 ASSESSMENT — PAIN - FUNCTIONAL ASSESSMENT: PAIN_FUNCTIONAL_ASSESSMENT: 0-10

## 2024-01-05 ASSESSMENT — PAIN DESCRIPTION - ORIENTATION: ORIENTATION: MID;UPPER

## 2024-01-05 ASSESSMENT — PAIN SCALES - GENERAL: PAINLEVEL_OUTOF10: 8

## 2024-01-05 ASSESSMENT — PAIN DESCRIPTION - LOCATION: LOCATION: ABDOMEN

## 2024-01-06 LAB
EKG ATRIAL RATE: 102 BPM
EKG DIAGNOSIS: NORMAL
EKG P AXIS: 81 DEGREES
EKG P-R INTERVAL: 208 MS
EKG Q-T INTERVAL: 376 MS
EKG QRS DURATION: 98 MS
EKG QTC CALCULATION (BAZETT): 490 MS
EKG R AXIS: 11 DEGREES
EKG T AXIS: 61 DEGREES
EKG VENTRICULAR RATE: 102 BPM

## 2024-01-06 PROCEDURE — 93010 ELECTROCARDIOGRAM REPORT: CPT | Performed by: INTERNAL MEDICINE

## 2024-01-06 NOTE — ED PROVIDER NOTES
EMERGENCY DEPARTMENT ENCOUNTER        Pt Name: Adenike Harvey  MRN: [id number]  Birthdate 1955  Date of evaluation: 1/5/2024  Provider: REGINA BERGMAN  PCP: Iban Lou MD    EMELY. I have evaluated this patient.        Triage CHIEF COMPLAINT       Chief Complaint   Patient presents with    Emesis    Diarrhea     With epigastric pain.     Cough     States coughing up mucus as well. Was seen on 12/26/23 dx with PN and sent home antibiotics. Reports attempting to take them but has been vomiting and not been able to keep every dose down      HISTORY OF PRESENT ILLNESS      Chief Complaint: Nausea vomiting diarrhea, generalized malaise, epigastric abdominal pain.    Adenike Harvey is a 68 y.o.  female who presents to the emergency department today via private vehicle with a chief complaint of nausea vomiting diarrhea, generalized malaise, weakness.  States that on 12/26 was diagnosed with pneumonia, had been prescribed doxycycline and Augmentin?  States that she has been taking both of them.  She states that she has had nausea vomiting the point where she is having decreased oral intake, is having several episodes of nonbloody diarrhea.  Is having some epigastrium/generalized abdominal cramping.  No measured fevers.  No hematemesis or bright red blood per the rectum.  Describes no significant chest pain shortness of breath lightheadedness dizziness, syncope and/or near syncope.  Patient is in remission for lung cancer, she is receiving immunotherapy but has not been able to secondary to the nausea vomiting diarrhea.  Patient has had history of hyponatremia, chronic low magnesium levels as well.  Patient feels like she is dehydrated.    Nursing Notes were all reviewed and agreed with or any disagreements were addressed in the HPI.    REVIEW OF SYSTEMS     At least 10 systems reviewed and otherwise acutely negative except as in [place].     PAST MEDICAL HISTORY     Past Medical History:

## 2024-01-06 NOTE — ED PROVIDER NOTES
I did not see this patient.  I reviewed the EKG which shows minimal sinus tachycardia ventricular rate of 102 bpm with no ST elevations or ST depressions or Q waves.  No AV block noted.  No axis deviation noted.  QTc is 490 ms.  WY interval is minimally prolonged at 208 ms and QRS duration is 98 ms.     Wan Grace MD  01/05/24 1581

## 2024-01-09 DIAGNOSIS — C34.11 MALIGNANT NEOPLASM OF UPPER LOBE, RIGHT BRONCHUS OR LUNG (HCC): ICD-10-CM

## 2024-01-09 DIAGNOSIS — C34.91 SQUAMOUS CELL LUNG CANCER, RIGHT (HCC): ICD-10-CM

## 2024-01-09 DIAGNOSIS — Z79.899 ENCOUNTER FOR LONG-TERM (CURRENT) USE OF HIGH-RISK MEDICATION: Primary | ICD-10-CM

## 2024-01-10 ENCOUNTER — CLINICAL DOCUMENTATION (OUTPATIENT)
Facility: HOSPITAL | Age: 69
End: 2024-01-10

## 2024-01-10 NOTE — PROGRESS NOTES
Patient Assistance        Navigator Type: Infusion  Documentation Type: Assistance Review  Contact Type: Telephone  Navigation Status: New Patient  Status of Patient Insurance Coverage: Patient has active coverage          Additional notes: The patient has been approved for foundation assistance through [employer] until 1/4/2025. She has been awarded $4100.    Drug Name: Keytruda  Form of PAP Assistance: Foundation Assistance

## 2024-01-11 ENCOUNTER — OFFICE VISIT (OUTPATIENT)
Dept: FAMILY MEDICINE CLINIC | Age: 69
End: 2024-01-11
Payer: MEDICARE

## 2024-01-11 VITALS
WEIGHT: 156 LBS | HEIGHT: 65 IN | DIASTOLIC BLOOD PRESSURE: 74 MMHG | OXYGEN SATURATION: 92 % | HEART RATE: 97 BPM | BODY MASS INDEX: 25.99 KG/M2 | SYSTOLIC BLOOD PRESSURE: 122 MMHG

## 2024-01-11 DIAGNOSIS — B37.0 THRUSH, ORAL: Primary | ICD-10-CM

## 2024-01-11 DIAGNOSIS — R09.82 POST-NASAL DRIP: ICD-10-CM

## 2024-01-11 DIAGNOSIS — J44.9 MODERATE COPD (CHRONIC OBSTRUCTIVE PULMONARY DISEASE) (HCC): ICD-10-CM

## 2024-01-11 DIAGNOSIS — M54.31 RIGHT SCIATIC NERVE PAIN: ICD-10-CM

## 2024-01-11 DIAGNOSIS — E87.1 HYPONATREMIA: ICD-10-CM

## 2024-01-11 DIAGNOSIS — T45.1X5A CHRONIC PAINFUL POLYNEUROPATHY AFTER CHEMOTHERAPY (HCC): ICD-10-CM

## 2024-01-11 DIAGNOSIS — G62.0 CHRONIC PAINFUL POLYNEUROPATHY AFTER CHEMOTHERAPY (HCC): ICD-10-CM

## 2024-01-11 PROCEDURE — 1123F ACP DISCUSS/DSCN MKR DOCD: CPT | Performed by: INTERNAL MEDICINE

## 2024-01-11 PROCEDURE — 3074F SYST BP LT 130 MM HG: CPT | Performed by: INTERNAL MEDICINE

## 2024-01-11 PROCEDURE — 3078F DIAST BP <80 MM HG: CPT | Performed by: INTERNAL MEDICINE

## 2024-01-11 PROCEDURE — 99213 OFFICE O/P EST LOW 20 MIN: CPT | Performed by: INTERNAL MEDICINE

## 2024-01-11 RX ORDER — FAMOTIDINE 10 MG/ML
20 INJECTION, SOLUTION INTRAVENOUS
OUTPATIENT
Start: 2024-02-08

## 2024-01-11 RX ORDER — EPINEPHRINE 1 MG/ML
0.3 INJECTION, SOLUTION, CONCENTRATE INTRAVENOUS PRN
OUTPATIENT
Start: 2024-02-08

## 2024-01-11 RX ORDER — FLUTICASONE PROPIONATE 50 MCG
2 SPRAY, SUSPENSION (ML) NASAL DAILY
Qty: 3 EACH | Refills: 3 | Status: SHIPPED | OUTPATIENT
Start: 2024-01-11

## 2024-01-11 RX ORDER — BUDESONIDE AND FORMOTEROL FUMARATE DIHYDRATE 160; 4.5 UG/1; UG/1
2 AEROSOL RESPIRATORY (INHALATION) 2 TIMES DAILY
Qty: 3 EACH | Refills: 3 | Status: SHIPPED | OUTPATIENT
Start: 2024-01-11

## 2024-01-11 RX ORDER — PROMETHAZINE HYDROCHLORIDE 25 MG/1
25 TABLET ORAL EVERY 6 HOURS PRN
Qty: 10 TABLET | Refills: 0 | Status: SHIPPED | OUTPATIENT
Start: 2024-01-11 | End: 2024-01-18

## 2024-01-11 RX ORDER — ACETAMINOPHEN 325 MG/1
650 TABLET ORAL
OUTPATIENT
Start: 2024-02-08

## 2024-01-11 RX ORDER — DIPHENHYDRAMINE HYDROCHLORIDE 50 MG/ML
50 INJECTION INTRAMUSCULAR; INTRAVENOUS
OUTPATIENT
Start: 2024-02-08

## 2024-01-11 RX ORDER — MEPERIDINE HYDROCHLORIDE 50 MG/ML
12.5 INJECTION INTRAMUSCULAR; INTRAVENOUS; SUBCUTANEOUS PRN
OUTPATIENT
Start: 2024-02-08

## 2024-01-11 RX ORDER — SODIUM CHLORIDE 9 MG/ML
5-250 INJECTION, SOLUTION INTRAVENOUS PRN
OUTPATIENT
Start: 2024-02-08

## 2024-01-11 RX ORDER — ALBUTEROL SULFATE 90 UG/1
4 AEROSOL, METERED RESPIRATORY (INHALATION) PRN
OUTPATIENT
Start: 2024-02-08

## 2024-01-11 RX ORDER — ONDANSETRON 2 MG/ML
8 INJECTION INTRAMUSCULAR; INTRAVENOUS
OUTPATIENT
Start: 2024-02-08

## 2024-01-11 RX ORDER — ALBUTEROL SULFATE 2.5 MG/3ML
2.5 SOLUTION RESPIRATORY (INHALATION) EVERY 6 HOURS PRN
Qty: 120 EACH | Refills: 3 | Status: SHIPPED | OUTPATIENT
Start: 2024-01-11

## 2024-01-11 RX ORDER — ONDANSETRON 4 MG/1
4 TABLET, FILM COATED ORAL EVERY 8 HOURS PRN
Qty: 30 TABLET | Refills: 3 | Status: SHIPPED | OUTPATIENT
Start: 2024-01-11

## 2024-01-11 RX ORDER — SODIUM CHLORIDE 0.9 % (FLUSH) 0.9 %
5-40 SYRINGE (ML) INJECTION PRN
OUTPATIENT
Start: 2024-02-08

## 2024-01-11 RX ORDER — GABAPENTIN 100 MG/1
100 CAPSULE ORAL 3 TIMES DAILY
Qty: 90 CAPSULE | Refills: 2 | Status: SHIPPED | OUTPATIENT
Start: 2024-01-11 | End: 2024-04-10

## 2024-01-11 RX ORDER — SODIUM CHLORIDE 1 G/1
1 TABLET ORAL 3 TIMES DAILY
Qty: 90 TABLET | Refills: 3 | Status: SHIPPED | OUTPATIENT
Start: 2024-01-11

## 2024-01-11 RX ORDER — DICYCLOMINE HCL 20 MG
20 TABLET ORAL 4 TIMES DAILY
Qty: 120 TABLET | Refills: 3 | Status: SHIPPED | OUTPATIENT
Start: 2024-01-11

## 2024-01-11 RX ORDER — SODIUM CHLORIDE 9 MG/ML
INJECTION, SOLUTION INTRAVENOUS CONTINUOUS
OUTPATIENT
Start: 2024-02-08

## 2024-01-11 RX ORDER — ATENOLOL 100 MG/1
100 TABLET ORAL DAILY
Qty: 30 TABLET | Refills: 3 | Status: SHIPPED | OUTPATIENT
Start: 2024-01-11

## 2024-01-11 RX ORDER — HEPARIN SODIUM (PORCINE) LOCK FLUSH IV SOLN 100 UNIT/ML 100 UNIT/ML
500 SOLUTION INTRAVENOUS PRN
OUTPATIENT
Start: 2024-02-08

## 2024-01-11 RX ORDER — FLUCONAZOLE 150 MG/1
150 TABLET ORAL DAILY
Qty: 3 TABLET | Refills: 0 | Status: SHIPPED | OUTPATIENT
Start: 2024-01-11 | End: 2024-01-14

## 2024-01-11 ASSESSMENT — PATIENT HEALTH QUESTIONNAIRE - PHQ9
2. FEELING DOWN, DEPRESSED OR HOPELESS: 1
SUM OF ALL RESPONSES TO PHQ QUESTIONS 1-9: 1
SUM OF ALL RESPONSES TO PHQ9 QUESTIONS 1 & 2: 1
SUM OF ALL RESPONSES TO PHQ QUESTIONS 1-9: 1
1. LITTLE INTEREST OR PLEASURE IN DOING THINGS: 0
SUM OF ALL RESPONSES TO PHQ QUESTIONS 1-9: 1
SUM OF ALL RESPONSES TO PHQ QUESTIONS 1-9: 1

## 2024-01-11 NOTE — PROGRESS NOTES
Outpatient Clinic Visit Note    Patient: Adenike Harvey  : 1955 (68 y.o.)  Date: 2024    CC:  Chief Complaint   Patient presents with    Follow-up     ED follow up        HPI: she was last in THe hospital in Nov for hyponatremia, but then had other ER visits I n Dec, the most recent of which was last week.  She had some mild hyponatremia.  The previous visit in late Dec, showed some pneumonia and she wA given oral antibiotics, which caused a good  deal of nausea.  She follow with Dr Gordon for the lung cancer issues on a regular basis and Dr Sutherland for radiation oncology as well.     Past Medical History:    Past Medical History:   Diagnosis Date    Anemia     Anxiety     Arthritis     \"Fingers, Back\"    Bronchitis Last Episode     Chronic back pain     COPD (chronic obstructive pulmonary disease) ([facility])     Sees Dr. Hernandez    Depression     Diverticulosis 2013    Extensive per CT    Hemoptysis 2017    History of external beam radiation therapy 2023    RIGHT LUNG 6,000 cGy in 30 fractions    Hx of blood clots     \"found I have a clot near my mediport so they are taking it out 2019- put me on Xarelto    Hyperlipidemia 2009    Hypertension 2009    Low back pain     \"better than it use to be- had therapy in the past- originally hurt back at work 20+ yrs ago\"    Lung mass     rul- scheduled to bronch     Panic attacks     Pneumonia Dx 1-17    Right Lung Cancer Dx 10-17    tx with chemo following with Dr Una Smith Dx     \"Right Side\"    Shortness of breath on exertion     Teeth missing     Upper And Lower    Urinary tract infection In Past    No Current Symptoms    Wears dentures     Full Upper, Partial Lower    Wears glasses        Past Surgical History:  Past Surgical History:   Procedure Laterality Date    BRONCHOSCOPY  2017- done     BUNIONECTOMY Bilateral     \"Done At Different Times\"    CATHETER REMOVAL Left 2019    PORT

## 2024-01-16 DIAGNOSIS — Z79.899 ENCOUNTER FOR LONG-TERM (CURRENT) USE OF HIGH-RISK MEDICATION: ICD-10-CM

## 2024-01-16 DIAGNOSIS — C34.91 SQUAMOUS CELL LUNG CANCER, RIGHT (HCC): Primary | ICD-10-CM

## 2024-01-16 DIAGNOSIS — C34.11 MALIGNANT NEOPLASM OF UPPER LOBE, RIGHT BRONCHUS OR LUNG (HCC): ICD-10-CM

## 2024-01-21 ENCOUNTER — HOSPITAL ENCOUNTER (INPATIENT)
Age: 69
LOS: 8 days | Discharge: NURSING, REHAB OR LONG-TERM CARE | DRG: 871 | End: 2024-01-29
Attending: EMERGENCY MEDICINE | Admitting: INTERNAL MEDICINE
Payer: MEDICARE

## 2024-01-21 ENCOUNTER — APPOINTMENT (OUTPATIENT)
Dept: CT IMAGING | Age: 69
DRG: 871 | End: 2024-01-21
Payer: MEDICARE

## 2024-01-21 ENCOUNTER — APPOINTMENT (OUTPATIENT)
Dept: GENERAL RADIOLOGY | Age: 69
DRG: 871 | End: 2024-01-21
Attending: EMERGENCY MEDICINE
Payer: MEDICARE

## 2024-01-21 DIAGNOSIS — R06.00 DYSPNEA AND RESPIRATORY ABNORMALITIES: ICD-10-CM

## 2024-01-21 DIAGNOSIS — R06.89 DYSPNEA AND RESPIRATORY ABNORMALITIES: ICD-10-CM

## 2024-01-21 DIAGNOSIS — E87.20 METABOLIC ACIDOSIS: ICD-10-CM

## 2024-01-21 DIAGNOSIS — R63.4 LOSS OF WEIGHT: ICD-10-CM

## 2024-01-21 DIAGNOSIS — J44.1 COPD EXACERBATION (HCC): Primary | ICD-10-CM

## 2024-01-21 DIAGNOSIS — N30.00 ACUTE CYSTITIS WITHOUT HEMATURIA: ICD-10-CM

## 2024-01-21 DIAGNOSIS — R11.2 NAUSEA AND VOMITING, UNSPECIFIED VOMITING TYPE: ICD-10-CM

## 2024-01-21 PROBLEM — R05.9 COUGH: Status: ACTIVE | Noted: 2024-01-21

## 2024-01-21 LAB
ALBUMIN SERPL-MCNC: 3.7 GM/DL (ref 3.4–5)
ALP BLD-CCNC: 68 IU/L (ref 40–129)
ALT SERPL-CCNC: <5 U/L (ref 10–40)
ANION GAP SERPL CALCULATED.3IONS-SCNC: 24 MMOL/L (ref 7–16)
APTT: 53.2 SECONDS (ref 25.1–37.1)
AST SERPL-CCNC: 21 IU/L (ref 15–37)
BACTERIA: NEGATIVE /HPF
BASE EXCESS: 12 (ref 0–3)
BASOPHILS ABSOLUTE: 0.1 K/CU MM
BASOPHILS RELATIVE PERCENT: 1.1 % (ref 0–1)
BILIRUB SERPL-MCNC: 0.4 MG/DL (ref 0–1)
BILIRUBIN URINE: ABNORMAL MG/DL
BLOOD, URINE: NEGATIVE
BUN SERPL-MCNC: 6 MG/DL (ref 6–23)
CALCIUM SERPL-MCNC: 10 MG/DL (ref 8.3–10.6)
CHLORIDE BLD-SCNC: 99 MMOL/L (ref 99–110)
CLARITY: CLEAR
CO2: 16 MMOL/L (ref 21–32)
COLOR: YELLOW
COMMENT: ABNORMAL
CREAT SERPL-MCNC: 1.1 MG/DL (ref 0.6–1.1)
DIFFERENTIAL TYPE: ABNORMAL
EOSINOPHILS ABSOLUTE: 0.4 K/CU MM
EOSINOPHILS RELATIVE PERCENT: 8.5 % (ref 0–3)
GFR SERPL CREATININE-BSD FRML MDRD: 55 ML/MIN/1.73M2
GLUCOSE SERPL-MCNC: 96 MG/DL (ref 70–99)
GLUCOSE, URINE: NEGATIVE MG/DL
HCO3 VENOUS: 15.7 MMOL/L (ref 22–29)
HCT VFR BLD CALC: 37.6 % (ref 37–47)
HEMOGLOBIN: 11.7 GM/DL (ref 12.5–16)
HYALINE CASTS: 5 /LPF
IMMATURE NEUTROPHIL %: 0.2 % (ref 0–0.43)
INFLUENZA A ANTIGEN: NOT DETECTED
INFLUENZA B ANTIGEN: NOT DETECTED
INR BLD: 1.6 INDEX
KETONES, URINE: >80 MG/DL
LEUKOCYTE ESTERASE, URINE: ABNORMAL
LYMPHOCYTES ABSOLUTE: 1.3 K/CU MM
LYMPHOCYTES RELATIVE PERCENT: 27.9 % (ref 24–44)
MAGNESIUM: 1.6 MG/DL (ref 1.8–2.4)
MCH RBC QN AUTO: 28.9 PG (ref 27–31)
MCHC RBC AUTO-ENTMCNC: 31.1 % (ref 32–36)
MCV RBC AUTO: 92.8 FL (ref 78–100)
MONOCYTES ABSOLUTE: 0.8 K/CU MM
MONOCYTES RELATIVE PERCENT: 18.1 % (ref 0–4)
MUCUS: ABNORMAL HPF
NITRITE URINE, QUANTITATIVE: NEGATIVE
NUCLEATED RBC %: 0 %
O2 SAT, VEN: 73.3 % (ref 50–70)
PCO2, VEN: 41 MMHG (ref 41–51)
PDW BLD-RTO: 14.6 % (ref 11.7–14.9)
PH VENOUS: 7.19 (ref 7.32–7.43)
PH, URINE: 6 (ref 5–8)
PLATELET # BLD: 397 K/CU MM (ref 140–440)
PMV BLD AUTO: 9.9 FL (ref 7.5–11.1)
PO2, VEN: 47 MMHG (ref 28–48)
POTASSIUM SERPL-SCNC: 3.3 MMOL/L (ref 3.5–5.1)
PRO-BNP: 654.5 PG/ML
PROTEIN UA: ABNORMAL MG/DL
PROTHROMBIN TIME: 18.9 SECONDS (ref 11.7–14.5)
RBC # BLD: 4.05 M/CU MM (ref 4.2–5.4)
RBC URINE: 1 /HPF (ref 0–6)
SARS-COV-2 RDRP RESP QL NAA+PROBE: NOT DETECTED
SEGMENTED NEUTROPHILS ABSOLUTE COUNT: 2 K/CU MM
SEGMENTED NEUTROPHILS RELATIVE PERCENT: 44.2 % (ref 36–66)
SODIUM BLD-SCNC: 139 MMOL/L (ref 135–145)
SOURCE: NORMAL
SPECIFIC GRAVITY UA: 1.02 (ref 1–1.03)
SQUAMOUS EPITHELIAL: <1 /HPF
TOTAL IMMATURE NEUTOROPHIL: 0.01 K/CU MM
TOTAL NUCLEATED RBC: 0 K/CU MM
TOTAL PROTEIN: 6.7 GM/DL (ref 6.4–8.2)
TRICHOMONAS: ABNORMAL /HPF
TROPONIN, HIGH SENSITIVITY: 31 NG/L (ref 0–14)
UROBILINOGEN, URINE: 1 MG/DL (ref 0.2–1)
WBC # BLD: 4.5 K/CU MM (ref 4–10.5)
WBC UA: 16 /HPF (ref 0–5)

## 2024-01-21 PROCEDURE — 6370000000 HC RX 637 (ALT 250 FOR IP): Performed by: INTERNAL MEDICINE

## 2024-01-21 PROCEDURE — 80053 COMPREHEN METABOLIC PANEL: CPT

## 2024-01-21 PROCEDURE — 6370000000 HC RX 637 (ALT 250 FOR IP): Performed by: EMERGENCY MEDICINE

## 2024-01-21 PROCEDURE — 94664 DEMO&/EVAL PT USE INHALER: CPT

## 2024-01-21 PROCEDURE — 87086 URINE CULTURE/COLONY COUNT: CPT

## 2024-01-21 PROCEDURE — 99285 EMERGENCY DEPT VISIT HI MDM: CPT

## 2024-01-21 PROCEDURE — 96375 TX/PRO/DX INJ NEW DRUG ADDON: CPT

## 2024-01-21 PROCEDURE — 85610 PROTHROMBIN TIME: CPT

## 2024-01-21 PROCEDURE — 83735 ASSAY OF MAGNESIUM: CPT

## 2024-01-21 PROCEDURE — 93005 ELECTROCARDIOGRAM TRACING: CPT | Performed by: EMERGENCY MEDICINE

## 2024-01-21 PROCEDURE — 1200000000 HC SEMI PRIVATE

## 2024-01-21 PROCEDURE — 87635 SARS-COV-2 COVID-19 AMP PRB: CPT

## 2024-01-21 PROCEDURE — 6360000002 HC RX W HCPCS: Performed by: EMERGENCY MEDICINE

## 2024-01-21 PROCEDURE — 85025 COMPLETE CBC W/AUTO DIFF WBC: CPT

## 2024-01-21 PROCEDURE — 84484 ASSAY OF TROPONIN QUANT: CPT

## 2024-01-21 PROCEDURE — 6360000002 HC RX W HCPCS: Performed by: INTERNAL MEDICINE

## 2024-01-21 PROCEDURE — 96365 THER/PROPH/DIAG IV INF INIT: CPT

## 2024-01-21 PROCEDURE — 83880 ASSAY OF NATRIURETIC PEPTIDE: CPT

## 2024-01-21 PROCEDURE — 2580000003 HC RX 258: Performed by: EMERGENCY MEDICINE

## 2024-01-21 PROCEDURE — 82805 BLOOD GASES W/O2 SATURATION: CPT

## 2024-01-21 PROCEDURE — 71045 X-RAY EXAM CHEST 1 VIEW: CPT

## 2024-01-21 PROCEDURE — 2500000003 HC RX 250 WO HCPCS: Performed by: EMERGENCY MEDICINE

## 2024-01-21 PROCEDURE — 94640 AIRWAY INHALATION TREATMENT: CPT

## 2024-01-21 PROCEDURE — 96361 HYDRATE IV INFUSION ADD-ON: CPT

## 2024-01-21 PROCEDURE — 85730 THROMBOPLASTIN TIME PARTIAL: CPT

## 2024-01-21 PROCEDURE — 96367 TX/PROPH/DG ADDL SEQ IV INF: CPT

## 2024-01-21 PROCEDURE — 87502 INFLUENZA DNA AMP PROBE: CPT

## 2024-01-21 PROCEDURE — 87040 BLOOD CULTURE FOR BACTERIA: CPT

## 2024-01-21 PROCEDURE — 81001 URINALYSIS AUTO W/SCOPE: CPT

## 2024-01-21 RX ORDER — MAGNESIUM SULFATE IN WATER 40 MG/ML
2000 INJECTION, SOLUTION INTRAVENOUS PRN
Status: DISCONTINUED | OUTPATIENT
Start: 2024-01-21 | End: 2024-01-29 | Stop reason: HOSPADM

## 2024-01-21 RX ORDER — POTASSIUM CHLORIDE 20 MEQ/1
40 TABLET, EXTENDED RELEASE ORAL PRN
Status: DISCONTINUED | OUTPATIENT
Start: 2024-01-21 | End: 2024-01-29 | Stop reason: HOSPADM

## 2024-01-21 RX ORDER — SODIUM CHLORIDE 9 MG/ML
INJECTION, SOLUTION INTRAVENOUS PRN
Status: DISCONTINUED | OUTPATIENT
Start: 2024-01-21 | End: 2024-01-29 | Stop reason: HOSPADM

## 2024-01-21 RX ORDER — ONDANSETRON 4 MG/1
4 TABLET, ORALLY DISINTEGRATING ORAL EVERY 8 HOURS PRN
Status: DISCONTINUED | OUTPATIENT
Start: 2024-01-21 | End: 2024-01-29 | Stop reason: HOSPADM

## 2024-01-21 RX ORDER — IPRATROPIUM BROMIDE AND ALBUTEROL SULFATE 2.5; .5 MG/3ML; MG/3ML
1 SOLUTION RESPIRATORY (INHALATION) ONCE
Status: COMPLETED | OUTPATIENT
Start: 2024-01-21 | End: 2024-01-21

## 2024-01-21 RX ORDER — ALBUTEROL SULFATE 2.5 MG/3ML
2.5 SOLUTION RESPIRATORY (INHALATION) EVERY 6 HOURS PRN
Status: DISCONTINUED | OUTPATIENT
Start: 2024-01-21 | End: 2024-01-29 | Stop reason: HOSPADM

## 2024-01-21 RX ORDER — ACETAMINOPHEN 650 MG/1
650 SUPPOSITORY RECTAL EVERY 6 HOURS PRN
Status: DISCONTINUED | OUTPATIENT
Start: 2024-01-21 | End: 2024-01-29 | Stop reason: HOSPADM

## 2024-01-21 RX ORDER — ONDANSETRON 2 MG/ML
4 INJECTION INTRAMUSCULAR; INTRAVENOUS EVERY 6 HOURS PRN
Status: DISCONTINUED | OUTPATIENT
Start: 2024-01-21 | End: 2024-01-29 | Stop reason: HOSPADM

## 2024-01-21 RX ORDER — POTASSIUM CHLORIDE 20 MEQ/1
40 TABLET, EXTENDED RELEASE ORAL ONCE
Status: COMPLETED | OUTPATIENT
Start: 2024-01-21 | End: 2024-01-21

## 2024-01-21 RX ORDER — 0.9 % SODIUM CHLORIDE 0.9 %
1000 INTRAVENOUS SOLUTION INTRAVENOUS ONCE
Status: COMPLETED | OUTPATIENT
Start: 2024-01-21 | End: 2024-01-21

## 2024-01-21 RX ORDER — POTASSIUM CHLORIDE 7.45 MG/ML
10 INJECTION INTRAVENOUS PRN
Status: DISCONTINUED | OUTPATIENT
Start: 2024-01-21 | End: 2024-01-29 | Stop reason: HOSPADM

## 2024-01-21 RX ORDER — ENOXAPARIN SODIUM 100 MG/ML
40 INJECTION SUBCUTANEOUS DAILY
Status: DISCONTINUED | OUTPATIENT
Start: 2024-01-22 | End: 2024-01-29 | Stop reason: HOSPADM

## 2024-01-21 RX ORDER — ACETAMINOPHEN 325 MG/1
650 TABLET ORAL EVERY 6 HOURS PRN
Status: DISCONTINUED | OUTPATIENT
Start: 2024-01-21 | End: 2024-01-29 | Stop reason: HOSPADM

## 2024-01-21 RX ORDER — SODIUM CHLORIDE 0.9 % (FLUSH) 0.9 %
5-40 SYRINGE (ML) INJECTION PRN
Status: DISCONTINUED | OUTPATIENT
Start: 2024-01-21 | End: 2024-01-29 | Stop reason: HOSPADM

## 2024-01-21 RX ORDER — FLUTICASONE PROPIONATE 50 MCG
2 SPRAY, SUSPENSION (ML) NASAL DAILY
Status: DISCONTINUED | OUTPATIENT
Start: 2024-01-22 | End: 2024-01-29 | Stop reason: HOSPADM

## 2024-01-21 RX ORDER — GABAPENTIN 100 MG/1
100 CAPSULE ORAL 3 TIMES DAILY
Status: DISCONTINUED | OUTPATIENT
Start: 2024-01-21 | End: 2024-01-29 | Stop reason: HOSPADM

## 2024-01-21 RX ORDER — SODIUM CHLORIDE 0.9 % (FLUSH) 0.9 %
5-40 SYRINGE (ML) INJECTION EVERY 12 HOURS SCHEDULED
Status: DISCONTINUED | OUTPATIENT
Start: 2024-01-21 | End: 2024-01-29 | Stop reason: HOSPADM

## 2024-01-21 RX ORDER — GUAIFENESIN 200 MG/10ML
200 LIQUID ORAL ONCE
Status: COMPLETED | OUTPATIENT
Start: 2024-01-21 | End: 2024-01-21

## 2024-01-21 RX ORDER — MAGNESIUM SULFATE IN WATER 40 MG/ML
2000 INJECTION, SOLUTION INTRAVENOUS ONCE
Status: COMPLETED | OUTPATIENT
Start: 2024-01-21 | End: 2024-01-21

## 2024-01-21 RX ORDER — DICYCLOMINE HCL 20 MG
20 TABLET ORAL 4 TIMES DAILY
Status: DISCONTINUED | OUTPATIENT
Start: 2024-01-21 | End: 2024-01-29 | Stop reason: HOSPADM

## 2024-01-21 RX ORDER — SODIUM CHLORIDE 1 G/1
1 TABLET ORAL 3 TIMES DAILY
Status: DISCONTINUED | OUTPATIENT
Start: 2024-01-21 | End: 2024-01-24

## 2024-01-21 RX ORDER — BUDESONIDE AND FORMOTEROL FUMARATE DIHYDRATE 160; 4.5 UG/1; UG/1
2 AEROSOL RESPIRATORY (INHALATION) 2 TIMES DAILY
Status: DISCONTINUED | OUTPATIENT
Start: 2024-01-21 | End: 2024-01-29 | Stop reason: HOSPADM

## 2024-01-21 RX ORDER — ATENOLOL 25 MG/1
100 TABLET ORAL DAILY
Status: DISCONTINUED | OUTPATIENT
Start: 2024-01-22 | End: 2024-01-28

## 2024-01-21 RX ORDER — POLYETHYLENE GLYCOL 3350 17 G/17G
17 POWDER, FOR SOLUTION ORAL DAILY PRN
Status: DISCONTINUED | OUTPATIENT
Start: 2024-01-21 | End: 2024-01-29 | Stop reason: HOSPADM

## 2024-01-21 RX ORDER — LEVOFLOXACIN 5 MG/ML
500 INJECTION, SOLUTION INTRAVENOUS ONCE
Status: COMPLETED | OUTPATIENT
Start: 2024-01-21 | End: 2024-01-21

## 2024-01-21 RX ADMIN — IPRATROPIUM BROMIDE AND ALBUTEROL SULFATE 1 DOSE: .5; 2.5 SOLUTION RESPIRATORY (INHALATION) at 17:02

## 2024-01-21 RX ADMIN — LEVOFLOXACIN 500 MG: 5 INJECTION, SOLUTION INTRAVENOUS at 19:10

## 2024-01-21 RX ADMIN — POTASSIUM CHLORIDE 40 MEQ: 1500 TABLET, EXTENDED RELEASE ORAL at 20:15

## 2024-01-21 RX ADMIN — NYSTATIN 500000 UNITS: 100000 SUSPENSION ORAL at 23:02

## 2024-01-21 RX ADMIN — IPRATROPIUM BROMIDE AND ALBUTEROL SULFATE 1 DOSE: .5; 2.5 SOLUTION RESPIRATORY (INHALATION) at 17:04

## 2024-01-21 RX ADMIN — SODIUM CHLORIDE 1000 ML: 9 INJECTION, SOLUTION INTRAVENOUS at 19:53

## 2024-01-21 RX ADMIN — SODIUM CHLORIDE 1000 ML: 9 INJECTION, SOLUTION INTRAVENOUS at 19:52

## 2024-01-21 RX ADMIN — IPRATROPIUM BROMIDE AND ALBUTEROL SULFATE 1 DOSE: .5; 2.5 SOLUTION RESPIRATORY (INHALATION) at 17:01

## 2024-01-21 RX ADMIN — SODIUM CHLORIDE 1 G: 1 TABLET ORAL at 23:02

## 2024-01-21 RX ADMIN — MAGNESIUM SULFATE HEPTAHYDRATE 2000 MG: 40 INJECTION, SOLUTION INTRAVENOUS at 20:19

## 2024-01-21 RX ADMIN — METHYLPREDNISOLONE SODIUM SUCCINATE 125 MG: 125 INJECTION, POWDER, FOR SOLUTION INTRAMUSCULAR; INTRAVENOUS at 16:55

## 2024-01-21 RX ADMIN — GABAPENTIN 100 MG: 100 CAPSULE ORAL at 23:01

## 2024-01-21 RX ADMIN — GUAIFENESIN 200 MG: 200 SOLUTION ORAL at 16:55

## 2024-01-21 RX ADMIN — DICYCLOMINE HYDROCHLORIDE 20 MG: 20 TABLET ORAL at 23:02

## 2024-01-21 RX ADMIN — SODIUM CHLORIDE 1000 ML: 9 INJECTION, SOLUTION INTRAVENOUS at 16:56

## 2024-01-21 ASSESSMENT — PAIN DESCRIPTION - LOCATION: LOCATION: HEAD

## 2024-01-21 ASSESSMENT — PAIN SCALES - GENERAL
PAINLEVEL_OUTOF10: 5
PAINLEVEL_OUTOF10: 0

## 2024-01-21 ASSESSMENT — LIFESTYLE VARIABLES
HOW OFTEN DO YOU HAVE A DRINK CONTAINING ALCOHOL: NEVER
HOW MANY STANDARD DRINKS CONTAINING ALCOHOL DO YOU HAVE ON A TYPICAL DAY: PATIENT DOES NOT DRINK

## 2024-01-21 ASSESSMENT — PAIN DESCRIPTION - DESCRIPTORS: DESCRIPTORS: ACHING

## 2024-01-21 NOTE — DISCHARGE INSTR - COC
Continuity of Care Form    Patient Name: Adenike Harvey   :  1955  MRN:  [id number]    Admit date:  2024  Discharge date:  2024    Code Status Order: Prior   Advance Directives:     Admitting Physician:  No admitting provider for patient encounter.  PCP: Iban Lou MD    Discharging Nurse: Leonila BRAGG  Discharging Hospital Unit/Room#: ED21/ED-21  Discharging Unit Phone Number: [phone]    Emergency Contact:   Extended Emergency Contact Information  Primary Emergency Contact: [place] [address] [facility]  Home Phone: [phone]  Mobile Phone: [phone]  Relation: Spouse    Past Surgical History:  Past Surgical History:   Procedure Laterality Date    BRONCHOSCOPY  2017- done     BUNIONECTOMY Bilateral     \"Done At Different Times\"    CATHETER REMOVAL Left 2019    PORT REMOVAL performed by Pepe [facility]pathi MD at [facility] OR    COLONOSCOPY      DILATION AND CURETTAGE OF UTERUS      ELBOW SURGERY Left     \"Tendon Repair\"    LUNG SURGERY  2018    per old chart had thoracoscopy and RUL lobectomy , bronchoscopy and lymph node bx     MEDIASTINOSCOPY  2018    Bronchoscopy    TONSILLECTOMY  1978    TUBAL LIGATION  1988    TUNNELED VENOUS PORT PLACEMENT Left 2018       Immunization History:   Immunization History   Administered Date(s) Administered    COVID-19, J&J, (age 18y+), IM, 0.5 mL 2021, 2021    Influenza A ([id number]) Vaccine PF IM 2009    Influenza Vaccine, unspecified formulation 10/01/2016    Influenza, FLUAD, (age 65 y+), Adjuvanted, 0.5mL 10/27/2022    Influenza, FLUARIX, FLULAVAL, FLUZONE (age 6 mo+) AND AFLURIA, (age 3 y+), PF, 0.5mL 10/09/2013, 10/08/2014, 2017, 2018, 11/15/2019, 2020    Pneumococcal, PPSV23, PNEUMOVAX 23, (age 2y+), SC/IM, 0.5mL 2017    TDaP, ADACEL (age 10y-64y), BOOSTRIX (age 10y+), IM, 0.5mL 2014       Active

## 2024-01-22 ENCOUNTER — APPOINTMENT (OUTPATIENT)
Dept: CT IMAGING | Age: 69
DRG: 871 | End: 2024-01-22
Payer: MEDICARE

## 2024-01-22 PROBLEM — R10.9 ABDOMINAL PAIN: Status: ACTIVE | Noted: 2024-01-22

## 2024-01-22 PROBLEM — R11.2 NAUSEA & VOMITING: Status: ACTIVE | Noted: 2024-01-22

## 2024-01-22 PROBLEM — K59.00 CONSTIPATION: Status: ACTIVE | Noted: 2024-01-22

## 2024-01-22 LAB
25(OH)D3 SERPL-MCNC: 9.44 NG/ML
ANION GAP SERPL CALCULATED.3IONS-SCNC: 18 MMOL/L (ref 7–16)
ANION GAP SERPL CALCULATED.3IONS-SCNC: 22 MMOL/L (ref 7–16)
ANISOCYTOSIS: ABNORMAL
B-OH-BUTYR SERPL-MCNC: 59.4 MG/DL (ref 0–3)
BANDED NEUTROPHILS ABSOLUTE COUNT: 0.19 K/CU MM
BANDED NEUTROPHILS RELATIVE PERCENT: 9 % (ref 5–11)
BASE EXCESS: 6 (ref 0–3)
BASOPHILS ABSOLUTE: 0 K/CU MM
BASOPHILS ABSOLUTE: 0 K/CU MM
BASOPHILS RELATIVE PERCENT: 0 % (ref 0–1)
BASOPHILS RELATIVE PERCENT: 0 % (ref 0–1)
BUN SERPL-MCNC: 5 MG/DL (ref 6–23)
BUN SERPL-MCNC: 7 MG/DL (ref 6–23)
BURR CELLS: ABNORMAL
CALCIUM SERPL-MCNC: 8.8 MG/DL (ref 8.3–10.6)
CALCIUM SERPL-MCNC: 9.2 MG/DL (ref 8.3–10.6)
CHLORIDE BLD-SCNC: 104 MMOL/L (ref 99–110)
CHLORIDE BLD-SCNC: 104 MMOL/L (ref 99–110)
CO2: 10 MMOL/L (ref 21–32)
CO2: 17 MMOL/L (ref 21–32)
COMMENT: ABNORMAL
CREAT SERPL-MCNC: 0.8 MG/DL (ref 0.6–1.1)
CREAT SERPL-MCNC: 0.8 MG/DL (ref 0.6–1.1)
DIFFERENTIAL TYPE: ABNORMAL
DIFFERENTIAL TYPE: ABNORMAL
EOSINOPHILS ABSOLUTE: 0 K/CU MM
EOSINOPHILS ABSOLUTE: 0 K/CU MM
EOSINOPHILS RELATIVE PERCENT: 0 % (ref 0–3)
EOSINOPHILS RELATIVE PERCENT: 0 % (ref 0–3)
ESTIMATED AVERAGE GLUCOSE: 103 MG/DL
FERRITIN: 286 NG/ML (ref 15–150)
FOLATE SERPL-MCNC: 6.4 NG/ML (ref 3.1–17.5)
GFR SERPL CREATININE-BSD FRML MDRD: >60 ML/MIN/1.73M2
GFR SERPL CREATININE-BSD FRML MDRD: >60 ML/MIN/1.73M2
GLUCOSE BLD-MCNC: 165 MG/DL (ref 70–99)
GLUCOSE BLD-MCNC: 204 MG/DL (ref 70–99)
GLUCOSE BLD-MCNC: 253 MG/DL (ref 70–99)
GLUCOSE SERPL-MCNC: 206 MG/DL (ref 70–99)
GLUCOSE SERPL-MCNC: 218 MG/DL (ref 70–99)
HBA1C MFR BLD: 5.2 % (ref 4.2–6.3)
HCO3 VENOUS: 17.6 MMOL/L (ref 22–29)
HCT VFR BLD CALC: 32.7 % (ref 37–47)
HCT VFR BLD CALC: 32.7 % (ref 37–47)
HEMOGLOBIN: 10.2 GM/DL (ref 12.5–16)
HEMOGLOBIN: 10.4 GM/DL (ref 12.5–16)
IMMATURE NEUTROPHIL %: 0 % (ref 0–0.43)
IMMATURE NEUTROPHIL %: 0.9 % (ref 0–0.43)
IRON: 31 UG/DL (ref 37–145)
LYMPHOCYTES ABSOLUTE: 0.1 K/CU MM
LYMPHOCYTES ABSOLUTE: 0.2 K/CU MM
LYMPHOCYTES RELATIVE PERCENT: 5.4 % (ref 24–44)
LYMPHOCYTES RELATIVE PERCENT: 7 % (ref 24–44)
MACROCYTES: ABNORMAL
MCH RBC QN AUTO: 28.9 PG (ref 27–31)
MCH RBC QN AUTO: 29.1 PG (ref 27–31)
MCHC RBC AUTO-ENTMCNC: 31.2 % (ref 32–36)
MCHC RBC AUTO-ENTMCNC: 31.8 % (ref 32–36)
MCV RBC AUTO: 90.8 FL (ref 78–100)
MCV RBC AUTO: 93.2 FL (ref 78–100)
MONOCYTES ABSOLUTE: 0 K/CU MM
MONOCYTES ABSOLUTE: 0 K/CU MM
MONOCYTES RELATIVE PERCENT: 0.9 % (ref 0–4)
MONOCYTES RELATIVE PERCENT: 2 % (ref 0–4)
NUCLEATED RBC %: 0 %
O2 SAT, VEN: 94.7 % (ref 50–70)
PCO2, VEN: 29 MMHG (ref 41–51)
PCT TRANSFERRIN: 25 % (ref 10–44)
PDW BLD-RTO: 14.7 % (ref 11.7–14.9)
PDW BLD-RTO: 14.7 % (ref 11.7–14.9)
PH VENOUS: 7.39 (ref 7.32–7.43)
PLATELET # BLD: 309 K/CU MM (ref 140–440)
PLATELET # BLD: 325 K/CU MM (ref 140–440)
PMV BLD AUTO: 10.1 FL (ref 7.5–11.1)
PMV BLD AUTO: 10.3 FL (ref 7.5–11.1)
PO2, VEN: 130 MMHG (ref 28–48)
POTASSIUM SERPL-SCNC: 4.1 MMOL/L (ref 3.5–5.1)
POTASSIUM SERPL-SCNC: 4.3 MMOL/L (ref 3.5–5.1)
RBC # BLD: 3.51 M/CU MM (ref 4.2–5.4)
RBC # BLD: 3.6 M/CU MM (ref 4.2–5.4)
SEGMENTED NEUTROPHILS ABSOLUTE COUNT: 1.8 K/CU MM
SEGMENTED NEUTROPHILS ABSOLUTE COUNT: 3.1 K/CU MM
SEGMENTED NEUTROPHILS RELATIVE PERCENT: 82 % (ref 36–66)
SEGMENTED NEUTROPHILS RELATIVE PERCENT: 92.8 % (ref 36–66)
SODIUM BLD-SCNC: 136 MMOL/L (ref 135–145)
SODIUM BLD-SCNC: 139 MMOL/L (ref 135–145)
T4 FREE SERPL-MCNC: 1.1 NG/DL (ref 0.9–1.8)
TOTAL IMMATURE NEUTOROPHIL: 0 K/CU MM
TOTAL IMMATURE NEUTOROPHIL: 0.03 K/CU MM
TOTAL IRON BINDING CAPACITY: 123 UG/DL (ref 250–450)
TOTAL NUCLEATED RBC: 0 K/CU MM
TSH SERPL DL<=0.005 MIU/L-ACNC: 1.46 UIU/ML (ref 0.27–4.2)
UNSATURATED IRON BINDING CAPACITY: 92 UG/DL (ref 110–370)
VITAMIN B-12: 864.7 PG/ML (ref 211–911)
WBC # BLD: 2.1 K/CU MM (ref 4–10.5)
WBC # BLD: 3.3 K/CU MM (ref 4–10.5)

## 2024-01-22 PROCEDURE — 99222 1ST HOSP IP/OBS MODERATE 55: CPT | Performed by: INTERNAL MEDICINE

## 2024-01-22 PROCEDURE — 6370000000 HC RX 637 (ALT 250 FOR IP): Performed by: INTERNAL MEDICINE

## 2024-01-22 PROCEDURE — 85025 COMPLETE CBC W/AUTO DIFF WBC: CPT

## 2024-01-22 PROCEDURE — 6360000002 HC RX W HCPCS: Performed by: INTERNAL MEDICINE

## 2024-01-22 PROCEDURE — 6360000002 HC RX W HCPCS: Performed by: NURSE PRACTITIONER

## 2024-01-22 PROCEDURE — 83605 ASSAY OF LACTIC ACID: CPT

## 2024-01-22 PROCEDURE — 36415 COLL VENOUS BLD VENIPUNCTURE: CPT

## 2024-01-22 PROCEDURE — 2580000003 HC RX 258: Performed by: INTERNAL MEDICINE

## 2024-01-22 PROCEDURE — 82746 ASSAY OF FOLIC ACID SERUM: CPT

## 2024-01-22 PROCEDURE — 1200000000 HC SEMI PRIVATE

## 2024-01-22 PROCEDURE — 82805 BLOOD GASES W/O2 SATURATION: CPT

## 2024-01-22 PROCEDURE — 83550 IRON BINDING TEST: CPT

## 2024-01-22 PROCEDURE — 84443 ASSAY THYROID STIM HORMONE: CPT

## 2024-01-22 PROCEDURE — 82010 KETONE BODYS QUAN: CPT

## 2024-01-22 PROCEDURE — 71250 CT THORAX DX C-: CPT

## 2024-01-22 PROCEDURE — 6370000000 HC RX 637 (ALT 250 FOR IP): Performed by: NURSE PRACTITIONER

## 2024-01-22 PROCEDURE — APPNB180 APP NON BILLABLE TIME > 60 MINS: Performed by: LICENSED PRACTICAL NURSE

## 2024-01-22 PROCEDURE — 74176 CT ABD & PELVIS W/O CONTRAST: CPT

## 2024-01-22 PROCEDURE — 94761 N-INVAS EAR/PLS OXIMETRY MLT: CPT

## 2024-01-22 PROCEDURE — 82306 VITAMIN D 25 HYDROXY: CPT

## 2024-01-22 PROCEDURE — 80048 BASIC METABOLIC PNL TOTAL CA: CPT

## 2024-01-22 PROCEDURE — 2500000003 HC RX 250 WO HCPCS: Performed by: INTERNAL MEDICINE

## 2024-01-22 PROCEDURE — 83036 HEMOGLOBIN GLYCOSYLATED A1C: CPT

## 2024-01-22 PROCEDURE — 2580000003 HC RX 258: Performed by: NURSE PRACTITIONER

## 2024-01-22 PROCEDURE — 94640 AIRWAY INHALATION TREATMENT: CPT

## 2024-01-22 PROCEDURE — 72125 CT NECK SPINE W/O DYE: CPT

## 2024-01-22 PROCEDURE — 83540 ASSAY OF IRON: CPT

## 2024-01-22 PROCEDURE — 82728 ASSAY OF FERRITIN: CPT

## 2024-01-22 PROCEDURE — 82607 VITAMIN B-12: CPT

## 2024-01-22 PROCEDURE — 82962 GLUCOSE BLOOD TEST: CPT

## 2024-01-22 PROCEDURE — 84439 ASSAY OF FREE THYROXINE: CPT

## 2024-01-22 PROCEDURE — 70450 CT HEAD/BRAIN W/O DYE: CPT

## 2024-01-22 RX ORDER — ERGOCALCIFEROL 1.25 MG/1
50000 CAPSULE ORAL WEEKLY
Status: DISCONTINUED | OUTPATIENT
Start: 2024-01-22 | End: 2024-01-29 | Stop reason: HOSPADM

## 2024-01-22 RX ORDER — SENNA AND DOCUSATE SODIUM 50; 8.6 MG/1; MG/1
2 TABLET, FILM COATED ORAL NIGHTLY PRN
Status: DISCONTINUED | OUTPATIENT
Start: 2024-01-22 | End: 2024-01-29 | Stop reason: HOSPADM

## 2024-01-22 RX ORDER — IPRATROPIUM BROMIDE AND ALBUTEROL SULFATE 2.5; .5 MG/3ML; MG/3ML
1 SOLUTION RESPIRATORY (INHALATION)
Status: DISCONTINUED | OUTPATIENT
Start: 2024-01-22 | End: 2024-01-24

## 2024-01-22 RX ORDER — ENEMA 19; 7 G/133ML; G/133ML
1 ENEMA RECTAL 2 TIMES DAILY
Status: DISPENSED | OUTPATIENT
Start: 2024-01-22 | End: 2024-01-23

## 2024-01-22 RX ORDER — DEXTROSE MONOHYDRATE 100 MG/ML
INJECTION, SOLUTION INTRAVENOUS CONTINUOUS PRN
Status: DISCONTINUED | OUTPATIENT
Start: 2024-01-22 | End: 2024-01-29 | Stop reason: HOSPADM

## 2024-01-22 RX ORDER — POLYETHYLENE GLYCOL 3350 17 G/17G
17 POWDER, FOR SOLUTION ORAL 2 TIMES DAILY
Status: DISCONTINUED | OUTPATIENT
Start: 2024-01-22 | End: 2024-01-29 | Stop reason: HOSPADM

## 2024-01-22 RX ORDER — PANTOPRAZOLE SODIUM 40 MG/1
40 TABLET, DELAYED RELEASE ORAL
Status: DISCONTINUED | OUTPATIENT
Start: 2024-01-22 | End: 2024-01-23

## 2024-01-22 RX ORDER — GLUCAGON 1 MG/ML
1 KIT INJECTION PRN
Status: DISCONTINUED | OUTPATIENT
Start: 2024-01-22 | End: 2024-01-29 | Stop reason: HOSPADM

## 2024-01-22 RX ORDER — INSULIN LISPRO 100 [IU]/ML
0-8 INJECTION, SOLUTION INTRAVENOUS; SUBCUTANEOUS
Status: DISCONTINUED | OUTPATIENT
Start: 2024-01-22 | End: 2024-01-28

## 2024-01-22 RX ORDER — BENZONATATE 100 MG/1
100 CAPSULE ORAL 3 TIMES DAILY PRN
Status: DISCONTINUED | OUTPATIENT
Start: 2024-01-22 | End: 2024-01-29 | Stop reason: HOSPADM

## 2024-01-22 RX ORDER — INSULIN LISPRO 100 [IU]/ML
0-4 INJECTION, SOLUTION INTRAVENOUS; SUBCUTANEOUS NIGHTLY
Status: DISCONTINUED | OUTPATIENT
Start: 2024-01-22 | End: 2024-01-28

## 2024-01-22 RX ORDER — DEXAMETHASONE 4 MG/1
6 TABLET ORAL DAILY
Status: DISCONTINUED | OUTPATIENT
Start: 2024-01-22 | End: 2024-01-24

## 2024-01-22 RX ADMIN — SODIUM CHLORIDE 1 G: 1 TABLET ORAL at 23:19

## 2024-01-22 RX ADMIN — BENZONATATE 100 MG: 100 CAPSULE ORAL at 06:08

## 2024-01-22 RX ADMIN — BUDESONIDE AND FORMOTEROL FUMARATE DIHYDRATE 2 PUFF: 160; 4.5 AEROSOL RESPIRATORY (INHALATION) at 08:46

## 2024-01-22 RX ADMIN — NYSTATIN 500000 UNITS: 100000 SUSPENSION ORAL at 08:21

## 2024-01-22 RX ADMIN — ERGOCALCIFEROL 50000 UNITS: 1.25 CAPSULE ORAL at 18:07

## 2024-01-22 RX ADMIN — DICYCLOMINE HYDROCHLORIDE 20 MG: 20 TABLET ORAL at 13:01

## 2024-01-22 RX ADMIN — SODIUM CHLORIDE, PRESERVATIVE FREE 10 ML: 5 INJECTION INTRAVENOUS at 08:22

## 2024-01-22 RX ADMIN — IPRATROPIUM BROMIDE AND ALBUTEROL SULFATE 1 DOSE: 2.5; .5 SOLUTION RESPIRATORY (INHALATION) at 14:55

## 2024-01-22 RX ADMIN — INSULIN LISPRO 2 UNITS: 100 INJECTION, SOLUTION INTRAVENOUS; SUBCUTANEOUS at 18:08

## 2024-01-22 RX ADMIN — ACETAMINOPHEN 650 MG: 325 TABLET ORAL at 16:02

## 2024-01-22 RX ADMIN — DICYCLOMINE HYDROCHLORIDE 20 MG: 20 TABLET ORAL at 23:08

## 2024-01-22 RX ADMIN — IPRATROPIUM BROMIDE AND ALBUTEROL SULFATE 1 DOSE: 2.5; .5 SOLUTION RESPIRATORY (INHALATION) at 12:02

## 2024-01-22 RX ADMIN — NYSTATIN 500000 UNITS: 100000 SUSPENSION ORAL at 23:19

## 2024-01-22 RX ADMIN — POLYETHYLENE GLYCOL (3350) 17 G: 17 POWDER, FOR SOLUTION ORAL at 23:08

## 2024-01-22 RX ADMIN — DEXAMETHASONE 6 MG: 4 TABLET ORAL at 23:08

## 2024-01-22 RX ADMIN — NYSTATIN 500000 UNITS: 100000 SUSPENSION ORAL at 18:07

## 2024-01-22 RX ADMIN — SODIUM CHLORIDE 1 G: 1 TABLET ORAL at 08:20

## 2024-01-22 RX ADMIN — GABAPENTIN 100 MG: 100 CAPSULE ORAL at 08:20

## 2024-01-22 RX ADMIN — IPRATROPIUM BROMIDE AND ALBUTEROL SULFATE 1 DOSE: 2.5; .5 SOLUTION RESPIRATORY (INHALATION) at 19:50

## 2024-01-22 RX ADMIN — BENZONATATE 100 MG: 100 CAPSULE ORAL at 23:08

## 2024-01-22 RX ADMIN — SODIUM CHLORIDE 1 G: 1 TABLET ORAL at 13:00

## 2024-01-22 RX ADMIN — DICYCLOMINE HYDROCHLORIDE 20 MG: 20 TABLET ORAL at 18:07

## 2024-01-22 RX ADMIN — INSULIN LISPRO 4 UNITS: 100 INJECTION, SOLUTION INTRAVENOUS; SUBCUTANEOUS at 13:01

## 2024-01-22 RX ADMIN — GABAPENTIN 100 MG: 100 CAPSULE ORAL at 23:08

## 2024-01-22 RX ADMIN — DICYCLOMINE HYDROCHLORIDE 20 MG: 20 TABLET ORAL at 08:20

## 2024-01-22 RX ADMIN — AZITHROMYCIN MONOHYDRATE 500 MG: 500 INJECTION, POWDER, LYOPHILIZED, FOR SOLUTION INTRAVENOUS at 23:49

## 2024-01-22 RX ADMIN — ATENOLOL 100 MG: 25 TABLET ORAL at 08:20

## 2024-01-22 RX ADMIN — PANTOPRAZOLE SODIUM 40 MG: 40 TABLET, DELAYED RELEASE ORAL at 06:08

## 2024-01-22 RX ADMIN — DOCUSATE SODIUM 50MG AND SENNOSIDES 8.6MG 2 TABLET: 8.6; 5 TABLET, FILM COATED ORAL at 23:08

## 2024-01-22 RX ADMIN — SODIUM BICARBONATE: 84 INJECTION, SOLUTION INTRAVENOUS at 08:31

## 2024-01-22 RX ADMIN — IPRATROPIUM BROMIDE AND ALBUTEROL SULFATE 1 DOSE: 2.5; .5 SOLUTION RESPIRATORY (INHALATION) at 08:46

## 2024-01-22 RX ADMIN — GABAPENTIN 100 MG: 100 CAPSULE ORAL at 13:01

## 2024-01-22 RX ADMIN — BUDESONIDE AND FORMOTEROL FUMARATE DIHYDRATE 2 PUFF: 160; 4.5 AEROSOL RESPIRATORY (INHALATION) at 19:58

## 2024-01-22 RX ADMIN — ACETAMINOPHEN 650 MG: 325 TABLET ORAL at 06:07

## 2024-01-22 RX ADMIN — SODIUM BICARBONATE: 84 INJECTION, SOLUTION INTRAVENOUS at 23:03

## 2024-01-22 RX ADMIN — ENOXAPARIN SODIUM 40 MG: 100 INJECTION SUBCUTANEOUS at 08:21

## 2024-01-22 RX ADMIN — NYSTATIN 500000 UNITS: 100000 SUSPENSION ORAL at 13:00

## 2024-01-22 RX ADMIN — CEFTRIAXONE 1000 MG: 1 INJECTION, POWDER, FOR SOLUTION INTRAMUSCULAR; INTRAVENOUS at 23:07

## 2024-01-22 ASSESSMENT — PAIN SCALES - GENERAL
PAINLEVEL_OUTOF10: 7
PAINLEVEL_OUTOF10: 9

## 2024-01-22 ASSESSMENT — PAIN DESCRIPTION - DESCRIPTORS
DESCRIPTORS: ACHING;DISCOMFORT
DESCRIPTORS: ACHING

## 2024-01-22 ASSESSMENT — PAIN - FUNCTIONAL ASSESSMENT
PAIN_FUNCTIONAL_ASSESSMENT: ACTIVITIES ARE NOT PREVENTED
PAIN_FUNCTIONAL_ASSESSMENT: ACTIVITIES ARE NOT PREVENTED

## 2024-01-22 ASSESSMENT — PAIN DESCRIPTION - LOCATION
LOCATION: HEAD
LOCATION: HEAD

## 2024-01-22 ASSESSMENT — PAIN DESCRIPTION - ORIENTATION: ORIENTATION: MID;UPPER

## 2024-01-22 ASSESSMENT — PAIN SCALES - WONG BAKER: WONGBAKER_NUMERICALRESPONSE: 0

## 2024-01-22 ASSESSMENT — PAIN DESCRIPTION - PAIN TYPE: TYPE: ACUTE PAIN

## 2024-01-22 NOTE — ED PROVIDER NOTES
ED Course as of 01/21/24 1909   Sun Jan 21, 2024   1805 COPD exacerbation   Tachy, Hx of PE not on AC  Got steroids and fluids  Pend: CT PE  Dispo: admit [AR]   1812 Troponin, High Sensitivity(!): 31 [AR]   1813 pH, Miguel(!): 7.19 [AR]   1813 pCO2, Miguel: 41 [AR]   1813 HCO3, Venous(!): 15.7 [AR]   1813 Creatinine: 1.1  Baseline around 0.7 [AR]   1813 XR CHEST PORTABLE  IMPRESSION:  1. No acute cardiopulmonary process.  2. Chronic findings as above. [AR]   1843 Anion Gap(!): 24 [AR]   1905 Patient is refusing CT angiogram of her chest due to concerns of kidney injury.  Patient was explained that the risks of not obtaining these and not knowing if she has a clot in her heart outweighs the benefits of preventing possible kidney injury.  She was told that the risks would include death if we are not able to find a clot and she has it there.  Patient reported understanding, she has capacity, she is still declining CT angiogram [AR]   1907 Patient will be admitted [AR]      ED Course User Index  [AR] Elvis Simms MD Rainero Garcia, Adrian, MD  01/21/24 7421    
fluids, levaquin,  that required close evaluation and/or intervention with concern for patient decompensation.        MDM:    Patient with complaint of increased cough shortness of breath wheezing COPD.  Again former smoker history of COPD.  Has been using her inhalers at home no relief.  Does not wear home O2.  [facility] [place].  She has finished course of antibiotics but not getting better.  Patient feels like COPD exacerbation on arrival she is tachycardic borderline hypoxic pressure was around 94 systolic.  Did give her breathing treatments, steroids as she is rhonchorous and wheezing she is in no distress otherwise given fluids, again given treatments and steroids did order labs imaging she has a history of PE a long time ago but no longer.  She is not on blood thinners anymore.  Given tachycardia and cough shortness of breath and no respiratory acidosis she is acidotic likely from lactic acid will do a PE study.  Again given treatment steroids cough medicine inhaler will give fluids awaiting labs imaging dispo.  May need admission for COPD exacerbation.  Currently I am at the end of my shift I will sign patient out to Dr. Carrington please see his note.    CLINICAL IMPRESSION:  Final diagnoses:   COPD exacerbation (HCC)   Dyspnea and respiratory abnormalities       (Please note that portions of this note may have been completed with a voice recognition program. Efforts were made to edit the dictations but occasionally words aremis-transcribed.)    DISPOSITION REFERRAL (if applicable):  No follow-up provider specified.    DISPOSITION MEDICATIONS (if applicable):  New Prescriptions    No medications on file          DO Yasir Capps James, DO  01/21/24 9252

## 2024-01-22 NOTE — PLAN OF CARE
Problem: Discharge Planning  Goal: Discharge to home or other facility with appropriate resources  Outcome: Progressing     Problem: Pain  Goal: Verbalizes/displays adequate comfort level or baseline comfort level  1/22/2024 0749 by Patti Garland RN  Outcome: Progressing  1/22/2024 0035 by Donovan Flowers RN  Outcome: Progressing     Problem: Safety - Adult  Goal: Free from fall injury  1/22/2024 0749 by Patti Garland RN  Outcome: Progressing  1/22/2024 0035 by Donovan Flowers RN  Outcome: Progressing

## 2024-01-22 NOTE — H&P
History and Physical      Name:  Adenike Harvey /Age/Sex: 1955  (68 y.o. female)   MRN & CSN:  [id number] & [id number] Encounter Date/Time: 2024 8:56 PM EST   Location:  ED21/ED-21 PCP: Iban Lou MD       Hospital Day: 1    Assessment and Plan:     #.  Cough  #. ?  COPD exacerbation  -Chest x-ray-no acute process, chronic findings  -Patient saturating well on room air, not tachypneic  -VBG-pH 7.19, pCO2 41, pO2 47, HCO3 15.7.  -Patient refused CTA chest  -Patient had CTA chest-2023-no PE, postsurgical changes from right upper lobe lobectomy, radiation changes.    -Patient received IV methylprednisolone, DuoNeb x 3 doses in ER, levofloxacin  -Lungs clear on my examination.  -Continue DuoNeb every 4 hours while awake     #.  Tachycardia  -Patient reported taking multiple breathing treatments at home, received 3 treatments in ER  -Received 3 L NS bolus  -Patient has persistent tachycardia  -Patient is on atenolol-resume    #.  Nausea, vomiting, diarrhea  -Patient was seen in ER 2024 with similar complaints  -Patient had CT abdomen/pelvis-which did not reveal any acute process.  -Patient reports no improvement in her symptoms, poor appetite  -Will consult GI    #.  Hypokalemia, hypomagnesemia  -Replaced in ER    #.  Anion gap metabolic acidosis  -Monitor with repeat BMP    #.  Seen in ER 2023 and was discharged on Augmentin, doxycycline for left lower lobe pneumonia    #.  Admission 2023 for hyponatremia, UTI    #.  History of right upper lobe lung mass-s/p open thoracotomy with right upper lobe bronchial sleep resection-2018  -Patient has recurrent lung cancer-PET scan 2022-metabolic activity associated with the new right hilar nodule  -Patient received chemo and radiation  -Patient was admitted 3/2023 due to anaphylactic reaction to chemotherapy  -Patient on immunotherapy    #.  Peripheral neuropathy secondary to chemotherapy  -Patient is on gabapentin    #.

## 2024-01-22 NOTE — ED NOTES
1819-PT DOESN'T WANT CONTRAST DUE TO HER KIDNEYS, ORDERING DR BLAKE HOME, WILL TRY TO ASK DR RAMIREZ TO SPEAK WITH PT.  1836-SPOKE TO DR RAMIREZ, TOLD HIM OF THE SITUATION OF PT NOT WANTING CT SCAN DONE WITH CONTRAST   KIDNEY FUNCTION 1/21/24 gfr 55, 20g  
Care and report provided to Nohemi ADEN  
Noted pt Pro-BNP elevated, pt has total of 3000 ml of Sodium chloride ordered, called and verified, will continue with an order per Dr. Balderrama.  
Obs notified SBAR placed and updated  
  MAGNESIUM - Abnormal; Notable for the following components:    Magnesium 1.6 (*)     All other components within normal limits   URINALYSIS - Abnormal; Notable for the following components:    Bilirubin Urine MODERATE NUMBER OR AMOUNT OBSERVED (*)     Ketones, Urine >80 (*)     Protein, UA TRACE (*)     Leukocyte Esterase, Urine TRACE (*)     All other components within normal limits   PROTIME/INR & PTT - Abnormal; Notable for the following components:    Protime 18.9 (*)     aPTT 53.2 (*)     All other components within normal limits   MICROSCOPIC URINALYSIS - Abnormal; Notable for the following components:    WBC, UA 16 (*)     Mucus, UA RARE (*)     All other components within normal limits       Background  History:   Past Medical History:   Diagnosis Date    Anemia     Anxiety     Arthritis     \"Fingers, Back\"    Bronchitis Last Episode 11-17    Chronic back pain     COPD (chronic obstructive pulmonary disease) (HCC)     Sees Dr. Hernandez    Depression     Diverticulosis 06/2013    Extensive per CT    Hemoptysis 12/06/2017    History of external beam radiation therapy 04/2023    RIGHT LUNG 6,000 cGy in 30 fractions    Hx of blood clots     \"found I have a clot near my mediport so they are taking it out 5/16/2019- put me on Xarelto    Hyperlipidemia 2009    Hypertension 2009    Low back pain     \"better than it use to be- had therapy in the past- originally hurt back at work 20+ yrs ago\"    Lung mass     rul- scheduled to bronch 12/74/2017    Panic attacks     Pneumonia Dx 1-17    Right Lung Cancer Dx 10-17    tx with chemo following with Dr Una Smith Dx 2014    \"Right Side\"    Shortness of breath on exertion     Teeth missing     Upper And Lower    Urinary tract infection In Past    No Current Symptoms    Wears dentures     Full Upper, Partial Lower    Wears glasses        Assessment    Vitals:    Level of Consciousness: Alert (0)   Vitals:    01/21/24 1801 01/21/24 1832 01/21/24 1835 01/21/24 1900

## 2024-01-23 ENCOUNTER — ANESTHESIA EVENT (OUTPATIENT)
Dept: ENDOSCOPY | Age: 69
End: 2024-01-23
Payer: MEDICARE

## 2024-01-23 ENCOUNTER — ANESTHESIA (OUTPATIENT)
Dept: ENDOSCOPY | Age: 69
End: 2024-01-23
Payer: MEDICARE

## 2024-01-23 PROBLEM — R68.81 EARLY SATIETY: Status: ACTIVE | Noted: 2024-01-23

## 2024-01-23 PROBLEM — E43 SEVERE MALNUTRITION (HCC): Status: ACTIVE | Noted: 2024-01-23

## 2024-01-23 LAB
AMMONIA: 47 UMOL/L (ref 11–51)
ANION GAP SERPL CALCULATED.3IONS-SCNC: 17 MMOL/L (ref 7–16)
BANDED NEUTROPHILS ABSOLUTE COUNT: 0.72 K/CU MM
BANDED NEUTROPHILS RELATIVE PERCENT: 9 % (ref 5–11)
BUN SERPL-MCNC: 8 MG/DL (ref 6–23)
CALCIUM SERPL-MCNC: 8.5 MG/DL (ref 8.3–10.6)
CHLORIDE BLD-SCNC: 101 MMOL/L (ref 99–110)
CO2: 20 MMOL/L (ref 21–32)
CREAT SERPL-MCNC: 0.7 MG/DL (ref 0.6–1.1)
CULTURE: NORMAL
DIFFERENTIAL TYPE: ABNORMAL
EKG ATRIAL RATE: 105 BPM
EKG DIAGNOSIS: NORMAL
EKG Q-T INTERVAL: 432 MS
EKG QRS DURATION: 104 MS
EKG QTC CALCULATION (BAZETT): 620 MS
EKG R AXIS: -33 DEGREES
EKG T AXIS: 57 DEGREES
EKG VENTRICULAR RATE: 124 BPM
GFR SERPL CREATININE-BSD FRML MDRD: >60 ML/MIN/1.73M2
GLUCOSE BLD-MCNC: 152 MG/DL (ref 70–99)
GLUCOSE BLD-MCNC: 167 MG/DL (ref 70–99)
GLUCOSE BLD-MCNC: 178 MG/DL (ref 70–99)
GLUCOSE BLD-MCNC: 203 MG/DL (ref 70–99)
GLUCOSE SERPL-MCNC: 208 MG/DL (ref 70–99)
HCT VFR BLD CALC: 30 % (ref 37–47)
HEMOGLOBIN: 9.6 GM/DL (ref 12.5–16)
LYMPHOCYTES ABSOLUTE: 0.3 K/CU MM
LYMPHOCYTES RELATIVE PERCENT: 4 % (ref 24–44)
Lab: NORMAL
MCH RBC QN AUTO: 28.7 PG (ref 27–31)
MCHC RBC AUTO-ENTMCNC: 32 % (ref 32–36)
MCV RBC AUTO: 89.8 FL (ref 78–100)
MONOCYTES ABSOLUTE: 0.2 K/CU MM
MONOCYTES RELATIVE PERCENT: 2 % (ref 0–4)
PDW BLD-RTO: 15 % (ref 11.7–14.9)
PLATELET # BLD: 307 K/CU MM (ref 140–440)
PMV BLD AUTO: 10 FL (ref 7.5–11.1)
POTASSIUM SERPL-SCNC: 4.2 MMOL/L (ref 3.5–5.1)
RBC # BLD: 3.34 M/CU MM (ref 4.2–5.4)
RBC # BLD: ABNORMAL 10*6/UL
SEGMENTED NEUTROPHILS ABSOLUTE COUNT: 6.8 K/CU MM
SEGMENTED NEUTROPHILS RELATIVE PERCENT: 85 % (ref 36–66)
SODIUM BLD-SCNC: 138 MMOL/L (ref 135–145)
SPECIMEN: NORMAL
WBC # BLD: 8 K/CU MM (ref 4–10.5)

## 2024-01-23 PROCEDURE — 99223 1ST HOSP IP/OBS HIGH 75: CPT | Performed by: INTERNAL MEDICINE

## 2024-01-23 PROCEDURE — 88305 TISSUE EXAM BY PATHOLOGIST: CPT | Performed by: PATHOLOGY

## 2024-01-23 PROCEDURE — 0DB38ZX EXCISION OF LOWER ESOPHAGUS, VIA NATURAL OR ARTIFICIAL OPENING ENDOSCOPIC, DIAGNOSTIC: ICD-10-PCS | Performed by: INTERNAL MEDICINE

## 2024-01-23 PROCEDURE — 6370000000 HC RX 637 (ALT 250 FOR IP): Performed by: INTERNAL MEDICINE

## 2024-01-23 PROCEDURE — 82962 GLUCOSE BLOOD TEST: CPT

## 2024-01-23 PROCEDURE — 88312 SPECIAL STAINS GROUP 1: CPT | Performed by: PATHOLOGY

## 2024-01-23 PROCEDURE — 94640 AIRWAY INHALATION TREATMENT: CPT

## 2024-01-23 PROCEDURE — 94761 N-INVAS EAR/PLS OXIMETRY MLT: CPT

## 2024-01-23 PROCEDURE — 43249 ESOPH EGD DILATION <30 MM: CPT | Performed by: INTERNAL MEDICINE

## 2024-01-23 PROCEDURE — 97166 OT EVAL MOD COMPLEX 45 MIN: CPT

## 2024-01-23 PROCEDURE — 80048 BASIC METABOLIC PNL TOTAL CA: CPT

## 2024-01-23 PROCEDURE — 6360000002 HC RX W HCPCS: Performed by: NURSE PRACTITIONER

## 2024-01-23 PROCEDURE — 87081 CULTURE SCREEN ONLY: CPT

## 2024-01-23 PROCEDURE — 85027 COMPLETE CBC AUTOMATED: CPT

## 2024-01-23 PROCEDURE — 97530 THERAPEUTIC ACTIVITIES: CPT

## 2024-01-23 PROCEDURE — 6370000000 HC RX 637 (ALT 250 FOR IP): Performed by: NURSE PRACTITIONER

## 2024-01-23 PROCEDURE — 2580000003 HC RX 258

## 2024-01-23 PROCEDURE — 88342 IMHCHEM/IMCYTCHM 1ST ANTB: CPT | Performed by: PATHOLOGY

## 2024-01-23 PROCEDURE — 87641 MR-STAPH DNA AMP PROBE: CPT

## 2024-01-23 PROCEDURE — 93010 ELECTROCARDIOGRAM REPORT: CPT | Performed by: INTERNAL MEDICINE

## 2024-01-23 PROCEDURE — C1726 CATH, BAL DIL, NON-VASCULAR: HCPCS | Performed by: INTERNAL MEDICINE

## 2024-01-23 PROCEDURE — 87899 AGENT NOS ASSAY W/OPTIC: CPT

## 2024-01-23 PROCEDURE — 2709999900 HC NON-CHARGEABLE SUPPLY: Performed by: INTERNAL MEDICINE

## 2024-01-23 PROCEDURE — 87449 NOS EACH ORGANISM AG IA: CPT

## 2024-01-23 PROCEDURE — 2580000003 HC RX 258: Performed by: NURSE PRACTITIONER

## 2024-01-23 PROCEDURE — 3609017700 HC EGD DILATION GASTRIC/DUODENAL STRICTURE: Performed by: INTERNAL MEDICINE

## 2024-01-23 PROCEDURE — 85007 BL SMEAR W/DIFF WBC COUNT: CPT

## 2024-01-23 PROCEDURE — 3700000000 HC ANESTHESIA ATTENDED CARE: Performed by: INTERNAL MEDICINE

## 2024-01-23 PROCEDURE — 0D758ZZ DILATION OF ESOPHAGUS, VIA NATURAL OR ARTIFICIAL OPENING ENDOSCOPIC: ICD-10-PCS | Performed by: INTERNAL MEDICINE

## 2024-01-23 PROCEDURE — 1200000000 HC SEMI PRIVATE

## 2024-01-23 PROCEDURE — 0DB68ZX EXCISION OF STOMACH, VIA NATURAL OR ARTIFICIAL OPENING ENDOSCOPIC, DIAGNOSTIC: ICD-10-PCS | Performed by: INTERNAL MEDICINE

## 2024-01-23 PROCEDURE — 6360000002 HC RX W HCPCS

## 2024-01-23 PROCEDURE — 97162 PT EVAL MOD COMPLEX 30 MIN: CPT

## 2024-01-23 PROCEDURE — 3700000001 HC ADD 15 MINUTES (ANESTHESIA): Performed by: INTERNAL MEDICINE

## 2024-01-23 PROCEDURE — 36415 COLL VENOUS BLD VENIPUNCTURE: CPT

## 2024-01-23 PROCEDURE — 2580000003 HC RX 258: Performed by: INTERNAL MEDICINE

## 2024-01-23 PROCEDURE — 82140 ASSAY OF AMMONIA: CPT

## 2024-01-23 PROCEDURE — 43239 EGD BIOPSY SINGLE/MULTIPLE: CPT | Performed by: INTERNAL MEDICINE

## 2024-01-23 RX ORDER — PROPOFOL 10 MG/ML
INJECTION, EMULSION INTRAVENOUS PRN
Status: DISCONTINUED | OUTPATIENT
Start: 2024-01-23 | End: 2024-01-23 | Stop reason: SDUPTHER

## 2024-01-23 RX ORDER — LIDOCAINE HYDROCHLORIDE 20 MG/ML
INJECTION, SOLUTION INTRAVENOUS PRN
Status: DISCONTINUED | OUTPATIENT
Start: 2024-01-23 | End: 2024-01-23 | Stop reason: SDUPTHER

## 2024-01-23 RX ORDER — LORAZEPAM 2 MG/ML
0.5 INJECTION INTRAMUSCULAR ONCE
Status: COMPLETED | OUTPATIENT
Start: 2024-01-23 | End: 2024-01-23

## 2024-01-23 RX ORDER — PANTOPRAZOLE SODIUM 40 MG/1
40 TABLET, DELAYED RELEASE ORAL
Status: DISCONTINUED | OUTPATIENT
Start: 2024-01-23 | End: 2024-01-29 | Stop reason: HOSPADM

## 2024-01-23 RX ORDER — SUCRALFATE 1 G/1
1 TABLET ORAL EVERY 6 HOURS SCHEDULED
Status: DISCONTINUED | OUTPATIENT
Start: 2024-01-23 | End: 2024-01-29 | Stop reason: HOSPADM

## 2024-01-23 RX ORDER — SODIUM CHLORIDE, SODIUM LACTATE, POTASSIUM CHLORIDE, CALCIUM CHLORIDE 600; 310; 30; 20 MG/100ML; MG/100ML; MG/100ML; MG/100ML
INJECTION, SOLUTION INTRAVENOUS CONTINUOUS PRN
Status: DISCONTINUED | OUTPATIENT
Start: 2024-01-23 | End: 2024-01-23 | Stop reason: SDUPTHER

## 2024-01-23 RX ADMIN — DEXAMETHASONE 6 MG: 4 TABLET ORAL at 09:23

## 2024-01-23 RX ADMIN — BUDESONIDE AND FORMOTEROL FUMARATE DIHYDRATE 2 PUFF: 160; 4.5 AEROSOL RESPIRATORY (INHALATION) at 07:23

## 2024-01-23 RX ADMIN — POLYETHYLENE GLYCOL (3350) 17 G: 17 POWDER, FOR SOLUTION ORAL at 21:42

## 2024-01-23 RX ADMIN — IPRATROPIUM BROMIDE AND ALBUTEROL SULFATE 1 DOSE: 2.5; .5 SOLUTION RESPIRATORY (INHALATION) at 07:23

## 2024-01-23 RX ADMIN — SODIUM CHLORIDE 1 G: 1 TABLET ORAL at 21:54

## 2024-01-23 RX ADMIN — IPRATROPIUM BROMIDE AND ALBUTEROL SULFATE 1 DOSE: 2.5; .5 SOLUTION RESPIRATORY (INHALATION) at 11:45

## 2024-01-23 RX ADMIN — IPRATROPIUM BROMIDE AND ALBUTEROL SULFATE 1 DOSE: 2.5; .5 SOLUTION RESPIRATORY (INHALATION) at 15:59

## 2024-01-23 RX ADMIN — DICYCLOMINE HYDROCHLORIDE 20 MG: 20 TABLET ORAL at 09:24

## 2024-01-23 RX ADMIN — DICYCLOMINE HYDROCHLORIDE 20 MG: 20 TABLET ORAL at 21:42

## 2024-01-23 RX ADMIN — LIDOCAINE HYDROCHLORIDE 100 MG: 20 INJECTION, SOLUTION INTRAVENOUS at 14:43

## 2024-01-23 RX ADMIN — GABAPENTIN 100 MG: 100 CAPSULE ORAL at 21:42

## 2024-01-23 RX ADMIN — GABAPENTIN 100 MG: 100 CAPSULE ORAL at 09:24

## 2024-01-23 RX ADMIN — SODIUM CHLORIDE, PRESERVATIVE FREE 10 ML: 5 INJECTION INTRAVENOUS at 09:22

## 2024-01-23 RX ADMIN — FLUTICASONE PROPIONATE 2 SPRAY: 50 SPRAY, METERED NASAL at 09:24

## 2024-01-23 RX ADMIN — SODIUM CHLORIDE 1 G: 1 TABLET ORAL at 09:24

## 2024-01-23 RX ADMIN — ATENOLOL 100 MG: 25 TABLET ORAL at 09:22

## 2024-01-23 RX ADMIN — CEFTRIAXONE 1000 MG: 1 INJECTION, POWDER, FOR SOLUTION INTRAMUSCULAR; INTRAVENOUS at 21:37

## 2024-01-23 RX ADMIN — SODIUM CHLORIDE, POTASSIUM CHLORIDE, SODIUM LACTATE AND CALCIUM CHLORIDE: 600; 310; 30; 20 INJECTION, SOLUTION INTRAVENOUS at 14:38

## 2024-01-23 RX ADMIN — PROPOFOL 170 MG: 10 INJECTION, EMULSION INTRAVENOUS at 14:43

## 2024-01-23 RX ADMIN — SODIUM CHLORIDE, PRESERVATIVE FREE 10 ML: 5 INJECTION INTRAVENOUS at 21:42

## 2024-01-23 RX ADMIN — NYSTATIN 500000 UNITS: 100000 SUSPENSION ORAL at 21:53

## 2024-01-23 RX ADMIN — NYSTATIN 500000 UNITS: 100000 SUSPENSION ORAL at 09:24

## 2024-01-23 RX ADMIN — LORAZEPAM 0.5 MG: 2 INJECTION INTRAMUSCULAR; INTRAVENOUS at 10:51

## 2024-01-23 RX ADMIN — AZITHROMYCIN MONOHYDRATE 500 MG: 500 INJECTION, POWDER, LYOPHILIZED, FOR SOLUTION INTRAVENOUS at 23:01

## 2024-01-23 RX ADMIN — BUDESONIDE AND FORMOTEROL FUMARATE DIHYDRATE 2 PUFF: 160; 4.5 AEROSOL RESPIRATORY (INHALATION) at 21:00

## 2024-01-23 RX ADMIN — IPRATROPIUM BROMIDE AND ALBUTEROL SULFATE 1 DOSE: 2.5; .5 SOLUTION RESPIRATORY (INHALATION) at 21:55

## 2024-01-23 ASSESSMENT — COPD QUESTIONNAIRES: CAT_SEVERITY: SEVERE

## 2024-01-23 ASSESSMENT — PAIN SCALES - GENERAL
PAINLEVEL_OUTOF10: 0
PAINLEVEL_OUTOF10: 0

## 2024-01-23 ASSESSMENT — LIFESTYLE VARIABLES: SMOKING_STATUS: 0

## 2024-01-23 ASSESSMENT — ENCOUNTER SYMPTOMS: SHORTNESS OF BREATH: 1

## 2024-01-23 ASSESSMENT — PAIN SCALES - WONG BAKER: WONGBAKER_NUMERICALRESPONSE: 0

## 2024-01-23 ASSESSMENT — PAIN - FUNCTIONAL ASSESSMENT: PAIN_FUNCTIONAL_ASSESSMENT: ACTIVITIES ARE NOT PREVENTED

## 2024-01-23 NOTE — ANESTHESIA POSTPROCEDURE EVALUATION
Department of Anesthesiology  Postprocedure Note    Patient: Adenike Harvey  MRN: [id number]  YOB: 1955  Date of evaluation: 1/23/2024    Procedure Summary       Date: 01/23/24 Room / Location: [place] [address] / [facility]    Anesthesia Start: 1438 Anesthesia Stop: 1457    Procedure: EGD DILATION BALLOON with 12-15 balloon up to 15 wtih biopsies Diagnosis:       Nausea and vomiting, unspecified vomiting type      Loss of weight      (Nausea and vomiting, unspecified vomiting type [R11.2])      (Loss of weight [R63.4])    Surgeons: Rony Radford MD Responsible Provider: Steve Menchaca MD    Anesthesia Type: MAC ASA Status: 4            Anesthesia Type: MAC    Papo Phase I:      Papo Phase II:      Anesthesia Post Evaluation    Patient location during evaluation: PACU  Patient participation: complete - patient participated  Level of consciousness: awake and alert  Airway patency: patent  Nausea & Vomiting: no nausea and no vomiting  Cardiovascular status: hemodynamically stable  Respiratory status: spontaneous ventilation and room air  Hydration status: stable    No notable events documented.

## 2024-01-23 NOTE — ANESTHESIA PRE PROCEDURE
vomiting R11.2       Past Medical History:        Diagnosis Date   • Anemia    • Anxiety    • Arthritis     \"Fingers, Back\"   • Bronchitis Last Episode 11-17   • Chronic back pain    • COPD (chronic obstructive pulmonary disease) ([facility])     Sees Dr. Hernandez   • Depression    • Diverticulosis 06/2013    Extensive per CT   • Hemoptysis 12/06/2017   • History of external beam radiation therapy 04/2023    RIGHT LUNG 6,000 cGy in 30 fractions   • Hx of blood clots     \"found I have a clot near my mediport so they are taking it out 5/16/2019- put me on Xarelto   • Hyperlipidemia 2009   • Hypertension 2009   • Low back pain     \"better than it use to be- had therapy in the past- originally hurt back at work 20+ yrs ago\"   • Lung mass     rul- scheduled to bronch 12/74/2017   • Nausea & vomiting 01/22/2024   • Panic attacks    • Pneumonia Dx 1-17   • Right Lung Cancer Dx 10-17    tx with chemo following with Dr Heart   • Shingles Dx 2014    \"Right Side\"   • Shortness of breath on exertion    • Teeth missing     Upper And Lower   • Urinary tract infection In Past    No Current Symptoms   • Wears dentures     Full Upper, Partial Lower   • Wears glasses        Past Surgical History:        Procedure Laterality Date   • BRONCHOSCOPY  12/07/2017 2/21/2018- done    • BUNIONECTOMY Bilateral 1990's    \"Done At Different Times\"   • CATHETER REMOVAL Left 5/16/2019    PORT REMOVAL performed by Pepe [facility]pathi MD at [facility] OR   • COLONOSCOPY  2000's   • DILATION AND CURETTAGE OF UTERUS  1989   • ELBOW SURGERY Left 1980    \"Tendon Repair\"   • LUNG SURGERY  04/12/2018    per old chart had thoracoscopy and RUL lobectomy , bronchoscopy and lymph node bx    • MEDIASTINOSCOPY  02/21/2018    Bronchoscopy   • TONSILLECTOMY  1978   • TUBAL LIGATION  1988   • TUNNELED VENOUS PORT PLACEMENT Left 07/31/2018       Social History:    Social History     Tobacco Use   • Smoking status: Former     Current packs/day: 0.00     Average packs/day: 0.5 packs/day

## 2024-01-23 NOTE — CONSULTS
Patient:   Adenike Harvey    Date:  24  :  1955, 68 y.o.   Nephrologist: Bailee Chen MD  Provider: Iban Lou MD    Reason for Consult: Severe metabolic acidosis    Consult requested by : Jahaira Mena MD    Chief Complaint:     Leg weakness, cough, shortness of breath nausea vomiting and diarrhea      HISTORY OF PRESENT ILLNESS/Brief hospital course  AND  brief background history    Ms. Harvey, is an unfortunate 68-year young female, was brought to the emergency department with emergency medical services several symptoms including leg weakness, cough and shortness of breath, nausea vomiting and diarrhea.    On arrival in the emergency department, she was afebrile but has very low blood pressure 80s over 60 and had tachycardia.  Her oxygen saturation 93% while breathing ambient air.  She underwent several diagnostic tests which include imaging and biochemical.    Imaging studies which included chest x-ray showed no outstanding finding.  Biochemical testing showed low potassium of 3.3, serum bicarbonate of 16 with anion gap of 24, blood urea nitrogen of 6 and creatinine of 1.1.  Other pertinent abnormal lab include slightly low hemoglobin 11.7, and slightly elevated proBNP level of 6 and 54-jorge.  Venous blood gas showed pH of 7.19 with pCO2 41, and HCO3 of 15.7.    Appropriate bodily fluid culture were done, she was negative for COVID-19 and influenza a and B.  Urine analysis showed urobilinogen, ketonuria and trace albuminuria with WBC of 16 but no RBC.    Methylprednisolone were initiated, potassium was supplemented and sodium chloride 1 g 3 times a day was initiated.  Kidney consult was requested because of severe anion gap metabolic acidosis    When I saw her, she is alert awake and oriented without any distress, no apparent shortness of breath.  I am familiar with her, I saw her back in 2023 when she was admitted with decreased urine output nausea 
HEMATOLOGY ONCOLOGY  Consultation Report    Patient Name:  Adenike Harvey  Patient :  1955  Patient MRN:  [id number]     Referring Provider: Iban Lou MD     Date of Service: 2024      Reason for Consult: known to us for lung cancer on pembro     Chief Complaint:    Chief Complaint   Patient presents with    Shortness of Breath    Cough     Pt states that she recently had pneumonia and completed the atb but she has not gotten better. States that she has a hx of COPD and feels like she is \"exacerbated\".     Patient Active Problem List:     Other hyperlipidemia     Osteoarthritis of finger     Right sciatic nerve pain     Essential hypertension     Abnormal mammogram of left breast     Moderate COPD (chronic obstructive pulmonary disease) (HCC)     Squamous cell lung cancer, right (HCC)     Primary cancer of right upper lobe of lung (HCC)     Other female genital prolapse     Creatinine elevation     History of lung cancer     Atrophy of right kidney     Malignant neoplasm of upper lobe, right bronchus or lung (HCC)     Other fatigue     Thrush, oral     Restless legs syndrome     Dyspnea, unspecified     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 female who presented to ED on 2024 due to cough, NVD.  She has NSCLC of  the lung on immunotherapy. Last immunotherapy on 2023.  She was seen in ER 2024 with similar complaints  2024 CT abdomen/pelvis:   1. No acute intra-abdominal or pelvic abnormality.  2. Colonic diverticulosis without evidence of acute diverticulitis.  3. Trace right 
[facility] ACUTE CARE PHYSICAL THERAPY EVALUATION  Adenike Harvey, 1955, ENDO/NONE, 1/23/2024    History  [place]:  The primary encounter diagnosis was COPD exacerbation (HCC). Diagnoses of Dyspnea and respiratory abnormalities, Metabolic acidosis, and Acute cystitis without hematuria were also pertinent to this visit.  Patient  has a past medical history of Anemia, Anxiety, Arthritis, Bronchitis, Chronic back pain, COPD (chronic obstructive pulmonary disease) (HCC), Depression, Diverticulosis, Hemoptysis, History of external beam radiation therapy, Hx of blood clots, Hyperlipidemia, Hypertension, Low back pain, Lung mass, Nausea & vomiting, Panic attacks, Pneumonia, Right Lung Cancer, Shingles, Shortness of breath on exertion, Teeth missing, Urinary tract infection, Wears dentures, and Wears glasses.  Patient  has a past surgical history that includes Tonsillectomy (1978); Tubal ligation (1988); Elbow surgery (Left, 1980); Dilation and curettage of uterus (1989); Colonoscopy (2000's); bronchoscopy (12/07/2017); Bunionectomy (Bilateral, 1990's); Mediastinoscopy (02/21/2018); Lung surgery (04/12/2018); Tunneled venous port placement (Left, 07/31/2018); and Catheter Removal (Left, 5/16/2019).    Discharge Recommendation: SNF    Subjective:    Patient states:  \"come here honey\" speaking in regard to her  at bedside.      Pain:  Pt denied having any pain.      Communication with other providers:  Handoff to RN, co-evaluation with Nydia EDWARDS to maximize safety and for tolerance    Restrictions: general precautions, fall risk     Home Setup/Prior level of function  Social/Functional History  Lives With: Spouse  Type of Home: House  Home Layout: One level  Home Access: Stairs to enter without rails  Entrance Stairs - Number of Steps: 1 + 1 (front door)  Bathroom Shower/Tub: Walk-in shower, Tub/Shower unit  Bathroom Equipment: Shower chair  Home Equipment: Cane, Walker, rolling  Has the patient had 
vitamin D Wound Type: None       Current Nutrition Intake & Therapies:    Average Meal Intake: NPO  Average Supplements Intake: NPO  Diet NPO Exceptions are: Sips of Water with Meds    Anthropometric Measures:  Height: 165.1 cm (5' 5\")  Ideal Body Weight (IBW): 125 lbs (57 kg)    Admission Body Weight: 71.9 kg (158 lb 8.2 oz)  Current Body Weight: 71.9 kg (158 lb 8.2 oz), 126.8 % IBW. Weight Source: Bed Scale  Current BMI (kg/m2): 26.4  Usual Body Weight: 80 kg (176 lb 5.9 oz) (11/28/2023 per RD notes, chart review: 173# 1/2023, 167# 7/6/23, 174# 11/9/23, 156# 1/11/23)  % Weight Change (Calculated): -10.1  Weight Adjustment For: No Adjustment                 BMI Categories: Overweight (BMI 25.0-29.9)    Estimated Daily Nutrient Needs:  Energy Requirements Based On: Formula  Weight Used for Energy Requirements: Current  Energy (kcal/day): 4897-0332 (MSJ)  Weight Used for Protein Requirements: Ideal  Protein (g/day): 68-80 (1.2-1.4 g/kg)  Method Used for Fluid Requirements: 1 ml/kcal  Fluid (ml/day): 9400-5304    Nutrition Diagnosis:   In context of acute illness or injury related to altered GI function, acute injury/trauma, altered taste perception, inadequate protein-energy intake as evidenced by weight loss, poor intake prior to admission, mild loss of subcutaneous fat, moderate muscle loss (significant wt loss of 10% in 2 months)    Nutrition Interventions:   Food and/or Nutrient Delivery: Continue NPO, Start Oral Diet, Start Oral Nutrition Supplement (if medically able)  Nutrition Education/Counseling: Education not indicated  Coordination of Nutrition Care: Continue to monitor while inpatient       Goals:     Goals: Initiate PO diet, within 2 days       Nutrition Monitoring and Evaluation:   Behavioral-Environmental Outcomes: None Identified  Food/Nutrient Intake Outcomes: Diet Advancement/Tolerance, IVF Intake  Physical Signs/Symptoms Outcomes: Biochemical Data, GI Status, Constipation, Nausea or Vomiting, 
as well as possible IBS/medication reaction to immunotherapy  -Recommend CT abdomen pelvis rule out obstruction, colitis- this has been ordered  -If CT abdomen pelvis showed no obstruction will recommend aggressive bowel regiment of enemas, MiraLAX and senna    3) unintentional weight loss; per epic review has lost 16 pounds in 3 months-patient herself reports poor intake due to no appetite and feeling nauseated all the time. -Concern for worsening mets ca, other differentials include PUD, IBS, and medication reaction to immunotherapy.  -Recommend nutrition consult  -Given this as well as symptoms of #1 and #2 and history of squamous cell lung cancer of right lung, seemingly negative PET scan and October 2023, we are currently recommend the patient undergo an EGD with biopsies tomorrow and will consider colonoscopy outpatient vs. inpatient.    -NPO midnight    4) currently on immunotherapy-she gets her therapy twice a month.  Has been on it since March.-Patient is under the care of Dr. Gordon    5) history of squamous cell lung cancer of right- will defer to oncology and appreciate recommendations    6) possible mild cognitive impairment?-Patient is a very difficult historian, confused to details, has difficulty answering questions due to distractibility    Patient's history, exam, and plan of care were discussed with the patient and Dr. Radford  . All questions and concerns were discussed with the patient. Patient agreed to plan.      Electronically signed by GRACE Nuñez CNP on 1/22/2024 at 8:05 AM

## 2024-01-23 NOTE — BRIEF OP NOTE
Brief Postoperative Note      Patient: Adenike Harvey  YOB: 1955  MRN: [id number]    Date of Procedure: 1/23/2024    Pre-Op Diagnosis Codes:     * Nausea and vomiting, unspecified vomiting type [R11.2]     * Loss of weight [R63.4]    Post-Op Diagnosis:  Severe Gastritis with clotted blood, Schtazki ring s/p Balloon dilation up to 15 mm.        Procedure(s):  EGD DILATION BALLOON with 12-15 balloon up to 15 wtih biopsies    Surgeon(s):  Rony Radford MD    Assistant:  * No surgical staff found *    Anesthesia: Monitor Anesthesia Care    Estimated Blood Loss (mL): Minimal    Complications: None    Specimens:   ID Type Source Tests Collected by Time Destination   A : bx stomach for gastritis (cold forceps) Tissue Stomach SURGICAL PATHOLOGY Rony Radford MD 1/23/2024 1444    B : bx distal esophagus for eosinophillic  espohagitis (cold forceps) Tissue Esophagus SURGICAL PATHOLOGY Rony Radford MD 1/23/2024 1446        Implants:  * No implants in log *      Drains: * No LDAs found *        Electronically signed by Rony Radford MD on 1/23/2024 at 2:51 PM

## 2024-01-24 ENCOUNTER — APPOINTMENT (OUTPATIENT)
Dept: MRI IMAGING | Age: 69
DRG: 871 | End: 2024-01-24
Payer: MEDICARE

## 2024-01-24 PROBLEM — J44.1 COPD EXACERBATION (HCC): Status: ACTIVE | Noted: 2024-01-24

## 2024-01-24 LAB
ANION GAP SERPL CALCULATED.3IONS-SCNC: 14 MMOL/L (ref 7–16)
BASOPHILS ABSOLUTE: 0 K/CU MM
BASOPHILS RELATIVE PERCENT: 0 % (ref 0–1)
BUN SERPL-MCNC: 11 MG/DL (ref 6–23)
CALCIUM SERPL-MCNC: 8.4 MG/DL (ref 8.3–10.6)
CHLORIDE BLD-SCNC: 100 MMOL/L (ref 99–110)
CO2: 24 MMOL/L (ref 21–32)
CREAT SERPL-MCNC: 0.6 MG/DL (ref 0.6–1.1)
DIFFERENTIAL TYPE: ABNORMAL
EOSINOPHILS ABSOLUTE: 0 K/CU MM
EOSINOPHILS RELATIVE PERCENT: 0 % (ref 0–3)
GFR SERPL CREATININE-BSD FRML MDRD: >60 ML/MIN/1.73M2
GLUCOSE BLD-MCNC: 182 MG/DL (ref 70–99)
GLUCOSE BLD-MCNC: 188 MG/DL (ref 70–99)
GLUCOSE BLD-MCNC: 193 MG/DL (ref 70–99)
GLUCOSE SERPL-MCNC: 201 MG/DL (ref 70–99)
HCT VFR BLD CALC: 29.8 % (ref 37–47)
HEMOGLOBIN: 9.5 GM/DL (ref 12.5–16)
IMMATURE NEUTROPHIL %: 0.9 % (ref 0–0.43)
L PNEUMO AG UR QL IA: NEGATIVE
LACTATE: 1.3 MMOL/L (ref 0.5–1.9)
LYMPHOCYTES ABSOLUTE: 0.2 K/CU MM
LYMPHOCYTES RELATIVE PERCENT: 3 % (ref 24–44)
MCH RBC QN AUTO: 28.4 PG (ref 27–31)
MCHC RBC AUTO-ENTMCNC: 31.9 % (ref 32–36)
MCV RBC AUTO: 89.2 FL (ref 78–100)
MONOCYTES ABSOLUTE: 0.1 K/CU MM
MONOCYTES RELATIVE PERCENT: 2.4 % (ref 0–4)
MRSA, DNA, NASAL: NEGATIVE
NUCLEATED RBC %: 0 %
PDW BLD-RTO: 15.1 % (ref 11.7–14.9)
PLATELET # BLD: 285 K/CU MM (ref 140–440)
PMV BLD AUTO: 10.2 FL (ref 7.5–11.1)
POTASSIUM SERPL-SCNC: 3.8 MMOL/L (ref 3.5–5.1)
PROCALCITONIN SERPL-MCNC: 0.1 NG/ML
RBC # BLD: 3.34 M/CU MM (ref 4.2–5.4)
S PNEUM AG CSF QL: NORMAL
SEGMENTED NEUTROPHILS ABSOLUTE COUNT: 5.4 K/CU MM
SEGMENTED NEUTROPHILS RELATIVE PERCENT: 93.7 % (ref 36–66)
SODIUM BLD-SCNC: 138 MMOL/L (ref 135–145)
SPECIMEN DESCRIPTION: NORMAL
TOTAL IMMATURE NEUTOROPHIL: 0.05 K/CU MM
TOTAL NUCLEATED RBC: 0 K/CU MM
WBC # BLD: 5.7 K/CU MM (ref 4–10.5)

## 2024-01-24 PROCEDURE — 94761 N-INVAS EAR/PLS OXIMETRY MLT: CPT

## 2024-01-24 PROCEDURE — 84145 PROCALCITONIN (PCT): CPT

## 2024-01-24 PROCEDURE — 6370000000 HC RX 637 (ALT 250 FOR IP): Performed by: INTERNAL MEDICINE

## 2024-01-24 PROCEDURE — 94640 AIRWAY INHALATION TREATMENT: CPT

## 2024-01-24 PROCEDURE — 6360000002 HC RX W HCPCS: Performed by: NURSE PRACTITIONER

## 2024-01-24 PROCEDURE — 80048 BASIC METABOLIC PNL TOTAL CA: CPT

## 2024-01-24 PROCEDURE — 6360000002 HC RX W HCPCS: Performed by: INTERNAL MEDICINE

## 2024-01-24 PROCEDURE — 2580000003 HC RX 258: Performed by: NURSE PRACTITIONER

## 2024-01-24 PROCEDURE — 85025 COMPLETE CBC W/AUTO DIFF WBC: CPT

## 2024-01-24 PROCEDURE — 1200000000 HC SEMI PRIVATE

## 2024-01-24 PROCEDURE — 82962 GLUCOSE BLOOD TEST: CPT

## 2024-01-24 PROCEDURE — 6370000000 HC RX 637 (ALT 250 FOR IP): Performed by: FAMILY MEDICINE

## 2024-01-24 PROCEDURE — APPNB30 APP NON BILLABLE TIME 0-30 MINS: Performed by: LICENSED PRACTICAL NURSE

## 2024-01-24 PROCEDURE — 83605 ASSAY OF LACTIC ACID: CPT

## 2024-01-24 PROCEDURE — 6360000004 HC RX CONTRAST MEDICATION: Performed by: PHYSICIAN ASSISTANT

## 2024-01-24 PROCEDURE — 2580000003 HC RX 258: Performed by: INTERNAL MEDICINE

## 2024-01-24 PROCEDURE — 6370000000 HC RX 637 (ALT 250 FOR IP): Performed by: NURSE PRACTITIONER

## 2024-01-24 PROCEDURE — 36415 COLL VENOUS BLD VENIPUNCTURE: CPT

## 2024-01-24 PROCEDURE — C9113 INJ PANTOPRAZOLE SODIUM, VIA: HCPCS | Performed by: NURSE PRACTITIONER

## 2024-01-24 PROCEDURE — 99232 SBSQ HOSP IP/OBS MODERATE 35: CPT | Performed by: INTERNAL MEDICINE

## 2024-01-24 PROCEDURE — A9579 GAD-BASE MR CONTRAST NOS,1ML: HCPCS | Performed by: PHYSICIAN ASSISTANT

## 2024-01-24 PROCEDURE — 89220 SPUTUM SPECIMEN COLLECTION: CPT

## 2024-01-24 PROCEDURE — 70553 MRI BRAIN STEM W/O & W/DYE: CPT

## 2024-01-24 PROCEDURE — 94664 DEMO&/EVAL PT USE INHALER: CPT

## 2024-01-24 PROCEDURE — 99232 SBSQ HOSP IP/OBS MODERATE 35: CPT | Performed by: PHYSICIAN ASSISTANT

## 2024-01-24 RX ORDER — PANTOPRAZOLE SODIUM 40 MG/10ML
40 INJECTION, POWDER, LYOPHILIZED, FOR SOLUTION INTRAVENOUS 2 TIMES DAILY
Status: DISCONTINUED | OUTPATIENT
Start: 2024-01-24 | End: 2024-01-25

## 2024-01-24 RX ORDER — IPRATROPIUM BROMIDE AND ALBUTEROL SULFATE 2.5; .5 MG/3ML; MG/3ML
1 SOLUTION RESPIRATORY (INHALATION)
Status: DISCONTINUED | OUTPATIENT
Start: 2024-01-24 | End: 2024-01-25

## 2024-01-24 RX ORDER — GUAIFENESIN 600 MG/1
600 TABLET, EXTENDED RELEASE ORAL 2 TIMES DAILY
Status: DISCONTINUED | OUTPATIENT
Start: 2024-01-24 | End: 2024-01-29 | Stop reason: HOSPADM

## 2024-01-24 RX ORDER — LORAZEPAM 2 MG/ML
0.5 INJECTION INTRAMUSCULAR ONCE
Status: COMPLETED | OUTPATIENT
Start: 2024-01-24 | End: 2024-01-24

## 2024-01-24 RX ORDER — HYDROXYZINE HYDROCHLORIDE 25 MG/1
25 TABLET, FILM COATED ORAL 3 TIMES DAILY PRN
Status: DISCONTINUED | OUTPATIENT
Start: 2024-01-24 | End: 2024-01-29 | Stop reason: HOSPADM

## 2024-01-24 RX ADMIN — FLUTICASONE PROPIONATE 2 SPRAY: 50 SPRAY, METERED NASAL at 10:35

## 2024-01-24 RX ADMIN — BUDESONIDE AND FORMOTEROL FUMARATE DIHYDRATE 2 PUFF: 160; 4.5 AEROSOL RESPIRATORY (INHALATION) at 08:09

## 2024-01-24 RX ADMIN — NYSTATIN 500000 UNITS: 100000 SUSPENSION ORAL at 10:35

## 2024-01-24 RX ADMIN — GABAPENTIN 100 MG: 100 CAPSULE ORAL at 08:09

## 2024-01-24 RX ADMIN — CEFTRIAXONE 1000 MG: 1 INJECTION, POWDER, FOR SOLUTION INTRAMUSCULAR; INTRAVENOUS at 21:28

## 2024-01-24 RX ADMIN — ENOXAPARIN SODIUM 40 MG: 100 INJECTION SUBCUTANEOUS at 10:39

## 2024-01-24 RX ADMIN — GABAPENTIN 100 MG: 100 CAPSULE ORAL at 13:46

## 2024-01-24 RX ADMIN — PANTOPRAZOLE SODIUM 40 MG: 40 TABLET, DELAYED RELEASE ORAL at 06:39

## 2024-01-24 RX ADMIN — NYSTATIN 500000 UNITS: 100000 SUSPENSION ORAL at 13:46

## 2024-01-24 RX ADMIN — LORAZEPAM 0.5 MG: 2 INJECTION INTRAMUSCULAR; INTRAVENOUS at 07:58

## 2024-01-24 RX ADMIN — SUCRALFATE 1 G: 1 TABLET ORAL at 02:43

## 2024-01-24 RX ADMIN — PANTOPRAZOLE SODIUM 40 MG: 40 INJECTION, POWDER, FOR SOLUTION INTRAVENOUS at 17:17

## 2024-01-24 RX ADMIN — IPRATROPIUM BROMIDE AND ALBUTEROL SULFATE 1 DOSE: 2.5; .5 SOLUTION RESPIRATORY (INHALATION) at 14:57

## 2024-01-24 RX ADMIN — IPRATROPIUM BROMIDE AND ALBUTEROL SULFATE 1 DOSE: 2.5; .5 SOLUTION RESPIRATORY (INHALATION) at 08:03

## 2024-01-24 RX ADMIN — SUCRALFATE 1 G: 1 TABLET ORAL at 06:39

## 2024-01-24 RX ADMIN — GUAIFENESIN 600 MG: 600 TABLET, EXTENDED RELEASE ORAL at 08:09

## 2024-01-24 RX ADMIN — DICYCLOMINE HYDROCHLORIDE 20 MG: 20 TABLET ORAL at 08:09

## 2024-01-24 RX ADMIN — DICYCLOMINE HYDROCHLORIDE 20 MG: 20 TABLET ORAL at 13:48

## 2024-01-24 RX ADMIN — POLYETHYLENE GLYCOL (3350) 17 G: 17 POWDER, FOR SOLUTION ORAL at 10:34

## 2024-01-24 RX ADMIN — SUCRALFATE 1 G: 1 TABLET ORAL at 10:34

## 2024-01-24 RX ADMIN — BENZONATATE 100 MG: 100 CAPSULE ORAL at 13:46

## 2024-01-24 RX ADMIN — GADOTERIDOL 15 ML: 279.3 INJECTION, SOLUTION INTRAVENOUS at 08:46

## 2024-01-24 RX ADMIN — BENZONATATE 100 MG: 100 CAPSULE ORAL at 04:09

## 2024-01-24 RX ADMIN — SODIUM CHLORIDE, PRESERVATIVE FREE 10 ML: 5 INJECTION INTRAVENOUS at 10:35

## 2024-01-24 RX ADMIN — SODIUM CHLORIDE, PRESERVATIVE FREE 10 ML: 5 INJECTION INTRAVENOUS at 21:19

## 2024-01-24 RX ADMIN — ONDANSETRON 4 MG: 4 TABLET, ORALLY DISINTEGRATING ORAL at 04:28

## 2024-01-24 RX ADMIN — ATENOLOL 100 MG: 25 TABLET ORAL at 10:34

## 2024-01-24 RX ADMIN — DEXAMETHASONE 6 MG: 4 TABLET ORAL at 08:09

## 2024-01-24 RX ADMIN — AZITHROMYCIN MONOHYDRATE 500 MG: 500 INJECTION, POWDER, LYOPHILIZED, FOR SOLUTION INTRAVENOUS at 23:14

## 2024-01-24 NOTE — CARE COORDINATION
1500 Received a call back from [facility], they will consider pt but can not, until pt is sitter free for 24 hours. [facility] states they will continue to monitor for progress to no sitter. BRENDANRN/CM

## 2024-01-24 NOTE — CARE COORDINATION
Pt has made her choices for rehab.  1) ARU  Call to Marcia who states PT/OT recommended SNF.    Update to pt that CM will contact [facility]ar verbalized understanding.  2) [facility], E-fax payor sheet.  Call TO ADMISSIONS,left VM to [facility] # [phone].  [id number] waiting CB from [facility].

## 2024-01-24 NOTE — RT PROTOCOL NOTE
RT Nebulizer Bronchodilator Protocol Note    There is a bronchodilator order in the chart from a provider indicating to follow the RT Bronchodilator Protocol and there is an “Initiate RT Bronchodilator Protocol” order as well (see protocol at bottom of note).    CXR Findings:  No results found.    The findings from the last RT Protocol Assessment were as follows:  Smoking: Chronic pulmonary disease  Respiratory Pattern: Regular pattern and RR 12-20 bpm  Breath Sounds: Inspiratory and expiratory or bilateral wheezing and/or rhonchi  Cough: Strong, spontaneous, non-productive  Indication for Bronchodilator Therapy:    Bronchodilator Assessment Score: 8    Aerosolized bronchodilator medication orders have been revised according to the RT Nebulizer Bronchodilator Protocol below.    Respiratory Therapist to perform RT Therapy Protocol Assessment initially then follow the protocol.  Repeat RT Therapy Protocol Assessment PRN for score 0-3 or on second treatment, BID, and PRN for scores above 3.    No Indications - adjust the frequency to every 6 hours PRN wheezing or bronchospasm, if no treatments needed after 48 hours then discontinue using Per Protocol order mode.     If indication present, adjust the RT bronchodilator orders based on the Bronchodilator Assessment Score as indicated below.  If a patient is on this medication at home then do not decrease Frequency below that used at home.    0-3 - enter or revise RT bronchodilator order(s) to equivalent RT Bronchodilator order with Frequency of every 4 hours PRN for wheezing or increased work of breathing using Per Protocol order mode.       4-6 - enter or revise RT Bronchodilator order(s) to two equivalent RT bronchodilator orders with one order with BID Frequency and one order with Frequency of every 4 hours PRN wheezing or increased work of breathing using Per Protocol order mode.         7-10 - enter or revise RT Bronchodilator order(s) to two equivalent RT

## 2024-01-24 NOTE — CARE COORDINATION
Received referral for ARU.  Reviewed patients clinicals and PT/OT notes.  Patients primary insurance is [facility] Medicare.  Per [facility] Medicare guidelines patient does not meet ARU criteria.  PT/OT rec' SNF.  Discussed with Kailey LEVY

## 2024-01-24 NOTE — CARE COORDINATION
1721 This CM received a call back from Agusto, president of [employer] # [phone], covering for Manda today.  He will review pt for acceptance and call back.

## 2024-01-24 NOTE — PLAN OF CARE
Problem: Discharge Planning  Goal: Discharge to home or other facility with appropriate resources  Outcome: Progressing     Problem: Pain  Goal: Verbalizes/displays adequate comfort level or baseline comfort level  Outcome: Progressing     Problem: Safety - Adult  Goal: Free from fall injury  Outcome: Progressing     Problem: Nutrition Deficit:  Goal: Optimize nutritional status  Outcome: Progressing     Problem: Skin/Tissue Integrity  Goal: Absence of new skin breakdown  Description: 1.  Monitor for areas of redness and/or skin breakdown  2.  Assess vascular access sites hourly  3.  Every 4-6 hours minimum:  Change oxygen saturation probe site  4.  Every 4-6 hours:  If on nasal continuous positive airway pressure, respiratory therapy assess nares and determine need for appliance change or resting period.  Outcome: Progressing

## 2024-01-24 NOTE — CARE COORDINATION
CM visited pt and her , introduced myself and reason for visit. CM discussed with pt and  about PT/OT recommending SNF rehab. CM explained insurance would cover REhab stay to get pt back to baseline health and ADL's.   CM left list of facilities for pt to choose where she wants to go for rehab.  CM stated to pt and  I would give them opportunity to review list and to choose facility. Pt verbalized understanding. BRENDAN,RN/ILANA

## 2024-01-25 LAB
ANION GAP SERPL CALCULATED.3IONS-SCNC: 11 MMOL/L (ref 7–16)
BASOPHILS ABSOLUTE: 0 K/CU MM
BASOPHILS RELATIVE PERCENT: 0.2 % (ref 0–1)
BUN SERPL-MCNC: 13 MG/DL (ref 6–23)
CALCIUM SERPL-MCNC: 8.4 MG/DL (ref 8.3–10.6)
CHLORIDE BLD-SCNC: 102 MMOL/L (ref 99–110)
CO2: 25 MMOL/L (ref 21–32)
CREAT SERPL-MCNC: 0.7 MG/DL (ref 0.6–1.1)
DIFFERENTIAL TYPE: ABNORMAL
EOSINOPHILS ABSOLUTE: 0 K/CU MM
EOSINOPHILS RELATIVE PERCENT: 0 % (ref 0–3)
GFR SERPL CREATININE-BSD FRML MDRD: >60 ML/MIN/1.73M2
GLUCOSE BLD-MCNC: 155 MG/DL (ref 70–99)
GLUCOSE BLD-MCNC: 166 MG/DL (ref 70–99)
GLUCOSE BLD-MCNC: 173 MG/DL (ref 70–99)
GLUCOSE BLD-MCNC: 183 MG/DL (ref 70–99)
GLUCOSE BLD-MCNC: 190 MG/DL (ref 70–99)
GLUCOSE SERPL-MCNC: 168 MG/DL (ref 70–99)
HCT VFR BLD CALC: 35.1 % (ref 37–47)
HEMOGLOBIN: 10.1 GM/DL (ref 12.5–16)
IMMATURE NEUTROPHIL %: 0.5 % (ref 0–0.43)
LYMPHOCYTES ABSOLUTE: 0.2 K/CU MM
LYMPHOCYTES RELATIVE PERCENT: 5.5 % (ref 24–44)
MCH RBC QN AUTO: 28.7 PG (ref 27–31)
MCHC RBC AUTO-ENTMCNC: 28.8 % (ref 32–36)
MCV RBC AUTO: 99.7 FL (ref 78–100)
MONOCYTES ABSOLUTE: 0.1 K/CU MM
MONOCYTES RELATIVE PERCENT: 2.5 % (ref 0–4)
NUCLEATED RBC %: 0 %
PDW BLD-RTO: 15.6 % (ref 11.7–14.9)
PLATELET # BLD: 268 K/CU MM (ref 140–440)
PMV BLD AUTO: 10.6 FL (ref 7.5–11.1)
POTASSIUM SERPL-SCNC: 3.9 MMOL/L (ref 3.5–5.1)
RBC # BLD: 3.52 M/CU MM (ref 4.2–5.4)
SEGMENTED NEUTROPHILS ABSOLUTE COUNT: 3.7 K/CU MM
SEGMENTED NEUTROPHILS RELATIVE PERCENT: 91.3 % (ref 36–66)
SODIUM BLD-SCNC: 138 MMOL/L (ref 135–145)
TOTAL IMMATURE NEUTOROPHIL: 0.02 K/CU MM
TOTAL NUCLEATED RBC: 0 K/CU MM
WBC # BLD: 4 K/CU MM (ref 4–10.5)

## 2024-01-25 PROCEDURE — 6370000000 HC RX 637 (ALT 250 FOR IP): Performed by: FAMILY MEDICINE

## 2024-01-25 PROCEDURE — C9113 INJ PANTOPRAZOLE SODIUM, VIA: HCPCS | Performed by: NURSE PRACTITIONER

## 2024-01-25 PROCEDURE — 97535 SELF CARE MNGMENT TRAINING: CPT

## 2024-01-25 PROCEDURE — 97530 THERAPEUTIC ACTIVITIES: CPT

## 2024-01-25 PROCEDURE — 6360000002 HC RX W HCPCS: Performed by: NURSE PRACTITIONER

## 2024-01-25 PROCEDURE — 6370000000 HC RX 637 (ALT 250 FOR IP): Performed by: NURSE PRACTITIONER

## 2024-01-25 PROCEDURE — 80048 BASIC METABOLIC PNL TOTAL CA: CPT

## 2024-01-25 PROCEDURE — 2580000003 HC RX 258: Performed by: INTERNAL MEDICINE

## 2024-01-25 PROCEDURE — 99232 SBSQ HOSP IP/OBS MODERATE 35: CPT | Performed by: INTERNAL MEDICINE

## 2024-01-25 PROCEDURE — 85025 COMPLETE CBC W/AUTO DIFF WBC: CPT

## 2024-01-25 PROCEDURE — 1200000000 HC SEMI PRIVATE

## 2024-01-25 PROCEDURE — 94640 AIRWAY INHALATION TREATMENT: CPT

## 2024-01-25 PROCEDURE — 82962 GLUCOSE BLOOD TEST: CPT

## 2024-01-25 PROCEDURE — 94010 BREATHING CAPACITY TEST: CPT

## 2024-01-25 PROCEDURE — 36415 COLL VENOUS BLD VENIPUNCTURE: CPT

## 2024-01-25 PROCEDURE — 94761 N-INVAS EAR/PLS OXIMETRY MLT: CPT

## 2024-01-25 PROCEDURE — 6370000000 HC RX 637 (ALT 250 FOR IP): Performed by: INTERNAL MEDICINE

## 2024-01-25 RX ORDER — AZITHROMYCIN 250 MG/1
250 TABLET, FILM COATED ORAL DAILY
Status: DISCONTINUED | OUTPATIENT
Start: 2024-01-25 | End: 2024-01-26

## 2024-01-25 RX ORDER — CEFDINIR 300 MG/1
300 CAPSULE ORAL EVERY 12 HOURS SCHEDULED
Status: DISCONTINUED | OUTPATIENT
Start: 2024-01-25 | End: 2024-01-26

## 2024-01-25 RX ORDER — ACETYLCYSTEINE 200 MG/ML
600 SOLUTION ORAL; RESPIRATORY (INHALATION)
Status: DISPENSED | OUTPATIENT
Start: 2024-01-25 | End: 2024-01-28

## 2024-01-25 RX ORDER — IPRATROPIUM BROMIDE AND ALBUTEROL SULFATE 2.5; .5 MG/3ML; MG/3ML
1 SOLUTION RESPIRATORY (INHALATION)
Status: DISCONTINUED | OUTPATIENT
Start: 2024-01-25 | End: 2024-01-26

## 2024-01-25 RX ADMIN — ACETAMINOPHEN 650 MG: 325 TABLET ORAL at 20:08

## 2024-01-25 RX ADMIN — SUCRALFATE 1 G: 1 TABLET ORAL at 05:08

## 2024-01-25 RX ADMIN — GABAPENTIN 100 MG: 100 CAPSULE ORAL at 15:33

## 2024-01-25 RX ADMIN — PANTOPRAZOLE SODIUM 40 MG: 40 INJECTION, POWDER, FOR SOLUTION INTRAVENOUS at 10:57

## 2024-01-25 RX ADMIN — SODIUM CHLORIDE, PRESERVATIVE FREE 10 ML: 5 INJECTION INTRAVENOUS at 20:10

## 2024-01-25 RX ADMIN — BUDESONIDE AND FORMOTEROL FUMARATE DIHYDRATE 2 PUFF: 160; 4.5 AEROSOL RESPIRATORY (INHALATION) at 08:12

## 2024-01-25 RX ADMIN — AZITHROMYCIN DIHYDRATE 250 MG: 250 TABLET ORAL at 15:33

## 2024-01-25 RX ADMIN — PANTOPRAZOLE SODIUM 40 MG: 40 TABLET, DELAYED RELEASE ORAL at 15:33

## 2024-01-25 RX ADMIN — IPRATROPIUM BROMIDE AND ALBUTEROL SULFATE 1 DOSE: .5; 2.5 SOLUTION RESPIRATORY (INHALATION) at 16:00

## 2024-01-25 RX ADMIN — DICYCLOMINE HYDROCHLORIDE 20 MG: 20 TABLET ORAL at 09:23

## 2024-01-25 RX ADMIN — ATENOLOL 100 MG: 25 TABLET ORAL at 09:23

## 2024-01-25 RX ADMIN — CEFDINIR 300 MG: 300 CAPSULE ORAL at 20:08

## 2024-01-25 RX ADMIN — DICYCLOMINE HYDROCHLORIDE 20 MG: 20 TABLET ORAL at 20:09

## 2024-01-25 RX ADMIN — SODIUM CHLORIDE, PRESERVATIVE FREE 10 ML: 5 INJECTION INTRAVENOUS at 10:57

## 2024-01-25 RX ADMIN — HYDROXYZINE HYDROCHLORIDE 25 MG: 25 TABLET, FILM COATED ORAL at 05:08

## 2024-01-25 RX ADMIN — IPRATROPIUM BROMIDE AND ALBUTEROL SULFATE 1 DOSE: 2.5; .5 SOLUTION RESPIRATORY (INHALATION) at 08:07

## 2024-01-25 RX ADMIN — GUAIFENESIN 600 MG: 600 TABLET, EXTENDED RELEASE ORAL at 20:09

## 2024-01-25 RX ADMIN — NYSTATIN 500000 UNITS: 100000 SUSPENSION ORAL at 20:09

## 2024-01-25 RX ADMIN — GABAPENTIN 100 MG: 100 CAPSULE ORAL at 09:23

## 2024-01-25 RX ADMIN — DEXAMETHASONE 6 MG: 4 TABLET ORAL at 09:23

## 2024-01-25 RX ADMIN — ACETAMINOPHEN 650 MG: 325 TABLET ORAL at 05:08

## 2024-01-25 RX ADMIN — GUAIFENESIN 600 MG: 600 TABLET, EXTENDED RELEASE ORAL at 09:23

## 2024-01-25 RX ADMIN — GABAPENTIN 100 MG: 100 CAPSULE ORAL at 20:08

## 2024-01-25 ASSESSMENT — COPD QUESTIONNAIRES
QUESTION1_COUGHFREQUENCY: 4
QUESTION5_HOMEACTIVITIES: 4
QUESTION3_CHESTTIGHTNESS: 4
TOTAL_EXACERBATIONS_PASTYEAR: 0
GOLD_GROUP: GROUP B
QUESTION2_CHESTPHLEGM: 3
QUESTION4_WALKINCLINE: 5
CAT_TOTALSCORE: 30
QUESTION7_SLEEPQUALITY: 0
QUESTION8_ENERGYLEVEL: 5
QUESTION6_LEAVINGHOUSE: 5

## 2024-01-25 ASSESSMENT — PAIN SCALES - WONG BAKER
WONGBAKER_NUMERICALRESPONSE: 2
WONGBAKER_NUMERICALRESPONSE: 0

## 2024-01-25 ASSESSMENT — PAIN DESCRIPTION - LOCATION
LOCATION: GENERALIZED
LOCATION: HEAD
LOCATION: GENERALIZED
LOCATION: HEAD

## 2024-01-25 ASSESSMENT — PAIN DESCRIPTION - DESCRIPTORS
DESCRIPTORS: ACHING

## 2024-01-25 ASSESSMENT — PAIN SCALES - GENERAL
PAINLEVEL_OUTOF10: 3
PAINLEVEL_OUTOF10: 0

## 2024-01-25 ASSESSMENT — PULMONARY FUNCTION TESTS
POST BRONCHODILATOR FEV1/FVC: 81
FEV1 (%PREDICTED): 81
PIF_VALUE: 85

## 2024-01-25 ASSESSMENT — PAIN - FUNCTIONAL ASSESSMENT: PAIN_FUNCTIONAL_ASSESSMENT: ACTIVITIES ARE NOT PREVENTED

## 2024-01-25 NOTE — PLAN OF CARE
Problem: Discharge Planning  Goal: Discharge to home or other facility with appropriate resources  Outcome: Progressing     Problem: Pain  Goal: Verbalizes/displays adequate comfort level or baseline comfort level  Outcome: Progressing     Problem: Safety - Adult  Goal: Free from fall injury  Outcome: Progressing     Problem: Nutrition Deficit:  Goal: Optimize nutritional status  Outcome: Progressing     Problem: Skin/Tissue Integrity  Goal: Absence of new skin breakdown  Description: 1.  Monitor for areas of redness and/or skin breakdown  2.  Assess vascular access sites hourly  3.  Every 4-6 hours minimum:  Change oxygen saturation probe site  4.  Every 4-6 hours:  If on nasal continuous positive airway pressure, respiratory therapy assess nares and determine need for appliance change or resting period.  Outcome: Progressing     Problem: Discharge Planning  Goal: Discharge to home or other facility with appropriate resources  Outcome: Progressing     Problem: Pain  Goal: Verbalizes/displays adequate comfort level or baseline comfort level  Outcome: Progressing     Problem: Safety - Adult  Goal: Free from fall injury  Outcome: Progressing     Problem: Nutrition Deficit:  Goal: Optimize nutritional status  Outcome: Progressing     Problem: Skin/Tissue Integrity  Goal: Absence of new skin breakdown  Description: 1.  Monitor for areas of redness and/or skin breakdown  2.  Assess vascular access sites hourly  3.  Every 4-6 hours minimum:  Change oxygen saturation probe site  4.  Every 4-6 hours:  If on nasal continuous positive airway pressure, respiratory therapy assess nares and determine need for appliance change or resting period.  Outcome: Progressing

## 2024-01-25 NOTE — PLAN OF CARE
Problem: Discharge Planning  Goal: Discharge to home or other facility with appropriate resources  1/25/2024 0036 by Anuja Mc RN  Outcome: Progressing  1/24/2024 1146 by Rik Cole RN  Outcome: Progressing     Problem: Pain  Goal: Verbalizes/displays adequate comfort level or baseline comfort level  1/25/2024 0036 by Anuja Mc RN  Outcome: Progressing  1/24/2024 1146 by Rik Cole RN  Outcome: Progressing     Problem: Safety - Adult  Goal: Free from fall injury  1/25/2024 0036 by Anuja Mc RN  Outcome: Progressing  1/24/2024 1146 by Rik Cole RN  Outcome: Progressing     Problem: Nutrition Deficit:  Goal: Optimize nutritional status  1/25/2024 0036 by Anuja Mc RN  Outcome: Progressing  1/24/2024 1146 by Rik Cole RN  Outcome: Progressing     Problem: Skin/Tissue Integrity  Goal: Absence of new skin breakdown  Description: 1.  Monitor for areas of redness and/or skin breakdown  2.  Assess vascular access sites hourly  3.  Every 4-6 hours minimum:  Change oxygen saturation probe site  4.  Every 4-6 hours:  If on nasal continuous positive airway pressure, respiratory therapy assess nares and determine need for appliance change or resting period.  1/25/2024 0036 by Anuja Mc RN  Outcome: Progressing  1/24/2024 1146 by Rik Cole RN  Outcome: Progressing

## 2024-01-25 NOTE — CARE COORDINATION
CM reviewed notes. Valentino pending determination to see how pt is doing when she is sitter free.  Currently pt has a sitter.   CM into see pt and discussed with  another option for SNF.  would like [facility]. CM spoke with Patti at [facility] and gave referral. CM informed per nursing that the sitter is going to be dcd and replaced with a . CM updated Brandie PRECIADO.

## 2024-01-26 LAB
CULTURE: NORMAL
CULTURE: NORMAL
GLUCOSE BLD-MCNC: 132 MG/DL (ref 70–99)
GLUCOSE BLD-MCNC: 150 MG/DL (ref 70–99)
GLUCOSE BLD-MCNC: 161 MG/DL (ref 70–99)
GLUCOSE BLD-MCNC: 168 MG/DL (ref 70–99)
Lab: NORMAL
Lab: NORMAL
SPECIMEN: NORMAL
SPECIMEN: NORMAL

## 2024-01-26 PROCEDURE — 6370000000 HC RX 637 (ALT 250 FOR IP): Performed by: NURSE PRACTITIONER

## 2024-01-26 PROCEDURE — 6360000002 HC RX W HCPCS: Performed by: INTERNAL MEDICINE

## 2024-01-26 PROCEDURE — 94640 AIRWAY INHALATION TREATMENT: CPT

## 2024-01-26 PROCEDURE — 99231 SBSQ HOSP IP/OBS SF/LOW 25: CPT | Performed by: INTERNAL MEDICINE

## 2024-01-26 PROCEDURE — 6360000002 HC RX W HCPCS: Performed by: NURSE PRACTITIONER

## 2024-01-26 PROCEDURE — 94761 N-INVAS EAR/PLS OXIMETRY MLT: CPT

## 2024-01-26 PROCEDURE — 82962 GLUCOSE BLOOD TEST: CPT

## 2024-01-26 PROCEDURE — 6370000000 HC RX 637 (ALT 250 FOR IP): Performed by: FAMILY MEDICINE

## 2024-01-26 PROCEDURE — 6370000000 HC RX 637 (ALT 250 FOR IP): Performed by: INTERNAL MEDICINE

## 2024-01-26 PROCEDURE — 2580000003 HC RX 258: Performed by: INTERNAL MEDICINE

## 2024-01-26 PROCEDURE — 1200000000 HC SEMI PRIVATE

## 2024-01-26 RX ORDER — ONDANSETRON 4 MG/1
4 TABLET, ORALLY DISINTEGRATING ORAL EVERY 8 HOURS PRN
Qty: 30 TABLET | Refills: 0
Start: 2024-01-26

## 2024-01-26 RX ORDER — BENZONATATE 100 MG/1
100 CAPSULE ORAL 3 TIMES DAILY PRN
Qty: 30 CAPSULE | Refills: 0
Start: 2024-01-26 | End: 2024-02-02

## 2024-01-26 RX ORDER — ONDANSETRON 2 MG/ML
4 INJECTION INTRAMUSCULAR; INTRAVENOUS EVERY 6 HOURS PRN
Qty: 84 ML | Refills: 0
Start: 2024-01-26 | End: 2024-01-26 | Stop reason: HOSPADM

## 2024-01-26 RX ORDER — GUAIFENESIN 600 MG/1
600 TABLET, EXTENDED RELEASE ORAL 2 TIMES DAILY
Qty: 30 TABLET | Refills: 0
Start: 2024-01-26

## 2024-01-26 RX ORDER — SUCRALFATE 1 G/1
1 TABLET ORAL 4 TIMES DAILY
Qty: 120 TABLET | Refills: 3
Start: 2024-01-26

## 2024-01-26 RX ORDER — POLYETHYLENE GLYCOL 3350 17 G/17G
17 POWDER, FOR SOLUTION ORAL 2 TIMES DAILY PRN
Qty: 527 G | Refills: 1
Start: 2024-01-26 | End: 2024-03-28

## 2024-01-26 RX ORDER — IPRATROPIUM BROMIDE AND ALBUTEROL SULFATE 2.5; .5 MG/3ML; MG/3ML
1 SOLUTION RESPIRATORY (INHALATION)
Status: DISCONTINUED | OUTPATIENT
Start: 2024-01-26 | End: 2024-01-29 | Stop reason: HOSPADM

## 2024-01-26 RX ORDER — CEFDINIR 300 MG/1
300 CAPSULE ORAL EVERY 12 HOURS SCHEDULED
Status: DISCONTINUED | OUTPATIENT
Start: 2024-01-26 | End: 2024-01-29 | Stop reason: HOSPADM

## 2024-01-26 RX ORDER — MELATONIN
2 DAILY
Qty: 30 TABLET | Refills: 5
Start: 2024-04-16

## 2024-01-26 RX ORDER — PANTOPRAZOLE SODIUM 40 MG/1
40 TABLET, DELAYED RELEASE ORAL
Qty: 30 TABLET | Refills: 3
Start: 2024-01-26 | End: 2024-01-31

## 2024-01-26 RX ORDER — HYDROXYZINE HYDROCHLORIDE 25 MG/1
25 TABLET, FILM COATED ORAL 3 TIMES DAILY PRN
Qty: 30 TABLET | Refills: 0
Start: 2024-01-26

## 2024-01-26 RX ORDER — ERGOCALCIFEROL 1.25 MG/1
50000 CAPSULE ORAL WEEKLY
Qty: 11 CAPSULE | Refills: 0
Start: 2024-01-29 | End: 2024-04-09

## 2024-01-26 RX ORDER — SENNA AND DOCUSATE SODIUM 50; 8.6 MG/1; MG/1
2 TABLET, FILM COATED ORAL NIGHTLY PRN
Qty: 30 TABLET | Refills: 0
Start: 2024-01-26

## 2024-01-26 RX ORDER — CEFDINIR 300 MG/1
300 CAPSULE ORAL EVERY 12 HOURS SCHEDULED
Qty: 9 CAPSULE | Refills: 0
Start: 2024-01-26 | End: 2024-01-29

## 2024-01-26 RX ORDER — IPRATROPIUM BROMIDE AND ALBUTEROL SULFATE 2.5; .5 MG/3ML; MG/3ML
3 SOLUTION RESPIRATORY (INHALATION) EVERY 4 HOURS PRN
Qty: 360 ML | Refills: 0
Start: 2024-01-26

## 2024-01-26 RX ADMIN — GABAPENTIN 100 MG: 100 CAPSULE ORAL at 09:56

## 2024-01-26 RX ADMIN — SUCRALFATE 1 G: 1 TABLET ORAL at 00:21

## 2024-01-26 RX ADMIN — GUAIFENESIN 600 MG: 600 TABLET, EXTENDED RELEASE ORAL at 09:56

## 2024-01-26 RX ADMIN — SUCRALFATE 1 G: 1 TABLET ORAL at 17:06

## 2024-01-26 RX ADMIN — ACETYLCYSTEINE 600 MG: 200 INHALANT RESPIRATORY (INHALATION) at 20:29

## 2024-01-26 RX ADMIN — PANTOPRAZOLE SODIUM 40 MG: 40 TABLET, DELAYED RELEASE ORAL at 15:12

## 2024-01-26 RX ADMIN — ATENOLOL 100 MG: 25 TABLET ORAL at 09:56

## 2024-01-26 RX ADMIN — IPRATROPIUM BROMIDE AND ALBUTEROL SULFATE 1 DOSE: .5; 2.5 SOLUTION RESPIRATORY (INHALATION) at 08:24

## 2024-01-26 RX ADMIN — DICYCLOMINE HYDROCHLORIDE 20 MG: 20 TABLET ORAL at 09:56

## 2024-01-26 RX ADMIN — ONDANSETRON 4 MG: 2 INJECTION INTRAMUSCULAR; INTRAVENOUS at 00:21

## 2024-01-26 RX ADMIN — IPRATROPIUM BROMIDE AND ALBUTEROL SULFATE 1 DOSE: 2.5; .5 SOLUTION RESPIRATORY (INHALATION) at 20:28

## 2024-01-26 RX ADMIN — CEFDINIR 300 MG: 300 CAPSULE ORAL at 09:56

## 2024-01-26 RX ADMIN — HYDROXYZINE HYDROCHLORIDE 25 MG: 25 TABLET, FILM COATED ORAL at 00:21

## 2024-01-26 RX ADMIN — SODIUM CHLORIDE, PRESERVATIVE FREE 10 ML: 5 INJECTION INTRAVENOUS at 19:49

## 2024-01-26 RX ADMIN — DICYCLOMINE HYDROCHLORIDE 20 MG: 20 TABLET ORAL at 19:49

## 2024-01-26 RX ADMIN — DEXAMETHASONE 6 MG: 4 TABLET ORAL at 09:56

## 2024-01-26 RX ADMIN — BUDESONIDE AND FORMOTEROL FUMARATE DIHYDRATE 2 PUFF: 160; 4.5 AEROSOL RESPIRATORY (INHALATION) at 08:25

## 2024-01-26 RX ADMIN — GABAPENTIN 100 MG: 100 CAPSULE ORAL at 19:49

## 2024-01-26 RX ADMIN — SODIUM CHLORIDE, PRESERVATIVE FREE 10 ML: 5 INJECTION INTRAVENOUS at 09:56

## 2024-01-26 RX ADMIN — DICYCLOMINE HYDROCHLORIDE 20 MG: 20 TABLET ORAL at 17:06

## 2024-01-26 RX ADMIN — CEFDINIR 300 MG: 300 CAPSULE ORAL at 19:48

## 2024-01-26 RX ADMIN — GABAPENTIN 100 MG: 100 CAPSULE ORAL at 15:12

## 2024-01-26 RX ADMIN — PANTOPRAZOLE SODIUM 40 MG: 40 TABLET, DELAYED RELEASE ORAL at 05:46

## 2024-01-26 RX ADMIN — ONDANSETRON 4 MG: 2 INJECTION INTRAMUSCULAR; INTRAVENOUS at 05:46

## 2024-01-26 RX ADMIN — SUCRALFATE 1 G: 1 TABLET ORAL at 05:46

## 2024-01-26 RX ADMIN — BUDESONIDE AND FORMOTEROL FUMARATE DIHYDRATE 2 PUFF: 160; 4.5 AEROSOL RESPIRATORY (INHALATION) at 20:29

## 2024-01-26 RX ADMIN — GUAIFENESIN 600 MG: 600 TABLET, EXTENDED RELEASE ORAL at 19:49

## 2024-01-26 ASSESSMENT — PAIN SCALES - GENERAL
PAINLEVEL_OUTOF10: 0
PAINLEVEL_OUTOF10: 0

## 2024-01-26 ASSESSMENT — PAIN SCALES - WONG BAKER
WONGBAKER_NUMERICALRESPONSE: 0

## 2024-01-26 NOTE — CARE COORDINATION
Reviewed chart, discussed in IDR, spoke with   Patti from [facility], they can not take pt till  out of room for 24 hrs. Also needs to know if any Chemo is planned,. VM left for Dr Roberts and nursing working on getting  out of room.  PS to BRITTANY Nava.      1630 Spoke with Dr Gordon and he states can hold Chemo meds  while in rehab.  Updated Patti and she states can start prior auth.  Carolina FRANKLIN updated.

## 2024-01-26 NOTE — CARE COORDINATION
Precert initiated via [employer] portal:   The authorization request [id number] has been submitted

## 2024-01-26 NOTE — PLAN OF CARE
Problem: Discharge Planning  Goal: Discharge to home or other facility with appropriate resources  1/26/2024 0515 by RAJIV PEÑALOZA  Outcome: Progressing  1/25/2024 1623 by Loenila Chavez LPN  Outcome: Progressing     Problem: Pain  Goal: Verbalizes/displays adequate comfort level or baseline comfort level  1/25/2024 1623 by Leonila Chavez LPN  Outcome: Progressing     Problem: Safety - Adult  Goal: Free from fall injury  1/25/2024 1623 by Leonila Chavez LPN  Outcome: Progressing     Problem: Nutrition Deficit:  Goal: Optimize nutritional status  1/25/2024 1623 by Leonila Chavez LPN  Outcome: Progressing     Problem: Skin/Tissue Integrity  Goal: Absence of new skin breakdown  Description: 1.  Monitor for areas of redness and/or skin breakdown  2.  Assess vascular access sites hourly  3.  Every 4-6 hours minimum:  Change oxygen saturation probe site  4.  Every 4-6 hours:  If on nasal continuous positive airway pressure, respiratory therapy assess nares and determine need for appliance change or resting period.  1/25/2024 1623 by Leonila Chavez LPN  Outcome: Progressing

## 2024-01-26 NOTE — DISCHARGE SUMMARY
V2.0  Discharge Summary/Progress Note     (Unable to DC today due to electronic sitter, stopping it 2:30 pm , so she can be DC'd tomorrow  afternoon)     Name:  Adenike Harvey /Age/Sex: 1955 (68 y.o. female)   Admit Date: 2024  Discharge Date: 24    MRN & CSN:  [id number] & [id number] Encounter Date and Time 24 1:59 PM EST    Attending:  Dave Roach MD Discharging Provider: GRACE DAHL [facility] Course:     Brief HPI: Adenike Harvey is a 68 y.o. female who presented with N&V, Epigastric Pain, Cough and Generalized Weakness. Was treated for Pneumonia of her LLL seen on CT Chest with IV Rocephin and Azithromycin, Duonebs and mucinex with improvement of her symptoms. GI was consulted and her EGD revealed Severe Gastritis. Her PPI was increased to BID and we added Carafate QID. Her N&V and Epigastric Pain have improved and she is eating much better. Due to a 16 lb weight loss from her Gastritis, she developed significant Weakness. PT/OT recommended SNF placement and continued therapy to regain her strength, prior to going home. Her mental status declined during her stay, but now she is back to baseline. Her Vit D level was very low, so was stared on Vit D 50,000 u caps weekly. She is medically stable today for discharge to [facility].     Brief Problem Based Course:     Cough  COPD exacerbation  Hx SCC Lung CA   CAP - LLL   -Chest x-ray-no acute process, chronic findings  -Patient saturating well on room air, not tachypneic  -VBG-pH 7.19, pCO2 41, pO2 47, HCO3 15.7  -Patient had CTA chest-2023- no PE, postsurgical changes from right upper lobe lobectomy, radiation changes.    -Patient received IV methylprednisolone, DuoNeb x 3 doses in ER, levofloxacin  -Continue DuoNeb every 4 hours while awake  -CT Chest/Abd/Pelvis: stable LLL lung lesion with adjacent interstitial infiltrate, may represent infectious vs neoplastic etiology- new since

## 2024-01-27 LAB
GLUCOSE BLD-MCNC: 102 MG/DL (ref 70–99)
GLUCOSE BLD-MCNC: 104 MG/DL (ref 70–99)
GLUCOSE BLD-MCNC: 108 MG/DL (ref 70–99)
GLUCOSE BLD-MCNC: 117 MG/DL (ref 70–99)

## 2024-01-27 PROCEDURE — 6360000002 HC RX W HCPCS: Performed by: INTERNAL MEDICINE

## 2024-01-27 PROCEDURE — 6360000002 HC RX W HCPCS: Performed by: NURSE PRACTITIONER

## 2024-01-27 PROCEDURE — 94761 N-INVAS EAR/PLS OXIMETRY MLT: CPT

## 2024-01-27 PROCEDURE — 2580000003 HC RX 258: Performed by: INTERNAL MEDICINE

## 2024-01-27 PROCEDURE — 6370000000 HC RX 637 (ALT 250 FOR IP): Performed by: NURSE PRACTITIONER

## 2024-01-27 PROCEDURE — 82962 GLUCOSE BLOOD TEST: CPT

## 2024-01-27 PROCEDURE — 1200000000 HC SEMI PRIVATE

## 2024-01-27 PROCEDURE — 6370000000 HC RX 637 (ALT 250 FOR IP): Performed by: INTERNAL MEDICINE

## 2024-01-27 PROCEDURE — 94640 AIRWAY INHALATION TREATMENT: CPT

## 2024-01-27 RX ADMIN — SODIUM CHLORIDE, PRESERVATIVE FREE 10 ML: 5 INJECTION INTRAVENOUS at 12:56

## 2024-01-27 RX ADMIN — IPRATROPIUM BROMIDE AND ALBUTEROL SULFATE 1 DOSE: 2.5; .5 SOLUTION RESPIRATORY (INHALATION) at 09:04

## 2024-01-27 RX ADMIN — DICYCLOMINE HYDROCHLORIDE 20 MG: 20 TABLET ORAL at 17:17

## 2024-01-27 RX ADMIN — PANTOPRAZOLE SODIUM 40 MG: 40 TABLET, DELAYED RELEASE ORAL at 17:17

## 2024-01-27 RX ADMIN — SUCRALFATE 1 G: 1 TABLET ORAL at 12:51

## 2024-01-27 RX ADMIN — GABAPENTIN 100 MG: 100 CAPSULE ORAL at 16:00

## 2024-01-27 RX ADMIN — ENOXAPARIN SODIUM 40 MG: 100 INJECTION SUBCUTANEOUS at 12:53

## 2024-01-27 RX ADMIN — SUCRALFATE 1 G: 1 TABLET ORAL at 05:26

## 2024-01-27 RX ADMIN — GABAPENTIN 100 MG: 100 CAPSULE ORAL at 20:02

## 2024-01-27 RX ADMIN — BUDESONIDE AND FORMOTEROL FUMARATE DIHYDRATE 2 PUFF: 160; 4.5 AEROSOL RESPIRATORY (INHALATION) at 09:03

## 2024-01-27 RX ADMIN — SUCRALFATE 1 G: 1 TABLET ORAL at 00:56

## 2024-01-27 RX ADMIN — GUAIFENESIN 600 MG: 600 TABLET, EXTENDED RELEASE ORAL at 12:53

## 2024-01-27 RX ADMIN — CEFDINIR 300 MG: 300 CAPSULE ORAL at 20:02

## 2024-01-27 RX ADMIN — NYSTATIN 500000 UNITS: 100000 SUSPENSION ORAL at 09:00

## 2024-01-27 RX ADMIN — NYSTATIN 500000 UNITS: 100000 SUSPENSION ORAL at 17:18

## 2024-01-27 RX ADMIN — DICYCLOMINE HYDROCHLORIDE 20 MG: 20 TABLET ORAL at 20:02

## 2024-01-27 RX ADMIN — CEFDINIR 300 MG: 300 CAPSULE ORAL at 12:51

## 2024-01-27 RX ADMIN — GABAPENTIN 100 MG: 100 CAPSULE ORAL at 12:51

## 2024-01-27 RX ADMIN — ATENOLOL 100 MG: 25 TABLET ORAL at 12:51

## 2024-01-27 RX ADMIN — GUAIFENESIN 600 MG: 600 TABLET, EXTENDED RELEASE ORAL at 20:03

## 2024-01-27 RX ADMIN — DICYCLOMINE HYDROCHLORIDE 20 MG: 20 TABLET ORAL at 12:52

## 2024-01-27 RX ADMIN — SUCRALFATE 1 G: 1 TABLET ORAL at 17:18

## 2024-01-27 RX ADMIN — PANTOPRAZOLE SODIUM 40 MG: 40 TABLET, DELAYED RELEASE ORAL at 05:26

## 2024-01-27 RX ADMIN — NYSTATIN 500000 UNITS: 100000 SUSPENSION ORAL at 12:52

## 2024-01-27 RX ADMIN — SODIUM CHLORIDE, PRESERVATIVE FREE 10 ML: 5 INJECTION INTRAVENOUS at 20:03

## 2024-01-27 RX ADMIN — ACETYLCYSTEINE 600 MG: 200 INHALANT RESPIRATORY (INHALATION) at 09:02

## 2024-01-28 LAB
ALBUMIN SERPL-MCNC: 3.1 GM/DL (ref 3.4–5)
ALP BLD-CCNC: 39 IU/L (ref 40–129)
ALT SERPL-CCNC: 12 U/L (ref 10–40)
ANION GAP SERPL CALCULATED.3IONS-SCNC: 7 MMOL/L (ref 7–16)
AST SERPL-CCNC: 28 IU/L (ref 15–37)
BILIRUB SERPL-MCNC: 0.3 MG/DL (ref 0–1)
BUN SERPL-MCNC: 17 MG/DL (ref 6–23)
CALCIUM SERPL-MCNC: 7.9 MG/DL (ref 8.3–10.6)
CHLORIDE BLD-SCNC: 104 MMOL/L (ref 99–110)
CO2: 27 MMOL/L (ref 21–32)
CREAT SERPL-MCNC: 0.9 MG/DL (ref 0.6–1.1)
GFR SERPL CREATININE-BSD FRML MDRD: >60 ML/MIN/1.73M2
GLUCOSE BLD-MCNC: 86 MG/DL (ref 70–99)
GLUCOSE SERPL-MCNC: 81 MG/DL (ref 70–99)
POTASSIUM SERPL-SCNC: 3.4 MMOL/L (ref 3.5–5.1)
SODIUM BLD-SCNC: 138 MMOL/L (ref 135–145)
TOTAL PROTEIN: 4.7 GM/DL (ref 6.4–8.2)

## 2024-01-28 PROCEDURE — 6370000000 HC RX 637 (ALT 250 FOR IP): Performed by: NURSE PRACTITIONER

## 2024-01-28 PROCEDURE — 1200000000 HC SEMI PRIVATE

## 2024-01-28 PROCEDURE — 6360000002 HC RX W HCPCS: Performed by: NURSE PRACTITIONER

## 2024-01-28 PROCEDURE — 2580000003 HC RX 258: Performed by: INTERNAL MEDICINE

## 2024-01-28 PROCEDURE — 6360000002 HC RX W HCPCS: Performed by: INTERNAL MEDICINE

## 2024-01-28 PROCEDURE — 36415 COLL VENOUS BLD VENIPUNCTURE: CPT

## 2024-01-28 PROCEDURE — 94761 N-INVAS EAR/PLS OXIMETRY MLT: CPT

## 2024-01-28 PROCEDURE — 94640 AIRWAY INHALATION TREATMENT: CPT

## 2024-01-28 PROCEDURE — 6370000000 HC RX 637 (ALT 250 FOR IP): Performed by: INTERNAL MEDICINE

## 2024-01-28 PROCEDURE — 2580000003 HC RX 258: Performed by: FAMILY MEDICINE

## 2024-01-28 PROCEDURE — 82962 GLUCOSE BLOOD TEST: CPT

## 2024-01-28 PROCEDURE — 80053 COMPREHEN METABOLIC PANEL: CPT

## 2024-01-28 RX ORDER — 0.9 % SODIUM CHLORIDE 0.9 %
500 INTRAVENOUS SOLUTION INTRAVENOUS ONCE
Status: COMPLETED | OUTPATIENT
Start: 2024-01-28 | End: 2024-01-29

## 2024-01-28 RX ORDER — SODIUM CHLORIDE 9 MG/ML
INJECTION, SOLUTION INTRAVENOUS CONTINUOUS
Status: ACTIVE | OUTPATIENT
Start: 2024-01-29 | End: 2024-01-29

## 2024-01-28 RX ORDER — ATENOLOL 25 MG/1
50 TABLET ORAL DAILY
Status: DISCONTINUED | OUTPATIENT
Start: 2024-01-29 | End: 2024-01-29 | Stop reason: HOSPADM

## 2024-01-28 RX ADMIN — PANTOPRAZOLE SODIUM 40 MG: 40 TABLET, DELAYED RELEASE ORAL at 18:25

## 2024-01-28 RX ADMIN — NYSTATIN 500000 UNITS: 100000 SUSPENSION ORAL at 08:31

## 2024-01-28 RX ADMIN — GUAIFENESIN 600 MG: 600 TABLET, EXTENDED RELEASE ORAL at 22:02

## 2024-01-28 RX ADMIN — ATENOLOL 100 MG: 25 TABLET ORAL at 08:31

## 2024-01-28 RX ADMIN — NYSTATIN 500000 UNITS: 100000 SUSPENSION ORAL at 18:26

## 2024-01-28 RX ADMIN — DICYCLOMINE HYDROCHLORIDE 20 MG: 20 TABLET ORAL at 08:30

## 2024-01-28 RX ADMIN — DEXAMETHASONE 6 MG: 4 TABLET ORAL at 08:31

## 2024-01-28 RX ADMIN — DICYCLOMINE HYDROCHLORIDE 20 MG: 20 TABLET ORAL at 18:25

## 2024-01-28 RX ADMIN — ENOXAPARIN SODIUM 40 MG: 100 INJECTION SUBCUTANEOUS at 08:31

## 2024-01-28 RX ADMIN — IPRATROPIUM BROMIDE AND ALBUTEROL SULFATE 1 DOSE: 2.5; .5 SOLUTION RESPIRATORY (INHALATION) at 21:50

## 2024-01-28 RX ADMIN — GABAPENTIN 100 MG: 100 CAPSULE ORAL at 08:31

## 2024-01-28 RX ADMIN — SUCRALFATE 1 G: 1 TABLET ORAL at 01:03

## 2024-01-28 RX ADMIN — SUCRALFATE 1 G: 1 TABLET ORAL at 18:25

## 2024-01-28 RX ADMIN — SODIUM CHLORIDE 500 ML: 9 INJECTION, SOLUTION INTRAVENOUS at 22:01

## 2024-01-28 RX ADMIN — BUDESONIDE AND FORMOTEROL FUMARATE DIHYDRATE 2 PUFF: 160; 4.5 AEROSOL RESPIRATORY (INHALATION) at 07:33

## 2024-01-28 RX ADMIN — SODIUM CHLORIDE: 9 INJECTION, SOLUTION INTRAVENOUS at 23:44

## 2024-01-28 RX ADMIN — SUCRALFATE 1 G: 1 TABLET ORAL at 05:29

## 2024-01-28 RX ADMIN — CEFDINIR 300 MG: 300 CAPSULE ORAL at 22:05

## 2024-01-28 RX ADMIN — SODIUM CHLORIDE, PRESERVATIVE FREE 10 ML: 5 INJECTION INTRAVENOUS at 08:31

## 2024-01-28 RX ADMIN — BUDESONIDE AND FORMOTEROL FUMARATE DIHYDRATE 2 PUFF: 160; 4.5 AEROSOL RESPIRATORY (INHALATION) at 21:50

## 2024-01-28 RX ADMIN — GABAPENTIN 100 MG: 100 CAPSULE ORAL at 22:02

## 2024-01-28 RX ADMIN — GABAPENTIN 100 MG: 100 CAPSULE ORAL at 18:26

## 2024-01-28 RX ADMIN — GUAIFENESIN 600 MG: 600 TABLET, EXTENDED RELEASE ORAL at 08:31

## 2024-01-28 RX ADMIN — CEFDINIR 300 MG: 300 CAPSULE ORAL at 08:31

## 2024-01-28 RX ADMIN — POLYETHYLENE GLYCOL (3350) 17 G: 17 POWDER, FOR SOLUTION ORAL at 08:31

## 2024-01-28 RX ADMIN — IPRATROPIUM BROMIDE AND ALBUTEROL SULFATE 1 DOSE: 2.5; .5 SOLUTION RESPIRATORY (INHALATION) at 07:33

## 2024-01-28 RX ADMIN — DICYCLOMINE HYDROCHLORIDE 20 MG: 20 TABLET ORAL at 22:03

## 2024-01-28 RX ADMIN — PANTOPRAZOLE SODIUM 40 MG: 40 TABLET, DELAYED RELEASE ORAL at 05:29

## 2024-01-28 ASSESSMENT — PAIN SCALES - GENERAL
PAINLEVEL_OUTOF10: 0
PAINLEVEL_OUTOF10: 0

## 2024-01-28 ASSESSMENT — PAIN DESCRIPTION - LOCATION: LOCATION: ABDOMEN

## 2024-01-28 NOTE — PLAN OF CARE
Problem: Discharge Planning  Goal: Discharge to home or other facility with appropriate resources  Outcome: Progressing     Problem: Pain  Goal: Verbalizes/displays adequate comfort level or baseline comfort level  Outcome: Progressing     Problem: Safety - Adult  Goal: Free from fall injury  Outcome: Progressing     Problem: Nutrition Deficit:  Goal: Optimize nutritional status  1/27/2024 2313 by Claudette Zayas RN  Outcome: Progressing  1/27/2024 1330 by Delmis Coppola RD  Outcome: Progressing  Flowsheets (Taken 1/27/2024 1322)  Nutrient intake appropriate for improving, restoring, or maintaining nutritional needs:   Assess nutritional status and recommend course of action   Monitor oral intake, labs, and treatment plans   Recommend appropriate diets, oral nutritional supplements, and vitamin/mineral supplements     Problem: Skin/Tissue Integrity  Goal: Absence of new skin breakdown  Description: 1.  Monitor for areas of redness and/or skin breakdown  2.  Assess vascular access sites hourly  3.  Every 4-6 hours minimum:  Change oxygen saturation probe site  4.  Every 4-6 hours:  If on nasal continuous positive airway pressure, respiratory therapy assess nares and determine need for appliance change or resting period.  Outcome: Progressing

## 2024-01-29 VITALS
BODY MASS INDEX: 26.63 KG/M2 | HEIGHT: 65 IN | TEMPERATURE: 98.3 F | HEART RATE: 80 BPM | RESPIRATION RATE: 17 BRPM | OXYGEN SATURATION: 97 % | DIASTOLIC BLOOD PRESSURE: 90 MMHG | WEIGHT: 159.83 LBS | SYSTOLIC BLOOD PRESSURE: 130 MMHG

## 2024-01-29 LAB
ANION GAP SERPL CALCULATED.3IONS-SCNC: 9 MMOL/L (ref 7–16)
BASOPHILS ABSOLUTE: 0 K/CU MM
BASOPHILS RELATIVE PERCENT: 0 % (ref 0–1)
BUN SERPL-MCNC: 15 MG/DL (ref 6–23)
CALCIUM SERPL-MCNC: 7.6 MG/DL (ref 8.3–10.6)
CHLORIDE BLD-SCNC: 101 MMOL/L (ref 99–110)
CO2: 25 MMOL/L (ref 21–32)
CREAT SERPL-MCNC: 0.7 MG/DL (ref 0.6–1.1)
DIFFERENTIAL TYPE: ABNORMAL
EOSINOPHILS ABSOLUTE: 0 K/CU MM
EOSINOPHILS RELATIVE PERCENT: 0 % (ref 0–3)
GFR SERPL CREATININE-BSD FRML MDRD: >60 ML/MIN/1.73M2
GLUCOSE SERPL-MCNC: 140 MG/DL (ref 70–99)
HCT VFR BLD CALC: 31.2 % (ref 37–47)
HEMOGLOBIN: 10 GM/DL (ref 12.5–16)
IMMATURE NEUTROPHIL %: 2.2 % (ref 0–0.43)
LYMPHOCYTES ABSOLUTE: 0.6 K/CU MM
LYMPHOCYTES RELATIVE PERCENT: 10.3 % (ref 24–44)
MCH RBC QN AUTO: 29 PG (ref 27–31)
MCHC RBC AUTO-ENTMCNC: 32.1 % (ref 32–36)
MCV RBC AUTO: 90.4 FL (ref 78–100)
MONOCYTES ABSOLUTE: 0.6 K/CU MM
MONOCYTES RELATIVE PERCENT: 9.4 % (ref 0–4)
NUCLEATED RBC %: 0 %
PDW BLD-RTO: 15.3 % (ref 11.7–14.9)
PLATELET # BLD: 334 K/CU MM (ref 140–440)
PMV BLD AUTO: 9.8 FL (ref 7.5–11.1)
POTASSIUM SERPL-SCNC: 3.7 MMOL/L (ref 3.5–5.1)
RBC # BLD: 3.45 M/CU MM (ref 4.2–5.4)
SEGMENTED NEUTROPHILS ABSOLUTE COUNT: 4.6 K/CU MM
SEGMENTED NEUTROPHILS RELATIVE PERCENT: 78.1 % (ref 36–66)
SODIUM BLD-SCNC: 135 MMOL/L (ref 135–145)
TOTAL IMMATURE NEUTOROPHIL: 0.13 K/CU MM
TOTAL NUCLEATED RBC: 0 K/CU MM
WBC # BLD: 5.9 K/CU MM (ref 4–10.5)

## 2024-01-29 PROCEDURE — 97530 THERAPEUTIC ACTIVITIES: CPT

## 2024-01-29 PROCEDURE — 36415 COLL VENOUS BLD VENIPUNCTURE: CPT

## 2024-01-29 PROCEDURE — 97116 GAIT TRAINING THERAPY: CPT

## 2024-01-29 PROCEDURE — 6370000000 HC RX 637 (ALT 250 FOR IP): Performed by: INTERNAL MEDICINE

## 2024-01-29 PROCEDURE — 97535 SELF CARE MNGMENT TRAINING: CPT

## 2024-01-29 PROCEDURE — 85025 COMPLETE CBC W/AUTO DIFF WBC: CPT

## 2024-01-29 PROCEDURE — 80048 BASIC METABOLIC PNL TOTAL CA: CPT

## 2024-01-29 PROCEDURE — 94640 AIRWAY INHALATION TREATMENT: CPT

## 2024-01-29 PROCEDURE — 6370000000 HC RX 637 (ALT 250 FOR IP): Performed by: NURSE PRACTITIONER

## 2024-01-29 PROCEDURE — 94761 N-INVAS EAR/PLS OXIMETRY MLT: CPT

## 2024-01-29 PROCEDURE — 99232 SBSQ HOSP IP/OBS MODERATE 35: CPT | Performed by: INTERNAL MEDICINE

## 2024-01-29 RX ORDER — ATENOLOL 100 MG/1
50 TABLET ORAL DAILY
Qty: 15 TABLET | Refills: 0
Start: 2024-01-29 | End: 2024-02-28

## 2024-01-29 RX ORDER — CEFDINIR 300 MG/1
300 CAPSULE ORAL EVERY 12 HOURS SCHEDULED
Qty: 6 CAPSULE | Refills: 0
Start: 2024-01-29 | End: 2024-02-01

## 2024-01-29 RX ADMIN — IPRATROPIUM BROMIDE AND ALBUTEROL SULFATE 1 DOSE: 2.5; .5 SOLUTION RESPIRATORY (INHALATION) at 07:32

## 2024-01-29 RX ADMIN — PANTOPRAZOLE SODIUM 40 MG: 40 TABLET, DELAYED RELEASE ORAL at 05:33

## 2024-01-29 RX ADMIN — DICYCLOMINE HYDROCHLORIDE 20 MG: 20 TABLET ORAL at 10:44

## 2024-01-29 RX ADMIN — CEFDINIR 300 MG: 300 CAPSULE ORAL at 10:44

## 2024-01-29 RX ADMIN — GABAPENTIN 100 MG: 100 CAPSULE ORAL at 10:44

## 2024-01-29 RX ADMIN — GUAIFENESIN 600 MG: 600 TABLET, EXTENDED RELEASE ORAL at 10:44

## 2024-01-29 RX ADMIN — BUDESONIDE AND FORMOTEROL FUMARATE DIHYDRATE 2 PUFF: 160; 4.5 AEROSOL RESPIRATORY (INHALATION) at 07:32

## 2024-01-29 RX ADMIN — SUCRALFATE 1 G: 1 TABLET ORAL at 05:33

## 2024-01-29 NOTE — CARE COORDINATION
Received SNF approval:   Effective From:  01/27/2024    Effective Through:  01/29/2024    Received fax from [facility] requesting additional info/ updated therapy.     Submitted updated information via portal 12:47 PM     Per [employer] portal, precert remains pending at this time 8:43 AM   [id number]    Electronic PAS completed

## 2024-01-29 NOTE — CARE COORDINATION
Discharging RN: Please complete the following for discharge..... Pt is discharging to [facility].    1) Call report [phone]  2) Fax AVS with completed KASIE to [phone]  3) Inform family    Transport is set up for 6:30p with [facility]. Packet is placed with soft chart. CM alerted RN and Patti/Samuel.

## 2024-01-29 NOTE — DISCHARGE SUMMARY
V2.0  Discharge Summary    Name:  Adenike Harvey /Age/Sex: 1955 (68 y.o. female)   Admit Date: 2024  Discharge Date: 24    MRN & CSN:  [id number] & [id number] Encounter Date and Time 24 2:24 PM EST    Attending:  Mario Alberto Uribe MD Discharging Provider: Astrid Banuelos PA-C       Hospital Course:     Brief HPI: Adenike Harvey is a 68 y.o. female who presented with COPD exacerbation, PNA.     Problems addressed during this hospitalization:     Acute COPD exacerbation  CAP - LLL   -resolving   -Chest x-ray-no acute process, chronic findings  -Remains stable on room air  -VBG-pH 7.39,   -MRSA Cx and urinary antigens : NEG  -CT chest-2024-Stable left lower lobe lung lesion with adjacent interstitial infiltrate may represent infectious or neoplastic etiology.  This is stable from the prior CT. Stable CT of the  abdomen and pelvis. No evidence for bowel obstruction. Stable peripheral right lung infiltrate. Stable radiation changes in the right perihilar region.Diverticulosis without evidence of diverticulitis. Stable calcific atherosclerotic disease in the aorta and coronary arteries. Stable right upper lobectomy.radiation changes.    -continue bronchodilators/ICS  -Complete Decadron course x 3 days  -Patient received IV methylprednisolone, DuoNeb x 3 doses in ER, levofloxacin x 1  -IV Rocephin x 3 doses and Azithromycin 500 mg x 4 doses   -Transition to cefdinir-end date   -Dispo: discharged to SNF     Sepsis- resolved   -Presenting criteria on admission tachycardia/tachypnea/leukopenia  -Received 3 L NS bolus  -Patient has persistent tachycardia, now resolved since    -Patient is on atenolol-resumed at lower dose  -treated for CAP as above     Severe gastritis  -Patient was seen in ER 2024 with similar complaints, has no appetite, has lost 16 lbs in 3 months, N&V, early satiety, abd pain, has been on a PPI and Bentyl from her PCP, no improvement in her sxs    -now tolerating

## 2024-01-30 ENCOUNTER — TELEPHONE (OUTPATIENT)
Dept: FAMILY MEDICINE CLINIC | Age: 69
End: 2024-01-30

## 2024-01-30 NOTE — TELEPHONE ENCOUNTER
Care Transitions Initial Follow Up Call    Outreach made within 2 business days of discharge: Yes    Patient: Adenike Harvey Patient : 1955   MRN: [id number]  Reason for Admission: There are no discharge diagnoses documented for the most recent discharge.  Discharge Date: 24       Spoke with: Self    Discharge department/facility: [facility]    TCM Interactive Patient Contact:  Was patient able to fill all prescriptions: Yes  Was patient instructed to bring all medications to the follow-up visit: Yes  Is patient taking all medications as directed in the discharge summary? Yes  Does patient understand their discharge instructions: Yes  Does patient have questions or concerns that need addressed prior to 7-14 day follow up office visit: no    Scheduled appointment with PCP within 7-14 days    Follow Up  Future Appointments   Date Time Provider Department Center   2024  8:45 AM SCHEDULE, [facility] MED ONC LAB [facility] MED ONC [place]   2024  9:15 AM SCHEDULE, [facility] MED ONC TREATMENT [facility] MED ONC [place]   2024  8:45 AM SCHEDULE[facility] [place]   2024 10:45 AM SCHEDULE, [facility] [place]   3/14/2024 10:00 AM Donovan Sutherland MD [facility] RAD ONC [place]   4/10/2024 11:00 AM Rony Radford MD [facility] G[facility]   2024 11:00 AM Iban Lou MD [facility]       Shahnaz Lamar MA

## 2024-01-30 NOTE — PROGRESS NOTES
POST FALL MANAGEMENT    Adenike Harvey  MEDICAL RECORD NUMBER:  [id number]  AGE: 68 y.o.   GENDER: female  : 1955  TODAYS DATE:  2024    Details     Fall Occurred: Yes    Was the Fall Witnessed:  No      Brief Review of Event: Patient was found sitting on floor next to bed with bed alarm going off. Patient able to move all extremities and no complaints of pain. Patient was helped up to bed with rails put up and bed alarm x2 in place.          Who found the patient: Lorenzo Ulloa RN      Where was the patient at the time of the fall: in room       Patient Comments:    Patient states she was trying to find her  and dog, so she got up from bed and must have tripped and fell.     Date Fall Occurred:  2024 .       Time Fall Occurred: 8:42p.m.     Assessment     Post Fall Head to Toe Assessment Completed: Yes    Post Fall Predictive Analytic Score Reviewed: Yes     Post Fall Vitals Completed: Yes    Post Fall Neuro Checks Completed: Yes    Injury Occurred(if yes, describe injury):  no           Did the Patient Experience:(Check Guillermo all that apply)    [] Patient hit head  [] Loss of consciousness  [] Change in mental status following the fall  [] Patient is on an anticoagulant medication    Unsure if fall caused the confusion. Patient seems to have been confused before the fall, as she says she was getting up to look for her  and dog, thinking she was at home.  CT Performed:  yes    Follow-up     Persons Notified of Fall:  (Provide names of persons notified)   [] Physician:   [x] EMELY:  [x] Nursing Supervisior:  [x] Manager:  [] Pharmacist:  [x] Family:  [] Other:      Electronically signed by Xuan Unger RN 2024 at 12:55 AM   
             [facility] Gastroenterology and Hepatology             MD Rony Fernandez MD Carol Christensen, APRN-CNP       Elissaalexsander Banks, APRN-CNP             [address] [place] [address]             [phone] fax [phone]      Gastroenterology Progress note . 1/24/2024  Reason for consult:   Nausea, Vomiting, Diarrhea    Physician attestation:  I have personally seen and examined the patient independently. I have reviewed the patient's available data,including medical history and recent test results. Reviewed and discussed note as documented by EMELY.  I agree with the physical exam findings, assessment and plan.     Briefly   Patient noted to be sitting in bed,  present at bedside.  Discussed findings of EGD from yesterday with severe gastritis, counseled to avoid NSAID medication and take PPI twice daily I also recommended that they follow-up outpatient with me.  Also discussed about her low-grade narrowing Schatzki's ring that was dilated.  Hemoglobin 9.5 today.    Gen: normal mood, alert  ENT: normal TJ  Cardiovascular: RRR, No M/R/G  Respiratory: CTA BL  Gastrointestinal: Soft,NT ND  Genitourinary: no suprapubic tenderness  Musculoskeletal: no scars  Skin:moist  Neuro: alert and oriented x3    My assessment and plan reveals   #1 severe gastritis  -Patient recommended to start oral PPI twice daily  -Outpatient follow-up with repeat EGD in 2 to 3 months  -Await biopsy results  -Avoid NSAIDs    #2 early satiety and weight loss  Patient now mentions she is tolerating diet much more suspect that it was a dysphagia component which is now improved after dilation of her Schatzki's ring with EGD    #3 constipation  Patient mentions initially she was having diarrhea during the time that she was taking antibiotics likely antibiotic induced diarrhea however currently mentions that she has had no bowel movement for 4 days  -CT without obstruction  -Continue 
    V2.0  [facility] Hospitalist Progress Note      Name:  Adenike Harvey /Age/Sex: 1955  (68 y.o. female)   MRN & CSN:  [id number] & [id number] Encounter Date/Time: 2024 2:21 PM EST    Location:  [address] [place] [address]-A PCP: Iban Lou MD       Hospital Day: 8    Assessment and Plan:   Adenike Harvey is a 68 y.o. female who presents with Abdominal pain    Acute COPD exacerbation  CAP - LLL   -resolving   -Chest x-ray-no acute process, chronic findings  -Remains stable on room air  -VBG-pH 7.39,   -MRSA Cx and urinary antigens : NEG  -CT chest-2024-Stable left lower lobe lung lesion with adjacent interstitial infiltrate may represent infectious or neoplastic etiology.  This is stable from the prior CT. Stable CT of the  abdomen and pelvis. No evidence for bowel obstruction. Stable peripheral right lung infiltrate. Stable radiation changes in the right perihilar region.Diverticulosis without evidence of diverticulitis. Stable calcific atherosclerotic disease in the aorta and coronary arteries. Stable right upper lobectomy.radiation changes.    -continue bronchodilators/ICS  -Complete Decadron course x 3 days  -Patient received IV methylprednisolone, DuoNeb x 3 doses in ER, levofloxacin x 1  -IV Rocephin x 3 doses and Azithromycin 500 mg x 4 doses   -Transition to cefdinir-end date   -Dispo: Medically stable for discharge to SNF-Per case management pre-CERT remains pending     Sepsis- resolved   -Presenting criteria on admission tachycardia/tachypnea/leukopenia  -Received 3 L NS bolus  -Patient has persistent tachycardia, now resolved since    -Patient is on atenolol-resume  -treated for CAP as above     Severe gastritis  -Patient was seen in ER 2024 with similar complaints, has no appetite, has lost 16 lbs in 3 months, N&V, early satiety, abd pain, has been on a PPI and Bentyl from her PCP, no improvement in her sxs    -now tolerating regular diet   EGD by Dr. Radford: Severe Gastritis 
  Nephrology Progress Note  1/24/2024 4:00 PM  Subjective:     Interval History: Adenike Harvey is a 68 y.o. female appears doing better family in the room no acute distress        Data:   Scheduled Meds:   dexAMETHasone  6 mg Oral Daily with breakfast    guaiFENesin  600 mg Oral BID    pantoprazole  40 mg IntraVENous BID    ipratropium 0.5 mg-albuterol 2.5 mg  1 Dose Inhalation TID RT    [Held by provider] pantoprazole  40 mg Oral BID AC    sucralfate  1 g Oral 4 times per day    insulin lispro  0-8 Units SubCUTAneous TID WC    insulin lispro  0-4 Units SubCUTAneous Nightly    polyethylene glycol  17 g Oral BID    vitamin D  50,000 Units Oral Weekly    cefTRIAXone (ROCEPHIN) IV  1,000 mg IntraVENous Q24H    azithromycin  500 mg IntraVENous Q24H    sodium chloride flush  5-40 mL IntraVENous 2 times per day    enoxaparin  40 mg SubCUTAneous Daily    atenolol  100 mg Oral Daily    budesonide-formoterol  2 puff Inhalation BID    dicyclomine  20 mg Oral 4x Daily    fluticasone  2 spray Each Nostril Daily    gabapentin  100 mg Oral TID    nystatin  5 mL Oral 4x Daily     Continuous Infusions:   dextrose      sodium chloride           CBC   Recent Labs     01/22/24  1227 01/23/24  0442 01/24/24  0508   WBC 3.3* 8.0 5.7   HGB 10.4* 9.6* 9.5*   HCT 32.7* 30.0* 29.8*    307 285      BMP   Recent Labs     01/22/24  1723 01/23/24  0442 01/24/24  0508    138 138   K 4.1 4.2 3.8    101 100   CO2 17* 20* 24   BUN 7 8 11   CREATININE 0.8 0.7 0.6     Hepatic:   Recent Labs     01/21/24  1628   AST 21   ALT <5*   BILITOT 0.4   ALKPHOS 68     Troponin: No results for input(s): \"TROPONINI\" in the last 72 hours.  BNP: No results for input(s): \"BNP\" in the last 72 hours.  Lipids: No results for input(s): \"CHOL\", \"HDL\" in the last 72 hours.    Invalid input(s): \"LDLCALCU\"  ABGs: No results found for: \"PHART\", \"PO2ART\", \"CZH0URH\"  INR:   Recent Labs     01/21/24  1709   INR 1.6     Renal Labs  Albumin:    Lab Results 
  Nephrology Progress Note  1/25/2024 1:44 PM  Subjective:     Interval History: Adenike Harvey is a 68 y.o. female Overall doing well no distress somewhat confused      Data:   Scheduled Meds:   azithromycin  250 mg Oral Daily    cefdinir  300 mg Oral 2 times per day    ipratropium 0.5 mg-albuterol 2.5 mg  1 Dose Inhalation 4x Daily RT    acetylcysteine  600 mg Inhalation BID RT    [START ON 1/26/2024] dexAMETHasone  6 mg Oral Daily with breakfast    guaiFENesin  600 mg Oral BID    pantoprazole  40 mg Oral BID AC    sucralfate  1 g Oral 4 times per day    insulin lispro  0-8 Units SubCUTAneous TID WC    insulin lispro  0-4 Units SubCUTAneous Nightly    polyethylene glycol  17 g Oral BID    vitamin D  50,000 Units Oral Weekly    sodium chloride flush  5-40 mL IntraVENous 2 times per day    enoxaparin  40 mg SubCUTAneous Daily    atenolol  100 mg Oral Daily    budesonide-formoterol  2 puff Inhalation BID    dicyclomine  20 mg Oral 4x Daily    fluticasone  2 spray Each Nostril Daily    gabapentin  100 mg Oral TID    nystatin  5 mL Oral 4x Daily     Continuous Infusions:   dextrose      sodium chloride           CBC   Recent Labs     01/23/24  0442 01/24/24  0508 01/25/24  0326   WBC 8.0 5.7 4.0   HGB 9.6* 9.5* 10.1*   HCT 30.0* 29.8* 35.1*    285 268      BMP   Recent Labs     01/23/24  0442 01/24/24  0508 01/25/24  0326    138 138   K 4.2 3.8 3.9    100 102   CO2 20* 24 25   BUN 8 11 13   CREATININE 0.7 0.6 0.7     Hepatic:   No results for input(s): \"AST\", \"ALT\", \"ALB\", \"BILITOT\", \"ALKPHOS\" in the last 72 hours.    Troponin: No results for input(s): \"TROPONINI\" in the last 72 hours.  BNP: No results for input(s): \"BNP\" in the last 72 hours.  Lipids: No results for input(s): \"CHOL\", \"HDL\" in the last 72 hours.    Invalid input(s): \"LDLCALCU\"  ABGs: No results found for: \"PHART\", \"PO2ART\", \"[id number]\"  INR:   No results for input(s): \"INR\" in the last 72 hours.    Renal Labs  Albumin:    Lab Results 
  Nephrology Progress Note  1/26/2024 11:29 AM  Subjective:     Interval History: Adenike Harvey is a 68 y.o. female  appears doing well no distress specimens to hopefully go home today      Data:   Scheduled Meds:   cefdinir  300 mg Oral 2 times per day    ipratropium 0.5 mg-albuterol 2.5 mg  1 Dose Inhalation 4x Daily RT    acetylcysteine  600 mg Inhalation BID RT    dexAMETHasone  6 mg Oral Daily with breakfast    guaiFENesin  600 mg Oral BID    pantoprazole  40 mg Oral BID AC    sucralfate  1 g Oral 4 times per day    insulin lispro  0-8 Units SubCUTAneous TID WC    insulin lispro  0-4 Units SubCUTAneous Nightly    polyethylene glycol  17 g Oral BID    vitamin D  50,000 Units Oral Weekly    sodium chloride flush  5-40 mL IntraVENous 2 times per day    enoxaparin  40 mg SubCUTAneous Daily    atenolol  100 mg Oral Daily    budesonide-formoterol  2 puff Inhalation BID    dicyclomine  20 mg Oral 4x Daily    fluticasone  2 spray Each Nostril Daily    gabapentin  100 mg Oral TID    nystatin  5 mL Oral 4x Daily     Continuous Infusions:   dextrose      sodium chloride           CBC   Recent Labs     01/24/24  0508 01/25/24  0326   WBC 5.7 4.0   HGB 9.5* 10.1*   HCT 29.8* 35.1*    268      BMP   Recent Labs     01/24/24  0508 01/25/24  0326    138   K 3.8 3.9    102   CO2 24 25   BUN 11 13   CREATININE 0.6 0.7     Hepatic:   No results for input(s): \"AST\", \"ALT\", \"ALB\", \"BILITOT\", \"ALKPHOS\" in the last 72 hours.    Troponin: No results for input(s): \"TROPONINI\" in the last 72 hours.  BNP: No results for input(s): \"BNP\" in the last 72 hours.  Lipids: No results for input(s): \"CHOL\", \"HDL\" in the last 72 hours.    Invalid input(s): \"LDLCALCU\"  ABGs: No results found for: \"PHART\", \"PO2ART\", \"[id number]\"  INR:   No results for input(s): \"INR\" in the last 72 hours.    Renal Labs  Albumin:    Lab Results   Component Value Date/Time    LABALBU 3.7 01/21/2024 04:28 PM     Calcium:    Lab Results   Component 
  Nephrology Progress Note  1/27/2024 12:05 PM  Subjective:     Interval History: Adenike Harvey is a 68 y.o. female doing well overall hoping to be discharged      Data:   Scheduled Meds:   cefdinir  300 mg Oral 2 times per day    ipratropium 0.5 mg-albuterol 2.5 mg  1 Dose Inhalation BID RT    acetylcysteine  600 mg Inhalation BID RT    dexAMETHasone  6 mg Oral Daily with breakfast    guaiFENesin  600 mg Oral BID    pantoprazole  40 mg Oral BID AC    sucralfate  1 g Oral 4 times per day    insulin lispro  0-8 Units SubCUTAneous TID WC    insulin lispro  0-4 Units SubCUTAneous Nightly    polyethylene glycol  17 g Oral BID    vitamin D  50,000 Units Oral Weekly    sodium chloride flush  5-40 mL IntraVENous 2 times per day    enoxaparin  40 mg SubCUTAneous Daily    atenolol  100 mg Oral Daily    budesonide-formoterol  2 puff Inhalation BID    dicyclomine  20 mg Oral 4x Daily    fluticasone  2 spray Each Nostril Daily    gabapentin  100 mg Oral TID    nystatin  5 mL Oral 4x Daily     Continuous Infusions:   dextrose      sodium chloride           CBC   Recent Labs     01/25/24  0326   WBC 4.0   HGB 10.1*   HCT 35.1*         BMP   Recent Labs     01/25/24  0326      K 3.9      CO2 25   BUN 13   CREATININE 0.7     Hepatic:   No results for input(s): \"AST\", \"ALT\", \"ALB\", \"BILITOT\", \"ALKPHOS\" in the last 72 hours.    Troponin: No results for input(s): \"TROPONINI\" in the last 72 hours.  BNP: No results for input(s): \"BNP\" in the last 72 hours.  Lipids: No results for input(s): \"CHOL\", \"HDL\" in the last 72 hours.    Invalid input(s): \"LDLCALCU\"  ABGs: No results found for: \"PHART\", \"PO2ART\", \"[id number]\"  INR:   No results for input(s): \"INR\" in the last 72 hours.    Renal Labs  Albumin:    Lab Results   Component Value Date/Time    LABALBU 3.7 01/21/2024 04:28 PM     Calcium:    Lab Results   Component Value Date/Time    CALCIUM 8.4 01/25/2024 03:26 AM     Phosphorus:    Lab Results   Component Value 
  Physical Therapy Treatment Note  Name: Adenike Harvey MRN: [id number] :   1955   Date:  2024   Admission Date: 2024 Room:  [address] [place] [address]   Restrictions/Precautions:  General Precautions, Fall Risk  Communication with other providers:  KEIKO Keen  Subjective:  Patient states:  \"I think I'm supposed to leave today, but I don't know yet.\"  Pain:   Location, Type, Intensity (0/10 to 10/10):  denies  Objective:    Observation:  Patient approached presenting in bedside chair and agreeable to participate in therapy services.    Objective Measures:     PAT/56 indicating an increased risk for falls.     Treatment, including education/measures:  Facilitation of STS transfers from bedside chair>standing without an AD and CGA. Patient requiring use of Ues to assist in bringing self into standing position. Patient ambulated 3x60' without an AD, HHA, and CGA provided by contralateral UE. Patient presenting with narrow ROSEMARIE, decreased step/stride length, decreased gait speed, and decreased toe clearance B. Patient presented x3 instances of LOB, but demonstrated appropriate stepping strategy to correct.     Patient performed PAT related tasks and performed with a score 44/56 indicating an increased risk for falls.     Assessment / Impression:    Patient presenting with improved mobility on this date and demonstrates ability to perform transfers and gait without an AD and CGA. Patient does present with hesitation and instability while ambulating without an AD at this time. Patient performed PAT with a score of 44/56 indicating an increased risk for falls.     Patient's tolerance of treatment:  good   Adverse Reaction: none  Significant change in status and impact:  none  Barriers to improvement:  General Deconditioning    Plan for Next Session:    Continue to address goals as stated in patient POC.    Time in:  1431  Time out:  1455  Timed treatment minutes:  24  Total treatment time:  24    Previously 
 discontinued per order  
1139 Sitter removed from room and  placed.   at bedside and was educated on how the BIN works.  
4 Eyes Skin Assessment     NAME:  Adenike Harvey  YOB: 1955  MEDICAL RECORD NUMBER:  [id number]    The patient is being assessed for  Admission    I agree that at least one RN has performed a thorough Head to Toe Skin Assessment on the patient. ALL assessment sites listed below have been assessed.      Areas assessed by both nurses:    Head, Face, Ears, Shoulders, Back, Chest, Arms, Elbows, Hands, Sacrum. Buttock, Coccyx, Ischium, Legs. Feet and Heels, and Under Medical Devices         Does the Patient have a Wound? No noted wound(s)       Mustapha Prevention initiated by RN: Yes  Wound Care Orders initiated by RN: Yes    Pressure Injury (Stage 3,4, Unstageable, DTI, NWPT, and Complex wounds) if present, place Wound referral order by RN under : No    New Ostomies, if present place, Ostomy referral order under : No     Nurse 1 eSignature: Electronically signed by Donovan Flowers RN on 1/22/24 at 3:06 AM EST    **SHARE this note so that the co-signing nurse can place an eSignature**    Nurse 2 eSignature: Electronically signed by Lorenzo Ulloa RN on 1/22/24 at 5:36 AM EST  
4 Eyes Skin Assessment     NAME:  Adenike Harvey  YOB: 1955  MEDICAL RECORD NUMBER:  [id number]    The patient is being assessed for  Transfer to New Unit    I agree that at least one RN has performed a thorough Head to Toe Skin Assessment on the patient. ALL assessment sites listed below have been assessed.      Areas assessed by both nurses:    Head, Face, Ears, Shoulders, Back, Chest, Arms, Elbows, Hands, Sacrum. Buttock, Coccyx, Ischium, Legs. Feet and Heels, and Under Medical Devices         Does the Patient have a Wound? No noted wound(s)    Redness coccyx       Mustapha Prevention initiated by RN: Yes  Wound Care Orders initiated by RN: No    Pressure Injury (Stage 3,4, Unstageable, DTI, NWPT, and Complex wounds) if present, place Wound referral order by RN under : No    New Ostomies, if present place, Ostomy referral order under : No     Nurse 1 eSignature: Electronically signed by Anuja Mc RN on 1/25/24 at 1:25 AM EST    **SHARE this note so that the co-signing nurse can place an eSignature**    Nurse 2 eSignature: Electronically signed by Francesco Huang LPN on 1/25/24 at 2:27 AM EST  
Called report to [facility]. All belongings are with pt. Pt transport to  with [facility].  
Comprehensive Nutrition Assessment    Type and Reason for Visit:  Reassess    Nutrition Recommendations/Plan:   Continue current diet  Reduce frequency of standard oral nutrition supplement to daily per patient preference  Monitor weights, po intakes, GI status, labs, POC     Malnutrition Assessment:  Malnutrition Status:  Severe malnutrition (01/23/24 1239)    Context:  Acute Illness       Nutrition Assessment:    Pt w/ adequate po intakes, consuming >50% of her meals on average. She reports dislike of food, encouraged her to ask for condiments when ordering. She does like Ensure but only drinks one a day, will reduce frequency. Did note significant 5.4% wt loss since admit. Continues to meet criteria for malnutrition. Plan to d/c NH soon. Continue to follow at high nutrition risk.    Nutrition Related Findings:    hx lung cancer dx 2017 with ongoing treatments, on keytruda, c/o constipation x 4-5 days pta. Rx: decadron, bentyl, nystatin, protonix, glycolax, sodium chloride, vitamin D Wound Type: None       Current Nutrition Intake & Therapies:    Average Meal Intake: 51-75%  Average Supplements Intake: 26-50% (1x per day)  ADULT DIET; Dysphagia - Soft and Bite Sized  ADULT ORAL NUTRITION SUPPLEMENT; Breakfast, Dinner; Standard High Calorie/High Protein Oral Supplement    Anthropometric Measures:  Height: 165.1 cm (5' 5\")  Ideal Body Weight (IBW): 125 lbs (57 kg)    Admission Body Weight: 71.9 kg (158 lb 8.2 oz)  Current Body Weight: 71.9 kg (158 lb 8.2 oz), 126.8 % IBW. Weight Source: Bed Scale  Current BMI (kg/m2): 26.4  Usual Body Weight: 80 kg (176 lb 5.9 oz) (11/28/2023 per RD notes, chart review: 173# 1/2023, 167# 7/6/23, 174# 11/9/23, 156# 1/11/23)  % Weight Change (Calculated): -10.1  Weight Adjustment For: No Adjustment                 BMI Categories: Overweight (BMI 25.0-29.9)    Estimated Daily Nutrient Needs:  Energy Requirements Based On: Formula  Weight Used for Energy Requirements: Current  Energy 
Current GOLD classification for Adenike Harvey      GOLD Stage:    Group: Group B  Recorded domestic exacerbations past 12 months: 0  Current recorded COPD Assessment Tool (CAT) score of 30  Current eosinophil count: 0     Inhaler Device   Acceptable for Use   Respimat  Not Breath Actuated Yes   MDI  Not Breath Actuated Yes           DPI  Observed PIF   using  In-Check Meter   Optimal PIF   Acceptable for Use   HANDIHALER 85 >30    Pressair 85 >45    NEOHALER 85 >50    Diskus 85 >60    ELLIPTA 85 >60      Records show Adenike Harvey was using symbicort as maintenance therapy prior to admission.          LONG-ACTING (LABA)   Arformoterol (Brovana) NEBULIZER   Indacaterol (Arcapta) NEOHALER   Olodaterol (Striverdi) Respimat   Salmeterol (Serevent) MDI, DISKUS   LONG-ACTING (LAMA)   Aclidinium bromide (Tudorza) PRESSAIR   Glycopyrronium bromide (Seebri) NEOHALER   Tiotropium (Spiriva) Respimat, HANDIHALER   Umeclidinium (Incruse) ELLIPTA   (LABA/LAMA)   Formoterol/glycopyrronium (Bevespi) MDI   Indacaterol/glycopyrronium (Utibron) NEOHALER   Vilanterol/umeclidinium (Anoro) ELLIPTA   Olodaterol/tiotropium (Stiolto) Respimat   (LABA/ICS)   Formoterol/budesomide (Symbicort) MDI   Formoterol/mometasone (Dulera) MDI   Salmeterol/fluticasone (Advair) MDI, DISKUS   Vilanterol/fluticasone (Breo) ELLIPTA   (LABA/LAMA/ICS)   Fluticasone/umeclidinium/vilanterol (Trelegy) ELLIPTA   Budesonide/glycopyrrolate/formoterol fumarate (Beztri) aerosphere        01/25/24 1406   Spirometry Assessment   FEV1 (%PRED) 81   Post Bronchodilator FEV/FVC 81   COPD Exacerbations in last year 0   PIF 85 L/min   COPD Assessment (CAT Score)   Cough Assessment 4   Phlegm Assessment 3   Chest tightness 4   Walking on an incline 5   Home Activities 4   Confident Leaving The Home 5   Sleeping Soundly 0   Have Energy 5   Assessment Score 30   $RT COPD Assessment Yes   GOLD Staging   Group Group B       
EMELY notified for low BP; pt is asymptomatic.  Reviewed EMR;  ordered 500 mL bolus x1.  Pt BP improved slightly; MAP > 60; pt states she feels fine. Admits dry mouth and has been trying to drink fluids and up to bathroom.  Will add CBC in AM for PMH anemia; H&H noted 1/25 stable.  Will add gentle IVF overnight.  Updated KEIKO Zayas to continue to monitor.     Vitals:    01/28/24 1450 01/28/24 2124 01/28/24 2130 01/28/24 2324   BP: (!) 90/57 (!) 76/41 (!) 64/38 (!) 80/50   Pulse:  73     Resp:  18     Temp:  98.3 °F (36.8 °C)     TempSrc:  Oral     SpO2:  91%     Weight:       Height:            
Hematology Oncology Inpatient   Progress Note    Patient Name:  Adenike Harvey  Patient :  1955  Patient MRN:  [id number]     Referring Provider: Iban Lou MD     Date of Service: 2024     HPI:   Adenike Harvey is a pleasant 68 y.o. female who presented to ED on 2024 due to cough, NVD.  She has NSCLC of  the lung on immunotherapy. Last immunotherapy on 2023.  She was seen in ER 2024 with similar complaints  2024 CT abdomen/pelvis:   1. No acute intra-abdominal or pelvic abnormality.  2. Colonic diverticulosis without evidence of acute diverticulitis.  3. Trace right pleural effusion and mild right basilar ground-glass opacities  without significant change.    Prior PET on 2023:  1.  The focal activity in the right hilar region seen on the prior PET-CT  scan has resolved.   2.  There is now heterogeneous activity along the medial right lung and  associated with patchy opacities in the right upper lobe and at the right  lung base.  These are favored to be post radiation change and/or other  infectious or inflammatory change and are not convincing for metastatic  disease.  Consider follow-up exam in 3 months.  She was treated with antibiotic.    GI has been consulted. Reports she has constipation. Miralax recommended. EGD with biopsies 2024  and colonoscopy as outpatient.  She fell on 2024  due to confusion.  2024 CT head: No acute intracranial abnormality.   No acute fracture or traumatic malalignment within the cervical spine.    2024 CT CAP:  1. Stable left lower lobe lung lesion with adjacent interstitial infiltrate  may represent infectious or neoplastic etiology.  This is stable from the  prior CT but new compared to the PET-CT from 2023.  This would favor an  infectious process over a neoplastic process.  2. Stable CT of the  abdomen and pelvis. No evidence for bowel obstruction.  3. Stable peripheral right lung infiltrate  4. Stable 
Hematology Oncology Inpatient   Progress Note    Patient Name:  Adenike Harvey  Patient :  1955  Patient MRN:  [id number]     Referring Provider: Iban Lou MD     Date of Service: 2024     HPI:   Adenike Harvey is a pleasant 68 y.o. female who presented to ED on 2024 due to cough, NVD.  She has NSCLC of  the lung on immunotherapy. Last immunotherapy on 2023.  She was seen in ER 2024 with similar complaints  2024 CT abdomen/pelvis:   1. No acute intra-abdominal or pelvic abnormality.  2. Colonic diverticulosis without evidence of acute diverticulitis.  3. Trace right pleural effusion and mild right basilar ground-glass opacities  without significant change.    Prior PET on 2023:  1.  The focal activity in the right hilar region seen on the prior PET-CT  scan has resolved.   2.  There is now heterogeneous activity along the medial right lung and  associated with patchy opacities in the right upper lobe and at the right  lung base.  These are favored to be post radiation change and/or other  infectious or inflammatory change and are not convincing for metastatic  disease.  Consider follow-up exam in 3 months.  She was treated with antibiotic.    GI has been consulted. Reports she has constipation. Miralax recommended. EGD with biopsies 2024  and colonoscopy as outpatient.  She fell on 2024  due to confusion.  2024 CT head: No acute intracranial abnormality.   No acute fracture or traumatic malalignment within the cervical spine.    2024 CT CAP:  1. Stable left lower lobe lung lesion with adjacent interstitial infiltrate  may represent infectious or neoplastic etiology.  This is stable from the  prior CT but new compared to the PET-CT from 2023.  This would favor an infectious process over a neoplastic process.  2. Stable CT of the  abdomen and pelvis. No evidence for bowel obstruction.  3. Stable peripheral right lung infiltrate  4. Stable 
Hematology Oncology Inpatient   Progress Note    Patient Name:  Adenike Harvey  Patient :  1955  Patient MRN:  [id number]     Referring Provider: Iban Lou MD     Date of Service: 2024     HPI:   Adenike Harvey is a pleasant 68 y.o. female who presented to ED on 2024 due to cough, NVD.  She has NSCLC of  the lung on immunotherapy. Last immunotherapy on 2023.  She was seen in ER 2024 with similar complaints  2024 CT abdomen/pelvis:   1. No acute intra-abdominal or pelvic abnormality.  2. Colonic diverticulosis without evidence of acute diverticulitis.  3. Trace right pleural effusion and mild right basilar ground-glass opacities  without significant change.    Prior PET on 2023:  1.  The focal activity in the right hilar region seen on the prior PET-CT  scan has resolved.   2.  There is now heterogeneous activity along the medial right lung and  associated with patchy opacities in the right upper lobe and at the right  lung base.  These are favored to be post radiation change and/or other  infectious or inflammatory change and are not convincing for metastatic  disease.  Consider follow-up exam in 3 months.  She was treated with antibiotic.    GI has been consulted. Reports she has constipation. Miralax recommended. EGD with biopsies 2024  and colonoscopy as outpatient.  She fell on 2024  due to confusion.  2024 CT head: No acute intracranial abnormality.   No acute fracture or traumatic malalignment within the cervical spine.    2024 CT CAP:  1. Stable left lower lobe lung lesion with adjacent interstitial infiltrate  may represent infectious or neoplastic etiology.  This is stable from the  prior CT but new compared to the PET-CT from 2023.  This would favor an infectious process over a neoplastic process.  2. Stable CT of the  abdomen and pelvis. No evidence for bowel obstruction.  3. Stable peripheral right lung infiltrate  4. Stable 
Occupational Therapy    Occupational Therapy Treatment Note    Name: Adenike Harvey MRN: [id number] :   1955   Date:  2024   Admission Date: 2024 Room:  [address] [place] [address]-A     Primary Problem:   The primary encounter diagnosis was COPD exacerbation (HCC). Diagnoses of Dyspnea and respiratory abnormalities, Metabolic acidosis, and Acute cystitis without hematuria were also pertinent to this visit.     Restrictions/Precautions:           fall risk, general precautions     Communication with other providers: YEMI Keen approved session     Subjective:  Patient states:  \"I just want to get to the next place.\"   Pain:   Location, Type, Intensity (0/10 to 10/10):  reports mild abdomen pain, did not rate.     Objective:    Observation: Pt presented supine in bed, spouse at bedside.    Objective Measures:  on RA.     Treatment, including education:  Therapeutic Activity Training:   Therapeutic activity training was instructed today.  Cues were given for safety, sequence, UE/LE placement, awareness, and balance.    Activities performed today included bed mobility training, sup-sit, sit-stand, SPT.    Supine to sit with SBA with HOB raised.     Sit to Stand transfer from EOB with SBA and later from recliner with CGA with cues for UE placement.     Functional mobility for 10'+10' with use of FWW with CGA and later 125' with use of FWW with CGA with cues for posture and pathway negotiation.     Stand to Sit with SBA with cues to reach back for surface.     Self Care Training:   Cues were given for safety, sequence, UE/LE placement, visual cues, and balance.    Activities performed today included:     Hygiene/grooming:  UB hygiene while seated on toilet for sponge bath with SBA.   Pt stood at sink for 3 min with SBA to utilize mouth wash for oral care, brushed hair, and washed hands.     Dressing:  SBA to doff/don gown while seated on toilet.   Pt doffed/donned socks while seated on toilet with SBA.   Pt 
Occupational Therapy    Occupational Therapy Treatment Note    Name: Adenike Harvey MRN: [id number] :   1955   Date:  2024   Admission Date: 2024 Room:  [address] [place] [address]-A     Per OTR/L Discharge Recommendation: Skilled Nursing Facility     Primary Problem:  The primary encounter diagnosis was COPD exacerbation (HCC). Diagnoses of Dyspnea and respiratory abnormalities, Metabolic acidosis, and Acute cystitis without hematuria were also pertinent to this visit.     Restrictions/Precautions: fall risk, general precautions     Communication with other providers: RN approved session. Communicated with sitter in room.     Subjective:  Patient states:  Pt agreeable to therapy session.   Pain:   Location, Type, Intensity (0/10 to 10/10):  Some stomach pain reported. Did not rate.     Objective:    Observation: Pt supine in bed with sitter and  at bedside upon arrival.    Objective Measures:  Pt alert. Some confusion reported. Oriented to self, month, place, and birthday.     Treatment, including education:    Pt supine in bed upon arrival. Pt completed supine to sit transfer to EOB with CGA and use of bed rail. Pt scooted to EOB with CGA. Pt completed sit to stand transfer from EOB with use of WW, MIN A and Vcs for safety. Pt completed functional mobility with use of WW to bathroom to complete toilet transfer. Pt completed stand to sit transfer to toilet with CGA and use of WW, Vcs for hand placement. Pt completed sit to stand transfer from toilet with MIN A and use of grab bar and WW. Pt completed functional mobility to sink to wash hands. Pt required Vcs to keep her walker with her at all times. Pt stood at sink washing hands with CGA. Pt completed functional mobility to armed chair with use of WW and CGA. Pt completed stand to sit transfer to armed chair with MIN A and Vcs for hand placement. Pt readjusted L sock with SBA while seated in armed chair. Pt completed sit to stand transfer from armed chair 
Occupational Therapy  [facility] ACUTE CARE OCCUPATIONAL THERAPY EVALUATION  Adenike Harvey, 1955, OBS [id number], 1/23/2024    Discharge Recommendation: Skilled Nursing Facility    History  [place]:  The primary encounter diagnosis was COPD exacerbation (HCC). Diagnoses of Dyspnea and respiratory abnormalities, Metabolic acidosis, and Acute cystitis without hematuria were also pertinent to this visit.  Patient  has a past medical history of Anemia, Anxiety, Arthritis, Bronchitis, Chronic back pain, COPD (chronic obstructive pulmonary disease) (HCC), Depression, Diverticulosis, Hemoptysis, History of external beam radiation therapy, Hx of blood clots, Hyperlipidemia, Hypertension, Low back pain, Lung mass, Nausea & vomiting, Panic attacks, Pneumonia, Right Lung Cancer, Shingles, Shortness of breath on exertion, Teeth missing, Urinary tract infection, Wears dentures, and Wears glasses.  Patient  has a past surgical history that includes Tonsillectomy (1978); Tubal ligation (1988); Elbow surgery (Left, 1980); Dilation and curettage of uterus (1989); Colonoscopy (2000's); bronchoscopy (12/07/2017); Bunionectomy (Bilateral, 1990's); Mediastinoscopy (02/21/2018); Lung surgery (04/12/2018); Tunneled venous port placement (Left, 07/31/2018); and Catheter Removal (Left, 5/16/2019).    Subjective:  Patient states:  \"Oh that pretty\" - pt when talking about random spot on bed and light in room  Pain:  denies pain when asked.    Communication: RN, CM, co-eval with PT Juan for safety  Restrictions: fall risk, general precautions    Home Setup/Prior level of function  Social/Functional History  Lives With: Spouse  Type of Home: House  Home Layout: One level  Home Access: Stairs to enter without rails  Entrance Stairs - Number of Steps: 1 + 1 (front door)  Bathroom Shower/Tub: Walk-in shower, Tub/Shower unit  Bathroom Equipment: Shower chair  Home Equipment: Cane, Walker, rolling  Has the patient had two or 
Physical Therapy    Physical Therapy Treatment Note  Name: Adenike Harvey MRN: [id number] :   1955   Date:  2024   Admission Date: 2024 Room:  [address] [place] [address]   Restrictions/Precautions:          fall risk, gen prec  Communication with other providers:    Subjective:  Patient states:  agreeable   Pain:   Location, Type, Intensity (0/10 to 10/10):  denies pt    Objective:    Observation:  in bed  Treatment, including education/measures:  Transfers  Rolling: SBA  Supine to sit :SBA  Sit to supine :SBA  Scooting :SBA  Sit to stand :CGA  Stand to sit :CGA  SPT:CGA /c FWW /c safe techs  Vc for sequencing and reminders of task  Gait:  Pt amb with FWW x~140' /c CGA and vc for safe techs, directional cues, redirection.   Safety  Patient left safely in the chair, with call light/phone in reach with alarm applied. Gait belt was used for transfers and gait.    Assessment / Impression:    Pt needs cues for redirection and initiation, safety techs.  Patient's tolerance of treatment:  good   Adverse Reaction: na  Significant change in status and impact:  na  Barriers to improvement:  weakness and endurance   Plan for Next Session:    Cont gait progression  Time in:  1445  Time out:  1500  Timed treatment minutes:  15  Total treatment time:  15    Previously filed items:  Social/Functional History  Lives With: Spouse  Type of Home: House  Home Layout: One level  Home Access: Stairs to enter without rails  Entrance Stairs - Number of Steps: 1 + 1 (front door)  Bathroom Shower/Tub: Walk-in shower, Tub/Shower unit  Bathroom Equipment: Shower chair  Home Equipment: Cane, Walker, rolling  Has the patient had two or more falls in the past year or any fall with injury in the past year?: Yes (1 a week ago and last night)  ADL Assistance: Independent  Homemaking Assistance: Independent  Ambulation Assistance: Independent  Transfer Assistance: Independent  Active : No        Short Term Goals  Time Frame for Short Term 
Report called and given to receiving Nurse Cristiana tomas [facility]   
Reported VBG to RN  
Transportation was here to  patient to go to [facility], patient was not found in the room.     Nursing supervisor notified, patient was contacted via phone call.Patient and family left the hospital magalie to home.     Night shift NP notified.     Patient's  called nursing supervisor that patient's  is taking patient back to [facility].   
Tried to get pt for mri in afternoon but pt was having EGD. Unable to scan pt later in the day. Will be done 1st thing in the am.  Pt will   
  Component Value Date/Time    NITRU NEGATIVE 01/21/2024 06:09 PM    COLORU YELLOW 01/21/2024 06:09 PM    PHUR 6.0 02/10/2020 10:12 AM    WBCUA 16 01/21/2024 06:09 PM    RBCUA 1 01/21/2024 06:09 PM    MUCUS RARE 01/21/2024 06:09 PM    TRICHOMONAS NONE SEEN 01/21/2024 06:09 PM    BACTERIA NEGATIVE 01/21/2024 06:09 PM    CLARITYU CLEAR 01/21/2024 06:09 PM    SPECGRAV 1.025 01/21/2024 06:09 PM    UROBILINOGEN 1.0 01/21/2024 06:09 PM    BILIRUBINUR MODERATE NUMBER OR AMOUNT OBSERVED 01/21/2024 06:09 PM    BILIRUBINUR small 02/10/2020 10:12 AM    BLOODU NEGATIVE 01/21/2024 06:09 PM    GLUCOSEU negative 02/10/2020 10:12 AM    KETUA >80 01/21/2024 06:09 PM     ABG:  No results found for: \"PHART\", \"CZK4DAV\", \"PO2ART\", \"SET1MJL\", \"BEART\", \"THGBART\", \"SRM7UNT\", \"O3CPVPYY\"  HgBA1c:    Lab Results   Component Value Date/Time    LABA1C 5.2 01/22/2024 06:00 AM     Microalbumen/Creatinine ratio:  No components found for: \"RUCREAT\"  TSH:    Lab Results   Component Value Date/Time    TSH 2.34 02/01/2017 09:51 AM     IRON:    Lab Results   Component Value Date/Time    IRON 31 01/22/2024 12:27 PM     Iron Saturation:  No components found for: \"PERCENTFE\"  TIBC:    Lab Results   Component Value Date/Time    TIBC 123 01/22/2024 12:27 PM     FERRITIN:    Lab Results   Component Value Date/Time    FERRITIN 286 01/22/2024 12:27 PM     RPR:  No results found for: \"RPR\"  RHONDA:  No results found for: \"ANATITER\", \"RHONDA\"  24 Hour Urine for Creatinine Clearance:  No components found for: \"CREAT4\", \"UHRS10\", \"UTV10\"      Objective:   I/O: 01/27 0701 - 01/28 0700  In: 730 [P.O.:720; I.V.:10]  Out: -   I/O last 3 completed shifts:  In: 730 [P.O.:720; I.V.:10]  Out: -   I/O this shift:  In: 260 [P.O.:250; I.V.:10]  Out: -   Vitals: /65   Pulse 78   Temp 98 °F (36.7 °C) (Oral)   Resp 18   Ht 1.651 m (5' 5\")   Wt 68 kg (149 lb 14.6 oz)   LMP 01/06/2009 (LMP Unknown)   SpO2 95%   BMI 24.95 kg/m²  {  General appearance: awake weak  HEENT: 
01/21/2024 04:28 PM     Calcium:    Lab Results   Component Value Date/Time    CALCIUM 8.5 01/23/2024 04:42 AM     Phosphorus:    Lab Results   Component Value Date/Time    PHOS 3.4 11/28/2023 06:30 AM     U/A:    Lab Results   Component Value Date/Time    NITRU NEGATIVE 01/21/2024 06:09 PM    COLORU YELLOW 01/21/2024 06:09 PM    PHUR 6.0 02/10/2020 10:12 AM    WBCUA 16 01/21/2024 06:09 PM    RBCUA 1 01/21/2024 06:09 PM    MUCUS RARE 01/21/2024 06:09 PM    TRICHOMONAS NONE SEEN 01/21/2024 06:09 PM    BACTERIA NEGATIVE 01/21/2024 06:09 PM    CLARITYU CLEAR 01/21/2024 06:09 PM    SPECGRAV 1.025 01/21/2024 06:09 PM    UROBILINOGEN 1.0 01/21/2024 06:09 PM    BILIRUBINUR MODERATE NUMBER OR AMOUNT OBSERVED 01/21/2024 06:09 PM    BILIRUBINUR small 02/10/2020 10:12 AM    BLOODU NEGATIVE 01/21/2024 06:09 PM    GLUCOSEU negative 02/10/2020 10:12 AM    KETUA >80 01/21/2024 06:09 PM     ABG:  No results found for: \"PHART\", \"LBU7FRI\", \"PO2ART\", \"XTR4RWX\", \"BEART\", \"THGBART\", \"HPS5KJX\", \"J2WCHCLM\"  HgBA1c:    Lab Results   Component Value Date/Time    LABA1C 5.2 01/22/2024 06:00 AM     Microalbumen/Creatinine ratio:  No components found for: \"RUCREAT\"  TSH:    Lab Results   Component Value Date/Time    TSH 2.34 02/01/2017 09:51 AM     IRON:    Lab Results   Component Value Date/Time    IRON 31 01/22/2024 12:27 PM     Iron Saturation:  No components found for: \"PERCENTFE\"  TIBC:    Lab Results   Component Value Date/Time    TIBC 123 01/22/2024 12:27 PM     FERRITIN:    Lab Results   Component Value Date/Time    FERRITIN 286 01/22/2024 12:27 PM     RPR:  No results found for: \"RPR\"  RHONDA:  No results found for: \"ANATITER\", \"RHONDA\"  24 Hour Urine for Creatinine Clearance:  No components found for: \"CREAT4\", \"UHRS10\", \"UTV10\"      Objective:   I/O: 01/22 0701 - 01/23 0700  In: 420 [P.O.:420]  Out: -   I/O last 3 completed shifts:  In: 420 [P.O.:420]  Out: -   I/O this shift:  In: 300 [I.V.:300]  Out: -   Vitals: /82   Pulse 98   
diminished breath sounds bilaterally  Heart: S1, S2 normal  Abdomen: abnormal findings:  soft nt  Extremities: edema trace  Neurologic: Mental status: alertness: awake        Assessment and Plan:      IMP:  1 met acidosis  2 lung cancer  3 anemia  4 htn    Plan      stable acidosis   follow-up oncology   monitor hemoglobin   blood pressure overall controlled   awaiting ECF             BRIE HUDSON MD, MD     
ordered, however patient refused.  Patient not hypoxic.         Review of Systems:      Pertinent positives and negatives discussed in HPI    Objective:     Intake/Output Summary (Last 24 hours) at 1/24/2024 1548  Last data filed at 1/24/2024 1358  Gross per 24 hour   Intake 840 ml   Output --   Net 840 ml        Vitals:   Vitals:    01/24/24 1457   BP:    Pulse: 94   Resp: 19   Temp:    SpO2: 95%       Physical Exam:     General: NAD  Eyes: EOMI  ENT: neck supple  Cardiovascular: Regular rate and rhythm, no murmurs, gallops, lifts of heaves  Respiratory: Clear to auscultation bilaterally, mild wheezes, no rhonchi or rales  Gastrointestinal: Soft, tender in epigastrium and LLQ, BS x 4  Genitourinary: no suprapubic tenderness  Musculoskeletal: No edema  Skin: warm, dry  Neuro: Alert and oriented x 3   Psych: Mood appropriate.     Medications:   Medications:    dexAMETHasone  6 mg Oral Daily with breakfast    guaiFENesin  600 mg Oral BID    pantoprazole  40 mg IntraVENous BID    ipratropium 0.5 mg-albuterol 2.5 mg  1 Dose Inhalation TID RT    [Held by provider] pantoprazole  40 mg Oral BID AC    sucralfate  1 g Oral 4 times per day    insulin lispro  0-8 Units SubCUTAneous TID WC    insulin lispro  0-4 Units SubCUTAneous Nightly    polyethylene glycol  17 g Oral BID    vitamin D  50,000 Units Oral Weekly    cefTRIAXone (ROCEPHIN) IV  1,000 mg IntraVENous Q24H    azithromycin  500 mg IntraVENous Q24H    sodium chloride flush  5-40 mL IntraVENous 2 times per day    enoxaparin  40 mg SubCUTAneous Daily    atenolol  100 mg Oral Daily    budesonide-formoterol  2 puff Inhalation BID    dicyclomine  20 mg Oral 4x Daily    fluticasone  2 spray Each Nostril Daily    gabapentin  100 mg Oral TID    nystatin  5 mL Oral 4x Daily      Infusions:    dextrose      sodium chloride       PRN Meds: benzonatate, 100 mg, TID PRN  glucose, 4 tablet, PRN  dextrose bolus, 125 mL, PRN   Or  dextrose bolus, 250 mL, PRN  glucagon (rDNA), 1 mg, 
0.6   GLUCOSE 218* 208* 201*       Hepatic:   Recent Labs     01/21/24  1628   AST 21   ALT <5*   BILITOT 0.4   ALKPHOS 68       INR:   Recent Labs     01/21/24  1709   INR 1.6       ASSESSMENT/RECOMMENDATION    Recurrent Squamous Cell Carcinoma of the Lung  -s/p RUL Lobectomy in 2018 with recurrent disease earlier this year.  S/p chemoradiation however discontinued radiosensitizing chemotherapy after 2 cycles d/t poor tolerance.  Has been on maintenance checkpoint inhibitor since completion of radiation  PET scan and recent CT chest reviewed.  We will hold immunotherapy during hospitalization.    2. COPD exacerbation. DuoNeb every 4 hours.    3. NV and constipation.  EGD with Schatzki ring, severe gastritis, stigmata of recent bleeding. Biopsies pending. Recommend long-term PPI    4. Anemia of chronic disease.  01/22/24 Ferritin: 286 (H) Iron: 31 (L) TIBC: 123 (L) Transferrin %: 25  FOLATE: 6.4 Vitamin B-12: 864.7  Trend CBC. Also noted to have stigmata of bleeding on EGD.    5. Confusion  MRI ordered in setting of Squamous Cell Lung Cancer    Patient was seen independently with attending physician Dr Una Gordon available for consultation.    Vane Razo PA-C    Imaging and labs reviewed and plan of care discussed with patient in depth.  We will follow along.  Please call with any questions.    
138 135 - 145 MMOL/L    Potassium 4.2 3.5 - 5.1 MMOL/L    Chloride 101 99 - 110 mMol/L    CO2 20 (L) 21 - 32 MMOL/L    Anion Gap 17 (H) 7 - 16    Glucose 208 (H) 70 - 99 MG/DL    BUN 8 6 - 23 MG/DL    Creatinine 0.7 0.6 - 1.1 MG/DL    Est, Glom Filt Rate >60 >60 mL/min/1.73m2    Calcium 8.5 8.3 - 10.6 MG/DL   Ammonia    Collection Time: 01/23/24  4:42 AM   Result Value Ref Range    Ammonia 47 11 - 51 UMOL/L   POCT Glucose    Collection Time: 01/23/24  6:47 AM   Result Value Ref Range    POC Glucose 178 (H) 70 - 99 MG/DL   POCT Glucose    Collection Time: 01/23/24 11:50 AM   Result Value Ref Range    POC Glucose 167 (H) 70 - 99 MG/DL        Imaging/Diagnostics Last 24 Hours   XR CHEST PORTABLE    Result Date: 1/21/2024  EXAMINATION: ONE XRAY VIEW OF THE CHEST 1/21/2024 4:47 pm COMPARISON: January 5, 2024 HISTORY: ORDERING SYSTEM PROVIDED HISTORY: dyspnea TECHNOLOGIST PROVIDED HISTORY: Reason for exam:->dyspnea Reason for Exam: dyspnea FINDINGS: Stable access port with the tip in the SVC.  Bilateral lower lobe scarring and mild elevation of the right hemidiaphragm.  Scar in the right suprahilar area.  No active infiltrates, pulmonary edema or active pleural disease.     1. No acute cardiopulmonary process. 2. Chronic findings as above.       Electronically signed by GRACE DAHL CNP on 1/23/2024 at 12:28 PM    
Potassium 3.9 3.5 - 5.1 MMOL/L    Chloride 102 99 - 110 mMol/L    CO2 25 21 - 32 MMOL/L    Anion Gap 11 7 - 16    Glucose 168 (H) 70 - 99 MG/DL    BUN 13 6 - 23 MG/DL    Creatinine 0.7 0.6 - 1.1 MG/DL    Est, Glom Filt Rate >60 >60 mL/min/1.73m2    Calcium 8.4 8.3 - 10.6 MG/DL   POCT Glucose    Collection Time: 01/25/24  4:25 AM   Result Value Ref Range    POC Glucose 166 (H) 70 - 99 MG/DL   POCT Glucose    Collection Time: 01/25/24  8:12 AM   Result Value Ref Range    POC Glucose 155 (H) 70 - 99 MG/DL        Imaging/Diagnostics Last 24 Hours   XR CHEST PORTABLE    Result Date: 1/21/2024  EXAMINATION: ONE XRAY VIEW OF THE CHEST 1/21/2024 4:47 pm COMPARISON: January 5, 2024 HISTORY: ORDERING SYSTEM PROVIDED HISTORY: dyspnea TECHNOLOGIST PROVIDED HISTORY: Reason for exam:->dyspnea Reason for Exam: dyspnea FINDINGS: Stable access port with the tip in the SVC.  Bilateral lower lobe scarring and mild elevation of the right hemidiaphragm.  Scar in the right suprahilar area.  No active infiltrates, pulmonary edema or active pleural disease.     1. No acute cardiopulmonary process. 2. Chronic findings as above.       Electronically signed by GRACE DAHL CNP on 1/25/2024 at 11:48 AM    
Eosinophils Absolute 0.0 K/CU MM    Basophils Absolute 0.0 K/CU MM    Nucleated RBC % 0.0 %    Total Nucleated RBC 0.0 K/CU MM    Total Immature Neutrophil 0.03 K/CU MM    Immature Neutrophil % 0.9 (H) 0 - 0.43 %   Vitamin B12 & Folate    Collection Time: 01/22/24 12:27 PM   Result Value Ref Range    Vitamin B-12 864.7 211 - 911 pg/ml    Folate 6.4 3.1 - 17.5 NG/ML   Ferritin    Collection Time: 01/22/24 12:27 PM   Result Value Ref Range    Ferritin 286 (H) 15 - 150 NG/ML   Iron and TIBC    Collection Time: 01/22/24 12:27 PM   Result Value Ref Range    Iron 31 (L) 37 - 145 ug/dL    UIBC 92 (L) 110 - 370 ug/dL    TIBC 123 (L) 250 - 450 ug/dL    Transferrin % 25 10 - 44 %   TSH    Collection Time: 01/22/24 12:27 PM   Result Value Ref Range    TSH, High Sensitivity 1.460 0.270 - 4.20 uIu/ml   T4, Free    Collection Time: 01/22/24 12:27 PM   Result Value Ref Range    T4 Free 1.10 0.9 - 1.8 NG/DL   Vitamin D 25 Hydroxy    Collection Time: 01/22/24 12:27 PM   Result Value Ref Range    Vit D, 25-Hydroxy 9.44 (L) >20 NG/ML   POCT Glucose    Collection Time: 01/22/24 12:27 PM   Result Value Ref Range    POC Glucose 253 (H) 70 - 99 MG/DL        Imaging/Diagnostics Last 24 Hours   XR CHEST PORTABLE    Result Date: 1/21/2024  EXAMINATION: ONE XRAY VIEW OF THE CHEST 1/21/2024 4:47 pm COMPARISON: January 5, 2024 HISTORY: ORDERING SYSTEM PROVIDED HISTORY: dyspnea TECHNOLOGIST PROVIDED HISTORY: Reason for exam:->dyspnea Reason for Exam: dyspnea FINDINGS: Stable access port with the tip in the SVC.  Bilateral lower lobe scarring and mild elevation of the right hemidiaphragm.  Scar in the right suprahilar area.  No active infiltrates, pulmonary edema or active pleural disease.     1. No acute cardiopulmonary process. 2. Chronic findings as above.       Electronically signed by GRACE DAHL CNP on 1/22/2024 at 3:34 PM    
acute abnormality of the visualized skull or soft tissues. CERVICAL SPINE: BONES/ALIGNMENT: There is no acute fracture or traumatic malalignment. DEGENERATIVE CHANGES: No significant degenerative changes. SOFT TISSUES: The prevertebral soft tissues are unremarkable.     No acute intracranial abnormality. No acute fracture or traumatic malalignment within the cervical spine.     CT CERVICAL SPINE WO CONTRAST    Result Date: 1/22/2024  EXAMINATION: CT OF THE HEAD WITHOUT CONTRAST; CT OF THE CERVICAL SPINE WITHOUT CONTRAST 1/22/2024 10:48 pm TECHNIQUE: CT of the head was performed without the administration of intravenous contrast. Automated exposure control, iterative reconstruction, and/or weight based adjustment of the mA/kV was utilized to reduce the radiation dose to as low as reasonably achievable.; CT of the cervical spine was performed without the administration of intravenous contrast. Multiplanar reformatted images are provided for review. Automated exposure control, iterative reconstruction, and/or weight based adjustment of the mA/kV was utilized to reduce the radiation dose to as low as reasonably achievable. COMPARISON: None. HISTORY: ORDERING SYSTEM PROVIDED HISTORY: Unwitnessed fall FINDINGS: BRAIN/VENTRICLES:  No evidence of an acute infarct or other acute parenchymal process.No evidence of acute intracranial hemorrhage.  There is no evidence of an intracranial mass or extraaxial fluid collection. No significant mass effect or midline shift. There is no significant volume loss.The ventricles are within normal limits of size and configuration for age.No apparent chronic white matter changes. ORBITS: The visualized portion of the orbits demonstrate no acute abnormality.  Status post right lens extraction. SINUSES:  The visualized paranasal sinuses and mastoid air cells demonstrate no acute abnormality. SOFT TISSUES/SKULL: No acute abnormality of the visualized skull or soft tissues. CERVICAL SPINE:

## 2024-01-31 ENCOUNTER — OFFICE VISIT (OUTPATIENT)
Dept: FAMILY MEDICINE CLINIC | Age: 69
End: 2024-01-31

## 2024-01-31 ENCOUNTER — CARE COORDINATION (OUTPATIENT)
Dept: CASE MANAGEMENT | Age: 69
End: 2024-01-31

## 2024-01-31 VITALS
SYSTOLIC BLOOD PRESSURE: 98 MMHG | DIASTOLIC BLOOD PRESSURE: 62 MMHG | HEART RATE: 84 BPM | HEIGHT: 65 IN | WEIGHT: 152 LBS | BODY MASS INDEX: 25.33 KG/M2 | OXYGEN SATURATION: 94 %

## 2024-01-31 DIAGNOSIS — K29.61 OTHER GASTRITIS WITH BLEEDING, UNSPECIFIED CHRONICITY: ICD-10-CM

## 2024-01-31 DIAGNOSIS — J18.9 PNEUMONIA OF LEFT LOWER LOBE DUE TO INFECTIOUS ORGANISM: Primary | ICD-10-CM

## 2024-01-31 DIAGNOSIS — N18.31 STAGE 3A CHRONIC KIDNEY DISEASE (HCC): ICD-10-CM

## 2024-01-31 DIAGNOSIS — R26.9 GAIT ABNORMALITY: ICD-10-CM

## 2024-01-31 DIAGNOSIS — J44.9 MODERATE COPD (CHRONIC OBSTRUCTIVE PULMONARY DISEASE) (HCC): ICD-10-CM

## 2024-01-31 RX ORDER — PANTOPRAZOLE SODIUM 40 MG/1
40 TABLET, DELAYED RELEASE ORAL
Qty: 60 TABLET | Refills: 0 | Status: SHIPPED | OUTPATIENT
Start: 2024-01-31

## 2024-01-31 RX ORDER — OMEPRAZOLE 20 MG/1
20 CAPSULE, DELAYED RELEASE ORAL DAILY
COMMUNITY
End: 2024-01-31

## 2024-01-31 NOTE — PROGRESS NOTES
Outpatient Clinic Visit Note    Patient: Adenike Harvey  : 1955 (68 y.o.)  Date: 2024    CC:  Chief Complaint   Patient presents with    Follow-Up from Hospital     TCM; copd exacerbation. Pneumonia and gastritis.  Feeling better, still weak.     Medication Check     Review meds clarify what she needs to take        HPI: she was scoped by Dr Radfodr and has severe gastritis and a ring which was dilated.  She is finishing up her antibiotic.  She had several days of confusion in hte beginning.  A brain MRI showed no CVA or mets.  She is not currently on medication for her lung cancer.      Past Medical History:    Past Medical History:   Diagnosis Date    Anemia     Anxiety     Arthritis     \"Fingers, Back\"    Bronchitis Last Episode     Chronic back pain     COPD (chronic obstructive pulmonary disease) ([facility])     Sees Dr. Hernandez    Depression     Diverticulosis 2013    Extensive per CT    Hemoptysis 2017    History of external beam radiation therapy 2023    RIGHT LUNG 6,000 cGy in 30 fractions    Hx of blood clots     \"found I have a clot near my mediport so they are taking it out 2019- put me on Xarelto    Hyperlipidemia 2009    Hypertension 2009    Low back pain     \"better than it use to be- had therapy in the past- originally hurt back at work 20+ yrs ago\"    Lung mass     rul- scheduled to bronch     Nausea & vomiting 2024    Panic attacks     Pneumonia Dx 1-17    Right Lung Cancer Dx 10-17    tx with chemo following with Dr Una Smith Dx     \"Right Side\"    Shortness of breath on exertion     Teeth missing     Upper And Lower    Urinary tract infection In Past    No Current Symptoms    Wears dentures     Full Upper, Partial Lower    Wears glasses        Past Surgical History:  Past Surgical History:   Procedure Laterality Date    BRONCHOSCOPY  2017- done     BUNIONECTOMY Bilateral     \"Done At Different Times\"    CATHETER

## 2024-01-31 NOTE — CARE COORDINATION
[facility] [place]   2/8/2024  9:15 AM SCHEDULE, [facility] [place]   2/27/2024  8:45 AM SCHEDULE, [facility] [place]   2/29/2024 10:45 AM SCHEDULE, [facility] [place]   3/14/2024 10:00 AM Donovan Sutherland MD [facility] RAD ONC [place]   4/10/2024 11:00 AM Rony Radford MD [facility]   5/13/2024 11:00 AM Iban Lou MD [facility]       Care Transition Nurse provided contact information.  Plan for follow-up call in 1-2 days based on severity of symptoms and risk factors.  Plan for next call: symptom management-COPD exacerbation    Kelsie Moraes RN

## 2024-02-01 ENCOUNTER — CARE COORDINATION (OUTPATIENT)
Dept: CASE MANAGEMENT | Age: 69
End: 2024-02-01

## 2024-02-04 RX ORDER — FLUCONAZOLE 100 MG/1
200 TABLET ORAL DAILY
Qty: 28 TABLET | Refills: 0 | Status: SHIPPED | OUTPATIENT
Start: 2024-02-04 | End: 2024-02-18

## 2024-02-08 ENCOUNTER — CARE COORDINATION (OUTPATIENT)
Dept: CASE MANAGEMENT | Age: 69
End: 2024-02-08

## 2024-02-08 NOTE — CARE COORDINATION
Completed       Transportation Support: Declined      DME Assistance: Declined     Senior Services: Declined    Disease Specific Clinic: Completed Occupational Therapy: Completed     Disease Association: Completed      Other Interventions:             Care Transition Nurse provided contact information for future needs. Plan for follow-up call in 5-7 days based on severity of symptoms and risk factors.  Plan for next call: symptom management- sob? cough? Fever? Sputum?   follow-up appointment-OP PT 2/12- how  is it going?    Marti Fontana RN

## 2024-02-12 ENCOUNTER — HOSPITAL ENCOUNTER (OUTPATIENT)
Dept: PHYSICAL THERAPY | Age: 69
Discharge: HOME | End: 2024-02-12
Payer: MEDICARE

## 2024-02-12 PROCEDURE — 97162 PT EVAL MOD COMPLEX 30 MIN: CPT

## 2024-02-12 PROCEDURE — 97110 THERAPEUTIC EXERCISES: CPT

## 2024-02-12 NOTE — PROGRESS NOTES
training, Stair training, Neuromuscular re-education, Home exercise program, Safety education & training, Patient/Caregiver education & training, Therapeutic activities     Frequency / Duration:  Patient to be seen 1 for 6 weeks       Eval Complexity:    Decision Making: Medium Complexity       Therapist Signature: Carmina Bustillo, PT, DPT, Cert. DN      Date: 2/12/2024     I certify that the above Therapy Services are being furnished while the patient is under my care. I agree with the treatment plan and certify that this therapy is necessary.      Physician's Signature:  ___________________________   Date:_______                                                                   Iban Lou, *        Physician Comments: _______________________________________________    Please sign and return to [facility] PHYSICAL THERAPY.  Please fax to the location listed below. THANK YOU for this referral!    [facility] PHYSICAL THERAPY  [address]  [place] [address]  Dept: [phone]  Dept Fax: [phone]  Loc: [phone]       POC NOTE

## 2024-02-12 NOTE — PLAN OF CARE
Outpatient Physical Therapy           [place]           [x] Phone: [phone]   Fax: [phone]  [place]           [] Phone: [phone]   Fax: [phone]     To: Iban Lou, *     From: Carmina Bustillo, PT, DPT, Cert. DN      Patient: Adenike Harvey       : 1955  Diagnosis: Gait abnormality [R26.9]    Treatment Diagnosis: BLE strength and gait deficits  Date: 2024    Physical Therapy Certification/Re-Certification Form  Dear Dr. Lou,   The following patient has been evaluated for physical therapy services and for therapy to continue, insurance requires physician review of the treatment plan initially and every 90 days. Please review the attached evaluation and/or summary of the patient's plan of care, and verify that you agree therapy should continue by signing the attached document and sending it back to our office.    Assessment:    Assessment: Pt is a 69 yo female that presents to therapy with complaints of weakness following hospital admission with pneumonia. pt amb with rollator with decreased TKE bilaterally and forward flexed posture with decreased sandi. pt demo impaired BLE strength, STS, gait, endurance, activity tolerance, functional mobility and ADLs. she is using a rollator since being discharged from the hospital. Pt would benefit from continued skilled physical therapy to address impairments and limitations, progress toward goal completion, promote independence with ADLs, and prevent further injury.    Patient agrees with established plan of care and assisted in the development of their short term and long term goals. Patient had no adverse reaction with initial treatment and there are no barriers to learning.  Learning preferences include demonstration, practice, and handouts.  Patient expressed understanding of HEP and appears to be motivated to participate in an active PT program including compliance with HEP expectations.        Plan of

## 2024-02-12 NOTE — FLOWSHEET NOTE
Outpatient Physical Therapy  [place]           [x] Phone: [phone]   Fax: [phone]  [place]           [] Phone: [phone]   Fax: [phone]        Physical Therapy Daily Treatment Note  Date:  2024    Patient Name:  Adenike Harvey  \"ANKIT\"   :  1955  MRN: [id number]  Restrictions/Precautions: Restrictions/Precautions: Fall Risk        Diagnosis:   Gait abnormality [R26.9]    Date of Injury/Surgery:   Treatment Diagnosis:  BLE strength and gait deficits  Insurance/Certification information: [employer]- precert required, $40 copay  Referring Physician:  Iban Lou, *     PCP: Iban Lou MD  Next Doctor Visit:    Plan of care signed (Y/N):  sent   Outcome Measure:   LEFS: 11/80  5x STS: 34.43 sec  TU.60 sec with rollator   Visit# / total visits:    7 by 24  Pain level: 0/10   Goals:     Patient goals: get stronger  Short term goals  Time Frame for Short term goals: refer to [facility]       Long Term Goals  Time Frame for Long Term Goals: 6 visits  Pt will report overall improvement in condition by 50% or more  pt will improve LEFS to 20/80 or more to show MDC and subjective improvement  pt will improve BLE hip flexion strength to 4/5 for improved sitting control  pt will improve 5x STs to 30 sec or less for improved getting out of chairs  pt will improve TUG to 25 sec or less with rollator for improved getting up to go to the bathroom      Summary of Evaluation:  Assessment: Pt is a 69 yo female that presents to therapy with complaints of weakness following hospital admission with pneumonia. pt amb with rollator with decreased TKE bilaterally and forward flexed posture with decreased sandi. pt demo impaired BLE strength, STS, gait, endurance, activity tolerance, functional mobility and ADLs. she is using a rollator since being discharged from the hospital. Pt would benefit from continued skilled physical therapy to address impairments and

## 2024-02-16 ENCOUNTER — TELEPHONE (OUTPATIENT)
Dept: FAMILY MEDICINE CLINIC | Age: 69
End: 2024-02-16

## 2024-02-16 NOTE — TELEPHONE ENCOUNTER
To Dr. Lou-    Reporting patient is still nauseous, has a cough, has diarrhea, and very weak.      Patient was in the hospital Jan. 21st until Jan. 31st.

## 2024-02-21 ENCOUNTER — APPOINTMENT (OUTPATIENT)
Dept: CT IMAGING | Age: 69
DRG: 871 | End: 2024-02-21
Payer: MEDICARE

## 2024-02-21 ENCOUNTER — APPOINTMENT (OUTPATIENT)
Dept: GENERAL RADIOLOGY | Age: 69
DRG: 871 | End: 2024-02-21
Payer: MEDICARE

## 2024-02-21 ENCOUNTER — HOSPITAL ENCOUNTER (INPATIENT)
Age: 69
LOS: 2 days | Discharge: HOME HEALTH | DRG: 871 | End: 2024-02-23
Attending: STUDENT IN AN ORGANIZED HEALTH CARE EDUCATION/TRAINING PROGRAM | Admitting: STUDENT IN AN ORGANIZED HEALTH CARE EDUCATION/TRAINING PROGRAM
Payer: MEDICARE

## 2024-02-21 ENCOUNTER — CARE COORDINATION (OUTPATIENT)
Dept: CASE MANAGEMENT | Age: 69
End: 2024-02-21

## 2024-02-21 DIAGNOSIS — R79.89 ELEVATED LACTIC ACID LEVEL: ICD-10-CM

## 2024-02-21 DIAGNOSIS — R06.00 DYSPNEA, UNSPECIFIED TYPE: Primary | ICD-10-CM

## 2024-02-21 DIAGNOSIS — R00.0 TACHYCARDIA: ICD-10-CM

## 2024-02-21 PROBLEM — A41.9 SEVERE SEPSIS (HCC): Status: ACTIVE | Noted: 2024-02-21

## 2024-02-21 PROBLEM — R65.20 SEVERE SEPSIS (HCC): Status: ACTIVE | Noted: 2024-02-21

## 2024-02-21 PROBLEM — R05.9 COUGH: Status: RESOLVED | Noted: 2024-01-21 | Resolved: 2024-02-21

## 2024-02-21 LAB
ALBUMIN SERPL-MCNC: 3.6 GM/DL (ref 3.4–5)
ALP BLD-CCNC: 84 IU/L (ref 40–129)
ALT SERPL-CCNC: <5 U/L (ref 10–40)
ANION GAP SERPL CALCULATED.3IONS-SCNC: 21 MMOL/L (ref 7–16)
ANION GAP SERPL CALCULATED.3IONS-SCNC: 26 MMOL/L (ref 7–16)
AST SERPL-CCNC: 15 IU/L (ref 15–37)
B PARAP IS1001 DNA NPH QL NAA+NON-PROBE: NOT DETECTED
B PERT.PT PRMT NPH QL NAA+NON-PROBE: NOT DETECTED
BACTERIA: ABNORMAL /HPF
BASE EXCESS: 9 (ref 0–3)
BASOPHILS ABSOLUTE: 0.1 K/CU MM
BASOPHILS RELATIVE PERCENT: 1.2 % (ref 0–1)
BILIRUB SERPL-MCNC: 0.3 MG/DL (ref 0–1)
BILIRUBIN URINE: ABNORMAL MG/DL
BLOOD, URINE: NEGATIVE
BUN SERPL-MCNC: 4 MG/DL (ref 6–23)
BUN SERPL-MCNC: 5 MG/DL (ref 6–23)
C PNEUM DNA NPH QL NAA+NON-PROBE: NOT DETECTED
CALCIUM SERPL-MCNC: 9.1 MG/DL (ref 8.3–10.6)
CALCIUM SERPL-MCNC: 9.8 MG/DL (ref 8.3–10.6)
CHLORIDE BLD-SCNC: 93 MMOL/L (ref 99–110)
CHLORIDE BLD-SCNC: 95 MMOL/L (ref 99–110)
CLARITY: CLEAR
CO2: 16 MMOL/L (ref 21–32)
CO2: 17 MMOL/L (ref 21–32)
COLOR: YELLOW
COMMENT: ABNORMAL
CREAT SERPL-MCNC: 0.8 MG/DL (ref 0.6–1.1)
CREAT SERPL-MCNC: 0.9 MG/DL (ref 0.6–1.1)
DIFFERENTIAL TYPE: ABNORMAL
EKG ATRIAL RATE: 129 BPM
EKG DIAGNOSIS: NORMAL
EKG Q-T INTERVAL: 422 MS
EKG QRS DURATION: 88 MS
EKG QTC CALCULATION (BAZETT): 601 MS
EKG R AXIS: -21 DEGREES
EKG T AXIS: 81 DEGREES
EKG VENTRICULAR RATE: 122 BPM
EOSINOPHILS ABSOLUTE: 0.2 K/CU MM
EOSINOPHILS RELATIVE PERCENT: 3.9 % (ref 0–3)
FLUAV H1 2009 PAN RNA NPH NAA+NON-PROBE: NOT DETECTED
FLUAV H1 RNA NPH QL NAA+NON-PROBE: NOT DETECTED
FLUAV H3 RNA NPH QL NAA+NON-PROBE: NOT DETECTED
FLUAV RNA NPH QL NAA+NON-PROBE: NOT DETECTED
FLUBV RNA NPH QL NAA+NON-PROBE: NOT DETECTED
GFR SERPL CREATININE-BSD FRML MDRD: >60 ML/MIN/1.73M2
GFR SERPL CREATININE-BSD FRML MDRD: >60 ML/MIN/1.73M2
GLUCOSE SERPL-MCNC: 77 MG/DL (ref 70–99)
GLUCOSE SERPL-MCNC: 96 MG/DL (ref 70–99)
GLUCOSE, URINE: NEGATIVE MG/DL
HADV DNA NPH QL NAA+NON-PROBE: NOT DETECTED
HCO3 VENOUS: 17.3 MMOL/L (ref 22–29)
HCOV 229E RNA NPH QL NAA+NON-PROBE: NOT DETECTED
HCOV HKU1 RNA NPH QL NAA+NON-PROBE: NOT DETECTED
HCOV NL63 RNA NPH QL NAA+NON-PROBE: NOT DETECTED
HCOV OC43 RNA NPH QL NAA+NON-PROBE: NOT DETECTED
HCT VFR BLD CALC: 36.6 % (ref 37–47)
HEMOGLOBIN: 11 GM/DL (ref 12.5–16)
HMPV RNA NPH QL NAA+NON-PROBE: NOT DETECTED
HPIV1 RNA NPH QL NAA+NON-PROBE: NOT DETECTED
HPIV2 RNA NPH QL NAA+NON-PROBE: NOT DETECTED
HPIV3 RNA NPH QL NAA+NON-PROBE: NOT DETECTED
HPIV4 RNA NPH QL NAA+NON-PROBE: NOT DETECTED
HYALINE CASTS: 0 /LPF
IMMATURE NEUTROPHIL %: 0.2 % (ref 0–0.43)
KETONES, URINE: >80 MG/DL
LACTATE: 1 MMOL/L (ref 0.5–1.9)
LACTIC ACID, SEPSIS: 2.8 MMOL/L (ref 0.4–2)
LACTIC ACID, SEPSIS: 3 MMOL/L (ref 0.4–2)
LEUKOCYTE ESTERASE, URINE: ABNORMAL
LYMPHOCYTES ABSOLUTE: 1.7 K/CU MM
LYMPHOCYTES RELATIVE PERCENT: 33.2 % (ref 24–44)
M PNEUMO DNA NPH QL NAA+NON-PROBE: NOT DETECTED
MAGNESIUM: 1.4 MG/DL (ref 1.8–2.4)
MCH RBC QN AUTO: 28.1 PG (ref 27–31)
MCHC RBC AUTO-ENTMCNC: 30.1 % (ref 32–36)
MCV RBC AUTO: 93.6 FL (ref 78–100)
MONOCYTES ABSOLUTE: 0.6 K/CU MM
MONOCYTES RELATIVE PERCENT: 12 % (ref 0–4)
MUCUS: ABNORMAL HPF
NITRITE URINE, QUANTITATIVE: NEGATIVE
NUCLEATED RBC %: 0 %
O2 SAT, VEN: 85.6 % (ref 50–70)
PCO2, VEN: 36 MMHG (ref 41–51)
PDW BLD-RTO: 15.6 % (ref 11.7–14.9)
PH VENOUS: 7.29 (ref 7.32–7.43)
PH, URINE: 6 (ref 5–8)
PLATELET # BLD: 670 K/CU MM (ref 140–440)
PMV BLD AUTO: 9.8 FL (ref 7.5–11.1)
PO2, VEN: 58 MMHG (ref 28–48)
POTASSIUM SERPL-SCNC: 4.1 MMOL/L (ref 3.5–5.1)
POTASSIUM SERPL-SCNC: 4.5 MMOL/L (ref 3.5–5.1)
PRO-BNP: 588.8 PG/ML
PROCALCITONIN SERPL-MCNC: 0.07 NG/ML
PROTEIN UA: NEGATIVE MG/DL
RBC # BLD: 3.91 M/CU MM (ref 4.2–5.4)
RBC URINE: 1 /HPF (ref 0–6)
RSV RNA NPH QL NAA+NON-PROBE: NOT DETECTED
RV+EV RNA NPH QL NAA+NON-PROBE: NOT DETECTED
SARS-COV-2 RNA NPH QL NAA+NON-PROBE: NOT DETECTED
SEGMENTED NEUTROPHILS ABSOLUTE COUNT: 2.6 K/CU MM
SEGMENTED NEUTROPHILS RELATIVE PERCENT: 49.5 % (ref 36–66)
SODIUM BLD-SCNC: 133 MMOL/L (ref 135–145)
SODIUM BLD-SCNC: 135 MMOL/L (ref 135–145)
SPECIFIC GRAVITY UA: 1.02 (ref 1–1.03)
SQUAMOUS EPITHELIAL: 1 /HPF
T4 FREE SERPL-MCNC: 1.16 NG/DL (ref 0.9–1.8)
TOTAL IMMATURE NEUTOROPHIL: 0.01 K/CU MM
TOTAL NUCLEATED RBC: 0 K/CU MM
TOTAL PROTEIN: 5.9 GM/DL (ref 6.4–8.2)
TRICHOMONAS: ABNORMAL /HPF
TROPONIN, HIGH SENSITIVITY: 22 NG/L (ref 0–14)
TROPONIN, HIGH SENSITIVITY: 25 NG/L (ref 0–14)
TSH SERPL DL<=0.005 MIU/L-ACNC: 5.01 UIU/ML (ref 0.27–4.2)
UROBILINOGEN, URINE: 0.2 MG/DL (ref 0.2–1)
WBC # BLD: 5.2 K/CU MM (ref 4–10.5)
WBC UA: 15 /HPF (ref 0–5)

## 2024-02-21 PROCEDURE — 81001 URINALYSIS AUTO W/SCOPE: CPT

## 2024-02-21 PROCEDURE — 2060000000 HC ICU INTERMEDIATE R&B

## 2024-02-21 PROCEDURE — 87040 BLOOD CULTURE FOR BACTERIA: CPT

## 2024-02-21 PROCEDURE — 99285 EMERGENCY DEPT VISIT HI MDM: CPT

## 2024-02-21 PROCEDURE — 71045 X-RAY EXAM CHEST 1 VIEW: CPT

## 2024-02-21 PROCEDURE — 2580000003 HC RX 258: Performed by: STUDENT IN AN ORGANIZED HEALTH CARE EDUCATION/TRAINING PROGRAM

## 2024-02-21 PROCEDURE — 6360000002 HC RX W HCPCS: Performed by: NURSE PRACTITIONER

## 2024-02-21 PROCEDURE — 0202U NFCT DS 22 TRGT SARS-COV-2: CPT

## 2024-02-21 PROCEDURE — 84145 PROCALCITONIN (PCT): CPT

## 2024-02-21 PROCEDURE — 96375 TX/PRO/DX INJ NEW DRUG ADDON: CPT

## 2024-02-21 PROCEDURE — 85025 COMPLETE CBC W/AUTO DIFF WBC: CPT

## 2024-02-21 PROCEDURE — 96361 HYDRATE IV INFUSION ADD-ON: CPT

## 2024-02-21 PROCEDURE — 71275 CT ANGIOGRAPHY CHEST: CPT

## 2024-02-21 PROCEDURE — 83605 ASSAY OF LACTIC ACID: CPT

## 2024-02-21 PROCEDURE — 84443 ASSAY THYROID STIM HORMONE: CPT

## 2024-02-21 PROCEDURE — 83735 ASSAY OF MAGNESIUM: CPT

## 2024-02-21 PROCEDURE — 6360000004 HC RX CONTRAST MEDICATION: Performed by: STUDENT IN AN ORGANIZED HEALTH CARE EDUCATION/TRAINING PROGRAM

## 2024-02-21 PROCEDURE — 80048 BASIC METABOLIC PNL TOTAL CA: CPT

## 2024-02-21 PROCEDURE — 6370000000 HC RX 637 (ALT 250 FOR IP): Performed by: STUDENT IN AN ORGANIZED HEALTH CARE EDUCATION/TRAINING PROGRAM

## 2024-02-21 PROCEDURE — 93005 ELECTROCARDIOGRAM TRACING: CPT

## 2024-02-21 PROCEDURE — 84439 ASSAY OF FREE THYROXINE: CPT

## 2024-02-21 PROCEDURE — 84484 ASSAY OF TROPONIN QUANT: CPT

## 2024-02-21 PROCEDURE — 80053 COMPREHEN METABOLIC PANEL: CPT

## 2024-02-21 PROCEDURE — 83880 ASSAY OF NATRIURETIC PEPTIDE: CPT

## 2024-02-21 PROCEDURE — 96365 THER/PROPH/DIAG IV INF INIT: CPT

## 2024-02-21 PROCEDURE — 74177 CT ABD & PELVIS W/CONTRAST: CPT

## 2024-02-21 PROCEDURE — 6360000002 HC RX W HCPCS: Performed by: STUDENT IN AN ORGANIZED HEALTH CARE EDUCATION/TRAINING PROGRAM

## 2024-02-21 RX ORDER — GUAIFENESIN 600 MG/1
600 TABLET, EXTENDED RELEASE ORAL 2 TIMES DAILY
Status: DISCONTINUED | OUTPATIENT
Start: 2024-02-21 | End: 2024-02-23 | Stop reason: HOSPADM

## 2024-02-21 RX ORDER — BUDESONIDE AND FORMOTEROL FUMARATE DIHYDRATE 160; 4.5 UG/1; UG/1
2 AEROSOL RESPIRATORY (INHALATION) 2 TIMES DAILY
Status: DISCONTINUED | OUTPATIENT
Start: 2024-02-21 | End: 2024-02-23 | Stop reason: HOSPADM

## 2024-02-21 RX ORDER — SODIUM CHLORIDE, SODIUM LACTATE, POTASSIUM CHLORIDE, AND CALCIUM CHLORIDE .6; .31; .03; .02 G/100ML; G/100ML; G/100ML; G/100ML
30 INJECTION, SOLUTION INTRAVENOUS ONCE
Status: COMPLETED | OUTPATIENT
Start: 2024-02-21 | End: 2024-02-21

## 2024-02-21 RX ORDER — IPRATROPIUM BROMIDE AND ALBUTEROL SULFATE 2.5; .5 MG/3ML; MG/3ML
1 SOLUTION RESPIRATORY (INHALATION)
Status: DISCONTINUED | OUTPATIENT
Start: 2024-02-22 | End: 2024-02-22

## 2024-02-21 RX ORDER — POLYETHYLENE GLYCOL 3350 17 G/17G
17 POWDER, FOR SOLUTION ORAL DAILY PRN
Status: DISCONTINUED | OUTPATIENT
Start: 2024-02-21 | End: 2024-02-23 | Stop reason: HOSPADM

## 2024-02-21 RX ORDER — NICOTINE 21 MG/24HR
1 PATCH, TRANSDERMAL 24 HOURS TRANSDERMAL DAILY PRN
Status: DISCONTINUED | OUTPATIENT
Start: 2024-02-21 | End: 2024-02-23 | Stop reason: HOSPADM

## 2024-02-21 RX ORDER — ONDANSETRON 4 MG/1
4 TABLET, ORALLY DISINTEGRATING ORAL EVERY 8 HOURS PRN
Status: DISCONTINUED | OUTPATIENT
Start: 2024-02-21 | End: 2024-02-21

## 2024-02-21 RX ORDER — SODIUM CHLORIDE 0.9 % (FLUSH) 0.9 %
5-40 SYRINGE (ML) INJECTION EVERY 12 HOURS SCHEDULED
Status: DISCONTINUED | OUTPATIENT
Start: 2024-02-21 | End: 2024-02-23 | Stop reason: HOSPADM

## 2024-02-21 RX ORDER — ONDANSETRON 2 MG/ML
4 INJECTION INTRAMUSCULAR; INTRAVENOUS EVERY 6 HOURS PRN
Status: DISCONTINUED | OUTPATIENT
Start: 2024-02-21 | End: 2024-02-21

## 2024-02-21 RX ORDER — SODIUM CHLORIDE 1 G/1
1 TABLET ORAL 3 TIMES DAILY
Status: DISCONTINUED | OUTPATIENT
Start: 2024-02-21 | End: 2024-02-23 | Stop reason: HOSPADM

## 2024-02-21 RX ORDER — SODIUM CHLORIDE 0.9 % (FLUSH) 0.9 %
5-40 SYRINGE (ML) INJECTION PRN
Status: DISCONTINUED | OUTPATIENT
Start: 2024-02-21 | End: 2024-02-23 | Stop reason: HOSPADM

## 2024-02-21 RX ORDER — GABAPENTIN 100 MG/1
100 CAPSULE ORAL 3 TIMES DAILY
Status: DISCONTINUED | OUTPATIENT
Start: 2024-02-21 | End: 2024-02-23 | Stop reason: HOSPADM

## 2024-02-21 RX ORDER — ENOXAPARIN SODIUM 100 MG/ML
40 INJECTION SUBCUTANEOUS EVERY EVENING
Status: DISCONTINUED | OUTPATIENT
Start: 2024-02-22 | End: 2024-02-23 | Stop reason: HOSPADM

## 2024-02-21 RX ORDER — POTASSIUM CHLORIDE 7.45 MG/ML
10 INJECTION INTRAVENOUS PRN
Status: DISCONTINUED | OUTPATIENT
Start: 2024-02-21 | End: 2024-02-23 | Stop reason: HOSPADM

## 2024-02-21 RX ORDER — SODIUM CHLORIDE 9 MG/ML
INJECTION, SOLUTION INTRAVENOUS PRN
Status: DISCONTINUED | OUTPATIENT
Start: 2024-02-21 | End: 2024-02-23 | Stop reason: HOSPADM

## 2024-02-21 RX ORDER — PROCHLORPERAZINE EDISYLATE 5 MG/ML
10 INJECTION INTRAMUSCULAR; INTRAVENOUS EVERY 6 HOURS PRN
Status: DISCONTINUED | OUTPATIENT
Start: 2024-02-21 | End: 2024-02-23 | Stop reason: HOSPADM

## 2024-02-21 RX ORDER — ACETAMINOPHEN 325 MG/1
650 TABLET ORAL EVERY 6 HOURS PRN
Status: DISCONTINUED | OUTPATIENT
Start: 2024-02-21 | End: 2024-02-23 | Stop reason: HOSPADM

## 2024-02-21 RX ORDER — PANTOPRAZOLE SODIUM 40 MG/1
40 TABLET, DELAYED RELEASE ORAL
Status: DISCONTINUED | OUTPATIENT
Start: 2024-02-22 | End: 2024-02-23 | Stop reason: HOSPADM

## 2024-02-21 RX ORDER — MAGNESIUM SULFATE IN WATER 40 MG/ML
2000 INJECTION, SOLUTION INTRAVENOUS ONCE
Status: COMPLETED | OUTPATIENT
Start: 2024-02-21 | End: 2024-02-21

## 2024-02-21 RX ORDER — ATENOLOL 25 MG/1
50 TABLET ORAL DAILY
Status: DISCONTINUED | OUTPATIENT
Start: 2024-02-22 | End: 2024-02-23 | Stop reason: HOSPADM

## 2024-02-21 RX ORDER — SUCRALFATE 1 G/1
1 TABLET ORAL 4 TIMES DAILY
Status: DISCONTINUED | OUTPATIENT
Start: 2024-02-21 | End: 2024-02-23 | Stop reason: HOSPADM

## 2024-02-21 RX ORDER — MAGNESIUM SULFATE IN WATER 40 MG/ML
2000 INJECTION, SOLUTION INTRAVENOUS PRN
Status: DISCONTINUED | OUTPATIENT
Start: 2024-02-21 | End: 2024-02-23 | Stop reason: HOSPADM

## 2024-02-21 RX ORDER — ONDANSETRON 2 MG/ML
4 INJECTION INTRAMUSCULAR; INTRAVENOUS ONCE
Status: COMPLETED | OUTPATIENT
Start: 2024-02-21 | End: 2024-02-21

## 2024-02-21 RX ORDER — SENNA AND DOCUSATE SODIUM 50; 8.6 MG/1; MG/1
2 TABLET, FILM COATED ORAL 2 TIMES DAILY
Status: DISCONTINUED | OUTPATIENT
Start: 2024-02-21 | End: 2024-02-23 | Stop reason: HOSPADM

## 2024-02-21 RX ORDER — ACETAMINOPHEN 650 MG/1
650 SUPPOSITORY RECTAL EVERY 6 HOURS PRN
Status: DISCONTINUED | OUTPATIENT
Start: 2024-02-21 | End: 2024-02-23 | Stop reason: HOSPADM

## 2024-02-21 RX ORDER — SODIUM CHLORIDE, SODIUM LACTATE, POTASSIUM CHLORIDE, CALCIUM CHLORIDE 600; 310; 30; 20 MG/100ML; MG/100ML; MG/100ML; MG/100ML
INJECTION, SOLUTION INTRAVENOUS CONTINUOUS
Status: DISPENSED | OUTPATIENT
Start: 2024-02-21 | End: 2024-02-22

## 2024-02-21 RX ADMIN — SODIUM CHLORIDE, PRESERVATIVE FREE 10 ML: 5 INJECTION INTRAVENOUS at 22:27

## 2024-02-21 RX ADMIN — GUAIFENESIN 600 MG: 600 TABLET, EXTENDED RELEASE ORAL at 22:24

## 2024-02-21 RX ADMIN — ONDANSETRON 4 MG: 2 INJECTION INTRAMUSCULAR; INTRAVENOUS at 17:02

## 2024-02-21 RX ADMIN — MAGNESIUM SULFATE HEPTAHYDRATE 2000 MG: 40 INJECTION, SOLUTION INTRAVENOUS at 16:15

## 2024-02-21 RX ADMIN — GABAPENTIN 100 MG: 100 CAPSULE ORAL at 22:25

## 2024-02-21 RX ADMIN — SUCRALFATE 1 G: 1 TABLET ORAL at 22:24

## 2024-02-21 RX ADMIN — IOPAMIDOL 60 ML: 755 INJECTION, SOLUTION INTRAVENOUS at 18:00

## 2024-02-21 RX ADMIN — CEFEPIME 2000 MG: 2 INJECTION, POWDER, FOR SOLUTION INTRAVENOUS at 19:18

## 2024-02-21 RX ADMIN — SODIUM CHLORIDE 1 G: 1 TABLET ORAL at 22:25

## 2024-02-21 RX ADMIN — SODIUM CHLORIDE, POTASSIUM CHLORIDE, SODIUM LACTATE AND CALCIUM CHLORIDE 2109 ML: 600; 310; 30; 20 INJECTION, SOLUTION INTRAVENOUS at 13:47

## 2024-02-21 RX ADMIN — SODIUM CHLORIDE, POTASSIUM CHLORIDE, SODIUM LACTATE AND CALCIUM CHLORIDE: 600; 310; 30; 20 INJECTION, SOLUTION INTRAVENOUS at 22:33

## 2024-02-21 RX ADMIN — VANCOMYCIN HYDROCHLORIDE 1750 MG: 5 INJECTION, POWDER, LYOPHILIZED, FOR SOLUTION INTRAVENOUS at 20:18

## 2024-02-21 ASSESSMENT — ENCOUNTER SYMPTOMS
CHEST TIGHTNESS: 0
COUGH: 1
ABDOMINAL PAIN: 1
TROUBLE SWALLOWING: 0
SORE THROAT: 0
NAUSEA: 1
SHORTNESS OF BREATH: 1
DIARRHEA: 1
VOMITING: 1

## 2024-02-21 NOTE — ED NOTES
ED TO INPATIENT SBAR HANDOFF    Patient Name: Adenike Harvey   :  1955  68 y.o.   Preferred Name     Family/Caregiver Present yes   Restraints no   C-SSRS: Risk of Suicide: No Risk  Sitter no   Sepsis Risk Score Sepsis Risk Score: 3.43      Situation  Chief Complaint   Patient presents with    Shortness of Breath     Brief Description of Patient's Condition: Pt arrives with c/o SOB ongoing and worsening post status lung resection with cancer Dx.  Pt is A&O x4, ambulates independently, in good spirits.    Mental Status: oriented  Arrived from: home    Imaging:   CT ABDOMEN PELVIS W IV CONTRAST Additional Contrast? None   Final Result   1. No pulmonary embolism or other acute abnormality in the chest, abdomen, or   pelvis.   2. Status post right upper lobectomy.  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   3. Resolution of the previously identified subpleural opacity in the left   lower lobe.         CTA PULMONARY W CONTRAST   Final Result   1. No pulmonary embolism or other acute abnormality in the chest, abdomen, or   pelvis.   2. Status post right upper lobectomy.  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   3. Resolution of the previously identified subpleural opacity in the left   lower lobe.         XR CHEST PORTABLE   Final Result   No acute process.  Chronic scarring and volume loss in the right lung.           Abnormal labs:   Abnormal Labs Reviewed   CBC WITH AUTO DIFFERENTIAL - Abnormal; Notable for the following components:       Result Value    RBC 3.91 (*)     Hemoglobin 11.0 (*)     Hematocrit 36.6 (*)     MCHC 30.1 (*)     RDW 15.6 (*)     Platelets 670 (*)     Monocytes % 12.0 (*)     Eosinophils % 3.9 (*)     Basophils % 1.2 (*)     All other components within normal limits   COMPREHENSIVE METABOLIC PANEL - Abnormal; Notable for the following components:    Chloride 93 (*)     CO2 16 (*)     Anion Gap 26 (*)     BUN 5 (*)      All other components within normal limits       Background  History:   Past Medical History:   Diagnosis Date    Anemia     Anxiety     Arthritis     \"Fingers, Back\"    Bronchitis Last Episode 11-17    Chronic back pain     COPD (chronic obstructive pulmonary disease) ([facility])     Sees Dr. Hernandez    Depression     Diverticulosis 06/2013    Extensive per CT    Hemoptysis 12/06/2017    History of external beam radiation therapy 04/2023    RIGHT LUNG 6,000 cGy in 30 fractions    Hx of blood clots     \"found I have a clot near my mediport so they are taking it out 5/16/2019- put me on Xarelto    Hyperlipidemia 2009    Hypertension 2009    Low back pain     \"better than it use to be- had therapy in the past- originally hurt back at work 20+ yrs ago\"    Lung mass     rul- scheduled to bronch 12/74/2017    Nausea & vomiting 01/22/2024    Panic attacks     Pneumonia Dx 1-17    Right Lung Cancer Dx 10-17    tx with chemo following with Dr Una Smith Dx 2014    \"Right Side\"    Shortness of breath on exertion     Teeth missing     Upper And Lower    Urinary tract infection In Past    No Current Symptoms    Wears dentures     Full Upper, Partial Lower    Wears glasses        Assessment    Vitals: MEWS Score: 6  Level of Consciousness: Alert (0)   Vitals:    02/21/24 1930 02/21/24 2000 02/21/24 2045 02/21/24 2100   BP: (!) 149/98 117/89 123/83 120/89   Pulse: (!) 124 (!) 122 (!) 119 (!) 123   Resp: 14 19 19 26   Temp:       TempSrc:       SpO2: 99% 99% 98% 96%   Weight:         PO Status: Regular  O2 Flow Rate: O2 Device: None (Room air)    Cardiac Rhythm:   Last documented pain medication administered:   NIH Score: NIH     Active LDA's:   Peripheral IV 02/21/24 Right Antecubital (Active)   Site Assessment Clean, dry & intact 02/21/24 1338   Line Status Blood return noted;Brisk blood return 02/21/24 1338   Line Care Connections checked and tightened 02/21/24 1338   Phlebitis Assessment No symptoms 02/21/24 1338

## 2024-02-21 NOTE — CARE COORDINATION
Care Transitions Outreach Attempt    Attempted to reach patient for transitions of care follow up. Unable to reach patient. CTN will try again.     Patient: Adenike Harvey Patient : 1955 MRN: [id number]    Last Discharge Facility       Date Complaint Diagnosis Description Type Department Provider    24 Shortness of Breath; Cough COPD exacerbation (HCC) ... ED to Hosp-Admission (Discharged) (ADMITTED) [facility] Mario Alberto Uribe MD; Carmelo Cooley...          Was this an external facility discharge? No Discharge Facility Name: [place]    Noted following upcoming appointments from discharge chart review:   [facility] [place] [facility]):   Future Appointments   Date Time Provider Department Center   2024  1:30 PM Rony Radford MD [facility]   2024 10:30 AM Carmina Bustillo, PT [facility] PT [facility]   3/5/2024 10:30 AM Ashley Gregorio, PTA [facility] PT [facility] [place]   3/12/2024 10:30 AM Ashley Gregorio, PTA [facility] PT [facility] [place]   3/14/2024 10:00 AM Donovan Sutherland MD [facility] RAD ON[facility] [place]   3/19/2024 10:30 AM Carmina Bustillo, PT [facility] PT [facility] [place]   4/10/2024 11:00 AM Rony Radford MD [facility]   2024 11:20 AM Iban Lou MD [facility]   2024 11:00 AM Iabn Lou MD [facility]     Non-BS  follow up appointment(s): SANDRA Fontana RN CTN

## 2024-02-21 NOTE — ED PROVIDER NOTES
[facility] ICU STEPDOWN  EMERGENCY DEPARTMENT ENCOUNTER      Pt Name: Adenike Harvey  MRN: [id number]  Birthdate 1955  Date of evaluation: 2/21/2024  Provider: GRACE Jiang - CNP  PCP: Iban Lou MD  Note Started: 1:30 PM EST 2/21/24    I am the Primary Clinician of Record.   I have seen and evaluated this patient with my supervising physician Elvis Simms, *.  CHIEF COMPLAINT       Chief Complaint   Patient presents with    Shortness of Breath       HISTORY OF PRESENT ILLNESS: 1 or more Elements   Adenike Harvey is a 68 y.o. female who presents to the ER with chief complaint of shortness of breath, productive cough, nausea, vomiting, and diarrhea.  Denies fever. Lack of appetitie.      I have reviewed the nursing triage documentation and agree unless otherwise noted.  REVIEW OF SYSTEMS :    Review of Systems   Constitutional:  Positive for fatigue. Negative for appetite change and fever.   HENT:  Negative for congestion, sore throat and trouble swallowing.    Respiratory:  Positive for cough and shortness of breath.    Cardiovascular:  Negative for chest pain.   Gastrointestinal:  Positive for abdominal pain.   Genitourinary:  Positive for difficulty urinating. Negative for dysuria.   Musculoskeletal:  Negative for myalgias.   Skin:  Negative for rash.   Neurological:  Negative for weakness and headaches.     Positives and Pertinent negatives as per HPI.   SURGICAL HISTORY     Past Surgical History:   Procedure Laterality Date    BRONCHOSCOPY  12/07/2017 2/21/2018- done     BUNIONECTOMY Bilateral 1990's    \"Done At Different Times\"    CATHETER REMOVAL Left 5/16/2019    PORT REMOVAL performed by Pepe Tripathi MD at [facility] OR    COLONOSCOPY  2000's    DILATION AND CURETTAGE OF UTERUS  1989    ELBOW SURGERY Left 1980    \"Tendon Repair\"    LUNG SURGERY  04/12/2018    per old chart had thoracoscopy and RUL lobectomy , bronchoscopy and lymph node bx     MEDIASTINOSCOPY     magnesium sulfate 2000 mg in 50 mL IVPB premix (2,000 mg IntraVENous New Bag 2/21/24 1615)   lactated ringers bolus 2,109 mL (0 mLs IntraVENous Stopped 2/21/24 1423)   ondansetron (ZOFRAN) injection 4 mg (4 mg IntraVENous Given 2/21/24 1702)     ED Course as of 02/21/24 1732   Wed Feb 21, 2024   1454 Lactic Acid, Sepsis(!!): 3.0 [AR]   1647 Troponin, High Sensitivity(!): 22 [AR]   1647 Lactic Acid, Sepsis(!!): 2.8 [AR]   1652 Patient requesting no contrast due to kidney issues, one of her kidneys is very small. She was told to stop getting contrast due to issues in the past [AR]   1728 Per review of external records, 1/23 EGD by Dr. Radford: Severe Gastritis with clotted blood, Schtazki ring which was dilated, increased PPI to BID and added Carafate Qac/hs   -cont PPI (Protonix 40 mg) PO BID and Carafate QID until GI follow up    [SH]   1729 Labs notable for a normal white count.  Kidney functions unremarkable.  She has an elevated [SH]   1730 Pro-BNP(!): 588.8 [SH]   1730 Magnesium(!): 1.4  Magnesium repleted in the emergency department. [SH]   1730 Respiratory panel is negative. [SH]   1731 Lactate, Sepsis(!!):    Lactic Acid, Sepsis 2.8(!!)  Initial lactic acid 3.0, repeat lactic acid 2.8 [SH]   1731 IMPRESSION:  Chest x-ray shows no acute process.  Chronic scarring and volume loss in the right lung.      [SH]      ED Course User Index  [AR] Elvis Simms MD  [SH] Araceli Thomson, APRN - CNP        ***cbc notable for normal white count without  bands (Elevated immature neutrophil percentage (> 5-10 %) making inflammatory processes, seizures, toxic ingestions, metabolic abnormalities, and tissue damage unlikely. No pancytopenia (lower-than-normal number of red and white blood cells and platelets in the blood) which would be concerning for cancer, lupus or infection.*** Hemoglobin and hematocrit are within normal parameters making concern for anemia unlikely.  Metabolic panel to evaluate for

## 2024-02-21 NOTE — CONSENT
BRAIN NATRIURETIC PEPTIDE - Abnormal; Notable for the following components:    Pro-.8 (*)     All other components within normal limits   MAGNESIUM - Abnormal; Notable for the following components:    Magnesium 1.4 (*)     All other components within normal limits   TROPONIN - Abnormal; Notable for the following components:    Troponin, High Sensitivity 25 (*)     All other components within normal limits   LACTATE, SEPSIS - Abnormal; Notable for the following components:    Lactic Acid, Sepsis 3.0 (*)     All other components within normal limits   LACTATE, SEPSIS - Abnormal; Notable for the following components:    Lactic Acid, Sepsis 2.8 (*)     All other components within normal limits   URINALYSIS - Abnormal; Notable for the following components:    Bilirubin Urine MODERATE NUMBER OR AMOUNT OBSERVED (*)     Ketones, Urine >80 (*)     Leukocyte Esterase, Urine SMALL NUMBER OR AMOUNT OBSERVED (*)     All other components within normal limits   TROPONIN - Abnormal; Notable for the following components:    Troponin, High Sensitivity 22 (*)     All other components within normal limits   MICROSCOPIC URINALYSIS - Abnormal; Notable for the following components:    WBC, UA 15 (*)     Bacteria, UA RARE (*)     Mucus, UA RARE (*)     All other components within normal limits   TSH WITH REFLEX - Abnormal; Notable for the following components:    TSH, High Sensitivity 5.010 (*)     All other components within normal limits   BASIC METABOLIC PANEL - Abnormal; Notable for the following components:    Sodium 133 (*)     Chloride 95 (*)     CO2 17 (*)     Anion Gap 21 (*)     BUN 4 (*)     All other components within normal limits   BLOOD GAS, VENOUS - Abnormal; Notable for the following components:    pH, Miguel 7.29 (*)     pCO2, Miguel 36 (*)     pO2, Miguel 58 (*)     Base Excess 9 (*)     HCO3, Venous 17.3 (*)     O2 Sat, Miguel 85.6 (*)     All other components within normal limits   RESPIRATORY PANEL, MOLECULAR, WITH  the following medications:  Medications   vancomycin (VANCOCIN) 1750 mg in sodium chloride 0.9 % 500 mL IVPB (1,750 mg IntraVENous New Bag 2/21/24 2018)   lactated ringers bolus 2,109 mL (0 mLs IntraVENous Stopped 2/21/24 1423)   magnesium sulfate 2000 mg in 50 mL IVPB premix (0 mg IntraVENous Stopped 2/21/24 1728)   ondansetron (ZOFRAN) injection 4 mg (4 mg IntraVENous Given 2/21/24 1702)   iopamidol (ISOVUE-370) 76 % injection 60 mL (60 mLs IntraVENous Given 2/21/24 1800)   ceFEPIme (MAXIPIME) 2,000 mg in sodium chloride 0.9 % 100 mL IVPB (mini-bag) (0 mg IntraVENous Stopped 2/1955)       ED Course as of 02/21/24 2134   Wed Feb 21, 2024   1454 Lactic Acid, Sepsis(!!): 3.0 [AR]   1647 Troponin, High Sensitivity(!): 22 [AR]   1647 Lactic Acid, Sepsis(!!): 2.8 [AR]   1652 Patient requesting no contrast due to kidney issues, one of her kidneys is very small. She was told to stop getting contrast due to issues in the past [AR]   1728 Per review of external records, 1/23 EGD by Dr. Radford: Severe Gastritis with clotted blood, Schtazki ring which was dilated, increased PPI to BID and added Carafate Qac/hs   -cont PPI (Protonix 40 mg) PO BID and Carafate QID until GI follow up    [SH]   1729 Labs notable for a normal white count.  Kidney functions unremarkable.  She has an elevated [SH]   1730 Pro-BNP(!): 588.8 [SH]   1730 Magnesium(!): 1.4  Magnesium repleted in the emergency department. [SH]   1730 Respiratory panel is negative. [SH]   1731 Lactate, Sepsis(!!):    Lactic Acid, Sepsis 2.8(!!)  Initial lactic acid 3.0, repeat lactic acid 2.8 [SH]   1731 IMPRESSION:  Chest x-ray shows no acute process.  Chronic scarring and volume loss in the right lung.      [SH]   1846 CTA PULMONARY W CONTRAST  IMPRESSION:  1. No pulmonary embolism or other acute abnormality in the chest, abdomen, or  pelvis.  2. Status post right upper lobectomy.  Unchanged post radiation fibrosis in  the medial aspect of the right lung.  3.

## 2024-02-21 NOTE — ED PROVIDER NOTES
I independently examined and evaluated Adenike Harvey.  I personally saw the patient and performed a substantive portion of the visit including all aspects of the medical decision making, made/approved the management plan and take responsibility for the patient management.    In brief their history revealed patient came in with shortness of breath, generalized weakness, cough for the last 2 days.  She reports that they were intermittent for the last 2 days but today became constant.  She also endorses right lower quadrant abdominal pain since he had an EGD 1 month ago, her pain is 6 out of 10 on presentation.  She has had 3-4 episodes of emesis in the last 3 weeks without blood.  She also had diarrhea for the last 2 to 3 weeks, which is usual for her after she started antibiotics which she has been taking for pneumonia.  She denies any fevers, chest pain, dysuria, hematuria, blood in the stool, vaginal bleeding, vaginal discharge.      PMHx: COPD, gastritis      Their focused exam revealed the patient is tachycardic with no murmurs appreciated, she has transmitted upper airway sounds in her lung exam diffusely, with decreased breath sounds.  Patient is also tender to palpation over her right lower quadrant.  The patient appears age appropriate, appears well-hydrated, well-nourished. Mucous membranes are moist. Speech is clear. Breathing is unlabored. Skin is dry. Mental status is normal. The patient moves all extremities and is without facial droop.    ED course:   CC/HPI Summary, DDx, ED Course, and Reassessment:   Pt presents as above.  Emergent conditions considered.  Presentation prompted initial sepsis evaluation.  She was given 30 cc/kg of IV fluids and Zofran for symptom control.  Laboratory evaluation is remarkable for elevated lactic acid that is trending down and mild elevated troponin that is also trending down to 22.  UA is concerning for possible UTI, patient started on vancomycin and cefepime.  She  similar morphology, but with PVCs in the past      History from : Patient and Significant Other      Limitations to history : None    Patient was given the following medications:  Medications - No data to display    Independent Imaging Interpretation by me: Chest x-ray, CT angiogram of the chest, CT abdomen and pelvis    Chronic conditions affecting care: COPD, gastritis    Discussion with Other Profesionals : None    Social Determinants : None    Records Reviewed : Inpatient Notes last discharge summary    Disposition Considerations (tests considered but not done, Shared Decision Making, Pt Expectation of Test or Tx.): Admit        Questions sought and answered with the patient. They voice understanding and agree with plan. Instructed to return for any worsening or worrisome concerns.    I am the Primary Clinician of Record.          All decisions regarding differential diagnosis, lab/radiology/EKG interpretation, risk of significant illness, specific reasons for performing tests, management/treatment, response to management/treatment, disposition, reasons for consults, results of consults, etc. were made by myself in conjunction with the Advanced Practice Provider.        For all further details of the patient's emergency department visit, please see the Advanced Practice Provider's documentation.           Elvis Simms MD  02/21/24 5040

## 2024-02-21 NOTE — H&P
5.2   HGB 11.0*   *   MCV 93.6   RDW 15.6*   LYMPHOPCT 33.2   MONOPCT 12.0*   BASOPCT 1.2*   MONOSABS 0.6   LYMPHSABS 1.7   EOSABS 0.2   BASOSABS 0.1     CMP:    Recent Labs     02/21/24  1325      K 4.5   CL 93*   CO2 16*   BUN 5*   CREATININE 0.9   GLUCOSE 96   LABALBU 3.6   CALCIUM 9.8   BILITOT 0.3   ALKPHOS 84   AST 15   ALT <5*     Lipids:   Lab Results   Component Value Date/Time    CHOL 274 02/08/2023 08:57 AM    HDL 27 02/08/2023 08:57 AM    TRIG 268 02/08/2023 08:57 AM     Hemoglobin A1C:   Lab Results   Component Value Date/Time    LABA1C 5.2 01/22/2024 06:00 AM     TSH:   Lab Results   Component Value Date/Time    TSH 2.34 02/01/2017 09:51 AM     Troponin:   Lab Results   Component Value Date/Time    TROPONINT <0.010 03/06/2023 06:14 PM     BNP:   Recent Labs     02/21/24  1325   PROBNP 588.8*     Lactic Acid: No results for input(s): \"LACTA\" in the last 72 hours.  UA:  Lab Results   Component Value Date/Time    NITRU NEGATIVE 02/21/2024 04:15 PM    COLORU YELLOW 02/21/2024 04:15 PM    PHUR 6.0 02/10/2020 10:12 AM    WBCUA 15 02/21/2024 04:15 PM    RBCUA 1 02/21/2024 04:15 PM    MUCUS RARE 02/21/2024 04:15 PM    TRICHOMONAS NONE SEEN 02/21/2024 04:15 PM    BACTERIA RARE 02/21/2024 04:15 PM    CLARITYU CLEAR 02/21/2024 04:15 PM    SPECGRAV 1.020 02/21/2024 04:15 PM    LEUKOCYTESUR SMALL NUMBER OR AMOUNT OBSERVED 02/21/2024 04:15 PM    UROBILINOGEN 0.2 02/21/2024 04:15 PM    BILIRUBINUR MODERATE NUMBER OR AMOUNT OBSERVED 02/21/2024 04:15 PM    BILIRUBINUR small 02/10/2020 10:12 AM    BLOODU NEGATIVE 02/21/2024 04:15 PM    GLUCOSEU negative 02/10/2020 10:12 AM    KETUA >80 02/21/2024 04:15 PM     Urine Cultures:   Lab Results   Component Value Date/Time    LABURIN >100,000 CFU/ml 02/10/2020 10:30 AM     Blood Cultures: No results found for: \"BC\"  No results found for: \"BLOODCULT2\"  Organism:   Lab Results   Component Value Date/Time    ORG Escherichia coli 02/10/2020 10:30 AM       Radiology  results:  CT ABDOMEN PELVIS W IV CONTRAST Additional Contrast? None   Final Result   1. No pulmonary embolism or other acute abnormality in the chest, abdomen, or   pelvis.   2. Status post right upper lobectomy.  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   3. Resolution of the previously identified subpleural opacity in the left   lower lobe.         CTA PULMONARY W CONTRAST   Final Result   1. No pulmonary embolism or other acute abnormality in the chest, abdomen, or   pelvis.   2. Status post right upper lobectomy.  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   3. Resolution of the previously identified subpleural opacity in the left   lower lobe.         XR CHEST PORTABLE   Final Result   No acute process.  Chronic scarring and volume loss in the right lung.             Luke Esqueda MD  02/21/24 6:54 PM

## 2024-02-22 ENCOUNTER — CARE COORDINATION (OUTPATIENT)
Dept: CASE MANAGEMENT | Age: 69
End: 2024-02-22

## 2024-02-22 PROBLEM — E43 SEVERE MALNUTRITION (HCC): Chronic | Status: ACTIVE | Noted: 2024-01-23

## 2024-02-22 LAB
ALBUMIN SERPL-MCNC: 3.1 GM/DL (ref 3.4–5)
ALP BLD-CCNC: 70 IU/L (ref 40–128)
ALT SERPL-CCNC: <5 U/L (ref 10–40)
ANION GAP SERPL CALCULATED.3IONS-SCNC: 19 MMOL/L (ref 7–16)
AST SERPL-CCNC: 11 IU/L (ref 15–37)
BASOPHILS ABSOLUTE: 0 K/CU MM
BASOPHILS RELATIVE PERCENT: 1.1 % (ref 0–1)
BILIRUB SERPL-MCNC: 0.3 MG/DL (ref 0–1)
BUN SERPL-MCNC: 4 MG/DL (ref 6–23)
CALCIUM SERPL-MCNC: 9.1 MG/DL (ref 8.3–10.6)
CHLORIDE BLD-SCNC: 99 MMOL/L (ref 99–110)
CO2: 17 MMOL/L (ref 21–32)
CREAT SERPL-MCNC: 0.7 MG/DL (ref 0.6–1.1)
DIFFERENTIAL TYPE: ABNORMAL
EOSINOPHILS ABSOLUTE: 0.2 K/CU MM
EOSINOPHILS RELATIVE PERCENT: 5.6 % (ref 0–3)
GFR SERPL CREATININE-BSD FRML MDRD: >60 ML/MIN/1.73M2
GLUCOSE SERPL-MCNC: 83 MG/DL (ref 70–99)
HCT VFR BLD CALC: 31.6 % (ref 37–47)
HEMOGLOBIN: 9.4 GM/DL (ref 12.5–16)
IMMATURE NEUTROPHIL %: 0 % (ref 0–0.43)
INR BLD: 1.4 INDEX
LYMPHOCYTES ABSOLUTE: 1.2 K/CU MM
LYMPHOCYTES RELATIVE PERCENT: 34.3 % (ref 24–44)
MCH RBC QN AUTO: 27.3 PG (ref 27–31)
MCHC RBC AUTO-ENTMCNC: 29.7 % (ref 32–36)
MCV RBC AUTO: 91.9 FL (ref 78–100)
MONOCYTES ABSOLUTE: 0.6 K/CU MM
MONOCYTES RELATIVE PERCENT: 17.1 % (ref 0–4)
NUCLEATED RBC %: 0 %
PDW BLD-RTO: 15.7 % (ref 11.7–14.9)
PLATELET # BLD: 454 K/CU MM (ref 140–440)
PMV BLD AUTO: 9.3 FL (ref 7.5–11.1)
POTASSIUM SERPL-SCNC: 3.8 MMOL/L (ref 3.5–5.1)
PROTHROMBIN TIME: 17.2 SECONDS (ref 11.7–14.5)
RBC # BLD: 3.44 M/CU MM (ref 4.2–5.4)
SEGMENTED NEUTROPHILS ABSOLUTE COUNT: 1.5 K/CU MM
SEGMENTED NEUTROPHILS RELATIVE PERCENT: 41.9 % (ref 36–66)
SODIUM BLD-SCNC: 135 MMOL/L (ref 135–145)
TOTAL IMMATURE NEUTOROPHIL: 0 K/CU MM
TOTAL NUCLEATED RBC: 0 K/CU MM
TOTAL PROTEIN: 4.7 GM/DL (ref 6.4–8.2)
WBC # BLD: 3.6 K/CU MM (ref 4–10.5)

## 2024-02-22 PROCEDURE — 85610 PROTHROMBIN TIME: CPT

## 2024-02-22 PROCEDURE — 85025 COMPLETE CBC W/AUTO DIFF WBC: CPT

## 2024-02-22 PROCEDURE — 80053 COMPREHEN METABOLIC PANEL: CPT

## 2024-02-22 PROCEDURE — 6370000000 HC RX 637 (ALT 250 FOR IP): Performed by: STUDENT IN AN ORGANIZED HEALTH CARE EDUCATION/TRAINING PROGRAM

## 2024-02-22 PROCEDURE — 2580000003 HC RX 258: Performed by: STUDENT IN AN ORGANIZED HEALTH CARE EDUCATION/TRAINING PROGRAM

## 2024-02-22 PROCEDURE — 36415 COLL VENOUS BLD VENIPUNCTURE: CPT

## 2024-02-22 PROCEDURE — 94761 N-INVAS EAR/PLS OXIMETRY MLT: CPT

## 2024-02-22 PROCEDURE — 6370000000 HC RX 637 (ALT 250 FOR IP): Performed by: FAMILY MEDICINE

## 2024-02-22 PROCEDURE — 94640 AIRWAY INHALATION TREATMENT: CPT

## 2024-02-22 PROCEDURE — 2060000000 HC ICU INTERMEDIATE R&B

## 2024-02-22 PROCEDURE — 82805 BLOOD GASES W/O2 SATURATION: CPT

## 2024-02-22 PROCEDURE — 6360000002 HC RX W HCPCS: Performed by: STUDENT IN AN ORGANIZED HEALTH CARE EDUCATION/TRAINING PROGRAM

## 2024-02-22 RX ORDER — ONDANSETRON 2 MG/ML
4 INJECTION INTRAMUSCULAR; INTRAVENOUS EVERY 6 HOURS PRN
Status: DISCONTINUED | OUTPATIENT
Start: 2024-02-22 | End: 2024-02-22

## 2024-02-22 RX ORDER — IPRATROPIUM BROMIDE AND ALBUTEROL SULFATE 2.5; .5 MG/3ML; MG/3ML
1 SOLUTION RESPIRATORY (INHALATION) EVERY 4 HOURS PRN
Status: DISCONTINUED | OUTPATIENT
Start: 2024-02-22 | End: 2024-02-23 | Stop reason: HOSPADM

## 2024-02-22 RX ADMIN — GABAPENTIN 100 MG: 100 CAPSULE ORAL at 13:14

## 2024-02-22 RX ADMIN — DOCUSATE SODIUM 50 MG AND SENNOSIDES 8.6 MG 2 TABLET: 8.6; 5 TABLET, FILM COATED ORAL at 08:38

## 2024-02-22 RX ADMIN — SODIUM CHLORIDE 1 G: 1 TABLET ORAL at 08:38

## 2024-02-22 RX ADMIN — GABAPENTIN 100 MG: 100 CAPSULE ORAL at 20:14

## 2024-02-22 RX ADMIN — ATENOLOL 50 MG: 25 TABLET ORAL at 08:38

## 2024-02-22 RX ADMIN — ENOXAPARIN SODIUM 40 MG: 100 INJECTION SUBCUTANEOUS at 16:39

## 2024-02-22 RX ADMIN — CEFTRIAXONE 1000 MG: 1 INJECTION, POWDER, FOR SOLUTION INTRAMUSCULAR; INTRAVENOUS at 05:34

## 2024-02-22 RX ADMIN — SUCRALFATE 1 G: 1 TABLET ORAL at 08:38

## 2024-02-22 RX ADMIN — BUDESONIDE AND FORMOTEROL FUMARATE DIHYDRATE 2 PUFF: 160; 4.5 AEROSOL RESPIRATORY (INHALATION) at 19:40

## 2024-02-22 RX ADMIN — GABAPENTIN 100 MG: 100 CAPSULE ORAL at 08:38

## 2024-02-22 RX ADMIN — SODIUM CHLORIDE, PRESERVATIVE FREE 10 ML: 5 INJECTION INTRAVENOUS at 20:16

## 2024-02-22 RX ADMIN — SODIUM CHLORIDE: 9 INJECTION, SOLUTION INTRAVENOUS at 05:32

## 2024-02-22 RX ADMIN — GUAIFENESIN 600 MG: 600 TABLET, EXTENDED RELEASE ORAL at 08:38

## 2024-02-22 RX ADMIN — IPRATROPIUM BROMIDE AND ALBUTEROL SULFATE 1 DOSE: 2.5; .5 SOLUTION RESPIRATORY (INHALATION) at 02:41

## 2024-02-22 RX ADMIN — IPRATROPIUM BROMIDE AND ALBUTEROL SULFATE 1 DOSE: 2.5; .5 SOLUTION RESPIRATORY (INHALATION) at 08:51

## 2024-02-22 RX ADMIN — SUCRALFATE 1 G: 1 TABLET ORAL at 16:39

## 2024-02-22 RX ADMIN — SODIUM CHLORIDE, PRESERVATIVE FREE 10 ML: 5 INJECTION INTRAVENOUS at 08:38

## 2024-02-22 RX ADMIN — SUCRALFATE 1 G: 1 TABLET ORAL at 13:13

## 2024-02-22 RX ADMIN — DOCUSATE SODIUM 50 MG AND SENNOSIDES 8.6 MG 2 TABLET: 8.6; 5 TABLET, FILM COATED ORAL at 20:14

## 2024-02-22 RX ADMIN — SUCRALFATE 1 G: 1 TABLET ORAL at 20:14

## 2024-02-22 RX ADMIN — BUDESONIDE AND FORMOTEROL FUMARATE DIHYDRATE 2 PUFF: 160; 4.5 AEROSOL RESPIRATORY (INHALATION) at 08:53

## 2024-02-22 RX ADMIN — ACETAMINOPHEN 650 MG: 325 TABLET ORAL at 20:35

## 2024-02-22 RX ADMIN — PANTOPRAZOLE SODIUM 40 MG: 40 TABLET, DELAYED RELEASE ORAL at 05:34

## 2024-02-22 RX ADMIN — SODIUM CHLORIDE 1 G: 1 TABLET ORAL at 20:14

## 2024-02-22 RX ADMIN — SODIUM CHLORIDE 1 G: 1 TABLET ORAL at 13:14

## 2024-02-22 RX ADMIN — PANTOPRAZOLE SODIUM 40 MG: 40 TABLET, DELAYED RELEASE ORAL at 16:38

## 2024-02-22 RX ADMIN — GUAIFENESIN 600 MG: 600 TABLET, EXTENDED RELEASE ORAL at 20:14

## 2024-02-22 RX ADMIN — ACETAMINOPHEN 650 MG: 325 TABLET ORAL at 04:00

## 2024-02-22 ASSESSMENT — PAIN SCALES - GENERAL
PAINLEVEL_OUTOF10: 6
PAINLEVEL_OUTOF10: 0
PAINLEVEL_OUTOF10: 0
PAINLEVEL_OUTOF10: 9
PAINLEVEL_OUTOF10: 3
PAINLEVEL_OUTOF10: 0

## 2024-02-22 ASSESSMENT — PAIN DESCRIPTION - ORIENTATION: ORIENTATION: RIGHT;LEFT

## 2024-02-22 ASSESSMENT — PAIN DESCRIPTION - LOCATION
LOCATION: HEAD
LOCATION: HEAD
LOCATION: OTHER (COMMENT)

## 2024-02-22 ASSESSMENT — PAIN DESCRIPTION - DESCRIPTORS
DESCRIPTORS: ACHING

## 2024-02-22 ASSESSMENT — PAIN DESCRIPTION - ONSET
ONSET: GRADUAL
ONSET: GRADUAL

## 2024-02-22 ASSESSMENT — PAIN DESCRIPTION - FREQUENCY
FREQUENCY: INTERMITTENT
FREQUENCY: INTERMITTENT

## 2024-02-22 ASSESSMENT — PAIN SCALES - WONG BAKER: WONGBAKER_NUMERICALRESPONSE: 0

## 2024-02-22 ASSESSMENT — PAIN DESCRIPTION - PAIN TYPE
TYPE: ACUTE PAIN
TYPE: ACUTE PAIN

## 2024-02-22 NOTE — PROGRESS NOTES
Comprehensive Nutrition Assessment    Type and Reason for Visit:  Initial, Positive Nutrition Screen (34+ # wt loss)    Nutrition Recommendations/Plan:   Continue regular diet  Offer standard and frozen oral nutrition supplements daily  Please encourage and document po intakes at all meals  Monitor weights, intakes, labs, POC     Malnutrition Assessment:  Malnutrition Status:  Severe malnutrition (02/22/24 0915)    Context:  Chronic Illness       Nutrition Assessment:    Admitted w/ severe sepsis, PMHx of moderate COPD, HTN and GERD, lung ca. Pt had not yet got a chance to eat breakfast today, has been w/ staff. She reports poor/minimal po intakes x1 week, has not had an appetite, c/o some nausea today. Pt known to dept w/ recent admit. Pt reports recent wt of 152# at MD visit, ? accuracy of CBW. If CBW accurate it reflects a significant wt loss of 17.1% over the past 3mo. NFPE performed, continues w/ mild wasting, continues to meet criteria for malnutrition this admit. Will add standard and frozen oral supps daily. Send educational handouts for high ok/high protein nutrition therapy. Follow at high nutrition risk.    Nutrition Related Findings:    +carafate, senokot; hgb 9.4, elevated troponin Wound Type: None       Current Nutrition Intake & Therapies:    Average Meal Intake: Unable to assess  Average Supplements Intake: None Ordered  ADULT DIET; Regular    Anthropometric Measures:  Height: 165.1 cm (5' 5\")  Ideal Body Weight (IBW): 125 lbs (57 kg)    Admission Body Weight: 64.9 kg (143 lb 1.3 oz)  Current Body Weight: 64.9 kg (143 lb 1.3 oz),   IBW. Weight Source: Bed Scale  Current BMI (kg/m2): 23.8  Usual Body Weight: 78.3 kg (172 lb 9.9 oz) (11/26/23)  % Weight Change (Calculated): -17.1  Weight Adjustment For: No Adjustment                 BMI Categories: Normal Weight (BMI 22.0 to 24.9) age over 65    Estimated Daily Nutrient Needs:  Energy Requirements Based On: Kcal/kg  Weight Used for Energy  Requirements: Current  Energy (kcal/day): 4214-8733 (30-35kcal/kg)  Weight Used for Protein Requirements: Current  Protein (g/day): 65-78 (1.0-1.2g/kg)  Method Used for Fluid Requirements: 1 ml/kcal  Fluid (ml/day): 2000    Nutrition Diagnosis:   Severe malnutrition, In context of chronic illness related to catabolic illness, inadequate protein-energy intake, altered GI function, impaired respiratory function as evidenced by nausea, mild muscle loss, weight loss 7.5% in 3 months, poor intake prior to admission, Criteria as identified in malnutrition assessment    Nutrition Interventions:   Food and/or Nutrient Delivery: Continue Current Diet, Start Oral Nutrition Supplement  Nutrition Education/Counseling: Survival skills/brief education completed (Malnutrition education bag/handouts given)  Coordination of Nutrition Care: Continue to monitor while inpatient, Coordination of Care  Plan of Care discussed with: pt, MD    Goals:     Goals: PO intake 50% or greater, by next RD assessment       Nutrition Monitoring and Evaluation:   Behavioral-Environmental Outcomes: None Identified  Food/Nutrient Intake Outcomes: Food and Nutrient Intake, Supplement Intake  Physical Signs/Symptoms Outcomes: Weight, Nutrition Focused Physical Findings, Biochemical Data, Nausea or Vomiting, Meal Time Behavior    Discharge Planning:    Continue current diet, Continue Oral Nutrition Supplement     Delmis Coppola RD  Contact: [phone]

## 2024-02-22 NOTE — CARE COORDINATION
02/22/24 0943   Service Assessment   Patient Orientation Alert and Oriented;Person;Place;Situation   Cognition Alert   History Provided By Patient   Primary Caregiver Spouse   Support Systems Spouse/Significant Other;Family Members   Patient's Healthcare Decision Maker is: Legal Next of Kin   PCP Verified by CM Yes   Last Visit to PCP Within last 6 months   Prior Functional Level Assistance with the following:;Housework;Cooking;Shopping;Mobility;Other (see comment)  (DME includes a walker and shower chr of rmobility)   Current Functional Level Mobility;Housework;Shopping;Cooking   Can patient return to prior living arrangement Yes   Ability to make needs known: Good   Family able to assist with home care needs: Yes  (Pts primary caregiver is her spouse.)   Social/Functional History   Lives With Spouse   Type of Home House   Home Layout One level   Bathroom Shower/Tub Shower chair with back   Mode of Transportation Car   Discharge Planning   Living Arrangements Spouse/Significant Other   Condition of Participation: Discharge Planning   The Patient and/Or Patient Representative agree with the Discharge Plan? Yes     CM into see pt to initiate a safe discharge plan. Cm introduced self and explained role of CM. Pt is kind, alert and oriented. Pt lives with her spouse who is primary caregiver. Pt is typically fairly independent for her ADL's. DME shower chr and walker. Pt would like a BSC. CM ordered [employer].  is able to provide for transportation/ to appointments/ obtaining meds/ obtaining groceries. Meals are limited to microwave and sandwiches. Pt has a PCP/ insurance and able to afford her prescriptions. Pt is limited supported by dtr who is local and friends if needed. Pt has grandchildren that assist when able. Cm offered home care and pt is unsure if she wants that and would like to discuss with her .   Discharge plan is home with spouse.  Pt has a PCP/ insurance and able to afford her

## 2024-02-22 NOTE — CARE COORDINATION
Adenike readmitted to [facility] 2/21/24 for Acute Cystitis/Sepsis. Per protocol, Care Transitions episode closed, no further outreach calls will be made on this encounter. CTN team will follow up on hospital discharge if eligible.       Marti Fontana RN [phone]

## 2024-02-22 NOTE — TELEPHONE ENCOUNTER
Spoke with pt she is in hospital, pt states this has been a trend. Advised pt to please call and schedule appt with our office upon discharge

## 2024-02-22 NOTE — PROGRESS NOTES
02/22/24  3:47 AM   Result Value Ref Range    WBC 3.6 (L) 4.0 - 10.5 K/CU MM    RBC 3.44 (L) 4.2 - 5.4 M/CU MM    Hemoglobin 9.4 (L) 12.5 - 16.0 GM/DL    Hematocrit 31.6 (L) 37 - 47 %    MCV 91.9 78 - 100 FL    MCH 27.3 27 - 31 PG    MCHC 29.7 (L) 32.0 - 36.0 %    RDW 15.7 (H) 11.7 - 14.9 %    Platelets 454 (H) 140 - 440 K/CU MM    MPV 9.3 7.5 - 11.1 FL    Differential Type AUTOMATED DIFFERENTIAL     Segs Relative 41.9 36 - 66 %    Lymphocytes % 34.3 24 - 44 %    Monocytes % 17.1 (H) 0 - 4 %    Eosinophils % 5.6 (H) 0 - 3 %    Basophils % 1.1 (H) 0 - 1 %    Segs Absolute 1.5 K/CU MM    Lymphocytes Absolute 1.2 K/CU MM    Monocytes Absolute 0.6 K/CU MM    Eosinophils Absolute 0.2 K/CU MM    Basophils Absolute 0.0 K/CU MM    Nucleated RBC % 0.0 %    Total Nucleated RBC 0.0 K/CU MM    Total Immature Neutrophil 0.00 K/CU MM    Immature Neutrophil % 0.0 0 - 0.43 %   Protime-INR    Collection Time: 02/22/24  3:47 AM   Result Value Ref Range    Protime 17.2 (H) 11.7 - 14.5 SECONDS    INR 1.4 INDEX   Blood Gas, Venous    Collection Time: 02/22/24  6:00 AM   Result Value Ref Range    pH, Miguel 7.29 (L) 7.32 - 7.43    pCO2, Miguel 36 (L) 41 - 51 mmHG    pO2, Miguel 58 (H) 28 - 48 mmHG    Base Excess 9 (H) 0 - 3.0    HCO3, Venous 17.3 (L) 22 - 29 MMOL/L    O2 Sat, Miguel 85.6 (H) 50 - 70 %    Comment VBG         Imaging/Diagnostics Last 24 Hours   CTA PULMONARY W CONTRAST    Result Date: 2/21/2024  EXAMINATION: CTA OF THE CHEST; CT OF THE ABDOMEN AND PELVIS WITH CONTRAST 2/21/2024 5:58 pm TECHNIQUE: CTA of the chest was performed after the administration of intravenous contrast.  Multiplanar reformatted images are provided for review.  MIP images are provided for review. Automated exposure control, iterative reconstruction, and/or weight based adjustment of the mA/kV was utilized to reduce the radiation dose to as low as reasonably achievable.; CT of the abdomen and pelvis was performed with the administration of intravenous contrast.  Tissues/Bones: No acute osseous or soft tissue abnormality. Abdomen/Pelvis: Organs: The liver and gallbladder are unremarkable.  No biliary ductal dilatation is identified.  The pancreas, spleen, and bilateral adrenal glands are unremarkable.  The The bilateral kidneys and ureters are unremarkable. GI/Bowel: Colonic diverticulosis.  Normal appendix.  The stomach and small bowel are normal in appearance.  No obstruction or wall thickening identified. Pelvis: The urinary bladder is normal in appearance.  The uterus and bilateral adnexae are unremarkable.  No free fluid in the pelvis.  No pelvic or inguinal lymphadenopathy. Peritoneum/Retroperitoneum: The abdominal aorta is normal in caliber with moderate to severe atherosclerosis.  No acute vascular abnormality identified.  No retroperitoneal or mesenteric lymphadenopathy is identified. No free air or fluid is seen in the abdomen. Bones/Soft Tissues: No acute osseous or soft tissue abnormality.     1. No pulmonary embolism or other acute abnormality in the chest, abdomen, or pelvis. 2. Status post right upper lobectomy.  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 3. Resolution of the previously identified subpleural opacity in the left lower lobe.     CT ABDOMEN PELVIS W IV CONTRAST Additional Contrast? None    Result Date: 2/21/2024  EXAMINATION: CTA OF THE CHEST; CT OF THE ABDOMEN AND PELVIS WITH CONTRAST 2/21/2024 5:58 pm TECHNIQUE: CTA of the chest was performed after the administration of intravenous contrast.  Multiplanar reformatted images are provided for review.  MIP images are provided for review. Automated exposure control, iterative reconstruction, and/or weight based adjustment of the mA/kV was utilized to reduce the radiation dose to as low as reasonably achievable.; CT of the abdomen and pelvis was performed with the administration of intravenous contrast. Multiplanar reformatted images are provided for review. Automated

## 2024-02-22 NOTE — PROGRESS NOTES
4 Eyes Skin Assessment     NAME:  Adenike Harvey  YOB: 1955  MEDICAL RECORD NUMBER:  [id number]    The patient is being assessed for  Admission    I agree that at least one RN has performed a thorough Head to Toe Skin Assessment on the patient. ALL assessment sites listed below have been assessed.      Areas assessed by both nurses:    Head, Face, Ears, Shoulders, Back, Chest, Arms, Elbows, Hands, Sacrum. Buttock, Coccyx, Ischium, Legs. Feet and Heels, and Under Medical Devices         Does the Patient have a Wound? No noted wound(s)       Mustapha Prevention initiated by RN: Yes  Wound Care Orders initiated by RN: No    Pressure Injury (Stage 3,4, Unstageable, DTI, NWPT, and Complex wounds) if present, place Wound referral order by RN under : No    New Ostomies, if present place, Ostomy referral order under : No     Nurse 1 eSignature: Electronically signed by Eri Sarabia RN on 2/21/24 at 11:16 PM EST    **SHARE this note so that the co-signing nurse can place an eSignature**    Nurse 2 eSignature: Electronically signed by Jaimie Sahni RN on 2/21/24 at 11:20 PM EST

## 2024-02-22 NOTE — PLAN OF CARE
Problem: Discharge Planning  Goal: Discharge to home or other facility with appropriate resources  2/22/2024 0844 by Stephanie Miller RN  Outcome: Progressing  2/21/2024 2308 by Eri Sarabia RN  Outcome: Progressing     Problem: Safety - Adult  Goal: Free from fall injury  2/22/2024 0844 by Stephanie Miller RN  Outcome: Progressing  2/21/2024 2308 by Eri Sarabia RN  Outcome: Progressing     Problem: Risk for Elopement  Goal: Patient will not exit the unit/facility without proper excort  Outcome: Progressing     Problem: Pain  Goal: Verbalizes/displays adequate comfort level or baseline comfort level  Outcome: Progressing

## 2024-02-22 NOTE — CARE COORDINATION
CM called Pooja at [facility] and provided the order/ documentation for the BSC. Pt can pick it up on her way home. LH

## 2024-02-23 VITALS
TEMPERATURE: 97.8 F | HEIGHT: 65 IN | HEART RATE: 104 BPM | SYSTOLIC BLOOD PRESSURE: 120 MMHG | BODY MASS INDEX: 23.51 KG/M2 | WEIGHT: 141.09 LBS | RESPIRATION RATE: 17 BRPM | DIASTOLIC BLOOD PRESSURE: 77 MMHG | OXYGEN SATURATION: 96 %

## 2024-02-23 LAB
ALBUMIN SERPL-MCNC: 3.1 GM/DL (ref 3.4–5)
ALP BLD-CCNC: 70 IU/L (ref 40–128)
ALT SERPL-CCNC: <5 U/L (ref 10–40)
ANION GAP SERPL CALCULATED.3IONS-SCNC: 15 MMOL/L (ref 7–16)
AST SERPL-CCNC: 11 IU/L (ref 15–37)
BASOPHILS ABSOLUTE: 0 K/CU MM
BASOPHILS RELATIVE PERCENT: 1.4 % (ref 0–1)
BILIRUB SERPL-MCNC: 0.3 MG/DL (ref 0–1)
BUN SERPL-MCNC: 3 MG/DL (ref 6–23)
CALCIUM SERPL-MCNC: 9.2 MG/DL (ref 8.3–10.6)
CHLORIDE BLD-SCNC: 100 MMOL/L (ref 99–110)
CO2: 20 MMOL/L (ref 21–32)
CREAT SERPL-MCNC: 0.7 MG/DL (ref 0.6–1.1)
DIFFERENTIAL TYPE: ABNORMAL
EOSINOPHILS ABSOLUTE: 0.1 K/CU MM
EOSINOPHILS RELATIVE PERCENT: 4.3 % (ref 0–3)
GFR SERPL CREATININE-BSD FRML MDRD: >60 ML/MIN/1.73M2
GLUCOSE SERPL-MCNC: 86 MG/DL (ref 70–99)
HCT VFR BLD CALC: 29.8 % (ref 37–47)
HEMOGLOBIN: 9.2 GM/DL (ref 12.5–16)
IMMATURE NEUTROPHIL %: 0.4 % (ref 0–0.43)
LYMPHOCYTES ABSOLUTE: 0.8 K/CU MM
LYMPHOCYTES RELATIVE PERCENT: 29.1 % (ref 24–44)
MCH RBC QN AUTO: 27.7 PG (ref 27–31)
MCHC RBC AUTO-ENTMCNC: 30.9 % (ref 32–36)
MCV RBC AUTO: 89.8 FL (ref 78–100)
MONOCYTES ABSOLUTE: 0.4 K/CU MM
MONOCYTES RELATIVE PERCENT: 14.5 % (ref 0–4)
NUCLEATED RBC %: 0 %
PDW BLD-RTO: 15.9 % (ref 11.7–14.9)
PLATELET # BLD: 433 K/CU MM (ref 140–440)
PMV BLD AUTO: 9.9 FL (ref 7.5–11.1)
POTASSIUM SERPL-SCNC: 4.3 MMOL/L (ref 3.5–5.1)
RBC # BLD: 3.32 M/CU MM (ref 4.2–5.4)
SEGMENTED NEUTROPHILS ABSOLUTE COUNT: 1.4 K/CU MM
SEGMENTED NEUTROPHILS RELATIVE PERCENT: 50.3 % (ref 36–66)
SODIUM BLD-SCNC: 135 MMOL/L (ref 135–145)
TOTAL IMMATURE NEUTOROPHIL: 0.01 K/CU MM
TOTAL NUCLEATED RBC: 0 K/CU MM
TOTAL PROTEIN: 4.7 GM/DL (ref 6.4–8.2)
WBC # BLD: 2.8 K/CU MM (ref 4–10.5)

## 2024-02-23 PROCEDURE — 85025 COMPLETE CBC W/AUTO DIFF WBC: CPT

## 2024-02-23 PROCEDURE — 6360000002 HC RX W HCPCS: Performed by: STUDENT IN AN ORGANIZED HEALTH CARE EDUCATION/TRAINING PROGRAM

## 2024-02-23 PROCEDURE — 94761 N-INVAS EAR/PLS OXIMETRY MLT: CPT

## 2024-02-23 PROCEDURE — 94640 AIRWAY INHALATION TREATMENT: CPT

## 2024-02-23 PROCEDURE — 6370000000 HC RX 637 (ALT 250 FOR IP): Performed by: FAMILY MEDICINE

## 2024-02-23 PROCEDURE — 36415 COLL VENOUS BLD VENIPUNCTURE: CPT

## 2024-02-23 PROCEDURE — 2580000003 HC RX 258: Performed by: STUDENT IN AN ORGANIZED HEALTH CARE EDUCATION/TRAINING PROGRAM

## 2024-02-23 PROCEDURE — 6370000000 HC RX 637 (ALT 250 FOR IP): Performed by: STUDENT IN AN ORGANIZED HEALTH CARE EDUCATION/TRAINING PROGRAM

## 2024-02-23 PROCEDURE — 80053 COMPREHEN METABOLIC PANEL: CPT

## 2024-02-23 RX ORDER — PROCHLORPERAZINE MALEATE 5 MG/1
5 TABLET ORAL EVERY 6 HOURS PRN
Qty: 20 TABLET | Refills: 0 | Status: ON HOLD | OUTPATIENT
Start: 2024-02-23 | End: 2024-03-09 | Stop reason: HOSPADM

## 2024-02-23 RX ORDER — MAGNESIUM HYDROXIDE/ALUMINUM HYDROXICE/SIMETHICONE 120; 1200; 1200 MG/30ML; MG/30ML; MG/30ML
15 SUSPENSION ORAL
Status: COMPLETED | OUTPATIENT
Start: 2024-02-23 | End: 2024-02-23

## 2024-02-23 RX ORDER — GUAIFENESIN 600 MG/1
600 TABLET, EXTENDED RELEASE ORAL 2 TIMES DAILY
Qty: 14 TABLET | Refills: 0 | Status: ON HOLD | OUTPATIENT
Start: 2024-02-23 | End: 2024-03-02 | Stop reason: HOSPADM

## 2024-02-23 RX ORDER — GUAIFENESIN 600 MG/1
600 TABLET, EXTENDED RELEASE ORAL 2 TIMES DAILY
Qty: 30 TABLET | Refills: 0 | Status: SHIPPED | OUTPATIENT
Start: 2024-02-23 | End: 2024-02-23 | Stop reason: HOSPADM

## 2024-02-23 RX ORDER — CIPROFLOXACIN 500 MG/1
500 TABLET, FILM COATED ORAL 2 TIMES DAILY
Qty: 6 TABLET | Refills: 0 | Status: ON HOLD | OUTPATIENT
Start: 2024-02-23 | End: 2024-03-02 | Stop reason: HOSPADM

## 2024-02-23 RX ADMIN — PANTOPRAZOLE SODIUM 40 MG: 40 TABLET, DELAYED RELEASE ORAL at 05:03

## 2024-02-23 RX ADMIN — IPRATROPIUM BROMIDE AND ALBUTEROL SULFATE 1 DOSE: 2.5; .5 SOLUTION RESPIRATORY (INHALATION) at 08:42

## 2024-02-23 RX ADMIN — BUDESONIDE AND FORMOTEROL FUMARATE DIHYDRATE 2 PUFF: 160; 4.5 AEROSOL RESPIRATORY (INHALATION) at 08:23

## 2024-02-23 RX ADMIN — SODIUM CHLORIDE, PRESERVATIVE FREE 10 ML: 5 INJECTION INTRAVENOUS at 07:45

## 2024-02-23 RX ADMIN — SODIUM CHLORIDE 1 G: 1 TABLET ORAL at 07:44

## 2024-02-23 RX ADMIN — ALUMINUM HYDROXIDE, MAGNESIUM HYDROXIDE, AND SIMETHICONE 15 ML: 200; 200; 20 SUSPENSION ORAL at 05:00

## 2024-02-23 RX ADMIN — GUAIFENESIN 600 MG: 600 TABLET, EXTENDED RELEASE ORAL at 07:44

## 2024-02-23 RX ADMIN — GABAPENTIN 100 MG: 100 CAPSULE ORAL at 07:44

## 2024-02-23 RX ADMIN — DOCUSATE SODIUM 50 MG AND SENNOSIDES 8.6 MG 2 TABLET: 8.6; 5 TABLET, FILM COATED ORAL at 07:44

## 2024-02-23 RX ADMIN — CEFTRIAXONE 1000 MG: 1 INJECTION, POWDER, FOR SOLUTION INTRAMUSCULAR; INTRAVENOUS at 04:32

## 2024-02-23 RX ADMIN — SUCRALFATE 1 G: 1 TABLET ORAL at 07:44

## 2024-02-23 RX ADMIN — ATENOLOL 50 MG: 25 TABLET ORAL at 07:44

## 2024-02-23 ASSESSMENT — PAIN SCALES - WONG BAKER
WONGBAKER_NUMERICALRESPONSE: 0
WONGBAKER_NUMERICALRESPONSE: 0

## 2024-02-23 ASSESSMENT — PAIN - FUNCTIONAL ASSESSMENT: PAIN_FUNCTIONAL_ASSESSMENT: ACTIVITIES ARE NOT PREVENTED

## 2024-02-23 ASSESSMENT — PAIN SCALES - GENERAL
PAINLEVEL_OUTOF10: 0
PAINLEVEL_OUTOF10: 5
PAINLEVEL_OUTOF10: 0

## 2024-02-23 ASSESSMENT — PAIN DESCRIPTION - DESCRIPTORS: DESCRIPTORS: ACHING

## 2024-02-23 ASSESSMENT — PAIN DESCRIPTION - ORIENTATION: ORIENTATION: MID

## 2024-02-23 ASSESSMENT — PAIN DESCRIPTION - PAIN TYPE: TYPE: ACUTE PAIN

## 2024-02-23 ASSESSMENT — PAIN DESCRIPTION - ONSET: ONSET: PROGRESSIVE

## 2024-02-23 ASSESSMENT — PAIN DESCRIPTION - LOCATION: LOCATION: ABDOMEN

## 2024-02-23 ASSESSMENT — PAIN DESCRIPTION - FREQUENCY: FREQUENCY: INTERMITTENT

## 2024-02-23 NOTE — PROGRESS NOTES
Outpatient Pharmacy Progress Note for Meds-to-Beds    Total number of Prescriptions Filled: 3  The following medications were dispensed to the patient during the discharge process:  ciprofloxacin (CIPRO)   prochlorperazine (COMPAZINE)   guaiFENesin (MUCINEX)     Additional Documentation:  Patient picked-up the medication(s) in the OP Pharmacy      Thank you for letting us serve your patients.  [place] [id number]    Phone: [phone]    Fax: [phone]

## 2024-02-23 NOTE — PROGRESS NOTES
AVS reviewed with patient, all questions answered. Pt pink, warm, dry alert and oriented at time of DC, LDAs removed, belongings gathered by patient, pt wheeled to pharm and then to exit

## 2024-02-23 NOTE — CARE COORDINATION
Spoke with pt to remind pt to  BSC and discussed HHC. Pt stated that she needs to discuss it with spouse and will return my call.

## 2024-02-23 NOTE — DISCHARGE INSTRUCTIONS
Attend all follow up appointments  Take all medications as prescribed  Activity as tolerated  Continue a healthy diet

## 2024-02-23 NOTE — PLAN OF CARE
Problem: Discharge Planning  Goal: Discharge to home or other facility with appropriate resources  2/22/2024 2152 by Joaquina Torres RN  Outcome: Progressing  2/22/2024 0844 by Stephanie Miller RN  Outcome: Progressing     Problem: Safety - Adult  Goal: Free from fall injury  2/22/2024 2152 by Joaquina Torres RN  Outcome: Progressing  2/22/2024 0844 by Stephanie Miller RN  Outcome: Progressing     Problem: Risk for Elopement  Goal: Patient will not exit the unit/facility without proper excort  2/22/2024 2152 by Joaquina Torres RN  Outcome: Progressing  2/22/2024 0844 by Stephanie Miller RN  Outcome: Progressing     Problem: Pain  Goal: Verbalizes/displays adequate comfort level or baseline comfort level  2/22/2024 2152 by Joaquina Torres RN  Outcome: Progressing  2/22/2024 0844 by Stephanie Miller RN  Outcome: Progressing     Problem: Nutrition Deficit:  Goal: Optimize nutritional status  Outcome: Progressing

## 2024-02-25 LAB
CULTURE: NORMAL
CULTURE: NORMAL
Lab: NORMAL
Lab: NORMAL
SPECIMEN: NORMAL
SPECIMEN: NORMAL

## 2024-02-26 ENCOUNTER — HOSPITAL ENCOUNTER (INPATIENT)
Age: 69
LOS: 5 days | Discharge: HOME | DRG: 321 | End: 2024-03-02
Attending: STUDENT IN AN ORGANIZED HEALTH CARE EDUCATION/TRAINING PROGRAM | Admitting: HOSPITALIST
Payer: MEDICARE

## 2024-02-26 ENCOUNTER — APPOINTMENT (OUTPATIENT)
Dept: GENERAL RADIOLOGY | Age: 69
DRG: 321 | End: 2024-02-26
Payer: MEDICARE

## 2024-02-26 ENCOUNTER — APPOINTMENT (OUTPATIENT)
Dept: CT IMAGING | Age: 69
DRG: 321 | End: 2024-02-26
Payer: MEDICARE

## 2024-02-26 DIAGNOSIS — I10 ESSENTIAL HYPERTENSION: ICD-10-CM

## 2024-02-26 DIAGNOSIS — A41.9 SEPTICEMIA (HCC): Primary | ICD-10-CM

## 2024-02-26 DIAGNOSIS — R07.9 CHEST PAIN, UNSPECIFIED TYPE: ICD-10-CM

## 2024-02-26 DIAGNOSIS — R06.02 SHORTNESS OF BREATH: ICD-10-CM

## 2024-02-26 DIAGNOSIS — J44.1 ACUTE EXACERBATION OF CHRONIC OBSTRUCTIVE PULMONARY DISEASE (COPD) (HCC): ICD-10-CM

## 2024-02-26 DIAGNOSIS — R94.39 ABNORMAL NUCLEAR STRESS TEST: ICD-10-CM

## 2024-02-26 LAB
ANION GAP SERPL CALCULATED.3IONS-SCNC: 16 MMOL/L (ref 7–16)
B PARAP IS1001 DNA NPH QL NAA+NON-PROBE: NOT DETECTED
B PERT.PT PRMT NPH QL NAA+NON-PROBE: NOT DETECTED
BASOPHILS ABSOLUTE: 0.1 K/CU MM
BASOPHILS RELATIVE PERCENT: 1.1 % (ref 0–1)
BILIRUBIN URINE: NEGATIVE MG/DL
BLOOD, URINE: NEGATIVE
BUN SERPL-MCNC: 2 MG/DL (ref 6–23)
C PNEUM DNA NPH QL NAA+NON-PROBE: NOT DETECTED
CALCIUM SERPL-MCNC: 9.1 MG/DL (ref 8.3–10.6)
CHLORIDE BLD-SCNC: 95 MMOL/L (ref 99–110)
CLARITY: CLEAR
CO2: 20 MMOL/L (ref 21–32)
COLOR: YELLOW
COMMENT UA: ABNORMAL
CREAT SERPL-MCNC: 0.7 MG/DL (ref 0.6–1.1)
CULTURE: NORMAL
CULTURE: NORMAL
DIFFERENTIAL TYPE: ABNORMAL
EKG ATRIAL RATE: 115 BPM
EKG ATRIAL RATE: 129 BPM
EKG DIAGNOSIS: NORMAL
EKG DIAGNOSIS: NORMAL
EKG Q-T INTERVAL: 368 MS
EKG Q-T INTERVAL: 422 MS
EKG QRS DURATION: 88 MS
EKG QRS DURATION: 90 MS
EKG QTC CALCULATION (BAZETT): 537 MS
EKG QTC CALCULATION (BAZETT): 601 MS
EKG R AXIS: -21 DEGREES
EKG R AXIS: -26 DEGREES
EKG T AXIS: 104 DEGREES
EKG T AXIS: 81 DEGREES
EKG VENTRICULAR RATE: 122 BPM
EKG VENTRICULAR RATE: 128 BPM
EOSINOPHILS ABSOLUTE: 0.1 K/CU MM
EOSINOPHILS RELATIVE PERCENT: 1.7 % (ref 0–3)
FLUAV H1 2009 PAN RNA NPH NAA+NON-PROBE: NOT DETECTED
FLUAV H1 RNA NPH QL NAA+NON-PROBE: NOT DETECTED
FLUAV H3 RNA NPH QL NAA+NON-PROBE: NOT DETECTED
FLUAV RNA NPH QL NAA+NON-PROBE: NOT DETECTED
FLUBV RNA NPH QL NAA+NON-PROBE: NOT DETECTED
GFR SERPL CREATININE-BSD FRML MDRD: >60 ML/MIN/1.73M2
GLUCOSE BLD-MCNC: 93 MG/DL (ref 70–99)
GLUCOSE SERPL-MCNC: 69 MG/DL (ref 70–99)
GLUCOSE, URINE: 100 MG/DL
HADV DNA NPH QL NAA+NON-PROBE: NOT DETECTED
HCOV 229E RNA NPH QL NAA+NON-PROBE: NOT DETECTED
HCOV HKU1 RNA NPH QL NAA+NON-PROBE: NOT DETECTED
HCOV NL63 RNA NPH QL NAA+NON-PROBE: NOT DETECTED
HCOV OC43 RNA NPH QL NAA+NON-PROBE: NOT DETECTED
HCT VFR BLD CALC: 33 % (ref 37–47)
HEMOGLOBIN: 10.2 GM/DL (ref 12.5–16)
HMPV RNA NPH QL NAA+NON-PROBE: NOT DETECTED
HPIV1 RNA NPH QL NAA+NON-PROBE: NOT DETECTED
HPIV2 RNA NPH QL NAA+NON-PROBE: NOT DETECTED
HPIV3 RNA NPH QL NAA+NON-PROBE: NOT DETECTED
HPIV4 RNA NPH QL NAA+NON-PROBE: NOT DETECTED
IMMATURE NEUTROPHIL %: 0.2 % (ref 0–0.43)
INFLUENZA A ANTIGEN: NOT DETECTED
INFLUENZA B ANTIGEN: NOT DETECTED
KETONES, URINE: >80 MG/DL
LACTIC ACID, SEPSIS: 0.9 MMOL/L (ref 0.4–2)
LACTIC ACID, SEPSIS: 1.3 MMOL/L (ref 0.4–2)
LEUKOCYTE ESTERASE, URINE: NEGATIVE
LYMPHOCYTES ABSOLUTE: 1.4 K/CU MM
LYMPHOCYTES RELATIVE PERCENT: 21.4 % (ref 24–44)
Lab: NORMAL
Lab: NORMAL
M PNEUMO DNA NPH QL NAA+NON-PROBE: NOT DETECTED
MCH RBC QN AUTO: 28.6 PG (ref 27–31)
MCHC RBC AUTO-ENTMCNC: 30.9 % (ref 32–36)
MCV RBC AUTO: 92.4 FL (ref 78–100)
MONOCYTES ABSOLUTE: 0.7 K/CU MM
MONOCYTES RELATIVE PERCENT: 10.1 % (ref 0–4)
NITRITE URINE, QUANTITATIVE: NEGATIVE
NUCLEATED RBC %: 0 %
PDW BLD-RTO: 15.9 % (ref 11.7–14.9)
PH, URINE: 6 (ref 5–8)
PLATELET # BLD: 413 K/CU MM (ref 140–440)
PMV BLD AUTO: 9.9 FL (ref 7.5–11.1)
POTASSIUM SERPL-SCNC: 3.8 MMOL/L (ref 3.5–5.1)
PRO-BNP: ABNORMAL PG/ML
PROCALCITONIN SERPL-MCNC: 0.08 NG/ML
PROTEIN UA: NEGATIVE MG/DL
RBC # BLD: 3.57 M/CU MM (ref 4.2–5.4)
RSV RNA NPH QL NAA+NON-PROBE: NOT DETECTED
RV+EV RNA NPH QL NAA+NON-PROBE: NOT DETECTED
SARS-COV-2 RDRP RESP QL NAA+PROBE: NOT DETECTED
SARS-COV-2 RNA NPH QL NAA+NON-PROBE: NOT DETECTED
SEGMENTED NEUTROPHILS ABSOLUTE COUNT: 4.2 K/CU MM
SEGMENTED NEUTROPHILS RELATIVE PERCENT: 65.5 % (ref 36–66)
SODIUM BLD-SCNC: 131 MMOL/L (ref 135–145)
SOURCE: NORMAL
SPECIFIC GRAVITY UA: 1.02 (ref 1–1.03)
SPECIMEN: NORMAL
SPECIMEN: NORMAL
TOTAL IMMATURE NEUTOROPHIL: 0.01 K/CU MM
TOTAL NUCLEATED RBC: 0 K/CU MM
TROPONIN, HIGH SENSITIVITY: 21 NG/L (ref 0–14)
TROPONIN, HIGH SENSITIVITY: 22 NG/L (ref 0–14)
UROBILINOGEN, URINE: 0.2 MG/DL (ref 0.2–1)
WBC # BLD: 6.4 K/CU MM (ref 4–10.5)

## 2024-02-26 PROCEDURE — 2580000003 HC RX 258: Performed by: STUDENT IN AN ORGANIZED HEALTH CARE EDUCATION/TRAINING PROGRAM

## 2024-02-26 PROCEDURE — 93010 ELECTROCARDIOGRAM REPORT: CPT | Performed by: INTERNAL MEDICINE

## 2024-02-26 PROCEDURE — 82962 GLUCOSE BLOOD TEST: CPT

## 2024-02-26 PROCEDURE — 81003 URINALYSIS AUTO W/O SCOPE: CPT

## 2024-02-26 PROCEDURE — 6360000004 HC RX CONTRAST MEDICATION: Performed by: STUDENT IN AN ORGANIZED HEALTH CARE EDUCATION/TRAINING PROGRAM

## 2024-02-26 PROCEDURE — 92610 EVALUATE SWALLOWING FUNCTION: CPT | Performed by: SPEECH-LANGUAGE PATHOLOGIST

## 2024-02-26 PROCEDURE — 71275 CT ANGIOGRAPHY CHEST: CPT

## 2024-02-26 PROCEDURE — 80048 BASIC METABOLIC PNL TOTAL CA: CPT

## 2024-02-26 PROCEDURE — 94640 AIRWAY INHALATION TREATMENT: CPT

## 2024-02-26 PROCEDURE — 6360000002 HC RX W HCPCS: Performed by: STUDENT IN AN ORGANIZED HEALTH CARE EDUCATION/TRAINING PROGRAM

## 2024-02-26 PROCEDURE — 2060000000 HC ICU INTERMEDIATE R&B

## 2024-02-26 PROCEDURE — 6360000002 HC RX W HCPCS: Performed by: HOSPITALIST

## 2024-02-26 PROCEDURE — 6370000000 HC RX 637 (ALT 250 FOR IP): Performed by: STUDENT IN AN ORGANIZED HEALTH CARE EDUCATION/TRAINING PROGRAM

## 2024-02-26 PROCEDURE — 96375 TX/PRO/DX INJ NEW DRUG ADDON: CPT

## 2024-02-26 PROCEDURE — 87205 SMEAR GRAM STAIN: CPT

## 2024-02-26 PROCEDURE — 83880 ASSAY OF NATRIURETIC PEPTIDE: CPT

## 2024-02-26 PROCEDURE — 83605 ASSAY OF LACTIC ACID: CPT

## 2024-02-26 PROCEDURE — 87040 BLOOD CULTURE FOR BACTERIA: CPT

## 2024-02-26 PROCEDURE — 99285 EMERGENCY DEPT VISIT HI MDM: CPT

## 2024-02-26 PROCEDURE — 6370000000 HC RX 637 (ALT 250 FOR IP): Performed by: HOSPITALIST

## 2024-02-26 PROCEDURE — 93005 ELECTROCARDIOGRAM TRACING: CPT | Performed by: STUDENT IN AN ORGANIZED HEALTH CARE EDUCATION/TRAINING PROGRAM

## 2024-02-26 PROCEDURE — 84145 PROCALCITONIN (PCT): CPT

## 2024-02-26 PROCEDURE — 96365 THER/PROPH/DIAG IV INF INIT: CPT

## 2024-02-26 PROCEDURE — 36415 COLL VENOUS BLD VENIPUNCTURE: CPT

## 2024-02-26 PROCEDURE — 94761 N-INVAS EAR/PLS OXIMETRY MLT: CPT

## 2024-02-26 PROCEDURE — 0202U NFCT DS 22 TRGT SARS-COV-2: CPT

## 2024-02-26 PROCEDURE — 84484 ASSAY OF TROPONIN QUANT: CPT

## 2024-02-26 PROCEDURE — 87502 INFLUENZA DNA AMP PROBE: CPT

## 2024-02-26 PROCEDURE — 2580000003 HC RX 258: Performed by: HOSPITALIST

## 2024-02-26 PROCEDURE — 87070 CULTURE OTHR SPECIMN AEROBIC: CPT

## 2024-02-26 PROCEDURE — 87635 SARS-COV-2 COVID-19 AMP PRB: CPT

## 2024-02-26 PROCEDURE — 85025 COMPLETE CBC W/AUTO DIFF WBC: CPT

## 2024-02-26 PROCEDURE — 71045 X-RAY EXAM CHEST 1 VIEW: CPT

## 2024-02-26 RX ORDER — GLUCAGON 1 MG/ML
1 KIT INJECTION PRN
Status: DISCONTINUED | OUTPATIENT
Start: 2024-02-26 | End: 2024-03-02 | Stop reason: HOSPADM

## 2024-02-26 RX ORDER — ONDANSETRON 2 MG/ML
4 INJECTION INTRAMUSCULAR; INTRAVENOUS EVERY 6 HOURS PRN
Status: DISCONTINUED | OUTPATIENT
Start: 2024-02-26 | End: 2024-03-02 | Stop reason: HOSPADM

## 2024-02-26 RX ORDER — ONDANSETRON 2 MG/ML
4 INJECTION INTRAMUSCULAR; INTRAVENOUS ONCE
Status: COMPLETED | OUTPATIENT
Start: 2024-02-26 | End: 2024-02-26

## 2024-02-26 RX ORDER — 0.9 % SODIUM CHLORIDE 0.9 %
1000 INTRAVENOUS SOLUTION INTRAVENOUS ONCE
Status: DISCONTINUED | OUTPATIENT
Start: 2024-02-26 | End: 2024-02-26

## 2024-02-26 RX ORDER — IPRATROPIUM BROMIDE AND ALBUTEROL SULFATE 2.5; .5 MG/3ML; MG/3ML
1 SOLUTION RESPIRATORY (INHALATION)
Status: DISCONTINUED | OUTPATIENT
Start: 2024-02-26 | End: 2024-02-27

## 2024-02-26 RX ORDER — SODIUM CHLORIDE 9 MG/ML
INJECTION, SOLUTION INTRAVENOUS CONTINUOUS
Status: DISCONTINUED | OUTPATIENT
Start: 2024-02-26 | End: 2024-02-27

## 2024-02-26 RX ORDER — MAGNESIUM SULFATE IN WATER 40 MG/ML
2000 INJECTION, SOLUTION INTRAVENOUS ONCE
Status: COMPLETED | OUTPATIENT
Start: 2024-02-26 | End: 2024-02-26

## 2024-02-26 RX ORDER — IPRATROPIUM BROMIDE AND ALBUTEROL SULFATE 2.5; .5 MG/3ML; MG/3ML
2 SOLUTION RESPIRATORY (INHALATION) ONCE
Status: COMPLETED | OUTPATIENT
Start: 2024-02-26 | End: 2024-02-26

## 2024-02-26 RX ORDER — DEXTROSE MONOHYDRATE 100 MG/ML
INJECTION, SOLUTION INTRAVENOUS CONTINUOUS PRN
Status: DISCONTINUED | OUTPATIENT
Start: 2024-02-26 | End: 2024-03-02 | Stop reason: HOSPADM

## 2024-02-26 RX ORDER — IPRATROPIUM BROMIDE AND ALBUTEROL SULFATE 2.5; .5 MG/3ML; MG/3ML
1 SOLUTION RESPIRATORY (INHALATION) EVERY 4 HOURS PRN
Status: DISCONTINUED | OUTPATIENT
Start: 2024-02-26 | End: 2024-02-27

## 2024-02-26 RX ORDER — DICYCLOMINE HCL 20 MG
20 TABLET ORAL 4 TIMES DAILY
Status: DISCONTINUED | OUTPATIENT
Start: 2024-02-26 | End: 2024-03-02 | Stop reason: HOSPADM

## 2024-02-26 RX ORDER — HYDROCODONE BITARTRATE AND ACETAMINOPHEN 5; 325 MG/1; MG/1
1 TABLET ORAL ONCE
Status: COMPLETED | OUTPATIENT
Start: 2024-02-26 | End: 2024-02-26

## 2024-02-26 RX ORDER — SODIUM CHLORIDE 9 MG/ML
INJECTION, SOLUTION INTRAVENOUS PRN
Status: DISCONTINUED | OUTPATIENT
Start: 2024-02-26 | End: 2024-03-02 | Stop reason: HOSPADM

## 2024-02-26 RX ORDER — ACETAMINOPHEN 650 MG/1
650 SUPPOSITORY RECTAL EVERY 6 HOURS PRN
Status: DISCONTINUED | OUTPATIENT
Start: 2024-02-26 | End: 2024-03-02 | Stop reason: HOSPADM

## 2024-02-26 RX ORDER — ONDANSETRON 4 MG/1
4 TABLET, ORALLY DISINTEGRATING ORAL EVERY 8 HOURS PRN
Status: DISCONTINUED | OUTPATIENT
Start: 2024-02-26 | End: 2024-03-02 | Stop reason: HOSPADM

## 2024-02-26 RX ORDER — BUDESONIDE AND FORMOTEROL FUMARATE DIHYDRATE 160; 4.5 UG/1; UG/1
2 AEROSOL RESPIRATORY (INHALATION) 2 TIMES DAILY
Status: DISCONTINUED | OUTPATIENT
Start: 2024-02-26 | End: 2024-03-02 | Stop reason: HOSPADM

## 2024-02-26 RX ORDER — SODIUM CHLORIDE 0.9 % (FLUSH) 0.9 %
5-40 SYRINGE (ML) INJECTION EVERY 12 HOURS SCHEDULED
Status: DISCONTINUED | OUTPATIENT
Start: 2024-02-26 | End: 2024-03-02 | Stop reason: HOSPADM

## 2024-02-26 RX ORDER — KETOROLAC TROMETHAMINE 30 MG/ML
15 INJECTION, SOLUTION INTRAMUSCULAR; INTRAVENOUS EVERY 6 HOURS PRN
Status: DISCONTINUED | OUTPATIENT
Start: 2024-02-26 | End: 2024-03-02 | Stop reason: HOSPADM

## 2024-02-26 RX ORDER — ASPIRIN 81 MG/1
324 TABLET, CHEWABLE ORAL ONCE
Status: COMPLETED | OUTPATIENT
Start: 2024-02-26 | End: 2024-02-26

## 2024-02-26 RX ORDER — 0.9 % SODIUM CHLORIDE 0.9 %
30 INTRAVENOUS SOLUTION INTRAVENOUS ONCE
Status: COMPLETED | OUTPATIENT
Start: 2024-02-26 | End: 2024-02-26

## 2024-02-26 RX ORDER — GABAPENTIN 100 MG/1
100 CAPSULE ORAL 3 TIMES DAILY
Status: DISCONTINUED | OUTPATIENT
Start: 2024-02-26 | End: 2024-03-02 | Stop reason: HOSPADM

## 2024-02-26 RX ORDER — ACETAMINOPHEN 325 MG/1
650 TABLET ORAL EVERY 6 HOURS PRN
Status: DISCONTINUED | OUTPATIENT
Start: 2024-02-26 | End: 2024-03-02 | Stop reason: HOSPADM

## 2024-02-26 RX ORDER — GUAIFENESIN/DEXTROMETHORPHAN 100-10MG/5
5 SYRUP ORAL EVERY 4 HOURS PRN
Status: DISCONTINUED | OUTPATIENT
Start: 2024-02-26 | End: 2024-03-02 | Stop reason: HOSPADM

## 2024-02-26 RX ORDER — BENZONATATE 100 MG/1
100 CAPSULE ORAL 3 TIMES DAILY PRN
Status: DISCONTINUED | OUTPATIENT
Start: 2024-02-26 | End: 2024-03-02 | Stop reason: HOSPADM

## 2024-02-26 RX ORDER — ENOXAPARIN SODIUM 100 MG/ML
40 INJECTION SUBCUTANEOUS DAILY
Status: DISCONTINUED | OUTPATIENT
Start: 2024-02-26 | End: 2024-03-02 | Stop reason: HOSPADM

## 2024-02-26 RX ORDER — SODIUM CHLORIDE 0.9 % (FLUSH) 0.9 %
5-40 SYRINGE (ML) INJECTION PRN
Status: DISCONTINUED | OUTPATIENT
Start: 2024-02-26 | End: 2024-03-02 | Stop reason: HOSPADM

## 2024-02-26 RX ORDER — POLYETHYLENE GLYCOL 3350 17 G/17G
17 POWDER, FOR SOLUTION ORAL DAILY PRN
Status: DISCONTINUED | OUTPATIENT
Start: 2024-02-26 | End: 2024-03-02 | Stop reason: HOSPADM

## 2024-02-26 RX ADMIN — ACETAMINOPHEN 650 MG: 325 TABLET ORAL at 12:25

## 2024-02-26 RX ADMIN — GUAIFENESIN SYRUP AND DEXTROMETHORPHAN 5 ML: 100; 10 SYRUP ORAL at 17:43

## 2024-02-26 RX ADMIN — BUDESONIDE AND FORMOTEROL FUMARATE DIHYDRATE 2 PUFF: 160; 4.5 AEROSOL RESPIRATORY (INHALATION) at 19:59

## 2024-02-26 RX ADMIN — PREDNISONE 50 MG: 20 TABLET ORAL at 05:33

## 2024-02-26 RX ADMIN — ASPIRIN 324 MG: 81 TABLET, CHEWABLE ORAL at 05:33

## 2024-02-26 RX ADMIN — SODIUM CHLORIDE, PRESERVATIVE FREE 10 ML: 5 INJECTION INTRAVENOUS at 21:28

## 2024-02-26 RX ADMIN — GUAIFENESIN SYRUP AND DEXTROMETHORPHAN 5 ML: 100; 10 SYRUP ORAL at 12:35

## 2024-02-26 RX ADMIN — HYDROCODONE BITARTRATE AND ACETAMINOPHEN 1 TABLET: 5; 325 TABLET ORAL at 05:32

## 2024-02-26 RX ADMIN — BENZONATATE 100 MG: 100 CAPSULE ORAL at 12:25

## 2024-02-26 RX ADMIN — GABAPENTIN 100 MG: 100 CAPSULE ORAL at 12:24

## 2024-02-26 RX ADMIN — DICYCLOMINE HYDROCHLORIDE 20 MG: 20 TABLET ORAL at 17:33

## 2024-02-26 RX ADMIN — AZITHROMYCIN MONOHYDRATE 500 MG: 500 INJECTION, POWDER, LYOPHILIZED, FOR SOLUTION INTRAVENOUS at 12:33

## 2024-02-26 RX ADMIN — WATER 40 MG: 1 INJECTION INTRAMUSCULAR; INTRAVENOUS; SUBCUTANEOUS at 17:33

## 2024-02-26 RX ADMIN — ONDANSETRON 4 MG: 2 INJECTION INTRAMUSCULAR; INTRAVENOUS at 05:46

## 2024-02-26 RX ADMIN — BUDESONIDE AND FORMOTEROL FUMARATE DIHYDRATE 2 PUFF: 160; 4.5 AEROSOL RESPIRATORY (INHALATION) at 11:16

## 2024-02-26 RX ADMIN — KETOROLAC TROMETHAMINE 15 MG: 30 INJECTION, SOLUTION INTRAMUSCULAR; INTRAVENOUS at 15:39

## 2024-02-26 RX ADMIN — MAGNESIUM SULFATE HEPTAHYDRATE 2000 MG: 40 INJECTION, SOLUTION INTRAVENOUS at 06:20

## 2024-02-26 RX ADMIN — SODIUM CHLORIDE: 9 INJECTION, SOLUTION INTRAVENOUS at 10:11

## 2024-02-26 RX ADMIN — GUAIFENESIN SYRUP AND DEXTROMETHORPHAN 5 ML: 100; 10 SYRUP ORAL at 21:27

## 2024-02-26 RX ADMIN — IOPAMIDOL 75 ML: 755 INJECTION, SOLUTION INTRAVENOUS at 06:14

## 2024-02-26 RX ADMIN — IPRATROPIUM BROMIDE AND ALBUTEROL SULFATE 2 DOSE: 2.5; .5 SOLUTION RESPIRATORY (INHALATION) at 04:55

## 2024-02-26 RX ADMIN — BENZONATATE 100 MG: 100 CAPSULE ORAL at 21:27

## 2024-02-26 RX ADMIN — DICYCLOMINE HYDROCHLORIDE 20 MG: 20 TABLET ORAL at 21:27

## 2024-02-26 RX ADMIN — IPRATROPIUM BROMIDE AND ALBUTEROL SULFATE 1 DOSE: 2.5; .5 SOLUTION RESPIRATORY (INHALATION) at 11:13

## 2024-02-26 RX ADMIN — SODIUM CHLORIDE: 9 INJECTION, SOLUTION INTRAVENOUS at 12:31

## 2024-02-26 RX ADMIN — GABAPENTIN 100 MG: 100 CAPSULE ORAL at 21:27

## 2024-02-26 RX ADMIN — ENOXAPARIN SODIUM 40 MG: 100 INJECTION SUBCUTANEOUS at 12:24

## 2024-02-26 RX ADMIN — SODIUM CHLORIDE 1888.5 ML: 9 INJECTION, SOLUTION INTRAVENOUS at 06:15

## 2024-02-26 RX ADMIN — ACETAMINOPHEN 650 MG: 325 TABLET ORAL at 21:27

## 2024-02-26 RX ADMIN — DICYCLOMINE HYDROCHLORIDE 20 MG: 20 TABLET ORAL at 12:25

## 2024-02-26 RX ADMIN — IPRATROPIUM BROMIDE AND ALBUTEROL SULFATE 1 DOSE: 2.5; .5 SOLUTION RESPIRATORY (INHALATION) at 15:02

## 2024-02-26 RX ADMIN — IPRATROPIUM BROMIDE AND ALBUTEROL SULFATE 1 DOSE: 2.5; .5 SOLUTION RESPIRATORY (INHALATION) at 19:59

## 2024-02-26 ASSESSMENT — PAIN SCALES - GENERAL
PAINLEVEL_OUTOF10: 0
PAINLEVEL_OUTOF10: 7
PAINLEVEL_OUTOF10: 4
PAINLEVEL_OUTOF10: 2
PAINLEVEL_OUTOF10: 3
PAINLEVEL_OUTOF10: 7

## 2024-02-26 ASSESSMENT — PAIN DESCRIPTION - LOCATION
LOCATION: CHEST
LOCATION: CHEST;ABDOMEN;RIB CAGE
LOCATION: CHEST
LOCATION: ABDOMEN;RIB CAGE;CHEST
LOCATION: HEAD
LOCATION: CHEST

## 2024-02-26 ASSESSMENT — PAIN - FUNCTIONAL ASSESSMENT
PAIN_FUNCTIONAL_ASSESSMENT: PREVENTS OR INTERFERES SOME ACTIVE ACTIVITIES AND ADLS
PAIN_FUNCTIONAL_ASSESSMENT: ACTIVITIES ARE NOT PREVENTED
PAIN_FUNCTIONAL_ASSESSMENT: 0-10

## 2024-02-26 ASSESSMENT — ENCOUNTER SYMPTOMS
ABDOMINAL PAIN: 1
SORE THROAT: 0
RHINORRHEA: 0
NAUSEA: 0
SHORTNESS OF BREATH: 1
DIARRHEA: 0
COUGH: 1
VOMITING: 1
WHEEZING: 1

## 2024-02-26 ASSESSMENT — PAIN DESCRIPTION - DESCRIPTORS
DESCRIPTORS: THROBBING
DESCRIPTORS: DULL
DESCRIPTORS: DISCOMFORT
DESCRIPTORS: DISCOMFORT

## 2024-02-26 ASSESSMENT — PAIN DESCRIPTION - ORIENTATION
ORIENTATION: POSTERIOR
ORIENTATION: MID
ORIENTATION: MID

## 2024-02-26 NOTE — PROGRESS NOTES
BP low improved  GLUC 69  BNP 12K  CTA C+    CHEST PAIN  SHORTNESS OF BREATH  TACHYPNEIC  HX LUNG CA  CTA C  COPD EXACERBATION  FLUID   NO SOURCE  ON NASAL CANULA  MAY NEED STEPDOWN

## 2024-02-26 NOTE — ED NOTES
Patient O2 dropped while sleeping to 87, added 2L O2.   Pt seen for BMI >40 initial nutrition evaluation.  Information obtained from pt, pt's wife at bedside, and medical record.

## 2024-02-26 NOTE — PROGRESS NOTES
SLP ALL NOTES  Facility/Department: [facility] ICU STEPDOWN   CLINICAL BEDSIDE SWALLOW EVALUATION    NAME: Adenike Harvey  : 1955  MRN: [id number]    ADMISSION DATE: 2024  ADMITTING DIAGNOSIS: has Other hyperlipidemia; Osteoarthritis of finger; Right sciatic nerve pain; Essential hypertension; Abnormal mammogram of left breast; Moderate COPD (chronic obstructive pulmonary disease) (HCC); Squamous cell lung cancer, right (HCC); Primary cancer of right upper lobe of lung (HCC); Other female genital prolapse; Creatinine elevation; History of lung cancer; Atrophy of right kidney; Malignant neoplasm of upper lobe, right bronchus or lung (HCC); Other fatigue; Thrush, oral; Restless legs syndrome; Dyspnea, unspecified; 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    Impression  Dysphagia Diagnosis: Swallow function appears WFL  Dysphagia Outcome Severity Scale: Level 6: Within functional limits/Modified independence     Adenike Harvey was seen for a clinical bedside swallow evaluation following admission for COPD exacerbation.  H/o CVA, COPD, Bipolar, Right lung CA/radiation, and esophageal dilatation.  Pt reports she had a lot of pain following her EGD/dilatation.  Pt presented with frequent congested cough prior to PO trials.  Oral mechanism exam was WNL with no asymmetry/focal weakness.  Vocal quality was WNL, cough strength was WFL.  PO trials of thins by cup/straw, pureed and regular solids completed.  Oral phase was WFL with mildly  n/a    General  Chart Reviewed: Yes  Behavior/Cognition: Alert;Cooperative;Pleasant mood  Respiratory Status: Room air  O2 Device: None (Room air)  Communication Observation: Functional  Follows Directions: Complex  Dentition: Edentulous  Patient Positioning: Upright in bed  Baseline Vocal Quality: Normal  Volitional Cough: Strong;Congested  Prior Dysphagia History: denies  Consistencies Administered: Regular;Thin - straw    Vision/Hearing       Oral Motor Deficits  Dentition: Edentulous  Oral Hygiene: Clean;Moist  Lingual: No impairment  Velum: No Impairment  Mandible: No impairment  Consistencies Administered: Regular;Thin - straw    Oral Phase Dysfunction  Oral Phase  Oral Phase: WFL     Indicators of Pharyngeal Phase Dysfunction   Pharyngeal Phase  Pharyngeal Phase: WFL  Pharyngeal Phase   Pharyngeal Phase: WFL    Prognosis  Prognosis: Good  Consulted and agree with results and recommendations: Patient;Family member  Family member consulted: spouse    Education  Patient Education: results and recommendations  Patient Education Response: Verbalizes understanding  Safety Devices in place: Yes  Type of devices: All fall risk precautions in place;Left in bed       Therapy Time  In/Out:  1145/1215    Lyric Alejandre SLP  2/26/2024 12:30 PM

## 2024-02-26 NOTE — ED PROVIDER NOTES
Patient handed off to me by colleague Dr. Monaco pending CT of the chest for PE.  CTA chest shows no pulmonary embolus, fibrosis, small right pleural effusion and small pericardial effusion.  Patient vitals have improved.  No source for sepsis.  Patient likely COPD exacerbation.  Patient although has a elevated BNP over 12,000 which is new.  Concerning for congestive heart failure.  Hospitalist has been consulted for admission.    BP (!) 101/59   Pulse (!) 118   Temp 97.9 °F (36.6 °C) (Oral)   Resp 21   LMP 01/06/2009 (LMP Unknown)   SpO2 97%     CTA CHEST W CONTRAST   Final Result   1.  No new pulmonary arterial embolism is identified.      2.  Fibrosis in the right perihilar and paramediastinal region with previous   surgery and radiation.  Small caliber of the right pulmonary artery and   general decreased caliber the right pulmonary arterial branches in the right   lung.  Non enhancement of the right pulmonary veins/compression with the   perihilar fibrosis.      3.  Development of small right pleural effusion and mild increase of the   small pericardial effusion.         XR CHEST PORTABLE   Final Result   1.  Postsurgical and post radiation changes at the right hilar and   paramediastinal margin.  Volume loss in the right lung and tenting of the   right hemidiaphragm are stable.  See the recent CT scan for greater details.      2.  No new pneumonia, pleural effusion, or pneumothorax.             ICD-10-CM    1. Septicemia (HCC)  A41.9       2. Chest pain, unspecified type  R07.9       3. Acute exacerbation of chronic obstructive pulmonary disease (COPD) (HCC)  J44.1              Elissa Gaona DO  02/26/24 0806

## 2024-02-26 NOTE — ED PROVIDER NOTES
tablet 16 g (has no administration in time range)   dextrose bolus 10% 125 mL (has no administration in time range)     Or   dextrose bolus 10% 250 mL (has no administration in time range)   glucagon injection 1 mg (has no administration in time range)   dextrose 10 % infusion (has no administration in time range)   budesonide-formoterol (SYMBICORT) 160-4.5 MCG/ACT inhaler 2 puff (2 puffs Inhalation Not Given 2/28/24 0928)   gabapentin (NEURONTIN) capsule 100 mg (100 mg Oral Given 2/28/24 1309)   dicyclomine (BENTYL) tablet 20 mg (20 mg Oral Given 2/28/24 1722)   sodium chloride flush 0.9 % injection 5-40 mL (10 mLs IntraVENous Given 2/28/24 0802)   sodium chloride flush 0.9 % injection 5-40 mL (has no administration in time range)   0.9 % sodium chloride infusion (0 mL/hr IntraVENous Stopped 2/26/24 1544)   ondansetron (ZOFRAN-ODT) disintegrating tablet 4 mg (4 mg Oral Given 2/28/24 0802)     Or   ondansetron (ZOFRAN) injection 4 mg ( IntraVENous See Alternative 2/28/24 0802)   polyethylene glycol (GLYCOLAX) packet 17 g (17 g Oral Given 2/28/24 1212)   enoxaparin (LOVENOX) injection 40 mg (40 mg SubCUTAneous Given 2/28/24 0802)   acetaminophen (TYLENOL) tablet 650 mg (650 mg Oral Given 2/27/24 1507)     Or   acetaminophen (TYLENOL) suppository 650 mg ( Rectal See Alternative 2/27/24 1507)   guaiFENesin-dextromethorphan (ROBITUSSIN DM) 100-10 MG/5ML syrup 5 mL (5 mLs Oral Given 2/28/24 1040)   benzonatate (TESSALON) capsule 100 mg (100 mg Oral Given 2/28/24 0801)   ketorolac (TORADOL) injection 15 mg (15 mg IntraVENous Given 2/27/24 1024)   ceFEPIme (MAXIPIME) 2,000 mg in sodium chloride 0.9 % 100 mL IVPB (mini-bag) (2,000 mg IntraVENous New Bag 2/28/24 1723)   vancomycin (VANCOCIN) 1,000 mg in sodium chloride 0.9 % 250 mL IVPB ([id number]) (0 mg IntraVENous Stopped 2/28/24 1311)   lactobacillus (CULTURELLE) capsule 1 capsule (1 capsule Oral Given 2/28/24 0801)   ipratropium 0.5 mg-albuterol 2.5 mg (DUONEB) nebulizer    ipratropium 0.5 mg-albuterol 2.5 mg (DUONEB) nebulizer solution 1 Dose (1 Dose Inhalation Given 2/28/24 0010)   levalbuterol (XOPENEX) nebulization 1.25 mg (0.63 mg Nebulization Given 2/28/24 0229)   ipratropium 0.5 mg-albuterol 2.5 mg (DUONEB) nebulizer solution 1 Dose (1 Dose Inhalation Given 2/28/24 1232)   aspirin chewable tablet 81 mg (81 mg Oral Given 2/28/24 1040)   predniSONE (DELTASONE) tablet 40 mg (has no administration in time range)   rosuvastatin (CRESTOR) tablet 40 mg (has no administration in time range)   metoprolol succinate (TOPROL XL) extended release tablet 25 mg (has no administration in time range)   dilTIAZem (CARDIZEM) tablet 30 mg (30 mg Oral Given 2/28/24 1721)   ipratropium 0.5 mg-albuterol 2.5 mg (DUONEB) nebulizer solution 2 Dose (2 Doses Inhalation Given 2/26/24 0455)   predniSONE (DELTASONE) tablet 50 mg (50 mg Oral Given 2/26/24 0533)   aspirin chewable tablet 324 mg (324 mg Oral Given 2/26/24 0533)   HYDROcodone-acetaminophen (NORCO) 5-325 MG per tablet 1 tablet (1 tablet Oral Given 2/26/24 0532)   ondansetron (ZOFRAN) injection 4 mg (4 mg IntraVENous Given 2/26/24 0546)   sodium chloride 0.9 % bolus 1,920 mL (0 mLs IntraVENous Stopped 2/26/24 0756)   magnesium sulfate 2000 mg in 50 mL IVPB premix (0 mg IntraVENous Stopped 2/26/24 0659)   iopamidol (ISOVUE-370) 76 % injection 75 mL (75 mLs IntraVENous Given 2/26/24 0614)   potassium chloride (KLOR-CON M) extended release tablet 40 mEq (40 mEq Oral Given 2/27/24 1034)   ceFEPIme (MAXIPIME) 2,000 mg in sodium chloride 0.9 % 100 mL IVPB (mini-bag) (0 mg IntraVENous Stopped 2/27/24 1114)   perflutren lipid microspheres (DEFINITY) injection (2 mLs  Given 2/27/24 0904)   vancomycin (VANCOCIN) 1,500 mg in sodium chloride 0.9 % 250 mL IVPB (Yrif4Thm) (0 mg IntraVENous Stopped 2/27/24 1214)   metoprolol (LOPRESSOR) injection 2.5 mg (2.5 mg IntraVENous Given 2/28/24 0247)   sodium chloride nebulizer 0.9 % solution 3 mL (5 mLs Nebulization

## 2024-02-26 NOTE — ED NOTES
ED TO INPATIENT SBAR HANDOFF    Patient Name: Adenike Harvey   :  1955  68 y.o.   Preferred Name  Pat  Family/Caregiver Present no   Restraints no   C-SSRS: Risk of Suicide: No Risk  Sitter no   Sepsis Risk Score Sepsis Risk Score: 4.21      Situation  Chief Complaint   Patient presents with    Chest Pain     Brief Description of Patient's Condition: Patient came into the ER for CP and SOB. Patient has a hx of COPD. Patient has a hx of hypotension. BP is now 90s to 100s systolic. Denies any complaints. Patient is asymptomatic.   Mental Status: oriented, alert, and coherent  Arrived from: home    Imaging:   CTA CHEST W CONTRAST   Final Result   1.  No new pulmonary arterial embolism is identified.      2.  Fibrosis in the right perihilar and paramediastinal region with previous   surgery and radiation.  Small caliber of the right pulmonary artery and   general decreased caliber the right pulmonary arterial branches in the right   lung.  Non enhancement of the right pulmonary veins/compression with the   perihilar fibrosis.      3.  Development of small right pleural effusion and mild increase of the   small pericardial effusion.         XR CHEST PORTABLE   Final Result   1.  Postsurgical and post radiation changes at the right hilar and   paramediastinal margin.  Volume loss in the right lung and tenting of the   right hemidiaphragm are stable.  See the recent CT scan for greater details.      2.  No new pneumonia, pleural effusion, or pneumothorax.           Abnormal labs:   Abnormal Labs Reviewed   CBC WITH AUTO DIFFERENTIAL - Abnormal; Notable for the following components:       Result Value    RBC 3.57 (*)     Hemoglobin 10.2 (*)     Hematocrit 33.0 (*)     MCHC 30.9 (*)     RDW 15.9 (*)     Lymphocytes % 21.4 (*)     Monocytes % 10.1 (*)     Basophils % 1.1 (*)     All other components within normal limits   BASIC METABOLIC PANEL - Abnormal; Notable for the following components:    Sodium 131 (*)      Patient has cold symptoms and a cough and would like medication sent to [facility] in [place]. He is aware Dr. Lanier and staff are off today and this may not be addressed until tomorrow. Any questions-please call him back.

## 2024-02-26 NOTE — ED NOTES
Medication History  [facility]    Patient Name: Adenike Harvey 1955     Medication history has been completed by: Ambika Newman [facility]    Source(s) of information: Insurance claims, [facility] notes     Primary Care Physician: Iban Lou MD     Pharmacy: [facility]    Allergies as of 02/26/2024 - Fully Reviewed 02/26/2024   Allergen Reaction Noted    Lipitor      Vicodin [hydrocodone-acetaminophen] Nausea Only     Carboplatin Other (See Comments) 03/27/2023    Hydrocodone  02/17/2023    Adhesive tape Rash 04/30/2018        Prior to Admission medications    Medication Sig Start Date End Date Taking? Authorizing Provider   ciprofloxacin (CIPRO) 500 MG tablet Take 1 tablet by mouth 2 times daily for 3 days 2/23/24 2/26/24  Amanda Ferrara MD   guaiFENesin (MUCINEX) 600 MG extended release tablet Take 1 tablet by mouth 2 times daily for 7 days 2/23/24 3/1/24  Amanda Ferrara MD   prochlorperazine (COMPAZINE) 5 MG tablet Take 1 tablet by mouth every 6 hours as needed for Nausea 2/23/24   Amanda Ferrara MD   pantoprazole (PROTONIX) 40 MG tablet Take 1 tablet by mouth 2 times daily (before meals) 1/31/24   Iban Lou MD   atenolol (TENORMIN) 100 MG tablet Take 0.5 tablets by mouth daily HOLD for SBP <110 1/29/24 2/28/24  Astrid Banuelos PA-C   ipratropium 0.5 mg-albuterol 2.5 mg (DUONEB) 0.5-2.5 (3) MG/3ML SOLN nebulizer solution Inhale 3 mLs into the lungs every 4 hours as needed for Shortness of Breath 1/26/24   Madelaine Nava APRN - CNP   hydrOXYzine HCl (ATARAX) 25 MG tablet Take 1 tablet by mouth 3 times daily as needed for Anxiety 1/26/24   Madelaine Nava APRN - CNP   sennosides-docusate sodium (SENOKOT-S) 8.6-50 MG tablet Take 2 tablets by mouth nightly as needed for Constipation 1/26/24   Madelaine Nava APRN - CNP   sucralfate (CARAFATE) 1 GM tablet Take 1 tablet by mouth 4 times daily 1/26/24   Madelaine Nava, GRACE - CNP   vitamin D3 (CHOLECALCIFEROL) 25 MCG

## 2024-02-26 NOTE — PROGRESS NOTES
4 Eyes Skin Assessment     NAME:  Adenike Harvey  YOB: 1955  MEDICAL RECORD NUMBER:  [id number]    The patient is being assessed for  Admission    I agree that at least one RN has performed a thorough Head to Toe Skin Assessment on the patient. ALL assessment sites listed below have been assessed.      Areas assessed by both nurses:    Head, Face, Ears, Shoulders, Back, Chest, Arms, Elbows, Hands, Sacrum. Buttock, Coccyx, Ischium, Legs. Feet and Heels, and Under Medical Devices         Does the Patient have a Wound? No noted wound(s)       Mustapha Prevention initiated by RN: No  Wound Care Orders initiated by RN: No    Pressure Injury (Stage 3,4, Unstageable, DTI, NWPT, and Complex wounds) if present, place Wound referral order by RN under : No    New Ostomies, if present place, Ostomy referral order under : No     Nurse 1 eSignature: Electronically signed by Sandra Kohler RN on 2/26/24 at 5:15 PM EST    **SHARE this note so that the co-signing nurse can place an eSignature**    Nurse 2 eSignature: Electronically signed by Elissa Frank RN on 2/26/24 at 5:16 PM EST

## 2024-02-26 NOTE — H&P
V2.0  History and Physical      Name:  Adenike Burger /Age/Sex: 1955  (68 y.o. female)   MRN & CSN:  [id number] & [id number] Encounter Date/Time: 2024 3:05 PM EST   Location:  -A PCP: Iban Lou MD       Hospital Day: 1    Assessment and Plan:   Adenike Burger is a 68 y.o. female who presents with COPD exacerbation (HCC)    Acute COPD exacerbation-CTA chest: Fibrosis in right perihilar and paramediastinal region.  On IV Solu-Medrol.  Bronchodilators.  Azithromycin.  Check respiratory culture and respiratory PCR.  Wean oxygen.  Pulmonology consult.  Chest pain-likely due to respiratory illness and soreness from coughing.  Tenderness with palpation.  Check serial troponins.  EKG: Junctional tachycardia.  Tachycardia-likely due to dyspnea.  Monitor.  Abdominal discomfort-likely due to soreness from coughing.  Monitor for nausea and vomiting.  Check UA since patient has some suprapubic tenderness.    HTN  History of right upper lobe lung mass s/p thoracotomy with right upper lobe bronchial resection  Peripheral neuropathy  Disposition:   Current Living situation: Home  Expected Disposition: TBD  Estimated D/C: TBD    Diet ADULT DIET; Regular   DVT Prophylaxis [] Lovenox, []  Heparin, [] SCDs, [] Ambulation,  [] Eliquis, [] Xarelto   Code Status Full Code discussed with patient and she wants everything done including CPR, shocking of heart, intubation if needed.   Surrogate Decision Maker/ POA  - ERIC BURGER     Personally reviewed Lab Studies and Imaging     Discussed management of the case with ER provider who recommended admission    EKG interpreted personally and results junctional tachycardia    History from:     patient, electronic medical record, ER provider    History of Present Illness:     Chief Complaint: COPD exacerbation (HCC)  Adenike Burger is a 68 y.o. female with pmh of HTN, COPD, who presents with shortness of breath.    Patient states that she woke up  exam:->shortness of breath Reason for Exam: sob FINDINGS: A left chest port terminates in the mid superior vena cava.  The cardiomediastinal silhouette is within normal limits.  Chronic scarring and volume loss of the right lung without significant change.  No acute osseous abnormality.     No acute process.  Chronic scarring and volume loss in the right lung.         Electronically signed by Mario Alberto Uribe MD on 2/26/2024 at 3:05 PM

## 2024-02-27 ENCOUNTER — APPOINTMENT (OUTPATIENT)
Dept: NON INVASIVE DIAGNOSTICS | Age: 69
DRG: 321 | End: 2024-02-27
Attending: STUDENT IN AN ORGANIZED HEALTH CARE EDUCATION/TRAINING PROGRAM
Payer: MEDICARE

## 2024-02-27 LAB
ALBUMIN SERPL-MCNC: 3.1 GM/DL (ref 3.4–5)
ALP BLD-CCNC: 64 IU/L (ref 40–128)
ALT SERPL-CCNC: <5 U/L (ref 10–40)
ANION GAP SERPL CALCULATED.3IONS-SCNC: 19 MMOL/L (ref 7–16)
AST SERPL-CCNC: 10 IU/L (ref 15–37)
BASOPHILS ABSOLUTE: 0 K/CU MM
BASOPHILS RELATIVE PERCENT: 0.1 % (ref 0–1)
BILIRUB SERPL-MCNC: 0.2 MG/DL (ref 0–1)
BUN SERPL-MCNC: 7 MG/DL (ref 6–23)
CALCIUM SERPL-MCNC: 8.3 MG/DL (ref 8.3–10.6)
CHLORIDE BLD-SCNC: 99 MMOL/L (ref 99–110)
CO2: 15 MMOL/L (ref 21–32)
CREAT SERPL-MCNC: 0.6 MG/DL (ref 0.6–1.1)
DIFFERENTIAL TYPE: ABNORMAL
ECHO AO ROOT DIAM: 3.3 CM
ECHO AO ROOT INDEX: 1.94 CM/M2
ECHO AV AREA PEAK VELOCITY: 2.5 CM2
ECHO AV AREA VTI: 2.5 CM2
ECHO AV AREA/BSA PEAK VELOCITY: 1.5 CM2/M2
ECHO AV AREA/BSA VTI: 1.5 CM2/M2
ECHO AV MEAN GRADIENT: 6 MMHG
ECHO AV MEAN VELOCITY: 1.1 M/S
ECHO AV PEAK GRADIENT: 10 MMHG
ECHO AV PEAK VELOCITY: 1.6 M/S
ECHO AV VELOCITY RATIO: 0.75
ECHO AV VTI: 25.6 CM
ECHO EST RA PRESSURE: 3 MMHG
ECHO IVC PROX: 1.2 CM
ECHO LA AREA 4C: 12.1 CM2
ECHO LA DIAMETER INDEX: 1.71 CM/M2
ECHO LA DIAMETER: 2.9 CM
ECHO LA MAJOR AXIS: 4.6 CM
ECHO LA TO AORTIC ROOT RATIO: 0.88
ECHO LA VOL MOD A4C: 25 ML (ref 22–52)
ECHO LA VOLUME INDEX MOD A4C: 15 ML/M2 (ref 16–34)
ECHO LV E' LATERAL VELOCITY: 15 CM/S
ECHO LV E' SEPTAL VELOCITY: 10 CM/S
ECHO LV EDV A4C: 80 ML
ECHO LV EDV INDEX A4C: 47 ML/M2
ECHO LV EJECTION FRACTION A4C: 49 %
ECHO LV ESV A4C: 41 ML
ECHO LV ESV INDEX A4C: 24 ML/M2
ECHO LV FRACTIONAL SHORTENING: 24 % (ref 28–44)
ECHO LV INTERNAL DIMENSION DIASTOLE INDEX: 2.65 CM/M2
ECHO LV INTERNAL DIMENSION DIASTOLIC: 4.5 CM (ref 3.9–5.3)
ECHO LV INTERNAL DIMENSION SYSTOLIC INDEX: 2 CM/M2
ECHO LV INTERNAL DIMENSION SYSTOLIC: 3.4 CM
ECHO LV IVSD: 0.9 CM (ref 0.6–0.9)
ECHO LV MASS 2D: 132.8 G (ref 67–162)
ECHO LV MASS INDEX 2D: 78.1 G/M2 (ref 43–95)
ECHO LV POSTERIOR WALL DIASTOLIC: 0.9 CM (ref 0.6–0.9)
ECHO LV RELATIVE WALL THICKNESS RATIO: 0.4
ECHO LVOT AREA: 3.1 CM2
ECHO LVOT AV VTI INDEX: 0.8
ECHO LVOT DIAM: 2 CM
ECHO LVOT MEAN GRADIENT: 3 MMHG
ECHO LVOT PEAK GRADIENT: 6 MMHG
ECHO LVOT PEAK VELOCITY: 1.2 M/S
ECHO LVOT STROKE VOLUME INDEX: 37.7 ML/M2
ECHO LVOT SV: 64.1 ML
ECHO LVOT VTI: 20.4 CM
ECHO MV A VELOCITY: 0.7 M/S
ECHO MV E VELOCITY: 1.1 M/S
ECHO MV E/A RATIO: 1.57
ECHO MV E/E' LATERAL: 7.33
ECHO MV E/E' RATIO (AVERAGED): 9.17
ECHO RIGHT VENTRICULAR SYSTOLIC PRESSURE (RVSP): 14 MMHG
ECHO RV MID DIMENSION: 2.3 CM
ECHO TV REGURGITANT MAX VELOCITY: 1.69 M/S
ECHO TV REGURGITANT PEAK GRADIENT: 11 MMHG
EOSINOPHILS ABSOLUTE: 0 K/CU MM
EOSINOPHILS RELATIVE PERCENT: 0 % (ref 0–3)
GFR SERPL CREATININE-BSD FRML MDRD: >60 ML/MIN/1.73M2
GLUCOSE SERPL-MCNC: 250 MG/DL (ref 70–99)
HCT VFR BLD CALC: 30.4 % (ref 37–47)
HEMOGLOBIN: 9.1 GM/DL (ref 12.5–16)
IMMATURE NEUTROPHIL %: 0.6 % (ref 0–0.43)
L PNEUMO AG UR QL IA: NEGATIVE
LYMPHOCYTES ABSOLUTE: 0.3 K/CU MM
LYMPHOCYTES RELATIVE PERCENT: 2.9 % (ref 24–44)
MCH RBC QN AUTO: 27 PG (ref 27–31)
MCHC RBC AUTO-ENTMCNC: 29.9 % (ref 32–36)
MCV RBC AUTO: 90.2 FL (ref 78–100)
MONOCYTES ABSOLUTE: 0.3 K/CU MM
MONOCYTES RELATIVE PERCENT: 2.8 % (ref 0–4)
NUCLEATED RBC %: 0 %
PDW BLD-RTO: 16.2 % (ref 11.7–14.9)
PLATELET # BLD: 349 K/CU MM (ref 140–440)
PMV BLD AUTO: 9.8 FL (ref 7.5–11.1)
POTASSIUM SERPL-SCNC: 3.7 MMOL/L (ref 3.5–5.1)
PRO-BNP: ABNORMAL PG/ML
PROCALCITONIN SERPL-MCNC: 3.1 NG/ML
RBC # BLD: 3.37 M/CU MM (ref 4.2–5.4)
S PNEUM AG CSF QL: NORMAL
SEGMENTED NEUTROPHILS ABSOLUTE COUNT: 10.9 K/CU MM
SEGMENTED NEUTROPHILS RELATIVE PERCENT: 93.6 % (ref 36–66)
SODIUM BLD-SCNC: 133 MMOL/L (ref 135–145)
TOTAL IMMATURE NEUTOROPHIL: 0.07 K/CU MM
TOTAL NUCLEATED RBC: 0 K/CU MM
TOTAL PROTEIN: 4.9 GM/DL (ref 6.4–8.2)
TROPONIN, HIGH SENSITIVITY: 16 NG/L (ref 0–14)
WBC # BLD: 11.7 K/CU MM (ref 4–10.5)

## 2024-02-27 PROCEDURE — 83880 ASSAY OF NATRIURETIC PEPTIDE: CPT

## 2024-02-27 PROCEDURE — 6360000002 HC RX W HCPCS: Performed by: HOSPITALIST

## 2024-02-27 PROCEDURE — 87899 AGENT NOS ASSAY W/OPTIC: CPT

## 2024-02-27 PROCEDURE — 87449 NOS EACH ORGANISM AG IA: CPT

## 2024-02-27 PROCEDURE — C8929 TTE W OR WO FOL WCON,DOPPLER: HCPCS

## 2024-02-27 PROCEDURE — 2580000003 HC RX 258: Performed by: HOSPITALIST

## 2024-02-27 PROCEDURE — 80053 COMPREHEN METABOLIC PANEL: CPT

## 2024-02-27 PROCEDURE — 36415 COLL VENOUS BLD VENIPUNCTURE: CPT

## 2024-02-27 PROCEDURE — 6370000000 HC RX 637 (ALT 250 FOR IP): Performed by: STUDENT IN AN ORGANIZED HEALTH CARE EDUCATION/TRAINING PROGRAM

## 2024-02-27 PROCEDURE — 6370000000 HC RX 637 (ALT 250 FOR IP): Performed by: HOSPITALIST

## 2024-02-27 PROCEDURE — 2580000003 HC RX 258: Performed by: STUDENT IN AN ORGANIZED HEALTH CARE EDUCATION/TRAINING PROGRAM

## 2024-02-27 PROCEDURE — 6360000002 HC RX W HCPCS: Performed by: STUDENT IN AN ORGANIZED HEALTH CARE EDUCATION/TRAINING PROGRAM

## 2024-02-27 PROCEDURE — 84145 PROCALCITONIN (PCT): CPT

## 2024-02-27 PROCEDURE — 6360000004 HC RX CONTRAST MEDICATION

## 2024-02-27 PROCEDURE — 94640 AIRWAY INHALATION TREATMENT: CPT

## 2024-02-27 PROCEDURE — 87641 MR-STAPH DNA AMP PROBE: CPT

## 2024-02-27 PROCEDURE — 92526 ORAL FUNCTION THERAPY: CPT | Performed by: SPEECH-LANGUAGE PATHOLOGIST

## 2024-02-27 PROCEDURE — 94761 N-INVAS EAR/PLS OXIMETRY MLT: CPT

## 2024-02-27 PROCEDURE — 93306 TTE W/DOPPLER COMPLETE: CPT | Performed by: INTERNAL MEDICINE

## 2024-02-27 PROCEDURE — 85025 COMPLETE CBC W/AUTO DIFF WBC: CPT

## 2024-02-27 PROCEDURE — 1200000000 HC SEMI PRIVATE

## 2024-02-27 PROCEDURE — 84484 ASSAY OF TROPONIN QUANT: CPT

## 2024-02-27 RX ORDER — IPRATROPIUM BROMIDE AND ALBUTEROL SULFATE 2.5; .5 MG/3ML; MG/3ML
1 SOLUTION RESPIRATORY (INHALATION) EVERY 4 HOURS PRN
Status: DISCONTINUED | OUTPATIENT
Start: 2024-02-27 | End: 2024-03-02 | Stop reason: HOSPADM

## 2024-02-27 RX ORDER — IPRATROPIUM BROMIDE AND ALBUTEROL SULFATE 2.5; .5 MG/3ML; MG/3ML
1 SOLUTION RESPIRATORY (INHALATION)
Status: DISCONTINUED | OUTPATIENT
Start: 2024-02-27 | End: 2024-02-28

## 2024-02-27 RX ORDER — POTASSIUM CHLORIDE 20 MEQ/1
40 TABLET, EXTENDED RELEASE ORAL ONCE
Status: COMPLETED | OUTPATIENT
Start: 2024-02-27 | End: 2024-02-27

## 2024-02-27 RX ORDER — LACTOBACILLUS RHAMNOSUS GG 10B CELL
1 CAPSULE ORAL
Status: DISCONTINUED | OUTPATIENT
Start: 2024-02-27 | End: 2024-03-02 | Stop reason: HOSPADM

## 2024-02-27 RX ADMIN — ACETAMINOPHEN 650 MG: 325 TABLET ORAL at 15:07

## 2024-02-27 RX ADMIN — VANCOMYCIN HYDROCHLORIDE 1000 MG: 1 INJECTION, POWDER, LYOPHILIZED, FOR SOLUTION INTRAVENOUS at 23:01

## 2024-02-27 RX ADMIN — CEFEPIME 2000 MG: 2 INJECTION, POWDER, FOR SOLUTION INTRAVENOUS at 10:28

## 2024-02-27 RX ADMIN — IPRATROPIUM BROMIDE AND ALBUTEROL SULFATE 1 DOSE: 2.5; .5 SOLUTION RESPIRATORY (INHALATION) at 08:27

## 2024-02-27 RX ADMIN — DICYCLOMINE HYDROCHLORIDE 20 MG: 20 TABLET ORAL at 18:55

## 2024-02-27 RX ADMIN — DICYCLOMINE HYDROCHLORIDE 20 MG: 20 TABLET ORAL at 10:35

## 2024-02-27 RX ADMIN — BENZONATATE 100 MG: 100 CAPSULE ORAL at 04:48

## 2024-02-27 RX ADMIN — VANCOMYCIN HYDROCHLORIDE 1500 MG: 1.5 INJECTION, POWDER, LYOPHILIZED, FOR SOLUTION INTRAVENOUS at 10:37

## 2024-02-27 RX ADMIN — WATER 40 MG: 1 INJECTION INTRAMUSCULAR; INTRAVENOUS; SUBCUTANEOUS at 18:46

## 2024-02-27 RX ADMIN — POTASSIUM CHLORIDE 40 MEQ: 1500 TABLET, EXTENDED RELEASE ORAL at 10:34

## 2024-02-27 RX ADMIN — GUAIFENESIN SYRUP AND DEXTROMETHORPHAN 5 ML: 100; 10 SYRUP ORAL at 20:06

## 2024-02-27 RX ADMIN — GUAIFENESIN SYRUP AND DEXTROMETHORPHAN 5 ML: 100; 10 SYRUP ORAL at 04:47

## 2024-02-27 RX ADMIN — BUDESONIDE AND FORMOTEROL FUMARATE DIHYDRATE 2 PUFF: 160; 4.5 AEROSOL RESPIRATORY (INHALATION) at 20:05

## 2024-02-27 RX ADMIN — SODIUM CHLORIDE, PRESERVATIVE FREE 10 ML: 5 INJECTION INTRAVENOUS at 20:06

## 2024-02-27 RX ADMIN — BENZONATATE 100 MG: 100 CAPSULE ORAL at 23:05

## 2024-02-27 RX ADMIN — WATER 40 MG: 1 INJECTION INTRAMUSCULAR; INTRAVENOUS; SUBCUTANEOUS at 03:55

## 2024-02-27 RX ADMIN — GABAPENTIN 100 MG: 100 CAPSULE ORAL at 20:06

## 2024-02-27 RX ADMIN — SODIUM CHLORIDE, PRESERVATIVE FREE 10 ML: 5 INJECTION INTRAVENOUS at 10:36

## 2024-02-27 RX ADMIN — IPRATROPIUM BROMIDE AND ALBUTEROL SULFATE 1 DOSE: 2.5; .5 SOLUTION RESPIRATORY (INHALATION) at 17:46

## 2024-02-27 RX ADMIN — GABAPENTIN 100 MG: 100 CAPSULE ORAL at 14:54

## 2024-02-27 RX ADMIN — SODIUM CHLORIDE: 9 INJECTION, SOLUTION INTRAVENOUS at 01:02

## 2024-02-27 RX ADMIN — IPRATROPIUM BROMIDE AND ALBUTEROL SULFATE 1 DOSE: .5; 2.5 SOLUTION RESPIRATORY (INHALATION) at 20:00

## 2024-02-27 RX ADMIN — BENZONATATE 100 MG: 100 CAPSULE ORAL at 14:54

## 2024-02-27 RX ADMIN — CEFEPIME 2000 MG: 2 INJECTION, POWDER, FOR SOLUTION INTRAVENOUS at 16:40

## 2024-02-27 RX ADMIN — KETOROLAC TROMETHAMINE 15 MG: 30 INJECTION, SOLUTION INTRAMUSCULAR; INTRAVENOUS at 10:24

## 2024-02-27 RX ADMIN — ENOXAPARIN SODIUM 40 MG: 100 INJECTION SUBCUTANEOUS at 10:35

## 2024-02-27 RX ADMIN — DICYCLOMINE HYDROCHLORIDE 20 MG: 20 TABLET ORAL at 14:00

## 2024-02-27 RX ADMIN — GABAPENTIN 100 MG: 100 CAPSULE ORAL at 10:35

## 2024-02-27 RX ADMIN — ACETAMINOPHEN 650 MG: 325 TABLET ORAL at 04:47

## 2024-02-27 RX ADMIN — DICYCLOMINE HYDROCHLORIDE 20 MG: 20 TABLET ORAL at 20:06

## 2024-02-27 RX ADMIN — Medication 1 CAPSULE: at 10:27

## 2024-02-27 RX ADMIN — BUDESONIDE AND FORMOTEROL FUMARATE DIHYDRATE 2 PUFF: 160; 4.5 AEROSOL RESPIRATORY (INHALATION) at 08:26

## 2024-02-27 RX ADMIN — PERFLUTREN 2 ML: 6.52 INJECTION, SUSPENSION INTRAVENOUS at 09:04

## 2024-02-27 ASSESSMENT — PAIN SCALES - GENERAL
PAINLEVEL_OUTOF10: 8
PAINLEVEL_OUTOF10: 0
PAINLEVEL_OUTOF10: 2
PAINLEVEL_OUTOF10: 0
PAINLEVEL_OUTOF10: 8
PAINLEVEL_OUTOF10: 5
PAINLEVEL_OUTOF10: 8

## 2024-02-27 ASSESSMENT — ENCOUNTER SYMPTOMS
SINUS PRESSURE: 0
COUGH: 0
SHORTNESS OF BREATH: 1
ABDOMINAL PAIN: 0
NAUSEA: 0
VOMITING: 0
COLOR CHANGE: 0
SINUS PAIN: 0
DIARRHEA: 0
SORE THROAT: 0
BACK PAIN: 0
CONSTIPATION: 0
WHEEZING: 0

## 2024-02-27 ASSESSMENT — PAIN DESCRIPTION - DESCRIPTORS
DESCRIPTORS: THROBBING
DESCRIPTORS: ACHING

## 2024-02-27 ASSESSMENT — PAIN DESCRIPTION - ORIENTATION
ORIENTATION: OTHER (COMMENT)
ORIENTATION: ANTERIOR;POSTERIOR
ORIENTATION: OTHER (COMMENT)

## 2024-02-27 ASSESSMENT — PAIN DESCRIPTION - PAIN TYPE
TYPE: OTHER (COMMENT)

## 2024-02-27 ASSESSMENT — PAIN DESCRIPTION - ONSET
ONSET: GRADUAL

## 2024-02-27 ASSESSMENT — PAIN DESCRIPTION - FREQUENCY
FREQUENCY: CONTINUOUS
FREQUENCY: CONTINUOUS
FREQUENCY: INTERMITTENT

## 2024-02-27 ASSESSMENT — PAIN DESCRIPTION - LOCATION
LOCATION: HEAD

## 2024-02-27 ASSESSMENT — PAIN - FUNCTIONAL ASSESSMENT
PAIN_FUNCTIONAL_ASSESSMENT: ACTIVITIES ARE NOT PREVENTED

## 2024-02-27 NOTE — PLAN OF CARE
Problem: Discharge Planning  Goal: Discharge to home or other facility with appropriate resources  2/27/2024 0840 by Brandie Jenkins RN  Outcome: Progressing  2/27/2024 0235 by Colette Serrano RN  Outcome: Progressing     Problem: Neurosensory - Adult  Goal: Achieves stable or improved neurological status  2/27/2024 0840 by Brandie Jenkins RN  Outcome: Progressing  2/27/2024 0235 by Colette Serrano RN  Outcome: Progressing  Goal: Achieves maximal functionality and self care  2/27/2024 0840 by Brandie Jenkins RN  Outcome: Progressing  2/27/2024 0235 by Colette Serrano RN  Outcome: Progressing     Problem: Respiratory - Adult  Goal: Achieves optimal ventilation and oxygenation  2/27/2024 0840 by Brandie Jenkins RN  Outcome: Progressing  2/27/2024 0235 by Colette Serrano RN  Outcome: Progressing     Problem: Cardiovascular - Adult  Goal: Maintains optimal cardiac output and hemodynamic stability  2/27/2024 0840 by Brandie Jenkins RN  Outcome: Progressing  2/27/2024 0235 by Colette Serrano RN  Outcome: Progressing  Goal: Absence of cardiac dysrhythmias or at baseline  2/27/2024 0840 by Brandie Jenkins RN  Outcome: Progressing  2/27/2024 0235 by Colette Serrano RN  Outcome: Progressing     Problem: Skin/Tissue Integrity - Adult  Goal: Skin integrity remains intact  2/27/2024 0840 by Brandie Jenkins RN  Outcome: Progressing  Flowsheets (Taken 2/27/2024 0839)  Skin Integrity Remains Intact:   Monitor for areas of redness and/or skin breakdown   Assess vascular access sites hourly  2/27/2024 0235 by Colette Serrano RN  Outcome: Progressing  Goal: Incisions, wounds, or drain sites healing without S/S of infection  2/27/2024 0840 by Brandie Jenkins RN  Outcome: Progressing  2/27/2024 0235 by Colette Serrano RN  Outcome: Progressing  Goal: Oral mucous membranes remain intact  2/27/2024 0840 by  RN  Outcome: Progressing  Goal: Hemodynamic stability and optimal renal function maintained  2/27/2024 0840 by Brandie Jenkins, RN  Outcome: Progressing  2/27/2024 0235 by Colette Serrano RN  Outcome: Progressing  Goal: Glucose maintained within prescribed range  2/27/2024 0840 by Brandie Jenkins, RN  Outcome: Progressing  2/27/2024 0235 by Colette Serrano RN  Outcome: Progressing     Problem: Hematologic - Adult  Goal: Maintains hematologic stability  2/27/2024 0840 by Brandie Jenkins, RN  Outcome: Progressing  2/27/2024 0235 by Colette Serrano RN  Outcome: Progressing     Problem: Pain  Goal: Verbalizes/displays adequate comfort level or baseline comfort level  2/27/2024 0840 by Brandie Jenkins, RN  Outcome: Progressing  2/27/2024 0235 by Colette Serrano RN  Outcome: Progressing     Problem: Safety - Adult  Goal: Free from fall injury  2/27/2024 0840 by Brandie Jenkins, RN  Outcome: Progressing  2/27/2024 0235 by Colette Serrano RN  Outcome: Progressing

## 2024-02-27 NOTE — RT PROTOCOL NOTE
RT Inhaler-Nebulizer Bronchodilator Protocol Note    There is a bronchodilator order in the chart from a provider indicating to follow the RT Bronchodilator Protocol and there is an “Initiate RT Inhaler-Nebulizer Bronchodilator Protocol” order as well (see protocol at bottom of note).    CXR Findings:  XR CHEST PORTABLE    Result Date: 2/26/2024  1.  Postsurgical and post radiation changes at the right hilar and paramediastinal margin.  Volume loss in the right lung and tenting of the right hemidiaphragm are stable.  See the recent CT scan for greater details. 2.  No new pneumonia, pleural effusion, or pneumothorax.       The findings from the last RT Protocol Assessment were as follows:   History Pulmonary Disease: Chronic pulmonary disease  Respiratory Pattern: Regular pattern and RR 12-20 bpm  Breath Sounds: Intermittent or unilateral wheezes  Cough: Strong, productive  Indication for Bronchodilator Therapy: On home bronchodilators (Patient only wants one scheduled tx once daily unless asked for. Changing orders to patients request.)  Bronchodilator Assessment Score: 7    Aerosolized bronchodilator medication orders have been revised according to the RT Inhaler-Nebulizer Bronchodilator Protocol below.    Respiratory Therapist to perform RT Therapy Protocol Assessment initially then follow the protocol.  Repeat RT Therapy Protocol Assessment PRN for score 0-3 or on second treatment, BID, and PRN for scores above 3.    No Indications - adjust the frequency to every 6 hours PRN wheezing or bronchospasm, if no treatments needed after 48 hours then discontinue using Per Protocol order mode.     If indication present, adjust the RT bronchodilator orders based on the Bronchodilator Assessment Score as indicated below.  Use Inhaler orders unless patient has one or more of the following: on home nebulizer, not able to hold breath for 10 seconds, is not alert and oriented, cannot activate and use MDI correctly, or

## 2024-02-27 NOTE — CONSULTS
Subjective:   CHIEF COMPLAINT / HPI:  68 year old female admitted with cough with yellow sputum. She is having increasing sob and this is associated with wheezing. Her chest hurts because of severe coughing spells. She denies any fever or hemoptysis.      Past Medical History:  Past Medical History:   Diagnosis Date    Anemia     Anxiety     Arthritis     \"Fingers, Back\"    Bronchitis Last Episode 11-17    Chronic back pain     COPD (chronic obstructive pulmonary disease) ([facility])     Sees Dr. Hernandez    Depression     Diverticulosis 06/2013    Extensive per CT    Hemoptysis 12/06/2017    History of external beam radiation therapy 04/2023    RIGHT LUNG 6,000 cGy in 30 fractions    Hx of blood clots     \"found I have a clot near my mediport so they are taking it out 5/16/2019- put me on Xarelto    Hyperlipidemia 2009    Hypertension 2009    Low back pain     \"better than it use to be- had therapy in the past- originally hurt back at work 20+ yrs ago\"    Lung mass     rul- scheduled to bronch 12/74/2017    Nausea & vomiting 01/22/2024    Panic attacks     Pneumonia Dx 1-17    Right Lung Cancer Dx 10-17    tx with chemo following with Dr Una Smith Dx 2014    \"Right Side\"    Shortness of breath on exertion     Teeth missing     Upper And Lower    Urinary tract infection In Past    No Current Symptoms    Wears dentures     Full Upper, Partial Lower    Wears glasses        Past Surgical History:        Procedure Laterality Date    BRONCHOSCOPY  12/07/2017 2/21/2018- done     BUNIONECTOMY Bilateral 1990's    \"Done At Different Times\"    CATHETER REMOVAL Left 5/16/2019    PORT REMOVAL performed by Pepe Tripathi MD at [facility] OR    COLONOSCOPY  2000's    DILATION AND CURETTAGE OF UTERUS  1989    ELBOW SURGERY Left 1980    \"Tendon Repair\"    LUNG SURGERY  04/12/2018    per old chart had thoracoscopy and RUL lobectomy , bronchoscopy and lymph node bx     MEDIASTINOSCOPY  02/21/2018    Bronchoscopy    TONSILLECTOMY  1978     TUBAL LIGATION  1988    TUNNELED VENOUS PORT PLACEMENT Left 07/31/2018    UPPER GASTROINTESTINAL ENDOSCOPY N/A 1/23/2024    EGD DILATION BALLOON with 12-15 balloon up to 15 wtih biopsies performed by Rony Radford MD at [facility] ENDOSCOPY       Current Medications:    Current Facility-Administered Medications: ceFEPIme (MAXIPIME) 2,000 mg in sodium chloride 0.9 % 100 mL IVPB (mini-bag), 2,000 mg, IntraVENous, q8h  potassium chloride (KLOR-CON M) extended release tablet 40 mEq, 40 mEq, Oral, Once  ceFEPIme (MAXIPIME) 2,000 mg in sodium chloride 0.9 % 100 mL IVPB (mini-bag), 2,000 mg, IntraVENous, Once  glucose chewable tablet 16 g, 4 tablet, Oral, PRN  dextrose bolus 10% 125 mL, 125 mL, IntraVENous, PRN **OR** dextrose bolus 10% 250 mL, 250 mL, IntraVENous, PRN  glucagon injection 1 mg, 1 mg, SubCUTAneous, PRN  dextrose 10 % infusion, , IntraVENous, Continuous PRN  budesonide-formoterol (SYMBICORT) 160-4.5 MCG/ACT inhaler 2 puff, 2 puff, Inhalation, BID  ipratropium 0.5 mg-albuterol 2.5 mg (DUONEB) nebulizer solution 1 Dose, 1 Dose, Inhalation, Q4H PRN  gabapentin (NEURONTIN) capsule 100 mg, 100 mg, Oral, TID  dicyclomine (BENTYL) tablet 20 mg, 20 mg, Oral, 4x Daily  sodium chloride flush 0.9 % injection 5-40 mL, 5-40 mL, IntraVENous, 2 times per day  sodium chloride flush 0.9 % injection 5-40 mL, 5-40 mL, IntraVENous, PRN  0.9 % sodium chloride infusion, , IntraVENous, PRN  ondansetron (ZOFRAN-ODT) disintegrating tablet 4 mg, 4 mg, Oral, Q8H PRN **OR** ondansetron (ZOFRAN) injection 4 mg, 4 mg, IntraVENous, Q6H PRN  polyethylene glycol (GLYCOLAX) packet 17 g, 17 g, Oral, Daily PRN  enoxaparin (LOVENOX) injection 40 mg, 40 mg, SubCUTAneous, Daily  acetaminophen (TYLENOL) tablet 650 mg, 650 mg, Oral, Q6H PRN **OR** acetaminophen (TYLENOL) suppository 650 mg, 650 mg, Rectal, Q6H PRN  ipratropium 0.5 mg-albuterol 2.5 mg (DUONEB) nebulizer solution 1 Dose, 1 Dose, Inhalation, Q4H WA RT  0.9 % sodium chloride infusion, ,

## 2024-02-27 NOTE — PROGRESS NOTES
COPD/GOLD   Records show Adenike Harvey was using symbicort (as maintenance therapy prior to admission.  GOLD assessment recomments a LABA/LAMA for maintenance therapy.    Please order if you find this appropriate.

## 2024-02-27 NOTE — CARE COORDINATION
02/27/24 0956   Service Assessment   Patient Orientation Alert and Oriented;Person;Place;Situation   Cognition Alert   History Provided By Medical Record;Patient   Primary Caregiver Spouse   Support Systems Spouse/Significant Other;Family Members   PCP Verified by CM Yes   Last Visit to PCP Within last 6 months   Prior Functional Level Assistance with the following:;Cooking;Shopping;Mobility  (uses walker)   Current Functional Level Assistance with the following:;Housework;Shopping;Mobility   Can patient return to prior living arrangement Yes   Ability to make needs known: Good   Family able to assist with home care needs: Yes   Would you like for me to discuss the discharge plan with any other family members/significant others, and if so, who? No   Financial Resources Medicare   Social/Functional History   Lives With Spouse   Type of Home House   Home Layout One level   Home Access Stairs to enter with rails   Entrance Stairs - Number of Steps 2   Bathroom Shower/Tub Shower chair with back   Bathroom Equipment Shower chair   Home Equipment Cane;Walker, rolling   ADL Assistance Needs assistance   Homemaking Assistance Needs assistance   Ambulation Assistance Independent   Transfer Assistance Independent   Active  No   Patient's  Info spouse   Occupation Retired   Discharge Planning   Type of Residence House   Living Arrangements Spouse/Significant Other   Patient expects to be discharged to: House   One/Two Story Residence One story     Chart reviewed. Pt is a readmission from 2/21-2/23/24. Pt is from home with spouse. Spouse is primary caregiver. Spouse provides transportation. Pt has DME. Plan home with spouse and [facility]. PS to Dr Lin for a HC order. PS referral to Inés with [facility].

## 2024-02-27 NOTE — PROGRESS NOTES
administration of intravenous contrast.  Multiplanar reformatted images are provided for review.  MIP images are provided for review. Automated exposure control, iterative reconstruction, and/or weight based adjustment of the mA/kV was utilized to reduce the radiation dose to as low as reasonably achievable. COMPARISON: 02/21/2024 HISTORY: ORDERING SYSTEM PROVIDED HISTORY: tachycardia; lung cancer hx; r/o PE TECHNOLOGIST PROVIDED HISTORY: Reason for exam:->tachycardia; lung cancer hx; r/o PE Additional Contrast?->1 Reason for Exam: tachycardia; lung cancer hx; r/o PE FINDINGS: Pulmonary Arteries: Good timing of the contrast bolus.  The left pulmonary arterial system is normal and no left-sided pulmonary arterial embolism. There is narrowing of the right pulmonary artery and branches with previous surgery and the fibrotic changes in the right hilar to paramediastinal region.  General reduced caliber of the remaining branches in the right lung but without a focal pulmonary arterial embolism identified..  Main pulmonary artery is normal in caliber.  The right pulmonary veins are also compressed/nonenhancing with the scarring in the right hilum and postsurgical changes. Mediastinum: Cardiac chambers are not enlarged.  There is a small pericardial effusion which is mildly increased from the recent study.  The thoracic aorta is not aneurysmal with scattered atherosclerotic calcifications.  Beam hardening artifact from high-density contrast in the SVC.  Scarring at the right paramediastinal margin and hilum with postsurgical changes and calcifications is redemonstrated.  The left hilar lymph nodes are not enlarged. Lungs/pleura: The scarring and fibrosis in the right hilar and suprahilar margin continuing along the paramediastinal margin.  A 4 mm nodule along the posterior right upper lung on series 302 image number 33 is unchanged.  3 mm nodule on series 302, image 50 and scarring in the right lower lung.  There is a  new pneumonia, pleural effusion, or pneumothorax.       Comment: Please note this report has been produced using speech recognition software and may contain errors related to that system including errors in grammar, punctuation, and spelling, as well as words and phrases that may be inappropriate. If there are any questions or concerns please feel free to contact the dictating provider for clarification.     Electronically signed by Stevie Humphries MD on 2/27/2024 at 10:42 AM

## 2024-02-27 NOTE — PROGRESS NOTES
[facility]  DEPARTMENT OF SPEECH/LANGUAGE PATHOLOGY  DAILY PROGRESS NOTE  Adenike Harvey  2/27/2024  [id number]  Septicemia ([facility]) [A41.9]  Acute exacerbation of chronic obstructive pulmonary disease (COPD) ([facility]) [J44.1]  COPD exacerbation ([facility]) [J44.1]  Chest pain, unspecified type [R07.9]  Allergies   Allergen Reactions    Lipitor      \"Makes Me Hurt So Bad I Can't Stand It\"    Vicodin [Hydrocodone-Acetaminophen] Nausea Only    Carboplatin Other (See Comments)     Required epi x 3, hospitalization overnight     Hydrocodone      Other reaction(s): Gastrointestinal Upset    Adhesive Tape Rash         Pt was seen this date for dysphagia treatment.       IMPRESSION AND RECOMMENDATIONS:    Pt seen this date for diet tolerance monitoring.  Pt was alert, pleasant, and cooperative and no longer exhibiting frequent congested cough with occasional cough only.  Vocal quality and cough strength WNL.  She denies any difficulty swallowing liquids, reports continued difficulty with large pills c/w her baseline likely due to esophageal dysphagia (H/o dilatation).  PO trials of thins by straw and soft solids completed with functional mastication with no dentition and normal clearance.  No overt s/s aspiration for all trials  Oropharyngeal swallow appears WFL.      Recommend:  Continue Regular diet/Thin liquids.  No needs identified at this time.      GOALS (current status in bold):  Short-term Goals  Timeframe for Short-term Goals: 1 week  Goal 1: Pt will tolerate Regular diet and Thin liquids without clinical evidence for aspiration 100% Met, Discontinue       EDUCATION:   recommendations    PAIN RATING (0-10 Scale):  does not c/o pain  Time in/Time out: 5045/9103    Visit number:  2      Lyric Alejandre, SLP  2/27/2024  12:06 PM

## 2024-02-27 NOTE — PLAN OF CARE
Problem: Discharge Planning  Goal: Discharge to home or other facility with appropriate resources  2/27/2024 0235 by Colette Serrano RN  Outcome: Progressing  2/26/2024 1435 by Sandra Kohler RN  Outcome: Progressing     Problem: Neurosensory - Adult  Goal: Achieves stable or improved neurological status  2/27/2024 0235 by Colette Serrano RN  Outcome: Progressing  2/26/2024 1435 by Sandra Kohler RN  Outcome: Progressing  Goal: Achieves maximal functionality and self care  2/27/2024 0235 by Colette Serrano RN  Outcome: Progressing  2/26/2024 1435 by Sandra Kohler RN  Outcome: Progressing     Problem: Respiratory - Adult  Goal: Achieves optimal ventilation and oxygenation  2/27/2024 0235 by Colette Serrano RN  Outcome: Progressing  2/26/2024 1435 by Sandra Kohler RN  Outcome: Progressing     Problem: Cardiovascular - Adult  Goal: Maintains optimal cardiac output and hemodynamic stability  2/27/2024 0235 by Colette Serrano RN  Outcome: Progressing  2/26/2024 1435 by Sandra Kohler RN  Outcome: Progressing  Goal: Absence of cardiac dysrhythmias or at baseline  2/27/2024 0235 by Colette Serrano RN  Outcome: Progressing  2/26/2024 1435 by Sandra Kohler RN  Outcome: Progressing     Problem: Skin/Tissue Integrity - Adult  Goal: Skin integrity remains intact  2/27/2024 0235 by Colette Serrano RN  Outcome: Progressing  2/26/2024 1435 by Sandra Kohler RN  Outcome: Progressing  Goal: Incisions, wounds, or drain sites healing without S/S of infection  2/27/2024 0235 by Colette Serrano RN  Outcome: Progressing  2/26/2024 1435 by Sandra Kohler RN  Outcome: Progressing  Goal: Oral mucous membranes remain intact  2/27/2024 0235 by Colette Serrano RN  Outcome: Progressing  2/26/2024 1435 by Sandra Kohler RN  Outcome: Progressing     Problem: Musculoskeletal - Adult  Goal: Return mobility to  safest level of function  2/27/2024 0235 by Colette Serrano RN  Outcome: Progressing  2/26/2024 1435 by Sandra Kohler RN  Outcome: Progressing  Goal: Return ADL status to a safe level of function  2/27/2024 0235 by Colette Serrano RN  Outcome: Progressing  2/26/2024 1435 by Sandra Kohler RN  Outcome: Progressing     Problem: Gastrointestinal - Adult  Goal: Minimal or absence of nausea and vomiting  2/27/2024 0235 by Colette Serrano RN  Outcome: Progressing  2/26/2024 1435 by Sandra Kohler RN  Outcome: Progressing  Goal: Maintains or returns to baseline bowel function  2/27/2024 0235 by Colette Serrano RN  Outcome: Progressing  2/26/2024 1435 by Sandra Kohler RN  Outcome: Progressing  Goal: Maintains adequate nutritional intake  2/27/2024 0235 by Cloette Serrano RN  Outcome: Progressing  2/26/2024 1435 by Sandra Kohler RN  Outcome: Progressing     Problem: Genitourinary - Adult  Goal: Absence of urinary retention  2/27/2024 0235 by Colette Serrano RN  Outcome: Progressing  2/26/2024 1435 by Sandra Kohler RN  Outcome: Progressing     Problem: Infection - Adult  Goal: Absence of infection at discharge  2/27/2024 0235 by Colette Serrano RN  Outcome: Progressing  2/26/2024 1435 by Sandra Kohler RN  Outcome: Progressing  Goal: Absence of infection during hospitalization  2/27/2024 0235 by Colette Serrano RN  Outcome: Progressing  2/26/2024 1435 by Sandra Kohler RN  Outcome: Progressing     Problem: Metabolic/Fluid and Electrolytes - Adult  Goal: Electrolytes maintained within normal limits  2/27/2024 0235 by Colette Serrano RN  Outcome: Progressing  2/26/2024 1435 by Sandra Kohler RN  Outcome: Progressing  Goal: Hemodynamic stability and optimal renal function maintained  2/27/2024 0235 by Colette Serrano RN  Outcome: Progressing  2/26/2024 1435 by Sandra Kohler,

## 2024-02-27 NOTE — PROGRESS NOTES
PHARMACY VANCOMYCIN MONITORING SERVICE  Pharmacy consulted by Dr. Humphries for monitoring and adjustment.    Indication for treatment: Vancomycin indication: HAP  Goal trough: Trough Goal: 15-20 mcg/mL  AUC/ALEJANDRO: 400-600    Risk Factors for MRSA Identified:   Received IV antibiotics within the past 90 days    Pertinent Laboratory Values:   Temp Readings from Last 3 Encounters:   02/27/24 98 °F (36.7 °C) (Oral)   02/23/24 97.8 °F (36.6 °C) (Oral)   01/29/24 98.3 °F (36.8 °C) (Oral)     Recent Labs     02/26/24  0418 02/27/24  0354   WBC 6.4 11.7*     Recent Labs     02/26/24  0418 02/27/24  0354   BUN 2* 7   CREATININE 0.7 0.6     Estimated Creatinine Clearance: 83 mL/min (based on SCr of 0.6 mg/dL).    Intake/Output Summary (Last 24 hours) at 2/27/2024 0902  Last data filed at 2/27/2024 0816  Gross per 24 hour   Intake 675 ml   Output 400 ml   Net 275 ml     Last Encounter Weight:  Wt Readings from Last 3 Encounters:   02/27/24 63.3 kg (139 lb 8.8 oz)   02/23/24 64 kg (141 lb 1.5 oz)   01/31/24 68.9 kg (152 lb)       In: 675   Out: 400 [Urine:400]     Pertinent Cultures:   Date    Source    Results      Vancomycin level:   TROUGH:  No results for input(s): \"VANCOTROUGH\" in the last 72 hours.  RANDOM:  No results for input(s): \"VANCORANDOM\" in the last 72 hours.    Assessment:  SCr, BUN, and urine output: see above  Day(s) of therapy: 1 of 7  Vancomycin concentration: to be collected    Plan:  Vancomycin 1500mg IVPB x 1 followed by  Current Vancomycin Dose: 1000 mg Q12H. Level will be scheduled after the 4th dose.   Pharmacy will continue to monitor patient and adjust therapy as indicated    VANCOMYCIN CONCENTRATION SCHEDULED FOR 02/29/24 @ 0600    Thank you for the consult.  Elvis Egan [facility]  2/27/2024 9:02 AM

## 2024-02-28 ENCOUNTER — APPOINTMENT (OUTPATIENT)
Dept: GENERAL RADIOLOGY | Age: 69
DRG: 321 | End: 2024-02-28
Payer: MEDICARE

## 2024-02-28 LAB
ANION GAP SERPL CALCULATED.3IONS-SCNC: 16 MMOL/L (ref 7–16)
BASOPHILS ABSOLUTE: 0 K/CU MM
BASOPHILS RELATIVE PERCENT: 0.1 % (ref 0–1)
BUN SERPL-MCNC: 14 MG/DL (ref 6–23)
CALCIUM SERPL-MCNC: 8.2 MG/DL (ref 8.3–10.6)
CHLORIDE BLD-SCNC: 103 MMOL/L (ref 99–110)
CO2: 16 MMOL/L (ref 21–32)
CREAT SERPL-MCNC: 0.7 MG/DL (ref 0.6–1.1)
CULTURE: NORMAL
DIFFERENTIAL TYPE: ABNORMAL
EOSINOPHILS ABSOLUTE: 0 K/CU MM
EOSINOPHILS RELATIVE PERCENT: 0 % (ref 0–3)
GFR SERPL CREATININE-BSD FRML MDRD: >60 ML/MIN/1.73M2
GLUCOSE BLD-MCNC: 176 MG/DL (ref 70–99)
GLUCOSE SERPL-MCNC: 157 MG/DL (ref 70–99)
HCT VFR BLD CALC: 27.4 % (ref 37–47)
HEMOGLOBIN: 8.6 GM/DL (ref 12.5–16)
IMMATURE NEUTROPHIL %: 1.1 % (ref 0–0.43)
LACTIC ACID, SEPSIS: 1.6 MMOL/L (ref 0.4–2)
LYMPHOCYTES ABSOLUTE: 0.2 K/CU MM
LYMPHOCYTES RELATIVE PERCENT: 1.6 % (ref 24–44)
Lab: NORMAL
MAGNESIUM: 1.7 MG/DL (ref 1.8–2.4)
MCH RBC QN AUTO: 27.9 PG (ref 27–31)
MCHC RBC AUTO-ENTMCNC: 31.4 % (ref 32–36)
MCV RBC AUTO: 89 FL (ref 78–100)
MONOCYTES ABSOLUTE: 0.2 K/CU MM
MONOCYTES RELATIVE PERCENT: 2.1 % (ref 0–4)
NUCLEATED RBC %: 0 %
PDW BLD-RTO: 17 % (ref 11.7–14.9)
PLATELET # BLD: 355 K/CU MM (ref 140–440)
PMV BLD AUTO: 10.3 FL (ref 7.5–11.1)
POTASSIUM SERPL-SCNC: 5 MMOL/L (ref 3.5–5.1)
RBC # BLD: 3.08 M/CU MM (ref 4.2–5.4)
SEGMENTED NEUTROPHILS ABSOLUTE COUNT: 11 K/CU MM
SEGMENTED NEUTROPHILS RELATIVE PERCENT: 95.1 % (ref 36–66)
SODIUM BLD-SCNC: 135 MMOL/L (ref 135–145)
SPECIMEN: NORMAL
T3 FREE: 1.2 PG/ML (ref 2.3–4.2)
TOTAL IMMATURE NEUTOROPHIL: 0.13 K/CU MM
TOTAL NUCLEATED RBC: 0 K/CU MM
TROPONIN, HIGH SENSITIVITY: 46 NG/L (ref 0–14)
TSH SERPL DL<=0.005 MIU/L-ACNC: 4.41 UIU/ML (ref 0.27–4.2)
WBC # BLD: 11.5 K/CU MM (ref 4–10.5)

## 2024-02-28 PROCEDURE — 36415 COLL VENOUS BLD VENIPUNCTURE: CPT

## 2024-02-28 PROCEDURE — 6370000000 HC RX 637 (ALT 250 FOR IP): Performed by: NURSE PRACTITIONER

## 2024-02-28 PROCEDURE — 85025 COMPLETE CBC W/AUTO DIFF WBC: CPT

## 2024-02-28 PROCEDURE — 99223 1ST HOSP IP/OBS HIGH 75: CPT | Performed by: INTERNAL MEDICINE

## 2024-02-28 PROCEDURE — 84484 ASSAY OF TROPONIN QUANT: CPT

## 2024-02-28 PROCEDURE — 84443 ASSAY THYROID STIM HORMONE: CPT

## 2024-02-28 PROCEDURE — 71045 X-RAY EXAM CHEST 1 VIEW: CPT

## 2024-02-28 PROCEDURE — 6360000002 HC RX W HCPCS: Performed by: STUDENT IN AN ORGANIZED HEALTH CARE EDUCATION/TRAINING PROGRAM

## 2024-02-28 PROCEDURE — 6370000000 HC RX 637 (ALT 250 FOR IP)

## 2024-02-28 PROCEDURE — 94761 N-INVAS EAR/PLS OXIMETRY MLT: CPT

## 2024-02-28 PROCEDURE — 80048 BASIC METABOLIC PNL TOTAL CA: CPT

## 2024-02-28 PROCEDURE — 6370000000 HC RX 637 (ALT 250 FOR IP): Performed by: HOSPITALIST

## 2024-02-28 PROCEDURE — 87040 BLOOD CULTURE FOR BACTERIA: CPT

## 2024-02-28 PROCEDURE — 84481 FREE ASSAY (FT-3): CPT

## 2024-02-28 PROCEDURE — 6370000000 HC RX 637 (ALT 250 FOR IP): Performed by: STUDENT IN AN ORGANIZED HEALTH CARE EDUCATION/TRAINING PROGRAM

## 2024-02-28 PROCEDURE — 2580000003 HC RX 258: Performed by: HOSPITALIST

## 2024-02-28 PROCEDURE — 83735 ASSAY OF MAGNESIUM: CPT

## 2024-02-28 PROCEDURE — 82962 GLUCOSE BLOOD TEST: CPT

## 2024-02-28 PROCEDURE — APPNB15 APP NON BILLABLE TIME 0-15 MINS

## 2024-02-28 PROCEDURE — 2500000003 HC RX 250 WO HCPCS: Performed by: NURSE PRACTITIONER

## 2024-02-28 PROCEDURE — 6360000002 HC RX W HCPCS: Performed by: HOSPITALIST

## 2024-02-28 PROCEDURE — 93005 ELECTROCARDIOGRAM TRACING: CPT | Performed by: NURSE PRACTITIONER

## 2024-02-28 PROCEDURE — 94640 AIRWAY INHALATION TREATMENT: CPT

## 2024-02-28 PROCEDURE — 1200000000 HC SEMI PRIVATE

## 2024-02-28 PROCEDURE — 2580000003 HC RX 258: Performed by: STUDENT IN AN ORGANIZED HEALTH CARE EDUCATION/TRAINING PROGRAM

## 2024-02-28 PROCEDURE — 6360000002 HC RX W HCPCS: Performed by: NURSE PRACTITIONER

## 2024-02-28 PROCEDURE — 6370000000 HC RX 637 (ALT 250 FOR IP): Performed by: INTERNAL MEDICINE

## 2024-02-28 PROCEDURE — 83605 ASSAY OF LACTIC ACID: CPT

## 2024-02-28 PROCEDURE — 84132 ASSAY OF SERUM POTASSIUM: CPT

## 2024-02-28 RX ORDER — ROSUVASTATIN CALCIUM 20 MG/1
40 TABLET, COATED ORAL NIGHTLY
Status: DISCONTINUED | OUTPATIENT
Start: 2024-02-28 | End: 2024-03-01

## 2024-02-28 RX ORDER — METOPROLOL TARTRATE 1 MG/ML
2.5 INJECTION, SOLUTION INTRAVENOUS ONCE
Status: COMPLETED | OUTPATIENT
Start: 2024-02-28 | End: 2024-02-28

## 2024-02-28 RX ORDER — MAGNESIUM SULFATE IN WATER 40 MG/ML
2000 INJECTION, SOLUTION INTRAVENOUS ONCE
Status: COMPLETED | OUTPATIENT
Start: 2024-02-28 | End: 2024-02-28

## 2024-02-28 RX ORDER — PANTOPRAZOLE SODIUM 40 MG/1
40 TABLET, DELAYED RELEASE ORAL
Qty: 60 TABLET | Refills: 3 | Status: SHIPPED | OUTPATIENT
Start: 2024-02-28

## 2024-02-28 RX ORDER — SODIUM CHLORIDE FOR INHALATION 0.9 %
3 VIAL, NEBULIZER (ML) INHALATION ONCE
Status: COMPLETED | OUTPATIENT
Start: 2024-02-28 | End: 2024-02-28

## 2024-02-28 RX ORDER — HYDROXYZINE HYDROCHLORIDE 10 MG/1
10 TABLET, FILM COATED ORAL ONCE
Status: COMPLETED | OUTPATIENT
Start: 2024-02-28 | End: 2024-02-28

## 2024-02-28 RX ORDER — IPRATROPIUM BROMIDE AND ALBUTEROL SULFATE 2.5; .5 MG/3ML; MG/3ML
1 SOLUTION RESPIRATORY (INHALATION)
Status: DISCONTINUED | OUTPATIENT
Start: 2024-02-28 | End: 2024-03-02

## 2024-02-28 RX ORDER — PREDNISONE 20 MG/1
40 TABLET ORAL DAILY
Status: DISCONTINUED | OUTPATIENT
Start: 2024-02-29 | End: 2024-03-02 | Stop reason: HOSPADM

## 2024-02-28 RX ORDER — ASPIRIN 81 MG/1
81 TABLET, CHEWABLE ORAL DAILY
Status: DISCONTINUED | OUTPATIENT
Start: 2024-02-28 | End: 2024-03-01

## 2024-02-28 RX ORDER — ROSUVASTATIN CALCIUM 5 MG/1
5 TABLET, COATED ORAL NIGHTLY
Status: DISCONTINUED | OUTPATIENT
Start: 2024-02-28 | End: 2024-02-28

## 2024-02-28 RX ORDER — LEVALBUTEROL INHALATION SOLUTION 0.63 MG/3ML
1.25 SOLUTION RESPIRATORY (INHALATION) EVERY 8 HOURS PRN
Status: DISCONTINUED | OUTPATIENT
Start: 2024-02-28 | End: 2024-03-02 | Stop reason: HOSPADM

## 2024-02-28 RX ORDER — METOPROLOL SUCCINATE 25 MG/1
25 TABLET, EXTENDED RELEASE ORAL DAILY
Status: DISCONTINUED | OUTPATIENT
Start: 2024-02-28 | End: 2024-02-29

## 2024-02-28 RX ADMIN — IPRATROPIUM BROMIDE AND ALBUTEROL SULFATE 1 DOSE: 2.5; .5 SOLUTION RESPIRATORY (INHALATION) at 12:32

## 2024-02-28 RX ADMIN — WATER 40 MG: 1 INJECTION INTRAMUSCULAR; INTRAVENOUS; SUBCUTANEOUS at 06:04

## 2024-02-28 RX ADMIN — ROSUVASTATIN CALCIUM 40 MG: 20 TABLET, COATED ORAL at 21:44

## 2024-02-28 RX ADMIN — MAGNESIUM SULFATE HEPTAHYDRATE 2000 MG: 40 INJECTION, SOLUTION INTRAVENOUS at 10:41

## 2024-02-28 RX ADMIN — Medication 1 CAPSULE: at 08:01

## 2024-02-28 RX ADMIN — ASPIRIN 81 MG: 81 TABLET, CHEWABLE ORAL at 10:40

## 2024-02-28 RX ADMIN — CEFEPIME 2000 MG: 2 INJECTION, POWDER, FOR SOLUTION INTRAVENOUS at 17:23

## 2024-02-28 RX ADMIN — DICYCLOMINE HYDROCHLORIDE 20 MG: 20 TABLET ORAL at 17:22

## 2024-02-28 RX ADMIN — CEFEPIME 2000 MG: 2 INJECTION, POWDER, FOR SOLUTION INTRAVENOUS at 00:30

## 2024-02-28 RX ADMIN — GABAPENTIN 100 MG: 100 CAPSULE ORAL at 13:09

## 2024-02-28 RX ADMIN — DICYCLOMINE HYDROCHLORIDE 20 MG: 20 TABLET ORAL at 12:12

## 2024-02-28 RX ADMIN — BENZONATATE 100 MG: 100 CAPSULE ORAL at 21:45

## 2024-02-28 RX ADMIN — ONDANSETRON 4 MG: 4 TABLET, ORALLY DISINTEGRATING ORAL at 08:02

## 2024-02-28 RX ADMIN — HYDROXYZINE HYDROCHLORIDE 10 MG: 10 TABLET ORAL at 03:33

## 2024-02-28 RX ADMIN — METOPROLOL TARTRATE 25 MG: 25 TABLET, FILM COATED ORAL at 10:40

## 2024-02-28 RX ADMIN — DILTIAZEM HYDROCHLORIDE 30 MG: 30 TABLET, FILM COATED ORAL at 17:21

## 2024-02-28 RX ADMIN — GUAIFENESIN SYRUP AND DEXTROMETHORPHAN 5 ML: 100; 10 SYRUP ORAL at 10:40

## 2024-02-28 RX ADMIN — ENOXAPARIN SODIUM 40 MG: 100 INJECTION SUBCUTANEOUS at 08:02

## 2024-02-28 RX ADMIN — SODIUM CHLORIDE 25 ML: 9 INJECTION, SOLUTION INTRAVENOUS at 23:19

## 2024-02-28 RX ADMIN — METOPROLOL SUCCINATE 25 MG: 25 TABLET, EXTENDED RELEASE ORAL at 21:53

## 2024-02-28 RX ADMIN — Medication 5 ML: at 02:32

## 2024-02-28 RX ADMIN — BENZONATATE 100 MG: 100 CAPSULE ORAL at 08:01

## 2024-02-28 RX ADMIN — GABAPENTIN 100 MG: 100 CAPSULE ORAL at 08:01

## 2024-02-28 RX ADMIN — LEVALBUTEROL 0.63 MG: 0.63 SOLUTION RESPIRATORY (INHALATION) at 02:29

## 2024-02-28 RX ADMIN — SODIUM CHLORIDE, PRESERVATIVE FREE 10 ML: 5 INJECTION INTRAVENOUS at 08:02

## 2024-02-28 RX ADMIN — VANCOMYCIN HYDROCHLORIDE 1000 MG: 1 INJECTION, POWDER, LYOPHILIZED, FOR SOLUTION INTRAVENOUS at 23:20

## 2024-02-28 RX ADMIN — POLYETHYLENE GLYCOL 3350 17 G: 17 POWDER, FOR SOLUTION ORAL at 12:12

## 2024-02-28 RX ADMIN — DILTIAZEM HYDROCHLORIDE 30 MG: 30 TABLET, FILM COATED ORAL at 14:37

## 2024-02-28 RX ADMIN — SODIUM CHLORIDE, PRESERVATIVE FREE 10 ML: 5 INJECTION INTRAVENOUS at 21:46

## 2024-02-28 RX ADMIN — VANCOMYCIN HYDROCHLORIDE 1000 MG: 1 INJECTION, POWDER, LYOPHILIZED, FOR SOLUTION INTRAVENOUS at 12:12

## 2024-02-28 RX ADMIN — IPRATROPIUM BROMIDE AND ALBUTEROL SULFATE 1 DOSE: 2.5; .5 SOLUTION RESPIRATORY (INHALATION) at 00:10

## 2024-02-28 RX ADMIN — GABAPENTIN 100 MG: 100 CAPSULE ORAL at 21:44

## 2024-02-28 RX ADMIN — DICYCLOMINE HYDROCHLORIDE 20 MG: 20 TABLET ORAL at 08:01

## 2024-02-28 RX ADMIN — DICYCLOMINE HYDROCHLORIDE 20 MG: 20 TABLET ORAL at 21:45

## 2024-02-28 RX ADMIN — METOPROLOL TARTRATE 2.5 MG: 1 INJECTION, SOLUTION INTRAVENOUS at 02:47

## 2024-02-28 RX ADMIN — CEFEPIME 2000 MG: 2 INJECTION, POWDER, FOR SOLUTION INTRAVENOUS at 08:03

## 2024-02-28 ASSESSMENT — ENCOUNTER SYMPTOMS
SINUS PAIN: 0
COLOR CHANGE: 0
COUGH: 0
VOMITING: 0
SORE THROAT: 0
WHEEZING: 0
BACK PAIN: 0
SINUS PRESSURE: 0
SHORTNESS OF BREATH: 1
NAUSEA: 0
CONSTIPATION: 0
DIARRHEA: 0
ABDOMINAL PAIN: 0

## 2024-02-28 ASSESSMENT — PAIN SCALES - GENERAL: PAINLEVEL_OUTOF10: 0

## 2024-02-28 NOTE — PROGRESS NOTES
Pt triggered sepsis alert. Antonella Zapata NP made aware. Lactic ordered. EKG and metoprolol also ordered at this time.

## 2024-02-28 NOTE — PROGRESS NOTES
Patient seen and examined by Dr. Brown and I.    Cardiology consulted for Chest pain, EKG changes, SOB        Plan:    Start aspirin and crestor    Plan for stress test tomorrow.    Echo 02/27:    Left Ventricle: Low normal left ventricular systolic function with a visually estimated EF of 45 - 50%. Apical septal wall appears akientic, Creighton is hypokinetic. Definity utilized no evidence of thrombus.    Aortic Valve: Sclerotic but non stenotic aortic valve.    Pericardium: No pericardial effusion.    Full note to follow    Ish Sanchez PA-C 02/28/24 9:22 AM

## 2024-02-28 NOTE — PROGRESS NOTES
PHARMACY VANCOMYCIN MONITORING SERVICE  Pharmacy consulted by Dr. Humphries for monitoring and adjustment.    Indication for treatment: Vancomycin indication: HAP  Goal trough: Trough Goal: 15-20 mcg/mL  AUC/ALEJANDRO: 400-600    Risk Factors for MRSA Identified:   Received IV antibiotics within the past 90 days    Pertinent Laboratory Values:   Temp Readings from Last 3 Encounters:   02/28/24 98.2 °F (36.8 °C) (Oral)   02/23/24 97.8 °F (36.6 °C) (Oral)   01/29/24 98.3 °F (36.8 °C) (Oral)     Recent Labs     02/26/24  0418 02/27/24  0354 02/28/24  0508   WBC 6.4 11.7* 11.5*       Recent Labs     02/26/24  0418 02/27/24  0354 02/28/24  0508   BUN 2* 7 14   CREATININE 0.7 0.6 0.7       Estimated Creatinine Clearance: 70 mL/min (based on SCr of 0.7 mg/dL).    Intake/Output Summary (Last 24 hours) at 2/28/2024 0903  Last data filed at 2/27/2024 1100  Gross per 24 hour   Intake --   Output 400 ml   Net -400 ml       Last Encounter Weight:  Wt Readings from Last 3 Encounters:   02/28/24 68.2 kg (150 lb 5.7 oz)   02/23/24 64 kg (141 lb 1.5 oz)   01/31/24 68.9 kg (152 lb)       In: 135   Out: 400 [Urine:400]     Pertinent Cultures:   Date    Source    Results  02/26/24  Blood    NGTD  Strep pneumo antigen   Urine Negative  Legionella antigen    Urine Negative    Vancomycin level:   TROUGH:  No results for input(s): \"VANCOTROUGH\" in the last 72 hours.  RANDOM:  No results for input(s): \"VANCORANDOM\" in the last 72 hours.    Assessment:  SCr, BUN, and urine output: see above  Day(s) of therapy: 2 of 7  Vancomycin concentration: to be collected    Plan:  Continue current Vancomycin Dose: 1000 mg Q12H. Level will be scheduled after the 4th dose.   Pharmacy will continue to monitor patient and adjust therapy as indicated    VANCOMYCIN CONCENTRATION SCHEDULED FOR 02/29/24 @ 0600    Thank you for the consult.  Elvis Egan [facility]  2/28/2024 9:03 AM

## 2024-02-28 NOTE — CONSULTS
Hyperlipidemia, Hypertension, Low back pain, Lung mass, Nausea & vomiting, Panic attacks, Pneumonia, Right Lung Cancer, Shingles, Shortness of breath on exertion, Teeth missing, Urinary tract infection, Wears dentures, and Wears glasses.  Past surgical history:   has a past surgical history that includes Tonsillectomy (1978); Tubal ligation (1988); Elbow surgery (Left, 1980); Dilation and curettage of uterus (1989); Colonoscopy (2000's); bronchoscopy (12/07/2017); Bunionectomy (Bilateral, 1990's); Mediastinoscopy (02/21/2018); Lung surgery (04/12/2018); Tunneled venous port placement (Left, 07/31/2018); Catheter Removal (Left, 5/16/2019); and Upper gastrointestinal endoscopy (N/A, 1/23/2024).  Social History:   reports that she quit smoking about 6 years ago. Her smoking use included cigarettes. She started smoking about 37 years ago. She has a 15.3 pack-year smoking history. She has never used smokeless tobacco. She reports that she does not drink alcohol and does not use drugs.  Family history:   no family history of CAD, STROKE of DM    Allergies   Allergen Reactions    Lipitor      \"Makes Me Hurt So Bad I Can't Stand It\"    Vicodin [Hydrocodone-Acetaminophen] Nausea Only    Carboplatin Other (See Comments)     Required epi x 3, hospitalization overnight     Hydrocodone      Other reaction(s): Gastrointestinal Upset    Adhesive Tape Rash       levalbuterol (XOPENEX) nebulization 1.25 mg, Q8H PRN  ipratropium 0.5 mg-albuterol 2.5 mg (DUONEB) nebulizer solution 1 Dose, 4x Daily RT  aspirin chewable tablet 81 mg, Daily  [START ON 2/29/2024] predniSONE (DELTASONE) tablet 40 mg, Daily  metoprolol tartrate (LOPRESSOR) tablet 25 mg, BID  rosuvastatin (CRESTOR) tablet 40 mg, Nightly  ceFEPIme (MAXIPIME) 2,000 mg in sodium chloride 0.9 % 100 mL IVPB (mini-bag), q8h  vancomycin (VANCOCIN) 1,000 mg in sodium chloride 0.9 % 250 mL IVPB ([id number]), Q12H  lactobacillus (CULTURELLE) capsule 1 capsule, Daily with  edema  GI:  Soft Non tender, non distended.   Musculoskeletal:   No tenderness   Integument:  Warm   Lymphatic:  No lymphadenopathy noted.   Neurologic:  Alert & oriented x 3     Psychiatric:  Affect normal       All labs, images, EKGs were personally reviewed      Assessment: 69 y.o.year old with PMH of  has a past medical history of Anemia, Anxiety, Arthritis, Bronchitis, Chronic back pain, COPD (chronic obstructive pulmonary disease) (HCC), Depression, Diverticulosis, Hemoptysis, History of external beam radiation therapy, Hx of blood clots, Hyperlipidemia, Hypertension, Low back pain, Lung mass, Nausea & vomiting, Panic attacks, Pneumonia, Right Lung Cancer, Shingles, Shortness of breath on exertion, Teeth missing, Urinary tract infection, Wears dentures, and Wears glasses.       Medical Decision Making :       Shortness of breath at rest and with exertion  Atypical chest pain  Abnormal echocardiogram with LV ejection fraction of 45 to 50%, apical septal wall akinesis, apex is hypokinetic.  Hyperlipidemia  Essential hypertension      No prior echocardiograms to compare with  Will plan for stress test likely tomorrow once breathing improves  Continue with aspirin 81 mg daily  Stop metoprolol tartrate  Start patient on Toprol-XL  Continue with high intensity statins: Crestor 40 daily      COPD: Acute exacerbation of COPD with severe symptoms including wheezing.  Right-sided pleural effusion  History of lung cancer s/p resection    Pulm medicine follow-up, on antibiotics steroids and oxygen.       Sinus tachycardia:    ?  Unclear etiology  Likely COPD exacerbation and underlying pulmonary fibrosis  CTA is negative for PE  Abnormal TSH which is elevated, free T4 is normal.  Check T3 level  Possible bronchodilator therapy  Start patient on low-dose Cardizem 30 4 times daily       Health maintenance:   Body mass index is 25.05 kg/m².  Exercise and Diet         Prior documentations in EMR were personally reviewed at

## 2024-02-28 NOTE — PROGRESS NOTES
Neutrophil 0.13 K/CU MM    Immature Neutrophil % 1.1 (H) 0 - 0.43 %   Magnesium    Collection Time: 02/28/24  5:08 AM   Result Value Ref Range    Magnesium 1.7 (L) 1.8 - 2.4 mg/dl   Lactate, Sepsis    Collection Time: 02/28/24  8:04 AM   Result Value Ref Range    Lactic Acid, Sepsis 1.6 0.4 - 2.0 mMOL/L   Troponin    Collection Time: 02/28/24  8:04 AM   Result Value Ref Range    Troponin, High Sensitivity 46 (H) 0 - 14 ng/L        Imaging/Diagnostics Last 24 Hours   CTA CHEST W CONTRAST    Result Date: 2/26/2024  EXAMINATION: CTA OF THE CHEST 2/26/2024 6:14 am TECHNIQUE: CTA of the chest was performed after the administration of intravenous contrast.  Multiplanar reformatted images are provided for review.  MIP images are provided for review. Automated exposure control, iterative reconstruction, and/or weight based adjustment of the mA/kV was utilized to reduce the radiation dose to as low as reasonably achievable. COMPARISON: 02/21/2024 HISTORY: ORDERING SYSTEM PROVIDED HISTORY: tachycardia; lung cancer hx; r/o PE TECHNOLOGIST PROVIDED HISTORY: Reason for exam:->tachycardia; lung cancer hx; r/o PE Additional Contrast?->1 Reason for Exam: tachycardia; lung cancer hx; r/o PE FINDINGS: Pulmonary Arteries: Good timing of the contrast bolus.  The left pulmonary arterial system is normal and no left-sided pulmonary arterial embolism. There is narrowing of the right pulmonary artery and branches with previous surgery and the fibrotic changes in the right hilar to paramediastinal region.  General reduced caliber of the remaining branches in the right lung but without a focal pulmonary arterial embolism identified..  Main pulmonary artery is normal in caliber.  The right pulmonary veins are also compressed/nonenhancing with the scarring in the right hilum and postsurgical changes. Mediastinum: Cardiac chambers are not enlarged.  There is a small pericardial effusion which is mildly increased from the recent study.  The  thoracic aorta is not aneurysmal with scattered atherosclerotic calcifications.  Beam hardening artifact from high-density contrast in the SVC.  Scarring at the right paramediastinal margin and hilum with postsurgical changes and calcifications is redemonstrated.  The left hilar lymph nodes are not enlarged. Lungs/pleura: The scarring and fibrosis in the right hilar and suprahilar margin continuing along the paramediastinal margin.  A 4 mm nodule along the posterior right upper lung on series 302 image number 33 is unchanged.  3 mm nodule on series 302, image 50 and scarring in the right lower lung.  There is a small amount of right pleural effusion which is new from the recent exam.  There is no pneumothorax. Upper Abdomen: No free air or free fluid at the upper abdomen.  For full abdomen detail see the recent dedicated abdominal CT. Soft Tissues/Bones: No new fractures are identified.  Chronic features in the right ribs with post radiation changes.  Degenerative changes along the spine but no collapse of the vertebral bodies.     1.  No new pulmonary arterial embolism is identified. 2.  Fibrosis in the right perihilar and paramediastinal region with previous surgery and radiation.  Small caliber of the right pulmonary artery and general decreased caliber the right pulmonary arterial branches in the right lung.  Non enhancement of the right pulmonary veins/compression with the perihilar fibrosis. 3.  Development of small right pleural effusion and mild increase of the small pericardial effusion.     XR CHEST PORTABLE    Result Date: 2/26/2024  EXAMINATION: ONE XRAY VIEW OF THE CHEST 2/26/2024 4:34 am COMPARISON: 02/21/2024 HISTORY: ORDERING SYSTEM PROVIDED HISTORY: chest pain TECHNOLOGIST PROVIDED HISTORY: Reason for exam:->chest pain Reason for Exam: chest pain FINDINGS: Fibrotic changes in the right hilar and paramediastinal margin are redemonstrated and see the recent CT for greater detail.  Tenting on the right

## 2024-02-28 NOTE — PROGRESS NOTES
Resource RN to bedside to administer metoprolol IV 2.5 mg 1 dose per order for sinus tachycardia. Pt is alert, C/O SOB with audible congestion heard from bedside. Rhonchi throughout on right side with diminished base and wheezing/ diminished lung sounds noted to the left upon auscultation. SPO2 in the mid to high 90's on R/A. B/tx being administered. BP  146/104  prior to metoprolol admin. Bp 127/107  post metoprolol admin. Pt still c/o SOB and anxiety. Primary LPN called Antonella NP to discuss possible chest xray and intervention. New orders obtained.    Stable CXR

## 2024-02-28 NOTE — PROGRESS NOTES
pulmonary      SUBJECTIVE:  sleeping and mild tachypnea     OBJECTIVE    VITALS:  BP (!) 127/107   Pulse (!) 113   Temp 98.1 °F (36.7 °C) (Oral)   Resp 24   Ht 1.65 m (5' 4.96\")   Wt 68.2 kg (150 lb 5.7 oz)   LMP 01/06/2009 (LMP Unknown)   SpO2 96%   BMI 25.05 kg/m²   HEAD AND FACE EXAM:  No throat injection, no active exudate,no thrush  NECK EXAM;No JVD, no masses, symmetrical  CHEST EXAM; Expansion equal and symmetrical, no masses  LUNG EXAM; Good breath sounds bilaterally. There are expiratory wheezes both lungs, there are crackles at both lung bases  CARDIOVASCULAR EXAM: Positive S1 and S2, no S3 or S4, no clicks ,no murmurs  RIGHT AND LEFT LOWER EXTRIMITY EXAM: No edema, no swelling,           LABS   Lab Results   Component Value Date    WBC 11.5 (H) 02/28/2024    HGB 8.6 (L) 02/28/2024    HCT 27.4 (L) 02/28/2024    MCV 89.0 02/28/2024     02/28/2024     Lab Results   Component Value Date    CREATININE 0.7 02/28/2024    BUN 14 02/28/2024     02/28/2024    K 5.0 02/28/2024     02/28/2024    CO2 16 (L) 02/28/2024     Lab Results   Component Value Date    INR 1.4 02/22/2024    PROTIME 17.2 (H) 02/22/2024          Lab Results   Component Value Date/Time    PHOS 3.4 11/28/2023 06:30 AM    PHOS 3.5 03/07/2023 05:14 AM      No results for input(s): \"PH\", \"PO2ART\", \"ZNS7SRP\", \"HCO3\", \"BEART\", \"O2SAT\" in the last 72 hours.      Wt Readings from Last 3 Encounters:   02/28/24 68.2 kg (150 lb 5.7 oz)   02/23/24 64 kg (141 lb 1.5 oz)   01/31/24 68.9 kg (152 lb)        2/28/2024      Narrative & Impression  EXAMINATION:  ONE XRAY VIEW OF THE CHEST     2/28/2024 3:08 am     COMPARISON:  02/26/2024 and 02/21/2024     HISTORY:  ORDERING SYSTEM PROVIDED HISTORY: shortness  TECHNOLOGIST PROVIDED HISTORY:  Reason for exam:->shortness  Reason for Exam: shortness     FINDINGS:  Left-sided MediPort is redemonstrated.  No endotracheal tube or nasogastric  tube.  External leads overlie the chest.     Masslike

## 2024-02-28 NOTE — PLAN OF CARE
Problem: Discharge Planning  Goal: Discharge to home or other facility with appropriate resources  Outcome: Progressing     Problem: Neurosensory - Adult  Goal: Achieves stable or improved neurological status  Outcome: Progressing  Goal: Achieves maximal functionality and self care  Outcome: Progressing     Problem: Respiratory - Adult  Goal: Achieves optimal ventilation and oxygenation  Outcome: Progressing     Problem: Cardiovascular - Adult  Goal: Maintains optimal cardiac output and hemodynamic stability  Outcome: Progressing  Goal: Absence of cardiac dysrhythmias or at baseline  Outcome: Progressing     Problem: Skin/Tissue Integrity - Adult  Goal: Skin integrity remains intact  Outcome: Progressing  Goal: Incisions, wounds, or drain sites healing without S/S of infection  Outcome: Progressing  Goal: Oral mucous membranes remain intact  Outcome: Progressing     Problem: Musculoskeletal - Adult  Goal: Return mobility to safest level of function  Outcome: Progressing  Goal: Return ADL status to a safe level of function  Outcome: Progressing     Problem: Gastrointestinal - Adult  Goal: Minimal or absence of nausea and vomiting  Outcome: Progressing  Goal: Maintains or returns to baseline bowel function  Outcome: Progressing  Goal: Maintains adequate nutritional intake  Outcome: Progressing     Problem: Genitourinary - Adult  Goal: Absence of urinary retention  Outcome: Progressing     Problem: Infection - Adult  Goal: Absence of infection at discharge  Outcome: Progressing  Goal: Absence of infection during hospitalization  Outcome: Progressing     Problem: Metabolic/Fluid and Electrolytes - Adult  Goal: Electrolytes maintained within normal limits  Outcome: Progressing  Goal: Hemodynamic stability and optimal renal function maintained  Outcome: Progressing  Goal: Glucose maintained within prescribed range  Outcome: Progressing     Problem: Hematologic - Adult  Goal: Maintains hematologic stability  Outcome:

## 2024-02-28 NOTE — PROGRESS NOTES
Notified of , EKG ordered, K+/Mg ordered, reviewed chart BP stable. Cardiology consulted 2/27/24 for elevated BNP, apical septal wall akinesis on TTE 2/27/24 Cardio eval pending, pt's on oral beta-blocker per home meds, will give IV lopressor for now.    Notified by nursing pt triggering sepsis, continue current tx/abx for underlying infection with current orders, blood cx neg x24hrs    - monitor for any further needed interventions

## 2024-02-29 ENCOUNTER — HOSPITAL ENCOUNTER (INPATIENT)
Dept: NON INVASIVE DIAGNOSTICS | Age: 69
Discharge: HOME | DRG: 321 | End: 2024-03-02
Payer: MEDICARE

## 2024-02-29 ENCOUNTER — APPOINTMENT (OUTPATIENT)
Dept: NUCLEAR MEDICINE | Age: 69
DRG: 321 | End: 2024-02-29
Payer: MEDICARE

## 2024-02-29 LAB
ANION GAP SERPL CALCULATED.3IONS-SCNC: 10 MMOL/L (ref 7–16)
BASOPHILS ABSOLUTE: 0 K/CU MM
BASOPHILS RELATIVE PERCENT: 0 % (ref 0–1)
BUN SERPL-MCNC: 14 MG/DL (ref 6–23)
CALCIUM SERPL-MCNC: 8.1 MG/DL (ref 8.3–10.6)
CHLORIDE BLD-SCNC: 108 MMOL/L (ref 99–110)
CHOLEST SERPL-MCNC: 137 MG/DL
CO2: 17 MMOL/L (ref 21–32)
CREAT SERPL-MCNC: 0.7 MG/DL (ref 0.6–1.1)
DIFFERENTIAL TYPE: ABNORMAL
DOSE AMOUNT: NORMAL
DOSE TIME: NORMAL
EOSINOPHILS ABSOLUTE: 0 K/CU MM
EOSINOPHILS RELATIVE PERCENT: 0 % (ref 0–3)
GFR SERPL CREATININE-BSD FRML MDRD: >60 ML/MIN/1.73M2
GLUCOSE SERPL-MCNC: 135 MG/DL (ref 70–99)
HCT VFR BLD CALC: 25.9 % (ref 37–47)
HDLC SERPL-MCNC: 30 MG/DL
HEMOGLOBIN: 8 GM/DL (ref 12.5–16)
IMMATURE NEUTROPHIL %: 1.3 % (ref 0–0.43)
LDLC SERPL CALC-MCNC: 77 MG/DL
LYMPHOCYTES ABSOLUTE: 0.3 K/CU MM
LYMPHOCYTES RELATIVE PERCENT: 4.5 % (ref 24–44)
MCH RBC QN AUTO: 28.1 PG (ref 27–31)
MCHC RBC AUTO-ENTMCNC: 30.9 % (ref 32–36)
MCV RBC AUTO: 90.9 FL (ref 78–100)
MONOCYTES ABSOLUTE: 0.2 K/CU MM
MONOCYTES RELATIVE PERCENT: 3.3 % (ref 0–4)
NUC STRESS EJECTION FRACTION: 41 %
NUCLEATED RBC %: 0 %
PDW BLD-RTO: 17.2 % (ref 11.7–14.9)
PLATELET # BLD: 349 K/CU MM (ref 140–440)
PMV BLD AUTO: 10.4 FL (ref 7.5–11.1)
POTASSIUM SERPL-SCNC: 5.2 MMOL/L (ref 3.5–5.1)
RBC # BLD: 2.85 M/CU MM (ref 4.2–5.4)
SEGMENTED NEUTROPHILS ABSOLUTE COUNT: 6.4 K/CU MM
SEGMENTED NEUTROPHILS RELATIVE PERCENT: 90.9 % (ref 36–66)
SODIUM BLD-SCNC: 135 MMOL/L (ref 135–145)
STRESS BASELINE DIAS BP: 79 MMHG
STRESS BASELINE HR: 96 BPM
STRESS BASELINE SYS BP: 125 MMHG
STRESS ESTIMATED WORKLOAD: 1 METS
STRESS PEAK DIAS BP: 73 MMHG
STRESS PEAK SYS BP: 127 MMHG
STRESS PERCENT HR ACHIEVED: 76 %
STRESS POST PEAK HR: 116 BPM
STRESS RATE PRESSURE PRODUCT: NORMAL BPM*MMHG
STRESS TARGET HR: 153 BPM
TOTAL IMMATURE NEUTOROPHIL: 0.09 K/CU MM
TOTAL NUCLEATED RBC: 0 K/CU MM
TRIGL SERPL-MCNC: 149 MG/DL
VANCOMYCIN RANDOM: 21.4 UG/ML
WBC # BLD: 7.1 K/CU MM (ref 4–10.5)

## 2024-02-29 PROCEDURE — 93016 CV STRESS TEST SUPVJ ONLY: CPT | Performed by: INTERNAL MEDICINE

## 2024-02-29 PROCEDURE — APPNB15 APP NON BILLABLE TIME 0-15 MINS

## 2024-02-29 PROCEDURE — 78452 HT MUSCLE IMAGE SPECT MULT: CPT | Performed by: INTERNAL MEDICINE

## 2024-02-29 PROCEDURE — 80202 ASSAY OF VANCOMYCIN: CPT

## 2024-02-29 PROCEDURE — 2580000003 HC RX 258: Performed by: STUDENT IN AN ORGANIZED HEALTH CARE EDUCATION/TRAINING PROGRAM

## 2024-02-29 PROCEDURE — 80061 LIPID PANEL: CPT

## 2024-02-29 PROCEDURE — 94640 AIRWAY INHALATION TREATMENT: CPT

## 2024-02-29 PROCEDURE — 78452 HT MUSCLE IMAGE SPECT MULT: CPT

## 2024-02-29 PROCEDURE — 93018 CV STRESS TEST I&R ONLY: CPT | Performed by: INTERNAL MEDICINE

## 2024-02-29 PROCEDURE — 6370000000 HC RX 637 (ALT 250 FOR IP): Performed by: INTERNAL MEDICINE

## 2024-02-29 PROCEDURE — 94761 N-INVAS EAR/PLS OXIMETRY MLT: CPT

## 2024-02-29 PROCEDURE — 80048 BASIC METABOLIC PNL TOTAL CA: CPT

## 2024-02-29 PROCEDURE — 36415 COLL VENOUS BLD VENIPUNCTURE: CPT

## 2024-02-29 PROCEDURE — 3430000000 HC RX DIAGNOSTIC RADIOPHARMACEUTICAL

## 2024-02-29 PROCEDURE — 6360000002 HC RX W HCPCS: Performed by: INTERNAL MEDICINE

## 2024-02-29 PROCEDURE — 6360000002 HC RX W HCPCS: Performed by: STUDENT IN AN ORGANIZED HEALTH CARE EDUCATION/TRAINING PROGRAM

## 2024-02-29 PROCEDURE — 2580000003 HC RX 258: Performed by: HOSPITALIST

## 2024-02-29 PROCEDURE — A9500 TC99M SESTAMIBI: HCPCS

## 2024-02-29 PROCEDURE — 1200000000 HC SEMI PRIVATE

## 2024-02-29 PROCEDURE — 6370000000 HC RX 637 (ALT 250 FOR IP): Performed by: STUDENT IN AN ORGANIZED HEALTH CARE EDUCATION/TRAINING PROGRAM

## 2024-02-29 PROCEDURE — 85025 COMPLETE CBC W/AUTO DIFF WBC: CPT

## 2024-02-29 PROCEDURE — 94669 MECHANICAL CHEST WALL OSCILL: CPT

## 2024-02-29 PROCEDURE — 6370000000 HC RX 637 (ALT 250 FOR IP): Performed by: HOSPITALIST

## 2024-02-29 PROCEDURE — 93017 CV STRESS TEST TRACING ONLY: CPT

## 2024-02-29 RX ORDER — TETRAKIS(2-METHOXYISOBUTYLISOCYANIDE)COPPER(I) TETRAFLUOROBORATE 1 MG/ML
10 INJECTION, POWDER, LYOPHILIZED, FOR SOLUTION INTRAVENOUS
Status: COMPLETED | OUTPATIENT
Start: 2024-02-29 | End: 2024-02-29

## 2024-02-29 RX ORDER — TETRAKIS(2-METHOXYISOBUTYLISOCYANIDE)COPPER(I) TETRAFLUOROBORATE 1 MG/ML
30 INJECTION, POWDER, LYOPHILIZED, FOR SOLUTION INTRAVENOUS
Status: COMPLETED | OUTPATIENT
Start: 2024-02-29 | End: 2024-02-29

## 2024-02-29 RX ORDER — METOPROLOL SUCCINATE 50 MG/1
50 TABLET, EXTENDED RELEASE ORAL DAILY
Status: DISCONTINUED | OUTPATIENT
Start: 2024-03-01 | End: 2024-03-02 | Stop reason: HOSPADM

## 2024-02-29 RX ORDER — REGADENOSON 0.08 MG/ML
0.4 INJECTION, SOLUTION INTRAVENOUS
Status: COMPLETED | OUTPATIENT
Start: 2024-02-29 | End: 2024-02-29

## 2024-02-29 RX ADMIN — VANCOMYCIN HYDROCHLORIDE 750 MG: 750 INJECTION, POWDER, LYOPHILIZED, FOR SOLUTION INTRAVENOUS at 16:31

## 2024-02-29 RX ADMIN — IPRATROPIUM BROMIDE AND ALBUTEROL SULFATE 1 DOSE: 2.5; .5 SOLUTION RESPIRATORY (INHALATION) at 21:21

## 2024-02-29 RX ADMIN — SODIUM CHLORIDE, PRESERVATIVE FREE 10 ML: 5 INJECTION INTRAVENOUS at 07:48

## 2024-02-29 RX ADMIN — DICYCLOMINE HYDROCHLORIDE 20 MG: 20 TABLET ORAL at 19:50

## 2024-02-29 RX ADMIN — SODIUM CHLORIDE, PRESERVATIVE FREE 10 ML: 5 INJECTION INTRAVENOUS at 19:51

## 2024-02-29 RX ADMIN — DICYCLOMINE HYDROCHLORIDE 20 MG: 20 TABLET ORAL at 16:31

## 2024-02-29 RX ADMIN — KIT FOR THE PREPARATION OF TECHNETIUM TC99M SESTAMIBI 30 MILLICURIE: 1 INJECTION, POWDER, LYOPHILIZED, FOR SOLUTION PARENTERAL at 12:15

## 2024-02-29 RX ADMIN — ROSUVASTATIN CALCIUM 40 MG: 20 TABLET, COATED ORAL at 19:50

## 2024-02-29 RX ADMIN — BENZONATATE 100 MG: 100 CAPSULE ORAL at 19:50

## 2024-02-29 RX ADMIN — GABAPENTIN 100 MG: 100 CAPSULE ORAL at 14:20

## 2024-02-29 RX ADMIN — BUDESONIDE AND FORMOTEROL FUMARATE DIHYDRATE 2 PUFF: 160; 4.5 AEROSOL RESPIRATORY (INHALATION) at 21:22

## 2024-02-29 RX ADMIN — KIT FOR THE PREPARATION OF TECHNETIUM TC99M SESTAMIBI 10 MILLICURIE: 1 INJECTION, POWDER, LYOPHILIZED, FOR SOLUTION PARENTERAL at 09:30

## 2024-02-29 RX ADMIN — DILTIAZEM HYDROCHLORIDE 30 MG: 30 TABLET, FILM COATED ORAL at 05:39

## 2024-02-29 RX ADMIN — CEFEPIME 2000 MG: 2 INJECTION, POWDER, FOR SOLUTION INTRAVENOUS at 00:51

## 2024-02-29 RX ADMIN — REGADENOSON 0.4 MG: 0.08 INJECTION, SOLUTION INTRAVENOUS at 12:13

## 2024-02-29 RX ADMIN — CEFEPIME 2000 MG: 2 INJECTION, POWDER, FOR SOLUTION INTRAVENOUS at 16:30

## 2024-02-29 RX ADMIN — GABAPENTIN 100 MG: 100 CAPSULE ORAL at 19:50

## 2024-02-29 RX ADMIN — CEFEPIME 2000 MG: 2 INJECTION, POWDER, FOR SOLUTION INTRAVENOUS at 07:44

## 2024-02-29 RX ADMIN — GUAIFENESIN SYRUP AND DEXTROMETHORPHAN 5 ML: 100; 10 SYRUP ORAL at 19:05

## 2024-02-29 RX ADMIN — IPRATROPIUM BROMIDE AND ALBUTEROL SULFATE 1 DOSE: 2.5; .5 SOLUTION RESPIRATORY (INHALATION) at 07:53

## 2024-02-29 RX ADMIN — BUDESONIDE AND FORMOTEROL FUMARATE DIHYDRATE 2 PUFF: 160; 4.5 AEROSOL RESPIRATORY (INHALATION) at 07:54

## 2024-02-29 RX ADMIN — ACETAMINOPHEN 650 MG: 325 TABLET ORAL at 19:50

## 2024-02-29 RX ADMIN — DICYCLOMINE HYDROCHLORIDE 20 MG: 20 TABLET ORAL at 14:20

## 2024-02-29 RX ADMIN — IPRATROPIUM BROMIDE AND ALBUTEROL SULFATE 1 DOSE: 2.5; .5 SOLUTION RESPIRATORY (INHALATION) at 15:07

## 2024-02-29 ASSESSMENT — ENCOUNTER SYMPTOMS
SHORTNESS OF BREATH: 1
SINUS PAIN: 0
COLOR CHANGE: 0
VOMITING: 0
WHEEZING: 0
NAUSEA: 0
BACK PAIN: 0
SORE THROAT: 0
SINUS PRESSURE: 0
ABDOMINAL PAIN: 0
CONSTIPATION: 0
COUGH: 0
DIARRHEA: 0

## 2024-02-29 ASSESSMENT — PAIN DESCRIPTION - FREQUENCY: FREQUENCY: INTERMITTENT

## 2024-02-29 ASSESSMENT — PAIN SCALES - GENERAL
PAINLEVEL_OUTOF10: 5
PAINLEVEL_OUTOF10: 0
PAINLEVEL_OUTOF10: 1

## 2024-02-29 ASSESSMENT — PAIN DESCRIPTION - PAIN TYPE: TYPE: ACUTE PAIN

## 2024-02-29 ASSESSMENT — PAIN DESCRIPTION - ORIENTATION: ORIENTATION: ANTERIOR

## 2024-02-29 ASSESSMENT — PAIN DESCRIPTION - DESCRIPTORS: DESCRIPTORS: ACHING

## 2024-02-29 ASSESSMENT — PAIN DESCRIPTION - LOCATION: LOCATION: HEAD

## 2024-02-29 ASSESSMENT — PAIN DESCRIPTION - ONSET: ONSET: GRADUAL

## 2024-02-29 ASSESSMENT — PAIN - FUNCTIONAL ASSESSMENT: PAIN_FUNCTIONAL_ASSESSMENT: ACTIVITIES ARE NOT PREVENTED

## 2024-02-29 NOTE — PROGRESS NOTES
pulmonary      SUBJECTIVE:  seen earlier and remains weak and sob     OBJECTIVE    VITALS:  /84   Pulse 94   Temp 98.2 °F (36.8 °C) (Oral)   Resp 18   Ht 1.65 m (5' 4.96\")   Wt 67 kg (147 lb 11.3 oz)   LMP 01/06/2009 (LMP Unknown)   SpO2 97%   BMI 24.61 kg/m²   HEAD AND FACE EXAM:  No throat injection, no active exudate,no thrush  NECK EXAM;No JVD, no masses, symmetrical  CHEST EXAM; Expansion equal and symmetrical, no masses  LUNG EXAM; Good breath sounds bilaterally. There are expiratory wheezes both lungs, there are crackles at both lung bases  CARDIOVASCULAR EXAM: Positive S1 and S2, no S3 or S4, no clicks ,no murmurs  RIGHT AND LEFT LOWER EXTRIMITY EXAM: No edema, no swelling, no inflamation  CNS EXAM: Alert and oriented X3          LABS   Lab Results   Component Value Date    WBC 7.1 02/29/2024    HGB 8.0 (L) 02/29/2024    HCT 25.9 (L) 02/29/2024    MCV 90.9 02/29/2024     02/29/2024     Lab Results   Component Value Date    CREATININE 0.7 02/29/2024    BUN 14 02/29/2024     02/29/2024    K 5.2 (H) 02/29/2024     02/29/2024    CO2 17 (L) 02/29/2024     Lab Results   Component Value Date    INR 1.4 02/22/2024    PROTIME 17.2 (H) 02/22/2024          Lab Results   Component Value Date/Time    PHOS 3.4 11/28/2023 06:30 AM    PHOS 3.5 03/07/2023 05:14 AM      No results for input(s): \"PH\", \"PO2ART\", \"ZDJ0UUU\", \"HCO3\", \"BEART\", \"O2SAT\" in the last 72 hours.      Wt Readings from Last 3 Encounters:   02/29/24 67 kg (147 lb 11.3 oz)   02/23/24 64 kg (141 lb 1.5 oz)   01/31/24 68.9 kg (152 lb)               ASSESMENT  Ac copd  Pulm fibrosis  Lung ca        PLAN  O2 adm  Bd rx  On po prednisone  For stress test    2/29/2024  Valentin Kahn MD, M.D.

## 2024-02-29 NOTE — PROGRESS NOTES
PHARMACY VANCOMYCIN MONITORING SERVICE  Pharmacy consulted by Dr. Humphries for monitoring and adjustment.    Indication for treatment: Vancomycin indication: HAP  Goal trough: Trough Goal: 15-20 mcg/mL  AUC/ALEJANDRO: 400-600    Risk Factors for MRSA Identified:   Received IV antibiotics within the past 90 days    Pertinent Laboratory Values:   Temp Readings from Last 3 Encounters:   02/29/24 98.2 °F (36.8 °C) (Oral)   02/23/24 97.8 °F (36.6 °C) (Oral)   01/29/24 98.3 °F (36.8 °C) (Oral)     Recent Labs     02/27/24  0354 02/28/24  0508 02/29/24  0540   WBC 11.7* 11.5* 7.1     Recent Labs     02/27/24  0354 02/28/24  0508 02/29/24  0540   BUN 7 14 14   CREATININE 0.6 0.7 0.7     Estimated Creatinine Clearance: 70 mL/min (based on SCr of 0.7 mg/dL).    Intake/Output Summary (Last 24 hours) at 2/29/2024 0907  Last data filed at 2/28/2024 2154  Gross per 24 hour   Intake 250 ml   Output --   Net 250 ml     Last Encounter Weight:  Wt Readings from Last 3 Encounters:   02/29/24 67 kg (147 lb 11.3 oz)   02/23/24 64 kg (141 lb 1.5 oz)   01/31/24 68.9 kg (152 lb)       In: 250   Out: -      Pertinent Cultures:   Date    Source    Results  02/26/24  Blood    NGTD  Strep pneumo antigen   Urine Negative  Legionella antigen    Urine Negative    Vancomycin level:   TROUGH:  No results for input(s): \"VANCOTROUGH\" in the last 72 hours.  RANDOM:    Recent Labs     02/29/24  0540   VANCORANDOM 21.4       Assessment:  SCr, BUN, and urine output: see above  Day(s) of therapy:3 of 7  Vancomycin concentration:   2/29/24 = 21.4    Plan:  Reduce vancomycin from 1000mg IVPB  every 12 hours to 750mg IVPB every 12 hours  Predicted  and trough 14.5  Pharmacy will continue to monitor patient and adjust therapy as indicated    VANCOMYCIN CONCENTRATION SCHEDULED FOR 03/02/24 @ 0600    Thank you for the consult.  Elvis Egan RP  2/29/2024 9:07 AM

## 2024-02-29 NOTE — PROGRESS NOTES
02/29/24 1639   Encounter Summary   Encounter Overview/Reason  Initial Encounter   Service Provided For: Patient   Referral/Consult From: [facility]   Support System Spouse   Last Encounter  02/29/24   Complexity of Encounter Low   Begin Time 1639   End Time  1649   Total Time Calculated 10 min   Spiritual/Emotional needs   Type Spiritual Support   Grief, Loss, and Adjustments   Type Adjustment to illness   Assessment/Intervention/Outcome   Assessment Coping;Peaceful   Intervention Active listening;Empowerment;Sustaining Presence/Ministry of presence   Outcome Coping;Expressed Gratitude;Receptive   Plan and Referrals   Plan/Referrals Continue Support (comment)

## 2024-02-29 NOTE — PROGRESS NOTES
V2.0  [facility]      Name:  Adenike Harvey /Age/Sex: 1955  (69 y.o. female)   MRN & CSN:  [id number] & [id number] Encounter Date/Time: 2024 10:42 AM EST    Location:  [address] [place] [address] PCP: Iban Lou MD       Hospital Day: 4    Assessment and Plan:   Adenike Harvey is a 69 y.o. female  who presents with COPD exacerbation (HCC)      Plan:    Chest pain  EKG with T wave inversions.  Suspect junctional rhythm.  Echo done that showed some akinesis. Pro=BNP elevated to 30k  Cardiology consulted  Stress test done on : Plan for cath tomorrow  Echo with EF slightly reduced with some akinesis    Acute COPD exacerbation  See CTA did show fibrotic changes  Pulm consulted  IV steroids  Breathing treatment    Healthcare associated pneumonia  Patient with findings on imaging as well as an elevated Pro-Tyree.  IV antibiotics: Vancomycin plus cefepime  Follow-up respiratory cultures, blood cultures, and other testing.      Diet Diet NPO Exceptions are: Sips of Water with Meds, Ice Chips   DVT Prophylaxis [x] Lovenox, []  Heparin, [] SCDs, [] Ambulation,    [] Eliquis, [] Xarelto  [] Coumadin [] other   Code Status Full Code   Disposition From: home  Expected Disposition: home  Estimated Date of Discharge: 2-3 days  Patient requires continued admission due to cardiology workup: Pending heart cath   Surrogate Decision Maker/ POA      Personally reviewed Lab Studies and Imaging     Discussed management of the case with cardiology who recommended followin up echo for next steps    EKG interpreted personally and results T-wave inversion    Imaging that was interpreted personally includes CT chest and results fibrotic with possible pneumonia    Drugs that require monitoring for toxicity include vancomycin and the method of monitoring was blood level monitoring    Subjective:     Chief Complaint: Chest Pain     Patient did have a stress test done this morning. No other acute events. Will  redemonstrated.  No new endotracheal tube or nasogastric tube. Marginal osteophytes along the spine but with underpenetration of the spine and lower ribs.     1.  Postsurgical and post radiation changes at the right hilar and paramediastinal margin.  Volume loss in the right lung and tenting of the right hemidiaphragm are stable.  See the recent CT scan for greater details. 2.  No new pneumonia, pleural effusion, or pneumothorax.       Comment: Please note this report has been produced using speech recognition software and may contain errors related to that system including errors in grammar, punctuation, and spelling, as well as words and phrases that may be inappropriate. If there are any questions or concerns please feel free to contact the dictating provider for clarification.     Electronically signed by Stevie Humphries MD on 2/29/2024 at 1:02 PM

## 2024-02-29 NOTE — PROGRESS NOTES
[place] [address]  Phone: [phone]    Fax [phone]                  Tera Livingston MD, [facility]       Alexys Morocho MD, [facility]  Bailee Ceballos MD, [facility]    MD Lili Machado MD Tariq Rizvi, MD Bilal Alam, MD Dr. Waseem Sajjad MD Melissa Kellis, APRN      Elissa White, APRSANTINO Tanner, APRSANTINO Carrillo, APRN  Ish Sanchez PA-C    CARDIOLOGY  NOTE      Name:  Adenike Harvey /Age/Sex: 1955  (69 y.o. female)   MRN & CSN:  [id number] & [id number] Admission Date/Time: 2024  3:59 AM   Location:  [address] [place] [address] PCP: Iban Lou MD       Hospital Day: 4    - Cardiology consult is for:  Chest pain        CARDIOLOGY ATTENDING ADDENDUM    I have seen, spoken to and examined this patient personally, independent of the NP/PAC. I have reviewed the hospital care given to date and reviewed all pertinent labs and imaging. I have spoken with patient, nursing staff and provided written and verbal instructions .The above note has been reviewed. I have spent substantive (>50%) amount of time in formulating patient care.           Physical Exam:       Head: normal  Eye: normal  Chest:  Clear,  0 Basilar crackles   Cardiovascular:  S1S2 Abdomen: soft   Extremities:  0 edema  Pulses :  palpable      MEDICAL DECISION MAKING :            Shortness of breath at rest and with exertion  Atypical chest pain  Abnormal echocardiogram with LV ejection fraction of 45 to 50%, apical septal wall akinesis, apex is hypokinetic.  Hyperlipidemia  Essential hypertension       No prior echocardiograms to compare with  Abnormal stress test with ischemia noted as below  Plan for left heart catheterization tomorrow. NPO after midnight.  Continue with aspirin 81 mg daily  Increase toprol XL 50 mg daily  Continue with high intensity statins: Crestor 40 daily        COPD: Acute exacerbation of  reviewed          Ish Sanhcez PA-C, 2/29/2024 2:08 PM

## 2024-03-01 LAB
ACTIVATED CLOTTING TIME, LOW RANGE: >400 SEC
ANION GAP SERPL CALCULATED.3IONS-SCNC: 11 MMOL/L (ref 7–16)
BASOPHILS ABSOLUTE: 0 K/CU MM
BASOPHILS RELATIVE PERCENT: 0.2 % (ref 0–1)
BUN SERPL-MCNC: 11 MG/DL (ref 6–23)
CALCIUM SERPL-MCNC: 8 MG/DL (ref 8.3–10.6)
CHLORIDE BLD-SCNC: 109 MMOL/L (ref 99–110)
CO2: 19 MMOL/L (ref 21–32)
CREAT SERPL-MCNC: 0.7 MG/DL (ref 0.6–1.1)
DIFFERENTIAL TYPE: ABNORMAL
EKG ATRIAL RATE: 121 BPM
EKG ATRIAL RATE: 95 BPM
EKG DIAGNOSIS: NORMAL
EKG DIAGNOSIS: NORMAL
EKG P AXIS: 52 DEGREES
EKG P AXIS: 72 DEGREES
EKG P-R INTERVAL: 138 MS
EKG P-R INTERVAL: 172 MS
EKG Q-T INTERVAL: 330 MS
EKG Q-T INTERVAL: 374 MS
EKG QRS DURATION: 90 MS
EKG QRS DURATION: 92 MS
EKG QTC CALCULATION (BAZETT): 468 MS
EKG QTC CALCULATION (BAZETT): 469 MS
EKG R AXIS: 1 DEGREES
EKG R AXIS: 12 DEGREES
EKG T AXIS: 72 DEGREES
EKG T AXIS: 94 DEGREES
EKG VENTRICULAR RATE: 121 BPM
EKG VENTRICULAR RATE: 95 BPM
EOSINOPHILS ABSOLUTE: 0 K/CU MM
EOSINOPHILS RELATIVE PERCENT: 0 % (ref 0–3)
GFR SERPL CREATININE-BSD FRML MDRD: >60 ML/MIN/1.73M2
GLUCOSE SERPL-MCNC: 92 MG/DL (ref 70–99)
HCT VFR BLD CALC: 26.5 % (ref 37–47)
HEMOGLOBIN: 8 GM/DL (ref 12.5–16)
IMMATURE NEUTROPHIL %: 3.4 % (ref 0–0.43)
LYMPHOCYTES ABSOLUTE: 0.8 K/CU MM
LYMPHOCYTES RELATIVE PERCENT: 13.4 % (ref 24–44)
MCH RBC QN AUTO: 27.7 PG (ref 27–31)
MCHC RBC AUTO-ENTMCNC: 30.2 % (ref 32–36)
MCV RBC AUTO: 91.7 FL (ref 78–100)
MONOCYTES ABSOLUTE: 0.4 K/CU MM
MONOCYTES RELATIVE PERCENT: 7 % (ref 0–4)
MRSA, DNA, NASAL: NEGATIVE
NUCLEATED RBC %: 0.3 %
PDW BLD-RTO: 17.3 % (ref 11.7–14.9)
PLATELET # BLD: 336 K/CU MM (ref 140–440)
PMV BLD AUTO: 9.9 FL (ref 7.5–11.1)
POTASSIUM SERPL-SCNC: 4.4 MMOL/L (ref 3.5–5.1)
RBC # BLD: 2.89 M/CU MM (ref 4.2–5.4)
SEGMENTED NEUTROPHILS ABSOLUTE COUNT: 4.5 K/CU MM
SEGMENTED NEUTROPHILS RELATIVE PERCENT: 76 % (ref 36–66)
SODIUM BLD-SCNC: 139 MMOL/L (ref 135–145)
SPECIMEN DESCRIPTION: NORMAL
TOTAL IMMATURE NEUTOROPHIL: 0.2 K/CU MM
TOTAL NUCLEATED RBC: 0 K/CU MM
WBC # BLD: 6 K/CU MM (ref 4–10.5)

## 2024-03-01 PROCEDURE — C1725 CATH, TRANSLUMIN NON-LASER: HCPCS | Performed by: INTERNAL MEDICINE

## 2024-03-01 PROCEDURE — 6360000002 HC RX W HCPCS: Performed by: STUDENT IN AN ORGANIZED HEALTH CARE EDUCATION/TRAINING PROGRAM

## 2024-03-01 PROCEDURE — 93010 ELECTROCARDIOGRAM REPORT: CPT | Performed by: INTERNAL MEDICINE

## 2024-03-01 PROCEDURE — C1894 INTRO/SHEATH, NON-LASER: HCPCS | Performed by: INTERNAL MEDICINE

## 2024-03-01 PROCEDURE — 6360000004 HC RX CONTRAST MEDICATION: Performed by: INTERNAL MEDICINE

## 2024-03-01 PROCEDURE — 94761 N-INVAS EAR/PLS OXIMETRY MLT: CPT

## 2024-03-01 PROCEDURE — 2580000003 HC RX 258: Performed by: HOSPITALIST

## 2024-03-01 PROCEDURE — 6370000000 HC RX 637 (ALT 250 FOR IP): Performed by: INTERNAL MEDICINE

## 2024-03-01 PROCEDURE — 85347 COAGULATION TIME ACTIVATED: CPT

## 2024-03-01 PROCEDURE — 2580000003 HC RX 258: Performed by: INTERNAL MEDICINE

## 2024-03-01 PROCEDURE — C9600 PERC DRUG-EL COR STENT SING: HCPCS | Performed by: INTERNAL MEDICINE

## 2024-03-01 PROCEDURE — 92920 PRQ TRLUML C ANGIOP 1ART&/BR: CPT | Performed by: INTERNAL MEDICINE

## 2024-03-01 PROCEDURE — 6370000000 HC RX 637 (ALT 250 FOR IP)

## 2024-03-01 PROCEDURE — 93005 ELECTROCARDIOGRAM TRACING: CPT | Performed by: INTERNAL MEDICINE

## 2024-03-01 PROCEDURE — 027034Z DILATION OF CORONARY ARTERY, ONE ARTERY WITH DRUG-ELUTING INTRALUMINAL DEVICE, PERCUTANEOUS APPROACH: ICD-10-PCS | Performed by: INTERNAL MEDICINE

## 2024-03-01 PROCEDURE — 2500000003 HC RX 250 WO HCPCS: Performed by: INTERNAL MEDICINE

## 2024-03-01 PROCEDURE — C1887 CATHETER, GUIDING: HCPCS | Performed by: INTERNAL MEDICINE

## 2024-03-01 PROCEDURE — 93458 L HRT ARTERY/VENTRICLE ANGIO: CPT | Performed by: INTERNAL MEDICINE

## 2024-03-01 PROCEDURE — C1769 GUIDE WIRE: HCPCS | Performed by: INTERNAL MEDICINE

## 2024-03-01 PROCEDURE — B2111ZZ FLUOROSCOPY OF MULTIPLE CORONARY ARTERIES USING LOW OSMOLAR CONTRAST: ICD-10-PCS | Performed by: INTERNAL MEDICINE

## 2024-03-01 PROCEDURE — 94640 AIRWAY INHALATION TREATMENT: CPT

## 2024-03-01 PROCEDURE — 4A023N7 MEASUREMENT OF CARDIAC SAMPLING AND PRESSURE, LEFT HEART, PERCUTANEOUS APPROACH: ICD-10-PCS | Performed by: INTERNAL MEDICINE

## 2024-03-01 PROCEDURE — 97116 GAIT TRAINING THERAPY: CPT

## 2024-03-01 PROCEDURE — 2709999900 HC NON-CHARGEABLE SUPPLY: Performed by: INTERNAL MEDICINE

## 2024-03-01 PROCEDURE — 2580000003 HC RX 258: Performed by: STUDENT IN AN ORGANIZED HEALTH CARE EDUCATION/TRAINING PROGRAM

## 2024-03-01 PROCEDURE — 94669 MECHANICAL CHEST WALL OSCILL: CPT

## 2024-03-01 PROCEDURE — 36415 COLL VENOUS BLD VENIPUNCTURE: CPT

## 2024-03-01 PROCEDURE — 92928 PRQ TCAT PLMT NTRAC ST 1 LES: CPT | Performed by: INTERNAL MEDICINE

## 2024-03-01 PROCEDURE — 2140000000 HC CCU INTERMEDIATE R&B

## 2024-03-01 PROCEDURE — 6360000002 HC RX W HCPCS: Performed by: INTERNAL MEDICINE

## 2024-03-01 PROCEDURE — C1874 STENT, COATED/COV W/DEL SYS: HCPCS | Performed by: INTERNAL MEDICINE

## 2024-03-01 PROCEDURE — 97530 THERAPEUTIC ACTIVITIES: CPT

## 2024-03-01 PROCEDURE — B2151ZZ FLUOROSCOPY OF LEFT HEART USING LOW OSMOLAR CONTRAST: ICD-10-PCS | Performed by: INTERNAL MEDICINE

## 2024-03-01 PROCEDURE — 94664 DEMO&/EVAL PT USE INHALER: CPT

## 2024-03-01 PROCEDURE — 97163 PT EVAL HIGH COMPLEX 45 MIN: CPT

## 2024-03-01 PROCEDURE — 92921 PR PRQ TRLUML CORONARY ANGIOPLASTY ADDL BRANCH: CPT | Performed by: INTERNAL MEDICINE

## 2024-03-01 PROCEDURE — 87641 MR-STAPH DNA AMP PROBE: CPT

## 2024-03-01 PROCEDURE — APPNB15 APP NON BILLABLE TIME 0-15 MINS

## 2024-03-01 PROCEDURE — 85025 COMPLETE CBC W/AUTO DIFF WBC: CPT

## 2024-03-01 PROCEDURE — 80048 BASIC METABOLIC PNL TOTAL CA: CPT

## 2024-03-01 PROCEDURE — 92921 HC PRQ CARDIAC ANGIO ADDL ART: CPT | Performed by: INTERNAL MEDICINE

## 2024-03-01 DEVICE — STENT CORONARY ONYX FRONTIER RX 3X26 MM ZOTAROLIMUS ELUT: Type: IMPLANTABLE DEVICE | Status: FUNCTIONAL

## 2024-03-01 RX ORDER — HEPARIN SODIUM 10000 [USP'U]/ML
INJECTION, SOLUTION INTRAVENOUS; SUBCUTANEOUS PRN
Status: DISCONTINUED | OUTPATIENT
Start: 2024-03-01 | End: 2024-03-01 | Stop reason: HOSPADM

## 2024-03-01 RX ORDER — SODIUM CHLORIDE 0.9 % (FLUSH) 0.9 %
5-40 SYRINGE (ML) INJECTION PRN
Status: DISCONTINUED | OUTPATIENT
Start: 2024-03-01 | End: 2024-03-02 | Stop reason: HOSPADM

## 2024-03-01 RX ORDER — ACETAMINOPHEN 325 MG/1
650 TABLET ORAL EVERY 4 HOURS PRN
Status: DISCONTINUED | OUTPATIENT
Start: 2024-03-01 | End: 2024-03-02 | Stop reason: HOSPADM

## 2024-03-01 RX ORDER — SODIUM CHLORIDE 9 MG/ML
INJECTION, SOLUTION INTRAVENOUS CONTINUOUS
Status: DISCONTINUED | OUTPATIENT
Start: 2024-03-01 | End: 2024-03-02

## 2024-03-01 RX ORDER — ASPIRIN 81 MG/1
81 TABLET, CHEWABLE ORAL DAILY
Status: DISCONTINUED | OUTPATIENT
Start: 2024-03-02 | End: 2024-03-02 | Stop reason: HOSPADM

## 2024-03-01 RX ORDER — CLOPIDOGREL BISULFATE 75 MG/1
75 TABLET ORAL DAILY
Status: DISCONTINUED | OUTPATIENT
Start: 2024-03-02 | End: 2024-03-02 | Stop reason: HOSPADM

## 2024-03-01 RX ORDER — SODIUM CHLORIDE 0.9 % (FLUSH) 0.9 %
5-40 SYRINGE (ML) INJECTION EVERY 12 HOURS SCHEDULED
Status: DISCONTINUED | OUTPATIENT
Start: 2024-03-01 | End: 2024-03-02 | Stop reason: HOSPADM

## 2024-03-01 RX ORDER — CLOPIDOGREL 300 MG/1
TABLET, FILM COATED ORAL PRN
Status: DISCONTINUED | OUTPATIENT
Start: 2024-03-01 | End: 2024-03-01 | Stop reason: HOSPADM

## 2024-03-01 RX ORDER — EPTIFIBATIDE 2 MG/ML
INJECTION, SOLUTION INTRAVENOUS PRN
Status: DISCONTINUED | OUTPATIENT
Start: 2024-03-01 | End: 2024-03-01 | Stop reason: HOSPADM

## 2024-03-01 RX ORDER — ROSUVASTATIN CALCIUM 20 MG/1
40 TABLET, COATED ORAL NIGHTLY
Status: DISCONTINUED | OUTPATIENT
Start: 2024-03-01 | End: 2024-03-02 | Stop reason: HOSPADM

## 2024-03-01 RX ORDER — ASPIRIN 325 MG
TABLET ORAL PRN
Status: DISCONTINUED | OUTPATIENT
Start: 2024-03-01 | End: 2024-03-01 | Stop reason: HOSPADM

## 2024-03-01 RX ORDER — SODIUM CHLORIDE 9 MG/ML
INJECTION, SOLUTION INTRAVENOUS PRN
Status: DISCONTINUED | OUTPATIENT
Start: 2024-03-01 | End: 2024-03-02 | Stop reason: HOSPADM

## 2024-03-01 RX ADMIN — ROSUVASTATIN CALCIUM 40 MG: 20 TABLET, COATED ORAL at 21:03

## 2024-03-01 RX ADMIN — SODIUM CHLORIDE: 9 INJECTION, SOLUTION INTRAVENOUS at 08:53

## 2024-03-01 RX ADMIN — SODIUM CHLORIDE 25 ML: 9 INJECTION, SOLUTION INTRAVENOUS at 00:51

## 2024-03-01 RX ADMIN — PREDNISONE 40 MG: 20 TABLET ORAL at 12:32

## 2024-03-01 RX ADMIN — IPRATROPIUM BROMIDE AND ALBUTEROL SULFATE 1 DOSE: 2.5; .5 SOLUTION RESPIRATORY (INHALATION) at 15:38

## 2024-03-01 RX ADMIN — DICYCLOMINE HYDROCHLORIDE 20 MG: 20 TABLET ORAL at 12:31

## 2024-03-01 RX ADMIN — CEFEPIME 2000 MG: 2 INJECTION, POWDER, FOR SOLUTION INTRAVENOUS at 17:22

## 2024-03-01 RX ADMIN — DICYCLOMINE HYDROCHLORIDE 20 MG: 20 TABLET ORAL at 21:03

## 2024-03-01 RX ADMIN — VANCOMYCIN HYDROCHLORIDE 750 MG: 750 INJECTION, POWDER, LYOPHILIZED, FOR SOLUTION INTRAVENOUS at 05:02

## 2024-03-01 RX ADMIN — GABAPENTIN 100 MG: 100 CAPSULE ORAL at 12:31

## 2024-03-01 RX ADMIN — IPRATROPIUM BROMIDE AND ALBUTEROL SULFATE 1 DOSE: 2.5; .5 SOLUTION RESPIRATORY (INHALATION) at 07:16

## 2024-03-01 RX ADMIN — DICYCLOMINE HYDROCHLORIDE 20 MG: 20 TABLET ORAL at 16:13

## 2024-03-01 RX ADMIN — VANCOMYCIN HYDROCHLORIDE 750 MG: 750 INJECTION, POWDER, LYOPHILIZED, FOR SOLUTION INTRAVENOUS at 16:10

## 2024-03-01 RX ADMIN — GABAPENTIN 100 MG: 100 CAPSULE ORAL at 21:03

## 2024-03-01 RX ADMIN — CEFEPIME 2000 MG: 2 INJECTION, POWDER, FOR SOLUTION INTRAVENOUS at 09:03

## 2024-03-01 RX ADMIN — BUDESONIDE AND FORMOTEROL FUMARATE DIHYDRATE 2 PUFF: 160; 4.5 AEROSOL RESPIRATORY (INHALATION) at 07:17

## 2024-03-01 RX ADMIN — Medication 1 CAPSULE: at 12:31

## 2024-03-01 RX ADMIN — METOPROLOL SUCCINATE 50 MG: 50 TABLET, EXTENDED RELEASE ORAL at 09:00

## 2024-03-01 RX ADMIN — CEFEPIME 2000 MG: 2 INJECTION, POWDER, FOR SOLUTION INTRAVENOUS at 00:52

## 2024-03-01 RX ADMIN — SODIUM CHLORIDE: 9 INJECTION, SOLUTION INTRAVENOUS at 12:33

## 2024-03-01 ASSESSMENT — ENCOUNTER SYMPTOMS
WHEEZING: 0
ABDOMINAL PAIN: 0
COUGH: 0
COLOR CHANGE: 0
SINUS PRESSURE: 0
DIARRHEA: 0
BACK PAIN: 0
VOMITING: 0
NAUSEA: 0
SHORTNESS OF BREATH: 1
SORE THROAT: 0
SINUS PAIN: 0
CONSTIPATION: 0

## 2024-03-01 ASSESSMENT — PAIN SCALES - GENERAL
PAINLEVEL_OUTOF10: 0

## 2024-03-01 ASSESSMENT — PAIN SCALES - WONG BAKER
WONGBAKER_NUMERICALRESPONSE: 0
WONGBAKER_NUMERICALRESPONSE: 0

## 2024-03-01 NOTE — PROGRESS NOTES
Procedure note  Left cardiac catheterization selective coronary angiography and ventriculography  Cutting Balloon angioplasty of the LAD  Double wiring of the LAD and diagonal  Stenting of the LAD    Medication  Angina pectoris and dyspnea along with abnormal echo    Findings  The left main is a large tubular vessel  The circumflex vessel is a dominant vessel has no significant stenosis  The right coronary artery is a small caliber vessel is diffusely diseased  The left and descending artery had a 99% stenosis in its proximal segment around the bifurcation both LAD and diagonal wired we did r Cutting Balloon angioplasty in the LAD and stented the LAD in the proximal segment was dilated with a 3.5 balloon taking the dimensions up to 3.9 of the proximal segment  The diagonal was not jailed wire was removed at the end    The LVEF is reduced with EF about 40% with anterior wall hypokinesis    Assessment:  Successful PCI of the LAD  Will continue with DAPT  To closely monitor  Risk factor modification will be continued

## 2024-03-01 NOTE — PROGRESS NOTES
[place] [address]  Phone: [phone]    Fax [phone]                  Tera Livingston MD, [facility]       Alexys Morocho MD, [facility]  Bailee Ceballos MD, [facility]    MD Lili Machado MD Tariq Rizvi, MD Bilal Alam, MD Dr. Waseem Sajjad MD Melissa Kellis, APRN      Elissa White, APRSANTINO Tanner, APRSANTINO Carrillo, APRN  Ish Sanchez PA-C    CARDIOLOGY  NOTE      Name:  Adenike Harvey /Age/Sex: 1955  (69 y.o. female)   MRN & CSN:  [id number] & [id number] Admission Date/Time: 2024  3:59 AM   Location:  [address] [place] [address] PCP: Iban Lou MD       Hospital Day: 5    - Cardiology consult is for:  Chest pain        CARDIOLOGY ATTENDING ADDENDUM    I have seen, spoken to and examined this patient personally, independent of the NP/PAC. I have reviewed the hospital care given to date and reviewed all pertinent labs and imaging. I have spoken with patient, nursing staff and provided written and verbal instructions .The above note has been reviewed. I have spent substantive (>50%) amount of time in formulating patient care.               Physical Exam:       Head: normal  Eye: normal  Chest:  + wheezing  Cardiovascular:  S1S2    Abdomen: soft   Extremities:    0 edema  Pulses :   palpable      MEDICAL DECISION MAKING :         Shortness of breath at rest and with exertion  Atypical chest pain  Abnormal echocardiogram with LV ejection fraction of 45 to 50%, apical septal wall akinesis, apex is hypokinetic.  Hyperlipidemia  Essential hypertension       No prior echocardiograms to compare with  Abnormal stress test with ischemia noted as below    Left heart catheterization :  99% LAD s/p PCI.  Residual 50% stenosis in OM1, if symptomatic will further evaluate.       -Continue dual antiplatelet therapy with aspirin and Plavix for at least 6 months.    continue Toprol-XL 50 mg  status is at baseline.   Psychiatric:         Behavior: Behavior is cooperative.          Medications:    vancomycin  750 mg IntraVENous Q12H    metoprolol succinate  50 mg Oral Daily    ipratropium 0.5 mg-albuterol 2.5 mg  1 Dose Inhalation 4x Daily RT    aspirin  81 mg Oral Daily    predniSONE  40 mg Oral Daily    rosuvastatin  40 mg Oral Nightly    cefepime  2,000 mg IntraVENous q8h    lactobacillus  1 capsule Oral Daily with breakfast    budesonide-formoterol  2 puff Inhalation BID    gabapentin  100 mg Oral TID    dicyclomine  20 mg Oral 4x Daily    sodium chloride flush  5-40 mL IntraVENous 2 times per day    enoxaparin  40 mg SubCUTAneous Daily      dextrose      sodium chloride 25 mL/hr at 03/01/24 0853     levalbuterol, ipratropium 0.5 mg-albuterol 2.5 mg, glucose, dextrose bolus **OR** dextrose bolus, glucagon (rDNA), dextrose, sodium chloride flush, sodium chloride, ondansetron **OR** ondansetron, polyethylene glycol, acetaminophen **OR** acetaminophen, guaiFENesin-dextromethorphan, benzonatate, ketorolac    Lab Data:  CBC:   Recent Labs     02/28/24  0508 02/29/24  0540 03/01/24  0814   WBC 11.5* 7.1 6.0   HGB 8.6* 8.0* 8.0*   HCT 27.4* 25.9* 26.5*   MCV 89.0 90.9 91.7    349 336       BMP:   Recent Labs     02/28/24  0508 02/29/24  0540 03/01/24  0814    135 139   K 5.0 5.2* 4.4    108 109   CO2 16* 17* 19*   BUN 14 14 11   CREATININE 0.7 0.7 0.7       LIVER PROFILE:   No results for input(s): \"AST\", \"ALT\", \"LIPASE\", \"AMYLASE\", \"ALB\", \"BILIDIR\", \"BILITOT\", \"ALKPHOS\" in the last 72 hours.    PT/INR: No results for input(s): \"PROTIME\", \"INR\" in the last 72 hours.  APTT: No results for input(s): \"APTT\" in the last 72 hours.  BNP:  No results for input(s): \"BNP\" in the last 72 hours.  TROPONIN: No results for input(s): \"TROPONINT\" in the last 72 hours.  Labs, consult, tests reviewed          Ish Sanchez PA-C, 3/1/2024 10:37 AM

## 2024-03-01 NOTE — FLOWSHEET NOTE
TRANSFER NOTE.    Received patient AOX4 in no distress. Accompanied by RN and STNA and safely connected to the cardiac monitor. Patient is oriented to room, all safety measures in place. Call light within pt reach. Patient stable as per VS below.     03/01/24 0214   Vitals   Temp 97.9 °F (36.6 °C)   Temp Source Oral   Pulse 96   Heart Rate Source Monitor   Respirations 20   /77   MAP (Calculated) 88   MAP (mmHg) 83   BP Location Right upper arm   BP Upper/Lower Upper   BP Method Automatic   Patient Position Semi fowlers   Cardiac Rhythm Sinus rhythm   Pain Assessment   Pain Assessment 0-10   Pain Level 0   Villalobos-Baker Pain Rating 0   Patient's Stated Pain Goal 0 - No pain   Oxygen Therapy   SpO2 98 %   Pulse Oximetry Type Intermittent   Pulse Oximeter Device Mode Intermittent   Pulse Oximeter Device Location Right;Finger   O2 Device None (Room air)

## 2024-03-01 NOTE — PROGRESS NOTES
V2.0  [facility] Hospitalist Progress Note      Name:  Adenike Harvey /Age/Sex: 1955  (69 y.o. female)   MRN & CSN:  [id number] & [id number] Encounter Date/Time: 3/1/2024 10:42 AM EST    Location:  [facility] PCP: Iban Lou MD       Hospital Day: 5    Assessment and Plan:   Adenike Harvey is a 69 y.o. female  who presents with COPD exacerbation (HCC)      Plan:    Chest pain  EKG with T wave inversions.  Suspect junctional rhythm.  Echo done that showed some akinesis. Pro=BNP elevated to 30k  Cardiology consulted  Cath: 99% LAD blockage, stents placed  Echo with EF slightly reduced with some akinesis  DAPT + Statin  PT/OT    Acute COPD exacerbation  See CTA did show fibrotic changes  Pulm consulted  steroids  Breathing treatment    Healthcare associated pneumonia  Patient with findings on imaging as well as an elevated Pro-Tyree.  IV antibiotics: cefepime  Follow-up respiratory cultures, blood cultures, and other testing.      Diet ADULT DIET; Regular; 4 carb choices (60 gm/meal); Low Fat/Low Chol/High Fiber/2 gm Na   DVT Prophylaxis [x] Lovenox, []  Heparin, [] SCDs, [] Ambulation,    [] Eliquis, [] Xarelto  [] Coumadin [] other   Code Status Full Code   Disposition From: home  Expected Disposition: home  Estimated Date of Discharge: 2-3 days  Patient requires continued admission due to cardiology workup: Pending heart cath   Surrogate Decision Maker/ POA      Personally reviewed Lab Studies and Imaging     Discussed management of the case with cardiology who recommended followin up echo for next steps    EKG interpreted personally and results T-wave inversion    Imaging that was interpreted personally includes CT chest and results fibrotic with possible pneumonia    Drugs that require monitoring for toxicity include vancomycin and the method of monitoring was blood level monitoring    Subjective:     Chief Complaint: Chest Pain     Patient with her cath done today and had stents

## 2024-03-01 NOTE — PROGRESS NOTES
CHART REVIEWED  PT EXAMINED  PROCEDURE DW PT  RISKS BENEFITS AND ALERTNATIVES EXPLAINED IN DETAIL  CONSENT OBTANED

## 2024-03-01 NOTE — DISCHARGE SUMMARY
Assessment: Successful PCI of the LAD Will continue with DAPT To closely monitor Risk factor modification will be continued         Result Date: 3/1/2024  Table formatting from the original result was not included. Procedure note Left cardiac catheterization selective coronary angiography and ventriculography Cutting Balloon angioplasty of the LAD Double wiring of the LAD and diagonal Stenting of the LAD  Medication Angina pectoris and dyspnea along with abnormal echo  Findings The left main is a large tubular vessel The circumflex vessel is a dominant vessel has no significant stenosis The right coronary artery is a small caliber vessel is diffusely diseased The left and descending artery had a 99% stenosis in its proximal segment around the bifurcation both LAD and diagonal wired we did r Cutting Balloon angioplasty in the LAD and stented the LAD in the proximal segment was dilated with a 3.5 balloon taking the dimensions up to 3.9 of the proximal segment The diagonal was not jailed wire was removed at the end  The LVEF is reduced with EF about 40% with anterior wall hypokinesis  Assessment: Successful PCI of the LAD Will continue with DAPT To closely monitor Risk factor modification will be continued         Nuclear stress test with myocardial perfusion    Result Date: 2/29/2024    Stress Combined Conclusion: Findings suggest a moderate risk of cardiac events.   Perfusion Defect: There is a left ventricular stress perfusion defect that is large in size present in the inferior and inferolateral segment(s). The defect is consistent with abnormal perfusion in the LCx or RCA territories.   Stress Test: A pharmacological stress test was performed using lexiscan. The patient reported no symptoms during the stress test. Abnormal Stress MPI Keep NPO after mid night Recommend LHC in morning Medical therapy        CBC:   Recent Labs     02/28/24  0508 02/29/24  0540 03/01/24  0814   WBC 11.5* 7.1 6.0   HGB 8.6* 8.0* 8.0*

## 2024-03-01 NOTE — ADT AUTH CERT
Patient/family refused Yu Mesa   158 Given 02/27/24 2000(s)   02/27/24 2000(a)   02/27/24 2010(r) 1 Dose   Inhalation      Elsie Arita   159 Given 02/26/24 1200(s)   02/26/24 1113(a)   02/26/24 1113(r) 1 Dose   Inhalation      Cosme Casas   160 Given 02/26/24 1600(s)   02/26/24 1502(a)   02/26/24 1502(r) 1 Dose   Inhalation      Cosme Casas   161 Given 02/26/24 2000(s)   02/26/24 1959(a)   02/26/24 2000(r) 1 Dose   Inhalation      KassiDerickna   162 Given 02/27/24 0800(s)   02/27/24 0827(a)   02/27/24 0828(r) 1 Dose   Inhalation      Yu Mesa     Held 02/27/24 0739(a)   02/27/24 0739(r) 0 Dose   Inhalation     pt wants to eat breakfast   Reason: Patient/family refused Yu Mesa   163 Not Given 02/27/24 1200(s)   02/27/24 1115(a)   02/27/24 1116(r) 1 Dose   Inhalation     pt refused, does not want more than once a day unless she ask for a PRN tx   Reason: Patient/family refused Yu Mesa   164 Given 02/26/24 1700(s)   02/26/24 1733(a)   02/26/24 1733(r) 40 mg   IntraVENous      Sandra Kohler   165 Given 02/27/24 0500(s)   02/27/24 0355(a)   02/27/24 0355(r) 40 mg   IntraVENous      Colette Serrano   166 Given 02/27/24 1700(s)   02/27/24 1846(a)   02/27/24 1851(r) 40 mg   IntraVENous      Brandie Jenkins   167 Given 02/28/24 0500(s)   02/28/24 0604(a)   02/28/24 0604(r) 40 mg   IntraVENous      Claudette Zayas   168 Given 02/28/24 2100(s)   02/28/24 2153(a)   02/28/24 2154(r) 25 mg   Oral      Ramirez, Kelton J   169 Not Given 02/29/24 0900(s)   02/29/24 0747(a)   02/29/24 0747(r) 25 mg   Oral     Reason: Pt NPO Katharine Hebert   170 Given 02/28/24 0945(s)   02/28/24 1040(a)   02/28/24 1041(r) 25 mg   Oral      Katharine Hebert   171 New Bag 02/27/24 2300(s)   02/27/24 2301(a)   02/27/24 2304(r) 1,000 mg 250 mL/hr IntraVENous   60 Minutes  Colette Serrano   172 Stopped 02/28/24 0001(s)   02/28/24 0001(a)   02/28/24 0033(r) 0 mg 0 mL/hr IntraVENous    60 Minutes  Colette Serrano     Due (Stopped) 02/28/24 0001(s)   02/27/24 2304(r)     IntraVENous      Colette Serranoado   173 New Bag 02/28/24 1100(s)   02/28/24 1212(a)   02/28/24 1218(r) 1,000 mg 250 mL/hr IntraVENous   60 Minutes  Katharine Hebert   174 Stopped 02/28/24 1312(s)   02/28/24 1311(a)   02/28/24 1311(r) 0 mg 0 mL/hr IntraVENous   60 Minutes  Katharine Hebert     Due (Stopped) 02/28/24 1312(s)   02/28/24 1218(r)     IntraVENous      Katharine Hebert   175 New Bag 02/28/24 2300(s)   02/28/24 2320(a)   02/28/24 2321(r) 1,000 mg 250 mL/hr IntraVENous   60 Minutes  Kelton Ramirez   176 Stopped 02/29/24 0020(s)   02/29/24 0043(a)   02/29/24 0043(r) 0 mg 0 mL/hr IntraVENous   60 Minutes  Kelton Ramirez     Due (Stopped) 02/29/24 0020(s)   02/28/24 2321(r)     IntraVENous      Kelton Ramirez     (s)=scheduled time   (a)=action time   (r)=recorded time

## 2024-03-01 NOTE — PROGRESS NOTES
pulmonary      SUBJECTIVE:  seen early am and stable     OBJECTIVE    VITALS:  /84   Pulse 95   Temp 98.2 °F (36.8 °C) (Oral)   Resp 18   Ht 1.65 m (5' 4.96\")   Wt 67.3 kg (148 lb 5.9 oz)   LMP 01/06/2009 (LMP Unknown)   SpO2 98%   BMI 24.72 kg/m²   HEAD AND FACE EXAM:  No throat injection, no active exudate,no thrush  NECK EXAM;No JVD, no masses, symmetrical  CHEST EXAM; Expansion equal and symmetrical, no masses  LUNG EXAM; Good breath sounds bilaterally. There are expiratory wheezes both lungs, there are crackles at both lung bases  CARDIOVASCULAR EXAM: Positive S1 and S2, no S3 or S4, no clicks ,no murmurs  RIGHT AND LEFT LOWER EXTRIMITY EXAM: No edema, no swelling, no inflamation  CNS EXAM: Alert and oriented X3          LABS   Lab Results   Component Value Date    WBC 7.1 02/29/2024    HGB 8.0 (L) 02/29/2024    HCT 25.9 (L) 02/29/2024    MCV 90.9 02/29/2024     02/29/2024     Lab Results   Component Value Date    CREATININE 0.7 02/29/2024    BUN 14 02/29/2024     02/29/2024    K 5.2 (H) 02/29/2024     02/29/2024    CO2 17 (L) 02/29/2024     Lab Results   Component Value Date    INR 1.4 02/22/2024    PROTIME 17.2 (H) 02/22/2024          Lab Results   Component Value Date/Time    PHOS 3.4 11/28/2023 06:30 AM    PHOS 3.5 03/07/2023 05:14 AM      No results for input(s): \"PH\", \"PO2ART\", \"XHH2DLT\", \"HCO3\", \"BEART\", \"O2SAT\" in the last 72 hours.      Wt Readings from Last 3 Encounters:   03/01/24 67.3 kg (148 lb 5.9 oz)   02/23/24 64 kg (141 lb 1.5 oz)   01/31/24 68.9 kg (152 lb)               ASSESMENT  Ac copd  Pulm fibrosis  Lung ca        PLAN  Cpm  Bd rx  Cardiac cath today    3/1/2024  Valentin Kahn MD, M.D.

## 2024-03-01 NOTE — PROGRESS NOTES
Physician Progress Note      PATIENT:               ELLEN BURGER  [facility] #:                  [id number]  :                       1955  ADMIT DATE:       2024 1:28 PM  DISCH DATE:        2024 11:49 AM  RESPONDING  PROVIDER #:        Amanda Ferrara MD          QUERY TEXT:    Patient admitted with sepsis. Noted to have documentation of weight loss,   dietician assessment with malnutrition diagnosis, etc. in 24 Nutrition   notes. If possible, please document in progress notes and discharge summary if   you are evaluating and /or treating any of the following:    The medical record reflects the following:  Risk Factors: severe sepsis, PMHx of moderate COPD, HTN and GERD, lung ca.  Clinical Indicators: Nutrition Diagnosis: Severe malnutrition, In context of   chronic illness related to catabolic illness, inadequate protein-energy   intake, altered GI function, impaired respiratory function as evidenced by   nausea, mild muscle loss, weight loss 7.5% in 3 months, poor intake prior to   admission  Treatment: Nutrition consult, Continue regular diet, Offer standard and frozen   oral nutrition supplements daily, Please encourage and document po intakes at   all meals, Monitor weights, intakes, labs, POC    Thank you, Terese Marti RN, CDS [phone]    ASPEN Criteria:    https://aspenjournals.onlinelibrary.hilliard.com/doi/full/10.1177/402906535212497  5  Options provided:  -- Protein calorie malnutrition severe  -- Other - I will add my own diagnosis  -- Disagree - Not applicable / Not valid  -- Disagree - Clinically unable to determine / Unknown  -- Refer to Clinical Documentation Reviewer    PROVIDER RESPONSE TEXT:    This patient has severe protein calorie malnutrition.    Query created by: Terese Marti on 2024 7:27 AM      Electronically signed by:  Amanda Ferrara MD 3/1/2024 12:32 PM

## 2024-03-01 NOTE — PROGRESS NOTES
PHARMACY VANCOMYCIN MONITORING SERVICE  Pharmacy consulted by Dr. Stevie Humphries for monitoring and adjustment.    Indication for treatment: Vancomycin indication: HAP  Goal trough: Trough Goal: 15-20 mcg/mL  AUC/ALEJANDRO: 400-600    Risk Factors for MRSA Identified:   Hospitalization within the past 90 days, Received IV antibiotics within the past 90 days    Pertinent Laboratory Values:   Temp Readings from Last 3 Encounters:   03/01/24 98.2 °F (36.8 °C) (Oral)   02/23/24 97.8 °F (36.6 °C) (Oral)   01/29/24 98.3 °F (36.8 °C) (Oral)     Recent Labs     02/28/24  0508 02/29/24  0540 03/01/24  0814   WBC 11.5* 7.1 6.0     Recent Labs     02/28/24  0508 02/29/24  0540 03/01/24  0814   BUN 14 14 11   CREATININE 0.7 0.7 0.7     Estimated Creatinine Clearance: 70 mL/min (based on SCr of 0.7 mg/dL).    Intake/Output Summary (Last 24 hours) at 3/1/2024 0927  Last data filed at 3/1/2024 0600  Gross per 24 hour   Intake 546.56 ml   Output 0 ml   Net 546.56 ml     Last Encounter Weight:  Wt Readings from Last 3 Encounters:   03/01/24 67.3 kg (148 lb 5.9 oz)   02/23/24 64 kg (141 lb 1.5 oz)   01/31/24 68.9 kg (152 lb)       In: 546.6   Out: 0      Pertinent Cultures:   Date    Source    Results  02/26   Blood    NG at 4 days  02/26   Sputum/Respiratory  Normal Resp Cathy, Resp Panel Negative  02/26   MRSA Nasal   Reordered    Vancomycin level:   TROUGH:  No results for input(s): \"VANCOTROUGH\" in the last 72 hours.  RANDOM:    Recent Labs     02/29/24  0540   VANCORANDOM 21.4       Assessment:  HPI: Adenike Harvey is a 69 yoF with a PMH of HLD, HTN, COPD, Arthritis and Blood Clots. She was admitted on 02/26 for a COPD exacerbation.   Interval History:   Pt Tx to 3N for heart cath planned for today.  SCr, BUN, and urine output:   Scr stable at 0.7 mg/dL, appears to be baseline.  BUN WNL  Urine output not documented  Day(s) of therapy: 4 / 7  Vancomycin concentration:   02/29 - 4.5 hours after last dose of 1000 mg IV q12 hours.

## 2024-03-02 VITALS
DIASTOLIC BLOOD PRESSURE: 78 MMHG | HEART RATE: 94 BPM | TEMPERATURE: 97.9 F | SYSTOLIC BLOOD PRESSURE: 121 MMHG | HEIGHT: 65 IN | RESPIRATION RATE: 24 BRPM | BODY MASS INDEX: 24.72 KG/M2 | OXYGEN SATURATION: 97 % | WEIGHT: 148.37 LBS

## 2024-03-02 LAB
ANION GAP SERPL CALCULATED.3IONS-SCNC: 9 MMOL/L (ref 7–16)
BASOPHILS ABSOLUTE: 0 K/CU MM
BASOPHILS RELATIVE PERCENT: 0.2 % (ref 0–1)
BUN SERPL-MCNC: 9 MG/DL (ref 6–23)
CALCIUM SERPL-MCNC: 7.8 MG/DL (ref 8.3–10.6)
CHLORIDE BLD-SCNC: 106 MMOL/L (ref 99–110)
CO2: 22 MMOL/L (ref 21–32)
CREAT SERPL-MCNC: 0.6 MG/DL (ref 0.6–1.1)
CULTURE: NORMAL
CULTURE: NORMAL
DIFFERENTIAL TYPE: ABNORMAL
EOSINOPHILS ABSOLUTE: 0 K/CU MM
EOSINOPHILS RELATIVE PERCENT: 0 % (ref 0–3)
GFR SERPL CREATININE-BSD FRML MDRD: >60 ML/MIN/1.73M2
GLUCOSE SERPL-MCNC: 103 MG/DL (ref 70–99)
HCT VFR BLD CALC: 26.6 % (ref 37–47)
HEMOGLOBIN: 8 GM/DL (ref 12.5–16)
IMMATURE NEUTROPHIL %: 3.3 % (ref 0–0.43)
LYMPHOCYTES ABSOLUTE: 0.7 K/CU MM
LYMPHOCYTES RELATIVE PERCENT: 13 % (ref 24–44)
Lab: NORMAL
Lab: NORMAL
MCH RBC QN AUTO: 27.3 PG (ref 27–31)
MCHC RBC AUTO-ENTMCNC: 30.1 % (ref 32–36)
MCV RBC AUTO: 90.8 FL (ref 78–100)
MONOCYTES ABSOLUTE: 0.5 K/CU MM
MONOCYTES RELATIVE PERCENT: 8.2 % (ref 0–4)
NUCLEATED RBC %: 1.1 %
PDW BLD-RTO: 17.3 % (ref 11.7–14.9)
PLATELET # BLD: 407 K/CU MM (ref 140–440)
PMV BLD AUTO: 10.2 FL (ref 7.5–11.1)
POTASSIUM SERPL-SCNC: 4.2 MMOL/L (ref 3.5–5.1)
RBC # BLD: 2.93 M/CU MM (ref 4.2–5.4)
SEGMENTED NEUTROPHILS ABSOLUTE COUNT: 4.1 K/CU MM
SEGMENTED NEUTROPHILS RELATIVE PERCENT: 75.3 % (ref 36–66)
SODIUM BLD-SCNC: 137 MMOL/L (ref 135–145)
SPECIMEN: NORMAL
SPECIMEN: NORMAL
TOTAL IMMATURE NEUTOROPHIL: 0.18 K/CU MM
TOTAL NUCLEATED RBC: 0.1 K/CU MM
WBC # BLD: 5.5 K/CU MM (ref 4–10.5)

## 2024-03-02 PROCEDURE — 6370000000 HC RX 637 (ALT 250 FOR IP): Performed by: NURSE PRACTITIONER

## 2024-03-02 PROCEDURE — 97166 OT EVAL MOD COMPLEX 45 MIN: CPT

## 2024-03-02 PROCEDURE — 6370000000 HC RX 637 (ALT 250 FOR IP): Performed by: INTERNAL MEDICINE

## 2024-03-02 PROCEDURE — 97535 SELF CARE MNGMENT TRAINING: CPT

## 2024-03-02 PROCEDURE — 6360000002 HC RX W HCPCS: Performed by: INTERNAL MEDICINE

## 2024-03-02 PROCEDURE — 85025 COMPLETE CBC W/AUTO DIFF WBC: CPT

## 2024-03-02 PROCEDURE — APPNB30 APP NON BILLABLE TIME 0-30 MINS: Performed by: NURSE PRACTITIONER

## 2024-03-02 PROCEDURE — 80048 BASIC METABOLIC PNL TOTAL CA: CPT

## 2024-03-02 PROCEDURE — 80202 ASSAY OF VANCOMYCIN: CPT

## 2024-03-02 PROCEDURE — 97530 THERAPEUTIC ACTIVITIES: CPT

## 2024-03-02 PROCEDURE — 99232 SBSQ HOSP IP/OBS MODERATE 35: CPT | Performed by: INTERNAL MEDICINE

## 2024-03-02 PROCEDURE — 2580000003 HC RX 258: Performed by: INTERNAL MEDICINE

## 2024-03-02 PROCEDURE — 94640 AIRWAY INHALATION TREATMENT: CPT

## 2024-03-02 PROCEDURE — 85027 COMPLETE CBC AUTOMATED: CPT

## 2024-03-02 PROCEDURE — 94761 N-INVAS EAR/PLS OXIMETRY MLT: CPT

## 2024-03-02 RX ORDER — CLOPIDOGREL BISULFATE 75 MG/1
75 TABLET ORAL DAILY
Qty: 30 TABLET | Refills: 3 | Status: SHIPPED | OUTPATIENT
Start: 2024-03-03

## 2024-03-02 RX ORDER — LISINOPRIL 5 MG/1
2.5 TABLET ORAL DAILY
Status: DISCONTINUED | OUTPATIENT
Start: 2024-03-02 | End: 2024-03-02 | Stop reason: HOSPADM

## 2024-03-02 RX ORDER — IPRATROPIUM BROMIDE AND ALBUTEROL SULFATE 2.5; .5 MG/3ML; MG/3ML
1 SOLUTION RESPIRATORY (INHALATION)
Status: DISCONTINUED | OUTPATIENT
Start: 2024-03-02 | End: 2024-03-02 | Stop reason: HOSPADM

## 2024-03-02 RX ORDER — ROSUVASTATIN CALCIUM 40 MG/1
40 TABLET, COATED ORAL NIGHTLY
Qty: 30 TABLET | Refills: 3 | Status: SHIPPED | OUTPATIENT
Start: 2024-03-02

## 2024-03-02 RX ORDER — METOPROLOL SUCCINATE 50 MG/1
50 TABLET, EXTENDED RELEASE ORAL DAILY
Qty: 30 TABLET | Refills: 3 | Status: SHIPPED | OUTPATIENT
Start: 2024-03-03

## 2024-03-02 RX ORDER — LISINOPRIL 2.5 MG/1
2.5 TABLET ORAL DAILY
Qty: 30 TABLET | Refills: 3 | Status: SHIPPED | OUTPATIENT
Start: 2024-03-03

## 2024-03-02 RX ORDER — ASPIRIN 81 MG/1
81 TABLET, CHEWABLE ORAL DAILY
Qty: 30 TABLET | Refills: 3 | Status: SHIPPED | OUTPATIENT
Start: 2024-03-03

## 2024-03-02 RX ADMIN — ASPIRIN 81 MG: 81 TABLET, CHEWABLE ORAL at 09:18

## 2024-03-02 RX ADMIN — IPRATROPIUM BROMIDE AND ALBUTEROL SULFATE 1 DOSE: 2.5; .5 SOLUTION RESPIRATORY (INHALATION) at 11:02

## 2024-03-02 RX ADMIN — METOPROLOL SUCCINATE 50 MG: 50 TABLET, EXTENDED RELEASE ORAL at 09:18

## 2024-03-02 RX ADMIN — DICYCLOMINE HYDROCHLORIDE 20 MG: 20 TABLET ORAL at 13:55

## 2024-03-02 RX ADMIN — CEFEPIME 2000 MG: 2 INJECTION, POWDER, FOR SOLUTION INTRAVENOUS at 00:26

## 2024-03-02 RX ADMIN — GABAPENTIN 100 MG: 100 CAPSULE ORAL at 09:18

## 2024-03-02 RX ADMIN — IPRATROPIUM BROMIDE AND ALBUTEROL SULFATE 1 DOSE: 2.5; .5 SOLUTION RESPIRATORY (INHALATION) at 07:47

## 2024-03-02 RX ADMIN — DICYCLOMINE HYDROCHLORIDE 20 MG: 20 TABLET ORAL at 09:18

## 2024-03-02 RX ADMIN — Medication 1 CAPSULE: at 09:19

## 2024-03-02 RX ADMIN — SODIUM CHLORIDE, PRESERVATIVE FREE 10 ML: 5 INJECTION INTRAVENOUS at 09:19

## 2024-03-02 RX ADMIN — GABAPENTIN 100 MG: 100 CAPSULE ORAL at 13:55

## 2024-03-02 RX ADMIN — BUDESONIDE AND FORMOTEROL FUMARATE DIHYDRATE 2 PUFF: 160; 4.5 AEROSOL RESPIRATORY (INHALATION) at 07:45

## 2024-03-02 RX ADMIN — CEFEPIME 2000 MG: 2 INJECTION, POWDER, FOR SOLUTION INTRAVENOUS at 09:36

## 2024-03-02 RX ADMIN — SODIUM CHLORIDE, PRESERVATIVE FREE 10 ML: 5 INJECTION INTRAVENOUS at 09:20

## 2024-03-02 RX ADMIN — LISINOPRIL 2.5 MG: 5 TABLET ORAL at 10:18

## 2024-03-02 RX ADMIN — PREDNISONE 40 MG: 20 TABLET ORAL at 09:18

## 2024-03-02 RX ADMIN — CLOPIDOGREL BISULFATE 75 MG: 75 TABLET ORAL at 09:18

## 2024-03-02 RX ADMIN — ENOXAPARIN SODIUM 40 MG: 100 INJECTION SUBCUTANEOUS at 09:17

## 2024-03-02 ASSESSMENT — PAIN SCALES - GENERAL
PAINLEVEL_OUTOF10: 0

## 2024-03-02 ASSESSMENT — PAIN SCALES - WONG BAKER
WONGBAKER_NUMERICALRESPONSE: 0

## 2024-03-02 NOTE — CARE COORDINATION
Received referral for ARU. I have reviewed patients clinicals and therapy recommendations from physical therapy. She has [facility] Medicare as her insurance provider. Unfortunately, she does not meet the requirements for ARU per the guidelines set forth by [employer]. Please defer to plan B.   Thank you for the opportunity to screen this patient for ARU.

## 2024-03-02 NOTE — PROGRESS NOTES
enter with rails  Entrance Stairs - Number of Steps: 2  Bathroom Shower/Tub: Shower chair with back  Bathroom Equipment: Shower chair  Home Equipment: Cane, Walker, rolling  ADL Assistance: Independent  Homemaking Assistance: Needs assistance  Ambulation Assistance: Independent  Transfer Assistance: Independent  Active : No  Patient's  Info: spouse  Occupation: Retired    Examination of body systems (includes body structures/functions, activity/participation limitations):  Observation:  Semi-fowlers in bed upon arrival, agreeable to therapy,  at bedside for end of session   Vision: Glasses prn   Hearing: WFL  Cardiopulmonary:  On room air, tele, vitals remained stable throughout session      Body Systems and functions:  ROM R/L:  WFL    Strength R/L:  BUE 4/5,   4/5  Sensation: WFL  Tone: Normal  Coordination: Minor resting tremors   Perception: WFL    Cognitive and Psychosocial Functioning:  Overall cognitive status:  Pt is A&Ox4, attention appears intact, and is able to follow multi-step commands w/o difficulty. Pt has good insight and awareness of limitations, is able to pace self appropriately.    Affect: Pleasant        Activities of Daily Living (ADLs):  Feeding: Set Up (pt required assist to open drink)   Grooming: SBA (pt stood at sink to perform hand hygiene)   UB bathing: SBA   LB bathing: SBA   UB dressing: SBA   LB dressing: SBA (pt doffed sock using figure 4 position while seated in reclining chair, educated on use of sock aid and able to don sock w/ sock aid)   Toileting: SBA (pt performed all aspects, including analia care and clothing management)     Pt ADL function inferred from observation of gross motor function, and assessment of mobility, balance, posture, safety awareness, cognition, and activity tolerance.     AM-PAC 6 click short form for inpatient daily activity:   How much help from another person does the patient currently need... Unable  Dep A Lot  Max A A Lot   Mod A  functional transfers/mobility w/ rollator. Pt currently presents w/ deficits relating to ADLs, IADLs, UE strength, functional activity tolerance,  and functional mobility. Pt would benefit from continued acute care OT services w/ discharge to home w/ HHOT and assist prn     Complexity: Moderate   Prognosis: Good, no significant barriers to participation at this time.   Occupational Therapy Plan  Times Per Week: 2x+  Times Per Day: Once a day  Current Treatment Recommendations: Strengthening, Safety education & training, Home management training, ROM, Balance training, Patient/Caregiver education & training, Functional mobility training, Endurance training, Equipment evaluation, education, & procurement, Positioning, Self-Care / ADL, Pain management, Co-Treatment, Coordination training       Goals: To be achieved prior to discharge  Goal 1: Pt will perform UE ADLs independently   Goal 2: Pt will perform LE ADLs Nely  Goal 3: Pt will perform toileting independently   Goal 4: Pt will perform HH distance functional mobility Nely in preparation for return to home   Goal 5: Pt will perform functional transfers to/from bed, chair, and toilet Nely  Goal 6: Pt will perform therex/theract in order to increase functional activity tolerance    Treatment plan:  Pt will perform therex/theract in order to increase functional activity tolerance in preparation for ADL participation.     Recommendations for NURSING activity: Up to chair for all 3 meals and up to bathroom for all toileting needs    Time:   Time in: 1024  Time out: 1059  Timed treatment minutes: 25  Total time: 35 minutes    Electronically signed by:    BEATRIZ Tan/DUANE [id number]  3/2/2024, 10:54 AM

## 2024-03-02 NOTE — PROGRESS NOTES
pulmonary      SUBJECTIVE:  doing better     OBJECTIVE    VITALS:  /80   Pulse 92   Temp 97.9 °F (36.6 °C) (Oral)   Resp 16   Ht 1.65 m (5' 4.96\")   Wt 67.3 kg (148 lb 5.9 oz)   LMP 01/06/2009 (LMP Unknown)   SpO2 97%   BMI 24.72 kg/m²   HEAD AND FACE EXAM:  No throat injection, no active exudate,no thrush  NECK EXAM;No JVD, no masses, symmetrical  CHEST EXAM; Expansion equal and symmetrical, no masses  LUNG EXAM; Good breath sounds bilaterally. There are expiratory wheezes both lungs, there are crackles at both lung bases  CARDIOVASCULAR EXAM: Positive S1 and S2, no S3 or S4, no clicks ,no murmurs  RIGHT AND LEFT LOWER EXTRIMITY EXAM: No edema, no swelling, no inflamation  CNS EXAM: Alert and oriented X3          LABS   Lab Results   Component Value Date    WBC 5.5 03/02/2024    HGB 8.0 (L) 03/02/2024    HCT 26.6 (L) 03/02/2024    MCV 90.8 03/02/2024     03/02/2024     Lab Results   Component Value Date    CREATININE 0.6 03/02/2024    BUN 9 03/02/2024     03/02/2024    K 4.2 03/02/2024     03/02/2024    CO2 22 03/02/2024     Lab Results   Component Value Date    INR 1.4 02/22/2024    PROTIME 17.2 (H) 02/22/2024          Lab Results   Component Value Date/Time    PHOS 3.4 11/28/2023 06:30 AM    PHOS 3.5 03/07/2023 05:14 AM      No results for input(s): \"PH\", \"PO2ART\", \"PIE6WGF\", \"HCO3\", \"BEART\", \"O2SAT\" in the last 72 hours.      Wt Readings from Last 3 Encounters:   03/01/24 67.3 kg (148 lb 5.9 oz)   02/23/24 64 kg (141 lb 1.5 oz)   01/31/24 68.9 kg (152 lb)               ASSESMENT  Ac copd  Pulm fibrosis  Lung ca        PLAN  Cpm  Doing better since stent placement    3/2/2024  Valentin Kahn MD, MPARVEEN.

## 2024-03-02 NOTE — CARE COORDINATION
LSW received consult to for ARU.  PT has seen pt and their recs are for ARU.  Need OT evals. WB note placed.  LSW will talk with pt today regarding going to ARU or SNF.

## 2024-03-02 NOTE — PROGRESS NOTES
Cardiology short note     Cardiology consult note for : chest pain    A: states she is feeling better.  Chest pain has resolved       Telemetry: sinus rhythm    Chest : expiratory wheeze and rhonchi  Right radial cath site is free of hematoma or ecchymosis  Cardiac; RRR     P:     Chest pain with abnormal stress test  LHC done: S/p PCI of the LAD for 99% proximal stenosis: has residual disease in the CX  On DAPT with ASA and plavix- keep for at least 6 months  High intensity statin - crestor 40 mg daily  BB : toprol xl    Ischemic cardiomyopathy  Mildly depressed left ventricular systolic function   EF 45-50%  On Toprol xl   Start low dose ACE lisinopril 2.5 mg daily  Re check echo as outpatient     Hyperlipidemia   Continue crestor    Will sign off and follow up as outpatient     I have seen ,spoken to  and examined this patient personally, independently of the EMELY. I have reviewed the hospital care given to date and reviewed all pertinent labs and imaging.I have spoken with patient, nursing staff and provided written and verbal instructions .The above note has been reviewed     CARDIOLOGY ATTENDING ADDENDUM    HPI:  I have reviewed the above HPI  And agree with above     Pulse Range: Pulse  Av.3  Min: 83  Max: 107    Blood Presuure Range:  Systolic (24hrs), Av , Min:119 , Max:141   ; Diastolic (24hrs), Av, Min:68, Max:96      Pulse ox Range: SpO2  Av.3 %  Min: 94 %  Max: 97 %    24hr I & O:    Intake/Output Summary (Last 24 hours) at 3/2/2024 1230  Last data filed at 3/2/2024 0800  Gross per 24 hour   Intake 240 ml   Output --   Net 240 ml         /80   Pulse 92   Temp 97.9 °F (36.6 °C) (Oral)   Resp 16   Ht 1.65 m (5' 4.96\")   Wt 67.3 kg (148 lb 5.9 oz)   LMP 2009 (LMP Unknown)   SpO2 97%   BMI 24.72 kg/m²       Physical Exam:  General:  Awake, alert, NAD  Head:normal  Eye:normal  Neck:  No JVD   Chest:  Clear to auscultation, respiration easy  Cardiovascular:  RRR

## 2024-03-02 NOTE — DISCHARGE SUMMARY
V2.0  Discharge Summary    Name:  Adenike Harvey /Age/Sex: 1955 (69 y.o. female)   Admit Date: 2024  Discharge Date: 3/2/24    MRN & CSN:  [id number] & [id number] Encounter Date and Time 3/2/24 1:46 PM EST    Attending:  Stevie Humphries,* Discharging Provider: Stevie Humphries MD       Hospital Course:     Brief HPI: Adenike Harvey is a 69 y.o. female  with pmh of HTN, COPD, who presents with shortness of breath. Patient states that she woke up around 3 AM and was coughing.  She felt like she could not move much.  She tried an inhaler but did not help.  She called the paramedics.  Her coughing was productive of clear phlegm.  She did not have any fever.  She had a little bit of wheezing.  She does not wear oxygen at home.  She quit smoking 8 years ago.    Brief Problem Based Course:     CAD  EKG with T wave inversions.  Suspect junctional rhythm.  Echo done that showed some akinesis. Pro=BNP elevated to 30k. Cardiology consulted and Cath done and showed a 99% LAD blockage, stents placed. Discharged on DAPT + Statin. Patient recommended for ARU by PT, but patient opted for home care.    Systolic CHF  Echo showed EF of 40%.  Patient stated on beta-blocker and low-dose lisinopril.  To follow-up with cardiology for further goal-directed management. To have repeat echo as an outpatient.      Acute COPD exacerbation  Pulm consulted and patient received steroids and Breathing treatment.     Healthcare associated pneumonia  Patient with findings on imaging as well as an elevated Pro-Tyree. Received IV antibiotics in house for a course.    The patient expressed appropriate understanding of, and agreement with the discharge recommendations, medications, and plan.     Consults this admission:  IP CONSULT TO PULMONOLOGY  IP CONSULT TO CARDIOLOGY  IP CONSULT TO CARDIOLOGY  IP CONSULT TO CARDIAC REHAB  IP CONSULT TO CASE MANAGEMENT  IP CONSULT TO HOME CARE NEEDS    Discharge Diagnosis:     COPD  contact the dictating provider for clarification.     Time Spent Discharging patient 35 minutes    Electronically signed by Stevie Humphries MD on 3/2/2024 at 1:46 PM

## 2024-03-02 NOTE — RT PROTOCOL NOTE
RT Nebulizer Bronchodilator Protocol Note    There is a bronchodilator order in the chart from a provider indicating to follow the RT Bronchodilator Protocol and there is an “Initiate RT Bronchodilator Protocol” order as well (see protocol at bottom of note).    CXR Findings:  No results found.    The findings from the last RT Protocol Assessment were as follows:  Smoking: Chronic pulmonary disease  Respiratory Pattern: Regular pattern and RR 12-20 bpm  Breath Sounds: Slightly diminished and/or crackles  Cough: Strong, productive  Indication for Bronchodilator Therapy: On home bronchodilators (Patient only wants one scheduled tx once daily unless asked for. Changing orders to patients request.)  Bronchodilator Assessment Score: 5    Aerosolized bronchodilator medication orders have been revised according to the RT Nebulizer Bronchodilator Protocol below.    Respiratory Therapist to perform RT Therapy Protocol Assessment initially then follow the protocol.  Repeat RT Therapy Protocol Assessment PRN for score 0-3 or on second treatment, BID, and PRN for scores above 3.    No Indications - adjust the frequency to every 6 hours PRN wheezing or bronchospasm, if no treatments needed after 48 hours then discontinue using Per Protocol order mode.     If indication present, adjust the RT bronchodilator orders based on the Bronchodilator Assessment Score as indicated below.  If a patient is on this medication at home then do not decrease Frequency below that used at home.    0-3 - enter or revise RT bronchodilator order(s) to equivalent RT Bronchodilator order with Frequency of every 4 hours PRN for wheezing or increased work of breathing using Per Protocol order mode.       4-6 - enter or revise RT Bronchodilator order(s) to two equivalent RT bronchodilator orders with one order with BID Frequency and one order with Frequency of every 4 hours PRN wheezing or increased work of breathing using Per Protocol order mode.

## 2024-03-02 NOTE — PROGRESS NOTES
Physical Therapy  Facility/Department: [facility] 3N  Physical Therapy Initial Assessment    Name: Adenike Harvey  : 1955  MRN: [id number]  Date of Service: 3/1/2024    Discharge Recommendations:  IP Rehab          Patient Diagnosis(es): The primary encounter diagnosis was Septicemia (HCC). Diagnoses of Chest pain, unspecified type, Acute exacerbation of chronic obstructive pulmonary disease (COPD) (HCC), Shortness of breath, Essential hypertension, and Abnormal nuclear stress test were also pertinent to this visit.  Past Medical History:  has a past medical history of Anemia, Anxiety, Arthritis, Bronchitis, Chronic back pain, COPD (chronic obstructive pulmonary disease) (HCC), Depression, Diverticulosis, Hemoptysis, History of external beam radiation therapy, Hx of blood clots, Hyperlipidemia, Hypertension, Low back pain, Lung mass, Nausea & vomiting, Panic attacks, Pneumonia, Right Lung Cancer, Shingles, Shortness of breath on exertion, Teeth missing, Urinary tract infection, Wears dentures, and Wears glasses.  Past Surgical History:  has a past surgical history that includes Tonsillectomy (); Tubal ligation (); Elbow surgery (Left, ); Dilation and curettage of uterus (); Colonoscopy ('s); bronchoscopy (2017); Bunionectomy (Bilateral, ); Mediastinoscopy (2018); Lung surgery (2018); Tunneled venous port placement (Left, 2018); Catheter Removal (Left, 2019); Upper gastrointestinal endoscopy (N/A, 2024); Cardiac procedure (N/A, 3/1/2024); and Cardiac procedure (N/A, 3/1/2024).    Assessment   Body Structures, Functions, Activity Limitations Requiring Skilled Therapeutic Intervention: Decreased functional mobility ;Decreased safe awareness;Decreased high-level IADLs;Decreased ADL status;Decreased cognition;Decreased endurance;Decreased strength;Decreased balance  Therapy Prognosis: Good  Decision Making: High Complexity  Clinical Presentation:  Training:   Therapeutic activity training was instructed today.  Cues were given for safety, sequence, UE/LE placement, awareness, and balance.    Activities performed today included bed mobility training, sup-sit, sit-stand, SPT.      AM-[facility] - Mobility    AM-PAC Basic Mobility - Inpatient   How much help is needed turning from your back to your side while in a flat bed without using bedrails?: A Little  How much help is needed moving from lying on your back to sitting on the side of a flat bed without using bedrails?: A Little  How much help is needed moving to and from a bed to a chair?: A Little  How much help is needed standing up from a chair using your arms?: A Little  How much help is needed walking in hospital room?: A Little  How much help is needed climbing 3-5 steps with a railing?: Total  AM-PAC Inpatient Mobility Raw Score : 16  AM-PAC Inpatient T-Scale Score : 40.78  Mobility Inpatient CMS 0-100% Score: 54.16  Mobility Inpatient CMS G-Code Modifier : CK           Goals  Long Term Goals  Time Frame for Long Term Goals : In one week:  Long Term Goal 1: Pt will complete all bed mobility with SBAx1  Long Term Goal 2: Pt will complete sit <> stand transfers with SBAx1  Long Term Goal 3: Pt will ambulate 150 feet with SBAx1 with LRAD  Long Term Goal 4: Pt will ascend/descend 6 steps with a handrail with minAx1  Long Term Goal 5: Pt will independently complete 3 sets of 10 reps of BLE AROM exercises       Education  Patient Education  Education Given To: Patient  Education Provided: Role of Therapy;Energy Conservation;Fall Prevention Strategies;Plan of Care;IADL Safety;ADL Adaptive Strategies;Equipment;Transfer Training  Education Method: Demonstration;Verbal  Education Outcome: Verbalized understanding;Demonstrated understanding;Continued education needed      Time In: 1720  Time Out: 1800  Total Treatment Time: 40  Timed Code Treatment Minutes: 30      Alex Hdez, PT, DPT  License #: [id number]

## 2024-03-02 NOTE — CARE COORDINATION
LSW spoke with pt regarding her discharge plans.  Pt lives in a one story home with her . Pt has a walker and shower chair.  Pt denies having HC.  Pt has a PCP and has insurance to help with health care cost.  LSW spoke with pt about PT recs for ARU.  LSW spoke with Willa with ARU and unfortunately pt does not meet the medical criteria to go to ARU at this time.  LSW spoke with pt about Plan B for a SNF.  Pt stated she is not interested and will not go to a SNF. LSW spoke with about HC and pt is agreeable to have HC after discharge.  Pt agreeable to have [facility].  Referral sent to HC.      CM will need a inpt HC order for nursing and PT/OT at discharge.  IF pt is discharge after hours please call [phone] and inform them of discharge and need to start HC services.  Fax HC order and AVS to [phone]       03/02/24 1053   Service Assessment   Patient Orientation Alert and Oriented   Cognition Alert   History Provided By Patient   Primary Caregiver Self   Support Systems Spouse/Significant Other   Patient's Healthcare Decision Maker is: Legal Next of Kin   PCP Verified by CM Yes   Last Visit to PCP Within last 3 months   Prior Functional Level Independent in ADLs/IADLs   Current Functional Level Assistance with the following:;Bathing;Dressing;Toileting   Can patient return to prior living arrangement Yes   Ability to make needs known: Good   Family able to assist with home care needs: Yes   Would you like for me to discuss the discharge plan with any other family members/significant others, and if so, who? No   Discharge Planning   Patient expects to be discharged to: House   Services At/After Discharge   Transition of Care Consult (CM Consult) Home Health   Condition of Participation: Discharge Planning   The Patient and/or Patient Representative was provided with a Choice of Provider? Patient   The Patient and/Or Patient Representative agree with the Discharge Plan? Yes   Freedom of Choice list was  provided with basic dialogue that supports the patient's individualized plan of care/goals, treatment preferences, and shares the quality data associated with the providers?  Yes

## 2024-03-04 ENCOUNTER — HOSPITAL ENCOUNTER (INPATIENT)
Age: 69
LOS: 4 days | Discharge: HOME HEALTH | End: 2024-03-09
Attending: EMERGENCY MEDICINE | Admitting: INTERNAL MEDICINE
Payer: MEDICARE

## 2024-03-04 ENCOUNTER — APPOINTMENT (OUTPATIENT)
Dept: GENERAL RADIOLOGY | Age: 69
End: 2024-03-04
Payer: MEDICARE

## 2024-03-04 ENCOUNTER — CARE COORDINATION (OUTPATIENT)
Dept: CASE MANAGEMENT | Age: 69
End: 2024-03-04

## 2024-03-04 DIAGNOSIS — R65.10 SIRS (SYSTEMIC INFLAMMATORY RESPONSE SYNDROME) (HCC): ICD-10-CM

## 2024-03-04 DIAGNOSIS — R53.1 GENERAL WEAKNESS: ICD-10-CM

## 2024-03-04 DIAGNOSIS — I10 ESSENTIAL HYPERTENSION: ICD-10-CM

## 2024-03-04 DIAGNOSIS — J10.1 INFLUENZA A: Primary | ICD-10-CM

## 2024-03-04 DIAGNOSIS — J44.1 COPD EXACERBATION (HCC): ICD-10-CM

## 2024-03-04 DIAGNOSIS — R42 DIZZINESS: ICD-10-CM

## 2024-03-04 LAB
ALBUMIN SERPL-MCNC: 3.4 GM/DL (ref 3.4–5)
ALP BLD-CCNC: 72 IU/L (ref 40–129)
ALT SERPL-CCNC: 16 U/L (ref 10–40)
ANION GAP SERPL CALCULATED.3IONS-SCNC: 11 MMOL/L (ref 7–16)
AST SERPL-CCNC: 21 IU/L (ref 15–37)
BASOPHILS ABSOLUTE: 0 K/CU MM
BASOPHILS RELATIVE PERCENT: 0.1 % (ref 0–1)
BILIRUB SERPL-MCNC: 0.4 MG/DL (ref 0–1)
BUN SERPL-MCNC: 10 MG/DL (ref 6–23)
CALCIUM SERPL-MCNC: 8.2 MG/DL (ref 8.3–10.6)
CHLORIDE BLD-SCNC: 97 MMOL/L (ref 99–110)
CO2: 24 MMOL/L (ref 21–32)
CREAT SERPL-MCNC: 0.7 MG/DL (ref 0.6–1.1)
DIFFERENTIAL TYPE: ABNORMAL
EOSINOPHILS ABSOLUTE: 0.3 K/CU MM
EOSINOPHILS RELATIVE PERCENT: 2.9 % (ref 0–3)
GFR SERPL CREATININE-BSD FRML MDRD: >60 ML/MIN/1.73M2
GLUCOSE SERPL-MCNC: 79 MG/DL (ref 70–99)
HCT VFR BLD CALC: 36.2 % (ref 37–47)
HEMOGLOBIN: 11.6 GM/DL (ref 12.5–16)
IMMATURE NEUTROPHIL %: 4.2 % (ref 0–0.43)
LACTIC ACID, SEPSIS: 1.7 MMOL/L (ref 0.4–2)
LIPASE: 9 IU/L (ref 13–60)
LYMPHOCYTES ABSOLUTE: 1.5 K/CU MM
LYMPHOCYTES RELATIVE PERCENT: 15.6 % (ref 24–44)
MCH RBC QN AUTO: 28.1 PG (ref 27–31)
MCHC RBC AUTO-ENTMCNC: 32 % (ref 32–36)
MCV RBC AUTO: 87.7 FL (ref 78–100)
MONOCYTES ABSOLUTE: 0.6 K/CU MM
MONOCYTES RELATIVE PERCENT: 5.8 % (ref 0–4)
NUCLEATED RBC %: 0.2 %
PDW BLD-RTO: 17.7 % (ref 11.7–14.9)
PLATELET # BLD: 572 K/CU MM (ref 140–440)
PMV BLD AUTO: 9.6 FL (ref 7.5–11.1)
POTASSIUM SERPL-SCNC: 3.2 MMOL/L (ref 3.5–5.1)
PRO-BNP: ABNORMAL PG/ML
RBC # BLD: 4.13 M/CU MM (ref 4.2–5.4)
SEGMENTED NEUTROPHILS ABSOLUTE COUNT: 6.8 K/CU MM
SEGMENTED NEUTROPHILS RELATIVE PERCENT: 71.4 % (ref 36–66)
SODIUM BLD-SCNC: 132 MMOL/L (ref 135–145)
TOTAL IMMATURE NEUTOROPHIL: 0.4 K/CU MM
TOTAL NUCLEATED RBC: 0 K/CU MM
TOTAL PROTEIN: 5.9 GM/DL (ref 6.4–8.2)
TROPONIN, HIGH SENSITIVITY: 53 NG/L (ref 0–14)
WBC # BLD: 9.5 K/CU MM (ref 4–10.5)

## 2024-03-04 PROCEDURE — 96375 TX/PRO/DX INJ NEW DRUG ADDON: CPT

## 2024-03-04 PROCEDURE — 71045 X-RAY EXAM CHEST 1 VIEW: CPT

## 2024-03-04 PROCEDURE — 87040 BLOOD CULTURE FOR BACTERIA: CPT

## 2024-03-04 PROCEDURE — 6370000000 HC RX 637 (ALT 250 FOR IP): Performed by: EMERGENCY MEDICINE

## 2024-03-04 PROCEDURE — 96374 THER/PROPH/DIAG INJ IV PUSH: CPT

## 2024-03-04 PROCEDURE — 80053 COMPREHEN METABOLIC PANEL: CPT

## 2024-03-04 PROCEDURE — 93005 ELECTROCARDIOGRAM TRACING: CPT | Performed by: EMERGENCY MEDICINE

## 2024-03-04 PROCEDURE — 99285 EMERGENCY DEPT VISIT HI MDM: CPT

## 2024-03-04 PROCEDURE — 83690 ASSAY OF LIPASE: CPT

## 2024-03-04 PROCEDURE — 83605 ASSAY OF LACTIC ACID: CPT

## 2024-03-04 PROCEDURE — 84484 ASSAY OF TROPONIN QUANT: CPT

## 2024-03-04 PROCEDURE — 6360000002 HC RX W HCPCS: Performed by: EMERGENCY MEDICINE

## 2024-03-04 PROCEDURE — 94640 AIRWAY INHALATION TREATMENT: CPT

## 2024-03-04 PROCEDURE — 85025 COMPLETE CBC W/AUTO DIFF WBC: CPT

## 2024-03-04 PROCEDURE — 83880 ASSAY OF NATRIURETIC PEPTIDE: CPT

## 2024-03-04 RX ORDER — IPRATROPIUM BROMIDE AND ALBUTEROL SULFATE 2.5; .5 MG/3ML; MG/3ML
1 SOLUTION RESPIRATORY (INHALATION) ONCE
Status: COMPLETED | OUTPATIENT
Start: 2024-03-04 | End: 2024-03-04

## 2024-03-04 RX ORDER — MORPHINE SULFATE 4 MG/ML
4 INJECTION, SOLUTION INTRAMUSCULAR; INTRAVENOUS ONCE
Status: COMPLETED | OUTPATIENT
Start: 2024-03-04 | End: 2024-03-04

## 2024-03-04 RX ORDER — ONDANSETRON 2 MG/ML
4 INJECTION INTRAMUSCULAR; INTRAVENOUS ONCE
Status: COMPLETED | OUTPATIENT
Start: 2024-03-04 | End: 2024-03-04

## 2024-03-04 RX ADMIN — IPRATROPIUM BROMIDE AND ALBUTEROL SULFATE 1 DOSE: 2.5; .5 SOLUTION RESPIRATORY (INHALATION) at 23:25

## 2024-03-04 RX ADMIN — MORPHINE SULFATE 4 MG: 4 INJECTION, SOLUTION INTRAMUSCULAR; INTRAVENOUS at 23:42

## 2024-03-04 RX ADMIN — ONDANSETRON 4 MG: 2 INJECTION INTRAMUSCULAR; INTRAVENOUS at 23:42

## 2024-03-04 RX ADMIN — IPRATROPIUM BROMIDE AND ALBUTEROL SULFATE 1 DOSE: 2.5; .5 SOLUTION RESPIRATORY (INHALATION) at 23:30

## 2024-03-04 ASSESSMENT — ENCOUNTER SYMPTOMS: COUGH: 1

## 2024-03-04 ASSESSMENT — PAIN - FUNCTIONAL ASSESSMENT
PAIN_FUNCTIONAL_ASSESSMENT: 0-10

## 2024-03-04 ASSESSMENT — PAIN DESCRIPTION - ORIENTATION: ORIENTATION: RIGHT

## 2024-03-04 ASSESSMENT — PAIN SCALES - GENERAL
PAINLEVEL_OUTOF10: 6
PAINLEVEL_OUTOF10: 6
PAINLEVEL_OUTOF10: 10

## 2024-03-04 ASSESSMENT — PAIN DESCRIPTION - LOCATION: LOCATION: RIB CAGE

## 2024-03-04 NOTE — PROGRESS NOTES
tomorrow, stress test   completed      Thank you, Terese Marti RN, CDS [phone]  Options provided:  -- Pleural effusion due to CHF  -- Pleural effusion due to Pneumonia  -- Pleural effusion due to other, please specify.  -- Other - I will add my own diagnosis  -- Disagree - Not applicable / Not valid  -- Disagree - Clinically unable to determine / Unknown  -- Refer to Clinical Documentation Reviewer    PROVIDER RESPONSE TEXT:    Patient has pleural effusion due to CHF.    Query created by: Terese Marti on 3/1/2024 4:20 PM      Electronically signed by:  PAWEL LEI 3/4/2024 1:21 PM

## 2024-03-04 NOTE — CARE COORDINATION
Care Transitions Outreach Attempt    Call within 2 business days of discharge: Yes     Patient: Adenike Harvey Patient : 1955 MRN: [id number]    Last Discharge Facility       Date Complaint Diagnosis Description Type Department Provider    24 Chest Pain Septicemia (HCC) ... ED to Hosp-Admission (Discharged) (ADMITTED) [facility] Stevie Humphries MD; Mary...          Noted following upcoming appointments from discharge chart review:   [facility] follow up appointment(s):   Future Appointments   Date Time Provider Department [place]   3/5/2024 10:30 AM Ashley Gregorio, PTA [facility] PT [facility]   3/12/2024 10:30 AM Ashley Gregorio, PTA [facility] PT [facility]   3/14/2024 10:00 AM Donovan Sutherland MD [facility] RAD [facility]   3/19/2024 10:30 AM Carmina Bustillo PT [facility] PT [facility]   4/10/2024 11:00 AM Rony Radford MD [facility] Gastro [facility]   2024 11:20 AM Iban Lou MD [facility] FPS [facility]   2024 11:00 AM Iban Lou MD [facility] FPS [facility]     1st attempt to reach for Care Transition discharge call unsuccessful as no answer, vm full. No approved EC per HIPAA on file. UTR letter sent via [employer].

## 2024-03-04 NOTE — PROGRESS NOTES
Cardiac Rehab will follow up with patient and provide further education.  Phase 1 and 2 CRR was ordered.  Patient was educated on the need for CRR.  Phase 2 order, as well as the patient's history, tests and medications were electronically sent to the OP CR program.

## 2024-03-05 ENCOUNTER — CARE COORDINATION (OUTPATIENT)
Dept: CASE MANAGEMENT | Age: 69
End: 2024-03-05

## 2024-03-05 ENCOUNTER — HOSPITAL ENCOUNTER (OUTPATIENT)
Dept: PHYSICAL THERAPY | Age: 69
Discharge: HOME | End: 2024-03-05

## 2024-03-05 PROBLEM — J10.1 INFLUENZA A: Status: ACTIVE | Noted: 2024-03-05

## 2024-03-05 LAB
ANION GAP SERPL CALCULATED.3IONS-SCNC: 13 MMOL/L (ref 7–16)
B PARAP IS1001 DNA NPH QL NAA+NON-PROBE: NOT DETECTED
B PERT.PT PRMT NPH QL NAA+NON-PROBE: NOT DETECTED
BACTERIA: NEGATIVE /HPF
BASOPHILS ABSOLUTE: 0 K/CU MM
BASOPHILS RELATIVE PERCENT: 0.1 % (ref 0–1)
BILIRUBIN URINE: NEGATIVE MG/DL
BLOOD, URINE: ABNORMAL
BUN SERPL-MCNC: 9 MG/DL (ref 6–23)
C PNEUM DNA NPH QL NAA+NON-PROBE: NOT DETECTED
CALCIUM SERPL-MCNC: 7.7 MG/DL (ref 8.3–10.6)
CHLORIDE BLD-SCNC: 100 MMOL/L (ref 99–110)
CLARITY: CLEAR
CO2: 22 MMOL/L (ref 21–32)
COLOR: YELLOW
CREAT SERPL-MCNC: 0.7 MG/DL (ref 0.6–1.1)
CULTURE: NORMAL
DIFFERENTIAL TYPE: ABNORMAL
EKG ATRIAL RATE: 116 BPM
EKG DIAGNOSIS: NORMAL
EKG P AXIS: 76 DEGREES
EKG P-R INTERVAL: 158 MS
EKG Q-T INTERVAL: 292 MS
EKG QRS DURATION: 92 MS
EKG QTC CALCULATION (BAZETT): 405 MS
EKG R AXIS: -51 DEGREES
EKG T AXIS: 99 DEGREES
EKG VENTRICULAR RATE: 116 BPM
EOSINOPHILS ABSOLUTE: 0.2 K/CU MM
EOSINOPHILS RELATIVE PERCENT: 2 % (ref 0–3)
FLUAV H1 2009 PAN RNA NPH NAA+NON-PROBE: ABNORMAL
FLUAV H1 RNA NPH QL NAA+NON-PROBE: NOT DETECTED
FLUAV H3 RNA NPH QL NAA+NON-PROBE: NOT DETECTED
FLUAV RNA NPH QL NAA+NON-PROBE: ABNORMAL
FLUBV RNA NPH QL NAA+NON-PROBE: NOT DETECTED
GFR SERPL CREATININE-BSD FRML MDRD: >60 ML/MIN/1.73M2
GLUCOSE SERPL-MCNC: 73 MG/DL (ref 70–99)
GLUCOSE, URINE: NEGATIVE MG/DL
GRAM SMEAR: NORMAL
HADV DNA NPH QL NAA+NON-PROBE: NOT DETECTED
HCOV 229E RNA NPH QL NAA+NON-PROBE: NOT DETECTED
HCOV HKU1 RNA NPH QL NAA+NON-PROBE: NOT DETECTED
HCOV NL63 RNA NPH QL NAA+NON-PROBE: NOT DETECTED
HCOV OC43 RNA NPH QL NAA+NON-PROBE: NOT DETECTED
HCT VFR BLD CALC: 34.5 % (ref 37–47)
HEMOGLOBIN: 10.6 GM/DL (ref 12.5–16)
HMPV RNA NPH QL NAA+NON-PROBE: NOT DETECTED
HPIV1 RNA NPH QL NAA+NON-PROBE: NOT DETECTED
HPIV2 RNA NPH QL NAA+NON-PROBE: NOT DETECTED
HPIV3 RNA NPH QL NAA+NON-PROBE: NOT DETECTED
HPIV4 RNA NPH QL NAA+NON-PROBE: NOT DETECTED
HYALINE CASTS: 0 /LPF
IMMATURE NEUTROPHIL %: 3.2 % (ref 0–0.43)
KETONES, URINE: ABNORMAL MG/DL
LACTIC ACID, SEPSIS: 1.2 MMOL/L (ref 0.4–2)
LEUKOCYTE ESTERASE, URINE: ABNORMAL
LYMPHOCYTES ABSOLUTE: 0.9 K/CU MM
LYMPHOCYTES RELATIVE PERCENT: 11.4 % (ref 24–44)
Lab: NORMAL
M PNEUMO DNA NPH QL NAA+NON-PROBE: NOT DETECTED
MCH RBC QN AUTO: 27.4 PG (ref 27–31)
MCHC RBC AUTO-ENTMCNC: 30.7 % (ref 32–36)
MCV RBC AUTO: 89.1 FL (ref 78–100)
MONOCYTES ABSOLUTE: 0.5 K/CU MM
MONOCYTES RELATIVE PERCENT: 6.5 % (ref 0–4)
MUCUS: ABNORMAL HPF
NITRITE URINE, QUANTITATIVE: NEGATIVE
NUCLEATED RBC %: 0 %
PDW BLD-RTO: 17.9 % (ref 11.7–14.9)
PH, URINE: 8 (ref 5–8)
PLATELET # BLD: 434 K/CU MM (ref 140–440)
PMV BLD AUTO: 9.6 FL (ref 7.5–11.1)
POTASSIUM SERPL-SCNC: 3.7 MMOL/L (ref 3.5–5.1)
PROTEIN UA: 30 MG/DL
RBC # BLD: 3.87 M/CU MM (ref 4.2–5.4)
RBC URINE: 1 /HPF (ref 0–6)
RENAL EPITHELIAL, UA: <1 /HPF
RSV RNA NPH QL NAA+NON-PROBE: NOT DETECTED
RV+EV RNA NPH QL NAA+NON-PROBE: NOT DETECTED
SARS-COV-2 RNA NPH QL NAA+NON-PROBE: NOT DETECTED
SEGMENTED NEUTROPHILS ABSOLUTE COUNT: 6.1 K/CU MM
SEGMENTED NEUTROPHILS RELATIVE PERCENT: 76.8 % (ref 36–66)
SODIUM BLD-SCNC: 135 MMOL/L (ref 135–145)
SPECIFIC GRAVITY UA: 1.02 (ref 1–1.03)
SPECIMEN: NORMAL
SQUAMOUS EPITHELIAL: <1 /HPF
TOTAL IMMATURE NEUTOROPHIL: 0.25 K/CU MM
TOTAL NUCLEATED RBC: 0 K/CU MM
TRANSITIONAL EPITHELIAL: <1 /HPF
TRICHOMONAS: ABNORMAL /HPF
TROPONIN, HIGH SENSITIVITY: 49 NG/L (ref 0–14)
UROBILINOGEN, URINE: 0.2 MG/DL (ref 0.2–1)
WBC # BLD: 7.9 K/CU MM (ref 4–10.5)
WBC UA: 8 /HPF (ref 0–5)

## 2024-03-05 PROCEDURE — 81001 URINALYSIS AUTO W/SCOPE: CPT

## 2024-03-05 PROCEDURE — 2500000003 HC RX 250 WO HCPCS: Performed by: FAMILY MEDICINE

## 2024-03-05 PROCEDURE — 6360000002 HC RX W HCPCS: Performed by: INTERNAL MEDICINE

## 2024-03-05 PROCEDURE — 87086 URINE CULTURE/COLONY COUNT: CPT

## 2024-03-05 PROCEDURE — 2580000003 HC RX 258: Performed by: INTERNAL MEDICINE

## 2024-03-05 PROCEDURE — 94640 AIRWAY INHALATION TREATMENT: CPT

## 2024-03-05 PROCEDURE — 6370000000 HC RX 637 (ALT 250 FOR IP): Performed by: INTERNAL MEDICINE

## 2024-03-05 PROCEDURE — 87040 BLOOD CULTURE FOR BACTERIA: CPT

## 2024-03-05 PROCEDURE — 80048 BASIC METABOLIC PNL TOTAL CA: CPT

## 2024-03-05 PROCEDURE — 85025 COMPLETE CBC W/AUTO DIFF WBC: CPT

## 2024-03-05 PROCEDURE — 93010 ELECTROCARDIOGRAM REPORT: CPT | Performed by: INTERNAL MEDICINE

## 2024-03-05 PROCEDURE — 2580000003 HC RX 258: Performed by: EMERGENCY MEDICINE

## 2024-03-05 PROCEDURE — 94761 N-INVAS EAR/PLS OXIMETRY MLT: CPT

## 2024-03-05 PROCEDURE — 0202U NFCT DS 22 TRGT SARS-COV-2: CPT

## 2024-03-05 PROCEDURE — 2140000000 HC CCU INTERMEDIATE R&B

## 2024-03-05 PROCEDURE — 96361 HYDRATE IV INFUSION ADD-ON: CPT

## 2024-03-05 RX ORDER — FLUTICASONE PROPIONATE 50 MCG
2 SPRAY, SUSPENSION (ML) NASAL DAILY
Status: DISCONTINUED | OUTPATIENT
Start: 2024-03-05 | End: 2024-03-09 | Stop reason: HOSPADM

## 2024-03-05 RX ORDER — ACETAMINOPHEN 325 MG/1
650 TABLET ORAL EVERY 6 HOURS PRN
Status: DISCONTINUED | OUTPATIENT
Start: 2024-03-05 | End: 2024-03-09 | Stop reason: HOSPADM

## 2024-03-05 RX ORDER — BUDESONIDE AND FORMOTEROL FUMARATE DIHYDRATE 160; 4.5 UG/1; UG/1
2 AEROSOL RESPIRATORY (INHALATION) 2 TIMES DAILY
Status: DISCONTINUED | OUTPATIENT
Start: 2024-03-05 | End: 2024-03-09 | Stop reason: HOSPADM

## 2024-03-05 RX ORDER — DICYCLOMINE HCL 20 MG
20 TABLET ORAL 4 TIMES DAILY
Status: DISCONTINUED | OUTPATIENT
Start: 2024-03-05 | End: 2024-03-09 | Stop reason: HOSPADM

## 2024-03-05 RX ORDER — SODIUM CHLORIDE 0.9 % (FLUSH) 0.9 %
5-40 SYRINGE (ML) INJECTION EVERY 12 HOURS SCHEDULED
Status: DISCONTINUED | OUTPATIENT
Start: 2024-03-05 | End: 2024-03-09 | Stop reason: HOSPADM

## 2024-03-05 RX ORDER — POTASSIUM CHLORIDE 7.45 MG/ML
10 INJECTION INTRAVENOUS PRN
Status: DISCONTINUED | OUTPATIENT
Start: 2024-03-05 | End: 2024-03-07

## 2024-03-05 RX ORDER — SODIUM CHLORIDE 0.9 % (FLUSH) 0.9 %
5-40 SYRINGE (ML) INJECTION PRN
Status: DISCONTINUED | OUTPATIENT
Start: 2024-03-05 | End: 2024-03-09 | Stop reason: HOSPADM

## 2024-03-05 RX ORDER — ROSUVASTATIN CALCIUM 20 MG/1
40 TABLET, COATED ORAL NIGHTLY
Status: DISCONTINUED | OUTPATIENT
Start: 2024-03-05 | End: 2024-03-09 | Stop reason: HOSPADM

## 2024-03-05 RX ORDER — IPRATROPIUM BROMIDE AND ALBUTEROL SULFATE 2.5; .5 MG/3ML; MG/3ML
1 SOLUTION RESPIRATORY (INHALATION) EVERY 4 HOURS PRN
Status: DISCONTINUED | OUTPATIENT
Start: 2024-03-05 | End: 2024-03-09 | Stop reason: HOSPADM

## 2024-03-05 RX ORDER — 0.9 % SODIUM CHLORIDE 0.9 %
1000 INTRAVENOUS SOLUTION INTRAVENOUS ONCE
Status: COMPLETED | OUTPATIENT
Start: 2024-03-05 | End: 2024-03-05

## 2024-03-05 RX ORDER — SODIUM CHLORIDE 9 MG/ML
INJECTION, SOLUTION INTRAVENOUS PRN
Status: DISCONTINUED | OUTPATIENT
Start: 2024-03-05 | End: 2024-03-09 | Stop reason: HOSPADM

## 2024-03-05 RX ORDER — OSELTAMIVIR PHOSPHATE 75 MG/1
75 CAPSULE ORAL 2 TIMES DAILY
Status: DISCONTINUED | OUTPATIENT
Start: 2024-03-05 | End: 2024-03-09 | Stop reason: HOSPADM

## 2024-03-05 RX ORDER — GUAIFENESIN 200 MG/10ML
100 LIQUID ORAL EVERY 4 HOURS PRN
Status: DISCONTINUED | OUTPATIENT
Start: 2024-03-05 | End: 2024-03-09 | Stop reason: HOSPADM

## 2024-03-05 RX ORDER — POTASSIUM CHLORIDE 20 MEQ/1
40 TABLET, EXTENDED RELEASE ORAL PRN
Status: DISCONTINUED | OUTPATIENT
Start: 2024-03-05 | End: 2024-03-07

## 2024-03-05 RX ORDER — GUAIFENESIN 600 MG/1
600 TABLET, EXTENDED RELEASE ORAL 2 TIMES DAILY
Status: DISCONTINUED | OUTPATIENT
Start: 2024-03-05 | End: 2024-03-09 | Stop reason: HOSPADM

## 2024-03-05 RX ORDER — ENOXAPARIN SODIUM 100 MG/ML
40 INJECTION SUBCUTANEOUS DAILY
Status: DISCONTINUED | OUTPATIENT
Start: 2024-03-05 | End: 2024-03-09 | Stop reason: HOSPADM

## 2024-03-05 RX ORDER — MAGNESIUM SULFATE IN WATER 40 MG/ML
2000 INJECTION, SOLUTION INTRAVENOUS PRN
Status: DISCONTINUED | OUTPATIENT
Start: 2024-03-05 | End: 2024-03-07

## 2024-03-05 RX ORDER — ONDANSETRON 4 MG/1
4 TABLET, ORALLY DISINTEGRATING ORAL EVERY 8 HOURS PRN
Status: DISCONTINUED | OUTPATIENT
Start: 2024-03-05 | End: 2024-03-09 | Stop reason: HOSPADM

## 2024-03-05 RX ORDER — LISINOPRIL 5 MG/1
2.5 TABLET ORAL DAILY
Status: DISCONTINUED | OUTPATIENT
Start: 2024-03-05 | End: 2024-03-09 | Stop reason: HOSPADM

## 2024-03-05 RX ORDER — ONDANSETRON 2 MG/ML
4 INJECTION INTRAMUSCULAR; INTRAVENOUS EVERY 6 HOURS PRN
Status: DISCONTINUED | OUTPATIENT
Start: 2024-03-05 | End: 2024-03-09 | Stop reason: HOSPADM

## 2024-03-05 RX ORDER — SODIUM CHLORIDE 9 MG/ML
INJECTION, SOLUTION INTRAVENOUS CONTINUOUS
Status: DISPENSED | OUTPATIENT
Start: 2024-03-05 | End: 2024-03-06

## 2024-03-05 RX ORDER — ASPIRIN 81 MG/1
81 TABLET, CHEWABLE ORAL DAILY
Status: DISCONTINUED | OUTPATIENT
Start: 2024-03-05 | End: 2024-03-09 | Stop reason: HOSPADM

## 2024-03-05 RX ORDER — ACETAMINOPHEN 650 MG/1
650 SUPPOSITORY RECTAL EVERY 6 HOURS PRN
Status: DISCONTINUED | OUTPATIENT
Start: 2024-03-05 | End: 2024-03-09 | Stop reason: HOSPADM

## 2024-03-05 RX ORDER — GABAPENTIN 100 MG/1
100 CAPSULE ORAL 3 TIMES DAILY
Status: DISCONTINUED | OUTPATIENT
Start: 2024-03-05 | End: 2024-03-09 | Stop reason: HOSPADM

## 2024-03-05 RX ORDER — CLOPIDOGREL BISULFATE 75 MG/1
75 TABLET ORAL DAILY
Status: DISCONTINUED | OUTPATIENT
Start: 2024-03-05 | End: 2024-03-09 | Stop reason: HOSPADM

## 2024-03-05 RX ORDER — PANTOPRAZOLE SODIUM 40 MG/1
40 TABLET, DELAYED RELEASE ORAL
Status: DISCONTINUED | OUTPATIENT
Start: 2024-03-05 | End: 2024-03-09 | Stop reason: HOSPADM

## 2024-03-05 RX ORDER — METOPROLOL SUCCINATE 50 MG/1
50 TABLET, EXTENDED RELEASE ORAL DAILY
Status: DISCONTINUED | OUTPATIENT
Start: 2024-03-05 | End: 2024-03-07

## 2024-03-05 RX ORDER — POLYETHYLENE GLYCOL 3350 17 G/17G
17 POWDER, FOR SOLUTION ORAL DAILY PRN
Status: DISCONTINUED | OUTPATIENT
Start: 2024-03-05 | End: 2024-03-09 | Stop reason: HOSPADM

## 2024-03-05 RX ORDER — HYDROXYZINE HYDROCHLORIDE 25 MG/1
25 TABLET, FILM COATED ORAL 3 TIMES DAILY PRN
Status: DISCONTINUED | OUTPATIENT
Start: 2024-03-05 | End: 2024-03-09 | Stop reason: HOSPADM

## 2024-03-05 RX ORDER — SUCRALFATE 1 G/1
1 TABLET ORAL 4 TIMES DAILY
Status: DISCONTINUED | OUTPATIENT
Start: 2024-03-05 | End: 2024-03-09 | Stop reason: HOSPADM

## 2024-03-05 RX ADMIN — DICYCLOMINE HYDROCHLORIDE 20 MG: 20 TABLET ORAL at 08:17

## 2024-03-05 RX ADMIN — CLOPIDOGREL BISULFATE 75 MG: 75 TABLET ORAL at 08:18

## 2024-03-05 RX ADMIN — GABAPENTIN 100 MG: 100 CAPSULE ORAL at 08:17

## 2024-03-05 RX ADMIN — ACETAMINOPHEN 650 MG: 325 TABLET ORAL at 13:55

## 2024-03-05 RX ADMIN — ENOXAPARIN SODIUM 40 MG: 100 INJECTION SUBCUTANEOUS at 08:18

## 2024-03-05 RX ADMIN — GUAIFENESIN 600 MG: 600 TABLET, EXTENDED RELEASE ORAL at 20:24

## 2024-03-05 RX ADMIN — GABAPENTIN 100 MG: 100 CAPSULE ORAL at 20:24

## 2024-03-05 RX ADMIN — PANTOPRAZOLE SODIUM 40 MG: 20 TABLET, DELAYED RELEASE ORAL at 16:34

## 2024-03-05 RX ADMIN — SODIUM CHLORIDE 1000 ML: 9 INJECTION, SOLUTION INTRAVENOUS at 00:13

## 2024-03-05 RX ADMIN — SODIUM CHLORIDE: 9 INJECTION, SOLUTION INTRAVENOUS at 06:57

## 2024-03-05 RX ADMIN — ASPIRIN 81 MG: 81 TABLET, CHEWABLE ORAL at 08:17

## 2024-03-05 RX ADMIN — HYDROXYZINE HYDROCHLORIDE 25 MG: 25 TABLET, FILM COATED ORAL at 20:24

## 2024-03-05 RX ADMIN — DICYCLOMINE HYDROCHLORIDE 20 MG: 20 TABLET ORAL at 20:25

## 2024-03-05 RX ADMIN — LISINOPRIL 2.5 MG: 5 TABLET ORAL at 08:17

## 2024-03-05 RX ADMIN — PANTOPRAZOLE SODIUM 40 MG: 20 TABLET, DELAYED RELEASE ORAL at 08:18

## 2024-03-05 RX ADMIN — GUAIFENESIN 600 MG: 600 TABLET, EXTENDED RELEASE ORAL at 08:18

## 2024-03-05 RX ADMIN — SUCRALFATE 1 G: 1 TABLET ORAL at 13:51

## 2024-03-05 RX ADMIN — BUDESONIDE AND FORMOTEROL FUMARATE DIHYDRATE 2 PUFF: 160; 4.5 AEROSOL RESPIRATORY (INHALATION) at 07:33

## 2024-03-05 RX ADMIN — SUCRALFATE 1 G: 1 TABLET ORAL at 20:24

## 2024-03-05 RX ADMIN — DICYCLOMINE HYDROCHLORIDE 20 MG: 20 TABLET ORAL at 13:51

## 2024-03-05 RX ADMIN — OSELTAMIVIR PHOSPHATE 75 MG: 75 CAPSULE ORAL at 20:31

## 2024-03-05 RX ADMIN — ONDANSETRON 4 MG: 2 INJECTION INTRAMUSCULAR; INTRAVENOUS at 20:24

## 2024-03-05 RX ADMIN — METOPROLOL SUCCINATE 50 MG: 50 TABLET, EXTENDED RELEASE ORAL at 08:17

## 2024-03-05 RX ADMIN — GABAPENTIN 100 MG: 100 CAPSULE ORAL at 13:51

## 2024-03-05 RX ADMIN — SUCRALFATE 1 G: 1 TABLET ORAL at 08:18

## 2024-03-05 RX ADMIN — SUCRALFATE 1 G: 1 TABLET ORAL at 16:34

## 2024-03-05 RX ADMIN — GUAIFENESIN 100 MG: 100 SOLUTION ORAL at 20:51

## 2024-03-05 RX ADMIN — OSELTAMIVIR PHOSPHATE 75 MG: 75 CAPSULE ORAL at 08:17

## 2024-03-05 RX ADMIN — SODIUM CHLORIDE, PRESERVATIVE FREE 10 ML: 5 INJECTION INTRAVENOUS at 20:26

## 2024-03-05 RX ADMIN — ACETAMINOPHEN 650 MG: 325 TABLET ORAL at 20:51

## 2024-03-05 RX ADMIN — DICYCLOMINE HYDROCHLORIDE 20 MG: 20 TABLET ORAL at 16:34

## 2024-03-05 RX ADMIN — ROSUVASTATIN CALCIUM 40 MG: 20 TABLET, COATED ORAL at 20:24

## 2024-03-05 ASSESSMENT — PAIN DESCRIPTION - DESCRIPTORS
DESCRIPTORS: ACHING
DESCRIPTORS: ACHING

## 2024-03-05 ASSESSMENT — PAIN SCALES - WONG BAKER
WONGBAKER_NUMERICALRESPONSE: 0

## 2024-03-05 ASSESSMENT — PAIN SCALES - GENERAL
PAINLEVEL_OUTOF10: 0
PAINLEVEL_OUTOF10: 2

## 2024-03-05 ASSESSMENT — PAIN DESCRIPTION - LOCATION
LOCATION: GENERALIZED
LOCATION: GENERALIZED

## 2024-03-05 NOTE — CARE COORDINATION
Upon second attempt at initial Care Transitions call Patient noted to have been readmitted to [facility] today w/ Inf A, Copd Exac. CT program closed per protocol.

## 2024-03-05 NOTE — ED TRIAGE NOTES
Pt to the ED with c/o right sided rib/abdominal pain with breathing. Pt states it is bad with inspiration and expiration. Pt states she was just admitted for a week and was discharged yesterday, and that she had a stent placed while she was here.

## 2024-03-05 NOTE — H&P
up to 3.9 of the proximal segment  , stenosis reduced to 0% The diagonal was not jailed wire was removed at the end BRET flow is 3 before and after PCI  The LVEF is reduced with EF about 40% with anterior wall hypokinesis  Assessment: Successful PCI of the LAD Will continue with DAPT To closely monitor Risk factor modification will be continued         Result Date: 3/1/2024  Table formatting from the original result was not included. Procedure note Left cardiac catheterization selective coronary angiography and ventriculography Cutting Balloon angioplasty of the LAD Double wiring of the LAD and diagonal Stenting of the LAD  Medication Angina pectoris and dyspnea along with abnormal echo  Findings The left main is a large tubular vessel The circumflex vessel is a dominant vessel has no significant stenosis The right coronary artery is a small caliber vessel is diffusely diseased The left and descending artery had a 99% stenosis in its proximal segment around the bifurcation both LAD and diagonal wired we did r Cutting Balloon angioplasty in the LAD and stented the LAD in the proximal segment was dilated with a 3.5 balloon taking the dimensions up to 3.9 of the proximal segment The diagonal was not jailed wire was removed at the end  The LVEF is reduced with EF about 40% with anterior wall hypokinesis  Assessment: Successful PCI of the LAD Will continue with DAPT To closely monitor Risk factor modification will be continued         Nuclear stress test with myocardial perfusion    Result Date: 2/29/2024    Stress Combined Conclusion: Findings suggest a moderate risk of cardiac events.   Perfusion Defect: There is a left ventricular stress perfusion defect that is large in size present in the inferior and inferolateral segment(s). The defect is consistent with abnormal perfusion in the LCx or RCA territories.   Stress Test: A pharmacological stress test was performed using lexiscan. The patient reported no symptoms

## 2024-03-05 NOTE — DISCHARGE SUMMARY
Outpatient Physical Therapy           [place]           [x] Phone: [phone]   Fax: [phone]  [place]           [] Phone: [phone]   Fax: [phone]      To: Iban Lou, *     From: Carmina Bustillo PT, DPT, Cert. DN      Patient: Adenike Harvey                    : 1955  Diagnosis:  Gait abnormality [R26.9]        Treatment Diagnosis:    BLE strength and gait deficits    Date: 3/5/2024  []  Progress Note                [x]  Discharge Note    Evaluation Date:  24   Total Visits to date:   1 Cancels/No-shows to date:  3      Pt has only attended eval, she forgot the first treatment session and has been admitted for the last two appts. Given her recent back to back admissions she is not appropriate for therapy at this time. Pt will be discharged this date.       Patient Status: [] Continue per initial plan of Care     [x] Patient now discharged     [] Additional visits requested, Please re-certify for additional visits:          If we are requesting more visits, we fully anticipate the patient's condition is expected to improve within the treatment timeframe we are requesting.    Electronically signed by:  Carmina Bustillo PT, MADYT, Cert. DN    3/5/2024, 9:48 AM    If you have any questions or concerns, please don't hesitate to call.  Thank you for your referral.    Physician Signature:______________________ Date:______ Time: ________  By signing above, therapist’s plan is approved by physician

## 2024-03-05 NOTE — ED NOTES
Pt states she was feeling slightly dizzy before getting up to walk, while walking dizziness increased. Pt did walk to door in room & then returned to bed, o2 sats remained 95%

## 2024-03-05 NOTE — ED PROVIDER NOTES
[facility] EMERGENCY DEPARTMENT  EMERGENCY DEPARTMENT ENCOUNTER      Pt Name: Adenike Harvey  MRN: [id number]  Birthdate 1955  Date of evaluation: 3/4/2024  Provider: Melanie Dsouza MD    CHIEF COMPLAINT       Chief Complaint   Patient presents with    Rib Pain     With breathing         HISTORY OF PRESENT ILLNESS      Adenike Harvey is a 69 y.o. female who presents to the emergency department  for   Chief Complaint   Patient presents with    Rib Pain     With breathing       69-year-old female presents complaining of chest wall pain, coughing and generalized weakness.  She was discharged home about 3 days ago after having been admitted for CAD, COPD as well as a pneumonia.  According to inpatient records, she did undergo antibiotics while she was admitted.  She also underwent a heart cath on February 26, 2024 and did have angioplasty and stents placed.  According to discharge summary for recent admission, it was recommended that she go to rehab however she chose to go home.  She presents with  at bedside.  He reports that the first day she was home she seemed to be doing well but now she is having more coughing with some sputum production and is well as the weakness and right-sided chest wall pain.  No falls, trauma or injury.  She does not have an oxygen requirement.  She denies any fevers.  Denies any specifically known sick contacts.  She does endorse that she has been having some vomiting.  No diarrhea.  Her  does report that she is specimen antiplatelet medications but they just picked those up today.  She has not had them since her discharge home.          Nursing Notes, Triage Notes & Vital Signs were reviewed.      REVIEW OF SYSTEMS    (2-9 systems for level 4, 10 or more for level 5)     Review of Systems   Respiratory:  Positive for cough.    Musculoskeletal:         Chest wall pain       Except as noted above the remainder of the review

## 2024-03-05 NOTE — ED NOTES
normal limits   COMPREHENSIVE METABOLIC PANEL - Abnormal; Notable for the following components:    Sodium 132 (*)     Potassium 3.2 (*)     Chloride 97 (*)     Calcium 8.2 (*)     Total Protein 5.9 (*)     All other components within normal limits   CBC WITH AUTO DIFFERENTIAL - Abnormal; Notable for the following components:    RBC 4.13 (*)     Hemoglobin 11.6 (*)     Hematocrit 36.2 (*)     RDW 17.7 (*)     Platelets 572 (*)     Segs Relative 71.4 (*)     Lymphocytes % 15.6 (*)     Monocytes % 5.8 (*)     Immature Neutrophil % 4.2 (*)     All other components within normal limits   LIPASE - Abnormal; Notable for the following components:    Lipase 9 (*)     All other components within normal limits   URINALYSIS - Abnormal; Notable for the following components:    Ketones, Urine TRACE (*)     Blood, Urine TRACE (*)     Protein, UA 30 (*)     Leukocyte Esterase, Urine TRACE (*)     All other components within normal limits   BRAIN NATRIURETIC PEPTIDE - Abnormal; Notable for the following components:    Pro-BNP 10,084 (*)     All other components within normal limits   TROPONIN - Abnormal; Notable for the following components:    Troponin, High Sensitivity 53 (*)     All other components within normal limits   TROPONIN - Abnormal; Notable for the following components:    Troponin, High Sensitivity 49 (*)     All other components within normal limits   MICROSCOPIC URINALYSIS - Abnormal; Notable for the following components:    WBC, UA 8 (*)     Mucus, UA RARE (*)     All other components within normal limits   BASIC METABOLIC PANEL W/ REFLEX TO MG FOR LOW K - Abnormal; Notable for the following components:    Calcium 7.7 (*)     All other components within normal limits   CBC WITH AUTO DIFFERENTIAL - Abnormal; Notable for the following components:    RBC 3.87 (*)     Hemoglobin 10.6 (*)     Hematocrit 34.5 (*)     MCHC 30.7 (*)     RDW 17.9 (*)     Segs Relative 76.8 (*)     Lymphocytes % 11.4 (*)     Monocytes % 6.5  further questions, please call Sending RN at 1918    Electronically signed by: Electronically signed by Elissa Kulkarni RN on 3/5/2024 at 12:30 PM

## 2024-03-05 NOTE — ED NOTES
Pt hit call light stating that she needed to use the restroom. Pt initially stated that she could walk to restroom. Pt walked from bed to door and stopped, stating she was dizzy and lightheaded. Pt returned to bed and placed on a pure-wick. Vitals reassessed with BP noted to be 165/118 with w HR @ 135, SPO2 @ 95%Sinus Tach on monitor

## 2024-03-05 NOTE — PROGRESS NOTES
Couldn't breath and had pain in the belly. She got dizzy when getting up to go to the bathroom.     No acute distress  Tachycardic  Lungs sound clear  Abd soft nondistended right sided discomfort to palpation  No pitting edema in LE    -Sepsis due to influenza A: On Tamiflu

## 2024-03-05 NOTE — FLOWSHEET NOTE
Physical Therapy  Cancellation/No-show Note  Patient Name:  Adenike Harvey  :  1955   Date:  3/5/2024  Cancelled visits to date: 0  No-shows to date: 3    For today's appointment patient:  []  Cancelled  []  Rescheduled appointment  [x]  No-show     Reason given by patient:  [x]  Patient ill  []  Conflicting appointment  []  No transportation    []  Conflict with work  []  No reason given  []  Other:     Comments:  current hospital admission    Electronically signed by:  Ashley Gregorio PTA, CLT 24 9:06 AM

## 2024-03-06 LAB
ANION GAP SERPL CALCULATED.3IONS-SCNC: 13 MMOL/L (ref 7–16)
BANDED NEUTROPHILS ABSOLUTE COUNT: 0.41 K/CU MM
BANDED NEUTROPHILS RELATIVE PERCENT: 8 % (ref 5–11)
BUN SERPL-MCNC: 9 MG/DL (ref 6–23)
CALCIUM SERPL-MCNC: 7.3 MG/DL (ref 8.3–10.6)
CHLORIDE BLD-SCNC: 96 MMOL/L (ref 99–110)
CO2: 21 MMOL/L (ref 21–32)
CREAT SERPL-MCNC: 0.6 MG/DL (ref 0.6–1.1)
CULTURE: NORMAL
DIFFERENTIAL TYPE: ABNORMAL
EOSINOPHILS ABSOLUTE: 0.1 K/CU MM
EOSINOPHILS RELATIVE PERCENT: 2 % (ref 0–3)
GFR SERPL CREATININE-BSD FRML MDRD: >60 ML/MIN/1.73M2
GLUCOSE SERPL-MCNC: 78 MG/DL (ref 70–99)
HCT VFR BLD CALC: 28.9 % (ref 37–47)
HEMOGLOBIN: 8.8 GM/DL (ref 12.5–16)
LYMPHOCYTES ABSOLUTE: 0.5 K/CU MM
LYMPHOCYTES RELATIVE PERCENT: 9 % (ref 24–44)
Lab: NORMAL
MCH RBC QN AUTO: 27.8 PG (ref 27–31)
MCHC RBC AUTO-ENTMCNC: 30.4 % (ref 32–36)
MCV RBC AUTO: 91.2 FL (ref 78–100)
MONOCYTES ABSOLUTE: 0.3 K/CU MM
MONOCYTES RELATIVE PERCENT: 5 % (ref 0–4)
PDW BLD-RTO: 17.4 % (ref 11.7–14.9)
PLATELET # BLD: 483 K/CU MM (ref 140–440)
PMV BLD AUTO: 9.6 FL (ref 7.5–11.1)
POTASSIUM SERPL-SCNC: 3.5 MMOL/L (ref 3.5–5.1)
RBC # BLD: 3.17 M/CU MM (ref 4.2–5.4)
RBC # BLD: ABNORMAL 10*6/UL
REASON FOR REJECTION: NORMAL
REJECTED TEST: NORMAL
SEGMENTED NEUTROPHILS ABSOLUTE COUNT: 3.8 K/CU MM
SEGMENTED NEUTROPHILS RELATIVE PERCENT: 76 % (ref 36–66)
SODIUM BLD-SCNC: 130 MMOL/L (ref 135–145)
SPECIMEN: NORMAL
WBC # BLD: 5.1 K/CU MM (ref 4–10.5)

## 2024-03-06 PROCEDURE — 94761 N-INVAS EAR/PLS OXIMETRY MLT: CPT

## 2024-03-06 PROCEDURE — 82270 OCCULT BLOOD FECES: CPT

## 2024-03-06 PROCEDURE — 6360000002 HC RX W HCPCS: Performed by: INTERNAL MEDICINE

## 2024-03-06 PROCEDURE — 94640 AIRWAY INHALATION TREATMENT: CPT

## 2024-03-06 PROCEDURE — 2580000003 HC RX 258: Performed by: INTERNAL MEDICINE

## 2024-03-06 PROCEDURE — 2140000000 HC CCU INTERMEDIATE R&B

## 2024-03-06 PROCEDURE — 6370000000 HC RX 637 (ALT 250 FOR IP): Performed by: INTERNAL MEDICINE

## 2024-03-06 PROCEDURE — 36592 COLLECT BLOOD FROM PICC: CPT

## 2024-03-06 PROCEDURE — 99222 1ST HOSP IP/OBS MODERATE 55: CPT | Performed by: INTERNAL MEDICINE

## 2024-03-06 PROCEDURE — 85007 BL SMEAR W/DIFF WBC COUNT: CPT

## 2024-03-06 PROCEDURE — 80048 BASIC METABOLIC PNL TOTAL CA: CPT

## 2024-03-06 PROCEDURE — 85014 HEMATOCRIT: CPT

## 2024-03-06 PROCEDURE — 6370000000 HC RX 637 (ALT 250 FOR IP): Performed by: HOSPITALIST

## 2024-03-06 PROCEDURE — 85018 HEMOGLOBIN: CPT

## 2024-03-06 PROCEDURE — 2580000003 HC RX 258: Performed by: HOSPITALIST

## 2024-03-06 PROCEDURE — 97162 PT EVAL MOD COMPLEX 30 MIN: CPT

## 2024-03-06 PROCEDURE — 97116 GAIT TRAINING THERAPY: CPT

## 2024-03-06 PROCEDURE — 85027 COMPLETE CBC AUTOMATED: CPT

## 2024-03-06 PROCEDURE — 97535 SELF CARE MNGMENT TRAINING: CPT

## 2024-03-06 PROCEDURE — 97166 OT EVAL MOD COMPLEX 45 MIN: CPT

## 2024-03-06 PROCEDURE — 2500000003 HC RX 250 WO HCPCS: Performed by: FAMILY MEDICINE

## 2024-03-06 RX ORDER — BENZONATATE 100 MG/1
100 CAPSULE ORAL 3 TIMES DAILY PRN
Status: DISCONTINUED | OUTPATIENT
Start: 2024-03-06 | End: 2024-03-09 | Stop reason: HOSPADM

## 2024-03-06 RX ORDER — IPRATROPIUM BROMIDE AND ALBUTEROL SULFATE 2.5; .5 MG/3ML; MG/3ML
1 SOLUTION RESPIRATORY (INHALATION)
Status: DISCONTINUED | OUTPATIENT
Start: 2024-03-06 | End: 2024-03-09 | Stop reason: HOSPADM

## 2024-03-06 RX ORDER — SODIUM CHLORIDE 9 MG/ML
INJECTION, SOLUTION INTRAVENOUS CONTINUOUS
Status: DISPENSED | OUTPATIENT
Start: 2024-03-06 | End: 2024-03-07

## 2024-03-06 RX ADMIN — BUDESONIDE AND FORMOTEROL FUMARATE DIHYDRATE 2 PUFF: 160; 4.5 AEROSOL RESPIRATORY (INHALATION) at 08:57

## 2024-03-06 RX ADMIN — ROSUVASTATIN CALCIUM 40 MG: 20 TABLET, COATED ORAL at 20:24

## 2024-03-06 RX ADMIN — ENOXAPARIN SODIUM 40 MG: 100 INJECTION SUBCUTANEOUS at 08:35

## 2024-03-06 RX ADMIN — OSELTAMIVIR PHOSPHATE 75 MG: 75 CAPSULE ORAL at 20:28

## 2024-03-06 RX ADMIN — IPRATROPIUM BROMIDE AND ALBUTEROL SULFATE 1 DOSE: 2.5; .5 SOLUTION RESPIRATORY (INHALATION) at 15:49

## 2024-03-06 RX ADMIN — GUAIFENESIN 100 MG: 100 SOLUTION ORAL at 20:23

## 2024-03-06 RX ADMIN — PANTOPRAZOLE SODIUM 40 MG: 20 TABLET, DELAYED RELEASE ORAL at 17:31

## 2024-03-06 RX ADMIN — GUAIFENESIN 600 MG: 600 TABLET, EXTENDED RELEASE ORAL at 08:35

## 2024-03-06 RX ADMIN — METOPROLOL SUCCINATE 50 MG: 50 TABLET, EXTENDED RELEASE ORAL at 08:34

## 2024-03-06 RX ADMIN — DICYCLOMINE HYDROCHLORIDE 20 MG: 20 TABLET ORAL at 20:24

## 2024-03-06 RX ADMIN — ONDANSETRON 4 MG: 2 INJECTION INTRAMUSCULAR; INTRAVENOUS at 06:09

## 2024-03-06 RX ADMIN — LISINOPRIL 2.5 MG: 5 TABLET ORAL at 08:34

## 2024-03-06 RX ADMIN — DICYCLOMINE HYDROCHLORIDE 20 MG: 20 TABLET ORAL at 08:34

## 2024-03-06 RX ADMIN — GUAIFENESIN 600 MG: 600 TABLET, EXTENDED RELEASE ORAL at 20:23

## 2024-03-06 RX ADMIN — GUAIFENESIN 100 MG: 100 SOLUTION ORAL at 10:12

## 2024-03-06 RX ADMIN — SUCRALFATE 1 G: 1 TABLET ORAL at 20:23

## 2024-03-06 RX ADMIN — PANTOPRAZOLE SODIUM 40 MG: 20 TABLET, DELAYED RELEASE ORAL at 06:10

## 2024-03-06 RX ADMIN — GABAPENTIN 100 MG: 100 CAPSULE ORAL at 20:23

## 2024-03-06 RX ADMIN — IPRATROPIUM BROMIDE AND ALBUTEROL SULFATE 1 DOSE: 2.5; .5 SOLUTION RESPIRATORY (INHALATION) at 21:22

## 2024-03-06 RX ADMIN — CLOPIDOGREL BISULFATE 75 MG: 75 TABLET ORAL at 08:34

## 2024-03-06 RX ADMIN — GABAPENTIN 100 MG: 100 CAPSULE ORAL at 13:26

## 2024-03-06 RX ADMIN — SODIUM CHLORIDE, PRESERVATIVE FREE 10 ML: 5 INJECTION INTRAVENOUS at 08:35

## 2024-03-06 RX ADMIN — DICYCLOMINE HYDROCHLORIDE 20 MG: 20 TABLET ORAL at 17:31

## 2024-03-06 RX ADMIN — OSELTAMIVIR PHOSPHATE 75 MG: 75 CAPSULE ORAL at 08:34

## 2024-03-06 RX ADMIN — SODIUM CHLORIDE: 9 INJECTION, SOLUTION INTRAVENOUS at 14:51

## 2024-03-06 RX ADMIN — GABAPENTIN 100 MG: 100 CAPSULE ORAL at 08:35

## 2024-03-06 RX ADMIN — ASPIRIN 81 MG: 81 TABLET, CHEWABLE ORAL at 08:35

## 2024-03-06 RX ADMIN — ONDANSETRON 4 MG: 2 INJECTION INTRAMUSCULAR; INTRAVENOUS at 20:23

## 2024-03-06 RX ADMIN — SUCRALFATE 1 G: 1 TABLET ORAL at 17:30

## 2024-03-06 RX ADMIN — SUCRALFATE 1 G: 1 TABLET ORAL at 08:34

## 2024-03-06 RX ADMIN — SUCRALFATE 1 G: 1 TABLET ORAL at 13:26

## 2024-03-06 RX ADMIN — DICYCLOMINE HYDROCHLORIDE 20 MG: 20 TABLET ORAL at 13:26

## 2024-03-06 ASSESSMENT — PAIN DESCRIPTION - ORIENTATION: ORIENTATION: MID

## 2024-03-06 ASSESSMENT — PAIN SCALES - GENERAL
PAINLEVEL_OUTOF10: 0
PAINLEVEL_OUTOF10: 1
PAINLEVEL_OUTOF10: 0

## 2024-03-06 ASSESSMENT — PAIN SCALES - WONG BAKER
WONGBAKER_NUMERICALRESPONSE: 0

## 2024-03-06 ASSESSMENT — PAIN DESCRIPTION - DESCRIPTORS: DESCRIPTORS: ACHING

## 2024-03-06 ASSESSMENT — PAIN DESCRIPTION - LOCATION: LOCATION: ABDOMEN

## 2024-03-06 NOTE — PROGRESS NOTES
Comprehensive Nutrition Assessment    Type and Reason for Visit:  Positive Nutrition Screen (weight loss 14-23#, reduced intake/appetite)    Nutrition Recommendations/Plan:   Continue cardiac diet at this time, may liberalize as needed to encourage intake  Continue to offer high calorie oral nutrition supplement with meals   Will continue to follow up during stay      Malnutrition Assessment:  Malnutrition Status:  Severe malnutrition (ongoing problem) (03/06/24 0920)    Context:  Acute Illness     Findings of the 6 clinical characteristics of malnutrition:  Energy Intake:  75% or less of estimated energy requirements for 7 or more days (past week reduced intake overall and emesis in past month)  Weight Loss:  Greater than 7.5% over 3 months     Body Fat Loss:  Mild body fat loss Buccal region, Triceps   Muscle Mass Loss:  Moderate muscle mass loss Clavicles (pectoralis & deltoids), Calf (gastrocnemius), Thigh (quadriceps)  Fluid Accumulation:  No significant fluid accumulation Extremities   Strength:  Not Performed    Nutrition Assessment:    Admit with rib/side pain, influenza A with recent hx PNA. Currently on cardiac diet with standard oral nutrition supplement with meals. Patient reports month long hx of reduced intake and emesis after meals. Progressive weight loss in past 3 months, significant unplanned loss. Continues to meet criteria for severe malnutrition in acute illness. Patient willing to consume some supplements during stay, prefers fruit flavors, will trial strawberry flavor as available. Provided information for eating with N/V and tips for adding protein from nutrition care manual. Will follow at high nutrition risk at this time.    Nutrition Related Findings:    resting in bed, coughing during visit,  hx lung cancer, COPD, recent cardiac cath with stent in february Wound Type: None       Current Nutrition Intake & Therapies:    Average Meal Intake: Unable to assess  Average Supplements Intake:

## 2024-03-06 NOTE — CARE COORDINATION
03/06/24 1241   Service Assessment   Patient Orientation Alert and Oriented   Cognition Alert   History Provided By Patient   Primary Caregiver Spouse   Support Systems Spouse/Significant Other   Patient's Healthcare Decision Maker is: Legal Next of Kin   PCP Verified by CM Yes   Prior Functional Level Assistance with the following:;Mobility   Current Functional Level Assistance with the following:;Mobility   Can patient return to prior living arrangement Yes   Ability to make needs known: Good   Family able to assist with home care needs: Yes   Would you like for me to discuss the discharge plan with any other family members/significant others, and if so, who? Yes  ()   Financial Resources Medicare   Community Resources ECF/Home Care     CM in to see Pt to initiate discharge planning.  Pt from home with her spouse Godwin and [facility].  Pt denies any needs at this time.  CM following

## 2024-03-06 NOTE — CONSULTS
Name:  Adenike Harvey /Age/Sex: 1955  (69 y.o. female)   MRN & CSN:  [id number] & [id number] Admission Date/Time: 3/4/2024  9:24 PM   Location:  [facility]/3123-A PCP: Iban Lou MD       Hospital Day: 3          Referring physician:  Jahaira Hill MD         Reason for consultation: Dizziness        Thanks for referral.    Information source: Patient    CC; rib pain on breathing      HPI:   Thank you for involving me in taking  care of Adenike Harvey who  is a 69 y.o.year  Old female  Presents with history of COPD, history of PCI of the LAD recently now cardiology consulted that the patient is complaining of dizziness also had shortness of breath being treated for sepsis due to influenza on Tamiflu                    Past medical history:    has a past medical history of Anemia, Anxiety, Arthritis, Bronchitis, Chronic back pain, COPD (chronic obstructive pulmonary disease) (HCC), Depression, Diverticulosis, Hemoptysis, History of external beam radiation therapy, Hx of blood clots, Hyperlipidemia, Hypertension, Low back pain, Lung mass, Nausea & vomiting, Panic attacks, Pneumonia, Right Lung Cancer, Shingles, Shortness of breath on exertion, Teeth missing, Urinary tract infection, Wears dentures, and Wears glasses.  Past surgical history:   has a past surgical history that includes Tonsillectomy (); Tubal ligation (); Elbow surgery (Left, ); Dilation and curettage of uterus (); Colonoscopy ('s); bronchoscopy (2017); Bunionectomy (Bilateral, 's); Mediastinoscopy (2018); Lung surgery (2018); Tunneled venous port placement (Left, 2018); Catheter Removal (Left, 2019); Upper gastrointestinal endoscopy (N/A, 2024); Cardiac procedure (N/A, 3/1/2024); and Cardiac procedure (N/A, 3/1/2024).  Social History:   reports that she quit smoking about 6 years ago. Her smoking use included cigarettes. She started smoking about 39  (Amilcar GUIDO, et al., 2019) is: 11.4%    Values used to calculate the score:      Age: 69 years      Sex: Female      Is Non- : No      Diabetic: No      Tobacco smoker: No      Systolic Blood Pressure: 141 mmHg      Is BP treated: Yes      HDL Cholesterol: 30 MG/DL      Total Cholesterol: 137 MG/DL     Assessment/Recommendations:     -Dizziness of unclear etiology blood pressure and heart rate is stable suggest checking orthostatics, neurologic workup for dizziness  -CAD has recent PCI has no chest pain on DAPT  -Hyperlipidemia on Lipitor  -Patient has flulike symptoms and the symptoms are from that         Tera Livingston MD, 3/6/2024 11:40 AM       Please note this report has been partially produced using speech recognition software and may contain errors related to that system including errors in grammar, punctuation, and spelling, as well as words and phrases that may be inappropriate. If there are any questions or concerns please feel free to contact the dictating provider for clarification.

## 2024-03-06 NOTE — PROGRESS NOTES
chemotherapy  -Patient is on gabapentin      Diet ADULT DIET; Regular; Low Fat/Low Chol/High Fiber/2 gm Na  ADULT ORAL NUTRITION SUPPLEMENT; Breakfast, Lunch, Dinner; Standard High Calorie/High Protein Oral Supplement    DVT Prophylaxis [x] Lovenox, [] Heparin, [] Eliquis, [] Xarelto  [] SCDs       Code Status Full Code   Disposition Expected Disposition: [facility]  Estimated Date of Discharge: 3/7  Patient requires continued admission due to sepsis due to flu   Home O2 None     Personally reviewed Lab Studies and Imaging         Subjective/Interval history:   Chief Complaint: Rib Pain (With breathing)     States she still has a cough and dizziness    Review of Systems:    Negative unless mentioned above    Objective:   No intake or output data in the 24 hours ending 03/06/24 0957     Vitals:   BP (!) 141/96   Pulse 97   Temp 99.4 °F (37.4 °C) (Oral)   Resp 13   Ht 1.626 m (5' 4\")   Wt 67.1 kg (148 lb)   LMP 01/06/2009 (LMP Unknown)   SpO2 96%   BMI 25.40 kg/m²     Physical Exam:   General: NAD, laying in bed  Eyes: no discharge  HENT: NCAT  Cardiovascular: slightly tachycardia  Respiratory: Clear to auscultation, on room air  Gastrointestinal: Soft, non tender, nondistended  Genitourinary: no suprapubic tenderness  Musculoskeletal: No edema, nontender  Skin: warm, dry, no gross lesions  Neuro: Alert. No gross deficits  Psych: Mood appropriate.     Medications:   Medications:    ipratropium 0.5 mg-albuterol 2.5 mg  1 Dose Inhalation Q4H WA RT    sodium chloride flush  5-40 mL IntraVENous 2 times per day    enoxaparin  40 mg SubCUTAneous Daily    oseltamivir  75 mg Oral BID    aspirin  81 mg Oral Daily    budesonide-formoterol  2 puff Inhalation BID    clopidogrel  75 mg Oral Daily    dicyclomine  20 mg Oral 4x Daily    fluticasone  2 spray Each Nostril Daily    gabapentin  100 mg Oral TID    lisinopril  2.5 mg Oral Daily    metoprolol succinate  50 mg Oral Daily    pantoprazole  40 mg Oral BID AC    rosuvastatin   40 mg Oral Nightly    sucralfate  1 g Oral 4x Daily    guaiFENesin  600 mg Oral BID      Infusions:    sodium chloride       PRN Meds: benzonatate, 100 mg, TID PRN  sodium chloride flush, 5-40 mL, PRN  sodium chloride, , PRN  potassium chloride, 40 mEq, PRN   Or  potassium alternative oral replacement, 40 mEq, PRN   Or  potassium chloride, 10 mEq, PRN  magnesium sulfate, 2,000 mg, PRN  ondansetron, 4 mg, Q8H PRN   Or  ondansetron, 4 mg, Q6H PRN  polyethylene glycol, 17 g, Daily PRN  acetaminophen, 650 mg, Q6H PRN   Or  acetaminophen, 650 mg, Q6H PRN  hydrOXYzine HCl, 25 mg, TID PRN  ipratropium 0.5 mg-albuterol 2.5 mg, 1 Dose, Q4H PRN  guaiFENesin, 100 mg, Q4H PRN        Labs      Recent Labs     03/04/24 2140 03/05/24  0615   WBC 9.5 7.9   HGB 11.6* 10.6*   HCT 36.2* 34.5*   * 434      Recent Labs     03/04/24 2140 03/05/24  0615   * 135   K 3.2* 3.7   CL 97* 100   CO2 24 22   BUN 10 9   CREATININE 0.7 0.7     Recent Labs     03/04/24 2140   AST 21   ALT 16   BILITOT 0.4   ALKPHOS 72     No results for input(s): \"INR\" in the last 72 hours.  No results for input(s): \"CKTOTAL\", \"CKMB\", \"CKMBINDEX\", \"TROPONINT\" in the last 72 hours.  Lab Results   Component Value Date/Time    LABA1C 5.2 01/22/2024 06:00 AM     CALCIUM:  7.7/22 (03/05 0615)  Lab Results   Component Value Date/Time    MG 1.7 02/28/2024 05:08 AM           Electronically signed by Mario Alberto Uribe MD on 3/6/2024 at 9:57 AM

## 2024-03-06 NOTE — CONSULTS
[facility] ACUTE CARE PHYSICAL THERAPY EVALUATION  Adenike Harvey, 1955, 3123/3123-A, 3/6/2024    History  [place]:  The primary encounter diagnosis was Influenza A. Diagnoses of General weakness, SIRS (systemic inflammatory response syndrome) (HCC), and COPD exacerbation (HCC) were also pertinent to this visit.  Patient  has a past medical history of Anemia, Anxiety, Arthritis, Bronchitis, Chronic back pain, COPD (chronic obstructive pulmonary disease) (HCC), Depression, Diverticulosis, Hemoptysis, History of external beam radiation therapy, Hx of blood clots, Hyperlipidemia, Hypertension, Low back pain, Lung mass, Nausea & vomiting, Panic attacks, Pneumonia, Right Lung Cancer, Shingles, Shortness of breath on exertion, Teeth missing, Urinary tract infection, Wears dentures, and Wears glasses.  Patient  has a past surgical history that includes Tonsillectomy (1978); Tubal ligation (1988); Elbow surgery (Left, 1980); Dilation and curettage of uterus (1989); Colonoscopy (2000's); bronchoscopy (12/07/2017); Bunionectomy (Bilateral, 1990's); Mediastinoscopy (02/21/2018); Lung surgery (04/12/2018); Tunneled venous port placement (Left, 07/31/2018); Catheter Removal (Left, 5/16/2019); Upper gastrointestinal endoscopy (N/A, 1/23/2024); Cardiac procedure (N/A, 3/1/2024); and Cardiac procedure (N/A, 3/1/2024).    Discharge Recommendation: [facility]    Subjective:    Patient states:  \"I must have gotten the flu here.\"      Pain:  7/10 headache.      Communication with other providers:  Handoff to RN, OT    Restrictions: general precautions, fall risk, droplet isolation    Home Setup/Prior level of function   Lives With: Spouse  Type of Home: House  Home Layout: One level  Home Access: Stairs to enter without rails  Entrance Stairs - Number of Steps: 1 + 1 (front door)  Bathroom Shower/Tub: Walk-in shower, Tub/Shower unit  Bathroom Equipment: Shower chair  Home Equipment: Cane, Walker, rolling  Has the patient had

## 2024-03-06 NOTE — PROGRESS NOTES
Occupational Therapy  [facility] ACUTE CARE OCCUPATIONAL THERAPY EVALUATION  Adenike Harvey, 1955, 3123/3123-A, 3/6/2024    Discharge Recommendation: Home with initial 24 hour supervision/assistance, Home Health OT services    History  [place]:  The primary encounter diagnosis was Influenza A. Diagnoses of General weakness, SIRS (systemic inflammatory response syndrome) (HCC), and COPD exacerbation (HCC) were also pertinent to this visit.  Patient  has a past medical history of Anemia, Anxiety, Arthritis, Bronchitis, Chronic back pain, COPD (chronic obstructive pulmonary disease) (HCC), Depression, Diverticulosis, Hemoptysis, History of external beam radiation therapy, Hx of blood clots, Hyperlipidemia, Hypertension, Low back pain, Lung mass, Nausea & vomiting, Panic attacks, Pneumonia, Right Lung Cancer, Shingles, Shortness of breath on exertion, Teeth missing, Urinary tract infection, Wears dentures, and Wears glasses.  Patient  has a past surgical history that includes Tonsillectomy (1978); Tubal ligation (1988); Elbow surgery (Left, 1980); Dilation and curettage of uterus (1989); Colonoscopy (2000's); bronchoscopy (12/07/2017); Bunionectomy (Bilateral, 1990's); Mediastinoscopy (02/21/2018); Lung surgery (04/12/2018); Tunneled venous port placement (Left, 07/31/2018); Catheter Removal (Left, 5/16/2019); Upper gastrointestinal endoscopy (N/A, 1/23/2024); Cardiac procedure (N/A, 3/1/2024); and Cardiac procedure (N/A, 3/1/2024).    Subjective:  Patient states:  \"I was doing well at home until the nausea hit me\"   Pain:  7/10 headache, nausea.    Communication: RN, CM, co-eval with PT   Restrictions: general precautions, fall risk, droplet isolation     Home Setup/Prior level of function   Social/Functional History  Lives With: Spouse  Type of Home: House  Home Layout: One level  Home Access: Stairs to enter without rails  Entrance Stairs - Number of Steps: 1 + 1 (front door)  Bathroom Shower/Tub:  Walk-in shower, Tub/Shower unit  Bathroom Equipment: Shower chair  Home Equipment: Cane, Walker, rolling  Has the patient had two or more falls in the past year or any fall with injury in the past year?: Yes (1 a week ago and last night)  ADL Assistance: Independent  Homemaking Assistance: Independent  Ambulation Assistance: Independent  Transfer Assistance: Independent  Active : No  *information above from past OT eval on 1/23, pt confirmed accuracy     Examination of body systems (includes body structures/functions, activity/participation limitations):  Observation:  Supine in bed upon arrival, agreeable to therapy   Vision:  WFL  Hearing:  WFL  Cardiopulmonary:  On room air, tele, vitals remained stable throughout session       Body Systems and functions:  ROM R/L:  WFL    Strength R/L:  RUE 4+/5,  LUE 4+/5,  4+/5  Sensation: WFL  Tone: Normal  Coordination: WFL  Perception: WNL    Pt body function inferred from observation of gross motor function, and assessment of mobility, balance, posture, ADL performance, and activity tolerance.    Activities of Daily Living (ADLs):  Feeding: set-up A  Grooming: washed hands at sink with SBA for balance safety, min verbal cues for 2ww positioning at sink  UB bathing: SBA  LB bathing: SBA  UB dressing: supervision  LB dressing: donned and doffed depends sitting on commode with stand-by assist for balance safety   Toileting: analia-care in sitting with stand-by assist for balance safety     Pt ADL function inferred from observation of gross motor function, and assessment of mobility, balance, posture, safety awareness, cognition, and activity tolerance.     AM-PAC 6 click short form for inpatient daily activity:   How much help from another person does the patient currently need... Unable  Dep A Lot  Max A A Lot   Mod A A Little  Min A A Little   CGA  SBA None   Mod I  Indep  Sup   1.  Putting on and taking off regular lower body clothing? [] 1    [] 2   [] 2   [] 3

## 2024-03-07 ENCOUNTER — APPOINTMENT (OUTPATIENT)
Dept: ULTRASOUND IMAGING | Age: 69
End: 2024-03-07
Payer: MEDICARE

## 2024-03-07 LAB
ANION GAP SERPL CALCULATED.3IONS-SCNC: 13 MMOL/L (ref 7–16)
BACTERIA: NEGATIVE /HPF
BASOPHILS ABSOLUTE: 0 K/CU MM
BASOPHILS RELATIVE PERCENT: 0.2 % (ref 0–1)
BILIRUBIN URINE: NEGATIVE MG/DL
BLOOD, URINE: ABNORMAL
BUN SERPL-MCNC: 5 MG/DL (ref 6–23)
CALCIUM SERPL-MCNC: 7.4 MG/DL (ref 8.3–10.6)
CHLORIDE BLD-SCNC: 96 MMOL/L (ref 99–110)
CLARITY: CLEAR
CO2: 22 MMOL/L (ref 21–32)
COLOR: YELLOW
CREAT SERPL-MCNC: 0.8 MG/DL (ref 0.6–1.1)
DIFFERENTIAL TYPE: ABNORMAL
EOSINOPHILS ABSOLUTE: 0.3 K/CU MM
EOSINOPHILS RELATIVE PERCENT: 7.5 % (ref 0–3)
GFR SERPL CREATININE-BSD FRML MDRD: >60 ML/MIN/1.73M2
GLUCOSE SERPL-MCNC: 60 MG/DL (ref 70–99)
GLUCOSE, URINE: NEGATIVE MG/DL
HCT VFR BLD CALC: 28.6 % (ref 37–47)
HEMOGLOBIN: 9.1 GM/DL (ref 12.5–16)
IMMATURE NEUTROPHIL %: 2 % (ref 0–0.43)
KETONES, URINE: 15 MG/DL
LEUKOCYTE ESTERASE, URINE: ABNORMAL
LYMPHOCYTES ABSOLUTE: 0.8 K/CU MM
LYMPHOCYTES RELATIVE PERCENT: 17.3 % (ref 24–44)
MAGNESIUM: 1.5 MG/DL (ref 1.8–2.4)
MAGNESIUM: 2.6 MG/DL (ref 1.8–2.4)
MCH RBC QN AUTO: 28.4 PG (ref 27–31)
MCHC RBC AUTO-ENTMCNC: 31.8 % (ref 32–36)
MCV RBC AUTO: 89.4 FL (ref 78–100)
MONOCYTES ABSOLUTE: 0.6 K/CU MM
MONOCYTES RELATIVE PERCENT: 13.4 % (ref 0–4)
NITRITE URINE, QUANTITATIVE: NEGATIVE
NUCLEATED RBC %: 0 %
OSMOLALITY UR: 268 MOS/L (ref 280–300)
PDW BLD-RTO: 17.2 % (ref 11.7–14.9)
PH, URINE: 7 (ref 5–8)
PLATELET # BLD: 295 K/CU MM (ref 140–440)
PMV BLD AUTO: 9.4 FL (ref 7.5–11.1)
POTASSIUM SERPL-SCNC: 3 MMOL/L (ref 3.5–5.1)
POTASSIUM SERPL-SCNC: 3.3 MMOL/L (ref 3.5–5.1)
PRO-BNP: 2744 PG/ML
PROTEIN UA: NEGATIVE MG/DL
RBC # BLD: 3.2 M/CU MM (ref 4.2–5.4)
RBC URINE: <1 /HPF (ref 0–6)
SEGMENTED NEUTROPHILS ABSOLUTE COUNT: 2.6 K/CU MM
SEGMENTED NEUTROPHILS RELATIVE PERCENT: 59.6 % (ref 36–66)
SODIUM BLD-SCNC: 131 MMOL/L (ref 135–145)
SODIUM URINE: 69 MMOL/L (ref 35–167)
SPECIFIC GRAVITY UA: 1.01 (ref 1–1.03)
TOTAL IMMATURE NEUTOROPHIL: 0.09 K/CU MM
TOTAL NUCLEATED RBC: 0 K/CU MM
TRICHOMONAS: ABNORMAL /HPF
UROBILINOGEN, URINE: 0.2 MG/DL (ref 0.2–1)
WBC # BLD: 4.4 K/CU MM (ref 4–10.5)
WBC UA: 13 /HPF (ref 0–5)

## 2024-03-07 PROCEDURE — 6370000000 HC RX 637 (ALT 250 FOR IP): Performed by: FAMILY MEDICINE

## 2024-03-07 PROCEDURE — 6370000000 HC RX 637 (ALT 250 FOR IP): Performed by: INTERNAL MEDICINE

## 2024-03-07 PROCEDURE — 2500000003 HC RX 250 WO HCPCS: Performed by: FAMILY MEDICINE

## 2024-03-07 PROCEDURE — 84300 ASSAY OF URINE SODIUM: CPT

## 2024-03-07 PROCEDURE — 83935 ASSAY OF URINE OSMOLALITY: CPT

## 2024-03-07 PROCEDURE — 36592 COLLECT BLOOD FROM PICC: CPT

## 2024-03-07 PROCEDURE — 93880 EXTRACRANIAL BILAT STUDY: CPT

## 2024-03-07 PROCEDURE — 94640 AIRWAY INHALATION TREATMENT: CPT

## 2024-03-07 PROCEDURE — 6360000002 HC RX W HCPCS

## 2024-03-07 PROCEDURE — APPNB30 APP NON BILLABLE TIME 0-30 MINS

## 2024-03-07 PROCEDURE — 94761 N-INVAS EAR/PLS OXIMETRY MLT: CPT

## 2024-03-07 PROCEDURE — 81001 URINALYSIS AUTO W/SCOPE: CPT

## 2024-03-07 PROCEDURE — 83735 ASSAY OF MAGNESIUM: CPT

## 2024-03-07 PROCEDURE — 85025 COMPLETE CBC W/AUTO DIFF WBC: CPT

## 2024-03-07 PROCEDURE — 84132 ASSAY OF SERUM POTASSIUM: CPT

## 2024-03-07 PROCEDURE — 99232 SBSQ HOSP IP/OBS MODERATE 35: CPT | Performed by: INTERNAL MEDICINE

## 2024-03-07 PROCEDURE — 80048 BASIC METABOLIC PNL TOTAL CA: CPT

## 2024-03-07 PROCEDURE — 83880 ASSAY OF NATRIURETIC PEPTIDE: CPT

## 2024-03-07 PROCEDURE — 6370000000 HC RX 637 (ALT 250 FOR IP)

## 2024-03-07 PROCEDURE — 6360000002 HC RX W HCPCS: Performed by: INTERNAL MEDICINE

## 2024-03-07 PROCEDURE — 2580000003 HC RX 258: Performed by: INTERNAL MEDICINE

## 2024-03-07 PROCEDURE — 2140000000 HC CCU INTERMEDIATE R&B

## 2024-03-07 PROCEDURE — 2580000003 HC RX 258

## 2024-03-07 PROCEDURE — 83930 ASSAY OF BLOOD OSMOLALITY: CPT

## 2024-03-07 PROCEDURE — 6370000000 HC RX 637 (ALT 250 FOR IP): Performed by: HOSPITALIST

## 2024-03-07 RX ORDER — METOPROLOL SUCCINATE 50 MG/1
50 TABLET, EXTENDED RELEASE ORAL DAILY
Status: DISCONTINUED | OUTPATIENT
Start: 2024-03-07 | End: 2024-03-09 | Stop reason: HOSPADM

## 2024-03-07 RX ORDER — MAGNESIUM SULFATE IN WATER 40 MG/ML
2000 INJECTION, SOLUTION INTRAVENOUS PRN
Status: DISCONTINUED | OUTPATIENT
Start: 2024-03-07 | End: 2024-03-09 | Stop reason: HOSPADM

## 2024-03-07 RX ORDER — POTASSIUM CHLORIDE 20 MEQ/1
40 TABLET, EXTENDED RELEASE ORAL ONCE
Status: COMPLETED | OUTPATIENT
Start: 2024-03-07 | End: 2024-03-07

## 2024-03-07 RX ORDER — POTASSIUM CHLORIDE 7.45 MG/ML
10 INJECTION INTRAVENOUS PRN
Status: DISCONTINUED | OUTPATIENT
Start: 2024-03-07 | End: 2024-03-09 | Stop reason: HOSPADM

## 2024-03-07 RX ORDER — POTASSIUM CHLORIDE 20 MEQ/1
40 TABLET, EXTENDED RELEASE ORAL PRN
Status: DISCONTINUED | OUTPATIENT
Start: 2024-03-07 | End: 2024-03-09 | Stop reason: HOSPADM

## 2024-03-07 RX ORDER — SODIUM CHLORIDE, SODIUM LACTATE, POTASSIUM CHLORIDE, AND CALCIUM CHLORIDE .6; .31; .03; .02 G/100ML; G/100ML; G/100ML; G/100ML
1000 INJECTION, SOLUTION INTRAVENOUS ONCE
Status: COMPLETED | OUTPATIENT
Start: 2024-03-07 | End: 2024-03-07

## 2024-03-07 RX ORDER — MAGNESIUM SULFATE 4 G/50ML
4000 INJECTION INTRAVENOUS ONCE
Status: COMPLETED | OUTPATIENT
Start: 2024-03-07 | End: 2024-03-07

## 2024-03-07 RX ORDER — POTASSIUM CHLORIDE 20 MEQ/1
20 TABLET, EXTENDED RELEASE ORAL ONCE
Status: DISCONTINUED | OUTPATIENT
Start: 2024-03-07 | End: 2024-03-07

## 2024-03-07 RX ADMIN — SUCRALFATE 1 G: 1 TABLET ORAL at 11:25

## 2024-03-07 RX ADMIN — PANTOPRAZOLE SODIUM 40 MG: 20 TABLET, DELAYED RELEASE ORAL at 06:08

## 2024-03-07 RX ADMIN — GUAIFENESIN 600 MG: 600 TABLET, EXTENDED RELEASE ORAL at 08:13

## 2024-03-07 RX ADMIN — GUAIFENESIN 100 MG: 100 SOLUTION ORAL at 00:36

## 2024-03-07 RX ADMIN — GABAPENTIN 100 MG: 100 CAPSULE ORAL at 19:45

## 2024-03-07 RX ADMIN — IPRATROPIUM BROMIDE AND ALBUTEROL SULFATE 1 DOSE: 2.5; .5 SOLUTION RESPIRATORY (INHALATION) at 16:00

## 2024-03-07 RX ADMIN — IPRATROPIUM BROMIDE AND ALBUTEROL SULFATE 1 DOSE: 2.5; .5 SOLUTION RESPIRATORY (INHALATION) at 08:23

## 2024-03-07 RX ADMIN — POTASSIUM CHLORIDE 40 MEQ: 1500 TABLET, EXTENDED RELEASE ORAL at 21:10

## 2024-03-07 RX ADMIN — BUDESONIDE AND FORMOTEROL FUMARATE DIHYDRATE 2 PUFF: 160; 4.5 AEROSOL RESPIRATORY (INHALATION) at 08:23

## 2024-03-07 RX ADMIN — DICYCLOMINE HYDROCHLORIDE 20 MG: 20 TABLET ORAL at 16:21

## 2024-03-07 RX ADMIN — DICYCLOMINE HYDROCHLORIDE 20 MG: 20 TABLET ORAL at 08:12

## 2024-03-07 RX ADMIN — MAGNESIUM SULFATE HEPTAHYDRATE 4000 MG: 80 INJECTION, SOLUTION INTRAVENOUS at 16:27

## 2024-03-07 RX ADMIN — ROSUVASTATIN CALCIUM 40 MG: 20 TABLET, COATED ORAL at 19:45

## 2024-03-07 RX ADMIN — SUCRALFATE 1 G: 1 TABLET ORAL at 08:13

## 2024-03-07 RX ADMIN — IPRATROPIUM BROMIDE AND ALBUTEROL SULFATE 1 DOSE: 2.5; .5 SOLUTION RESPIRATORY (INHALATION) at 20:43

## 2024-03-07 RX ADMIN — GUAIFENESIN 600 MG: 600 TABLET, EXTENDED RELEASE ORAL at 19:45

## 2024-03-07 RX ADMIN — OSELTAMIVIR PHOSPHATE 75 MG: 75 CAPSULE ORAL at 08:13

## 2024-03-07 RX ADMIN — SODIUM CHLORIDE, PRESERVATIVE FREE 10 ML: 5 INJECTION INTRAVENOUS at 08:14

## 2024-03-07 RX ADMIN — BUDESONIDE AND FORMOTEROL FUMARATE DIHYDRATE 2 PUFF: 160; 4.5 AEROSOL RESPIRATORY (INHALATION) at 20:43

## 2024-03-07 RX ADMIN — SUCRALFATE 1 G: 1 TABLET ORAL at 19:45

## 2024-03-07 RX ADMIN — CLOPIDOGREL BISULFATE 75 MG: 75 TABLET ORAL at 08:13

## 2024-03-07 RX ADMIN — PANTOPRAZOLE SODIUM 40 MG: 20 TABLET, DELAYED RELEASE ORAL at 16:21

## 2024-03-07 RX ADMIN — GABAPENTIN 100 MG: 100 CAPSULE ORAL at 13:02

## 2024-03-07 RX ADMIN — ASPIRIN 81 MG: 81 TABLET, CHEWABLE ORAL at 08:13

## 2024-03-07 RX ADMIN — ENOXAPARIN SODIUM 40 MG: 100 INJECTION SUBCUTANEOUS at 08:12

## 2024-03-07 RX ADMIN — DICYCLOMINE HYDROCHLORIDE 20 MG: 20 TABLET ORAL at 19:45

## 2024-03-07 RX ADMIN — GABAPENTIN 100 MG: 100 CAPSULE ORAL at 08:13

## 2024-03-07 RX ADMIN — METOPROLOL SUCCINATE 50 MG: 50 TABLET, EXTENDED RELEASE ORAL at 11:25

## 2024-03-07 RX ADMIN — POTASSIUM CHLORIDE 40 MEQ: 1500 TABLET, EXTENDED RELEASE ORAL at 11:25

## 2024-03-07 RX ADMIN — DICYCLOMINE HYDROCHLORIDE 20 MG: 20 TABLET ORAL at 11:25

## 2024-03-07 RX ADMIN — POTASSIUM CHLORIDE 10 MEQ: 7.46 INJECTION, SOLUTION INTRAVENOUS at 20:47

## 2024-03-07 RX ADMIN — IPRATROPIUM BROMIDE AND ALBUTEROL SULFATE 1 DOSE: 2.5; .5 SOLUTION RESPIRATORY (INHALATION) at 11:44

## 2024-03-07 RX ADMIN — SUCRALFATE 1 G: 1 TABLET ORAL at 16:21

## 2024-03-07 RX ADMIN — HYDROXYZINE HYDROCHLORIDE 25 MG: 25 TABLET, FILM COATED ORAL at 19:45

## 2024-03-07 RX ADMIN — OSELTAMIVIR PHOSPHATE 75 MG: 75 CAPSULE ORAL at 21:28

## 2024-03-07 RX ADMIN — ACETAMINOPHEN 650 MG: 325 TABLET ORAL at 19:45

## 2024-03-07 RX ADMIN — SODIUM CHLORIDE, POTASSIUM CHLORIDE, SODIUM LACTATE AND CALCIUM CHLORIDE 1000 ML: 600; 310; 30; 20 INJECTION, SOLUTION INTRAVENOUS at 13:01

## 2024-03-07 ASSESSMENT — PAIN - FUNCTIONAL ASSESSMENT: PAIN_FUNCTIONAL_ASSESSMENT: ACTIVITIES ARE NOT PREVENTED

## 2024-03-07 ASSESSMENT — PAIN DESCRIPTION - LOCATION
LOCATION: HEAD
LOCATION: ABDOMEN

## 2024-03-07 ASSESSMENT — PAIN DESCRIPTION - ORIENTATION: ORIENTATION: MID

## 2024-03-07 ASSESSMENT — PAIN SCALES - WONG BAKER
WONGBAKER_NUMERICALRESPONSE: 0

## 2024-03-07 ASSESSMENT — PAIN DESCRIPTION - DESCRIPTORS
DESCRIPTORS: ACHING
DESCRIPTORS: ACHING

## 2024-03-07 ASSESSMENT — PAIN SCALES - GENERAL
PAINLEVEL_OUTOF10: 5
PAINLEVEL_OUTOF10: 3

## 2024-03-07 NOTE — FLOWSHEET NOTE
Orthostatic BP      03/07/24 1127   Vitals   Blood Pressure Lying (S)  109/78   Pulse Lying (S)  96 PER MINUTE   Blood Pressure Sitting (S)  108/74   Pulse Sitting (S)  101 PER MINUTE   Blood Pressure Standing (S)  88/59   Pulse Standing (S)  113 PER MINUTE

## 2024-03-07 NOTE — PROGRESS NOTES
[place] [address]  Phone: [phone]    Fax [phone]                  Tera Livingston MD, [facility]       Alexys Morocho MD, [facility]  Bailee Ceballos MD, [facility]    MD Lili Machado MD Tariq Rizvi, MD Bilal Alam, MD Dr. Waseem Sajjad MD Melissa Kellis, APRSANTINO White, APRSANTINO Tanner, APRSANTINO Carrillo, APRN  Ish Sanchez PA-C    CARDIOLOGY  NOTE      Name:  Adenike Harvey /Age/Sex: 1955  (69 y.o. female)   MRN & CSN:  [id number] & [id number] Admission Date/Time: 3/4/2024  9:24 PM   Location:  [address] [place] [address]- PCP: Iban oLu MD       Hospital Day: 4    - Cardiology consult is for: Dizziness      ASSESSMENT/ PLAN:  Dizziness  Orthostatic hypotension  Hypokalemia and hypomagnesemia  -Orthostatic vital signs positive today.  -Patient with poor oral intake.  -Advised compression stockings and increase hydration.  Give 1 L bolus of lactated Ringer's.  -Goal potassium 4.0-4.5, magnesium 2.0-2.2. Replete per protocol   -Check carotid artery ultrasound.  Coronary artery disease s/p PCI LAD   Ischemic cardiomyopathy with mildly reduced ejection fraction  -Chest pain-free at this time.  -Patient states she is compliant with her antiplatelets.  -Recent echo showing EF of 45-50% with apical wall motion abnormalities.  -Continue aspirin and Plavix for at least 6 months.  -GDMT: Continue Toprol-XL 50 mg daily. Lisinopril held this am due to hypotension  -Continue Crestor 40 mg daily  Sepsis with influenza A  COPD exacerbation  Non-small cell lung cancer s/p thoracotomy 2018  -On Tamiflu.  Per primary care        Subjective:  Adenike is a 69 y.o.year old     Patient is continue to complain of dizziness upon standing.  Patient does have poor oral intake and is currently infected with the flu.  Likely the combination of these 2 things has contributed to her  discussed with EMELY as well:    -Dizziness of unclear etiology blood pressure and heart rate is stable suggest checking orthostatics, neurologic workup for dizziness  -CAD has recent PCI has no chest pain on DAPT  -Hyperlipidemia on Lipitor  -Patient has flulike symptoms and the symptoms are from that  -COPD exacerbation neuro continue with present medical therapy  -HFrEF we will continue with medical therapy  -Has non-small cell lung cancer status post thoracotomy  -Hypokalemia and hypomagnesemia being corrected          LORI HUITRON MD FACC

## 2024-03-07 NOTE — PROGRESS NOTES
V2.0  [facility] [place] Note      Name:  Adenike Harvey /Age/Sex: 1955  (69 y.o. female)   MRN & CSN:  [id number] & [id number] Encounter Date/Time: 3/7/2024 9:57 AM EST    Location:  [facility] PCP: Iban Lou MD       Hospital Day: 4    Assessment and Plan:   #.  Sepsis due to influenza A  -Respiratory panel positive for influenza A  -Low-grade temp, tachycardia  -Ordered Tamiflu  - Tmax 101.5.  Check UA for additional sources of infection.  BCx 3/4: NGTD.     #.  COPD exacerbation secondary to above  -Patient received DuoNeb x 2 doses in ER  -On DuoNebs.  On Symbicort.     #.  Tachycardia  -Patient received NS bolus, no improvement in tachycardia  -Resume home medications-metoprolol succinate  - Heart rate improving.     Severe malnutrition  - Cardiac diet and high-calorie oral nutrition supplements as per dietitian.    Elevated troponin  - May be due to influenza.  Patient does have dizziness.  Consult cardiology due to recent heart cath.  EKG appears to show sinus tachycardia.    Anemia  - May be dilutional.  No gross bleeding.  Monitor.  - Improved hemoglobin 9.1.    Orthostatic hypotension  - Receiving additional bolus.  Compression stockings.  Carotid ultrasound.    Hypomagnesemia  - Placed on replacement protocol.     #.  Recent admission -3/2/2024  -patient had LHC-stent to LAD aspirin, Plavix, lisinopril, metoprolol succinate, statin  -Patient was also treated for pneumonia.  And completed course of antibiotics  - Aspirin and Plavix for at least 6 months.     #.  Chronic systolic CHF  -Does not appear to be in exacerbation  -Patient rather appears dry even though proBNP is elevated  -Patient received 1 L NS bolus  -Received IVF.  - Lisinopril held due to hypotension.  On beta-blocker.     #.  NSCLC  -History of right upper lobe lung mass-s/p open thoracotomy with right upper lobe bronchial sleep resection-2018  -Patient has recurrent lung cancer-PET scan  IntraVENous Once    magnesium sulfate  4,000 mg IntraVENous Once    ipratropium 0.5 mg-albuterol 2.5 mg  1 Dose Inhalation Q4H WA RT    sodium chloride flush  5-40 mL IntraVENous 2 times per day    enoxaparin  40 mg SubCUTAneous Daily    oseltamivir  75 mg Oral BID    aspirin  81 mg Oral Daily    budesonide-formoterol  2 puff Inhalation BID    clopidogrel  75 mg Oral Daily    dicyclomine  20 mg Oral 4x Daily    fluticasone  2 spray Each Nostril Daily    gabapentin  100 mg Oral TID    lisinopril  2.5 mg Oral Daily    pantoprazole  40 mg Oral BID AC    rosuvastatin  40 mg Oral Nightly    sucralfate  1 g Oral 4x Daily    guaiFENesin  600 mg Oral BID      Infusions:    sodium chloride       PRN Meds: potassium chloride, 40 mEq, PRN   Or  potassium alternative oral replacement, 40 mEq, PRN   Or  potassium chloride, 10 mEq, PRN  magnesium sulfate, 2,000 mg, PRN  benzonatate, 100 mg, TID PRN  sodium chloride flush, 5-40 mL, PRN  sodium chloride, , PRN  ondansetron, 4 mg, Q8H PRN   Or  ondansetron, 4 mg, Q6H PRN  polyethylene glycol, 17 g, Daily PRN  acetaminophen, 650 mg, Q6H PRN   Or  acetaminophen, 650 mg, Q6H PRN  hydrOXYzine HCl, 25 mg, TID PRN  ipratropium 0.5 mg-albuterol 2.5 mg, 1 Dose, Q4H PRN  guaiFENesin, 100 mg, Q4H PRN        Labs      Recent Labs     03/05/24  0615 03/06/24  1014 03/07/24  0430   WBC 7.9 5.1 4.4   HGB 10.6* 8.8* 9.1*   HCT 34.5* 28.9* 28.6*    483* 295      Recent Labs     03/05/24  0615 03/06/24  1130 03/07/24  0430    130* 131*   K 3.7 3.5 3.3*    96* 96*   CO2 22 21 22   BUN 9 9 5*   CREATININE 0.7 0.6 0.8     Recent Labs     03/04/24  2140   AST 21   ALT 16   BILITOT 0.4   ALKPHOS 72     No results for input(s): \"INR\" in the last 72 hours.  No results for input(s): \"CKTOTAL\", \"CKMB\", \"CKMBINDEX\", \"TROPONINT\" in the last 72 hours.  Lab Results   Component Value Date/Time    LABA1C 5.2 01/22/2024 06:00 AM     CALCIUM:  7.4/22 (03/07 0430)  Lab Results   Component Value

## 2024-03-07 NOTE — PLAN OF CARE
Problem: Safety - Adult  Goal: Free from fall injury  Outcome: Progressing     Problem: Discharge Planning  Goal: Discharge to home or other facility with appropriate resources  Outcome: Progressing     Problem: Pain  Goal: Verbalizes/displays adequate comfort level or baseline comfort level  Outcome: Progressing     Problem: Nutrition Deficit:  Goal: Optimize nutritional status  Outcome: Progressing

## 2024-03-08 LAB
ANION GAP SERPL CALCULATED.3IONS-SCNC: 11 MMOL/L (ref 7–16)
BUN SERPL-MCNC: 3 MG/DL (ref 6–23)
CALCIUM SERPL-MCNC: 7.7 MG/DL (ref 8.3–10.6)
CHLORIDE BLD-SCNC: 100 MMOL/L (ref 99–110)
CO2: 24 MMOL/L (ref 21–32)
CREAT SERPL-MCNC: 0.5 MG/DL (ref 0.6–1.1)
GFR SERPL CREATININE-BSD FRML MDRD: >60 ML/MIN/1.73M2
GLUCOSE SERPL-MCNC: 75 MG/DL (ref 70–99)
MAGNESIUM: 1.9 MG/DL (ref 1.8–2.4)
OSMOLALITY UR: 199 MOS/L (ref 292–1090)
POTASSIUM SERPL-SCNC: 3.5 MMOL/L (ref 3.5–5.1)
REASON FOR REJECTION: NORMAL
REJECTED TEST: NORMAL
SODIUM BLD-SCNC: 135 MMOL/L (ref 135–145)

## 2024-03-08 PROCEDURE — 94761 N-INVAS EAR/PLS OXIMETRY MLT: CPT

## 2024-03-08 PROCEDURE — APPNB15 APP NON BILLABLE TIME 0-15 MINS

## 2024-03-08 PROCEDURE — 80048 BASIC METABOLIC PNL TOTAL CA: CPT

## 2024-03-08 PROCEDURE — 6370000000 HC RX 637 (ALT 250 FOR IP): Performed by: HOSPITALIST

## 2024-03-08 PROCEDURE — 94640 AIRWAY INHALATION TREATMENT: CPT

## 2024-03-08 PROCEDURE — 80053 COMPREHEN METABOLIC PANEL: CPT

## 2024-03-08 PROCEDURE — 2500000003 HC RX 250 WO HCPCS: Performed by: FAMILY MEDICINE

## 2024-03-08 PROCEDURE — 6370000000 HC RX 637 (ALT 250 FOR IP)

## 2024-03-08 PROCEDURE — 97530 THERAPEUTIC ACTIVITIES: CPT

## 2024-03-08 PROCEDURE — 6370000000 HC RX 637 (ALT 250 FOR IP): Performed by: INTERNAL MEDICINE

## 2024-03-08 PROCEDURE — 2580000003 HC RX 258: Performed by: INTERNAL MEDICINE

## 2024-03-08 PROCEDURE — 83735 ASSAY OF MAGNESIUM: CPT

## 2024-03-08 PROCEDURE — 2140000000 HC CCU INTERMEDIATE R&B

## 2024-03-08 PROCEDURE — 99232 SBSQ HOSP IP/OBS MODERATE 35: CPT | Performed by: INTERNAL MEDICINE

## 2024-03-08 PROCEDURE — 6360000002 HC RX W HCPCS: Performed by: INTERNAL MEDICINE

## 2024-03-08 PROCEDURE — 36592 COLLECT BLOOD FROM PICC: CPT

## 2024-03-08 RX ADMIN — LISINOPRIL 2.5 MG: 5 TABLET ORAL at 08:37

## 2024-03-08 RX ADMIN — GUAIFENESIN 100 MG: 100 SOLUTION ORAL at 20:28

## 2024-03-08 RX ADMIN — GUAIFENESIN 600 MG: 600 TABLET, EXTENDED RELEASE ORAL at 20:27

## 2024-03-08 RX ADMIN — DICYCLOMINE HYDROCHLORIDE 20 MG: 20 TABLET ORAL at 14:42

## 2024-03-08 RX ADMIN — SUCRALFATE 1 G: 1 TABLET ORAL at 20:28

## 2024-03-08 RX ADMIN — GABAPENTIN 100 MG: 100 CAPSULE ORAL at 20:27

## 2024-03-08 RX ADMIN — SODIUM CHLORIDE, PRESERVATIVE FREE 10 ML: 5 INJECTION INTRAVENOUS at 08:39

## 2024-03-08 RX ADMIN — OSELTAMIVIR PHOSPHATE 75 MG: 75 CAPSULE ORAL at 08:49

## 2024-03-08 RX ADMIN — GABAPENTIN 100 MG: 100 CAPSULE ORAL at 14:43

## 2024-03-08 RX ADMIN — IPRATROPIUM BROMIDE AND ALBUTEROL SULFATE 1 DOSE: 2.5; .5 SOLUTION RESPIRATORY (INHALATION) at 22:30

## 2024-03-08 RX ADMIN — PANTOPRAZOLE SODIUM 40 MG: 20 TABLET, DELAYED RELEASE ORAL at 05:48

## 2024-03-08 RX ADMIN — PANTOPRAZOLE SODIUM 40 MG: 20 TABLET, DELAYED RELEASE ORAL at 14:43

## 2024-03-08 RX ADMIN — OSELTAMIVIR PHOSPHATE 75 MG: 75 CAPSULE ORAL at 20:34

## 2024-03-08 RX ADMIN — ASPIRIN 81 MG: 81 TABLET, CHEWABLE ORAL at 08:36

## 2024-03-08 RX ADMIN — GABAPENTIN 100 MG: 100 CAPSULE ORAL at 08:36

## 2024-03-08 RX ADMIN — BUDESONIDE AND FORMOTEROL FUMARATE DIHYDRATE 2 PUFF: 160; 4.5 AEROSOL RESPIRATORY (INHALATION) at 22:32

## 2024-03-08 RX ADMIN — SUCRALFATE 1 G: 1 TABLET ORAL at 08:36

## 2024-03-08 RX ADMIN — METOPROLOL SUCCINATE 50 MG: 50 TABLET, EXTENDED RELEASE ORAL at 08:37

## 2024-03-08 RX ADMIN — SUCRALFATE 1 G: 1 TABLET ORAL at 14:42

## 2024-03-08 RX ADMIN — ACETAMINOPHEN 650 MG: 325 TABLET ORAL at 05:48

## 2024-03-08 RX ADMIN — BUDESONIDE AND FORMOTEROL FUMARATE DIHYDRATE 2 PUFF: 160; 4.5 AEROSOL RESPIRATORY (INHALATION) at 08:17

## 2024-03-08 RX ADMIN — IPRATROPIUM BROMIDE AND ALBUTEROL SULFATE 1 DOSE: 2.5; .5 SOLUTION RESPIRATORY (INHALATION) at 13:38

## 2024-03-08 RX ADMIN — SUCRALFATE 1 G: 1 TABLET ORAL at 17:43

## 2024-03-08 RX ADMIN — ROSUVASTATIN CALCIUM 40 MG: 20 TABLET, COATED ORAL at 20:28

## 2024-03-08 RX ADMIN — DICYCLOMINE HYDROCHLORIDE 20 MG: 20 TABLET ORAL at 17:43

## 2024-03-08 RX ADMIN — SODIUM CHLORIDE, PRESERVATIVE FREE 10 ML: 5 INJECTION INTRAVENOUS at 20:27

## 2024-03-08 RX ADMIN — CLOPIDOGREL BISULFATE 75 MG: 75 TABLET ORAL at 08:37

## 2024-03-08 RX ADMIN — DICYCLOMINE HYDROCHLORIDE 20 MG: 20 TABLET ORAL at 20:27

## 2024-03-08 RX ADMIN — DICYCLOMINE HYDROCHLORIDE 20 MG: 20 TABLET ORAL at 08:36

## 2024-03-08 RX ADMIN — BENZONATATE 100 MG: 100 CAPSULE ORAL at 08:36

## 2024-03-08 RX ADMIN — IPRATROPIUM BROMIDE AND ALBUTEROL SULFATE 1 DOSE: 2.5; .5 SOLUTION RESPIRATORY (INHALATION) at 08:16

## 2024-03-08 RX ADMIN — ENOXAPARIN SODIUM 40 MG: 100 INJECTION SUBCUTANEOUS at 08:36

## 2024-03-08 RX ADMIN — GUAIFENESIN 600 MG: 600 TABLET, EXTENDED RELEASE ORAL at 08:36

## 2024-03-08 RX ADMIN — ACETAMINOPHEN 650 MG: 325 TABLET ORAL at 20:28

## 2024-03-08 ASSESSMENT — PAIN SCALES - GENERAL
PAINLEVEL_OUTOF10: 3
PAINLEVEL_OUTOF10: 0

## 2024-03-08 ASSESSMENT — PAIN DESCRIPTION - LOCATION: LOCATION: HEAD

## 2024-03-08 ASSESSMENT — PAIN DESCRIPTION - DESCRIPTORS: DESCRIPTORS: ACHING

## 2024-03-08 NOTE — PROGRESS NOTES
Comprehensive Nutrition Assessment    Type and Reason for Visit:  Reassess    Nutrition Recommendations/Plan:   D/C Cardiac Diet  Continue Regular Diet  Continue current oral nutrition supplements, between meals     Malnutrition Assessment:  Malnutrition Status:  Severe malnutrition (ongoing problem) (03/06/24 0920)    Context:  Acute Illness     Findings of the 6 clinical characteristics of malnutrition:  Energy Intake:  75% or less of estimated energy requirements for 7 or more days (past week reduced intake overall and emesis in past month)  Weight Loss:  Greater than 7.5% over 3 months     Body Fat Loss:  Mild body fat loss Buccal region, Triceps   Muscle Mass Loss:  Moderate muscle mass loss Clavicles (pectoralis & deltoids), Calf (gastrocnemius), Thigh (quadriceps)  Fluid Accumulation:  No significant fluid accumulation Extremities   Strength:  Not Performed    Nutrition Assessment:    Pt continues with poor oral intake on Cardiac Diet, consuming 0-25% of meals. Will d/c cardiac restrictions at this time. Continue current oral supplements and follow as high nutrition risk.    Nutrition Related Findings:    BUN 3, Cr 0.5   Wound Type: None       Current Nutrition Intake & Therapies:    Average Meal Intake: 0%, 1-25%, Refusing to eat  Average Supplements Intake: Unable to assess  ADULT DIET; Regular; Low Fat/Low Chol/High Fiber/2 gm Na  ADULT ORAL NUTRITION SUPPLEMENT; Breakfast, Lunch, Dinner; Standard High Calorie/High Protein Oral Supplement  ADULT ORAL NUTRITION SUPPLEMENT; Breakfast, Lunch, Dinner; Standard 4 oz Oral Supplement    Anthropometric Measures:  Height: 162.6 cm (5' 4\")  Ideal Body Weight (IBW): 120 lbs (55 kg)    Admission Body Weight: 62.2 kg (137 lb 2 oz)  Current Body Weight: 62.2 kg (137 lb 2 oz), 123.3 % IBW. Weight Source: Stated (recent wt hx admit 64.9 kg)  Current BMI (kg/m2): 23.5  Usual Body Weight: 73.5 kg (162 lb 0.6 oz) (January 2024 hx)  % Weight Change (Calculated):  -8.7  Weight Adjustment For: No Adjustment                 BMI Categories: Normal Weight (BMI 22.0 to 24.9) age over 65    Estimated Daily Nutrient Needs:  Energy Requirements Based On: Kcal/kg  Weight Used for Energy Requirements: Current  Energy (kcal/day): 3386-4712 (25-30 ok/kg)  Weight Used for Protein Requirements: Current  Protein (g/day): 67-80 (1-1.2 g/kg)  Method Used for Fluid Requirements: 1 ml/kcal  Fluid (ml/day): 0576-5506    Nutrition Diagnosis:   Severe malnutrition, In context of acute illness or injury related to inadequate protein-energy intake as evidenced by poor intake prior to admission, moderate muscle loss, weight loss 7.5% in 3 months    Nutrition Interventions:   Food and/or Nutrient Delivery: Modify Current Diet, Continue Oral Nutrition Supplement  Nutrition Education/Counseling: Education initiated  Coordination of Nutrition Care: Continue to monitor while inpatient  Plan of Care discussed with: patient, alerted attending re: continued malnutrition    Goals:  Previous Goal Met: Progressing toward Goal(s) (slowly)  Goals: by next RD assessment, PO intake 50% or greater       Nutrition Monitoring and Evaluation:   Behavioral-Environmental Outcomes: None Identified  Food/Nutrient Intake Outcomes: Food and Nutrient Intake, Supplement Intake  Physical Signs/Symptoms Outcomes: Biochemical Data, Skin, Weight, Meal Time Behavior, Nausea or Vomiting    Discharge Planning:    Continue Oral Nutrition Supplement     Miguelina Daniels RD, LD  Contact: [phone]

## 2024-03-08 NOTE — PROGRESS NOTES
[place] [address]  Phone: [phone]    Fax [phone]                  Tera Livingston MD, [facility]       Alexys Morocho MD, [facility]  Bailee Ceballos MD, [facility]    MD Lili Machado MD Tariq Rizvi, MD Bilal Alam, MD Dr. Waseem Sajjad MD Melissa Kellis, APRN      Elissa White, APRSANTINO Tanner, APRSANTINO Carrillo, APRN  Ish Sanchez PA-C    CARDIOLOGY  NOTE      Name:  Adenike Harvey /Age/Sex: 1955  (69 y.o. female)   MRN & CSN:  [id number] & [id number] Admission Date/Time: 3/4/2024  9:24 PM   Location:  [address] [place] [address] PCP: Iban Lou MD       Hospital Day: 5    - Cardiology consult is for: Dizziness      ASSESSMENT/ PLAN:  Dizziness  Orthostatic hypotension  Hypokalemia and hypomagnesemia  -Orthostatic vital signs positive yesterday, per nursing staff orthostatics are within normal range today, yet to be documented  -Patient with poor oral intake.  -Advised compression stockings and increase hydration.  Compression stockings not in place at this time  -Goal potassium 4.0-4.5, magnesium 2.0-2.2. Replete per protocol   -Carotid artery ultrasound not indicative of carotid artery stenosis  Coronary artery disease s/p PCI LAD   Ischemic cardiomyopathy with mildly reduced ejection fraction  -Chest pain-free at this time.  -Patient states she is compliant with her antiplatelets.  -Recent echo showing EF of 45-50% with apical wall motion abnormalities.  -Continue aspirin and Plavix for at least 6 months.  -GDMT: Continue Toprol-XL 50 mg daily. Lisinopril held this am due to hypotension  -Continue Crestor 40 mg daily  Sepsis with influenza A  COPD exacerbation  Non-small cell lung cancer s/p thoracotomy 2018  -On Tamiflu.  Per primary care    Cardiology will sign off, please call with any questions.  Patient to follow-up in clinic    Subjective:  Adenike is a 69  Data:  CBC:   Recent Labs     24  1014 24  0430   WBC 5.1 4.4   HGB 8.8* 9.1*   HCT 28.9* 28.6*   MCV 91.2 89.4   * 295       BMP:   Recent Labs     24  1130 24  0430 24  1945 24  0600   * 131*  --  135   K 3.5 3.3* 3.0* 3.5   CL 96* 96*  --  100   CO2   --  24   BUN 9 5*  --  3*   CREATININE 0.6 0.8  --  0.5*       LIVER PROFILE:   No results for input(s): \"AST\", \"ALT\", \"LIPASE\", \"AMYLASE\", \"ALB\", \"BILIDIR\", \"BILITOT\", \"ALKPHOS\" in the last 72 hours.    PT/INR: No results for input(s): \"PROTIME\", \"INR\" in the last 72 hours.  APTT: No results for input(s): \"APTT\" in the last 72 hours.  BNP:  No results for input(s): \"BNP\" in the last 72 hours.  TROPONIN: No results for input(s): \"TROPONINT\" in the last 72 hours.  Labs, consult, tests reviewed          Ish Sanchez PA-C, 3/8/2024 3:05 PM     I have seen ,spoken to  and examined this patient personally, independently of the EMELY. I have reviewed the hospital care given to date and reviewed all pertinent labs and imaging.I have spoken with patient, nursing staff and provided written and verbal instructions .The above note has been reviewed     CARDIOLOGY ATTENDING ADDENDUM    HPI:  I have reviewed the above HPI  And agree with above     Pulse Range: Pulse  Av.4  Min: 86  Max: 100    Blood Presuure Range:  Systolic (24hrs), Av , Min:103 , Max:139   ; Diastolic (24hrs), Av, Min:64, Max:90      Pulse ox Range: SpO2  Av.4 %  Min: 95 %  Max: 100 %    24hr I & O:    Intake/Output Summary (Last 24 hours) at 3/8/2024 1546  Last data filed at 3/8/2024 1100  Gross per 24 hour   Intake 2575.99 ml   Output 300 ml   Net 2275.99 ml         /85   Pulse 95   Temp 98.5 °F (36.9 °C) (Oral)   Resp 19   Ht 1.626 m (5' 4\")   Wt 62.2 kg (137 lb 2 oz)   LMP 2009 (LMP Unknown)   SpO2 100%   BMI 23.54 kg/m²       Physical Exam:  General:  Awake, alert, NAD  Head:normal  Eye:normal  Neck:  No JVD

## 2024-03-08 NOTE — PROGRESS NOTES
Physical Therapy    Attempted PT treatment this PM.  Patient states that she just returned to bed and would prefer to stay in bed at this time.  Will re-attempt as able and appropriate.      Alex Hdez, PT, DPT  License #: [id number]

## 2024-03-08 NOTE — CARE COORDINATION
Followed up with pt. Plan remains home with spouse and [facility]. Denies further needs.  able to transport home at discharge.

## 2024-03-08 NOTE — PROGRESS NOTES
Occupational Therapy    Occupational Therapy Treatment Note    Name: Adenike Harvey MRN: [id number] :   1955   Date:  3/8/2024   Admission Date: 3/4/2024 Room:  3123/3123-A     Per OTR/L Discharge Recommendation: Home with initial 24 hour supervision/assistance, Home Health OT services     Primary Problem: The primary encounter diagnosis was Influenza A. Diagnoses of General weakness, SIRS (systemic inflammatory response syndrome) (HCC), and COPD exacerbation (HCC) were also pertinent to this visit.      Restrictions/Precautions: general precautions, fall risk, droplet isolation      Communication with other providers: RN approved session.     Subjective:  Patient states:  Pt agreeable to bed to chair due to SOB  Pain:   Location, Type, Intensity (0/10 to 10/10):  denies pain    Objective:    Observation: Pt supine in bed upon arrival.    Objective Measures:  Pt alert and oriented.     Treatment, including education:  Therapeutic Activity Training:   Therapeutic activity training was instructed today.  Cues were given for safety, sequence, UE/LE placement, awareness, and balance.    Activities performed today included bed mobility training, sup-sit, sit-stand, SPT.    Pt supine in bed upon arrival and endorsed SOB. Pt agreeable to sit in chair this date. Pt completed supine to sit with head of bed elevated and SBA. Pt seated edge of bed with set up and SBA. Pt completed sit to stand from edge of bed with SBA and completed SPT from bed to chair with CGA. Pt seated in chair and respiratory at bedside. Pt needs met and call light in reach.     Assessment / Impression:    Patient's tolerance of treatment: Well  Adverse Reaction: None  Significant change in status and impact: Improved from initial evaluation  Barriers to improvement: None noted      Plan for Next Session:    Continue OT POC and progress OOB tolerance.     Time in:  1324  Time out:  1340  Timed treatment minutes:  16  Total treatment time:   16      Electronically signed by:    SWATI Zhao,   3/8/2024, 1:54 PM

## 2024-03-08 NOTE — PROGRESS NOTES
Called core lab to get update on patient's labs sent at 1620. Advised I will receive a call back.

## 2024-03-08 NOTE — PROGRESS NOTES
V2.0  [facility] Hospitalist Progress Note      Name:  Adenike Harvey /Age/Sex: 1955  (69 y.o. female)   MRN & CSN:  [id number] & [id number] Encounter Date/Time: 3/8/2024 9:57 AM EST    [facility] PCP: Iban Lou MD       Hospital Day: 5    Assessment and Plan:   #.  Sepsis due to influenza A  -Respiratory panel positive for influenza A  -Low-grade temp, tachycardia  -Ordered Tamiflu  - Tmax 101.5.  Check UA for additional sources of infection.  BCx 3/4: NGTD.     #.  COPD exacerbation secondary to above  -Patient received DuoNeb x 2 doses in ER  -On DuoNebs.  On Symbicort.     #.  Tachycardia  -Patient received NS bolus, no improvement in tachycardia  -Resume home medications-metoprolol succinate  - Heart rate improving.     Severe malnutrition  - high-calorie oral nutrition supplements as per dietitian.  - Regular diet as per dietitian.    Elevated troponin  - May be due to influenza.  Patient does have dizziness.  Consult cardiology due to recent heart cath.  EKG appears to show sinus tachycardia.    Anemia  - May be dilutional.  No gross bleeding.  Monitor.  - Improved hemoglobin 9.1.    Orthostatic hypotension  - Receiving additional bolus.  Compression stockings.  Carotid ultrasound.  - Repeat orthostatics negative.  Carotid ultrasound not indicative of stenosis.    Hypomagnesemia  - Placed on replacement protocol.     #.  Recent admission -3/2/2024  -patient had LHC-stent to LAD aspirin, Plavix, lisinopril, metoprolol succinate, statin  -Patient was also treated for pneumonia.  And completed course of antibiotics  - Aspirin and Plavix for at least 6 months.     #.  Chronic systolic CHF  -Does not appear to be in exacerbation  -Patient rather appears dry even though proBNP is elevated  -Patient received 1 L NS bolus  -Received IVF.  - Lisinopril held due to hypotension.  On beta-blocker.     #.  NSCLC  -History of right upper lobe lung mass-s/p open thoracotomy with right  upper lobe bronchial sleep resection-4/2018  -Patient has recurrent lung cancer-PET scan 12/2022-metabolic activity associated with the new right hilar nodule  -Patient received chemo and radiation  -Patient was admitted 3/2023 due to anaphylactic reaction to chemotherapy  -Patient on immunotherapy- last 11/2023  -MRI brain 1/2024- non acute     #.  Peripheral neuropathy secondary to chemotherapy  -Patient is on gabapentin      Diet ADULT ORAL NUTRITION SUPPLEMENT; Breakfast, Lunch, Dinner; Standard High Calorie/High Protein Oral Supplement  ADULT ORAL NUTRITION SUPPLEMENT; Breakfast, Lunch, Dinner; Standard 4 oz Oral Supplement  ADULT DIET; Regular    DVT Prophylaxis [x] Lovenox, [] Heparin, [] Eliquis, [] Xarelto  [] SCDs       Code Status Full Code   Disposition Expected Disposition: [facility]  Estimated Date of Discharge: 3/9  Patient requires continued admission due to sepsis due to flu   Home O2 None     Personally reviewed Lab Studies and Imaging         Subjective/Interval history:   Chief Complaint: Rib Pain (With breathing)     States she still has a cough  Also was a little dizzy when standing and was orthostatic positive  She states she has some pain over the bladder    Review of Systems:    Negative unless mentioned above    Objective:     Intake/Output Summary (Last 24 hours) at 3/8/2024 1747  Last data filed at 3/8/2024 1100  Gross per 24 hour   Intake 2575.99 ml   Output 300 ml   Net 2275.99 ml        Vitals:   /88   Pulse 95   Temp 98.4 °F (36.9 °C) (Oral)   Resp 23   Ht 1.626 m (5' 4\")   Wt 62.2 kg (137 lb 2 oz)   LMP 01/06/2009 (LMP Unknown)   SpO2 99%   BMI 23.54 kg/m²     Physical Exam:   General: NAD, laying in bed  Eyes: no discharge  HENT: NCAT  Cardiovascular: slightly tachycardic  Respiratory: faint wheze, on room air  Gastrointestinal: Soft, non tender, nondistended  Genitourinary:  suprapubic tenderness  Musculoskeletal: No edema, nontender  Skin: warm, dry, no gross

## 2024-03-09 VITALS
TEMPERATURE: 98.1 F | OXYGEN SATURATION: 97 % | HEART RATE: 105 BPM | WEIGHT: 137.13 LBS | HEIGHT: 64 IN | DIASTOLIC BLOOD PRESSURE: 62 MMHG | RESPIRATION RATE: 24 BRPM | SYSTOLIC BLOOD PRESSURE: 140 MMHG | BODY MASS INDEX: 23.41 KG/M2

## 2024-03-09 PROCEDURE — 6370000000 HC RX 637 (ALT 250 FOR IP)

## 2024-03-09 PROCEDURE — 6370000000 HC RX 637 (ALT 250 FOR IP): Performed by: HOSPITALIST

## 2024-03-09 PROCEDURE — 83735 ASSAY OF MAGNESIUM: CPT

## 2024-03-09 PROCEDURE — 6360000002 HC RX W HCPCS: Performed by: INTERNAL MEDICINE

## 2024-03-09 PROCEDURE — 6370000000 HC RX 637 (ALT 250 FOR IP): Performed by: INTERNAL MEDICINE

## 2024-03-09 PROCEDURE — 94761 N-INVAS EAR/PLS OXIMETRY MLT: CPT

## 2024-03-09 PROCEDURE — 97530 THERAPEUTIC ACTIVITIES: CPT

## 2024-03-09 PROCEDURE — 94640 AIRWAY INHALATION TREATMENT: CPT

## 2024-03-09 PROCEDURE — 83880 ASSAY OF NATRIURETIC PEPTIDE: CPT

## 2024-03-09 PROCEDURE — 97116 GAIT TRAINING THERAPY: CPT

## 2024-03-09 RX ORDER — GUAIFENESIN 200 MG/10ML
100 LIQUID ORAL EVERY 4 HOURS PRN
Qty: 118 ML | Refills: 1 | Status: SHIPPED | OUTPATIENT
Start: 2024-03-09

## 2024-03-09 RX ORDER — GUAIFENESIN 600 MG/1
600 TABLET, EXTENDED RELEASE ORAL 2 TIMES DAILY
Qty: 10 TABLET | Refills: 0 | Status: SHIPPED | OUTPATIENT
Start: 2024-03-09 | End: 2024-03-14

## 2024-03-09 RX ORDER — OSELTAMIVIR PHOSPHATE 75 MG/1
75 CAPSULE ORAL 2 TIMES DAILY
Qty: 1 CAPSULE | Refills: 0 | Status: SHIPPED | OUTPATIENT
Start: 2024-03-09 | End: 2024-03-10

## 2024-03-09 RX ORDER — BENZONATATE 100 MG/1
100 CAPSULE ORAL 3 TIMES DAILY PRN
Qty: 15 CAPSULE | Refills: 1 | Status: SHIPPED | OUTPATIENT
Start: 2024-03-09 | End: 2024-03-16

## 2024-03-09 RX ADMIN — ASPIRIN 81 MG: 81 TABLET, CHEWABLE ORAL at 08:23

## 2024-03-09 RX ADMIN — LISINOPRIL 2.5 MG: 5 TABLET ORAL at 08:23

## 2024-03-09 RX ADMIN — DICYCLOMINE HYDROCHLORIDE 20 MG: 20 TABLET ORAL at 08:23

## 2024-03-09 RX ADMIN — GABAPENTIN 100 MG: 100 CAPSULE ORAL at 08:23

## 2024-03-09 RX ADMIN — OSELTAMIVIR PHOSPHATE 75 MG: 75 CAPSULE ORAL at 08:33

## 2024-03-09 RX ADMIN — BUDESONIDE AND FORMOTEROL FUMARATE DIHYDRATE 2 PUFF: 160; 4.5 AEROSOL RESPIRATORY (INHALATION) at 07:44

## 2024-03-09 RX ADMIN — ONDANSETRON 4 MG: 2 INJECTION INTRAMUSCULAR; INTRAVENOUS at 06:27

## 2024-03-09 RX ADMIN — CLOPIDOGREL BISULFATE 75 MG: 75 TABLET ORAL at 08:28

## 2024-03-09 RX ADMIN — GUAIFENESIN 600 MG: 600 TABLET, EXTENDED RELEASE ORAL at 08:23

## 2024-03-09 RX ADMIN — PANTOPRAZOLE SODIUM 40 MG: 20 TABLET, DELAYED RELEASE ORAL at 06:23

## 2024-03-09 RX ADMIN — ENOXAPARIN SODIUM 40 MG: 100 INJECTION SUBCUTANEOUS at 08:24

## 2024-03-09 RX ADMIN — SUCRALFATE 1 G: 1 TABLET ORAL at 08:24

## 2024-03-09 RX ADMIN — IPRATROPIUM BROMIDE AND ALBUTEROL SULFATE 1 DOSE: 2.5; .5 SOLUTION RESPIRATORY (INHALATION) at 11:29

## 2024-03-09 RX ADMIN — IPRATROPIUM BROMIDE AND ALBUTEROL SULFATE 1 DOSE: 2.5; .5 SOLUTION RESPIRATORY (INHALATION) at 07:43

## 2024-03-09 RX ADMIN — METOPROLOL SUCCINATE 50 MG: 50 TABLET, EXTENDED RELEASE ORAL at 08:23

## 2024-03-09 RX ADMIN — BENZONATATE 100 MG: 100 CAPSULE ORAL at 06:27

## 2024-03-09 NOTE — PLAN OF CARE
Problem: Safety - Adult  Goal: Free from fall injury  Outcome: Progressing     Problem: Discharge Planning  Goal: Discharge to home or other facility with appropriate resources  Outcome: Progressing     Problem: Pain  Goal: Verbalizes/displays adequate comfort level or baseline comfort level  3/9/2024 0259 by RAJIV PEÑALOZA  Outcome: Progressing  3/9/2024 0257 by RAJIV PEÑALOZA  Outcome: Progressing     Problem: Nutrition Deficit:  Goal: Optimize nutritional status  3/9/2024 0259 by RAJIV PEÑALOZA  Outcome: Progressing  3/9/2024 0257 by RAJIV PEÑALOZA  Outcome: Progressing

## 2024-03-09 NOTE — DISCHARGE INSTR - COC
Continuity of Care Form    Patient Name: Adenike Harvey   :  1955  MRN:  [id number]    Admit date:  3/4/2024  Discharge date:  ***    Code Status Order: Full Code   Advance Directives:     Admitting Physician:  Jahaira Hill MD  PCP: Iban Lou MD    Discharging Nurse: ***  Discharging Hospital Unit/Room#: 3123/3123-A  Discharging Unit Phone Number: ***    Emergency Contact:   Extended Emergency Contact Information  Primary Emergency Contact: [place] [address] [facility]  Home Phone: [phone]  Mobile Phone: [phone]  Relation: Spouse    Past Surgical History:  Past Surgical History:   Procedure Laterality Date    BRONCHOSCOPY  2017- done     BUNIONECTOMY Bilateral     \"Done At Different Times\"    CARDIAC PROCEDURE N/A 3/1/2024    Left heart cath / coronary angiography performed by Tera Livingston MD at [facility] CARDIAC CATH LAB    CARDIAC PROCEDURE N/A 3/1/2024    Percutaneous coronary intervention performed by Tera Livingston MD at [facility] CARDIAC CATH LAB    CATHETER REMOVAL Left 2019    PORT REMOVAL performed by Pepe Tripathi MD at [facility] OR    COLONOSCOPY      DILATION AND CURETTAGE OF UTERUS      ELBOW SURGERY Left     \"Tendon Repair\"    LUNG SURGERY  2018    per old chart had thoracoscopy and RUL lobectomy , bronchoscopy and lymph node bx     MEDIASTINOSCOPY  2018    Bronchoscopy    TONSILLECTOMY  1978    TUBAL LIGATION      TUNNELED VENOUS PORT PLACEMENT Left 2018    UPPER GASTROINTESTINAL ENDOSCOPY N/A 2024    EGD DILATION BALLOON with 12-15 balloon up to 15 wtih biopsies performed by Rony Radford MD at [facility] ENDOSCOPY       Immunization History:   Immunization History   Administered Date(s) Administered    COVID-19, J&J, (age 18y+), IM, 0.5 mL 2021, 2021    Influenza A (M2D4-52) Vaccine PF IM 2009    Influenza Vaccine, unspecified formulation 10/01/2016  Swallowing Test): {Done Not Done Date:052669488}    Treatments at the Time of Hospital Discharge:   Respiratory Treatments: ***  Oxygen Therapy:  {Therapy; copd oxygen:84308}  Ventilator:    {[facility] Vent List:536744629}    Rehab Therapies: {THERAPEUTIC INTERVENTION:8825923475}  Weight Bearing Status/Restrictions: {[facility] Weight Bearin}  Other Medical Equipment (for information only, NOT a DME order):  {EQUIPMENT:458436816}  Other Treatments: ***    Patient's personal belongings (please select all that are sent with patient):  {[facility] DME Belongings:946215845}    RN SIGNATURE:  {Esignature:257308749}    CASE MANAGEMENT/SOCIAL WORK SECTION    Inpatient Status Date: ***    Readmission Risk Assessment Score:  Readmission Risk              Risk of Unplanned Readmission:  53           Discharging to Facility/ Agency   Name:   Address:  Phone:  Fax:    Dialysis Facility (if applicable)   Name:  Address:  Dialysis Schedule:  Phone:  Fax:    / signature: {Esignature:911055669}    PHYSICIAN SECTION    Prognosis: {Prognosis:3531356522}    Condition at Discharge: { Patient Condition:978638246}    Rehab Potential (if transferring to Rehab): {Prognosis:6267783497}    Recommended Labs or Other Treatments After Discharge: ***    Physician Certification: I certify the above information and transfer of Adenike Harvey  is necessary for the continuing treatment of the diagnosis listed and that she requires {Admit to Appropriate Level of Care:25746} for {GREATER/LESS:589737200} 30 days.     Update Admission H&P: {[facility] DME Changes in HandP:872550478}    PHYSICIAN SIGNATURE:  {Esignature:954841363}

## 2024-03-09 NOTE — DISCHARGE INSTRUCTIONS
Medications: see computerized discharge medication list  Activity: activity as tolerated  Diet: regular diet   Disposition: home with home care  Discharged Condition: Stable

## 2024-03-09 NOTE — PROGRESS NOTES
gross lesions  Neuro: Alert. No gross deficits  Psych: Mood appropriate.     Medications:   Medications:    metoprolol succinate  50 mg Oral Daily    ipratropium 0.5 mg-albuterol 2.5 mg  1 Dose Inhalation Q4H WA RT    sodium chloride flush  5-40 mL IntraVENous 2 times per day    enoxaparin  40 mg SubCUTAneous Daily    oseltamivir  75 mg Oral BID    aspirin  81 mg Oral Daily    budesonide-formoterol  2 puff Inhalation BID    clopidogrel  75 mg Oral Daily    dicyclomine  20 mg Oral 4x Daily    fluticasone  2 spray Each Nostril Daily    gabapentin  100 mg Oral TID    lisinopril  2.5 mg Oral Daily    pantoprazole  40 mg Oral BID AC    rosuvastatin  40 mg Oral Nightly    sucralfate  1 g Oral 4x Daily    guaiFENesin  600 mg Oral BID      Infusions:    sodium chloride       PRN Meds: potassium chloride, 40 mEq, PRN   Or  potassium alternative oral replacement, 40 mEq, PRN   Or  potassium chloride, 10 mEq, PRN  magnesium sulfate, 2,000 mg, PRN  benzonatate, 100 mg, TID PRN  sodium chloride flush, 5-40 mL, PRN  sodium chloride, , PRN  ondansetron, 4 mg, Q8H PRN   Or  ondansetron, 4 mg, Q6H PRN  polyethylene glycol, 17 g, Daily PRN  acetaminophen, 650 mg, Q6H PRN   Or  acetaminophen, 650 mg, Q6H PRN  hydrOXYzine HCl, 25 mg, TID PRN  ipratropium 0.5 mg-albuterol 2.5 mg, 1 Dose, Q4H PRN  guaiFENesin, 100 mg, Q4H PRN        Labs      Recent Labs     03/07/24  0430   WBC 4.4   HGB 9.1*   HCT 28.6*         Recent Labs     03/06/24  1130 03/07/24  0430 03/07/24  1945 03/08/24  0600   * 131*  --  135   K 3.5 3.3* 3.0* 3.5   CL 96* 96*  --  100   CO2 21 22  --  24   BUN 9 5*  --  3*   CREATININE 0.6 0.8  --  0.5*     No results for input(s): \"AST\", \"ALT\", \"ALB\", \"BILIDIR\", \"BILITOT\", \"ALKPHOS\" in the last 72 hours.    No results for input(s): \"INR\" in the last 72 hours.  No results for input(s): \"CKTOTAL\", \"CKMB\", \"CKMBINDEX\", \"TROPONINT\" in the last 72 hours.  Lab Results   Component Value Date/Time    LABA1C 5.2

## 2024-03-09 NOTE — DISCHARGE SUMMARY
Aero  Commonly known as: Symbicort  Inhale 2 puffs into the lungs 2 times daily     clopidogrel 75 MG tablet  Commonly known as: PLAVIX  Take 1 tablet by mouth daily     dicyclomine 20 MG tablet  Commonly known as: BENTYL  Take 1 tablet by mouth 4 times daily     fluticasone 50 MCG/ACT nasal spray  Commonly known as: FLONASE  2 sprays by Each Nostril route daily     gabapentin 100 MG capsule  Commonly known as: NEURONTIN  Take 1 capsule by mouth 3 times daily for 90 days.     hydrOXYzine HCl 25 MG tablet  Commonly known as: ATARAX  Take 1 tablet by mouth 3 times daily as needed for Anxiety     ipratropium 0.5 mg-albuterol 2.5 mg 0.5-2.5 (3) MG/3ML Soln nebulizer solution  Commonly known as: DUONEB  Inhale 3 mLs into the lungs every 4 hours as needed for Shortness of Breath     lisinopril 2.5 MG tablet  Commonly known as: PRINIVIL;ZESTRIL  Take 1 tablet by mouth daily     metoprolol succinate 50 MG extended release tablet  Commonly known as: TOPROL XL  Take 1 tablet by mouth daily     pantoprazole 40 MG tablet  Commonly known as: PROTONIX  Take 1 tablet by mouth 2 times daily (before meals)     polyethylene glycol 17 g packet  Commonly known as: GLYCOLAX  Take 1 packet by mouth 2 times daily as needed for Constipation     rosuvastatin 40 MG tablet  Commonly known as: CRESTOR  Take 1 tablet by mouth nightly     sennosides-docusate sodium 8.6-50 MG tablet  Commonly known as: SENOKOT-S  Take 2 tablets by mouth nightly as needed for Constipation     sucralfate 1 GM tablet  Commonly known as: CARAFATE  Take 1 tablet by mouth 4 times daily     vitamin D3 25 MCG (1000 UT) Tabs tablet  Generic drug: vitamin D  Take 2 tablets by mouth daily  Start taking on: April 16, 2024            STOP taking these medications      prochlorperazine 5 MG tablet  Commonly known as: COMPAZINE               Where to Get Your Medications        These medications were sent to [id number] - [place], [place] - [address]  defect is consistent with abnormal perfusion in the LCx or RCA territories.   Stress Test: A pharmacological stress test was performed using lexiscan. The patient reported no symptoms during the stress test. Abnormal Stress MPI Keep NPO after mid night Recommend C in morning Medical therapy     XR CHEST PORTABLE    Result Date: 2/28/2024  EXAMINATION: ONE XRAY VIEW OF THE CHEST 2/28/2024 3:08 am COMPARISON: 02/26/2024 and 02/21/2024 HISTORY: ORDERING SYSTEM PROVIDED HISTORY: shortness TECHNOLOGIST PROVIDED HISTORY: Reason for exam:->shortness Reason for Exam: shortness FINDINGS: Left-sided MediPort is redemonstrated.  No endotracheal tube or nasogastric tube.  External leads overlie the chest. Masslike area along the right hilar and suprahilar margin corresponding to fibrosis with previous surgery and radiation therapy.  Tenting of the right hemidiaphragm from traction of the fibrosis.  Postsurgical changes over the right hilar to suprahilar region.  Overall configuration is stable. Left lung is fully inflated with some minimal atelectasis/scarring along the base.  No pneumothorax is identified.     1.  Stable appearance of the right lung with the postsurgical and post radiation changes.  The right-sided volume loss and tenting of the diaphragm are redemonstrated. 2.  The left lung is fully inflated with some minimal atelectasis/scarring along the base.     Echo (TTE) complete (PRN contrast/bubble/strain/3D)    Result Date: 2/27/2024    Left Ventricle: Low normal left ventricular systolic function with a visually estimated EF of 45 - 50%. Apical septal wall appears akientic, [place] is hypokinetic. Definity utilized no evidence of thrombus.   Aortic Valve: Sclerotic but non stenotic aortic valve.   Pericardium: No pericardial effusion.     CTA CHEST W CONTRAST    Result Date: 2/26/2024  EXAMINATION: CTA OF THE CHEST 2/26/2024 6:14 am TECHNIQUE: CTA of the chest was performed after the administration of intravenous

## 2024-03-09 NOTE — PLAN OF CARE
Problem: Safety - Adult  Goal: Free from fall injury  Outcome: Progressing     Problem: Discharge Planning  Goal: Discharge to home or other facility with appropriate resources  Outcome: Progressing     Problem: Pain  Goal: Verbalizes/displays adequate comfort level or baseline comfort level  Outcome: Progressing     Problem: Nutrition Deficit:  Goal: Optimize nutritional status  Outcome: Progressing      no nausea/no vomiting

## 2024-03-10 LAB
CULTURE: NORMAL
CULTURE: NORMAL
Lab: NORMAL
Lab: NORMAL
SPECIMEN: NORMAL
SPECIMEN: NORMAL

## 2024-03-10 NOTE — PROGRESS NOTES
Physical Therapy    Physical Therapy Treatment Note  Name: Adenike Harvey MRN: [id number] :   1955   Date:  3/10/2024   Admission Date: 3/4/2024 Room:  [address] [place] [address]   Restrictions/Precautions:          general precautions     Subjective:  Patient states:  \"I know I should probably walk\"  Pain:    Location, Type, Intensity (0/10 to 10/10):  denies; 0/10     Objective:    Observation:  pt supine in bed upon entry     Treatment, including education/measures:  Pt agreeable to participating in therapy at this time.     Therapeutic Activity Training:   Therapeutic activity training was instructed today.  Cues were given for safety, sequence, UE/LE placement, awareness, and balance.    Activities performed today included bed mobility training, sup-sit, sit-stand, SPT.  Pt completed supine <> sit transfers with supervision/SBAx1.  Pt completed sit <> stand transfers to/from bed/chair/toilet with SBAx1.     Gait Training:  Cues were given for safety, sequence, device management, balance, posture, awareness, path.    Pt ambulated 50 feet + 50 feet with SBAx1 with a front wheeled walker with a decreased gait speed and a decreased step length bilaterally.  Pt provided with verbal and tactile cues for BLE placement, walker placement, and sequence.  Pt provided with verbal and tactile cues to maintain upright posture in order to avoid COM shifting outside of [place]IE.  Pt provided with verbal cues for directions in order to successfully navigate hallway and return to correct room.  Pt provided with verbal cues to practice pursed lip breathing throughout ambulation.     Safety  Patient left safely in the bed, with call light/phone in reach with alarm applied. Gait belt was used for transfers and gait.     Assessment / Impression:    Patient's tolerance of treatment:  good; patient tolerated ambulation in hallway today          Adverse Reaction: none  Significant change in status and impact:  none  Barriers to improvement:

## 2024-03-11 ENCOUNTER — CARE COORDINATION (OUTPATIENT)
Dept: CASE MANAGEMENT | Age: 69
End: 2024-03-11

## 2024-03-11 NOTE — CARE COORDINATION
Care Transitions Outreach Attempt    Call within 2 business days of discharge: Yes     Attempted to reach patient for transitions of care follow up. Unable to reach patient. (1st attempt)    Patient: Adenike Harvey Patient : 1955 MRN: [id number]    Last Discharge Facility       Date Complaint Diagnosis Description Type Department Provider    3/4/24 Rib Pain Influenza A ... ED to Hosp-Admission (Discharged) (ADMITTED) [facility] Mario Alberto Uribe MD; Odell Dsouza, ...          Was this an external facility discharge? No Discharge Facility Name: [facility]    Noted following upcoming appointments from discharge chart review:   [facility] follow up appointment(s):   Future Appointments   Date Time Provider Department Center   3/14/2024 10:00 AM Donovan Sutherland MD [facility]   4/10/2024 11:00 AM Rony Radford MD [facility] G[facility]   2024 11:20 AM Iban Lou MD [facility]   2024 11:00 AM Iban Lou MD [facility]     Non-BS  follow up appointment(s): SANDRA Fontana RN CTN     

## 2024-03-12 ENCOUNTER — CARE COORDINATION (OUTPATIENT)
Dept: CASE MANAGEMENT | Age: 69
End: 2024-03-12

## 2024-03-12 DIAGNOSIS — J44.1 COPD EXACERBATION (HCC): Primary | ICD-10-CM

## 2024-03-12 PROCEDURE — 1111F DSCHRG MED/CURRENT MED MERGE: CPT | Performed by: INTERNAL MEDICINE

## 2024-03-15 ENCOUNTER — CARE COORDINATION (OUTPATIENT)
Dept: CARE COORDINATION | Age: 69
End: 2024-03-15

## 2024-03-15 NOTE — CARE COORDINATION
Care Transitions Follow Up Call    Patient Current Location:  Home: [address] [place], [place] [id number]    LPN Care Coordinator contacted the patient by telephone to follow up after admission on 3/4-3/9.  Verified name and  with patient as identifiers.    Patient: Adenike Harvey  Patient : 1955   MRN: [id number]  Reason for Admission: COPD Exac. Flu A   Discharge Date: 3/9/24 RARS: Readmission Risk Score: 38.2      Needs to be reviewed by the provider   Additional needs identified to be addressed with provider: No  none             Method of communication with provider: none.    LPN CC spoke with patient. States she could be better. Has some SOB & cough-no improvement. Cough is nonproductive. Denies fever/chills, N/D. Has some occ vomiting d/t forceful coughing. Hydrating ok. Appetite diminished. Denies problems with bowels or bladder. Meds do help cough & she has refills. She did not go to PCP f/u yesterday d/t transport. States  was not up to it. She is agreeable to reschedule HFU. LPN CC assisted to schedule for 3/19.   Angelita Hancock, YEMI CC  Care Transitions  [phone]    Addressed changes since last contact:  none  Discussed follow-up appointments. If no appointment was previously scheduled, appointment scheduling offered: Yes.   Is follow up appointment scheduled within 7 days of discharge? Yes.    Follow Up  Future Appointments   Date Time Provider Department Center   3/19/2024  3:20 PM Iban Lou MD [facility]   3/21/2024  9:45 AM Una Gordon MD [facility]   3/21/2024 10:00 AM Donovan Sutherland MD [facility] RAD ONC [place]   4/10/2024 11:00 AM Rony Radford MD [facility]   2024 11:20 AM Iban Lou MD [facility]   2024 11:00 AM Iabn Lou MD [facility]     External follow up appointment(s): SANDRA    LPN Care Coordinator reviewed medical action plan and red flags with patient and discussed any barriers to care and/or understanding

## 2024-03-19 ENCOUNTER — CARE COORDINATION (OUTPATIENT)
Dept: CASE MANAGEMENT | Age: 69
End: 2024-03-19

## 2024-03-20 ENCOUNTER — CARE COORDINATION (OUTPATIENT)
Dept: CASE MANAGEMENT | Age: 69
End: 2024-03-20

## 2024-03-20 NOTE — CARE COORDINATION
Care Transitions Follow Up Call    Patient: Adenike Harvey  Patient : 1955   MRN: <[id number]>  Reason for Admission: COPD Exac. Flu A    Discharge Date: 3/9/24 RARS: Readmission Risk Score: 38.2    Attempted to contact patient for follow up transition call. Left voicemail message to return call with an update on condition since discharge. Contact information provided. Will continue to follow up.        Follow Up  Future Appointments   Date Time Provider Department [place]   3/21/2024  9:45 AM Una Gordon MD [facility] CC [facility]   3/21/2024 10:00 AM Donovan Sutherland MD [facility] RAD ONC [place]   4/10/2024 11:00 AM Rony Radford MD [facility] Gastro [facility]   2024 11:20 AM Iban Luo MD [facility] FPS [facility]   2024 11:00 AM Iban Lou MD [facility] FPS [facility]       Care Transitions Subsequent and Final Call    Subsequent and Final Calls  Are you currently active with any services?: Outpatient/Community Services  Care Transitions Interventions    Physical Therapy: Completed       Transportation Support: Declined      DME Assistance: Declined     Senior Services: Declined    Disease Specific Clinic: Completed Occupational Therapy: Completed     Disease Association: Completed      Other Interventions:             Radha Groves LPN

## 2024-03-21 ENCOUNTER — CARE COORDINATION (OUTPATIENT)
Dept: CASE MANAGEMENT | Age: 69
End: 2024-03-21

## 2024-03-21 ENCOUNTER — TELEPHONE (OUTPATIENT)
Dept: RADIATION ONCOLOGY | Age: 69
End: 2024-03-21

## 2024-03-21 NOTE — TELEPHONE ENCOUNTER
Follow Up for Dr. Sutherland & Dr. Gordon missed for Date of Service 03/21/2024. Called patient's phone to reschedule but voice mail was full. Left voice message on spouse's phone to have patient call & reschedule both appointments.

## 2024-03-21 NOTE — CARE COORDINATION
Care Transitions Follow Up Call    Patient: Adenike Harvey  Patient : 1955   MRN: <[id number]>  Reason for Admission: COPD Exac. Flu A     Discharge Date: 3/9/24 RARS: Readmission Risk Score: 38.2    Attempted to contact patient for follow up transition call. Unable to leave a voicemail message to return call. Mailbox is full. Will continue to follow.        Follow Up  Future Appointments   Date Time Provider Department Center   4/10/2024 11:00 AM Rony Radford MD [facility] Gastro [facility]   2024 11:20 AM Iban Lou MD [facility]   2024 11:00 AM Iban Lou MD [facility]       Care Transitions Subsequent and Final Call    Subsequent and Final Calls  Are you currently active with any services?: Outpatient/Community Services  Care Transitions Interventions    Physical Therapy: Completed       Transportation Support: Declined      DME Assistance: Declined     Senior Services: Declined    Disease Specific Clinic: Completed Occupational Therapy: Completed     Disease Association: Completed      Other Interventions:             Radha Groves LPN

## 2024-03-22 ENCOUNTER — APPOINTMENT (OUTPATIENT)
Dept: GENERAL RADIOLOGY | Age: 69
DRG: 871 | End: 2024-03-22
Payer: MEDICARE

## 2024-03-22 ENCOUNTER — APPOINTMENT (OUTPATIENT)
Dept: CT IMAGING | Age: 69
DRG: 871 | End: 2024-03-22
Payer: MEDICARE

## 2024-03-22 ENCOUNTER — HOSPITAL ENCOUNTER (INPATIENT)
Age: 69
LOS: 17 days | Discharge: NURSING, REHAB OR LONG-TERM CARE | DRG: 871 | End: 2024-04-08
Attending: STUDENT IN AN ORGANIZED HEALTH CARE EDUCATION/TRAINING PROGRAM | Admitting: STUDENT IN AN ORGANIZED HEALTH CARE EDUCATION/TRAINING PROGRAM
Payer: MEDICARE

## 2024-03-22 DIAGNOSIS — N17.0 SEPSIS WITH ACUTE RENAL FAILURE, TUBULAR NECROSIS, AND SEPTIC SHOCK, DUE TO UNSPECIFIED ORGANISM (HCC): ICD-10-CM

## 2024-03-22 DIAGNOSIS — A41.9 SEPSIS WITH ACUTE RENAL FAILURE AND SEPTIC SHOCK, DUE TO UNSPECIFIED ORGANISM, UNSPECIFIED ACUTE RENAL FAILURE TYPE (HCC): ICD-10-CM

## 2024-03-22 DIAGNOSIS — N17.9 SEPSIS WITH ACUTE RENAL FAILURE AND SEPTIC SHOCK, DUE TO UNSPECIFIED ORGANISM, UNSPECIFIED ACUTE RENAL FAILURE TYPE (HCC): ICD-10-CM

## 2024-03-22 DIAGNOSIS — I50.21 ACUTE SYSTOLIC HEART FAILURE (HCC): ICD-10-CM

## 2024-03-22 DIAGNOSIS — A41.9 SEPSIS WITH ACUTE RENAL FAILURE, TUBULAR NECROSIS, AND SEPTIC SHOCK, DUE TO UNSPECIFIED ORGANISM (HCC): ICD-10-CM

## 2024-03-22 DIAGNOSIS — N17.9 ACUTE RENAL FAILURE, UNSPECIFIED ACUTE RENAL FAILURE TYPE (HCC): Primary | ICD-10-CM

## 2024-03-22 DIAGNOSIS — R65.21 SEPSIS WITH ACUTE RENAL FAILURE AND SEPTIC SHOCK, DUE TO UNSPECIFIED ORGANISM, UNSPECIFIED ACUTE RENAL FAILURE TYPE (HCC): ICD-10-CM

## 2024-03-22 DIAGNOSIS — R65.21 SEPSIS WITH ACUTE RENAL FAILURE, TUBULAR NECROSIS, AND SEPTIC SHOCK, DUE TO UNSPECIFIED ORGANISM (HCC): ICD-10-CM

## 2024-03-22 DIAGNOSIS — J18.9 PNEUMONIA OF RIGHT LUNG DUE TO INFECTIOUS ORGANISM, UNSPECIFIED PART OF LUNG: ICD-10-CM

## 2024-03-22 LAB
ALBUMIN SERPL-MCNC: 3.3 GM/DL (ref 3.4–5)
ALP BLD-CCNC: 89 IU/L (ref 40–129)
ALT SERPL-CCNC: 12 U/L (ref 10–40)
ANION GAP SERPL CALCULATED.3IONS-SCNC: 25 MMOL/L (ref 7–16)
AST SERPL-CCNC: 58 IU/L (ref 15–37)
BACTERIA: NEGATIVE /HPF
BASOPHILS ABSOLUTE: 0.1 K/CU MM
BASOPHILS RELATIVE PERCENT: 1 % (ref 0–1)
BILIRUB SERPL-MCNC: 0.5 MG/DL (ref 0–1)
BILIRUBIN DIRECT: 0.2 MG/DL (ref 0–0.3)
BILIRUBIN URINE: ABNORMAL MG/DL
BILIRUBIN, INDIRECT: 0.3 MG/DL (ref 0–0.7)
BLOOD, URINE: ABNORMAL
BUN SERPL-MCNC: 18 MG/DL (ref 6–23)
CALCIUM SERPL-MCNC: 10 MG/DL (ref 8.3–10.6)
CHLORIDE BLD-SCNC: 92 MMOL/L (ref 99–110)
CLARITY: ABNORMAL
CO2: 15 MMOL/L (ref 21–32)
COLOR: YELLOW
CREAT SERPL-MCNC: 3.5 MG/DL (ref 0.6–1.1)
CREAT UR-MCNC: 89.1 MG/DL (ref 28–217)
DIFFERENTIAL TYPE: ABNORMAL
EKG ATRIAL RATE: 150 BPM
EKG DIAGNOSIS: NORMAL
EKG Q-T INTERVAL: 366 MS
EKG QRS DURATION: 94 MS
EKG QTC CALCULATION (BAZETT): 568 MS
EKG R AXIS: -52 DEGREES
EKG T AXIS: 79 DEGREES
EKG VENTRICULAR RATE: 145 BPM
EOSINOPHILS ABSOLUTE: 0.4 K/CU MM
EOSINOPHILS RELATIVE PERCENT: 7.7 % (ref 0–3)
GFR SERPL CREATININE-BSD FRML MDRD: 14 ML/MIN/1.73M2
GLUCOSE SERPL-MCNC: 82 MG/DL (ref 70–99)
GLUCOSE, URINE: NEGATIVE MG/DL
GRANULAR CASTS: 34 /LPF
HCT VFR BLD CALC: 32.3 % (ref 37–47)
HEMOGLOBIN: 10.1 GM/DL (ref 12.5–16)
IMMATURE NEUTROPHIL %: 0.2 % (ref 0–0.43)
INFLUENZA A ANTIGEN: NOT DETECTED
INFLUENZA B ANTIGEN: NOT DETECTED
KETONES, URINE: 15 MG/DL
LACTATE: 1.2 MMOL/L (ref 0.5–1.9)
LACTIC ACID, SEPSIS: 1.3 MMOL/L (ref 0.4–2)
LACTIC ACID, SEPSIS: 2.4 MMOL/L (ref 0.4–2)
LEUKOCYTE ESTERASE, URINE: NEGATIVE
LIPASE: 33 IU/L (ref 13–60)
LYMPHOCYTES ABSOLUTE: 1.3 K/CU MM
LYMPHOCYTES RELATIVE PERCENT: 25.7 % (ref 24–44)
MAGNESIUM: 1.8 MG/DL (ref 1.8–2.4)
MCH RBC QN AUTO: 27.6 PG (ref 27–31)
MCHC RBC AUTO-ENTMCNC: 31.3 % (ref 32–36)
MCV RBC AUTO: 88.3 FL (ref 78–100)
MONOCYTES ABSOLUTE: 0.6 K/CU MM
MONOCYTES RELATIVE PERCENT: 11.3 % (ref 0–4)
MUCUS: ABNORMAL HPF
NITRITE URINE, QUANTITATIVE: NEGATIVE
NUCLEATED RBC %: 0 %
PDW BLD-RTO: 16.7 % (ref 11.7–14.9)
PH, URINE: 6.5 (ref 5–8)
PLATELET # BLD: 841 K/CU MM (ref 140–440)
PMV BLD AUTO: 10 FL (ref 7.5–11.1)
POTASSIUM SERPL-SCNC: 3.2 MMOL/L (ref 3.5–5.1)
PROCALCITONIN SERPL-MCNC: 0.83 NG/ML
PROTEIN UA: >300 MG/DL
RBC # BLD: 3.66 M/CU MM (ref 4.2–5.4)
RBC URINE: 5 /HPF (ref 0–6)
REASON FOR REJECTION: NORMAL
REJECTED TEST: NORMAL
SARS-COV-2 RDRP RESP QL NAA+PROBE: NOT DETECTED
SEGMENTED NEUTROPHILS ABSOLUTE COUNT: 2.7 K/CU MM
SEGMENTED NEUTROPHILS RELATIVE PERCENT: 54.1 % (ref 36–66)
SODIUM BLD-SCNC: 132 MMOL/L (ref 135–145)
SODIUM URINE: 51 MMOL/L (ref 35–167)
SOURCE: NORMAL
SPECIFIC GRAVITY UA: 1.02 (ref 1–1.03)
TOTAL IMMATURE NEUTOROPHIL: 0.01 K/CU MM
TOTAL NUCLEATED RBC: 0 K/CU MM
TOTAL PROTEIN: 6.9 GM/DL (ref 6.4–8.2)
TRICHOMONAS: ABNORMAL /HPF
UROBILINOGEN, URINE: 0.2 MG/DL (ref 0.2–1)
WBC # BLD: 4.9 K/CU MM (ref 4–10.5)
WBC UA: 3 /HPF (ref 0–5)

## 2024-03-22 PROCEDURE — 96375 TX/PRO/DX INJ NEW DRUG ADDON: CPT

## 2024-03-22 PROCEDURE — 6370000000 HC RX 637 (ALT 250 FOR IP): Performed by: STUDENT IN AN ORGANIZED HEALTH CARE EDUCATION/TRAINING PROGRAM

## 2024-03-22 PROCEDURE — 2500000003 HC RX 250 WO HCPCS: Performed by: STUDENT IN AN ORGANIZED HEALTH CARE EDUCATION/TRAINING PROGRAM

## 2024-03-22 PROCEDURE — 96367 TX/PROPH/DG ADDL SEQ IV INF: CPT

## 2024-03-22 PROCEDURE — 84145 PROCALCITONIN (PCT): CPT

## 2024-03-22 PROCEDURE — 2580000003 HC RX 258: Performed by: STUDENT IN AN ORGANIZED HEALTH CARE EDUCATION/TRAINING PROGRAM

## 2024-03-22 PROCEDURE — P9047 ALBUMIN (HUMAN), 25%, 50ML: HCPCS | Performed by: STUDENT IN AN ORGANIZED HEALTH CARE EDUCATION/TRAINING PROGRAM

## 2024-03-22 PROCEDURE — 71045 X-RAY EXAM CHEST 1 VIEW: CPT

## 2024-03-22 PROCEDURE — 87641 MR-STAPH DNA AMP PROBE: CPT

## 2024-03-22 PROCEDURE — 96365 THER/PROPH/DIAG IV INF INIT: CPT

## 2024-03-22 PROCEDURE — 99285 EMERGENCY DEPT VISIT HI MDM: CPT

## 2024-03-22 PROCEDURE — 2580000003 HC RX 258: Performed by: INTERNAL MEDICINE

## 2024-03-22 PROCEDURE — 87507 IADNA-DNA/RNA PROBE TQ 12-25: CPT

## 2024-03-22 PROCEDURE — 83605 ASSAY OF LACTIC ACID: CPT

## 2024-03-22 PROCEDURE — 80053 COMPREHEN METABOLIC PANEL: CPT

## 2024-03-22 PROCEDURE — 87635 SARS-COV-2 COVID-19 AMP PRB: CPT

## 2024-03-22 PROCEDURE — 2700000000 HC OXYGEN THERAPY PER DAY

## 2024-03-22 PROCEDURE — 6360000002 HC RX W HCPCS: Performed by: STUDENT IN AN ORGANIZED HEALTH CARE EDUCATION/TRAINING PROGRAM

## 2024-03-22 PROCEDURE — 93005 ELECTROCARDIOGRAM TRACING: CPT | Performed by: STUDENT IN AN ORGANIZED HEALTH CARE EDUCATION/TRAINING PROGRAM

## 2024-03-22 PROCEDURE — 94761 N-INVAS EAR/PLS OXIMETRY MLT: CPT

## 2024-03-22 PROCEDURE — 93010 ELECTROCARDIOGRAM REPORT: CPT | Performed by: INTERNAL MEDICINE

## 2024-03-22 PROCEDURE — 81001 URINALYSIS AUTO W/SCOPE: CPT

## 2024-03-22 PROCEDURE — 6360000002 HC RX W HCPCS: Performed by: NURSE PRACTITIONER

## 2024-03-22 PROCEDURE — 82570 ASSAY OF URINE CREATININE: CPT

## 2024-03-22 PROCEDURE — 87502 INFLUENZA DNA AMP PROBE: CPT

## 2024-03-22 PROCEDURE — 82248 BILIRUBIN DIRECT: CPT

## 2024-03-22 PROCEDURE — 74176 CT ABD & PELVIS W/O CONTRAST: CPT

## 2024-03-22 PROCEDURE — 85025 COMPLETE CBC W/AUTO DIFF WBC: CPT

## 2024-03-22 PROCEDURE — 6370000000 HC RX 637 (ALT 250 FOR IP): Performed by: NURSE PRACTITIONER

## 2024-03-22 PROCEDURE — 84300 ASSAY OF URINE SODIUM: CPT

## 2024-03-22 PROCEDURE — 87040 BLOOD CULTURE FOR BACTERIA: CPT

## 2024-03-22 PROCEDURE — 3E033XZ INTRODUCTION OF VASOPRESSOR INTO PERIPHERAL VEIN, PERCUTANEOUS APPROACH: ICD-10-PCS | Performed by: STUDENT IN AN ORGANIZED HEALTH CARE EDUCATION/TRAINING PROGRAM

## 2024-03-22 PROCEDURE — 83690 ASSAY OF LIPASE: CPT

## 2024-03-22 PROCEDURE — 83735 ASSAY OF MAGNESIUM: CPT

## 2024-03-22 PROCEDURE — 2580000003 HC RX 258: Performed by: NURSE PRACTITIONER

## 2024-03-22 PROCEDURE — 2000000000 HC ICU R&B

## 2024-03-22 PROCEDURE — 96366 THER/PROPH/DIAG IV INF ADDON: CPT

## 2024-03-22 RX ORDER — ACETAMINOPHEN 325 MG/1
650 TABLET ORAL EVERY 6 HOURS PRN
Status: DISCONTINUED | OUTPATIENT
Start: 2024-03-22 | End: 2024-04-09 | Stop reason: HOSPADM

## 2024-03-22 RX ORDER — ACETAMINOPHEN 650 MG/1
650 SUPPOSITORY RECTAL EVERY 6 HOURS PRN
Status: DISCONTINUED | OUTPATIENT
Start: 2024-03-22 | End: 2024-04-09 | Stop reason: HOSPADM

## 2024-03-22 RX ORDER — ONDANSETRON 2 MG/ML
4 INJECTION INTRAMUSCULAR; INTRAVENOUS ONCE
Status: COMPLETED | OUTPATIENT
Start: 2024-03-22 | End: 2024-03-22

## 2024-03-22 RX ORDER — ACETAMINOPHEN 500 MG
1000 TABLET ORAL ONCE
Status: COMPLETED | OUTPATIENT
Start: 2024-03-22 | End: 2024-03-22

## 2024-03-22 RX ORDER — DOXYCYCLINE HYCLATE 100 MG
100 TABLET ORAL ONCE
Status: COMPLETED | OUTPATIENT
Start: 2024-03-22 | End: 2024-03-22

## 2024-03-22 RX ORDER — SODIUM CHLORIDE, SODIUM LACTATE, POTASSIUM CHLORIDE, CALCIUM CHLORIDE 600; 310; 30; 20 MG/100ML; MG/100ML; MG/100ML; MG/100ML
INJECTION, SOLUTION INTRAVENOUS CONTINUOUS
Status: DISCONTINUED | OUTPATIENT
Start: 2024-03-22 | End: 2024-03-23

## 2024-03-22 RX ORDER — CLOPIDOGREL BISULFATE 75 MG/1
75 TABLET ORAL DAILY
Status: DISCONTINUED | OUTPATIENT
Start: 2024-03-23 | End: 2024-04-01

## 2024-03-22 RX ORDER — ALBUMIN (HUMAN) 12.5 G/50ML
25 SOLUTION INTRAVENOUS ONCE
Status: COMPLETED | OUTPATIENT
Start: 2024-03-22 | End: 2024-03-22

## 2024-03-22 RX ORDER — SODIUM CHLORIDE 9 MG/ML
INJECTION, SOLUTION INTRAVENOUS PRN
Status: DISCONTINUED | OUTPATIENT
Start: 2024-03-22 | End: 2024-04-09 | Stop reason: HOSPADM

## 2024-03-22 RX ORDER — SODIUM CHLORIDE 0.9 % (FLUSH) 0.9 %
5-40 SYRINGE (ML) INJECTION PRN
Status: DISCONTINUED | OUTPATIENT
Start: 2024-03-22 | End: 2024-04-09 | Stop reason: HOSPADM

## 2024-03-22 RX ORDER — 0.9 % SODIUM CHLORIDE 0.9 %
863 INTRAVENOUS SOLUTION INTRAVENOUS ONCE
Status: COMPLETED | OUTPATIENT
Start: 2024-03-22 | End: 2024-03-22

## 2024-03-22 RX ORDER — GABAPENTIN 100 MG/1
100 CAPSULE ORAL 3 TIMES DAILY
Status: DISCONTINUED | OUTPATIENT
Start: 2024-03-22 | End: 2024-04-05

## 2024-03-22 RX ORDER — POLYETHYLENE GLYCOL 3350 17 G/17G
17 POWDER, FOR SOLUTION ORAL DAILY PRN
Status: DISCONTINUED | OUTPATIENT
Start: 2024-03-22 | End: 2024-04-09 | Stop reason: HOSPADM

## 2024-03-22 RX ORDER — HEPARIN SODIUM 5000 [USP'U]/ML
5000 INJECTION, SOLUTION INTRAVENOUS; SUBCUTANEOUS EVERY 8 HOURS SCHEDULED
Status: DISCONTINUED | OUTPATIENT
Start: 2024-03-22 | End: 2024-04-09 | Stop reason: HOSPADM

## 2024-03-22 RX ORDER — ONDANSETRON 4 MG/1
4 TABLET, ORALLY DISINTEGRATING ORAL EVERY 8 HOURS PRN
Status: DISCONTINUED | OUTPATIENT
Start: 2024-03-22 | End: 2024-04-09 | Stop reason: HOSPADM

## 2024-03-22 RX ORDER — SODIUM CHLORIDE 0.9 % (FLUSH) 0.9 %
5-40 SYRINGE (ML) INJECTION EVERY 12 HOURS SCHEDULED
Status: DISCONTINUED | OUTPATIENT
Start: 2024-03-22 | End: 2024-04-09 | Stop reason: HOSPADM

## 2024-03-22 RX ORDER — SODIUM CHLORIDE, SODIUM LACTATE, POTASSIUM CHLORIDE, CALCIUM CHLORIDE 600; 310; 30; 20 MG/100ML; MG/100ML; MG/100ML; MG/100ML
500 INJECTION, SOLUTION INTRAVENOUS ONCE
Status: COMPLETED | OUTPATIENT
Start: 2024-03-22 | End: 2024-03-22

## 2024-03-22 RX ORDER — ONDANSETRON 2 MG/ML
4 INJECTION INTRAMUSCULAR; INTRAVENOUS EVERY 6 HOURS PRN
Status: DISCONTINUED | OUTPATIENT
Start: 2024-03-22 | End: 2024-04-09 | Stop reason: HOSPADM

## 2024-03-22 RX ORDER — SODIUM CHLORIDE, SODIUM LACTATE, POTASSIUM CHLORIDE, AND CALCIUM CHLORIDE .6; .31; .03; .02 G/100ML; G/100ML; G/100ML; G/100ML
1000 INJECTION, SOLUTION INTRAVENOUS ONCE
Status: COMPLETED | OUTPATIENT
Start: 2024-03-22 | End: 2024-03-22

## 2024-03-22 RX ORDER — NOREPINEPHRINE BITARTRATE 0.06 MG/ML
1-100 INJECTION, SOLUTION INTRAVENOUS CONTINUOUS
Status: DISCONTINUED | OUTPATIENT
Start: 2024-03-22 | End: 2024-03-25

## 2024-03-22 RX ORDER — PANTOPRAZOLE SODIUM 40 MG/1
40 TABLET, DELAYED RELEASE ORAL
Status: DISCONTINUED | OUTPATIENT
Start: 2024-03-22 | End: 2024-04-03

## 2024-03-22 RX ORDER — ASPIRIN 81 MG/1
81 TABLET, CHEWABLE ORAL DAILY
Status: DISCONTINUED | OUTPATIENT
Start: 2024-03-23 | End: 2024-04-01

## 2024-03-22 RX ORDER — CIPROFLOXACIN HYDROCHLORIDE 3.5 MG/ML
1 SOLUTION/ DROPS TOPICAL
Status: DISCONTINUED | OUTPATIENT
Start: 2024-03-22 | End: 2024-03-22

## 2024-03-22 RX ORDER — 0.9 % SODIUM CHLORIDE 0.9 %
1000 INTRAVENOUS SOLUTION INTRAVENOUS ONCE
Status: COMPLETED | OUTPATIENT
Start: 2024-03-22 | End: 2024-03-22

## 2024-03-22 RX ORDER — CIPROFLOXACIN HYDROCHLORIDE 3.5 MG/ML
1 SOLUTION/ DROPS TOPICAL
Status: DISCONTINUED | OUTPATIENT
Start: 2024-03-22 | End: 2024-03-25

## 2024-03-22 RX ORDER — ROSUVASTATIN CALCIUM 20 MG/1
40 TABLET, COATED ORAL NIGHTLY
Status: DISCONTINUED | OUTPATIENT
Start: 2024-03-22 | End: 2024-03-27

## 2024-03-22 RX ADMIN — SODIUM CHLORIDE, POTASSIUM CHLORIDE, SODIUM LACTATE AND CALCIUM CHLORIDE: 600; 310; 30; 20 INJECTION, SOLUTION INTRAVENOUS at 18:20

## 2024-03-22 RX ADMIN — HEPARIN SODIUM 5000 UNITS: 5000 INJECTION INTRAVENOUS; SUBCUTANEOUS at 22:01

## 2024-03-22 RX ADMIN — CIPROFLOXACIN 1 DROP: 3 SOLUTION OPHTHALMIC at 21:52

## 2024-03-22 RX ADMIN — SODIUM CHLORIDE, PRESERVATIVE FREE 10 ML: 5 INJECTION INTRAVENOUS at 21:58

## 2024-03-22 RX ADMIN — SODIUM CHLORIDE 1000 ML: 9 INJECTION, SOLUTION INTRAVENOUS at 10:58

## 2024-03-22 RX ADMIN — SODIUM CHLORIDE 1000 ML: 9 INJECTION, SOLUTION INTRAVENOUS at 12:01

## 2024-03-22 RX ADMIN — NOREPINEPHRINE BITARTRATE 5 MCG/MIN: 0.06 INJECTION, SOLUTION INTRAVENOUS at 12:05

## 2024-03-22 RX ADMIN — ONDANSETRON 4 MG: 2 INJECTION INTRAMUSCULAR; INTRAVENOUS at 11:08

## 2024-03-22 RX ADMIN — GABAPENTIN 100 MG: 100 CAPSULE ORAL at 22:02

## 2024-03-22 RX ADMIN — ALBUMIN (HUMAN) 25 G: 0.25 INJECTION, SOLUTION INTRAVENOUS at 16:15

## 2024-03-22 RX ADMIN — VANCOMYCIN HYDROCHLORIDE 1500 MG: 1.5 INJECTION, POWDER, LYOPHILIZED, FOR SOLUTION INTRAVENOUS at 12:05

## 2024-03-22 RX ADMIN — HEPARIN SODIUM 5000 UNITS: 5000 INJECTION INTRAVENOUS; SUBCUTANEOUS at 18:24

## 2024-03-22 RX ADMIN — ACETAMINOPHEN 1000 MG: 500 TABLET ORAL at 11:08

## 2024-03-22 RX ADMIN — SODIUM CHLORIDE, POTASSIUM CHLORIDE, SODIUM LACTATE AND CALCIUM CHLORIDE 1000 ML: 600; 310; 30; 20 INJECTION, SOLUTION INTRAVENOUS at 16:18

## 2024-03-22 RX ADMIN — CIPROFLOXACIN 1 DROP: 3 SOLUTION OPHTHALMIC at 20:00

## 2024-03-22 RX ADMIN — HYDROCORTISONE SODIUM SUCCINATE 50 MG: 100 INJECTION, POWDER, FOR SOLUTION INTRAMUSCULAR; INTRAVENOUS at 22:02

## 2024-03-22 RX ADMIN — DOXYCYCLINE HYCLATE 100 MG: 100 TABLET, COATED ORAL at 13:43

## 2024-03-22 RX ADMIN — PIPERACILLIN AND TAZOBACTAM 4500 MG: 4; .5 INJECTION, POWDER, FOR SOLUTION INTRAVENOUS at 11:14

## 2024-03-22 RX ADMIN — ONDANSETRON 4 MG: 2 INJECTION INTRAMUSCULAR; INTRAVENOUS at 21:49

## 2024-03-22 RX ADMIN — SODIUM CHLORIDE, POTASSIUM CHLORIDE, SODIUM LACTATE AND CALCIUM CHLORIDE 500 ML: 600; 310; 30; 20 INJECTION, SOLUTION INTRAVENOUS at 21:57

## 2024-03-22 RX ADMIN — SODIUM CHLORIDE 863 ML: 9 INJECTION, SOLUTION INTRAVENOUS at 11:00

## 2024-03-22 ASSESSMENT — PAIN DESCRIPTION - PAIN TYPE: TYPE: ACUTE PAIN

## 2024-03-22 ASSESSMENT — PAIN SCALES - WONG BAKER
WONGBAKER_NUMERICALRESPONSE: NO HURT

## 2024-03-22 ASSESSMENT — PAIN SCALES - GENERAL
PAINLEVEL_OUTOF10: 5
PAINLEVEL_OUTOF10: 0

## 2024-03-22 ASSESSMENT — PAIN DESCRIPTION - ORIENTATION: ORIENTATION: LEFT

## 2024-03-22 ASSESSMENT — PAIN DESCRIPTION - ONSET: ONSET: GRADUAL

## 2024-03-22 ASSESSMENT — PAIN DESCRIPTION - DESCRIPTORS
DESCRIPTORS: DISCOMFORT
DESCRIPTORS: DISCOMFORT

## 2024-03-22 ASSESSMENT — PAIN - FUNCTIONAL ASSESSMENT: PAIN_FUNCTIONAL_ASSESSMENT: ACTIVITIES ARE NOT PREVENTED

## 2024-03-22 ASSESSMENT — PAIN DESCRIPTION - FREQUENCY: FREQUENCY: INTERMITTENT

## 2024-03-22 ASSESSMENT — PAIN DESCRIPTION - LOCATION: LOCATION: EYE

## 2024-03-22 NOTE — ED PROVIDER NOTES
chloride 0.9 % 100 mL IVPB (mini-bag) (0 mg IntraVENous Stopped 3/22/24 1148)   vancomycin (VANCOCIN) 1,500 mg in sodium chloride 0.9 % 250 mL IVPB (Acdd4Wca) (0 mg IntraVENous Stopped 3/22/24 1341)   sodium chloride 0.9 % bolus 863 mL (0 mLs IntraVENous Stopped 3/22/24 1158)   sodium chloride 0.9 % bolus 1,000 mL (0 mLs IntraVENous Stopped 3/22/24 1336)   doxycycline hyclate (VIBRA-TABS) tablet 100 mg (100 mg Oral Given 3/22/24 1343)       DISCHARGE PRESCRIPTIONS: (None if blank)  New Prescriptions    No medications on file       I have reviewed and interpreted all of the currently available lab results from this visit (if applicable):    Radiographs (if obtained):  Radiologist's Report Reviewed:  No results found.  CT ABDOMEN PELVIS WO CONTRAST Additional Contrast? None   Final Result      1. No acute intra-abdominopelvic abnormality.      Electronically signed by Gage Lau DO      XR CHEST PORTABLE   Final Result      1. New faint airspace opacity in the right upper lung and the right lower lung. Correlate for clinical evidence of pneumonia or aspiration.         Electronically signed by Mike Chen MD          LABS: (none if blank)  Labs Reviewed   CBC WITH AUTO DIFFERENTIAL - Abnormal; Notable for the following components:       Result Value    RBC 3.66 (*)     Hemoglobin 10.1 (*)     Hematocrit 32.3 (*)     MCHC 31.3 (*)     RDW 16.7 (*)     Platelets 841 (*)     Monocytes % 11.3 (*)     Eosinophils % 7.7 (*)     All other components within normal limits   BASIC METABOLIC PANEL - Abnormal; Notable for the following components:    Sodium 132 (*)     Potassium 3.2 (*)     Chloride 92 (*)     CO2 15 (*)     Anion Gap 25 (*)     Creatinine 3.5 (*)     Est, Glom Filt Rate 14 (*)     All other components within normal limits   HEPATIC FUNCTION PANEL - Abnormal; Notable for the following components:    Albumin 3.3 (*)     AST 58 (*)     All other components within normal limits   LACTATE, SEPSIS - Abnormal;

## 2024-03-22 NOTE — ED NOTES
Medication History  [facility]    Patient Name: Adenike Harvey 1955     Medication history has been completed by: Ambika Newman [facility]    Source(s) of information: patient's  supplied medications from home and insurance claims     Primary Care Physician: Iban Lou MD     Pharmacy: [facility]    Allergies as of 03/22/2024 - Fully Reviewed 03/22/2024   Allergen Reaction Noted    Lipitor      Vicodin [hydrocodone-acetaminophen] Nausea Only     Carboplatin Other (See Comments) 03/27/2023    Hydrocodone  02/17/2023    Adhesive tape Rash 04/30/2018        Prior to Admission medications    Medication Sig Start Date End Date Taking? Authorizing Provider   guaiFENesin (ROBITUSSIN) 100 MG/5ML liquid Take 5 mLs by mouth every 4 hours as needed for Cough  Patient not taking: Reported on 3/22/2024 3/9/24   Mario Alberto Uribe MD   aspirin 81 MG chewable tablet Take 1 tablet by mouth daily 3/3/24   Stevie Humphries MD   clopidogrel (PLAVIX) 75 MG tablet Take 1 tablet by mouth daily 3/3/24   Stevie Humphries MD   lisinopril (PRINIVIL;ZESTRIL) 2.5 MG tablet Take 1 tablet by mouth daily 3/3/24   Stevie Humphries MD   metoprolol succinate (TOPROL XL) 50 MG extended release tablet Take 1 tablet by mouth daily 3/3/24   Stevie Humphries MD   rosuvastatin (CRESTOR) 40 MG tablet Take 1 tablet by mouth nightly 3/2/24   Stevie Humphries MD   pantoprazole (PROTONIX) 40 MG tablet Take 1 tablet by mouth 2 times daily (before meals) 2/28/24   Iban Lou MD   ipratropium 0.5 mg-albuterol 2.5 mg (DUONEB) 0.5-2.5 (3) MG/3ML SOLN nebulizer solution Inhale 3 mLs into the lungs every 4 hours as needed for Shortness of Breath 1/26/24 3/22/24  NarcMadelaine wellington, GRACE - CNP   hydrOXYzine HCl (ATARAX) 25 MG tablet Take 1 tablet by mouth 3 times daily as needed for Anxiety 1/26/24 3/22/24  Madelaine Nava APRN - CNP   sennosides-docusate sodium

## 2024-03-22 NOTE — H&P
V2.0  History and Physical      Name:  Adenike Harvey /Age/Sex: 1955  (69 y.o. female)   MRN & CSN:  [id number] & [id number] Encounter Date/Time: 3/22/2024 5:40 PM EDT   Location:  -A PCP: Iban Lou MD       Hospital Day: 1    Assessment and Plan:   Adenike Harvey is a 69 y.o. 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 Participating in Hx.     Cv - s/p 2.8L IVF bolus with continued hypotension so started on levo. Wean to MAP > 65. Added vaso. Ordered another liter bolus and albumin. SDS  Hold home metop and ACEI    Resp - no active issue. On 3L NC. Hx of COPD. Maintain O2 sats > 88    GI - NPO for now while on high dose pressors. GI ppx     - MELIDA likely pre-renal (concentrated urine and hx of n/v/diarrhea) vs ATN from shock. Ordered urine lytes. Maintain burgess to monitor UOP. Replete lytes as needed.    ID - continue broad spectrum abx at this time. Follow up cultures. Follow up Cdiff.     Heme - dvt ppx    I have performed 50 minutes of critical care time, excluding and separately billable procedures           Disposition:   Current Living situation: home  Expected Disposition: tbd  Estimated D/C: tb    Diet Diet NPO   DVT Prophylaxis [] Lovenox, []  Heparin, [] SCDs, [] Ambulation,  [] Eliquis, [] Xarelto, [] Coumadin   Code Status Full Code   Surrogate Decision Maker/ POA tbd     Personally reviewed Lab Studies and Imaging     Discussed management of the case with Dr. Virk      History from:     patient, electronic medical record    History of Present Illness:     Chief Complaint: weakness, n/v/diarrhea  Hypotensive on admission. Labs with llactic acidosis (resolved with resus),

## 2024-03-23 LAB
ANION GAP SERPL CALCULATED.3IONS-SCNC: 21 MMOL/L (ref 7–16)
ANION GAP SERPL CALCULATED.3IONS-SCNC: 21 MMOL/L (ref 7–16)
BASOPHILS ABSOLUTE: 0 K/CU MM
BASOPHILS RELATIVE PERCENT: 0.3 % (ref 0–1)
BUN SERPL-MCNC: 17 MG/DL (ref 6–23)
BUN SERPL-MCNC: 18 MG/DL (ref 6–23)
CALCIUM SERPL-MCNC: 8 MG/DL (ref 8.3–10.6)
CALCIUM SERPL-MCNC: 8.4 MG/DL (ref 8.3–10.6)
CHLORIDE BLD-SCNC: 102 MMOL/L (ref 99–110)
CHLORIDE BLD-SCNC: 103 MMOL/L (ref 99–110)
CO2: 12 MMOL/L (ref 21–32)
CO2: 13 MMOL/L (ref 21–32)
CREAT SERPL-MCNC: 3.5 MG/DL (ref 0.6–1.1)
CREAT SERPL-MCNC: 3.7 MG/DL (ref 0.6–1.1)
DIFFERENTIAL TYPE: ABNORMAL
EOSINOPHILS ABSOLUTE: 0 K/CU MM
EOSINOPHILS RELATIVE PERCENT: 0.7 % (ref 0–3)
GFR SERPL CREATININE-BSD FRML MDRD: 13 ML/MIN/1.73M2
GFR SERPL CREATININE-BSD FRML MDRD: 14 ML/MIN/1.73M2
GLUCOSE SERPL-MCNC: 64 MG/DL (ref 70–99)
GLUCOSE SERPL-MCNC: 93 MG/DL (ref 70–99)
HCT VFR BLD CALC: 27.7 % (ref 37–47)
HEMOGLOBIN: 8.2 GM/DL (ref 12.5–16)
IMMATURE NEUTROPHIL %: 0.3 % (ref 0–0.43)
LYMPHOCYTES ABSOLUTE: 0.1 K/CU MM
LYMPHOCYTES RELATIVE PERCENT: 4.2 % (ref 24–44)
MAGNESIUM: 1.2 MG/DL (ref 1.8–2.4)
MCH RBC QN AUTO: 27 PG (ref 27–31)
MCHC RBC AUTO-ENTMCNC: 29.6 % (ref 32–36)
MCV RBC AUTO: 91.1 FL (ref 78–100)
MONOCYTES ABSOLUTE: 0.1 K/CU MM
MONOCYTES RELATIVE PERCENT: 2.3 % (ref 0–4)
MRSA, DNA, NASAL: NEGATIVE
NUCLEATED RBC %: 0 %
PDW BLD-RTO: 17 % (ref 11.7–14.9)
PHOSPHORUS: 5.1 MG/DL (ref 2.5–4.9)
PLATELET # BLD: 577 K/CU MM (ref 140–440)
PMV BLD AUTO: 9.7 FL (ref 7.5–11.1)
POTASSIUM SERPL-SCNC: 3.1 MMOL/L (ref 3.5–5.1)
POTASSIUM SERPL-SCNC: 3.3 MMOL/L (ref 3.5–5.1)
RBC # BLD: 3.04 M/CU MM (ref 4.2–5.4)
REASON FOR REJECTION: NORMAL
REJECTED TEST: NORMAL
SEGMENTED NEUTROPHILS ABSOLUTE COUNT: 2.8 K/CU MM
SEGMENTED NEUTROPHILS RELATIVE PERCENT: 92.2 % (ref 36–66)
SODIUM BLD-SCNC: 135 MMOL/L (ref 135–145)
SODIUM BLD-SCNC: 137 MMOL/L (ref 135–145)
SPECIMEN DESCRIPTION: NORMAL
TOTAL IMMATURE NEUTOROPHIL: 0.01 K/CU MM
TOTAL NUCLEATED RBC: 0 K/CU MM
WBC # BLD: 3.1 K/CU MM (ref 4–10.5)

## 2024-03-23 PROCEDURE — P9047 ALBUMIN (HUMAN), 25%, 50ML: HCPCS | Performed by: STUDENT IN AN ORGANIZED HEALTH CARE EDUCATION/TRAINING PROGRAM

## 2024-03-23 PROCEDURE — 6360000002 HC RX W HCPCS: Performed by: STUDENT IN AN ORGANIZED HEALTH CARE EDUCATION/TRAINING PROGRAM

## 2024-03-23 PROCEDURE — 2500000003 HC RX 250 WO HCPCS: Performed by: STUDENT IN AN ORGANIZED HEALTH CARE EDUCATION/TRAINING PROGRAM

## 2024-03-23 PROCEDURE — 85025 COMPLETE CBC W/AUTO DIFF WBC: CPT

## 2024-03-23 PROCEDURE — 2580000003 HC RX 258: Performed by: NURSE PRACTITIONER

## 2024-03-23 PROCEDURE — 6360000002 HC RX W HCPCS: Performed by: INTERNAL MEDICINE

## 2024-03-23 PROCEDURE — 6360000002 HC RX W HCPCS: Performed by: NURSE PRACTITIONER

## 2024-03-23 PROCEDURE — 2580000003 HC RX 258: Performed by: STUDENT IN AN ORGANIZED HEALTH CARE EDUCATION/TRAINING PROGRAM

## 2024-03-23 PROCEDURE — 83735 ASSAY OF MAGNESIUM: CPT

## 2024-03-23 PROCEDURE — 36592 COLLECT BLOOD FROM PICC: CPT

## 2024-03-23 PROCEDURE — 80048 BASIC METABOLIC PNL TOTAL CA: CPT

## 2024-03-23 PROCEDURE — 6370000000 HC RX 637 (ALT 250 FOR IP): Performed by: NURSE PRACTITIONER

## 2024-03-23 PROCEDURE — 87507 IADNA-DNA/RNA PROBE TQ 12-25: CPT

## 2024-03-23 PROCEDURE — 2000000000 HC ICU R&B

## 2024-03-23 PROCEDURE — 2580000003 HC RX 258: Performed by: INTERNAL MEDICINE

## 2024-03-23 PROCEDURE — 2700000000 HC OXYGEN THERAPY PER DAY

## 2024-03-23 PROCEDURE — 6370000000 HC RX 637 (ALT 250 FOR IP): Performed by: STUDENT IN AN ORGANIZED HEALTH CARE EDUCATION/TRAINING PROGRAM

## 2024-03-23 PROCEDURE — 36591 DRAW BLOOD OFF VENOUS DEVICE: CPT

## 2024-03-23 PROCEDURE — 94761 N-INVAS EAR/PLS OXIMETRY MLT: CPT

## 2024-03-23 PROCEDURE — 84100 ASSAY OF PHOSPHORUS: CPT

## 2024-03-23 RX ORDER — SODIUM BICARBONATE 650 MG/1
650 TABLET ORAL 4 TIMES DAILY
Status: DISCONTINUED | OUTPATIENT
Start: 2024-03-23 | End: 2024-04-03

## 2024-03-23 RX ORDER — BENZONATATE 100 MG/1
100 CAPSULE ORAL 3 TIMES DAILY PRN
Status: DISPENSED | OUTPATIENT
Start: 2024-03-23 | End: 2024-03-25

## 2024-03-23 RX ORDER — SODIUM CHLORIDE, SODIUM LACTATE, POTASSIUM CHLORIDE, CALCIUM CHLORIDE 600; 310; 30; 20 MG/100ML; MG/100ML; MG/100ML; MG/100ML
INJECTION, SOLUTION INTRAVENOUS CONTINUOUS
Status: DISPENSED | OUTPATIENT
Start: 2024-03-23 | End: 2024-03-23

## 2024-03-23 RX ORDER — GUAIFENESIN/DEXTROMETHORPHAN 100-10MG/5
5 SYRUP ORAL EVERY 4 HOURS PRN
Status: DISCONTINUED | OUTPATIENT
Start: 2024-03-23 | End: 2024-04-04

## 2024-03-23 RX ORDER — POTASSIUM CHLORIDE 7.45 MG/ML
10 INJECTION INTRAVENOUS ONCE
Status: COMPLETED | OUTPATIENT
Start: 2024-03-23 | End: 2024-03-23

## 2024-03-23 RX ORDER — MAGNESIUM SULFATE IN WATER 40 MG/ML
2000 INJECTION, SOLUTION INTRAVENOUS ONCE
Status: COMPLETED | OUTPATIENT
Start: 2024-03-23 | End: 2024-03-23

## 2024-03-23 RX ORDER — SODIUM CHLORIDE, SODIUM LACTATE, POTASSIUM CHLORIDE, AND CALCIUM CHLORIDE .6; .31; .03; .02 G/100ML; G/100ML; G/100ML; G/100ML
1000 INJECTION, SOLUTION INTRAVENOUS ONCE
Status: COMPLETED | OUTPATIENT
Start: 2024-03-23 | End: 2024-03-23

## 2024-03-23 RX ORDER — ALBUMIN (HUMAN) 12.5 G/50ML
25 SOLUTION INTRAVENOUS ONCE
Status: COMPLETED | OUTPATIENT
Start: 2024-03-23 | End: 2024-03-23

## 2024-03-23 RX ADMIN — PANTOPRAZOLE SODIUM 40 MG: 40 TABLET, DELAYED RELEASE ORAL at 06:14

## 2024-03-23 RX ADMIN — BENZONATATE 100 MG: 100 CAPSULE ORAL at 15:24

## 2024-03-23 RX ADMIN — ONDANSETRON 4 MG: 4 TABLET, ORALLY DISINTEGRATING ORAL at 16:37

## 2024-03-23 RX ADMIN — PIPERACILLIN AND TAZOBACTAM 3375 MG: 3; .375 INJECTION, POWDER, FOR SOLUTION INTRAVENOUS at 12:49

## 2024-03-23 RX ADMIN — CIPROFLOXACIN 1 DROP: 3 SOLUTION OPHTHALMIC at 22:00

## 2024-03-23 RX ADMIN — SODIUM CHLORIDE, PRESERVATIVE FREE 10 ML: 5 INJECTION INTRAVENOUS at 07:51

## 2024-03-23 RX ADMIN — SODIUM BICARBONATE 650 MG: 650 TABLET ORAL at 08:27

## 2024-03-23 RX ADMIN — GABAPENTIN 100 MG: 100 CAPSULE ORAL at 08:27

## 2024-03-23 RX ADMIN — HYDROCORTISONE SODIUM SUCCINATE 50 MG: 100 INJECTION, POWDER, FOR SOLUTION INTRAMUSCULAR; INTRAVENOUS at 21:03

## 2024-03-23 RX ADMIN — HYDROCORTISONE SODIUM SUCCINATE 50 MG: 100 INJECTION, POWDER, FOR SOLUTION INTRAMUSCULAR; INTRAVENOUS at 03:43

## 2024-03-23 RX ADMIN — SODIUM CHLORIDE, POTASSIUM CHLORIDE, SODIUM LACTATE AND CALCIUM CHLORIDE 1000 ML: 600; 310; 30; 20 INJECTION, SOLUTION INTRAVENOUS at 00:54

## 2024-03-23 RX ADMIN — CIPROFLOXACIN 1 DROP: 3 SOLUTION OPHTHALMIC at 13:26

## 2024-03-23 RX ADMIN — ACETAMINOPHEN 650 MG: 325 TABLET ORAL at 18:03

## 2024-03-23 RX ADMIN — CIPROFLOXACIN 1 DROP: 3 SOLUTION OPHTHALMIC at 06:14

## 2024-03-23 RX ADMIN — CIPROFLOXACIN 1 DROP: 3 SOLUTION OPHTHALMIC at 16:39

## 2024-03-23 RX ADMIN — CIPROFLOXACIN 1 DROP: 3 SOLUTION OPHTHALMIC at 20:00

## 2024-03-23 RX ADMIN — ASPIRIN 81 MG: 81 TABLET, CHEWABLE ORAL at 08:27

## 2024-03-23 RX ADMIN — BENZONATATE 100 MG: 100 CAPSULE ORAL at 21:31

## 2024-03-23 RX ADMIN — HEPARIN SODIUM 5000 UNITS: 5000 INJECTION INTRAVENOUS; SUBCUTANEOUS at 12:53

## 2024-03-23 RX ADMIN — SODIUM BICARBONATE: 84 INJECTION INTRAVENOUS at 10:12

## 2024-03-23 RX ADMIN — SODIUM CHLORIDE, POTASSIUM CHLORIDE, SODIUM LACTATE AND CALCIUM CHLORIDE: 600; 310; 30; 20 INJECTION, SOLUTION INTRAVENOUS at 08:43

## 2024-03-23 RX ADMIN — CIPROFLOXACIN 1 DROP: 3 SOLUTION OPHTHALMIC at 10:47

## 2024-03-23 RX ADMIN — ALBUMIN (HUMAN) 25 G: 0.25 INJECTION, SOLUTION INTRAVENOUS at 13:24

## 2024-03-23 RX ADMIN — MAGNESIUM SULFATE HEPTAHYDRATE 2000 MG: 40 INJECTION, SOLUTION INTRAVENOUS at 08:43

## 2024-03-23 RX ADMIN — SODIUM BICARBONATE 650 MG: 650 TABLET ORAL at 12:52

## 2024-03-23 RX ADMIN — HYDROCORTISONE SODIUM SUCCINATE 50 MG: 100 INJECTION, POWDER, FOR SOLUTION INTRAMUSCULAR; INTRAVENOUS at 08:28

## 2024-03-23 RX ADMIN — SODIUM BICARBONATE 650 MG: 650 TABLET ORAL at 16:37

## 2024-03-23 RX ADMIN — GABAPENTIN 100 MG: 100 CAPSULE ORAL at 12:52

## 2024-03-23 RX ADMIN — HEPARIN SODIUM 5000 UNITS: 5000 INJECTION INTRAVENOUS; SUBCUTANEOUS at 06:14

## 2024-03-23 RX ADMIN — NOREPINEPHRINE BITARTRATE 4 MCG/MIN: 0.06 INJECTION, SOLUTION INTRAVENOUS at 12:55

## 2024-03-23 RX ADMIN — SODIUM CHLORIDE, PRESERVATIVE FREE 10 ML: 5 INJECTION INTRAVENOUS at 21:04

## 2024-03-23 RX ADMIN — SODIUM CHLORIDE, POTASSIUM CHLORIDE, SODIUM LACTATE AND CALCIUM CHLORIDE: 600; 310; 30; 20 INJECTION, SOLUTION INTRAVENOUS at 00:09

## 2024-03-23 RX ADMIN — PIPERACILLIN AND TAZOBACTAM 3375 MG: 3; .375 INJECTION, POWDER, FOR SOLUTION INTRAVENOUS at 01:08

## 2024-03-23 RX ADMIN — SODIUM CHLORIDE, POTASSIUM CHLORIDE, SODIUM LACTATE AND CALCIUM CHLORIDE: 600; 310; 30; 20 INJECTION, SOLUTION INTRAVENOUS at 01:55

## 2024-03-23 RX ADMIN — PANTOPRAZOLE SODIUM 40 MG: 40 TABLET, DELAYED RELEASE ORAL at 15:24

## 2024-03-23 RX ADMIN — CIPROFLOXACIN 1 DROP: 3 SOLUTION OPHTHALMIC at 07:48

## 2024-03-23 RX ADMIN — CIPROFLOXACIN 1 DROP: 3 SOLUTION OPHTHALMIC at 14:33

## 2024-03-23 RX ADMIN — CIPROFLOXACIN 1 DROP: 3 SOLUTION OPHTHALMIC at 18:04

## 2024-03-23 RX ADMIN — GABAPENTIN 100 MG: 100 CAPSULE ORAL at 21:03

## 2024-03-23 RX ADMIN — POTASSIUM CHLORIDE 10 MEQ: 7.46 INJECTION, SOLUTION INTRAVENOUS at 03:47

## 2024-03-23 RX ADMIN — HYDROCORTISONE SODIUM SUCCINATE 50 MG: 100 INJECTION, POWDER, FOR SOLUTION INTRAMUSCULAR; INTRAVENOUS at 14:30

## 2024-03-23 RX ADMIN — CLOPIDOGREL BISULFATE 75 MG: 75 TABLET ORAL at 08:27

## 2024-03-23 RX ADMIN — SODIUM BICARBONATE 650 MG: 650 TABLET ORAL at 21:04

## 2024-03-23 RX ADMIN — HEPARIN SODIUM 5000 UNITS: 5000 INJECTION INTRAVENOUS; SUBCUTANEOUS at 21:04

## 2024-03-23 RX ADMIN — ONDANSETRON 4 MG: 4 TABLET, ORALLY DISINTEGRATING ORAL at 21:04

## 2024-03-23 ASSESSMENT — PAIN SCALES - WONG BAKER

## 2024-03-23 ASSESSMENT — PAIN DESCRIPTION - ONSET: ONSET: GRADUAL

## 2024-03-23 ASSESSMENT — PAIN DESCRIPTION - LOCATION: LOCATION: EAR

## 2024-03-23 ASSESSMENT — PAIN DESCRIPTION - DESCRIPTORS: DESCRIPTORS: DISCOMFORT

## 2024-03-23 ASSESSMENT — PAIN SCALES - GENERAL: PAINLEVEL_OUTOF10: 0

## 2024-03-23 ASSESSMENT — PAIN DESCRIPTION - FREQUENCY: FREQUENCY: INTERMITTENT

## 2024-03-23 ASSESSMENT — PAIN - FUNCTIONAL ASSESSMENT: PAIN_FUNCTIONAL_ASSESSMENT: ACTIVITIES ARE NOT PREVENTED

## 2024-03-24 ENCOUNTER — APPOINTMENT (OUTPATIENT)
Dept: GENERAL RADIOLOGY | Age: 69
DRG: 871 | End: 2024-03-24
Payer: MEDICARE

## 2024-03-24 ENCOUNTER — APPOINTMENT (OUTPATIENT)
Dept: ULTRASOUND IMAGING | Age: 69
DRG: 871 | End: 2024-03-24
Payer: MEDICARE

## 2024-03-24 LAB
AMORPHOUS: ABNORMAL /HPF
ANION GAP SERPL CALCULATED.3IONS-SCNC: 17 MMOL/L (ref 7–16)
B-OH-BUTYR SERPL-MCNC: 21 MG/DL (ref 0–3)
BACTERIA: NEGATIVE /HPF
BASOPHILS ABSOLUTE: 0 K/CU MM
BASOPHILS RELATIVE PERCENT: 0.3 % (ref 0–1)
BILIRUBIN URINE: ABNORMAL MG/DL
BLOOD, URINE: ABNORMAL
BUN SERPL-MCNC: 20 MG/DL (ref 6–23)
CALCIUM SERPL-MCNC: 8.2 MG/DL (ref 8.3–10.6)
CHLORIDE BLD-SCNC: 100 MMOL/L (ref 99–110)
CLARITY: ABNORMAL
CO2: 20 MMOL/L (ref 21–32)
COLOR: YELLOW
CORTISOL, PLASMA: 95.93
CREAT SERPL-MCNC: 4.3 MG/DL (ref 0.6–1.1)
CREATININE URINE: 62.5 MG/DL (ref 28–217)
DIFFERENTIAL TYPE: ABNORMAL
EOSINOPHILS ABSOLUTE: 0 K/CU MM
EOSINOPHILS RELATIVE PERCENT: 0 % (ref 0–3)
FERRITIN: 1390 NG/ML (ref 15–150)
GFR SERPL CREATININE-BSD FRML MDRD: 11 ML/MIN/1.73M2
GLUCOSE SERPL-MCNC: 130 MG/DL (ref 70–99)
GLUCOSE, URINE: NEGATIVE MG/DL
HCT VFR BLD CALC: 22.6 % (ref 37–47)
HEMOCCULT STL QL: NEGATIVE
HEMOGLOBIN: 7.2 GM/DL (ref 12.5–16)
HYALINE CASTS: 2 /LPF
IMMATURE NEUTROPHIL %: 0.6 % (ref 0–0.43)
IRON: 50 UG/DL (ref 37–145)
KETONES, URINE: 15 MG/DL
LEUKOCYTE ESTERASE, URINE: ABNORMAL
LYMPHOCYTES ABSOLUTE: 0.2 K/CU MM
LYMPHOCYTES RELATIVE PERCENT: 7.2 % (ref 24–44)
MAGNESIUM: 1.9 MG/DL (ref 1.8–2.4)
MCH RBC QN AUTO: 27.2 PG (ref 27–31)
MCHC RBC AUTO-ENTMCNC: 31.9 % (ref 32–36)
MCV RBC AUTO: 85.3 FL (ref 78–100)
MONOCYTES ABSOLUTE: 0.2 K/CU MM
MONOCYTES RELATIVE PERCENT: 5.1 % (ref 0–4)
MUCUS: ABNORMAL HPF
NITRITE URINE, QUANTITATIVE: NEGATIVE
NUCLEATED RBC %: 0 %
PCT TRANSFERRIN: 63 % (ref 10–44)
PDW BLD-RTO: 17.1 % (ref 11.7–14.9)
PH, URINE: 5.5 (ref 5–8)
PHOSPHORUS: 3.9 MG/DL (ref 2.5–4.9)
PLATELET # BLD: 453 K/CU MM (ref 140–440)
PMV BLD AUTO: 9.9 FL (ref 7.5–11.1)
POTASSIUM SERPL-SCNC: 3 MMOL/L (ref 3.5–5.1)
PROT/CREAT RATIO, UR: 2.6
PROTEIN UA: 100 MG/DL
RBC # BLD: 2.65 M/CU MM (ref 4.2–5.4)
RBC URINE: 35 /HPF (ref 0–6)
SEGMENTED NEUTROPHILS ABSOLUTE COUNT: 2.9 K/CU MM
SEGMENTED NEUTROPHILS RELATIVE PERCENT: 86.8 % (ref 36–66)
SODIUM BLD-SCNC: 137 MMOL/L (ref 135–145)
SODIUM URINE: 15 MMOL/L (ref 35–167)
SPECIFIC GRAVITY UA: 1.02 (ref 1–1.03)
SQUAMOUS EPITHELIAL: 3 /HPF
TOTAL IMMATURE NEUTOROPHIL: 0.02 K/CU MM
TOTAL IRON BINDING CAPACITY: 79 UG/DL (ref 250–450)
TOTAL NUCLEATED RBC: 0 K/CU MM
TRICHOMONAS: ABNORMAL /HPF
UNSATURATED IRON BINDING CAPACITY: 29 UG/DL (ref 110–370)
URINE TOTAL PROTEIN: 161.1 MG/DL
UROBILINOGEN, URINE: 0.2 MG/DL (ref 0.2–1)
WBC # BLD: 3.3 K/CU MM (ref 4–10.5)
WBC UA: 15 /HPF (ref 0–5)

## 2024-03-24 PROCEDURE — 84300 ASSAY OF URINE SODIUM: CPT

## 2024-03-24 PROCEDURE — 6370000000 HC RX 637 (ALT 250 FOR IP): Performed by: FAMILY MEDICINE

## 2024-03-24 PROCEDURE — 83540 ASSAY OF IRON: CPT

## 2024-03-24 PROCEDURE — 82570 ASSAY OF URINE CREATININE: CPT

## 2024-03-24 PROCEDURE — 82024 ASSAY OF ACTH: CPT

## 2024-03-24 PROCEDURE — 6360000002 HC RX W HCPCS: Performed by: INTERNAL MEDICINE

## 2024-03-24 PROCEDURE — 84100 ASSAY OF PHOSPHORUS: CPT

## 2024-03-24 PROCEDURE — 82272 OCCULT BLD FECES 1-3 TESTS: CPT

## 2024-03-24 PROCEDURE — 82533 TOTAL CORTISOL: CPT

## 2024-03-24 PROCEDURE — 76770 US EXAM ABDO BACK WALL COMP: CPT

## 2024-03-24 PROCEDURE — 83550 IRON BINDING TEST: CPT

## 2024-03-24 PROCEDURE — 6360000002 HC RX W HCPCS: Performed by: NURSE PRACTITIONER

## 2024-03-24 PROCEDURE — 84156 ASSAY OF PROTEIN URINE: CPT

## 2024-03-24 PROCEDURE — 2060000000 HC ICU INTERMEDIATE R&B

## 2024-03-24 PROCEDURE — 2580000003 HC RX 258: Performed by: STUDENT IN AN ORGANIZED HEALTH CARE EDUCATION/TRAINING PROGRAM

## 2024-03-24 PROCEDURE — 2700000000 HC OXYGEN THERAPY PER DAY

## 2024-03-24 PROCEDURE — 2580000003 HC RX 258: Performed by: NURSE PRACTITIONER

## 2024-03-24 PROCEDURE — 76775 US EXAM ABDO BACK WALL LIM: CPT

## 2024-03-24 PROCEDURE — 36592 COLLECT BLOOD FROM PICC: CPT

## 2024-03-24 PROCEDURE — 82010 KETONE BODYS QUAN: CPT

## 2024-03-24 PROCEDURE — 71045 X-RAY EXAM CHEST 1 VIEW: CPT

## 2024-03-24 PROCEDURE — 6370000000 HC RX 637 (ALT 250 FOR IP): Performed by: STUDENT IN AN ORGANIZED HEALTH CARE EDUCATION/TRAINING PROGRAM

## 2024-03-24 PROCEDURE — 94640 AIRWAY INHALATION TREATMENT: CPT

## 2024-03-24 PROCEDURE — 83735 ASSAY OF MAGNESIUM: CPT

## 2024-03-24 PROCEDURE — 85025 COMPLETE CBC W/AUTO DIFF WBC: CPT

## 2024-03-24 PROCEDURE — P9047 ALBUMIN (HUMAN), 25%, 50ML: HCPCS | Performed by: STUDENT IN AN ORGANIZED HEALTH CARE EDUCATION/TRAINING PROGRAM

## 2024-03-24 PROCEDURE — 6370000000 HC RX 637 (ALT 250 FOR IP): Performed by: NURSE PRACTITIONER

## 2024-03-24 PROCEDURE — 82728 ASSAY OF FERRITIN: CPT

## 2024-03-24 PROCEDURE — 80048 BASIC METABOLIC PNL TOTAL CA: CPT

## 2024-03-24 PROCEDURE — 81001 URINALYSIS AUTO W/SCOPE: CPT

## 2024-03-24 PROCEDURE — 94761 N-INVAS EAR/PLS OXIMETRY MLT: CPT

## 2024-03-24 PROCEDURE — 6360000002 HC RX W HCPCS: Performed by: STUDENT IN AN ORGANIZED HEALTH CARE EDUCATION/TRAINING PROGRAM

## 2024-03-24 PROCEDURE — 2500000003 HC RX 250 WO HCPCS: Performed by: STUDENT IN AN ORGANIZED HEALTH CARE EDUCATION/TRAINING PROGRAM

## 2024-03-24 RX ORDER — ALBUMIN (HUMAN) 12.5 G/50ML
25 SOLUTION INTRAVENOUS ONCE
Status: COMPLETED | OUTPATIENT
Start: 2024-03-24 | End: 2024-03-24

## 2024-03-24 RX ORDER — SODIUM CHLORIDE, SODIUM LACTATE, POTASSIUM CHLORIDE, CALCIUM CHLORIDE 600; 310; 30; 20 MG/100ML; MG/100ML; MG/100ML; MG/100ML
INJECTION, SOLUTION INTRAVENOUS CONTINUOUS
Status: DISCONTINUED | OUTPATIENT
Start: 2024-03-24 | End: 2024-03-25

## 2024-03-24 RX ORDER — IPRATROPIUM BROMIDE AND ALBUTEROL SULFATE 2.5; .5 MG/3ML; MG/3ML
1 SOLUTION RESPIRATORY (INHALATION)
Status: COMPLETED | OUTPATIENT
Start: 2024-03-24 | End: 2024-03-24

## 2024-03-24 RX ORDER — POTASSIUM CHLORIDE 29.8 MG/ML
20 INJECTION INTRAVENOUS ONCE
Status: COMPLETED | OUTPATIENT
Start: 2024-03-24 | End: 2024-03-24

## 2024-03-24 RX ADMIN — BENZONATATE 100 MG: 100 CAPSULE ORAL at 23:38

## 2024-03-24 RX ADMIN — SODIUM CHLORIDE, PRESERVATIVE FREE 10 ML: 5 INJECTION INTRAVENOUS at 08:14

## 2024-03-24 RX ADMIN — SODIUM CHLORIDE, POTASSIUM CHLORIDE, SODIUM LACTATE AND CALCIUM CHLORIDE: 600; 310; 30; 20 INJECTION, SOLUTION INTRAVENOUS at 22:43

## 2024-03-24 RX ADMIN — POTASSIUM CHLORIDE 20 MEQ: 29.8 INJECTION, SOLUTION INTRAVENOUS at 08:12

## 2024-03-24 RX ADMIN — IPRATROPIUM BROMIDE AND ALBUTEROL SULFATE 1 DOSE: 2.5; .5 SOLUTION RESPIRATORY (INHALATION) at 19:57

## 2024-03-24 RX ADMIN — GABAPENTIN 100 MG: 100 CAPSULE ORAL at 08:13

## 2024-03-24 RX ADMIN — CIPROFLOXACIN 1 DROP: 3 SOLUTION OPHTHALMIC at 18:04

## 2024-03-24 RX ADMIN — HEPARIN SODIUM 5000 UNITS: 5000 INJECTION INTRAVENOUS; SUBCUTANEOUS at 05:40

## 2024-03-24 RX ADMIN — CIPROFLOXACIN 1 DROP: 3 SOLUTION OPHTHALMIC at 05:41

## 2024-03-24 RX ADMIN — SODIUM CHLORIDE, POTASSIUM CHLORIDE, SODIUM LACTATE AND CALCIUM CHLORIDE: 600; 310; 30; 20 INJECTION, SOLUTION INTRAVENOUS at 08:11

## 2024-03-24 RX ADMIN — CIPROFLOXACIN 1 DROP: 3 SOLUTION OPHTHALMIC at 12:38

## 2024-03-24 RX ADMIN — CIPROFLOXACIN 1 DROP: 3 SOLUTION OPHTHALMIC at 10:00

## 2024-03-24 RX ADMIN — ALBUMIN (HUMAN) 25 G: 0.25 INJECTION, SOLUTION INTRAVENOUS at 09:25

## 2024-03-24 RX ADMIN — CIPROFLOXACIN 1 DROP: 3 SOLUTION OPHTHALMIC at 16:15

## 2024-03-24 RX ADMIN — ACETAMINOPHEN 650 MG: 325 TABLET ORAL at 13:52

## 2024-03-24 RX ADMIN — GUAIFENESIN SYRUP AND DEXTROMETHORPHAN 5 ML: 100; 10 SYRUP ORAL at 00:16

## 2024-03-24 RX ADMIN — SODIUM BICARBONATE: 84 INJECTION INTRAVENOUS at 06:28

## 2024-03-24 RX ADMIN — HYDROCORTISONE SODIUM SUCCINATE 50 MG: 100 INJECTION, POWDER, FOR SOLUTION INTRAMUSCULAR; INTRAVENOUS at 03:33

## 2024-03-24 RX ADMIN — METHYLPREDNISOLONE SODIUM SUCCINATE 60 MG: 125 INJECTION, POWDER, FOR SOLUTION INTRAMUSCULAR; INTRAVENOUS at 12:40

## 2024-03-24 RX ADMIN — CLOPIDOGREL BISULFATE 75 MG: 75 TABLET ORAL at 08:13

## 2024-03-24 RX ADMIN — PIPERACILLIN AND TAZOBACTAM 3375 MG: 3; .375 INJECTION, POWDER, FOR SOLUTION INTRAVENOUS at 12:47

## 2024-03-24 RX ADMIN — PANTOPRAZOLE SODIUM 40 MG: 40 TABLET, DELAYED RELEASE ORAL at 05:41

## 2024-03-24 RX ADMIN — CIPROFLOXACIN 1 DROP: 3 SOLUTION OPHTHALMIC at 20:00

## 2024-03-24 RX ADMIN — PANTOPRAZOLE SODIUM 40 MG: 40 TABLET, DELAYED RELEASE ORAL at 18:04

## 2024-03-24 RX ADMIN — ASPIRIN 81 MG: 81 TABLET, CHEWABLE ORAL at 08:13

## 2024-03-24 RX ADMIN — PIPERACILLIN AND TAZOBACTAM 3375 MG: 3; .375 INJECTION, POWDER, FOR SOLUTION INTRAVENOUS at 00:24

## 2024-03-24 RX ADMIN — CIPROFLOXACIN 1 DROP: 3 SOLUTION OPHTHALMIC at 22:00

## 2024-03-24 RX ADMIN — SODIUM BICARBONATE: 84 INJECTION INTRAVENOUS at 06:30

## 2024-03-24 RX ADMIN — CIPROFLOXACIN 1 DROP: 3 SOLUTION OPHTHALMIC at 08:13

## 2024-03-24 RX ADMIN — GUAIFENESIN SYRUP AND DEXTROMETHORPHAN 5 ML: 100; 10 SYRUP ORAL at 12:49

## 2024-03-24 RX ADMIN — CIPROFLOXACIN 1 DROP: 3 SOLUTION OPHTHALMIC at 13:53

## 2024-03-24 RX ADMIN — SODIUM CHLORIDE, PRESERVATIVE FREE 10 ML: 5 INJECTION INTRAVENOUS at 22:44

## 2024-03-24 ASSESSMENT — PAIN SCALES - WONG BAKER

## 2024-03-24 ASSESSMENT — PAIN SCALES - GENERAL
PAINLEVEL_OUTOF10: 0

## 2024-03-24 ASSESSMENT — PAIN - FUNCTIONAL ASSESSMENT: PAIN_FUNCTIONAL_ASSESSMENT: ACTIVITIES ARE NOT PREVENTED

## 2024-03-24 ASSESSMENT — PAIN DESCRIPTION - LOCATION: LOCATION: HEAD

## 2024-03-24 ASSESSMENT — PAIN DESCRIPTION - ONSET: ONSET: GRADUAL

## 2024-03-24 ASSESSMENT — PAIN DESCRIPTION - DESCRIPTORS: DESCRIPTORS: DISCOMFORT

## 2024-03-24 ASSESSMENT — PAIN DESCRIPTION - FREQUENCY: FREQUENCY: INTERMITTENT

## 2024-03-25 ENCOUNTER — APPOINTMENT (OUTPATIENT)
Dept: NON INVASIVE DIAGNOSTICS | Age: 69
DRG: 871 | End: 2024-03-25
Attending: STUDENT IN AN ORGANIZED HEALTH CARE EDUCATION/TRAINING PROGRAM
Payer: MEDICARE

## 2024-03-25 PROBLEM — R19.7 DIARRHEA: Status: ACTIVE | Noted: 2024-03-25

## 2024-03-25 PROBLEM — N17.9 ACUTE RENAL FAILURE (HCC): Status: ACTIVE | Noted: 2024-03-25

## 2024-03-25 PROBLEM — D64.9 ANEMIA: Status: ACTIVE | Noted: 2024-03-25

## 2024-03-25 LAB
ANION GAP SERPL CALCULATED.3IONS-SCNC: 19 MMOL/L (ref 7–16)
ANION GAP SERPL CALCULATED.3IONS-SCNC: 19 MMOL/L (ref 7–16)
BASOPHILS ABSOLUTE: 0 K/CU MM
BASOPHILS RELATIVE PERCENT: 0 % (ref 0–1)
BUN SERPL-MCNC: 23 MG/DL (ref 6–23)
BUN SERPL-MCNC: 25 MG/DL (ref 6–23)
CALCIUM SERPL-MCNC: 7.9 MG/DL (ref 8.3–10.6)
CALCIUM SERPL-MCNC: 8.2 MG/DL (ref 8.3–10.6)
CHLORIDE BLD-SCNC: 97 MMOL/L (ref 99–110)
CHLORIDE BLD-SCNC: 98 MMOL/L (ref 99–110)
CO2: 18 MMOL/L (ref 21–32)
CO2: 20 MMOL/L (ref 21–32)
CREAT SERPL-MCNC: 4.6 MG/DL (ref 0.6–1.1)
CREAT SERPL-MCNC: 4.7 MG/DL (ref 0.6–1.1)
DIFFERENTIAL TYPE: ABNORMAL
ECHO AO ROOT DIAM: 3 CM
ECHO AO ROOT INDEX: 1.73 CM/M2
ECHO BSA: 1.75 M2
ECHO EST RA PRESSURE: 15 MMHG
ECHO IVC PROX: 2.2 CM
ECHO LA AREA 4C: 13 CM2
ECHO LA DIAMETER INDEX: 2.08 CM/M2
ECHO LA DIAMETER: 3.6 CM
ECHO LA MAJOR AXIS: 4.2 CM
ECHO LA TO AORTIC ROOT RATIO: 1.2
ECHO LA VOL MOD A4C: 29 ML (ref 22–52)
ECHO LA VOLUME INDEX MOD A4C: 17 ML/M2 (ref 16–34)
ECHO LV EDV A4C: 104 ML
ECHO LV EDV INDEX A4C: 60 ML/M2
ECHO LV EJECTION FRACTION A4C: 28 %
ECHO LV ESV A4C: 75 ML
ECHO LV ESV INDEX A4C: 43 ML/M2
ECHO LV FRACTIONAL SHORTENING: 17 % (ref 28–44)
ECHO LV INTERNAL DIMENSION DIASTOLE INDEX: 2.43 CM/M2
ECHO LV INTERNAL DIMENSION DIASTOLIC: 4.2 CM (ref 3.9–5.3)
ECHO LV INTERNAL DIMENSION SYSTOLIC INDEX: 2.02 CM/M2
ECHO LV INTERNAL DIMENSION SYSTOLIC: 3.5 CM
ECHO LV IVSD: 0.7 CM (ref 0.6–0.9)
ECHO LV MASS 2D: 93 G (ref 67–162)
ECHO LV MASS INDEX 2D: 53.8 G/M2 (ref 43–95)
ECHO LV POSTERIOR WALL DIASTOLIC: 0.8 CM (ref 0.6–0.9)
ECHO LV RELATIVE WALL THICKNESS RATIO: 0.38
ECHO LVOT AREA: 3.1 CM2
ECHO LVOT DIAM: 2 CM
ECHO RIGHT VENTRICULAR SYSTOLIC PRESSURE (RVSP): 77 MMHG
ECHO TV REGURGITANT MAX VELOCITY: 3.95 M/S
ECHO TV REGURGITANT PEAK GRADIENT: 62 MMHG
EOSINOPHILS ABSOLUTE: 0 K/CU MM
EOSINOPHILS RELATIVE PERCENT: 0 % (ref 0–3)
GFR SERPL CREATININE-BSD FRML MDRD: 10 ML/MIN/1.73M2
GFR SERPL CREATININE-BSD FRML MDRD: 10 ML/MIN/1.73M2
GLUCOSE SERPL-MCNC: 175 MG/DL (ref 70–99)
GLUCOSE SERPL-MCNC: 185 MG/DL (ref 70–99)
HCT VFR BLD CALC: 20.6 % (ref 37–47)
HCT VFR BLD CALC: 25.1 % (ref 37–47)
HEMOGLOBIN: 6.5 GM/DL (ref 12.5–16)
HEMOGLOBIN: 8.2 GM/DL (ref 12.5–16)
IMMATURE NEUTROPHIL %: 1.3 % (ref 0–0.43)
LYMPHOCYTES ABSOLUTE: 0.1 K/CU MM
LYMPHOCYTES RELATIVE PERCENT: 3.5 % (ref 24–44)
MAGNESIUM: 1.6 MG/DL (ref 1.8–2.4)
MAGNESIUM: 2.3 MG/DL (ref 1.8–2.4)
MCH RBC QN AUTO: 27.2 PG (ref 27–31)
MCHC RBC AUTO-ENTMCNC: 31.6 % (ref 32–36)
MCV RBC AUTO: 86.2 FL (ref 78–100)
MONOCYTES ABSOLUTE: 0.1 K/CU MM
MONOCYTES RELATIVE PERCENT: 3 % (ref 0–4)
NUCLEATED RBC %: 0 %
PDW BLD-RTO: 17.3 % (ref 11.7–14.9)
PHOSPHORUS: 3 MG/DL (ref 2.5–4.9)
PHOSPHORUS: 3.9 MG/DL (ref 2.5–4.9)
PLATELET # BLD: 344 K/CU MM (ref 140–440)
PMV BLD AUTO: 9.7 FL (ref 7.5–11.1)
POTASSIUM SERPL-SCNC: 2.6 MMOL/L (ref 3.5–5.1)
POTASSIUM SERPL-SCNC: 3 MMOL/L (ref 3.5–5.1)
POTASSIUM SERPL-SCNC: 3.6 MMOL/L (ref 3.5–5.1)
RBC # BLD: 2.39 M/CU MM (ref 4.2–5.4)
SEGMENTED NEUTROPHILS ABSOLUTE COUNT: 3.7 K/CU MM
SEGMENTED NEUTROPHILS RELATIVE PERCENT: 92.2 % (ref 36–66)
SODIUM BLD-SCNC: 134 MMOL/L (ref 135–145)
SODIUM BLD-SCNC: 137 MMOL/L (ref 135–145)
TOTAL IMMATURE NEUTOROPHIL: 0.05 K/CU MM
TOTAL NUCLEATED RBC: 0 K/CU MM
TROPONIN, HIGH SENSITIVITY: 169 NG/L (ref 0–14)
TROPONIN, HIGH SENSITIVITY: 174 NG/L (ref 0–14)
TROPONIN, HIGH SENSITIVITY: 184 NG/L (ref 0–14)
WBC # BLD: 4 K/CU MM (ref 4–10.5)

## 2024-03-25 PROCEDURE — 99223 1ST HOSP IP/OBS HIGH 75: CPT | Performed by: SPECIALIST

## 2024-03-25 PROCEDURE — 93321 DOPPLER ECHO F-UP/LMTD STD: CPT | Performed by: INTERNAL MEDICINE

## 2024-03-25 PROCEDURE — 6370000000 HC RX 637 (ALT 250 FOR IP): Performed by: STUDENT IN AN ORGANIZED HEALTH CARE EDUCATION/TRAINING PROGRAM

## 2024-03-25 PROCEDURE — 86922 COMPATIBILITY TEST ANTIGLOB: CPT

## 2024-03-25 PROCEDURE — 94761 N-INVAS EAR/PLS OXIMETRY MLT: CPT

## 2024-03-25 PROCEDURE — 2580000003 HC RX 258: Performed by: NURSE PRACTITIONER

## 2024-03-25 PROCEDURE — 6360000002 HC RX W HCPCS: Performed by: INTERNAL MEDICINE

## 2024-03-25 PROCEDURE — 6360000002 HC RX W HCPCS: Performed by: STUDENT IN AN ORGANIZED HEALTH CARE EDUCATION/TRAINING PROGRAM

## 2024-03-25 PROCEDURE — 6370000000 HC RX 637 (ALT 250 FOR IP): Performed by: INTERNAL MEDICINE

## 2024-03-25 PROCEDURE — 85018 HEMOGLOBIN: CPT

## 2024-03-25 PROCEDURE — 6370000000 HC RX 637 (ALT 250 FOR IP): Performed by: NURSE PRACTITIONER

## 2024-03-25 PROCEDURE — APPNB45 APP NON BILLABLE 31-45 MINUTES: Performed by: LICENSED PRACTICAL NURSE

## 2024-03-25 PROCEDURE — 93325 DOPPLER ECHO COLOR FLOW MAPG: CPT | Performed by: INTERNAL MEDICINE

## 2024-03-25 PROCEDURE — 30233N1 TRANSFUSION OF NONAUTOLOGOUS RED BLOOD CELLS INTO PERIPHERAL VEIN, PERCUTANEOUS APPROACH: ICD-10-PCS | Performed by: INTERNAL MEDICINE

## 2024-03-25 PROCEDURE — 84132 ASSAY OF SERUM POTASSIUM: CPT

## 2024-03-25 PROCEDURE — 2700000000 HC OXYGEN THERAPY PER DAY

## 2024-03-25 PROCEDURE — 99223 1ST HOSP IP/OBS HIGH 75: CPT | Performed by: INTERNAL MEDICINE

## 2024-03-25 PROCEDURE — 80048 BASIC METABOLIC PNL TOTAL CA: CPT

## 2024-03-25 PROCEDURE — 93005 ELECTROCARDIOGRAM TRACING: CPT

## 2024-03-25 PROCEDURE — 84484 ASSAY OF TROPONIN QUANT: CPT

## 2024-03-25 PROCEDURE — 83735 ASSAY OF MAGNESIUM: CPT

## 2024-03-25 PROCEDURE — 36430 TRANSFUSION BLD/BLD COMPNT: CPT

## 2024-03-25 PROCEDURE — 86901 BLOOD TYPING SEROLOGIC RH(D): CPT

## 2024-03-25 PROCEDURE — 93321 DOPPLER ECHO F-UP/LMTD STD: CPT

## 2024-03-25 PROCEDURE — 2580000003 HC RX 258: Performed by: STUDENT IN AN ORGANIZED HEALTH CARE EDUCATION/TRAINING PROGRAM

## 2024-03-25 PROCEDURE — 85025 COMPLETE CBC W/AUTO DIFF WBC: CPT

## 2024-03-25 PROCEDURE — 94640 AIRWAY INHALATION TREATMENT: CPT

## 2024-03-25 PROCEDURE — 84100 ASSAY OF PHOSPHORUS: CPT

## 2024-03-25 PROCEDURE — 36592 COLLECT BLOOD FROM PICC: CPT

## 2024-03-25 PROCEDURE — 93308 TTE F-UP OR LMTD: CPT | Performed by: INTERNAL MEDICINE

## 2024-03-25 PROCEDURE — 86900 BLOOD TYPING SEROLOGIC ABO: CPT

## 2024-03-25 PROCEDURE — 6370000000 HC RX 637 (ALT 250 FOR IP): Performed by: FAMILY MEDICINE

## 2024-03-25 PROCEDURE — 2580000003 HC RX 258: Performed by: INTERNAL MEDICINE

## 2024-03-25 PROCEDURE — 86850 RBC ANTIBODY SCREEN: CPT

## 2024-03-25 PROCEDURE — 2060000000 HC ICU INTERMEDIATE R&B

## 2024-03-25 PROCEDURE — 36415 COLL VENOUS BLD VENIPUNCTURE: CPT

## 2024-03-25 PROCEDURE — 85014 HEMATOCRIT: CPT

## 2024-03-25 PROCEDURE — P9016 RBC LEUKOCYTES REDUCED: HCPCS

## 2024-03-25 RX ORDER — HYDROXYZINE PAMOATE 25 MG/1
25 CAPSULE ORAL ONCE
Status: COMPLETED | OUTPATIENT
Start: 2024-03-25 | End: 2024-03-25

## 2024-03-25 RX ORDER — IPRATROPIUM BROMIDE AND ALBUTEROL SULFATE 2.5; .5 MG/3ML; MG/3ML
1 SOLUTION RESPIRATORY (INHALATION) EVERY 4 HOURS PRN
Status: DISCONTINUED | OUTPATIENT
Start: 2024-03-25 | End: 2024-04-09 | Stop reason: HOSPADM

## 2024-03-25 RX ORDER — SODIUM CHLORIDE 9 MG/ML
INJECTION, SOLUTION INTRAVENOUS PRN
Status: DISCONTINUED | OUTPATIENT
Start: 2024-03-25 | End: 2024-04-09 | Stop reason: HOSPADM

## 2024-03-25 RX ORDER — CIPROFLOXACIN HYDROCHLORIDE 3.5 MG/ML
1 SOLUTION/ DROPS TOPICAL
Status: DISCONTINUED | OUTPATIENT
Start: 2024-03-25 | End: 2024-04-09 | Stop reason: HOSPADM

## 2024-03-25 RX ORDER — MAGNESIUM SULFATE IN WATER 40 MG/ML
2000 INJECTION, SOLUTION INTRAVENOUS ONCE
Status: COMPLETED | OUTPATIENT
Start: 2024-03-25 | End: 2024-03-25

## 2024-03-25 RX ORDER — POTASSIUM CHLORIDE 7.45 MG/ML
10 INJECTION INTRAVENOUS
Status: COMPLETED | OUTPATIENT
Start: 2024-03-25 | End: 2024-03-25

## 2024-03-25 RX ADMIN — CLOPIDOGREL BISULFATE 75 MG: 75 TABLET ORAL at 08:40

## 2024-03-25 RX ADMIN — CIPROFLOXACIN 1 DROP: 3 SOLUTION OPHTHALMIC at 20:29

## 2024-03-25 RX ADMIN — PIPERACILLIN AND TAZOBACTAM 3375 MG: 3; .375 INJECTION, POWDER, FOR SOLUTION INTRAVENOUS at 12:25

## 2024-03-25 RX ADMIN — CIPROFLOXACIN 1 DROP: 3 SOLUTION OPHTHALMIC at 06:34

## 2024-03-25 RX ADMIN — IPRATROPIUM BROMIDE AND ALBUTEROL SULFATE 1 DOSE: 2.5; .5 SOLUTION RESPIRATORY (INHALATION) at 12:41

## 2024-03-25 RX ADMIN — MAGNESIUM SULFATE HEPTAHYDRATE 2000 MG: 40 INJECTION, SOLUTION INTRAVENOUS at 13:19

## 2024-03-25 RX ADMIN — POTASSIUM CHLORIDE: 2 INJECTION, SOLUTION, CONCENTRATE INTRAVENOUS at 17:02

## 2024-03-25 RX ADMIN — SODIUM CHLORIDE, PRESERVATIVE FREE 10 ML: 5 INJECTION INTRAVENOUS at 20:30

## 2024-03-25 RX ADMIN — POTASSIUM CHLORIDE 10 MEQ: 7.46 INJECTION, SOLUTION INTRAVENOUS at 17:15

## 2024-03-25 RX ADMIN — CIPROFLOXACIN 1 DROP: 3 SOLUTION OPHTHALMIC at 15:11

## 2024-03-25 RX ADMIN — POTASSIUM CHLORIDE 10 MEQ: 7.46 INJECTION, SOLUTION INTRAVENOUS at 12:02

## 2024-03-25 RX ADMIN — POTASSIUM BICARBONATE 40 MEQ: 782 TABLET, EFFERVESCENT ORAL at 08:40

## 2024-03-25 RX ADMIN — POTASSIUM CHLORIDE 10 MEQ: 7.46 INJECTION, SOLUTION INTRAVENOUS at 18:52

## 2024-03-25 RX ADMIN — IPRATROPIUM BROMIDE AND ALBUTEROL SULFATE 1 DOSE: 2.5; .5 SOLUTION RESPIRATORY (INHALATION) at 02:07

## 2024-03-25 RX ADMIN — CIPROFLOXACIN 1 DROP: 3 SOLUTION OPHTHALMIC at 08:39

## 2024-03-25 RX ADMIN — PIPERACILLIN AND TAZOBACTAM 3375 MG: 3; .375 INJECTION, POWDER, FOR SOLUTION INTRAVENOUS at 00:34

## 2024-03-25 RX ADMIN — ASPIRIN 81 MG: 81 TABLET, CHEWABLE ORAL at 08:40

## 2024-03-25 RX ADMIN — HYDROXYZINE PAMOATE 25 MG: 25 CAPSULE ORAL at 00:36

## 2024-03-25 RX ADMIN — PANTOPRAZOLE SODIUM 40 MG: 40 TABLET, DELAYED RELEASE ORAL at 15:11

## 2024-03-25 RX ADMIN — IPRATROPIUM BROMIDE AND ALBUTEROL SULFATE 1 DOSE: 2.5; .5 SOLUTION RESPIRATORY (INHALATION) at 08:57

## 2024-03-25 RX ADMIN — CIPROFLOXACIN 1 DROP: 3 SOLUTION OPHTHALMIC at 09:40

## 2024-03-25 RX ADMIN — IPRATROPIUM BROMIDE AND ALBUTEROL SULFATE 1 DOSE: 2.5; .5 SOLUTION RESPIRATORY (INHALATION) at 19:46

## 2024-03-25 RX ADMIN — GUAIFENESIN SYRUP AND DEXTROMETHORPHAN 5 ML: 100; 10 SYRUP ORAL at 00:36

## 2024-03-25 RX ADMIN — METHYLPREDNISOLONE SODIUM SUCCINATE 60 MG: 125 INJECTION, POWDER, FOR SOLUTION INTRAMUSCULAR; INTRAVENOUS at 08:41

## 2024-03-25 RX ADMIN — POTASSIUM CHLORIDE 10 MEQ: 7.46 INJECTION, SOLUTION INTRAVENOUS at 15:29

## 2024-03-25 RX ADMIN — CIPROFLOXACIN 1 DROP: 3 SOLUTION OPHTHALMIC at 22:15

## 2024-03-25 RX ADMIN — SODIUM CHLORIDE, PRESERVATIVE FREE 10 ML: 5 INJECTION INTRAVENOUS at 08:41

## 2024-03-25 RX ADMIN — PANTOPRAZOLE SODIUM 40 MG: 40 TABLET, DELAYED RELEASE ORAL at 06:34

## 2024-03-25 RX ADMIN — POTASSIUM CHLORIDE 10 MEQ: 7.46 INJECTION, SOLUTION INTRAVENOUS at 09:41

## 2024-03-25 ASSESSMENT — PAIN SCALES - GENERAL
PAINLEVEL_OUTOF10: 0

## 2024-03-25 ASSESSMENT — PAIN SCALES - WONG BAKER

## 2024-03-26 ENCOUNTER — CARE COORDINATION (OUTPATIENT)
Dept: CASE MANAGEMENT | Age: 69
End: 2024-03-26

## 2024-03-26 LAB
ABO/RH: NORMAL
ACTH PLAS-MCNC: <1.5 PG/ML (ref 7.2–63.3)
ANION GAP SERPL CALCULATED.3IONS-SCNC: 18 MMOL/L (ref 7–16)
ANTIBODY SCREEN: NEGATIVE
BASOPHILS ABSOLUTE: 0 K/CU MM
BASOPHILS RELATIVE PERCENT: 0 % (ref 0–1)
BUN SERPL-MCNC: 27 MG/DL (ref 6–23)
CALCIUM SERPL-MCNC: 7.8 MG/DL (ref 8.3–10.6)
CHLORIDE BLD-SCNC: 97 MMOL/L (ref 99–110)
CO2: 17 MMOL/L (ref 21–32)
COMPONENT: NORMAL
CREAT SERPL-MCNC: 4.9 MG/DL (ref 0.6–1.1)
CROSSMATCH RESULT: NORMAL
DIFFERENTIAL TYPE: ABNORMAL
EKG ATRIAL RATE: 117 BPM
EKG DIAGNOSIS: NORMAL
EKG P AXIS: 43 DEGREES
EKG P-R INTERVAL: 138 MS
EKG Q-T INTERVAL: 408 MS
EKG QRS DURATION: 90 MS
EKG QTC CALCULATION (BAZETT): 569 MS
EKG R AXIS: -11 DEGREES
EKG T AXIS: 185 DEGREES
EKG VENTRICULAR RATE: 117 BPM
EOSINOPHILS ABSOLUTE: 0 K/CU MM
EOSINOPHILS RELATIVE PERCENT: 0 % (ref 0–3)
GFR SERPL CREATININE-BSD FRML MDRD: 9 ML/MIN/1.73M2
GLUCOSE SERPL-MCNC: 206 MG/DL (ref 70–99)
HCT VFR BLD CALC: 25.2 % (ref 37–47)
HEMOGLOBIN: 8.2 GM/DL (ref 12.5–16)
IMMATURE NEUTROPHIL %: 1.8 % (ref 0–0.43)
LYMPHOCYTES ABSOLUTE: 0.2 K/CU MM
LYMPHOCYTES RELATIVE PERCENT: 3.4 % (ref 24–44)
MAGNESIUM: 2.1 MG/DL (ref 1.8–2.4)
MCH RBC QN AUTO: 27.9 PG (ref 27–31)
MCHC RBC AUTO-ENTMCNC: 32.5 % (ref 32–36)
MCV RBC AUTO: 85.7 FL (ref 78–100)
MONOCYTES ABSOLUTE: 0.2 K/CU MM
MONOCYTES RELATIVE PERCENT: 2.4 % (ref 0–4)
NUCLEATED RBC %: 0 %
PDW BLD-RTO: 16.7 % (ref 11.7–14.9)
PHOSPHORUS: 2.8 MG/DL (ref 2.5–4.9)
PLATELET # BLD: 307 K/CU MM (ref 140–440)
PMV BLD AUTO: 10 FL (ref 7.5–11.1)
POTASSIUM SERPL-SCNC: 3.4 MMOL/L (ref 3.5–5.1)
RBC # BLD: 2.94 M/CU MM (ref 4.2–5.4)
SEGMENTED NEUTROPHILS ABSOLUTE COUNT: 6.6 K/CU MM
SEGMENTED NEUTROPHILS RELATIVE PERCENT: 92.4 % (ref 36–66)
SODIUM BLD-SCNC: 132 MMOL/L (ref 135–145)
STATUS: NORMAL
TOTAL IMMATURE NEUTOROPHIL: 0.13 K/CU MM
TOTAL NUCLEATED RBC: 0 K/CU MM
TRANSFUSION STATUS: NORMAL
TROPONIN, HIGH SENSITIVITY: 149 NG/L (ref 0–14)
UNIT DIVISION: 0
UNIT NUMBER: NORMAL
WBC # BLD: 7.1 K/CU MM (ref 4–10.5)

## 2024-03-26 PROCEDURE — 6360000002 HC RX W HCPCS: Performed by: NURSE PRACTITIONER

## 2024-03-26 PROCEDURE — 6370000000 HC RX 637 (ALT 250 FOR IP): Performed by: INTERNAL MEDICINE

## 2024-03-26 PROCEDURE — 2580000003 HC RX 258: Performed by: STUDENT IN AN ORGANIZED HEALTH CARE EDUCATION/TRAINING PROGRAM

## 2024-03-26 PROCEDURE — 84484 ASSAY OF TROPONIN QUANT: CPT

## 2024-03-26 PROCEDURE — 6360000002 HC RX W HCPCS: Performed by: INTERNAL MEDICINE

## 2024-03-26 PROCEDURE — 99231 SBSQ HOSP IP/OBS SF/LOW 25: CPT | Performed by: SPECIALIST

## 2024-03-26 PROCEDURE — 2580000003 HC RX 258: Performed by: INTERNAL MEDICINE

## 2024-03-26 PROCEDURE — 94761 N-INVAS EAR/PLS OXIMETRY MLT: CPT

## 2024-03-26 PROCEDURE — 6370000000 HC RX 637 (ALT 250 FOR IP): Performed by: SPECIALIST

## 2024-03-26 PROCEDURE — 2140000000 HC CCU INTERMEDIATE R&B

## 2024-03-26 PROCEDURE — 6370000000 HC RX 637 (ALT 250 FOR IP)

## 2024-03-26 PROCEDURE — 6370000000 HC RX 637 (ALT 250 FOR IP): Performed by: NURSE PRACTITIONER

## 2024-03-26 PROCEDURE — 83735 ASSAY OF MAGNESIUM: CPT

## 2024-03-26 PROCEDURE — 80048 BASIC METABOLIC PNL TOTAL CA: CPT

## 2024-03-26 PROCEDURE — 2580000003 HC RX 258: Performed by: NURSE PRACTITIONER

## 2024-03-26 PROCEDURE — 6360000002 HC RX W HCPCS: Performed by: STUDENT IN AN ORGANIZED HEALTH CARE EDUCATION/TRAINING PROGRAM

## 2024-03-26 PROCEDURE — 6370000000 HC RX 637 (ALT 250 FOR IP): Performed by: STUDENT IN AN ORGANIZED HEALTH CARE EDUCATION/TRAINING PROGRAM

## 2024-03-26 PROCEDURE — 85025 COMPLETE CBC W/AUTO DIFF WBC: CPT

## 2024-03-26 PROCEDURE — APPNB30 APP NON BILLABLE TIME 0-30 MINS: Performed by: LICENSED PRACTICAL NURSE

## 2024-03-26 PROCEDURE — 82271 OCCULT BLOOD OTHER SOURCES: CPT

## 2024-03-26 PROCEDURE — 93010 ELECTROCARDIOGRAM REPORT: CPT | Performed by: INTERNAL MEDICINE

## 2024-03-26 PROCEDURE — 99233 SBSQ HOSP IP/OBS HIGH 50: CPT | Performed by: INTERNAL MEDICINE

## 2024-03-26 PROCEDURE — 51798 US URINE CAPACITY MEASURE: CPT

## 2024-03-26 PROCEDURE — 84100 ASSAY OF PHOSPHORUS: CPT

## 2024-03-26 PROCEDURE — 94640 AIRWAY INHALATION TREATMENT: CPT

## 2024-03-26 RX ORDER — POTASSIUM CHLORIDE 20 MEQ/1
20 TABLET, EXTENDED RELEASE ORAL ONCE
Status: COMPLETED | OUTPATIENT
Start: 2024-03-26 | End: 2024-03-26

## 2024-03-26 RX ORDER — HYDRALAZINE HYDROCHLORIDE 10 MG/1
10 TABLET, FILM COATED ORAL EVERY 8 HOURS SCHEDULED
Status: DISCONTINUED | OUTPATIENT
Start: 2024-03-26 | End: 2024-03-27

## 2024-03-26 RX ORDER — LOPERAMIDE HYDROCHLORIDE 2 MG/1
2 CAPSULE ORAL EVERY 8 HOURS PRN
Status: DISCONTINUED | OUTPATIENT
Start: 2024-03-26 | End: 2024-04-09 | Stop reason: HOSPADM

## 2024-03-26 RX ORDER — ISOSORBIDE DINITRATE 10 MG/1
5 TABLET ORAL 3 TIMES DAILY
Status: DISCONTINUED | OUTPATIENT
Start: 2024-03-26 | End: 2024-03-27

## 2024-03-26 RX ADMIN — ONDANSETRON 4 MG: 2 INJECTION INTRAMUSCULAR; INTRAVENOUS at 22:22

## 2024-03-26 RX ADMIN — POTASSIUM CHLORIDE 20 MEQ: 1500 TABLET, EXTENDED RELEASE ORAL at 06:25

## 2024-03-26 RX ADMIN — SODIUM CHLORIDE, PRESERVATIVE FREE 10 ML: 5 INJECTION INTRAVENOUS at 20:09

## 2024-03-26 RX ADMIN — HYDRALAZINE HYDROCHLORIDE 10 MG: 10 TABLET, FILM COATED ORAL at 16:04

## 2024-03-26 RX ADMIN — LOPERAMIDE HYDROCHLORIDE 2 MG: 2 CAPSULE ORAL at 08:21

## 2024-03-26 RX ADMIN — PIPERACILLIN AND TAZOBACTAM 3375 MG: 3; .375 INJECTION, POWDER, FOR SOLUTION INTRAVENOUS at 00:04

## 2024-03-26 RX ADMIN — SODIUM CHLORIDE 25 ML: 9 INJECTION, SOLUTION INTRAVENOUS at 12:49

## 2024-03-26 RX ADMIN — ISOSORBIDE DINITRATE 5 MG: 10 TABLET ORAL at 16:04

## 2024-03-26 RX ADMIN — CLOPIDOGREL BISULFATE 75 MG: 75 TABLET ORAL at 08:21

## 2024-03-26 RX ADMIN — IPRATROPIUM BROMIDE AND ALBUTEROL SULFATE 1 DOSE: 2.5; .5 SOLUTION RESPIRATORY (INHALATION) at 11:16

## 2024-03-26 RX ADMIN — ISOSORBIDE DINITRATE 5 MG: 10 TABLET ORAL at 20:07

## 2024-03-26 RX ADMIN — IPRATROPIUM BROMIDE AND ALBUTEROL SULFATE 1 DOSE: 2.5; .5 SOLUTION RESPIRATORY (INHALATION) at 03:56

## 2024-03-26 RX ADMIN — PANTOPRAZOLE SODIUM 40 MG: 40 TABLET, DELAYED RELEASE ORAL at 16:04

## 2024-03-26 RX ADMIN — CIPROFLOXACIN 1 DROP: 3 SOLUTION OPHTHALMIC at 10:12

## 2024-03-26 RX ADMIN — SODIUM CHLORIDE, PRESERVATIVE FREE 10 ML: 5 INJECTION INTRAVENOUS at 08:21

## 2024-03-26 RX ADMIN — ONDANSETRON 4 MG: 4 TABLET, ORALLY DISINTEGRATING ORAL at 12:57

## 2024-03-26 RX ADMIN — IPRATROPIUM BROMIDE AND ALBUTEROL SULFATE 1 DOSE: 2.5; .5 SOLUTION RESPIRATORY (INHALATION) at 23:33

## 2024-03-26 RX ADMIN — HYDRALAZINE HYDROCHLORIDE 10 MG: 10 TABLET, FILM COATED ORAL at 10:11

## 2024-03-26 RX ADMIN — ISOSORBIDE DINITRATE 5 MG: 10 TABLET ORAL at 10:12

## 2024-03-26 RX ADMIN — METHYLPREDNISOLONE SODIUM SUCCINATE 60 MG: 125 INJECTION, POWDER, FOR SOLUTION INTRAMUSCULAR; INTRAVENOUS at 08:21

## 2024-03-26 RX ADMIN — HYDRALAZINE HYDROCHLORIDE 10 MG: 10 TABLET, FILM COATED ORAL at 22:22

## 2024-03-26 RX ADMIN — PANTOPRAZOLE SODIUM 40 MG: 40 TABLET, DELAYED RELEASE ORAL at 06:25

## 2024-03-26 RX ADMIN — CIPROFLOXACIN 1 DROP: 3 SOLUTION OPHTHALMIC at 06:25

## 2024-03-26 RX ADMIN — CIPROFLOXACIN 1 DROP: 3 SOLUTION OPHTHALMIC at 20:10

## 2024-03-26 RX ADMIN — ASPIRIN 81 MG: 81 TABLET, CHEWABLE ORAL at 08:21

## 2024-03-26 RX ADMIN — CIPROFLOXACIN 1 DROP: 3 SOLUTION OPHTHALMIC at 17:42

## 2024-03-26 RX ADMIN — LOPERAMIDE HYDROCHLORIDE 2 MG: 2 CAPSULE ORAL at 20:07

## 2024-03-26 RX ADMIN — PIPERACILLIN AND TAZOBACTAM 3375 MG: 3; .375 INJECTION, POWDER, FOR SOLUTION INTRAVENOUS at 12:50

## 2024-03-26 RX ADMIN — POTASSIUM CHLORIDE: 2 INJECTION, SOLUTION, CONCENTRATE INTRAVENOUS at 16:04

## 2024-03-26 ASSESSMENT — PAIN SCALES - GENERAL
PAINLEVEL_OUTOF10: 0
PAINLEVEL_OUTOF10: 2
PAINLEVEL_OUTOF10: 0

## 2024-03-26 ASSESSMENT — PAIN DESCRIPTION - LOCATION: LOCATION: ABDOMEN

## 2024-03-26 NOTE — PLAN OF CARE
Problem: Discharge Planning  Goal: Discharge to home or other facility with appropriate resources  Outcome: Progressing  Flowsheets (Taken 3/25/2024 2000)  Discharge to home or other facility with appropriate resources:   Identify barriers to discharge with patient and caregiver   Arrange for needed discharge resources and transportation as appropriate   Identify discharge learning needs (meds, wound care, etc)     Problem: Pain  Goal: Verbalizes/displays adequate comfort level or baseline comfort level  Outcome: Progressing  Flowsheets (Taken 3/25/2024 2000)  Verbalizes/displays adequate comfort level or baseline comfort level:   Encourage patient to monitor pain and request assistance   Assess pain using appropriate pain scale   Administer analgesics based on type and severity of pain and evaluate response   Implement non-pharmacological measures as appropriate and evaluate response   Consider cultural and social influences on pain and pain management   Notify Licensed Independent Practitioner if interventions unsuccessful or patient reports new pain     Problem: Safety - Adult  Goal: Free from fall injury  Outcome: Progressing

## 2024-03-26 NOTE — CARE COORDINATION
Noted per chart review, Adenike readmitted to [facility] 3/22/24 for N/V/ +MELIDA. Per protocol, current Care Transitions episode closed. CTN team will follow up on hospital DC if eligible.      Marti Fontana RN [phone]

## 2024-03-27 LAB
ALBUMIN SERPL-MCNC: 3.2 GM/DL (ref 3.4–5)
ALP BLD-CCNC: 55 IU/L (ref 40–129)
ALT SERPL-CCNC: 11 U/L (ref 10–40)
ANION GAP SERPL CALCULATED.3IONS-SCNC: 11 MMOL/L (ref 7–16)
AST SERPL-CCNC: 21 IU/L (ref 15–37)
B-OH-BUTYR SERPL-MCNC: <2.1 MG/DL (ref 0–3)
BASOPHILS ABSOLUTE: 0 K/CU MM
BASOPHILS RELATIVE PERCENT: 0.1 % (ref 0–1)
BILIRUB SERPL-MCNC: 0.4 MG/DL (ref 0–1)
BILIRUBIN DIRECT: 0.2 MG/DL (ref 0–0.3)
BILIRUBIN, INDIRECT: 0.2 MG/DL (ref 0–0.7)
BUN SERPL-MCNC: 33 MG/DL (ref 6–23)
CALCIUM SERPL-MCNC: 7.9 MG/DL (ref 8.3–10.6)
CHLORIDE BLD-SCNC: 97 MMOL/L (ref 99–110)
CO2: 20 MMOL/L (ref 21–32)
CREAT SERPL-MCNC: 5.3 MG/DL (ref 0.6–1.1)
CULTURE: NORMAL
CULTURE: NORMAL
DIFFERENTIAL TYPE: ABNORMAL
EOSINOPHILS ABSOLUTE: 0 K/CU MM
EOSINOPHILS RELATIVE PERCENT: 0 % (ref 0–3)
GFR SERPL CREATININE-BSD FRML MDRD: 8 ML/MIN/1.73M2
GLUCOSE SERPL-MCNC: 131 MG/DL (ref 70–99)
HCT VFR BLD CALC: 25.2 % (ref 37–47)
HEMOGLOBIN: 8.2 GM/DL (ref 12.5–16)
IMMATURE NEUTROPHIL %: 1.4 % (ref 0–0.43)
LYMPHOCYTES ABSOLUTE: 0.4 K/CU MM
LYMPHOCYTES RELATIVE PERCENT: 4.2 % (ref 24–44)
Lab: NORMAL
Lab: NORMAL
MAGNESIUM: 2 MG/DL (ref 1.8–2.4)
MCH RBC QN AUTO: 27.8 PG (ref 27–31)
MCHC RBC AUTO-ENTMCNC: 32.5 % (ref 32–36)
MCV RBC AUTO: 85.4 FL (ref 78–100)
MONOCYTES ABSOLUTE: 0.8 K/CU MM
MONOCYTES RELATIVE PERCENT: 7.8 % (ref 0–4)
NUCLEATED RBC %: 0.2 %
OCCULT BLOOD, OTHER: POSITIVE
PDW BLD-RTO: 17.2 % (ref 11.7–14.9)
PHOSPHORUS: 2.1 MG/DL (ref 2.5–4.9)
PLATELET # BLD: 281 K/CU MM (ref 140–440)
PMV BLD AUTO: 10.3 FL (ref 7.5–11.1)
POTASSIUM SERPL-SCNC: 4.3 MMOL/L (ref 3.5–5.1)
PRO-BNP: ABNORMAL PG/ML
RBC # BLD: 2.95 M/CU MM (ref 4.2–5.4)
SEGMENTED NEUTROPHILS ABSOLUTE COUNT: 8.8 K/CU MM
SEGMENTED NEUTROPHILS RELATIVE PERCENT: 86.5 % (ref 36–66)
SODIUM BLD-SCNC: 128 MMOL/L (ref 135–145)
SPECIMEN: NORMAL
SPECIMEN: NORMAL
TOTAL IMMATURE NEUTOROPHIL: 0.14 K/CU MM
TOTAL NUCLEATED RBC: 0 K/CU MM
TOTAL PROTEIN: 4.9 GM/DL (ref 6.4–8.2)
WBC # BLD: 10.2 K/CU MM (ref 4–10.5)

## 2024-03-27 PROCEDURE — 2140000000 HC CCU INTERMEDIATE R&B

## 2024-03-27 PROCEDURE — 80053 COMPREHEN METABOLIC PANEL: CPT

## 2024-03-27 PROCEDURE — 6370000000 HC RX 637 (ALT 250 FOR IP): Performed by: NURSE PRACTITIONER

## 2024-03-27 PROCEDURE — 6360000002 HC RX W HCPCS: Performed by: INTERNAL MEDICINE

## 2024-03-27 PROCEDURE — APPNB30 APP NON BILLABLE TIME 0-30 MINS: Performed by: LICENSED PRACTICAL NURSE

## 2024-03-27 PROCEDURE — 94761 N-INVAS EAR/PLS OXIMETRY MLT: CPT

## 2024-03-27 PROCEDURE — 85025 COMPLETE CBC W/AUTO DIFF WBC: CPT

## 2024-03-27 PROCEDURE — 2580000003 HC RX 258: Performed by: NURSE PRACTITIONER

## 2024-03-27 PROCEDURE — 82010 KETONE BODYS QUAN: CPT

## 2024-03-27 PROCEDURE — 6360000002 HC RX W HCPCS: Performed by: STUDENT IN AN ORGANIZED HEALTH CARE EDUCATION/TRAINING PROGRAM

## 2024-03-27 PROCEDURE — 99233 SBSQ HOSP IP/OBS HIGH 50: CPT | Performed by: INTERNAL MEDICINE

## 2024-03-27 PROCEDURE — 80048 BASIC METABOLIC PNL TOTAL CA: CPT

## 2024-03-27 PROCEDURE — 6370000000 HC RX 637 (ALT 250 FOR IP)

## 2024-03-27 PROCEDURE — 80076 HEPATIC FUNCTION PANEL: CPT

## 2024-03-27 PROCEDURE — 6370000000 HC RX 637 (ALT 250 FOR IP): Performed by: STUDENT IN AN ORGANIZED HEALTH CARE EDUCATION/TRAINING PROGRAM

## 2024-03-27 PROCEDURE — 84100 ASSAY OF PHOSPHORUS: CPT

## 2024-03-27 PROCEDURE — 83880 ASSAY OF NATRIURETIC PEPTIDE: CPT

## 2024-03-27 PROCEDURE — 94640 AIRWAY INHALATION TREATMENT: CPT

## 2024-03-27 PROCEDURE — 6360000002 HC RX W HCPCS: Performed by: NURSE PRACTITIONER

## 2024-03-27 PROCEDURE — 99231 SBSQ HOSP IP/OBS SF/LOW 25: CPT | Performed by: SPECIALIST

## 2024-03-27 PROCEDURE — 2580000003 HC RX 258: Performed by: STUDENT IN AN ORGANIZED HEALTH CARE EDUCATION/TRAINING PROGRAM

## 2024-03-27 PROCEDURE — APPNB30 APP NON BILLABLE TIME 0-30 MINS

## 2024-03-27 PROCEDURE — 83735 ASSAY OF MAGNESIUM: CPT

## 2024-03-27 RX ORDER — FUROSEMIDE 10 MG/ML
40 INJECTION INTRAMUSCULAR; INTRAVENOUS 2 TIMES DAILY
Status: DISCONTINUED | OUTPATIENT
Start: 2024-03-27 | End: 2024-03-30

## 2024-03-27 RX ORDER — ROSUVASTATIN CALCIUM 5 MG/1
10 TABLET, COATED ORAL NIGHTLY
Status: DISCONTINUED | OUTPATIENT
Start: 2024-03-27 | End: 2024-04-09 | Stop reason: HOSPADM

## 2024-03-27 RX ORDER — METOPROLOL SUCCINATE 25 MG/1
25 TABLET, EXTENDED RELEASE ORAL DAILY
Status: DISCONTINUED | OUTPATIENT
Start: 2024-03-27 | End: 2024-03-28

## 2024-03-27 RX ORDER — ISOSORBIDE DINITRATE 10 MG/1
10 TABLET ORAL 3 TIMES DAILY
Status: DISCONTINUED | OUTPATIENT
Start: 2024-03-27 | End: 2024-04-08

## 2024-03-27 RX ORDER — HYDRALAZINE HYDROCHLORIDE 25 MG/1
25 TABLET, FILM COATED ORAL EVERY 8 HOURS SCHEDULED
Status: DISCONTINUED | OUTPATIENT
Start: 2024-03-27 | End: 2024-04-08

## 2024-03-27 RX ADMIN — FUROSEMIDE 40 MG: 10 INJECTION, SOLUTION INTRAMUSCULAR; INTRAVENOUS at 18:24

## 2024-03-27 RX ADMIN — HYDRALAZINE HYDROCHLORIDE 25 MG: 25 TABLET ORAL at 14:20

## 2024-03-27 RX ADMIN — PIPERACILLIN AND TAZOBACTAM 3375 MG: 3; .375 INJECTION, POWDER, FOR SOLUTION INTRAVENOUS at 12:24

## 2024-03-27 RX ADMIN — CIPROFLOXACIN 1 DROP: 3 SOLUTION OPHTHALMIC at 18:25

## 2024-03-27 RX ADMIN — CIPROFLOXACIN 1 DROP: 3 SOLUTION OPHTHALMIC at 08:01

## 2024-03-27 RX ADMIN — GUAIFENESIN SYRUP AND DEXTROMETHORPHAN 5 ML: 100; 10 SYRUP ORAL at 16:19

## 2024-03-27 RX ADMIN — CIPROFLOXACIN 1 DROP: 3 SOLUTION OPHTHALMIC at 05:34

## 2024-03-27 RX ADMIN — PANTOPRAZOLE SODIUM 40 MG: 40 TABLET, DELAYED RELEASE ORAL at 05:34

## 2024-03-27 RX ADMIN — SODIUM CHLORIDE, PRESERVATIVE FREE 10 ML: 5 INJECTION INTRAVENOUS at 08:00

## 2024-03-27 RX ADMIN — PIPERACILLIN AND TAZOBACTAM 3375 MG: 3; .375 INJECTION, POWDER, FOR SOLUTION INTRAVENOUS at 00:13

## 2024-03-27 RX ADMIN — ONDANSETRON 4 MG: 2 INJECTION INTRAMUSCULAR; INTRAVENOUS at 20:07

## 2024-03-27 RX ADMIN — CLOPIDOGREL BISULFATE 75 MG: 75 TABLET ORAL at 08:00

## 2024-03-27 RX ADMIN — HYDRALAZINE HYDROCHLORIDE 10 MG: 10 TABLET, FILM COATED ORAL at 05:34

## 2024-03-27 RX ADMIN — ISOSORBIDE DINITRATE 10 MG: 10 TABLET ORAL at 14:20

## 2024-03-27 RX ADMIN — ISOSORBIDE DINITRATE 10 MG: 10 TABLET ORAL at 20:07

## 2024-03-27 RX ADMIN — ISOSORBIDE DINITRATE 5 MG: 10 TABLET ORAL at 08:00

## 2024-03-27 RX ADMIN — CIPROFLOXACIN 1 DROP: 3 SOLUTION OPHTHALMIC at 20:08

## 2024-03-27 RX ADMIN — IPRATROPIUM BROMIDE AND ALBUTEROL SULFATE 1 DOSE: 2.5; .5 SOLUTION RESPIRATORY (INHALATION) at 15:10

## 2024-03-27 RX ADMIN — CIPROFLOXACIN 1 DROP: 3 SOLUTION OPHTHALMIC at 14:24

## 2024-03-27 RX ADMIN — ONDANSETRON 4 MG: 2 INJECTION INTRAMUSCULAR; INTRAVENOUS at 08:16

## 2024-03-27 RX ADMIN — PANTOPRAZOLE SODIUM 40 MG: 40 TABLET, DELAYED RELEASE ORAL at 14:20

## 2024-03-27 RX ADMIN — IPRATROPIUM BROMIDE AND ALBUTEROL SULFATE 1 DOSE: 2.5; .5 SOLUTION RESPIRATORY (INHALATION) at 09:36

## 2024-03-27 RX ADMIN — SODIUM CHLORIDE, PRESERVATIVE FREE 10 ML: 5 INJECTION INTRAVENOUS at 20:07

## 2024-03-27 RX ADMIN — METHYLPREDNISOLONE SODIUM SUCCINATE 60 MG: 125 INJECTION, POWDER, FOR SOLUTION INTRAMUSCULAR; INTRAVENOUS at 08:03

## 2024-03-27 RX ADMIN — ASPIRIN 81 MG: 81 TABLET, CHEWABLE ORAL at 08:00

## 2024-03-27 RX ADMIN — ROSUVASTATIN CALCIUM 10 MG: 5 TABLET, COATED ORAL at 20:07

## 2024-03-27 RX ADMIN — ONDANSETRON 4 MG: 2 INJECTION INTRAMUSCULAR; INTRAVENOUS at 14:19

## 2024-03-27 RX ADMIN — FUROSEMIDE 40 MG: 10 INJECTION, SOLUTION INTRAMUSCULAR; INTRAVENOUS at 14:24

## 2024-03-27 RX ADMIN — HYDRALAZINE HYDROCHLORIDE 25 MG: 25 TABLET ORAL at 20:07

## 2024-03-27 ASSESSMENT — PAIN SCALES - GENERAL: PAINLEVEL_OUTOF10: 0

## 2024-03-27 NOTE — PLAN OF CARE
Problem: Discharge Planning  Goal: Discharge to home or other facility with appropriate resources  3/27/2024 1045 by Merlyn Powers RN  Outcome: Progressing  Flowsheets (Taken 3/27/2024 0757)  Discharge to home or other facility with appropriate resources:   Identify barriers to discharge with patient and caregiver   Identify discharge learning needs (meds, wound care, etc)   Arrange for needed discharge resources and transportation as appropriate   Arrange for interpreters to assist at discharge as needed   Refer to discharge planning if patient needs post-hospital services based on physician order or complex needs related to functional status, cognitive ability or social support system  3/27/2024 0053 by Lata Pope, RN  Outcome: Progressing     Problem: Pain  Goal: Verbalizes/displays adequate comfort level or baseline comfort level  3/27/2024 1045 by Merlyn Powers RN  Outcome: Progressing  Flowsheets (Taken 3/27/2024 0756)  Verbalizes/displays adequate comfort level or baseline comfort level:   Encourage patient to monitor pain and request assistance   Assess pain using appropriate pain scale   Administer analgesics based on type and severity of pain and evaluate response  3/27/2024 0053 by Lata Pope, RN  Outcome: Progressing     Problem: Safety - Adult  Goal: Free from fall injury  3/27/2024 1045 by Merlyn Powers RN  Outcome: Progressing  3/27/2024 0053 by Lata Pope, RN  Outcome: Progressing

## 2024-03-28 ENCOUNTER — TELEPHONE (OUTPATIENT)
Dept: ONCOLOGY | Age: 69
End: 2024-03-28

## 2024-03-28 LAB
ANION GAP SERPL CALCULATED.3IONS-SCNC: 17 MMOL/L (ref 7–16)
BASOPHILS ABSOLUTE: 0 K/CU MM
BASOPHILS RELATIVE PERCENT: 0.1 % (ref 0–1)
BUN SERPL-MCNC: 42 MG/DL (ref 6–23)
CALCIUM SERPL-MCNC: 8.1 MG/DL (ref 8.3–10.6)
CHLORIDE BLD-SCNC: 96 MMOL/L (ref 99–110)
CO2: 17 MMOL/L (ref 21–32)
CREAT SERPL-MCNC: 5.8 MG/DL (ref 0.6–1.1)
DIFFERENTIAL TYPE: ABNORMAL
EOSINOPHILS ABSOLUTE: 0 K/CU MM
EOSINOPHILS RELATIVE PERCENT: 0 % (ref 0–3)
GFR SERPL CREATININE-BSD FRML MDRD: 7 ML/MIN/1.73M2
GLUCOSE SERPL-MCNC: 123 MG/DL (ref 70–99)
HCT VFR BLD CALC: 26.3 % (ref 37–47)
HEMOGLOBIN: 8.5 GM/DL (ref 12.5–16)
IMMATURE NEUTROPHIL %: 2.5 % (ref 0–0.43)
LYMPHOCYTES ABSOLUTE: 0.3 K/CU MM
LYMPHOCYTES RELATIVE PERCENT: 4 % (ref 24–44)
MAGNESIUM: 1.9 MG/DL (ref 1.8–2.4)
MCH RBC QN AUTO: 28 PG (ref 27–31)
MCHC RBC AUTO-ENTMCNC: 32.3 % (ref 32–36)
MCV RBC AUTO: 86.5 FL (ref 78–100)
MONOCYTES ABSOLUTE: 0.2 K/CU MM
MONOCYTES RELATIVE PERCENT: 3 % (ref 0–4)
NUCLEATED RBC %: 1 %
PDW BLD-RTO: 17.2 % (ref 11.7–14.9)
PHOSPHORUS: 3.2 MG/DL (ref 2.5–4.9)
PLATELET # BLD: 362 K/CU MM (ref 140–440)
PMV BLD AUTO: 10.4 FL (ref 7.5–11.1)
POTASSIUM SERPL-SCNC: 5.1 MMOL/L (ref 3.5–5.1)
RBC # BLD: 3.04 M/CU MM (ref 4.2–5.4)
REASON FOR REJECTION: NORMAL
REJECTED TEST: NORMAL
SEGMENTED NEUTROPHILS ABSOLUTE COUNT: 7.2 K/CU MM
SEGMENTED NEUTROPHILS RELATIVE PERCENT: 90.4 % (ref 36–66)
SODIUM BLD-SCNC: 130 MMOL/L (ref 135–145)
TOTAL IMMATURE NEUTOROPHIL: 0.2 K/CU MM
TOTAL NUCLEATED RBC: 0.1 K/CU MM
WBC # BLD: 8 K/CU MM (ref 4–10.5)

## 2024-03-28 PROCEDURE — 83735 ASSAY OF MAGNESIUM: CPT

## 2024-03-28 PROCEDURE — 2580000003 HC RX 258: Performed by: STUDENT IN AN ORGANIZED HEALTH CARE EDUCATION/TRAINING PROGRAM

## 2024-03-28 PROCEDURE — 6360000002 HC RX W HCPCS: Performed by: STUDENT IN AN ORGANIZED HEALTH CARE EDUCATION/TRAINING PROGRAM

## 2024-03-28 PROCEDURE — 6360000002 HC RX W HCPCS: Performed by: INTERNAL MEDICINE

## 2024-03-28 PROCEDURE — 83993 ASSAY FOR CALPROTECTIN FECAL: CPT

## 2024-03-28 PROCEDURE — 94761 N-INVAS EAR/PLS OXIMETRY MLT: CPT

## 2024-03-28 PROCEDURE — 94640 AIRWAY INHALATION TREATMENT: CPT

## 2024-03-28 PROCEDURE — 6370000000 HC RX 637 (ALT 250 FOR IP): Performed by: STUDENT IN AN ORGANIZED HEALTH CARE EDUCATION/TRAINING PROGRAM

## 2024-03-28 PROCEDURE — 80053 COMPREHEN METABOLIC PANEL: CPT

## 2024-03-28 PROCEDURE — 87507 IADNA-DNA/RNA PROBE TQ 12-25: CPT

## 2024-03-28 PROCEDURE — 85025 COMPLETE CBC W/AUTO DIFF WBC: CPT

## 2024-03-28 PROCEDURE — 6370000000 HC RX 637 (ALT 250 FOR IP): Performed by: NURSE PRACTITIONER

## 2024-03-28 PROCEDURE — 84100 ASSAY OF PHOSPHORUS: CPT

## 2024-03-28 PROCEDURE — 2140000000 HC CCU INTERMEDIATE R&B

## 2024-03-28 PROCEDURE — APPNB30 APP NON BILLABLE TIME 0-30 MINS: Performed by: LICENSED PRACTICAL NURSE

## 2024-03-28 PROCEDURE — 80048 BASIC METABOLIC PNL TOTAL CA: CPT

## 2024-03-28 PROCEDURE — 99233 SBSQ HOSP IP/OBS HIGH 50: CPT | Performed by: INTERNAL MEDICINE

## 2024-03-28 PROCEDURE — 87324 CLOSTRIDIUM AG IA: CPT

## 2024-03-28 PROCEDURE — 6370000000 HC RX 637 (ALT 250 FOR IP)

## 2024-03-28 PROCEDURE — 99231 SBSQ HOSP IP/OBS SF/LOW 25: CPT | Performed by: SPECIALIST

## 2024-03-28 PROCEDURE — 2580000003 HC RX 258: Performed by: NURSE PRACTITIONER

## 2024-03-28 PROCEDURE — 87449 NOS EACH ORGANISM AG IA: CPT

## 2024-03-28 RX ORDER — METOPROLOL SUCCINATE 25 MG/1
25 TABLET, EXTENDED RELEASE ORAL DAILY
Status: DISCONTINUED | OUTPATIENT
Start: 2024-03-28 | End: 2024-04-09 | Stop reason: HOSPADM

## 2024-03-28 RX ADMIN — CIPROFLOXACIN 1 DROP: 3 SOLUTION OPHTHALMIC at 09:30

## 2024-03-28 RX ADMIN — HYDRALAZINE HYDROCHLORIDE 25 MG: 25 TABLET ORAL at 13:49

## 2024-03-28 RX ADMIN — HYDRALAZINE HYDROCHLORIDE 25 MG: 25 TABLET ORAL at 20:46

## 2024-03-28 RX ADMIN — HYDRALAZINE HYDROCHLORIDE 25 MG: 25 TABLET ORAL at 05:27

## 2024-03-28 RX ADMIN — ISOSORBIDE DINITRATE 10 MG: 10 TABLET ORAL at 13:49

## 2024-03-28 RX ADMIN — CIPROFLOXACIN 1 DROP: 3 SOLUTION OPHTHALMIC at 17:06

## 2024-03-28 RX ADMIN — FUROSEMIDE 40 MG: 10 INJECTION, SOLUTION INTRAMUSCULAR; INTRAVENOUS at 17:06

## 2024-03-28 RX ADMIN — IPRATROPIUM BROMIDE AND ALBUTEROL SULFATE 1 DOSE: 2.5; .5 SOLUTION RESPIRATORY (INHALATION) at 05:33

## 2024-03-28 RX ADMIN — CIPROFLOXACIN 1 DROP: 3 SOLUTION OPHTHALMIC at 20:48

## 2024-03-28 RX ADMIN — FUROSEMIDE 40 MG: 10 INJECTION, SOLUTION INTRAMUSCULAR; INTRAVENOUS at 09:29

## 2024-03-28 RX ADMIN — PANTOPRAZOLE SODIUM 40 MG: 40 TABLET, DELAYED RELEASE ORAL at 05:27

## 2024-03-28 RX ADMIN — ONDANSETRON 4 MG: 4 TABLET, ORALLY DISINTEGRATING ORAL at 05:26

## 2024-03-28 RX ADMIN — SODIUM CHLORIDE, PRESERVATIVE FREE 10 ML: 5 INJECTION INTRAVENOUS at 20:47

## 2024-03-28 RX ADMIN — ROSUVASTATIN CALCIUM 10 MG: 5 TABLET, COATED ORAL at 20:46

## 2024-03-28 RX ADMIN — METHYLPREDNISOLONE SODIUM SUCCINATE 60 MG: 125 INJECTION, POWDER, FOR SOLUTION INTRAMUSCULAR; INTRAVENOUS at 09:29

## 2024-03-28 RX ADMIN — CLOPIDOGREL BISULFATE 75 MG: 75 TABLET ORAL at 09:29

## 2024-03-28 RX ADMIN — SODIUM CHLORIDE, PRESERVATIVE FREE 10 ML: 5 INJECTION INTRAVENOUS at 09:30

## 2024-03-28 RX ADMIN — ISOSORBIDE DINITRATE 10 MG: 10 TABLET ORAL at 20:46

## 2024-03-28 RX ADMIN — CIPROFLOXACIN 1 DROP: 3 SOLUTION OPHTHALMIC at 05:27

## 2024-03-28 RX ADMIN — ASPIRIN 81 MG: 81 TABLET, CHEWABLE ORAL at 09:28

## 2024-03-28 RX ADMIN — PIPERACILLIN AND TAZOBACTAM 3375 MG: 3; .375 INJECTION, POWDER, FOR SOLUTION INTRAVENOUS at 11:44

## 2024-03-28 RX ADMIN — PIPERACILLIN AND TAZOBACTAM 3375 MG: 3; .375 INJECTION, POWDER, FOR SOLUTION INTRAVENOUS at 00:24

## 2024-03-28 RX ADMIN — CIPROFLOXACIN 1 DROP: 3 SOLUTION OPHTHALMIC at 13:49

## 2024-03-28 RX ADMIN — METOPROLOL SUCCINATE 25 MG: 25 TABLET, FILM COATED, EXTENDED RELEASE ORAL at 11:45

## 2024-03-28 RX ADMIN — ISOSORBIDE DINITRATE 10 MG: 10 TABLET ORAL at 09:28

## 2024-03-28 RX ADMIN — PANTOPRAZOLE SODIUM 40 MG: 40 TABLET, DELAYED RELEASE ORAL at 17:06

## 2024-03-28 NOTE — TELEPHONE ENCOUNTER
Patient No Showed for Follow Up for Dr. Gordon 3/21/24 and Dr. Sutherland 3/21/24. Called and spoke with patient today. She does not want to reschedule at this time. Patient stated she will call back when she is ready to reschedule.

## 2024-03-28 NOTE — PLAN OF CARE
Problem: Discharge Planning  Goal: Discharge to home or other facility with appropriate resources  3/28/2024 0808 by Kirstin Sanchez RN  Outcome: Progressing  3/27/2024 2209 by Ltaa Pope RN  Outcome: Progressing     Problem: Pain  Goal: Verbalizes/displays adequate comfort level or baseline comfort level  3/28/2024 0808 by Kirstin Sanchez RN  Outcome: Progressing  3/27/2024 2209 by Lata Pope RN  Outcome: Progressing     Problem: Safety - Adult  Goal: Free from fall injury  3/28/2024 0808 by Kirstin Sanchez RN  Outcome: Progressing  3/27/2024 2209 by Lata Pope RN  Outcome: Progressing     Problem: Skin/Tissue Integrity  Goal: Absence of new skin breakdown  Description: 1.  Monitor for areas of redness and/or skin breakdown  2.  Assess vascular access sites hourly  3.  Every 4-6 hours minimum:  Change oxygen saturation probe site  4.  Every 4-6 hours:  If on nasal continuous positive airway pressure, respiratory therapy assess nares and determine need for appliance change or resting period.  Outcome: Progressing

## 2024-03-29 LAB
ANION GAP SERPL CALCULATED.3IONS-SCNC: 14 MMOL/L (ref 7–16)
ANISOCYTOSIS: ABNORMAL
BANDED NEUTROPHILS ABSOLUTE COUNT: 0.55 K/CU MM
BANDED NEUTROPHILS RELATIVE PERCENT: 8 % (ref 5–11)
BASOPHILS ABSOLUTE: 0 K/CU MM
BASOPHILS RELATIVE PERCENT: 0 % (ref 0–1)
BUN SERPL-MCNC: 53 MG/DL (ref 6–23)
CALCIUM SERPL-MCNC: 8.3 MG/DL (ref 8.3–10.6)
CHLORIDE BLD-SCNC: 97 MMOL/L (ref 99–110)
CO2: 19 MMOL/L (ref 21–32)
CREAT SERPL-MCNC: 6.1 MG/DL (ref 0.6–1.1)
DIFFERENTIAL TYPE: ABNORMAL
EOSINOPHILS ABSOLUTE: 0 K/CU MM
EOSINOPHILS RELATIVE PERCENT: 0 % (ref 0–3)
GFR SERPL CREATININE-BSD FRML MDRD: 7 ML/MIN/1.73M2
GLUCOSE BLD-MCNC: 148 MG/DL (ref 70–99)
GLUCOSE SERPL-MCNC: 128 MG/DL (ref 70–99)
HCT VFR BLD CALC: 28.7 % (ref 37–47)
HEMOGLOBIN: 8.7 GM/DL (ref 12.5–16)
HYPOCHROMIA: ABNORMAL
IMMATURE NEUTROPHIL %: 0 % (ref 0–0.43)
LYMPHOCYTES ABSOLUTE: 0.3 K/CU MM
LYMPHOCYTES RELATIVE PERCENT: 5 % (ref 24–44)
MACROCYTES: ABNORMAL
MAGNESIUM: 2.2 MG/DL (ref 1.8–2.4)
MCH RBC QN AUTO: 27.4 PG (ref 27–31)
MCHC RBC AUTO-ENTMCNC: 30.3 % (ref 32–36)
MCV RBC AUTO: 90.5 FL (ref 78–100)
MONOCYTES ABSOLUTE: 0.2 K/CU MM
MONOCYTES RELATIVE PERCENT: 3 % (ref 0–4)
PDW BLD-RTO: 17.7 % (ref 11.7–14.9)
PHOSPHORUS: 4.5 MG/DL (ref 2.5–4.9)
PLATELET # BLD: 370 K/CU MM (ref 140–440)
PMV BLD AUTO: 10.3 FL (ref 7.5–11.1)
POTASSIUM SERPL-SCNC: 5.3 MMOL/L (ref 3.5–5.1)
RBC # BLD: 3.17 M/CU MM (ref 4.2–5.4)
SEGMENTED NEUTROPHILS ABSOLUTE COUNT: 5.8 K/CU MM
SEGMENTED NEUTROPHILS RELATIVE PERCENT: 84 % (ref 36–66)
SODIUM BLD-SCNC: 130 MMOL/L (ref 135–145)
STOMATOCYTES: ABNORMAL
TOTAL IMMATURE NEUTOROPHIL: 0 K/CU MM
WBC # BLD: 6.9 K/CU MM (ref 4–10.5)

## 2024-03-29 PROCEDURE — 6370000000 HC RX 637 (ALT 250 FOR IP): Performed by: STUDENT IN AN ORGANIZED HEALTH CARE EDUCATION/TRAINING PROGRAM

## 2024-03-29 PROCEDURE — APPNB30 APP NON BILLABLE TIME 0-30 MINS: Performed by: LICENSED PRACTICAL NURSE

## 2024-03-29 PROCEDURE — 82962 GLUCOSE BLOOD TEST: CPT

## 2024-03-29 PROCEDURE — 36415 COLL VENOUS BLD VENIPUNCTURE: CPT

## 2024-03-29 PROCEDURE — 2580000003 HC RX 258: Performed by: STUDENT IN AN ORGANIZED HEALTH CARE EDUCATION/TRAINING PROGRAM

## 2024-03-29 PROCEDURE — 2580000003 HC RX 258: Performed by: NURSE PRACTITIONER

## 2024-03-29 PROCEDURE — 6370000000 HC RX 637 (ALT 250 FOR IP): Performed by: NURSE PRACTITIONER

## 2024-03-29 PROCEDURE — 85025 COMPLETE CBC W/AUTO DIFF WBC: CPT

## 2024-03-29 PROCEDURE — 80048 BASIC METABOLIC PNL TOTAL CA: CPT

## 2024-03-29 PROCEDURE — 94761 N-INVAS EAR/PLS OXIMETRY MLT: CPT

## 2024-03-29 PROCEDURE — 99232 SBSQ HOSP IP/OBS MODERATE 35: CPT | Performed by: INTERNAL MEDICINE

## 2024-03-29 PROCEDURE — 6360000002 HC RX W HCPCS: Performed by: STUDENT IN AN ORGANIZED HEALTH CARE EDUCATION/TRAINING PROGRAM

## 2024-03-29 PROCEDURE — 94640 AIRWAY INHALATION TREATMENT: CPT

## 2024-03-29 PROCEDURE — 6360000002 HC RX W HCPCS: Performed by: INTERNAL MEDICINE

## 2024-03-29 PROCEDURE — 2140000000 HC CCU INTERMEDIATE R&B

## 2024-03-29 PROCEDURE — 84100 ASSAY OF PHOSPHORUS: CPT

## 2024-03-29 PROCEDURE — 83735 ASSAY OF MAGNESIUM: CPT

## 2024-03-29 PROCEDURE — 6370000000 HC RX 637 (ALT 250 FOR IP)

## 2024-03-29 RX ORDER — PROCHLORPERAZINE MALEATE 5 MG/1
5 TABLET ORAL EVERY 6 HOURS PRN
Status: DISCONTINUED | OUTPATIENT
Start: 2024-03-29 | End: 2024-04-09 | Stop reason: HOSPADM

## 2024-03-29 RX ADMIN — PIPERACILLIN AND TAZOBACTAM 3375 MG: 3; .375 INJECTION, POWDER, FOR SOLUTION INTRAVENOUS at 12:08

## 2024-03-29 RX ADMIN — CIPROFLOXACIN 1 DROP: 3 SOLUTION OPHTHALMIC at 05:44

## 2024-03-29 RX ADMIN — HYDRALAZINE HYDROCHLORIDE 25 MG: 25 TABLET ORAL at 15:04

## 2024-03-29 RX ADMIN — METHYLPREDNISOLONE SODIUM SUCCINATE 60 MG: 125 INJECTION, POWDER, FOR SOLUTION INTRAMUSCULAR; INTRAVENOUS at 09:49

## 2024-03-29 RX ADMIN — CIPROFLOXACIN 1 DROP: 3 SOLUTION OPHTHALMIC at 15:05

## 2024-03-29 RX ADMIN — CIPROFLOXACIN 1 DROP: 3 SOLUTION OPHTHALMIC at 09:55

## 2024-03-29 RX ADMIN — SODIUM CHLORIDE, PRESERVATIVE FREE 10 ML: 5 INJECTION INTRAVENOUS at 20:43

## 2024-03-29 RX ADMIN — SODIUM CHLORIDE 10 ML/HR: 9 INJECTION, SOLUTION INTRAVENOUS at 00:22

## 2024-03-29 RX ADMIN — HYDRALAZINE HYDROCHLORIDE 25 MG: 25 TABLET ORAL at 05:44

## 2024-03-29 RX ADMIN — PIPERACILLIN AND TAZOBACTAM 3375 MG: 3; .375 INJECTION, POWDER, FOR SOLUTION INTRAVENOUS at 00:23

## 2024-03-29 RX ADMIN — FUROSEMIDE 40 MG: 10 INJECTION, SOLUTION INTRAMUSCULAR; INTRAVENOUS at 09:49

## 2024-03-29 RX ADMIN — ISOSORBIDE DINITRATE 10 MG: 10 TABLET ORAL at 15:05

## 2024-03-29 RX ADMIN — FUROSEMIDE 40 MG: 10 INJECTION, SOLUTION INTRAMUSCULAR; INTRAVENOUS at 17:59

## 2024-03-29 RX ADMIN — IPRATROPIUM BROMIDE AND ALBUTEROL SULFATE 1 DOSE: 2.5; .5 SOLUTION RESPIRATORY (INHALATION) at 09:08

## 2024-03-29 RX ADMIN — CLOPIDOGREL BISULFATE 75 MG: 75 TABLET ORAL at 09:49

## 2024-03-29 RX ADMIN — PANTOPRAZOLE SODIUM 40 MG: 40 TABLET, DELAYED RELEASE ORAL at 05:44

## 2024-03-29 RX ADMIN — ASPIRIN 81 MG: 81 TABLET, CHEWABLE ORAL at 09:49

## 2024-03-29 RX ADMIN — PANTOPRAZOLE SODIUM 40 MG: 40 TABLET, DELAYED RELEASE ORAL at 16:13

## 2024-03-29 RX ADMIN — CIPROFLOXACIN 1 DROP: 3 SOLUTION OPHTHALMIC at 17:59

## 2024-03-29 RX ADMIN — METOPROLOL SUCCINATE 25 MG: 25 TABLET, FILM COATED, EXTENDED RELEASE ORAL at 09:49

## 2024-03-29 RX ADMIN — ISOSORBIDE DINITRATE 10 MG: 10 TABLET ORAL at 09:59

## 2024-03-29 RX ADMIN — SODIUM CHLORIDE, PRESERVATIVE FREE 10 ML: 5 INJECTION INTRAVENOUS at 09:54

## 2024-03-29 RX ADMIN — CIPROFLOXACIN 1 DROP: 3 SOLUTION OPHTHALMIC at 20:45

## 2024-03-29 ASSESSMENT — PAIN SCALES - GENERAL
PAINLEVEL_OUTOF10: 0
PAINLEVEL_OUTOF10: 0

## 2024-03-29 NOTE — PLAN OF CARE
Problem: Discharge Planning  Goal: Discharge to home or other facility with appropriate resources  3/29/2024 1003 by Rik Cole RN  Outcome: Progressing  3/29/2024 0145 by Miguel Steven RN  Outcome: Progressing     Problem: Pain  Goal: Verbalizes/displays adequate comfort level or baseline comfort level  3/29/2024 1003 by Rik Cole RN  Outcome: Progressing  3/29/2024 0145 by Miguel Steven RN  Outcome: Progressing     Problem: Safety - Adult  Goal: Free from fall injury  3/29/2024 1003 by Rik Cole RN  Outcome: Progressing  3/29/2024 0145 by Miguel Steven RN  Outcome: Progressing     Problem: Skin/Tissue Integrity  Goal: Absence of new skin breakdown  Description: 1.  Monitor for areas of redness and/or skin breakdown  2.  Assess vascular access sites hourly  3.  Every 4-6 hours minimum:  Change oxygen saturation probe site  4.  Every 4-6 hours:  If on nasal continuous positive airway pressure, respiratory therapy assess nares and determine need for appliance change or resting period.  3/29/2024 1003 by Rik Cole RN  Outcome: Progressing  3/29/2024 0145 by Miguel Steven RN  Outcome: Progressing

## 2024-03-30 LAB
ALBUMIN SERPL-MCNC: 3.3 GM/DL (ref 3.4–5)
ALP BLD-CCNC: 49 IU/L (ref 40–128)
ALT SERPL-CCNC: 10 U/L (ref 10–40)
ANION GAP SERPL CALCULATED.3IONS-SCNC: 19 MMOL/L (ref 7–16)
AST SERPL-CCNC: 15 IU/L (ref 15–37)
BACTERIA: NEGATIVE /HPF
BASOPHILS ABSOLUTE: 0 K/CU MM
BASOPHILS RELATIVE PERCENT: 0.1 % (ref 0–1)
BILIRUB SERPL-MCNC: 0.4 MG/DL (ref 0–1)
BILIRUBIN URINE: NEGATIVE MG/DL
BLOOD, URINE: ABNORMAL
BUN SERPL-MCNC: 62 MG/DL (ref 6–23)
CALCIUM SERPL-MCNC: 7.9 MG/DL (ref 8.3–10.6)
CHLORIDE BLD-SCNC: 97 MMOL/L (ref 99–110)
CLARITY: CLEAR
CO2: 18 MMOL/L (ref 21–32)
COLOR: YELLOW
CREAT SERPL-MCNC: 6.3 MG/DL (ref 0.6–1.1)
CREATININE URINE: 24.8 MG/DL (ref 28–217)
DIFFERENTIAL TYPE: ABNORMAL
EOSINOPHILS ABSOLUTE: 0 K/CU MM
EOSINOPHILS RELATIVE PERCENT: 0 % (ref 0–3)
GFR SERPL CREATININE-BSD FRML MDRD: 7 ML/MIN/1.73M2
GLUCOSE SERPL-MCNC: 118 MG/DL (ref 70–99)
GLUCOSE, URINE: NEGATIVE MG/DL
HCT VFR BLD CALC: 24 % (ref 37–47)
HEMOGLOBIN: 7.7 GM/DL (ref 12.5–16)
IMMATURE NEUTROPHIL %: 7.6 % (ref 0–0.43)
KETONES, URINE: NEGATIVE MG/DL
LEUKOCYTE ESTERASE, URINE: ABNORMAL
LYMPHOCYTES ABSOLUTE: 0.6 K/CU MM
LYMPHOCYTES RELATIVE PERCENT: 6.5 % (ref 24–44)
MCH RBC QN AUTO: 28 PG (ref 27–31)
MCHC RBC AUTO-ENTMCNC: 32.1 % (ref 32–36)
MCV RBC AUTO: 87.3 FL (ref 78–100)
MONOCYTES ABSOLUTE: 0.7 K/CU MM
MONOCYTES RELATIVE PERCENT: 8 % (ref 0–4)
NITRITE URINE, QUANTITATIVE: NEGATIVE
NUCLEATED RBC %: 1.8 %
PDW BLD-RTO: 17.2 % (ref 11.7–14.9)
PH, URINE: 6.5 (ref 5–8)
PLATELET # BLD: 300 K/CU MM (ref 140–440)
PMV BLD AUTO: 9.6 FL (ref 7.5–11.1)
POTASSIUM SERPL-SCNC: 4.8 MMOL/L (ref 3.5–5.1)
PROT/CREAT RATIO, UR: 0.8
PROTEIN UA: ABNORMAL MG/DL
RBC # BLD: 2.75 M/CU MM (ref 4.2–5.4)
RBC URINE: 12 /HPF (ref 0–6)
SEGMENTED NEUTROPHILS ABSOLUTE COUNT: 6.7 K/CU MM
SEGMENTED NEUTROPHILS RELATIVE PERCENT: 77.8 % (ref 36–66)
SODIUM BLD-SCNC: 134 MMOL/L (ref 135–145)
SODIUM URINE: 111 MMOL/L (ref 35–167)
SPECIFIC GRAVITY UA: 1.01 (ref 1–1.03)
TOTAL IMMATURE NEUTOROPHIL: 0.66 K/CU MM
TOTAL NUCLEATED RBC: 0.2 K/CU MM
TOTAL PROTEIN: 5 GM/DL (ref 6.4–8.2)
TRICHOMONAS: ABNORMAL /HPF
URINE TOTAL PROTEIN: 21 MG/DL
UROBILINOGEN, URINE: 0.2 MG/DL (ref 0.2–1)
WBC # BLD: 8.7 K/CU MM (ref 4–10.5)
WBC UA: 6 /HPF (ref 0–5)
YEAST: ABNORMAL /HPF

## 2024-03-30 PROCEDURE — 94761 N-INVAS EAR/PLS OXIMETRY MLT: CPT

## 2024-03-30 PROCEDURE — 6360000002 HC RX W HCPCS: Performed by: NURSE PRACTITIONER

## 2024-03-30 PROCEDURE — 6370000000 HC RX 637 (ALT 250 FOR IP): Performed by: STUDENT IN AN ORGANIZED HEALTH CARE EDUCATION/TRAINING PROGRAM

## 2024-03-30 PROCEDURE — 6370000000 HC RX 637 (ALT 250 FOR IP): Performed by: NURSE PRACTITIONER

## 2024-03-30 PROCEDURE — 6360000002 HC RX W HCPCS: Performed by: STUDENT IN AN ORGANIZED HEALTH CARE EDUCATION/TRAINING PROGRAM

## 2024-03-30 PROCEDURE — 84156 ASSAY OF PROTEIN URINE: CPT

## 2024-03-30 PROCEDURE — 2580000003 HC RX 258: Performed by: NURSE PRACTITIONER

## 2024-03-30 PROCEDURE — 94640 AIRWAY INHALATION TREATMENT: CPT

## 2024-03-30 PROCEDURE — 84300 ASSAY OF URINE SODIUM: CPT

## 2024-03-30 PROCEDURE — 80053 COMPREHEN METABOLIC PANEL: CPT

## 2024-03-30 PROCEDURE — 85025 COMPLETE CBC W/AUTO DIFF WBC: CPT

## 2024-03-30 PROCEDURE — 6370000000 HC RX 637 (ALT 250 FOR IP): Performed by: INTERNAL MEDICINE

## 2024-03-30 PROCEDURE — 2140000000 HC CCU INTERMEDIATE R&B

## 2024-03-30 PROCEDURE — 81001 URINALYSIS AUTO W/SCOPE: CPT

## 2024-03-30 PROCEDURE — 6370000000 HC RX 637 (ALT 250 FOR IP)

## 2024-03-30 PROCEDURE — 6360000002 HC RX W HCPCS: Performed by: INTERNAL MEDICINE

## 2024-03-30 PROCEDURE — 82570 ASSAY OF URINE CREATININE: CPT

## 2024-03-30 RX ORDER — SULFAMETHOXAZOLE AND TRIMETHOPRIM 400; 80 MG/1; MG/1
1 TABLET ORAL
Status: DISCONTINUED | OUTPATIENT
Start: 2024-03-30 | End: 2024-04-09 | Stop reason: HOSPADM

## 2024-03-30 RX ORDER — METOLAZONE 2.5 MG/1
5 TABLET ORAL DAILY
Status: DISCONTINUED | OUTPATIENT
Start: 2024-03-30 | End: 2024-04-08

## 2024-03-30 RX ORDER — FUROSEMIDE 10 MG/ML
80 INJECTION INTRAMUSCULAR; INTRAVENOUS 3 TIMES DAILY
Status: DISCONTINUED | OUTPATIENT
Start: 2024-03-30 | End: 2024-04-05

## 2024-03-30 RX ADMIN — HYDRALAZINE HYDROCHLORIDE 25 MG: 25 TABLET ORAL at 06:06

## 2024-03-30 RX ADMIN — PANTOPRAZOLE SODIUM 40 MG: 40 TABLET, DELAYED RELEASE ORAL at 15:39

## 2024-03-30 RX ADMIN — SODIUM CHLORIDE, PRESERVATIVE FREE 10 ML: 5 INJECTION INTRAVENOUS at 20:13

## 2024-03-30 RX ADMIN — ISOSORBIDE DINITRATE 10 MG: 10 TABLET ORAL at 10:35

## 2024-03-30 RX ADMIN — METHYLPREDNISOLONE SODIUM SUCCINATE 60 MG: 125 INJECTION, POWDER, FOR SOLUTION INTRAMUSCULAR; INTRAVENOUS at 10:36

## 2024-03-30 RX ADMIN — CIPROFLOXACIN 1 DROP: 3 SOLUTION OPHTHALMIC at 10:39

## 2024-03-30 RX ADMIN — FUROSEMIDE 40 MG: 10 INJECTION, SOLUTION INTRAMUSCULAR; INTRAVENOUS at 10:35

## 2024-03-30 RX ADMIN — FUROSEMIDE 80 MG: 10 INJECTION, SOLUTION INTRAMUSCULAR; INTRAVENOUS at 20:12

## 2024-03-30 RX ADMIN — CLOPIDOGREL BISULFATE 75 MG: 75 TABLET ORAL at 10:35

## 2024-03-30 RX ADMIN — FUROSEMIDE 80 MG: 10 INJECTION, SOLUTION INTRAMUSCULAR; INTRAVENOUS at 15:35

## 2024-03-30 RX ADMIN — ONDANSETRON 4 MG: 2 INJECTION INTRAMUSCULAR; INTRAVENOUS at 06:06

## 2024-03-30 RX ADMIN — SODIUM BICARBONATE 650 MG: 650 TABLET ORAL at 20:12

## 2024-03-30 RX ADMIN — ROSUVASTATIN CALCIUM 10 MG: 5 TABLET, COATED ORAL at 20:12

## 2024-03-30 RX ADMIN — PANTOPRAZOLE SODIUM 40 MG: 40 TABLET, DELAYED RELEASE ORAL at 06:06

## 2024-03-30 RX ADMIN — HYDRALAZINE HYDROCHLORIDE 25 MG: 25 TABLET ORAL at 20:16

## 2024-03-30 RX ADMIN — CIPROFLOXACIN 1 DROP: 3 SOLUTION OPHTHALMIC at 20:13

## 2024-03-30 RX ADMIN — METOPROLOL SUCCINATE 25 MG: 25 TABLET, FILM COATED, EXTENDED RELEASE ORAL at 10:35

## 2024-03-30 RX ADMIN — ONDANSETRON 4 MG: 2 INJECTION INTRAMUSCULAR; INTRAVENOUS at 20:25

## 2024-03-30 RX ADMIN — SODIUM BICARBONATE 650 MG: 650 TABLET ORAL at 13:06

## 2024-03-30 RX ADMIN — ASPIRIN 81 MG: 81 TABLET, CHEWABLE ORAL at 10:35

## 2024-03-30 RX ADMIN — IPRATROPIUM BROMIDE AND ALBUTEROL SULFATE 1 DOSE: 2.5; .5 SOLUTION RESPIRATORY (INHALATION) at 16:12

## 2024-03-30 RX ADMIN — SULFAMETHOXAZOLE AND TRIMETHOPRIM 1 TABLET: 400; 80 TABLET ORAL at 13:05

## 2024-03-30 RX ADMIN — ISOSORBIDE DINITRATE 10 MG: 10 TABLET ORAL at 20:12

## 2024-03-30 RX ADMIN — ACETAMINOPHEN 650 MG: 325 TABLET ORAL at 15:39

## 2024-03-30 RX ADMIN — HYDRALAZINE HYDROCHLORIDE 25 MG: 25 TABLET ORAL at 15:13

## 2024-03-30 RX ADMIN — SODIUM BICARBONATE 650 MG: 650 TABLET ORAL at 15:39

## 2024-03-30 RX ADMIN — ISOSORBIDE DINITRATE 10 MG: 10 TABLET ORAL at 15:14

## 2024-03-30 RX ADMIN — SODIUM CHLORIDE, PRESERVATIVE FREE 10 ML: 5 INJECTION INTRAVENOUS at 10:40

## 2024-03-30 ASSESSMENT — PAIN DESCRIPTION - LOCATION: LOCATION: BACK

## 2024-03-30 ASSESSMENT — PAIN SCALES - GENERAL
PAINLEVEL_OUTOF10: 4
PAINLEVEL_OUTOF10: 0

## 2024-03-30 ASSESSMENT — PAIN DESCRIPTION - DESCRIPTORS: DESCRIPTORS: ACHING

## 2024-03-30 ASSESSMENT — PAIN SCALES - WONG BAKER: WONGBAKER_NUMERICALRESPONSE: HURTS A LITTLE BIT

## 2024-03-30 ASSESSMENT — PAIN DESCRIPTION - ORIENTATION: ORIENTATION: MID;LOWER

## 2024-03-30 ASSESSMENT — PAIN - FUNCTIONAL ASSESSMENT: PAIN_FUNCTIONAL_ASSESSMENT: ACTIVITIES ARE NOT PREVENTED

## 2024-03-31 LAB
ALBUMIN SERPL-MCNC: 3.3 GM/DL (ref 3.4–5)
ALP BLD-CCNC: 49 IU/L (ref 40–129)
ALT SERPL-CCNC: 9 U/L (ref 10–40)
ANION GAP SERPL CALCULATED.3IONS-SCNC: 17 MMOL/L (ref 7–16)
AST SERPL-CCNC: 10 IU/L (ref 15–37)
BACTERIA: NEGATIVE /HPF
BASOPHILS ABSOLUTE: 0 K/CU MM
BASOPHILS RELATIVE PERCENT: 0.1 % (ref 0–1)
BILIRUB SERPL-MCNC: 0.3 MG/DL (ref 0–1)
BILIRUBIN URINE: NEGATIVE MG/DL
BLOOD, URINE: ABNORMAL
BUN SERPL-MCNC: 72 MG/DL (ref 6–23)
CALCIUM SERPL-MCNC: 8 MG/DL (ref 8.3–10.6)
CALPROTECTIN STL-MCNT: 494 UG/G
CHLORIDE BLD-SCNC: 97 MMOL/L (ref 99–110)
CLARITY: CLEAR
CO2: 20 MMOL/L (ref 21–32)
COLOR: YELLOW
CREAT SERPL-MCNC: 6.7 MG/DL (ref 0.6–1.1)
DIFFERENTIAL TYPE: ABNORMAL
EOSINOPHILS ABSOLUTE: 0 K/CU MM
EOSINOPHILS RELATIVE PERCENT: 0 % (ref 0–3)
GFR SERPL CREATININE-BSD FRML MDRD: 6 ML/MIN/1.73M2
GLUCOSE SERPL-MCNC: 116 MG/DL (ref 70–99)
GLUCOSE, URINE: NEGATIVE MG/DL
HCT VFR BLD CALC: 23.5 % (ref 37–47)
HEMOGLOBIN: 7.7 GM/DL (ref 12.5–16)
IMMATURE NEUTROPHIL %: 10.7 % (ref 0–0.43)
KETONES, URINE: NEGATIVE MG/DL
LEUKOCYTE ESTERASE, URINE: NEGATIVE
LYMPHOCYTES ABSOLUTE: 0.5 K/CU MM
LYMPHOCYTES RELATIVE PERCENT: 6.6 % (ref 24–44)
MCH RBC QN AUTO: 28.4 PG (ref 27–31)
MCHC RBC AUTO-ENTMCNC: 32.8 % (ref 32–36)
MCV RBC AUTO: 86.7 FL (ref 78–100)
MONOCYTES ABSOLUTE: 0.3 K/CU MM
MONOCYTES RELATIVE PERCENT: 4.1 % (ref 0–4)
NITRITE URINE, QUANTITATIVE: NEGATIVE
NUCLEATED RBC %: 1.7 %
PDW BLD-RTO: 17.2 % (ref 11.7–14.9)
PH, URINE: 7 (ref 5–8)
PLATELET # BLD: 319 K/CU MM (ref 140–440)
PMV BLD AUTO: 10 FL (ref 7.5–11.1)
POTASSIUM SERPL-SCNC: 4.2 MMOL/L (ref 3.5–5.1)
PROTEIN UA: NEGATIVE MG/DL
RBC # BLD: 2.71 M/CU MM (ref 4.2–5.4)
RBC URINE: 2 /HPF (ref 0–6)
SEGMENTED NEUTROPHILS ABSOLUTE COUNT: 6 K/CU MM
SEGMENTED NEUTROPHILS RELATIVE PERCENT: 78.5 % (ref 36–66)
SODIUM BLD-SCNC: 134 MMOL/L (ref 135–145)
SPECIFIC GRAVITY UA: 1.01 (ref 1–1.03)
TOTAL IMMATURE NEUTOROPHIL: 0.81 K/CU MM
TOTAL NUCLEATED RBC: 0.1 K/CU MM
TOTAL PROTEIN: 5.1 GM/DL (ref 6.4–8.2)
TRICHOMONAS: NORMAL /HPF
UROBILINOGEN, URINE: 0.2 MG/DL (ref 0.2–1)
WBC # BLD: 7.6 K/CU MM (ref 4–10.5)
WBC UA: <1 /HPF (ref 0–5)
YEAST: NORMAL /HPF

## 2024-03-31 PROCEDURE — 80053 COMPREHEN METABOLIC PANEL: CPT

## 2024-03-31 PROCEDURE — 81001 URINALYSIS AUTO W/SCOPE: CPT

## 2024-03-31 PROCEDURE — 2140000000 HC CCU INTERMEDIATE R&B

## 2024-03-31 PROCEDURE — 6360000002 HC RX W HCPCS: Performed by: INTERNAL MEDICINE

## 2024-03-31 PROCEDURE — 6370000000 HC RX 637 (ALT 250 FOR IP): Performed by: NURSE PRACTITIONER

## 2024-03-31 PROCEDURE — 94761 N-INVAS EAR/PLS OXIMETRY MLT: CPT

## 2024-03-31 PROCEDURE — 6370000000 HC RX 637 (ALT 250 FOR IP): Performed by: STUDENT IN AN ORGANIZED HEALTH CARE EDUCATION/TRAINING PROGRAM

## 2024-03-31 PROCEDURE — 6370000000 HC RX 637 (ALT 250 FOR IP)

## 2024-03-31 PROCEDURE — 51798 US URINE CAPACITY MEASURE: CPT

## 2024-03-31 PROCEDURE — 2580000003 HC RX 258: Performed by: NURSE PRACTITIONER

## 2024-03-31 PROCEDURE — 6360000002 HC RX W HCPCS: Performed by: NURSE PRACTITIONER

## 2024-03-31 PROCEDURE — 6370000000 HC RX 637 (ALT 250 FOR IP): Performed by: INTERNAL MEDICINE

## 2024-03-31 PROCEDURE — 85025 COMPLETE CBC W/AUTO DIFF WBC: CPT

## 2024-03-31 RX ADMIN — METHYLPREDNISOLONE SODIUM SUCCINATE 60 MG: 125 INJECTION, POWDER, FOR SOLUTION INTRAMUSCULAR; INTRAVENOUS at 08:25

## 2024-03-31 RX ADMIN — ISOSORBIDE DINITRATE 10 MG: 10 TABLET ORAL at 16:21

## 2024-03-31 RX ADMIN — ASPIRIN 81 MG: 81 TABLET, CHEWABLE ORAL at 08:24

## 2024-03-31 RX ADMIN — CLOPIDOGREL BISULFATE 75 MG: 75 TABLET ORAL at 08:25

## 2024-03-31 RX ADMIN — ISOSORBIDE DINITRATE 10 MG: 10 TABLET ORAL at 08:25

## 2024-03-31 RX ADMIN — SODIUM CHLORIDE, PRESERVATIVE FREE 10 ML: 5 INJECTION INTRAVENOUS at 08:26

## 2024-03-31 RX ADMIN — CIPROFLOXACIN 1 DROP: 3 SOLUTION OPHTHALMIC at 18:05

## 2024-03-31 RX ADMIN — CIPROFLOXACIN 1 DROP: 3 SOLUTION OPHTHALMIC at 06:30

## 2024-03-31 RX ADMIN — METOPROLOL SUCCINATE 25 MG: 25 TABLET, FILM COATED, EXTENDED RELEASE ORAL at 08:24

## 2024-03-31 RX ADMIN — SODIUM BICARBONATE 650 MG: 650 TABLET ORAL at 08:24

## 2024-03-31 RX ADMIN — FUROSEMIDE 80 MG: 10 INJECTION, SOLUTION INTRAMUSCULAR; INTRAVENOUS at 08:25

## 2024-03-31 RX ADMIN — FUROSEMIDE 80 MG: 10 INJECTION, SOLUTION INTRAMUSCULAR; INTRAVENOUS at 14:57

## 2024-03-31 RX ADMIN — CIPROFLOXACIN 1 DROP: 3 SOLUTION OPHTHALMIC at 08:27

## 2024-03-31 RX ADMIN — GUAIFENESIN SYRUP AND DEXTROMETHORPHAN 5 ML: 100; 10 SYRUP ORAL at 11:13

## 2024-03-31 RX ADMIN — SODIUM BICARBONATE 650 MG: 650 TABLET ORAL at 16:21

## 2024-03-31 RX ADMIN — PANTOPRAZOLE SODIUM 40 MG: 40 TABLET, DELAYED RELEASE ORAL at 16:21

## 2024-03-31 RX ADMIN — ONDANSETRON 4 MG: 2 INJECTION INTRAMUSCULAR; INTRAVENOUS at 11:48

## 2024-03-31 RX ADMIN — HYDRALAZINE HYDROCHLORIDE 25 MG: 25 TABLET ORAL at 14:57

## 2024-03-31 RX ADMIN — SODIUM BICARBONATE 650 MG: 650 TABLET ORAL at 14:57

## 2024-03-31 RX ADMIN — PANTOPRAZOLE SODIUM 40 MG: 40 TABLET, DELAYED RELEASE ORAL at 06:29

## 2024-03-31 RX ADMIN — CIPROFLOXACIN 1 DROP: 3 SOLUTION OPHTHALMIC at 22:11

## 2024-03-31 RX ADMIN — CIPROFLOXACIN 1 DROP: 3 SOLUTION OPHTHALMIC at 14:58

## 2024-03-31 RX ADMIN — ISOSORBIDE DINITRATE 10 MG: 10 TABLET ORAL at 22:06

## 2024-03-31 RX ADMIN — HYDRALAZINE HYDROCHLORIDE 25 MG: 25 TABLET ORAL at 06:29

## 2024-03-31 RX ADMIN — HYDRALAZINE HYDROCHLORIDE 25 MG: 25 TABLET ORAL at 22:06

## 2024-03-31 RX ADMIN — METOLAZONE 5 MG: 2.5 TABLET ORAL at 08:24

## 2024-03-31 ASSESSMENT — PAIN DESCRIPTION - LOCATION: LOCATION: ABDOMEN

## 2024-03-31 ASSESSMENT — PAIN SCALES - GENERAL: PAINLEVEL_OUTOF10: 5

## 2024-03-31 NOTE — PLAN OF CARE
Problem: Discharge Planning  Goal: Discharge to home or other facility with appropriate resources  3/31/2024 1029 by Carmina Win, RN  Outcome: Progressing  3/30/2024 2045 by Lake Wright RN  Outcome: Progressing     Problem: Pain  Goal: Verbalizes/displays adequate comfort level or baseline comfort level  3/31/2024 1029 by Carmina Win, RN  Outcome: Progressing  3/30/2024 2045 by Lake Wright RN  Outcome: Progressing     Problem: Safety - Adult  Goal: Free from fall injury  3/31/2024 1029 by Carmina Win, RN  Outcome: Progressing  3/30/2024 2045 by Lake Wright RN  Outcome: Progressing     Problem: Skin/Tissue Integrity  Goal: Absence of new skin breakdown  Description: 1.  Monitor for areas of redness and/or skin breakdown  2.  Assess vascular access sites hourly  3.  Every 4-6 hours minimum:  Change oxygen saturation probe site  4.  Every 4-6 hours:  If on nasal continuous positive airway pressure, respiratory therapy assess nares and determine need for appliance change or resting period.  Outcome: Progressing

## 2024-03-31 NOTE — PLAN OF CARE
Problem: Discharge Planning  Goal: Discharge to home or other facility with appropriate resources  3/30/2024 2045 by Lake Wright RN  Outcome: Progressing  3/30/2024 1519 by Rik Cole RN  Outcome: Progressing     Problem: Pain  Goal: Verbalizes/displays adequate comfort level or baseline comfort level  3/30/2024 2045 by Lake Wright RN  Outcome: Progressing  3/30/2024 1519 by Rik Cole RN  Outcome: Progressing     Problem: Safety - Adult  Goal: Free from fall injury  3/30/2024 2045 by Lake Wright RN  Outcome: Progressing  3/30/2024 1519 by Rik Cole RN  Outcome: Progressing     Problem: Skin/Tissue Integrity  Goal: Absence of new skin breakdown  Description: 1.  Monitor for areas of redness and/or skin breakdown  2.  Assess vascular access sites hourly  3.  Every 4-6 hours minimum:  Change oxygen saturation probe site  4.  Every 4-6 hours:  If on nasal continuous positive airway pressure, respiratory therapy assess nares and determine need for appliance change or resting period.  3/30/2024 1519 by Rik Cole, RN  Outcome: Progressing

## 2024-04-01 LAB
ALBUMIN SERPL-MCNC: 3.3 GM/DL (ref 3.4–5)
ALP BLD-CCNC: 53 IU/L (ref 40–128)
ALT SERPL-CCNC: 9 U/L (ref 10–40)
ANION GAP SERPL CALCULATED.3IONS-SCNC: 18 MMOL/L (ref 7–16)
APTT: 28.2 SECONDS (ref 25.1–37.1)
AST SERPL-CCNC: 12 IU/L (ref 15–37)
BANDED NEUTROPHILS ABSOLUTE COUNT: 0.35 K/CU MM
BANDED NEUTROPHILS RELATIVE PERCENT: 4 % (ref 5–11)
BILIRUB SERPL-MCNC: 0.3 MG/DL (ref 0–1)
BUN SERPL-MCNC: 78 MG/DL (ref 6–23)
CALCIUM SERPL-MCNC: 7.9 MG/DL (ref 8.3–10.6)
CHLORIDE BLD-SCNC: 92 MMOL/L (ref 99–110)
CO2: 20 MMOL/L (ref 21–32)
CREAT SERPL-MCNC: 6.9 MG/DL (ref 0.6–1.1)
DIFFERENTIAL TYPE: ABNORMAL
GFR SERPL CREATININE-BSD FRML MDRD: 6 ML/MIN/1.73M2
GLUCOSE BLD-MCNC: 94 MG/DL (ref 70–99)
GLUCOSE SERPL-MCNC: 109 MG/DL (ref 70–99)
HCT VFR BLD CALC: 25.7 % (ref 37–47)
HEMOGLOBIN: 7.6 GM/DL (ref 12.5–16)
INR BLD: 1.5 INDEX
LYMPHOCYTES ABSOLUTE: 0.5 K/CU MM
LYMPHOCYTES RELATIVE PERCENT: 6 % (ref 24–44)
MCH RBC QN AUTO: 27.6 PG (ref 27–31)
MCHC RBC AUTO-ENTMCNC: 29.6 % (ref 32–36)
MCV RBC AUTO: 93.5 FL (ref 78–100)
METAMYELOCYTES ABSOLUTE COUNT: 0.09 K/CU MM
METAMYELOCYTES PERCENT: 1 %
MONOCYTES ABSOLUTE: 0.4 K/CU MM
MONOCYTES RELATIVE PERCENT: 5 % (ref 0–4)
MYELOCYTE PERCENT: 1 %
MYELOCYTES ABSOLUTE COUNT: 0.09 K/CU MM
PDW BLD-RTO: 17.8 % (ref 11.7–14.9)
PLATELET # BLD: 291 K/CU MM (ref 140–440)
PMV BLD AUTO: 10.2 FL (ref 7.5–11.1)
POTASSIUM SERPL-SCNC: 4.3 MMOL/L (ref 3.5–5.1)
PROTHROMBIN TIME: 18.9 SECONDS (ref 11.7–14.5)
RBC # BLD: 2.75 M/CU MM (ref 4.2–5.4)
RBC # BLD: ABNORMAL 10*6/UL
SEGMENTED NEUTROPHILS ABSOLUTE COUNT: 7.3 K/CU MM
SEGMENTED NEUTROPHILS RELATIVE PERCENT: 83 % (ref 36–66)
SODIUM BLD-SCNC: 130 MMOL/L (ref 135–145)
TOTAL PROTEIN: 5 GM/DL (ref 6.4–8.2)
WBC # BLD: 8.7 K/CU MM (ref 4–10.5)

## 2024-04-01 PROCEDURE — 82962 GLUCOSE BLOOD TEST: CPT

## 2024-04-01 PROCEDURE — 6370000000 HC RX 637 (ALT 250 FOR IP): Performed by: NURSE PRACTITIONER

## 2024-04-01 PROCEDURE — 6360000002 HC RX W HCPCS: Performed by: INTERNAL MEDICINE

## 2024-04-01 PROCEDURE — 97116 GAIT TRAINING THERAPY: CPT

## 2024-04-01 PROCEDURE — 36415 COLL VENOUS BLD VENIPUNCTURE: CPT

## 2024-04-01 PROCEDURE — 97166 OT EVAL MOD COMPLEX 45 MIN: CPT

## 2024-04-01 PROCEDURE — 85007 BL SMEAR W/DIFF WBC COUNT: CPT

## 2024-04-01 PROCEDURE — 6360000002 HC RX W HCPCS: Performed by: NURSE PRACTITIONER

## 2024-04-01 PROCEDURE — 85730 THROMBOPLASTIN TIME PARTIAL: CPT

## 2024-04-01 PROCEDURE — 97162 PT EVAL MOD COMPLEX 30 MIN: CPT

## 2024-04-01 PROCEDURE — 80053 COMPREHEN METABOLIC PANEL: CPT

## 2024-04-01 PROCEDURE — 6370000000 HC RX 637 (ALT 250 FOR IP): Performed by: INTERNAL MEDICINE

## 2024-04-01 PROCEDURE — 2140000000 HC CCU INTERMEDIATE R&B

## 2024-04-01 PROCEDURE — 2580000003 HC RX 258: Performed by: NURSE PRACTITIONER

## 2024-04-01 PROCEDURE — 6370000000 HC RX 637 (ALT 250 FOR IP): Performed by: STUDENT IN AN ORGANIZED HEALTH CARE EDUCATION/TRAINING PROGRAM

## 2024-04-01 PROCEDURE — 97535 SELF CARE MNGMENT TRAINING: CPT

## 2024-04-01 PROCEDURE — 85610 PROTHROMBIN TIME: CPT

## 2024-04-01 PROCEDURE — 85027 COMPLETE CBC AUTOMATED: CPT

## 2024-04-01 PROCEDURE — 6370000000 HC RX 637 (ALT 250 FOR IP)

## 2024-04-01 PROCEDURE — 94761 N-INVAS EAR/PLS OXIMETRY MLT: CPT

## 2024-04-01 RX ORDER — CLOPIDOGREL BISULFATE 75 MG/1
75 TABLET ORAL DAILY
Status: DISCONTINUED | OUTPATIENT
Start: 2024-04-01 | End: 2024-04-09 | Stop reason: HOSPADM

## 2024-04-01 RX ORDER — ASPIRIN 81 MG/1
81 TABLET, CHEWABLE ORAL DAILY
Status: DISCONTINUED | OUTPATIENT
Start: 2024-04-01 | End: 2024-04-06

## 2024-04-01 RX ADMIN — CIPROFLOXACIN 1 DROP: 3 SOLUTION OPHTHALMIC at 21:34

## 2024-04-01 RX ADMIN — SODIUM BICARBONATE 650 MG: 650 TABLET ORAL at 21:24

## 2024-04-01 RX ADMIN — HYDRALAZINE HYDROCHLORIDE 25 MG: 25 TABLET ORAL at 06:11

## 2024-04-01 RX ADMIN — SODIUM BICARBONATE 650 MG: 650 TABLET ORAL at 16:42

## 2024-04-01 RX ADMIN — FUROSEMIDE 80 MG: 10 INJECTION, SOLUTION INTRAMUSCULAR; INTRAVENOUS at 08:55

## 2024-04-01 RX ADMIN — HYDRALAZINE HYDROCHLORIDE 25 MG: 25 TABLET ORAL at 21:24

## 2024-04-01 RX ADMIN — CIPROFLOXACIN 1 DROP: 3 SOLUTION OPHTHALMIC at 06:11

## 2024-04-01 RX ADMIN — ONDANSETRON 4 MG: 2 INJECTION INTRAMUSCULAR; INTRAVENOUS at 08:55

## 2024-04-01 RX ADMIN — CIPROFLOXACIN 1 DROP: 3 SOLUTION OPHTHALMIC at 16:42

## 2024-04-01 RX ADMIN — SODIUM BICARBONATE 650 MG: 650 TABLET ORAL at 12:50

## 2024-04-01 RX ADMIN — SODIUM CHLORIDE, PRESERVATIVE FREE 10 ML: 5 INJECTION INTRAVENOUS at 21:25

## 2024-04-01 RX ADMIN — HYDRALAZINE HYDROCHLORIDE 25 MG: 25 TABLET ORAL at 12:50

## 2024-04-01 RX ADMIN — SULFAMETHOXAZOLE AND TRIMETHOPRIM 1 TABLET: 400; 80 TABLET ORAL at 12:50

## 2024-04-01 RX ADMIN — ONDANSETRON 4 MG: 2 INJECTION INTRAMUSCULAR; INTRAVENOUS at 23:16

## 2024-04-01 RX ADMIN — ISOSORBIDE DINITRATE 10 MG: 10 TABLET ORAL at 21:41

## 2024-04-01 RX ADMIN — METHYLPREDNISOLONE SODIUM SUCCINATE 60 MG: 125 INJECTION, POWDER, FOR SOLUTION INTRAMUSCULAR; INTRAVENOUS at 08:55

## 2024-04-01 RX ADMIN — ROSUVASTATIN CALCIUM 10 MG: 5 TABLET, COATED ORAL at 21:24

## 2024-04-01 RX ADMIN — FUROSEMIDE 80 MG: 10 INJECTION, SOLUTION INTRAMUSCULAR; INTRAVENOUS at 12:50

## 2024-04-01 RX ADMIN — CIPROFLOXACIN 1 DROP: 3 SOLUTION OPHTHALMIC at 09:00

## 2024-04-01 RX ADMIN — CIPROFLOXACIN 1 DROP: 3 SOLUTION OPHTHALMIC at 12:51

## 2024-04-01 RX ADMIN — PANTOPRAZOLE SODIUM 40 MG: 40 TABLET, DELAYED RELEASE ORAL at 06:11

## 2024-04-01 RX ADMIN — PANTOPRAZOLE SODIUM 40 MG: 40 TABLET, DELAYED RELEASE ORAL at 16:42

## 2024-04-01 RX ADMIN — FUROSEMIDE 80 MG: 10 INJECTION, SOLUTION INTRAMUSCULAR; INTRAVENOUS at 21:24

## 2024-04-01 RX ADMIN — ISOSORBIDE DINITRATE 10 MG: 10 TABLET ORAL at 12:50

## 2024-04-01 RX ADMIN — SODIUM CHLORIDE, PRESERVATIVE FREE 10 ML: 5 INJECTION INTRAVENOUS at 08:55

## 2024-04-01 ASSESSMENT — PAIN SCALES - GENERAL: PAINLEVEL_OUTOF10: 3

## 2024-04-01 ASSESSMENT — PAIN SCALES - WONG BAKER: WONGBAKER_NUMERICALRESPONSE: NO HURT

## 2024-04-01 NOTE — CARE COORDINATION
Met with pt and daughter for a f/u visit for d/c planning.  Discussed home vs SNF.  Daughter states that pt is very weak.  Pt states that she would consider going to rehab if needed.  Daughter states that she would want her to go to [facility] if she needs to go to a SNF d/t daughter works there.  D/c plan is home with spouse and [facility] vs SNF.  PT/OT ordered and requested.  CM to f/u after PT/OT rec's.  TE

## 2024-04-01 NOTE — DISCHARGE INSTR - COC
Discharging to Facility/ Agency   Name: [facility]  Address: [address]  Phone: [phone]  Fax: [phone]    Dialysis Facility (if applicable)   Name:  Address:  Dialysis Schedule:  Phone:  Fax:    PHYSICIAN SECTION    Nutrition Therapy:  Current Nutrition Therapy:   - Oral Diet:  General    Routes of Feeding: Oral  Liquids: No Restrictions  Daily Fluid Restriction: no  Last Modified Barium Swallow with Video (Video Swallowing Test): not done    Treatments at the Time of Hospital Discharge:   Respiratory Treatments:   Oxygen Therapy:  is not on home oxygen therapy.  Ventilator:    - No ventilator support    Rehab Therapies: Physical Therapy and Occupational Therapy  Weight Bearing Status/Restrictions: No weight bearing restrictions  Other Medical Equipment (for information only, NOT a DME order):    Other Treatments:     Prognosis: Good    Condition at Discharge: Stable    Rehab Potential (if transferring to Rehab): Good    Recommended Labs or Other Treatments After Discharge:     Repeat CMP in 3 days       Physician Certification: I certify the above information and transfer of Adenike Harvey  is necessary for the continuing treatment of the diagnosis listed and that she requires Skilled Nursing Facility for less 30 days.     Update Admission H&P: No change in H&P    PHYSICIAN SIGNATURE:  Electronically signed by Lauren Ga MD on 4/8/24 at 4:09 PM EDT

## 2024-04-01 NOTE — CARE COORDINATION
Met with pt to discuss the PT recommendation for SNF.  Pt is agreeable and wants to go to [facility] d/t her daughter works there.  SNF list was offered.  Referral made to [facility].  [facility] to review and notify CM of decision to accept or not.  Pt will require a pre-cert if accepted.  TE

## 2024-04-02 ENCOUNTER — APPOINTMENT (OUTPATIENT)
Dept: GENERAL RADIOLOGY | Age: 69
DRG: 871 | End: 2024-04-02
Payer: MEDICARE

## 2024-04-02 LAB
ALBUMIN SERPL-MCNC: 3.4 GM/DL (ref 3.4–5)
ALP BLD-CCNC: 53 IU/L (ref 40–129)
ALT SERPL-CCNC: 8 U/L (ref 10–40)
ANION GAP SERPL CALCULATED.3IONS-SCNC: 18 MMOL/L (ref 7–16)
AST SERPL-CCNC: 10 IU/L (ref 15–37)
BILIRUB SERPL-MCNC: 0.3 MG/DL (ref 0–1)
BUN SERPL-MCNC: 87 MG/DL (ref 6–23)
CALCIUM SERPL-MCNC: 8 MG/DL (ref 8.3–10.6)
CHLORIDE BLD-SCNC: 91 MMOL/L (ref 99–110)
CO2: 25 MMOL/L (ref 21–32)
CREAT SERPL-MCNC: 7.1 MG/DL (ref 0.6–1.1)
DIFFERENTIAL TYPE: ABNORMAL
GFR SERPL CREATININE-BSD FRML MDRD: 6 ML/MIN/1.73M2
GLUCOSE SERPL-MCNC: 112 MG/DL (ref 70–99)
HCT VFR BLD CALC: 24.7 % (ref 37–47)
HEMOGLOBIN: 8.2 GM/DL (ref 12.5–16)
LYMPHOCYTES ABSOLUTE: 0.9 K/CU MM
LYMPHOCYTES RELATIVE PERCENT: 6 % (ref 24–44)
MAGNESIUM: 2 MG/DL (ref 1.8–2.4)
MCH RBC QN AUTO: 28.6 PG (ref 27–31)
MCHC RBC AUTO-ENTMCNC: 33.2 % (ref 32–36)
MCV RBC AUTO: 86.1 FL (ref 78–100)
MONOCYTES ABSOLUTE: 0.4 K/CU MM
MONOCYTES RELATIVE PERCENT: 3 % (ref 0–4)
PDW BLD-RTO: 17 % (ref 11.7–14.9)
PLATELET # BLD: 316 K/CU MM (ref 140–440)
PMV BLD AUTO: 10 FL (ref 7.5–11.1)
POTASSIUM SERPL-SCNC: 3.5 MMOL/L (ref 3.5–5.1)
RBC # BLD: 2.87 M/CU MM (ref 4.2–5.4)
SEGMENTED NEUTROPHILS ABSOLUTE COUNT: 13.3 K/CU MM
SEGMENTED NEUTROPHILS RELATIVE PERCENT: 91 % (ref 36–66)
SODIUM BLD-SCNC: 134 MMOL/L (ref 135–145)
TARGET CELLS: ABNORMAL
TOTAL PROTEIN: 5.1 GM/DL (ref 6.4–8.2)
WBC # BLD: 14.6 K/CU MM (ref 4–10.5)

## 2024-04-02 PROCEDURE — 6360000002 HC RX W HCPCS: Performed by: NURSE PRACTITIONER

## 2024-04-02 PROCEDURE — 71045 X-RAY EXAM CHEST 1 VIEW: CPT

## 2024-04-02 PROCEDURE — 85027 COMPLETE CBC AUTOMATED: CPT

## 2024-04-02 PROCEDURE — 6370000000 HC RX 637 (ALT 250 FOR IP)

## 2024-04-02 PROCEDURE — 6360000002 HC RX W HCPCS: Performed by: INTERNAL MEDICINE

## 2024-04-02 PROCEDURE — 2580000003 HC RX 258: Performed by: NURSE PRACTITIONER

## 2024-04-02 PROCEDURE — 6370000000 HC RX 637 (ALT 250 FOR IP): Performed by: NURSE PRACTITIONER

## 2024-04-02 PROCEDURE — 83735 ASSAY OF MAGNESIUM: CPT

## 2024-04-02 PROCEDURE — 85007 BL SMEAR W/DIFF WBC COUNT: CPT

## 2024-04-02 PROCEDURE — 80053 COMPREHEN METABOLIC PANEL: CPT

## 2024-04-02 PROCEDURE — 6370000000 HC RX 637 (ALT 250 FOR IP): Performed by: LICENSED PRACTICAL NURSE

## 2024-04-02 PROCEDURE — 2140000000 HC CCU INTERMEDIATE R&B

## 2024-04-02 PROCEDURE — 6370000000 HC RX 637 (ALT 250 FOR IP): Performed by: STUDENT IN AN ORGANIZED HEALTH CARE EDUCATION/TRAINING PROGRAM

## 2024-04-02 RX ORDER — SPIRONOLACTONE 50 MG/1
25 TABLET, FILM COATED ORAL DAILY
Status: DISCONTINUED | OUTPATIENT
Start: 2024-04-02 | End: 2024-04-09 | Stop reason: HOSPADM

## 2024-04-02 RX ADMIN — PANTOPRAZOLE SODIUM 40 MG: 40 TABLET, DELAYED RELEASE ORAL at 16:14

## 2024-04-02 RX ADMIN — PANTOPRAZOLE SODIUM 40 MG: 40 TABLET, DELAYED RELEASE ORAL at 05:29

## 2024-04-02 RX ADMIN — ONDANSETRON 4 MG: 2 INJECTION INTRAMUSCULAR; INTRAVENOUS at 08:19

## 2024-04-02 RX ADMIN — ISOSORBIDE DINITRATE 10 MG: 10 TABLET ORAL at 13:25

## 2024-04-02 RX ADMIN — ROSUVASTATIN CALCIUM 10 MG: 5 TABLET, COATED ORAL at 21:24

## 2024-04-02 RX ADMIN — HYDRALAZINE HYDROCHLORIDE 25 MG: 25 TABLET ORAL at 13:25

## 2024-04-02 RX ADMIN — SODIUM BICARBONATE 650 MG: 650 TABLET ORAL at 21:24

## 2024-04-02 RX ADMIN — METHYLPREDNISOLONE SODIUM SUCCINATE 60 MG: 125 INJECTION, POWDER, FOR SOLUTION INTRAMUSCULAR; INTRAVENOUS at 10:06

## 2024-04-02 RX ADMIN — SODIUM CHLORIDE, PRESERVATIVE FREE 10 ML: 5 INJECTION INTRAVENOUS at 08:19

## 2024-04-02 RX ADMIN — SODIUM BICARBONATE 650 MG: 650 TABLET ORAL at 13:25

## 2024-04-02 RX ADMIN — ISOSORBIDE DINITRATE 10 MG: 10 TABLET ORAL at 21:24

## 2024-04-02 RX ADMIN — FUROSEMIDE 80 MG: 10 INJECTION, SOLUTION INTRAMUSCULAR; INTRAVENOUS at 10:06

## 2024-04-02 RX ADMIN — ONDANSETRON 4 MG: 2 INJECTION INTRAMUSCULAR; INTRAVENOUS at 16:15

## 2024-04-02 RX ADMIN — CIPROFLOXACIN 1 DROP: 3 SOLUTION OPHTHALMIC at 21:31

## 2024-04-02 RX ADMIN — FUROSEMIDE 80 MG: 10 INJECTION, SOLUTION INTRAMUSCULAR; INTRAVENOUS at 21:25

## 2024-04-02 RX ADMIN — CIPROFLOXACIN 1 DROP: 3 SOLUTION OPHTHALMIC at 10:08

## 2024-04-02 RX ADMIN — FUROSEMIDE 80 MG: 10 INJECTION, SOLUTION INTRAMUSCULAR; INTRAVENOUS at 13:25

## 2024-04-02 RX ADMIN — SODIUM BICARBONATE 650 MG: 650 TABLET ORAL at 16:14

## 2024-04-02 RX ADMIN — SODIUM CHLORIDE, PRESERVATIVE FREE 10 ML: 5 INJECTION INTRAVENOUS at 21:28

## 2024-04-02 RX ADMIN — CIPROFLOXACIN 1 DROP: 3 SOLUTION OPHTHALMIC at 16:15

## 2024-04-02 RX ADMIN — CIPROFLOXACIN 1 DROP: 3 SOLUTION OPHTHALMIC at 05:29

## 2024-04-02 RX ADMIN — CIPROFLOXACIN 1 DROP: 3 SOLUTION OPHTHALMIC at 13:26

## 2024-04-02 RX ADMIN — ACETAMINOPHEN 650 MG: 325 TABLET ORAL at 16:14

## 2024-04-02 RX ADMIN — PROCHLORPERAZINE MALEATE 5 MG: 5 TABLET ORAL at 13:25

## 2024-04-02 ASSESSMENT — PAIN SCALES - GENERAL
PAINLEVEL_OUTOF10: 0
PAINLEVEL_OUTOF10: 0

## 2024-04-02 ASSESSMENT — PAIN SCALES - WONG BAKER
WONGBAKER_NUMERICALRESPONSE: NO HURT
WONGBAKER_NUMERICALRESPONSE: NO HURT

## 2024-04-02 NOTE — CARE COORDINATION
[facility] is agreeable to accept pt when a pre-cert is obtained.  to notify CM when to start the pre-cert. Updated pt, spouse and daughter at bedside.  D/c instructions are on front of packet located with the soft chart.  TE    DO NOT SEND PT TO SNF UNTIL A PRE-CERT IS OBTAINED

## 2024-04-02 NOTE — PLAN OF CARE
Problem: Discharge Planning  Goal: Discharge to home or other facility with appropriate resources  4/1/2024 1329 by Latosha Gaona, RN  Outcome: Progressing  4/1/2024 1018 by Helen Lau, RN  Outcome: Progressing     Problem: Pain  Goal: Verbalizes/displays adequate comfort level or baseline comfort level  Outcome: Progressing     Problem: Safety - Adult  Goal: Free from fall injury  Outcome: Progressing     Problem: Skin/Tissue Integrity  Goal: Absence of new skin breakdown  Description: 1.  Monitor for areas of redness and/or skin breakdown  2.  Assess vascular access sites hourly  3.  Every 4-6 hours minimum:  Change oxygen saturation probe site  4.  Every 4-6 hours:  If on nasal continuous positive airway pressure, respiratory therapy assess nares and determine need for appliance change or resting period.  Outcome: Progressing

## 2024-04-03 LAB
ALBUMIN SERPL-MCNC: 3.8 GM/DL (ref 3.4–5)
ALP BLD-CCNC: 57 IU/L (ref 40–128)
ALT SERPL-CCNC: 9 U/L (ref 10–40)
ANION GAP SERPL CALCULATED.3IONS-SCNC: 20 MMOL/L (ref 7–16)
AST SERPL-CCNC: 12 IU/L (ref 15–37)
BASOPHILS ABSOLUTE: 0 K/CU MM
BASOPHILS RELATIVE PERCENT: 0.1 % (ref 0–1)
BILIRUB SERPL-MCNC: 0.5 MG/DL (ref 0–1)
BUN SERPL-MCNC: 91 MG/DL (ref 6–23)
CALCIUM SERPL-MCNC: 8.1 MG/DL (ref 8.3–10.6)
CHLORIDE BLD-SCNC: 84 MMOL/L (ref 99–110)
CO2: 26 MMOL/L (ref 21–32)
CREAT SERPL-MCNC: 7.1 MG/DL (ref 0.6–1.1)
DIFFERENTIAL TYPE: ABNORMAL
EOSINOPHILS ABSOLUTE: 0 K/CU MM
EOSINOPHILS RELATIVE PERCENT: 0 % (ref 0–3)
GFR SERPL CREATININE-BSD FRML MDRD: 6 ML/MIN/1.73M2
GLUCOSE SERPL-MCNC: 100 MG/DL (ref 70–99)
HCT VFR BLD CALC: 25.8 % (ref 37–47)
HEMOGLOBIN: 8.3 GM/DL (ref 12.5–16)
IMMATURE NEUTROPHIL %: 4.1 % (ref 0–0.43)
LYMPHOCYTES ABSOLUTE: 0.7 K/CU MM
LYMPHOCYTES RELATIVE PERCENT: 3.9 % (ref 24–44)
MAGNESIUM: 1.9 MG/DL (ref 1.8–2.4)
MCH RBC QN AUTO: 27.9 PG (ref 27–31)
MCHC RBC AUTO-ENTMCNC: 32.2 % (ref 32–36)
MCV RBC AUTO: 86.6 FL (ref 78–100)
MONOCYTES ABSOLUTE: 0.6 K/CU MM
MONOCYTES RELATIVE PERCENT: 3.3 % (ref 0–4)
NUCLEATED RBC %: 0.5 %
PDW BLD-RTO: 16.7 % (ref 11.7–14.9)
PHOSPHORUS: 5.5 MG/DL (ref 2.5–4.9)
PLATELET # BLD: 277 K/CU MM (ref 140–440)
PMV BLD AUTO: 9.6 FL (ref 7.5–11.1)
POTASSIUM SERPL-SCNC: 3.2 MMOL/L (ref 3.5–5.1)
PROCALCITONIN SERPL-MCNC: 0.41 NG/ML
RBC # BLD: 2.98 M/CU MM (ref 4.2–5.4)
SEGMENTED NEUTROPHILS ABSOLUTE COUNT: 15.2 K/CU MM
SEGMENTED NEUTROPHILS RELATIVE PERCENT: 88.6 % (ref 36–66)
SODIUM BLD-SCNC: 130 MMOL/L (ref 135–145)
TOTAL IMMATURE NEUTOROPHIL: 0.71 K/CU MM
TOTAL NUCLEATED RBC: 0.1 K/CU MM
TOTAL PROTEIN: 5.7 GM/DL (ref 6.4–8.2)
WBC # BLD: 17.2 K/CU MM (ref 4–10.5)

## 2024-04-03 PROCEDURE — 94761 N-INVAS EAR/PLS OXIMETRY MLT: CPT

## 2024-04-03 PROCEDURE — 85025 COMPLETE CBC W/AUTO DIFF WBC: CPT

## 2024-04-03 PROCEDURE — 36415 COLL VENOUS BLD VENIPUNCTURE: CPT

## 2024-04-03 PROCEDURE — 84145 PROCALCITONIN (PCT): CPT

## 2024-04-03 PROCEDURE — 6370000000 HC RX 637 (ALT 250 FOR IP): Performed by: STUDENT IN AN ORGANIZED HEALTH CARE EDUCATION/TRAINING PROGRAM

## 2024-04-03 PROCEDURE — 97530 THERAPEUTIC ACTIVITIES: CPT

## 2024-04-03 PROCEDURE — 2140000000 HC CCU INTERMEDIATE R&B

## 2024-04-03 PROCEDURE — 6370000000 HC RX 637 (ALT 250 FOR IP): Performed by: NURSE PRACTITIONER

## 2024-04-03 PROCEDURE — 6370000000 HC RX 637 (ALT 250 FOR IP)

## 2024-04-03 PROCEDURE — 97112 NEUROMUSCULAR REEDUCATION: CPT

## 2024-04-03 PROCEDURE — 80053 COMPREHEN METABOLIC PANEL: CPT

## 2024-04-03 PROCEDURE — 84100 ASSAY OF PHOSPHORUS: CPT

## 2024-04-03 PROCEDURE — 2580000003 HC RX 258: Performed by: NURSE PRACTITIONER

## 2024-04-03 PROCEDURE — 83735 ASSAY OF MAGNESIUM: CPT

## 2024-04-03 PROCEDURE — 6370000000 HC RX 637 (ALT 250 FOR IP): Performed by: INTERNAL MEDICINE

## 2024-04-03 PROCEDURE — 87040 BLOOD CULTURE FOR BACTERIA: CPT

## 2024-04-03 RX ORDER — PANTOPRAZOLE SODIUM 40 MG/1
40 TABLET, DELAYED RELEASE ORAL
Status: DISCONTINUED | OUTPATIENT
Start: 2024-04-03 | End: 2024-04-09 | Stop reason: HOSPADM

## 2024-04-03 RX ORDER — PREDNISONE 20 MG/1
60 TABLET ORAL DAILY
Status: DISCONTINUED | OUTPATIENT
Start: 2024-04-03 | End: 2024-04-09 | Stop reason: HOSPADM

## 2024-04-03 RX ADMIN — ASPIRIN 81 MG: 81 TABLET, CHEWABLE ORAL at 09:19

## 2024-04-03 RX ADMIN — ROSUVASTATIN CALCIUM 10 MG: 5 TABLET, COATED ORAL at 21:31

## 2024-04-03 RX ADMIN — SODIUM CHLORIDE, PRESERVATIVE FREE 10 ML: 5 INJECTION INTRAVENOUS at 21:34

## 2024-04-03 RX ADMIN — POTASSIUM BICARBONATE 40 MEQ: 782 TABLET, EFFERVESCENT ORAL at 21:31

## 2024-04-03 RX ADMIN — CIPROFLOXACIN 1 DROP: 3 SOLUTION OPHTHALMIC at 09:20

## 2024-04-03 RX ADMIN — PREDNISONE 60 MG: 20 TABLET ORAL at 09:19

## 2024-04-03 RX ADMIN — SULFAMETHOXAZOLE AND TRIMETHOPRIM 1 TABLET: 400; 80 TABLET ORAL at 12:29

## 2024-04-03 RX ADMIN — CIPROFLOXACIN 1 DROP: 3 SOLUTION OPHTHALMIC at 06:20

## 2024-04-03 RX ADMIN — CIPROFLOXACIN 1 DROP: 3 SOLUTION OPHTHALMIC at 14:42

## 2024-04-03 RX ADMIN — ISOSORBIDE DINITRATE 10 MG: 10 TABLET ORAL at 21:31

## 2024-04-03 RX ADMIN — ISOSORBIDE DINITRATE 10 MG: 10 TABLET ORAL at 09:23

## 2024-04-03 RX ADMIN — PANTOPRAZOLE SODIUM 40 MG: 40 TABLET, DELAYED RELEASE ORAL at 16:26

## 2024-04-03 RX ADMIN — ISOSORBIDE DINITRATE 10 MG: 10 TABLET ORAL at 14:42

## 2024-04-03 RX ADMIN — POTASSIUM BICARBONATE 40 MEQ: 782 TABLET, EFFERVESCENT ORAL at 09:20

## 2024-04-03 RX ADMIN — CIPROFLOXACIN 1 DROP: 3 SOLUTION OPHTHALMIC at 16:26

## 2024-04-03 RX ADMIN — METOPROLOL SUCCINATE 25 MG: 25 TABLET, FILM COATED, EXTENDED RELEASE ORAL at 09:19

## 2024-04-03 RX ADMIN — CIPROFLOXACIN 1 DROP: 3 SOLUTION OPHTHALMIC at 21:33

## 2024-04-03 RX ADMIN — SPIRONOLACTONE 25 MG: 50 TABLET ORAL at 09:19

## 2024-04-03 RX ADMIN — CLOPIDOGREL BISULFATE 75 MG: 75 TABLET ORAL at 09:19

## 2024-04-03 RX ADMIN — PANTOPRAZOLE SODIUM 40 MG: 40 TABLET, DELAYED RELEASE ORAL at 06:08

## 2024-04-03 RX ADMIN — SODIUM CHLORIDE, PRESERVATIVE FREE 10 ML: 5 INJECTION INTRAVENOUS at 09:30

## 2024-04-03 ASSESSMENT — PAIN SCALES - WONG BAKER
WONGBAKER_NUMERICALRESPONSE: NO HURT

## 2024-04-03 ASSESSMENT — PAIN SCALES - GENERAL
PAINLEVEL_OUTOF10: 0

## 2024-04-03 NOTE — PLAN OF CARE
Problem: Discharge Planning  Goal: Discharge to home or other facility with appropriate resources  Outcome: Progressing     Problem: Pain  Goal: Verbalizes/displays adequate comfort level or baseline comfort level  Outcome: Progressing     Problem: Safety - Adult  Goal: Free from fall injury  Outcome: Progressing     Problem: Skin/Tissue Integrity  Goal: Absence of new skin breakdown  Description: 1.  Monitor for areas of redness and/or skin breakdown  2.  Assess vascular access sites hourly  3.  Every 4-6 hours minimum:  Change oxygen saturation probe site  4.  Every 4-6 hours:  If on nasal continuous positive airway pressure, respiratory therapy assess nares and determine need for appliance change or resting period.  Outcome: Progressing     Problem: Nutrition Deficit:  Goal: Optimize nutritional status  Outcome: Progressing  Flowsheets (Taken 4/2/2024 1428 by Paolo Traore, RD, LD)  Nutrient intake appropriate for improving, restoring, or maintaining nutritional needs:   Assess nutritional status and recommend course of action   Monitor oral intake, labs, and treatment plans   Recommend appropriate diets, oral nutritional supplements, and vitamin/mineral supplements

## 2024-04-03 NOTE — DISCHARGE INSTR - DIET
Good nutrition is important when healing from an illness, injury, or surgery.  Follow any nutrition recommendations given to you during your hospital stay.   If you were given an oral nutrition supplement while in the hospital, continue to take this supplement at home.  You can take it with meals, in-between meals, and/or before bedtime. These supplements can be purchased at most local grocery stores, pharmacies, and chain super-stores.   If you have any questions about your diet or nutrition, call the hospital and ask for the dietitian.    Due to malnutrition diagnosis, after discharge, RD recommends patient to drink high calorie, high protein oral nutrition supplements of greater than 200 calories per serving with at least or greater than 10 gm protein per serving, at least 2-3 times per day, to promote increased po intakes and weight gain. Coupons and High Calorie, High Protein Nutrition Therapy handouts provided.     Suggestions to follow at home to improve appetite:   Aim for small, frequent eating sessions. (I.e. 5-8 times per day of smaller portions)   Schedule eating times (I.e. every 2-4 hours would have a snack)   Enhance the eating environment (I.e. Eating with family and friends, watching favorite movie, or listening to favorite music with meal)   Identify problem smells taste, textures, and temperatures  Choose foods that are easy to chew and swallow   Avoid drinking fluids during eating session. Save drinks and sips between meals.     There is no need to apply all of these strategies at once.     Many patients benefit from adding 1 or 2 suggestions at a time to see how they affect their ability to eat, and then making an informed decision on how to proceed.   Please read Nutrition handout below:     High Calorie, High Protein Nutrition Therapy from Nutrition Care Manual     A high-calorie, high-protein diet has been recommended to you. Your registered dietitian nutritionist (RDN) may have recommended

## 2024-04-03 NOTE — PLAN OF CARE
Problem: Discharge Planning  Goal: Discharge to home or other facility with appropriate resources  4/3/2024 1012 by Helen Lau RN  Outcome: Progressing  4/3/2024 0213 by Teresa Milian RN  Outcome: Progressing     Problem: Pain  Goal: Verbalizes/displays adequate comfort level or baseline comfort level  4/3/2024 0213 by Teresa Milian RN  Outcome: Progressing     Problem: Safety - Adult  Goal: Free from fall injury  4/3/2024 0213 by Teresa Milian RN  Outcome: Progressing     Problem: Skin/Tissue Integrity  Goal: Absence of new skin breakdown  Description: 1.  Monitor for areas of redness and/or skin breakdown  2.  Assess vascular access sites hourly  3.  Every 4-6 hours minimum:  Change oxygen saturation probe site  4.  Every 4-6 hours:  If on nasal continuous positive airway pressure, respiratory therapy assess nares and determine need for appliance change or resting period.  4/3/2024 1012 by Helen Lau RN  Outcome: Progressing  4/3/2024 0213 by Teresa Milian RN  Outcome: Progressing     Problem: Nutrition Deficit:  Goal: Optimize nutritional status  4/3/2024 0213 by Teresa Milian RN  Outcome: Progressing  Flowsheets (Taken 4/2/2024 1428 by Paolo Traore, RICHIE, LD)  Nutrient intake appropriate for improving, restoring, or maintaining nutritional needs:   Assess nutritional status and recommend course of action   Monitor oral intake, labs, and treatment plans   Recommend appropriate diets, oral nutritional supplements, and vitamin/mineral supplements

## 2024-04-04 ENCOUNTER — APPOINTMENT (OUTPATIENT)
Dept: CT IMAGING | Age: 69
DRG: 871 | End: 2024-04-04
Payer: MEDICARE

## 2024-04-04 ENCOUNTER — APPOINTMENT (OUTPATIENT)
Dept: NON INVASIVE DIAGNOSTICS | Age: 69
DRG: 871 | End: 2024-04-04
Attending: INTERNAL MEDICINE
Payer: MEDICARE

## 2024-04-04 PROBLEM — J10.1 INFLUENZA A: Status: RESOLVED | Noted: 2024-03-05 | Resolved: 2024-04-04

## 2024-04-04 LAB
ALBUMIN SERPL-MCNC: 3.9 GM/DL (ref 3.4–5)
ALP BLD-CCNC: 66 IU/L (ref 40–129)
ALT SERPL-CCNC: 9 U/L (ref 10–40)
ANION GAP SERPL CALCULATED.3IONS-SCNC: 20 MMOL/L (ref 7–16)
ANISOCYTOSIS: ABNORMAL
AST SERPL-CCNC: 12 IU/L (ref 15–37)
BACTERIA: ABNORMAL /HPF
BANDED NEUTROPHILS ABSOLUTE COUNT: 1.94 K/CU MM
BANDED NEUTROPHILS RELATIVE PERCENT: 4 % (ref 5–11)
BILIRUB SERPL-MCNC: 0.5 MG/DL (ref 0–1)
BILIRUBIN URINE: NEGATIVE MG/DL
BLOOD, URINE: ABNORMAL
BUN SERPL-MCNC: 101 MG/DL (ref 6–23)
CALCIUM SERPL-MCNC: 8.4 MG/DL (ref 8.3–10.6)
CHLORIDE BLD-SCNC: 84 MMOL/L (ref 99–110)
CLARITY: ABNORMAL
CO2: 28 MMOL/L (ref 21–32)
COLOR: YELLOW
CREAT SERPL-MCNC: 7 MG/DL (ref 0.6–1.1)
DIFFERENTIAL TYPE: ABNORMAL
DIFFERENTIAL TYPE: ABNORMAL
GFR SERPL CREATININE-BSD FRML MDRD: 6 ML/MIN/1.73M2
GLUCOSE SERPL-MCNC: 99 MG/DL (ref 70–99)
GLUCOSE, URINE: 100 MG/DL
HCT VFR BLD CALC: 24.4 % (ref 37–47)
HCT VFR BLD CALC: 25.7 % (ref 37–47)
HEMOGLOBIN: 8.2 GM/DL (ref 12.5–16)
HEMOGLOBIN: 8.4 GM/DL (ref 12.5–16)
HIGH SENSITIVE C-REACTIVE PROTEIN: 180.2 MG/L (ref 0–5)
HYPERSEGMENTED NEUTROPHILS: PRESENT
HYPOCHROMIA: ABNORMAL
KETONES, URINE: NEGATIVE MG/DL
LACTATE: 1.5 MMOL/L (ref 0.5–1.9)
LEUKOCYTE ESTERASE, URINE: ABNORMAL
LYMPHOCYTES ABSOLUTE: 0.5 K/CU MM
LYMPHOCYTES ABSOLUTE: 1.5 K/CU MM
LYMPHOCYTES RELATIVE PERCENT: 1 % (ref 24–44)
LYMPHOCYTES RELATIVE PERCENT: 3 % (ref 24–44)
MAGNESIUM: 1.8 MG/DL (ref 1.8–2.4)
MCH RBC QN AUTO: 28 PG (ref 27–31)
MCH RBC QN AUTO: 28.7 PG (ref 27–31)
MCHC RBC AUTO-ENTMCNC: 32.7 % (ref 32–36)
MCHC RBC AUTO-ENTMCNC: 33.6 % (ref 32–36)
MCV RBC AUTO: 85.3 FL (ref 78–100)
MCV RBC AUTO: 85.7 FL (ref 78–100)
MONOCYTES ABSOLUTE: 0.5 K/CU MM
MONOCYTES ABSOLUTE: 0.5 K/CU MM
MONOCYTES RELATIVE PERCENT: 1 % (ref 0–4)
MONOCYTES RELATIVE PERCENT: 1 % (ref 0–4)
NITRITE URINE, QUANTITATIVE: NEGATIVE
PDW BLD-RTO: 16.2 % (ref 11.7–14.9)
PDW BLD-RTO: 16.2 % (ref 11.7–14.9)
PH, URINE: 7.5 (ref 5–8)
PHOSPHORUS: 4.4 MG/DL (ref 2.5–4.9)
PLATELET # BLD: 217 K/CU MM (ref 140–440)
PLATELET # BLD: 224 K/CU MM (ref 140–440)
PLT MORPHOLOGY: ABNORMAL
PMV BLD AUTO: 10.1 FL (ref 7.5–11.1)
PMV BLD AUTO: 9.5 FL (ref 7.5–11.1)
POTASSIUM SERPL-SCNC: 3.3 MMOL/L (ref 3.5–5.1)
PROCALCITONIN SERPL-MCNC: 1.13 NG/ML
PROTEIN UA: ABNORMAL MG/DL
RBC # BLD: 2.86 M/CU MM (ref 4.2–5.4)
RBC # BLD: 3 M/CU MM (ref 4.2–5.4)
RBC URINE: 0 /HPF (ref 0–6)
RENAL EPITHELIAL, UA: <1 /HPF
SEGMENTED NEUTROPHILS ABSOLUTE COUNT: 44.5 K/CU MM
SEGMENTED NEUTROPHILS ABSOLUTE COUNT: 46.8 K/CU MM
SEGMENTED NEUTROPHILS RELATIVE PERCENT: 92 % (ref 36–66)
SEGMENTED NEUTROPHILS RELATIVE PERCENT: 98 % (ref 36–66)
SMEAR REVIEW: NORMAL
SODIUM BLD-SCNC: 132 MMOL/L (ref 135–145)
SPECIFIC GRAVITY UA: 1.01 (ref 1–1.03)
STOMATOCYTES: ABNORMAL
TOTAL PROTEIN: 5.9 GM/DL (ref 6.4–8.2)
TRICHOMONAS: ABNORMAL /HPF
UROBILINOGEN, URINE: 0.2 MG/DL (ref 0.2–1)
WBC # BLD: 47.8 K/CU MM (ref 4–10.5)
WBC # BLD: 48.4 K/CU MM (ref 4–10.5)
WBC UA: 244 /HPF (ref 0–5)

## 2024-04-04 PROCEDURE — 85027 COMPLETE CBC AUTOMATED: CPT

## 2024-04-04 PROCEDURE — 84145 PROCALCITONIN (PCT): CPT

## 2024-04-04 PROCEDURE — 6360000002 HC RX W HCPCS: Performed by: NURSE PRACTITIONER

## 2024-04-04 PROCEDURE — 84100 ASSAY OF PHOSPHORUS: CPT

## 2024-04-04 PROCEDURE — 93308 TTE F-UP OR LMTD: CPT

## 2024-04-04 PROCEDURE — 83735 ASSAY OF MAGNESIUM: CPT

## 2024-04-04 PROCEDURE — 85007 BL SMEAR W/DIFF WBC COUNT: CPT

## 2024-04-04 PROCEDURE — 86141 C-REACTIVE PROTEIN HS: CPT

## 2024-04-04 PROCEDURE — 2580000003 HC RX 258: Performed by: INTERNAL MEDICINE

## 2024-04-04 PROCEDURE — 87077 CULTURE AEROBIC IDENTIFY: CPT

## 2024-04-04 PROCEDURE — APPSS60 APP SPLIT SHARED TIME 46-60 MINUTES: Performed by: NURSE PRACTITIONER

## 2024-04-04 PROCEDURE — 2580000003 HC RX 258: Performed by: NURSE PRACTITIONER

## 2024-04-04 PROCEDURE — 6370000000 HC RX 637 (ALT 250 FOR IP): Performed by: STUDENT IN AN ORGANIZED HEALTH CARE EDUCATION/TRAINING PROGRAM

## 2024-04-04 PROCEDURE — 6370000000 HC RX 637 (ALT 250 FOR IP): Performed by: INTERNAL MEDICINE

## 2024-04-04 PROCEDURE — 2140000000 HC CCU INTERMEDIATE R&B

## 2024-04-04 PROCEDURE — 87186 SC STD MICRODIL/AGAR DIL: CPT

## 2024-04-04 PROCEDURE — 81001 URINALYSIS AUTO W/SCOPE: CPT

## 2024-04-04 PROCEDURE — 80053 COMPREHEN METABOLIC PANEL: CPT

## 2024-04-04 PROCEDURE — 6360000002 HC RX W HCPCS: Performed by: INTERNAL MEDICINE

## 2024-04-04 PROCEDURE — 87641 MR-STAPH DNA AMP PROBE: CPT

## 2024-04-04 PROCEDURE — 87086 URINE CULTURE/COLONY COUNT: CPT

## 2024-04-04 PROCEDURE — 74176 CT ABD & PELVIS W/O CONTRAST: CPT

## 2024-04-04 PROCEDURE — 86140 C-REACTIVE PROTEIN: CPT

## 2024-04-04 PROCEDURE — 83605 ASSAY OF LACTIC ACID: CPT

## 2024-04-04 RX ORDER — 0.9 % SODIUM CHLORIDE 0.9 %
500 INTRAVENOUS SOLUTION INTRAVENOUS ONCE
Status: COMPLETED | OUTPATIENT
Start: 2024-04-04 | End: 2024-04-04

## 2024-04-04 RX ORDER — FLUCONAZOLE 2 MG/ML
200 INJECTION, SOLUTION INTRAVENOUS EVERY 24 HOURS
Status: DISCONTINUED | OUTPATIENT
Start: 2024-04-04 | End: 2024-04-08

## 2024-04-04 RX ORDER — LINEZOLID 2 MG/ML
600 INJECTION, SOLUTION INTRAVENOUS EVERY 12 HOURS
Status: DISCONTINUED | OUTPATIENT
Start: 2024-04-04 | End: 2024-04-04

## 2024-04-04 RX ORDER — 0.9 % SODIUM CHLORIDE 0.9 %
1000 INTRAVENOUS SOLUTION INTRAVENOUS ONCE
Status: COMPLETED | OUTPATIENT
Start: 2024-04-04 | End: 2024-04-04

## 2024-04-04 RX ADMIN — SODIUM CHLORIDE 1000 ML: 9 INJECTION, SOLUTION INTRAVENOUS at 17:49

## 2024-04-04 RX ADMIN — CLOPIDOGREL BISULFATE 75 MG: 75 TABLET ORAL at 09:20

## 2024-04-04 RX ADMIN — CIPROFLOXACIN 1 DROP: 3 SOLUTION OPHTHALMIC at 15:16

## 2024-04-04 RX ADMIN — CIPROFLOXACIN 1 DROP: 3 SOLUTION OPHTHALMIC at 11:14

## 2024-04-04 RX ADMIN — SODIUM CHLORIDE 500 ML: 9 INJECTION, SOLUTION INTRAVENOUS at 14:20

## 2024-04-04 RX ADMIN — PANTOPRAZOLE SODIUM 40 MG: 40 TABLET, DELAYED RELEASE ORAL at 06:22

## 2024-04-04 RX ADMIN — CIPROFLOXACIN 1 DROP: 3 SOLUTION OPHTHALMIC at 17:09

## 2024-04-04 RX ADMIN — PREDNISONE 60 MG: 20 TABLET ORAL at 08:40

## 2024-04-04 RX ADMIN — SODIUM CHLORIDE, PRESERVATIVE FREE 10 ML: 5 INJECTION INTRAVENOUS at 09:19

## 2024-04-04 RX ADMIN — FLUCONAZOLE 200 MG: 2 INJECTION, SOLUTION INTRAVENOUS at 11:14

## 2024-04-04 RX ADMIN — SODIUM CHLORIDE, PRESERVATIVE FREE 10 ML: 5 INJECTION INTRAVENOUS at 21:02

## 2024-04-04 RX ADMIN — SODIUM CHLORIDE 500 ML: 9 INJECTION, SOLUTION INTRAVENOUS at 10:12

## 2024-04-04 RX ADMIN — POTASSIUM BICARBONATE 40 MEQ: 782 TABLET, EFFERVESCENT ORAL at 08:40

## 2024-04-04 RX ADMIN — ASPIRIN 81 MG: 81 TABLET, CHEWABLE ORAL at 09:19

## 2024-04-04 RX ADMIN — ONDANSETRON 4 MG: 2 INJECTION INTRAMUSCULAR; INTRAVENOUS at 21:08

## 2024-04-04 RX ADMIN — LINEZOLID 600 MG: 600 INJECTION, SOLUTION INTRAVENOUS at 11:02

## 2024-04-04 RX ADMIN — CIPROFLOXACIN 1 DROP: 3 SOLUTION OPHTHALMIC at 21:02

## 2024-04-04 RX ADMIN — MEROPENEM 1000 MG: 1 INJECTION, POWDER, FOR SOLUTION INTRAVENOUS at 11:53

## 2024-04-04 RX ADMIN — ROSUVASTATIN CALCIUM 10 MG: 5 TABLET, COATED ORAL at 21:02

## 2024-04-04 RX ADMIN — VANCOMYCIN HYDROCHLORIDE 1500 MG: 1.5 INJECTION, POWDER, LYOPHILIZED, FOR SOLUTION INTRAVENOUS at 15:13

## 2024-04-04 RX ADMIN — POTASSIUM BICARBONATE 40 MEQ: 782 TABLET, EFFERVESCENT ORAL at 21:01

## 2024-04-04 RX ADMIN — PANTOPRAZOLE SODIUM 40 MG: 40 TABLET, DELAYED RELEASE ORAL at 15:33

## 2024-04-04 ASSESSMENT — PAIN SCALES - WONG BAKER
WONGBAKER_NUMERICALRESPONSE: NO HURT

## 2024-04-04 ASSESSMENT — PAIN SCALES - GENERAL
PAINLEVEL_OUTOF10: 0

## 2024-04-04 NOTE — PLAN OF CARE
Problem: Discharge Planning  Goal: Discharge to home or other facility with appropriate resources  Outcome: Progressing     Problem: Pain  Goal: Verbalizes/displays adequate comfort level or baseline comfort level  Outcome: Progressing     Problem: Safety - Adult  Goal: Free from fall injury  Outcome: Progressing     Problem: Skin/Tissue Integrity  Goal: Absence of new skin breakdown  Description: 1.  Monitor for areas of redness and/or skin breakdown  2.  Assess vascular access sites hourly  3.  Every 4-6 hours minimum:  Change oxygen saturation probe site  4.  Every 4-6 hours:  If on nasal continuous positive airway pressure, respiratory therapy assess nares and determine need for appliance change or resting period.  Outcome: Progressing     Problem: Nutrition Deficit:  Goal: Optimize nutritional status  Outcome: Progressing

## 2024-04-05 LAB
ALBUMIN SERPL-MCNC: 3.2 GM/DL (ref 3.4–5)
ALP BLD-CCNC: 69 IU/L (ref 40–128)
ALT SERPL-CCNC: 14 U/L (ref 10–40)
ANION GAP SERPL CALCULATED.3IONS-SCNC: 18 MMOL/L (ref 7–16)
AST SERPL-CCNC: 22 IU/L (ref 15–37)
BASOPHILS ABSOLUTE: 0.1 K/CU MM
BASOPHILS RELATIVE PERCENT: 0.2 % (ref 0–1)
BILIRUB SERPL-MCNC: 0.3 MG/DL (ref 0–1)
BUN SERPL-MCNC: 89 MG/DL (ref 6–23)
CALCIUM SERPL-MCNC: 7.6 MG/DL (ref 8.3–10.6)
CHLORIDE BLD-SCNC: 92 MMOL/L (ref 99–110)
CO2: 25 MMOL/L (ref 21–32)
CREAT SERPL-MCNC: 5.7 MG/DL (ref 0.6–1.1)
CRP SERPL HS-MCNC: 202.2 MG/L
DIFFERENTIAL TYPE: ABNORMAL
DOSE AMOUNT: NORMAL
DOSE TIME: NORMAL
ECHO AV AREA PEAK VELOCITY: 1.8 CM2
ECHO AV AREA VTI: 1.7 CM2
ECHO AV AREA/BSA PEAK VELOCITY: 1.1 CM2/M2
ECHO AV AREA/BSA VTI: 1.1 CM2/M2
ECHO AV MEAN GRADIENT: 7 MMHG
ECHO AV MEAN VELOCITY: 1.3 M/S
ECHO AV PEAK GRADIENT: 10 MMHG
ECHO AV PEAK VELOCITY: 1.6 M/S
ECHO AV VELOCITY RATIO: 0.44
ECHO AV VTI: 22.8 CM
ECHO BSA: 1.61 M2
ECHO LV EDV A4C: 28 ML
ECHO LV EDV INDEX A4C: 17 ML/M2
ECHO LV EJECTION FRACTION A4C: 55 %
ECHO LV ESV A4C: 13 ML
ECHO LV ESV INDEX A4C: 8 ML/M2
ECHO LV FRACTIONAL SHORTENING: 29 % (ref 28–44)
ECHO LV INTERNAL DIMENSION DIASTOLE INDEX: 2.11 CM/M2
ECHO LV INTERNAL DIMENSION DIASTOLIC: 3.4 CM (ref 3.9–5.3)
ECHO LV INTERNAL DIMENSION SYSTOLIC INDEX: 1.49 CM/M2
ECHO LV INTERNAL DIMENSION SYSTOLIC: 2.4 CM
ECHO LV IVSD: 1 CM (ref 0.6–0.9)
ECHO LV MASS 2D: 98.9 G (ref 67–162)
ECHO LV MASS INDEX 2D: 61.4 G/M2 (ref 43–95)
ECHO LV POSTERIOR WALL DIASTOLIC: 1 CM (ref 0.6–0.9)
ECHO LV RELATIVE WALL THICKNESS RATIO: 0.59
ECHO LVOT AREA: 3.8 CM2
ECHO LVOT AV VTI INDEX: 0.44
ECHO LVOT DIAM: 2.2 CM
ECHO LVOT MEAN GRADIENT: 1 MMHG
ECHO LVOT PEAK GRADIENT: 2 MMHG
ECHO LVOT PEAK VELOCITY: 0.7 M/S
ECHO LVOT STROKE VOLUME INDEX: 23.8 ML/M2
ECHO LVOT SV: 38.4 ML
ECHO LVOT VTI: 10.1 CM
EOSINOPHILS ABSOLUTE: 0 K/CU MM
EOSINOPHILS RELATIVE PERCENT: 0 % (ref 0–3)
GFR SERPL CREATININE-BSD FRML MDRD: 8 ML/MIN/1.73M2
GLUCOSE SERPL-MCNC: 116 MG/DL (ref 70–99)
HCT VFR BLD CALC: 19.7 % (ref 37–47)
HCT VFR BLD CALC: 21.4 % (ref 37–47)
HCT VFR BLD CALC: 21.9 % (ref 37–47)
HCT VFR BLD CALC: 22.5 % (ref 37–47)
HEMOGLOBIN: 6.4 GM/DL (ref 12.5–16)
HEMOGLOBIN: 6.9 GM/DL (ref 12.5–16)
HEMOGLOBIN: 7.2 GM/DL (ref 12.5–16)
HEMOGLOBIN: 7.4 GM/DL (ref 12.5–16)
HIGH SENSITIVE C-REACTIVE PROTEIN: 243.2 MG/L (ref 0–5)
IMMATURE NEUTROPHIL %: 1.7 % (ref 0–0.43)
INR BLD: 1.2 INDEX
LYMPHOCYTES ABSOLUTE: 0.4 K/CU MM
LYMPHOCYTES RELATIVE PERCENT: 0.9 % (ref 24–44)
MAGNESIUM: 1.7 MG/DL (ref 1.8–2.4)
MCH RBC QN AUTO: 28.3 PG (ref 27–31)
MCHC RBC AUTO-ENTMCNC: 32.9 % (ref 32–36)
MCV RBC AUTO: 86.2 FL (ref 78–100)
MONOCYTES ABSOLUTE: 0.3 K/CU MM
MONOCYTES RELATIVE PERCENT: 0.8 % (ref 0–4)
MRSA, DNA, NASAL: NEGATIVE
NUCLEATED RBC %: 0.1 %
PDW BLD-RTO: 16.6 % (ref 11.7–14.9)
PHOSPHORUS: 4.2 MG/DL (ref 2.5–4.9)
PLATELET # BLD: 158 K/CU MM (ref 140–440)
PMV BLD AUTO: 9.8 FL (ref 7.5–11.1)
POTASSIUM SERPL-SCNC: 3.2 MMOL/L (ref 3.5–5.1)
PROCALCITONIN SERPL-MCNC: 2.46 NG/ML
PROTHROMBIN TIME: 15.5 SECONDS (ref 11.7–14.5)
RBC # BLD: 2.54 M/CU MM (ref 4.2–5.4)
SEGMENTED NEUTROPHILS ABSOLUTE COUNT: 35.9 K/CU MM
SEGMENTED NEUTROPHILS RELATIVE PERCENT: 96.4 % (ref 36–66)
SODIUM BLD-SCNC: 135 MMOL/L (ref 135–145)
SPECIMEN DESCRIPTION: NORMAL
TOTAL IMMATURE NEUTOROPHIL: 0.63 K/CU MM
TOTAL NUCLEATED RBC: 0 K/CU MM
TOTAL PROTEIN: 4.9 GM/DL (ref 6.4–8.2)
VANCOMYCIN RANDOM: 27.7 UG/ML
WBC # BLD: 37.2 K/CU MM (ref 4–10.5)

## 2024-04-05 PROCEDURE — 6370000000 HC RX 637 (ALT 250 FOR IP): Performed by: INTERNAL MEDICINE

## 2024-04-05 PROCEDURE — 2140000000 HC CCU INTERMEDIATE R&B

## 2024-04-05 PROCEDURE — 84145 PROCALCITONIN (PCT): CPT

## 2024-04-05 PROCEDURE — 86901 BLOOD TYPING SEROLOGIC RH(D): CPT

## 2024-04-05 PROCEDURE — C9113 INJ PANTOPRAZOLE SODIUM, VIA: HCPCS | Performed by: SPECIALIST

## 2024-04-05 PROCEDURE — 83735 ASSAY OF MAGNESIUM: CPT

## 2024-04-05 PROCEDURE — APPNB30 APP NON BILLABLE TIME 0-30 MINS: Performed by: LICENSED PRACTICAL NURSE

## 2024-04-05 PROCEDURE — 93325 DOPPLER ECHO COLOR FLOW MAPG: CPT | Performed by: INTERNAL MEDICINE

## 2024-04-05 PROCEDURE — 85018 HEMOGLOBIN: CPT

## 2024-04-05 PROCEDURE — 86850 RBC ANTIBODY SCREEN: CPT

## 2024-04-05 PROCEDURE — 36591 DRAW BLOOD OFF VENOUS DEVICE: CPT

## 2024-04-05 PROCEDURE — 86922 COMPATIBILITY TEST ANTIGLOB: CPT

## 2024-04-05 PROCEDURE — 6360000002 HC RX W HCPCS: Performed by: SPECIALIST

## 2024-04-05 PROCEDURE — 85014 HEMATOCRIT: CPT

## 2024-04-05 PROCEDURE — 6370000000 HC RX 637 (ALT 250 FOR IP): Performed by: STUDENT IN AN ORGANIZED HEALTH CARE EDUCATION/TRAINING PROGRAM

## 2024-04-05 PROCEDURE — 80053 COMPREHEN METABOLIC PANEL: CPT

## 2024-04-05 PROCEDURE — 86900 BLOOD TYPING SEROLOGIC ABO: CPT

## 2024-04-05 PROCEDURE — 84100 ASSAY OF PHOSPHORUS: CPT

## 2024-04-05 PROCEDURE — 85025 COMPLETE CBC W/AUTO DIFF WBC: CPT

## 2024-04-05 PROCEDURE — 6360000002 HC RX W HCPCS: Performed by: NURSE PRACTITIONER

## 2024-04-05 PROCEDURE — 93321 DOPPLER ECHO F-UP/LMTD STD: CPT | Performed by: INTERNAL MEDICINE

## 2024-04-05 PROCEDURE — 94761 N-INVAS EAR/PLS OXIMETRY MLT: CPT

## 2024-04-05 PROCEDURE — 2580000003 HC RX 258: Performed by: NURSE PRACTITIONER

## 2024-04-05 PROCEDURE — 80202 ASSAY OF VANCOMYCIN: CPT

## 2024-04-05 PROCEDURE — 2580000003 HC RX 258: Performed by: INTERNAL MEDICINE

## 2024-04-05 PROCEDURE — 6360000002 HC RX W HCPCS: Performed by: INTERNAL MEDICINE

## 2024-04-05 PROCEDURE — 86141 C-REACTIVE PROTEIN HS: CPT

## 2024-04-05 PROCEDURE — 86140 C-REACTIVE PROTEIN: CPT

## 2024-04-05 PROCEDURE — 93308 TTE F-UP OR LMTD: CPT | Performed by: INTERNAL MEDICINE

## 2024-04-05 PROCEDURE — 85610 PROTHROMBIN TIME: CPT

## 2024-04-05 RX ORDER — SODIUM CHLORIDE 9 MG/ML
INJECTION, SOLUTION INTRAVENOUS CONTINUOUS
Status: DISPENSED | OUTPATIENT
Start: 2024-04-05 | End: 2024-04-06

## 2024-04-05 RX ORDER — GUAIFENESIN/DEXTROMETHORPHAN 100-10MG/5
10 SYRUP ORAL EVERY 4 HOURS PRN
Status: DISCONTINUED | OUTPATIENT
Start: 2024-04-05 | End: 2024-04-09 | Stop reason: HOSPADM

## 2024-04-05 RX ORDER — PANTOPRAZOLE SODIUM 40 MG/10ML
40 INJECTION, POWDER, LYOPHILIZED, FOR SOLUTION INTRAVENOUS 2 TIMES DAILY
Status: DISCONTINUED | OUTPATIENT
Start: 2024-04-05 | End: 2024-04-09 | Stop reason: HOSPADM

## 2024-04-05 RX ORDER — POTASSIUM CHLORIDE 29.8 MG/ML
20 INJECTION INTRAVENOUS
Status: COMPLETED | OUTPATIENT
Start: 2024-04-05 | End: 2024-04-05

## 2024-04-05 RX ORDER — SODIUM CHLORIDE 9 MG/ML
INJECTION, SOLUTION INTRAVENOUS PRN
Status: DISCONTINUED | OUTPATIENT
Start: 2024-04-05 | End: 2024-04-09 | Stop reason: HOSPADM

## 2024-04-05 RX ADMIN — CIPROFLOXACIN 1 DROP: 3 SOLUTION OPHTHALMIC at 17:43

## 2024-04-05 RX ADMIN — CIPROFLOXACIN 1 DROP: 3 SOLUTION OPHTHALMIC at 21:26

## 2024-04-05 RX ADMIN — SODIUM CHLORIDE, PRESERVATIVE FREE 10 ML: 5 INJECTION INTRAVENOUS at 21:26

## 2024-04-05 RX ADMIN — POTASSIUM CHLORIDE 20 MEQ: 29.8 INJECTION INTRAVENOUS at 16:57

## 2024-04-05 RX ADMIN — CIPROFLOXACIN 1 DROP: 3 SOLUTION OPHTHALMIC at 05:19

## 2024-04-05 RX ADMIN — FLUCONAZOLE 200 MG: 2 INJECTION, SOLUTION INTRAVENOUS at 11:29

## 2024-04-05 RX ADMIN — SODIUM CHLORIDE: 9 INJECTION, SOLUTION INTRAVENOUS at 23:14

## 2024-04-05 RX ADMIN — SODIUM CHLORIDE, PRESERVATIVE FREE 10 ML: 5 INJECTION INTRAVENOUS at 08:36

## 2024-04-05 RX ADMIN — ONDANSETRON 4 MG: 2 INJECTION INTRAMUSCULAR; INTRAVENOUS at 23:46

## 2024-04-05 RX ADMIN — POTASSIUM CHLORIDE 20 MEQ: 29.8 INJECTION INTRAVENOUS at 17:43

## 2024-04-05 RX ADMIN — CIPROFLOXACIN 1 DROP: 3 SOLUTION OPHTHALMIC at 10:02

## 2024-04-05 RX ADMIN — PANTOPRAZOLE SODIUM 40 MG: 40 INJECTION, POWDER, FOR SOLUTION INTRAVENOUS at 10:02

## 2024-04-05 RX ADMIN — PANTOPRAZOLE SODIUM 40 MG: 40 TABLET, DELAYED RELEASE ORAL at 06:37

## 2024-04-05 RX ADMIN — MEROPENEM 1000 MG: 1 INJECTION, POWDER, FOR SOLUTION INTRAVENOUS at 11:26

## 2024-04-05 RX ADMIN — PANTOPRAZOLE SODIUM 40 MG: 40 INJECTION, POWDER, FOR SOLUTION INTRAVENOUS at 21:26

## 2024-04-05 RX ADMIN — ROSUVASTATIN CALCIUM 10 MG: 5 TABLET, COATED ORAL at 21:26

## 2024-04-05 RX ADMIN — ONDANSETRON 4 MG: 2 INJECTION INTRAMUSCULAR; INTRAVENOUS at 06:37

## 2024-04-05 RX ADMIN — SODIUM CHLORIDE: 9 INJECTION, SOLUTION INTRAVENOUS at 08:38

## 2024-04-05 NOTE — CARE COORDINATION
[facility] is agreeable to accept pt when a pre-cert is obtained. Dr to notify CM when to start the pre-cert. Updated pt, spouse and daughter at bedside.  D/c instructions are on front of packet located with the soft chart.  TE     DO NOT SEND PT TO SNF UNTIL A PRE-CERT IS OBTAINED    Pt returning/new to [facility] at discharge. Please call report to [phone] and fax orders, AVS, and discharge summaries to [phone].

## 2024-04-06 ENCOUNTER — APPOINTMENT (OUTPATIENT)
Dept: ULTRASOUND IMAGING | Age: 69
DRG: 871 | End: 2024-04-06
Payer: MEDICARE

## 2024-04-06 LAB
ALBUMIN SERPL-MCNC: 3 GM/DL (ref 3.4–5)
ALP BLD-CCNC: 191 IU/L (ref 40–128)
ALT SERPL-CCNC: 167 U/L (ref 10–40)
ANION GAP SERPL CALCULATED.3IONS-SCNC: 18 MMOL/L (ref 7–16)
AST SERPL-CCNC: 461 IU/L (ref 15–37)
BASOPHILS ABSOLUTE: 0 K/CU MM
BASOPHILS RELATIVE PERCENT: 0.2 % (ref 0–1)
BILIRUB SERPL-MCNC: 0.6 MG/DL (ref 0–1)
BUN SERPL-MCNC: 76 MG/DL (ref 6–23)
CALCIUM SERPL-MCNC: 7.8 MG/DL (ref 8.3–10.6)
CHLORIDE BLD-SCNC: 91 MMOL/L (ref 99–110)
CO2: 22 MMOL/L (ref 21–32)
CREAT SERPL-MCNC: 4.7 MG/DL (ref 0.6–1.1)
CRP SERPL HS-MCNC: 206 MG/L
DIFFERENTIAL TYPE: ABNORMAL
EOSINOPHILS ABSOLUTE: 0 K/CU MM
EOSINOPHILS RELATIVE PERCENT: 0 % (ref 0–3)
GFR SERPL CREATININE-BSD FRML MDRD: 10 ML/MIN/1.73M2
GLUCOSE SERPL-MCNC: 81 MG/DL (ref 70–99)
HCT VFR BLD CALC: 25.6 % (ref 37–47)
HCT VFR BLD CALC: 27.2 % (ref 37–47)
HCT VFR BLD CALC: 27.9 % (ref 37–47)
HEMOGLOBIN: 8.6 GM/DL (ref 12.5–16)
HEMOGLOBIN: 9.2 GM/DL (ref 12.5–16)
HEMOGLOBIN: 9.3 GM/DL (ref 12.5–16)
HIGH SENSITIVE C-REACTIVE PROTEIN: >300 MG/L (ref 0–5)
IMMATURE NEUTROPHIL %: 2.5 % (ref 0–0.43)
LYMPHOCYTES ABSOLUTE: 0.6 K/CU MM
LYMPHOCYTES RELATIVE PERCENT: 3.3 % (ref 24–44)
MAGNESIUM: 1.5 MG/DL (ref 1.8–2.4)
MCH RBC QN AUTO: 28.8 PG (ref 27–31)
MCHC RBC AUTO-ENTMCNC: 33.8 % (ref 32–36)
MCV RBC AUTO: 85.3 FL (ref 78–100)
MONOCYTES ABSOLUTE: 0.2 K/CU MM
MONOCYTES RELATIVE PERCENT: 1.1 % (ref 0–4)
NUCLEATED RBC %: 0.1 %
PDW BLD-RTO: 15.7 % (ref 11.7–14.9)
PHOSPHORUS: 3.6 MG/DL (ref 2.5–4.9)
PLATELET # BLD: 114 K/CU MM (ref 140–440)
PMV BLD AUTO: 9.9 FL (ref 7.5–11.1)
POTASSIUM SERPL-SCNC: 3.4 MMOL/L (ref 3.5–5.1)
PROCALCITONIN SERPL-MCNC: 2.02 NG/ML
RBC # BLD: 3.19 M/CU MM (ref 4.2–5.4)
SEGMENTED NEUTROPHILS ABSOLUTE COUNT: 16.3 K/CU MM
SEGMENTED NEUTROPHILS RELATIVE PERCENT: 92.9 % (ref 36–66)
SODIUM BLD-SCNC: 131 MMOL/L (ref 135–145)
TOTAL IMMATURE NEUTOROPHIL: 0.43 K/CU MM
TOTAL NUCLEATED RBC: 0 K/CU MM
TOTAL PROTEIN: 5 GM/DL (ref 6.4–8.2)
WBC # BLD: 17.5 K/CU MM (ref 4–10.5)

## 2024-04-06 PROCEDURE — 2140000000 HC CCU INTERMEDIATE R&B

## 2024-04-06 PROCEDURE — 97530 THERAPEUTIC ACTIVITIES: CPT

## 2024-04-06 PROCEDURE — 85014 HEMATOCRIT: CPT

## 2024-04-06 PROCEDURE — 87324 CLOSTRIDIUM AG IA: CPT

## 2024-04-06 PROCEDURE — 76705 ECHO EXAM OF ABDOMEN: CPT

## 2024-04-06 PROCEDURE — 97535 SELF CARE MNGMENT TRAINING: CPT

## 2024-04-06 PROCEDURE — 87449 NOS EACH ORGANISM AG IA: CPT

## 2024-04-06 PROCEDURE — 94761 N-INVAS EAR/PLS OXIMETRY MLT: CPT

## 2024-04-06 PROCEDURE — 84145 PROCALCITONIN (PCT): CPT

## 2024-04-06 PROCEDURE — 6360000002 HC RX W HCPCS: Performed by: SPECIALIST

## 2024-04-06 PROCEDURE — 80053 COMPREHEN METABOLIC PANEL: CPT

## 2024-04-06 PROCEDURE — 2580000003 HC RX 258: Performed by: INTERNAL MEDICINE

## 2024-04-06 PROCEDURE — 6360000002 HC RX W HCPCS: Performed by: INTERNAL MEDICINE

## 2024-04-06 PROCEDURE — 6370000000 HC RX 637 (ALT 250 FOR IP): Performed by: INTERNAL MEDICINE

## 2024-04-06 PROCEDURE — 94640 AIRWAY INHALATION TREATMENT: CPT

## 2024-04-06 PROCEDURE — C9113 INJ PANTOPRAZOLE SODIUM, VIA: HCPCS | Performed by: SPECIALIST

## 2024-04-06 PROCEDURE — 85025 COMPLETE CBC W/AUTO DIFF WBC: CPT

## 2024-04-06 PROCEDURE — P9016 RBC LEUKOCYTES REDUCED: HCPCS

## 2024-04-06 PROCEDURE — 6370000000 HC RX 637 (ALT 250 FOR IP): Performed by: STUDENT IN AN ORGANIZED HEALTH CARE EDUCATION/TRAINING PROGRAM

## 2024-04-06 PROCEDURE — 86140 C-REACTIVE PROTEIN: CPT

## 2024-04-06 PROCEDURE — 97112 NEUROMUSCULAR REEDUCATION: CPT

## 2024-04-06 PROCEDURE — 84100 ASSAY OF PHOSPHORUS: CPT

## 2024-04-06 PROCEDURE — 83735 ASSAY OF MAGNESIUM: CPT

## 2024-04-06 PROCEDURE — 6370000000 HC RX 637 (ALT 250 FOR IP)

## 2024-04-06 PROCEDURE — 36430 TRANSFUSION BLD/BLD COMPNT: CPT

## 2024-04-06 PROCEDURE — 86141 C-REACTIVE PROTEIN HS: CPT

## 2024-04-06 PROCEDURE — 85018 HEMOGLOBIN: CPT

## 2024-04-06 RX ORDER — MAGNESIUM SULFATE IN WATER 40 MG/ML
2000 INJECTION, SOLUTION INTRAVENOUS ONCE
Status: COMPLETED | OUTPATIENT
Start: 2024-04-06 | End: 2024-04-06

## 2024-04-06 RX ORDER — ASPIRIN 81 MG/1
81 TABLET, CHEWABLE ORAL DAILY
Status: DISCONTINUED | OUTPATIENT
Start: 2024-04-06 | End: 2024-04-09 | Stop reason: HOSPADM

## 2024-04-06 RX ORDER — POTASSIUM CHLORIDE 29.8 MG/ML
20 INJECTION INTRAVENOUS
Status: COMPLETED | OUTPATIENT
Start: 2024-04-06 | End: 2024-04-06

## 2024-04-06 RX ADMIN — CIPROFLOXACIN 1 DROP: 3 SOLUTION OPHTHALMIC at 05:04

## 2024-04-06 RX ADMIN — PANTOPRAZOLE SODIUM 40 MG: 40 INJECTION, POWDER, FOR SOLUTION INTRAVENOUS at 09:18

## 2024-04-06 RX ADMIN — PREDNISONE 60 MG: 20 TABLET ORAL at 09:17

## 2024-04-06 RX ADMIN — ROSUVASTATIN CALCIUM 10 MG: 5 TABLET, COATED ORAL at 21:38

## 2024-04-06 RX ADMIN — CIPROFLOXACIN 1 DROP: 3 SOLUTION OPHTHALMIC at 21:38

## 2024-04-06 RX ADMIN — FLUCONAZOLE 200 MG: 2 INJECTION, SOLUTION INTRAVENOUS at 12:24

## 2024-04-06 RX ADMIN — CIPROFLOXACIN 1 DROP: 3 SOLUTION OPHTHALMIC at 14:09

## 2024-04-06 RX ADMIN — POTASSIUM CHLORIDE 20 MEQ: 29.8 INJECTION INTRAVENOUS at 12:10

## 2024-04-06 RX ADMIN — GUAIFENESIN AND DEXTROMETHORPHAN 10 ML: 100; 10 SYRUP ORAL at 12:12

## 2024-04-06 RX ADMIN — ASPIRIN 81 MG: 81 TABLET, CHEWABLE ORAL at 14:09

## 2024-04-06 RX ADMIN — PANTOPRAZOLE SODIUM 40 MG: 40 INJECTION, POWDER, FOR SOLUTION INTRAVENOUS at 21:38

## 2024-04-06 RX ADMIN — MAGNESIUM SULFATE HEPTAHYDRATE 2000 MG: 40 INJECTION, SOLUTION INTRAVENOUS at 09:38

## 2024-04-06 RX ADMIN — METOPROLOL SUCCINATE 25 MG: 25 TABLET, FILM COATED, EXTENDED RELEASE ORAL at 09:17

## 2024-04-06 RX ADMIN — POTASSIUM CHLORIDE 20 MEQ: 29.8 INJECTION INTRAVENOUS at 09:52

## 2024-04-06 RX ADMIN — CIPROFLOXACIN 1 DROP: 3 SOLUTION OPHTHALMIC at 09:18

## 2024-04-06 RX ADMIN — IPRATROPIUM BROMIDE AND ALBUTEROL SULFATE 1 DOSE: 2.5; .5 SOLUTION RESPIRATORY (INHALATION) at 16:08

## 2024-04-06 RX ADMIN — CIPROFLOXACIN 1 DROP: 3 SOLUTION OPHTHALMIC at 18:20

## 2024-04-06 RX ADMIN — MEROPENEM 1000 MG: 1 INJECTION, POWDER, FOR SOLUTION INTRAVENOUS at 12:27

## 2024-04-06 ASSESSMENT — PAIN DESCRIPTION - LOCATION: LOCATION: HEAD

## 2024-04-06 ASSESSMENT — PAIN - FUNCTIONAL ASSESSMENT: PAIN_FUNCTIONAL_ASSESSMENT: ACTIVITIES ARE NOT PREVENTED

## 2024-04-06 ASSESSMENT — PAIN DESCRIPTION - DESCRIPTORS: DESCRIPTORS: DISCOMFORT;ACHING

## 2024-04-06 ASSESSMENT — PAIN DESCRIPTION - ORIENTATION: ORIENTATION: ANTERIOR

## 2024-04-06 ASSESSMENT — PAIN SCALES - GENERAL: PAINLEVEL_OUTOF10: 7

## 2024-04-07 LAB
ABO/RH: NORMAL
ALBUMIN SERPL-MCNC: 3 GM/DL (ref 3.4–5)
ALP BLD-CCNC: 333 IU/L (ref 40–128)
ALT SERPL-CCNC: 269 U/L (ref 10–40)
ANION GAP SERPL CALCULATED.3IONS-SCNC: 20 MMOL/L (ref 7–16)
ANTIBODY SCREEN: NEGATIVE
AST SERPL-CCNC: 529 IU/L (ref 15–37)
BASOPHILS ABSOLUTE: 0 K/CU MM
BASOPHILS RELATIVE PERCENT: 0.1 % (ref 0–1)
BILIRUB SERPL-MCNC: 0.5 MG/DL (ref 0–1)
BUN SERPL-MCNC: 74 MG/DL (ref 6–23)
CALCIUM SERPL-MCNC: 8.3 MG/DL (ref 8.3–10.6)
CHLORIDE BLD-SCNC: 94 MMOL/L (ref 99–110)
CO2: 20 MMOL/L (ref 21–32)
COMPONENT: NORMAL
CREAT SERPL-MCNC: 4.1 MG/DL (ref 0.6–1.1)
CROSSMATCH RESULT: NORMAL
DIFFERENTIAL TYPE: ABNORMAL
EOSINOPHILS ABSOLUTE: 0 K/CU MM
EOSINOPHILS RELATIVE PERCENT: 0 % (ref 0–3)
GFR SERPL CREATININE-BSD FRML MDRD: 11 ML/MIN/1.73M2
GLUCOSE SERPL-MCNC: 105 MG/DL (ref 70–99)
HCT VFR BLD CALC: 26.1 % (ref 37–47)
HCT VFR BLD CALC: 27.1 % (ref 37–47)
HCT VFR BLD CALC: 29 % (ref 37–47)
HCT VFR BLD CALC: 29.2 % (ref 37–47)
HEMOGLOBIN: 8.6 GM/DL (ref 12.5–16)
HEMOGLOBIN: 9 GM/DL (ref 12.5–16)
HEMOGLOBIN: 9.6 GM/DL (ref 12.5–16)
HEMOGLOBIN: 9.8 GM/DL (ref 12.5–16)
HIGH SENSITIVE C-REACTIVE PROTEIN: >350 MG/L (ref 0–5)
IMMATURE NEUTROPHIL %: 2.7 % (ref 0–0.43)
LYMPHOCYTES ABSOLUTE: 0.3 K/CU MM
LYMPHOCYTES RELATIVE PERCENT: 4 % (ref 24–44)
MAGNESIUM: 2 MG/DL (ref 1.8–2.4)
MCH RBC QN AUTO: 27.9 PG (ref 27–31)
MCHC RBC AUTO-ENTMCNC: 33 % (ref 32–36)
MCV RBC AUTO: 84.7 FL (ref 78–100)
MONOCYTES ABSOLUTE: 0.1 K/CU MM
MONOCYTES RELATIVE PERCENT: 1.8 % (ref 0–4)
NUCLEATED RBC %: 0 %
PDW BLD-RTO: 16.7 % (ref 11.7–14.9)
PLATELET # BLD: 110 K/CU MM (ref 140–440)
PMV BLD AUTO: 10.9 FL (ref 7.5–11.1)
POTASSIUM SERPL-SCNC: 4.4 MMOL/L (ref 3.5–5.1)
PROCALCITONIN SERPL-MCNC: 2.11 NG/ML
RBC # BLD: 3.08 M/CU MM (ref 4.2–5.4)
SEGMENTED NEUTROPHILS ABSOLUTE COUNT: 6.7 K/CU MM
SEGMENTED NEUTROPHILS RELATIVE PERCENT: 91.4 % (ref 36–66)
SODIUM BLD-SCNC: 134 MMOL/L (ref 135–145)
STATUS: NORMAL
TOTAL IMMATURE NEUTOROPHIL: 0.2 K/CU MM
TOTAL NUCLEATED RBC: 0 K/CU MM
TOTAL PROTEIN: 5.1 GM/DL (ref 6.4–8.2)
TRANSFUSION STATUS: NORMAL
UNIT DIVISION: 0
UNIT NUMBER: NORMAL
WBC # BLD: 7.3 K/CU MM (ref 4–10.5)

## 2024-04-07 PROCEDURE — 2580000003 HC RX 258: Performed by: INTERNAL MEDICINE

## 2024-04-07 PROCEDURE — 6360000002 HC RX W HCPCS: Performed by: NURSE PRACTITIONER

## 2024-04-07 PROCEDURE — 80053 COMPREHEN METABOLIC PANEL: CPT

## 2024-04-07 PROCEDURE — 2580000003 HC RX 258: Performed by: NURSE PRACTITIONER

## 2024-04-07 PROCEDURE — 94761 N-INVAS EAR/PLS OXIMETRY MLT: CPT

## 2024-04-07 PROCEDURE — 6360000002 HC RX W HCPCS: Performed by: INTERNAL MEDICINE

## 2024-04-07 PROCEDURE — 94669 MECHANICAL CHEST WALL OSCILL: CPT

## 2024-04-07 PROCEDURE — 85025 COMPLETE CBC W/AUTO DIFF WBC: CPT

## 2024-04-07 PROCEDURE — 83735 ASSAY OF MAGNESIUM: CPT

## 2024-04-07 PROCEDURE — 6360000002 HC RX W HCPCS: Performed by: SPECIALIST

## 2024-04-07 PROCEDURE — 84145 PROCALCITONIN (PCT): CPT

## 2024-04-07 PROCEDURE — 86141 C-REACTIVE PROTEIN HS: CPT

## 2024-04-07 PROCEDURE — 6360000002 HC RX W HCPCS: Performed by: FAMILY MEDICINE

## 2024-04-07 PROCEDURE — C9113 INJ PANTOPRAZOLE SODIUM, VIA: HCPCS | Performed by: SPECIALIST

## 2024-04-07 PROCEDURE — 85014 HEMATOCRIT: CPT

## 2024-04-07 PROCEDURE — 6370000000 HC RX 637 (ALT 250 FOR IP): Performed by: INTERNAL MEDICINE

## 2024-04-07 PROCEDURE — 36591 DRAW BLOOD OFF VENOUS DEVICE: CPT

## 2024-04-07 PROCEDURE — 94664 DEMO&/EVAL PT USE INHALER: CPT

## 2024-04-07 PROCEDURE — 85018 HEMOGLOBIN: CPT

## 2024-04-07 PROCEDURE — 36592 COLLECT BLOOD FROM PICC: CPT

## 2024-04-07 PROCEDURE — 2140000000 HC CCU INTERMEDIATE R&B

## 2024-04-07 PROCEDURE — 6370000000 HC RX 637 (ALT 250 FOR IP)

## 2024-04-07 RX ORDER — PROCHLORPERAZINE EDISYLATE 5 MG/ML
5 INJECTION INTRAMUSCULAR; INTRAVENOUS EVERY 6 HOURS PRN
Status: DISCONTINUED | OUTPATIENT
Start: 2024-04-07 | End: 2024-04-09 | Stop reason: HOSPADM

## 2024-04-07 RX ORDER — LINEZOLID 600 MG/1
600 TABLET, FILM COATED ORAL EVERY 12 HOURS SCHEDULED
Status: DISCONTINUED | OUTPATIENT
Start: 2024-04-07 | End: 2024-04-09 | Stop reason: HOSPADM

## 2024-04-07 RX ADMIN — CIPROFLOXACIN 1 DROP: 3 SOLUTION OPHTHALMIC at 05:05

## 2024-04-07 RX ADMIN — GUAIFENESIN AND DEXTROMETHORPHAN 10 ML: 100; 10 SYRUP ORAL at 17:36

## 2024-04-07 RX ADMIN — CIPROFLOXACIN 1 DROP: 3 SOLUTION OPHTHALMIC at 17:36

## 2024-04-07 RX ADMIN — HYDRALAZINE HYDROCHLORIDE 25 MG: 25 TABLET ORAL at 14:19

## 2024-04-07 RX ADMIN — CLOPIDOGREL BISULFATE 75 MG: 75 TABLET ORAL at 09:15

## 2024-04-07 RX ADMIN — SODIUM CHLORIDE, PRESERVATIVE FREE 10 ML: 5 INJECTION INTRAVENOUS at 08:13

## 2024-04-07 RX ADMIN — GUAIFENESIN AND DEXTROMETHORPHAN 10 ML: 100; 10 SYRUP ORAL at 22:58

## 2024-04-07 RX ADMIN — HYDRALAZINE HYDROCHLORIDE 25 MG: 25 TABLET ORAL at 22:49

## 2024-04-07 RX ADMIN — METOPROLOL SUCCINATE 25 MG: 25 TABLET, FILM COATED, EXTENDED RELEASE ORAL at 09:15

## 2024-04-07 RX ADMIN — SODIUM CHLORIDE, PRESERVATIVE FREE 10 ML: 5 INJECTION INTRAVENOUS at 00:35

## 2024-04-07 RX ADMIN — MEROPENEM 1000 MG: 1 INJECTION, POWDER, FOR SOLUTION INTRAVENOUS at 12:08

## 2024-04-07 RX ADMIN — SODIUM CHLORIDE, PRESERVATIVE FREE 10 ML: 5 INJECTION INTRAVENOUS at 22:49

## 2024-04-07 RX ADMIN — CIPROFLOXACIN 1 DROP: 3 SOLUTION OPHTHALMIC at 12:18

## 2024-04-07 RX ADMIN — ASPIRIN 81 MG: 81 TABLET, CHEWABLE ORAL at 08:05

## 2024-04-07 RX ADMIN — GUAIFENESIN AND DEXTROMETHORPHAN 10 ML: 100; 10 SYRUP ORAL at 12:18

## 2024-04-07 RX ADMIN — LINEZOLID 600 MG: 600 TABLET, FILM COATED ORAL at 22:53

## 2024-04-07 RX ADMIN — ONDANSETRON 4 MG: 2 INJECTION INTRAMUSCULAR; INTRAVENOUS at 22:53

## 2024-04-07 RX ADMIN — PREDNISONE 60 MG: 20 TABLET ORAL at 08:05

## 2024-04-07 RX ADMIN — PROCHLORPERAZINE EDISYLATE 5 MG: 5 INJECTION INTRAMUSCULAR; INTRAVENOUS at 02:36

## 2024-04-07 RX ADMIN — CIPROFLOXACIN 1 DROP: 3 SOLUTION OPHTHALMIC at 14:20

## 2024-04-07 RX ADMIN — CIPROFLOXACIN 1 DROP: 3 SOLUTION OPHTHALMIC at 22:50

## 2024-04-07 RX ADMIN — PANTOPRAZOLE SODIUM 40 MG: 40 INJECTION, POWDER, FOR SOLUTION INTRAVENOUS at 22:49

## 2024-04-07 RX ADMIN — PANTOPRAZOLE SODIUM 40 MG: 40 INJECTION, POWDER, FOR SOLUTION INTRAVENOUS at 08:04

## 2024-04-07 RX ADMIN — ONDANSETRON 4 MG: 2 INJECTION INTRAMUSCULAR; INTRAVENOUS at 01:26

## 2024-04-07 NOTE — CONSULTS
V2.0  [facility] Consult Note      Name:  Adenike Harvey /Age/Sex: 1955  (69 y.o. female)   MRN & CSN:  [id number] & [id number] Encounter Date/Time: 3/23/2024 10:47 AM EDT   Location:  -A PCP: Iban Lou MD     Attending:Argenis Babcock MD  Consulting Provider: Otf Uribe MD      Hospital Day: 2    Assessment and Recommendations   Adenike Harvey is a 69 y.o. female with a pmh of CAD s/p PCI, systolic CHF, COPD, hx of recurrent NSLC (was on immunotherapy) who presents with Septic shock      Hospital Problems             Last Modified POA    * (Principal) Sepsis (HCC) 3/22/2024 Yes       Recommendations:    Septic shock secondary to concern for pneumonia: On on pressors.  Currently under primarily ICU care.  Chest x-ray shows signs of pneumonia and hypoventilation.  UA is negative.  Lactic acidosis in setting of above  Anion gap metabolic acidosis in the setting of above  Intractable diarrhea in the setting of above, improved  MELIDA in the setting of above improving with fluid hydration currently on pressors  History of COPD on 3 L nasal cannula      Comment: Please note this report has been produced using speech recognition software and may contain errors related to that system including errors in grammar, punctuation, and spelling, as well as words and phrases that may be inappropriate. If there are any questions or concerns please feel free to contact the dictating provider for clarification.     Diet ADULT DIET; Regular; 5 carb choices (75 gm/meal); Low Fat/Low Chol/High Fiber/DARRELL   DVT Prophylaxis [] Lovenox, []  Heparin, [] SCDs, [] Ambulation,  [] Eliquis, [] Xarelto   Code Status Full Code   Surrogate Decision Maker/ POA     Hi  History Obtained From:    patient    History of Present Illness:     Chief Complaint: Sepsis (HCC)    Patient presented to the hospital because of generalized weakness nausea vomiting diarrhea along with hypotension.  Patient was recently admitted to the 
24 hour central line rounding completed. Accessed PORT d/t patient needs at this time. Patient with low dose levophed ordered and serial labs. Patient continues to meet the following criteria for central vascular access: Irritant/vesicant medication. Brisk blood return noted. Labs collected and sent.     Consult the Vascular Access Team for questions, concerns, or change in patient's condition.   
CARDIOLOGY CONSULT NOTE         Reason for consultation:   Elevated troponin      Primary care physician: Iban Lou MD      Chief Complaints :  Chief Complaint   Patient presents with    Nausea     X1 week     Emesis     X1 week     Diarrhea     X3 days         History of present illness:Adenike is a 69 y.o.year old female who has a history of CAD s/p recent PCI to LAD with drug-eluting stents, systolic CHF, COPD who is now admitted with septic shock and MELIDA.  We are asked to see her for elevated troponin and management of antiplatelet medicines.  Patient denies any chest pain on evaluation.  She states that she has been compliant with her medications.    Review of Systems:     All systems negative except as marked.        Physical Examination:    Vitals:    03/25/24 1700   BP: 98/65   Pulse: (!) 116   Resp: 23   Temp:    SpO2: 96%        General Appearance:  No distress, conversant    Constitutional:  No acute distress, non-toxic appearance.    HENT:  Normocephalic, Atraumatic,   Eyes:  PERRL, EOMI, Conjunctiva normal, No discharge.   Respiratory:  No respiratory distress, No wheezing  Cardiovascular: S1, S2, no murmurs, gallops. JVD wnl  Abdomen /GI:   Soft, No tenderness   Genitourinary: No costovertebral angle tenderness   Musculoskeletal:  No edema, no tenderness, no deformities.   Integument:  Well hydrated, no rash   Neurologic:  Alert & oriented x 3, no focal deficits noted       Medical decision making and Data review:    Lab Review   Recent Labs     03/25/24  0630   WBC 4.0   HGB 6.5*   HCT 20.6*         Recent Labs     03/25/24  0630 03/25/24  1450     --    K 2.6* 3.0*   CL 98*  --    CO2 20*  --    PHOS 3.9  --    BUN 23  --    CREATININE 4.7*  --      No results for input(s): \"AST\", \"ALT\", \"ALB\", \"BILIDIR\", \"BILITOT\", \"ALKPHOS\" in the last 72 hours.  No results for input(s): \"TROPONINT\" in the last 72 hours.    No results for input(s): \"PROBNP\" in the last 72 hours.  Lab 
[facility] ACUTE CARE PHYSICAL THERAPY EVALUATION  Adenike Harvey, 1955, 3111/3111-A, 4/1/2024    History  [place]:  The primary encounter diagnosis was Acute renal failure, unspecified acute renal failure type (HCC). Diagnoses of Sepsis with acute renal failure and septic shock, due to unspecified organism, unspecified acute renal failure type (HCC), Pneumonia of right lung due to infectious organism, unspecified part of lung, and Sepsis with acute renal failure, tubular necrosis, and septic shock, due to unspecified organism (HCC) were also pertinent to this visit.  Patient  has a past medical history of Anemia, Anxiety, Arthritis, Bronchitis, Chronic back pain, COPD (chronic obstructive pulmonary disease) (HCC), Depression, Diverticulosis, Hemoptysis, History of external beam radiation therapy, Hx of blood clots, Hyperlipidemia, Hypertension, Low back pain, Lung mass, Nausea & vomiting, Panic attacks, Pneumonia, Right Lung Cancer, Shingles, Shortness of breath on exertion, Teeth missing, Urinary tract infection, Wears dentures, and Wears glasses.  Patient  has a past surgical history that includes Tonsillectomy (1978); Tubal ligation (1988); Elbow surgery (Left, 1980); Dilation and curettage of uterus (1989); Colonoscopy (2000's); bronchoscopy (12/07/2017); Bunionectomy (Bilateral, 1990's); Mediastinoscopy (02/21/2018); Lung surgery (04/12/2018); Tunneled venous port placement (Left, 07/31/2018); Catheter Removal (Left, 5/16/2019); Upper gastrointestinal endoscopy (N/A, 1/23/2024); Cardiac procedure (N/A, 3/1/2024); and Cardiac procedure (N/A, 3/1/2024).    Discharge Recommendation: Encourage facility for moderate post-acute rehabilitation, anticipate 1-2 hours per day and 5 days per week    Equipment: TBD at next level of care    Subjective:    Patient states:  \"I don't know if this will happen today.\"      Pain:  denies pain.      Communication with other providers:  Handoff to RN, 
Infectious Disease Consult Note  2024   Patient Name: Adenike Harvey : 1955   Impression  Leukocytosis with Concern for Infectious Process, Possibly  and/or GI:  No reported allergies to ABX. CrCl 7 on stage III MELIDA, Afebrile, no leukocytosis from admission until it started on  of 17.2, now 47.8. Steroid therapy onboard, Solu-Medrol from 3/24-, then prednisone from  still onboard. Pct 1.13 and .2, may be elevated in the setting of MELIDA. Cortisol 95.93. CRP pending. COVID-1, Influenza A/B and MRSA by PCR negative.   3/22-BC 0 NGTD  4/3-BC Pending  -UA and urine culture: Pending  3/22-CT A/P WO Contrast: No acute intra-abdominopelvic abnormality.   -PCXR: interval decrease in size of the right mediastinal/suprahilar opacity. Small left pleural effusion.   Elevated Troponin/ CAD s/p PCI on DAPT/ HFrEF:  Dr. Josue onboard  MELIDA/ Hyponatremia/ Hypokalemia:  Dr. Chen onboard  Right NSCLC:  Follows as OP with Dr. Heart, s/p chemo/ radiation, last was Keytruda in 2023.   Mediport Placement 2018:  COPD on Room Air at Baseline:  Schatzki's Ring/ Gastritis:   Dr. Maurer onboard  Multi-morbidity: per PMHx: recurrrent right NSCLC s/p RUL resection, chemo and radiation 2018 per Dr. Heart, chronic anemia, depression/ anxiety, COPD, diverticulosis, HLD, HTN, CAD s/p PCI on DAPT  Plan:  Continue po Bactrim 400-80 mg q48h for prophylaxis  Continue empiric IV fluconazole 200 mg q24h  DC IV linezolid   Start empiric IV vancomycin per pharmacy dosing  Continue empiric meropenem 1 gm q24h  Trend CRP and Pct, ordered  Await BC from 4/3  Await UA and urine culture  Ordered CT A&P wo contrast to evaluate for infectious process, perforation, or other concerns    Thank you for allowing me to consult in the care of this patient.  ------------------------  REASON FOR CONSULT: Infective syndrome \"leucocytosis\"  Requested by: Dr. Lauren Ga  HPI:Patient is a 69 y.o.  female with a history 
Rounding completed. L upper chest PowerMedPort de-accessed & re-accessed with new needleset per protocol - sterile dressing with CHG scrub, SkinPrep, LuerLok cap/SwabCaps applied - site returns blood briskly and flushes without resistance/abnormalities and infusion continued.   
[facility] ACUTE CARE OCCUPATIONAL THERAPY EVALUATION    Adenike Harvey, 1955, 3111/3111-A, 4/1/2024      Discharge Recommendation: Patient has moderate post-acute PT/OT therapy service needs. Encourage facility for post-acute rehabilitation, anticipate 1-2 hours per day and 5 days per week.    History:  [place]:  The primary encounter diagnosis was Acute renal failure, unspecified acute renal failure type (HCC). Diagnoses of Sepsis with acute renal failure and septic shock, due to unspecified organism, unspecified acute renal failure type (HCC), Pneumonia of right lung due to infectious organism, unspecified part of lung, and Sepsis with acute renal failure, tubular necrosis, and septic shock, due to unspecified organism (HCC) were also pertinent to this visit.  Past Medical History:   Diagnosis Date    Anemia     Anxiety     Arthritis     \"Fingers, Back\"    Bronchitis Last Episode 11-17    Chronic back pain     COPD (chronic obstructive pulmonary disease) (HCC)     Sees Dr. Hernandez    Depression     Diverticulosis 06/2013    Extensive per CT    Hemoptysis 12/06/2017    History of external beam radiation therapy 04/2023    RIGHT LUNG 6,000 cGy in 30 fractions    Hx of blood clots     \"found I have a clot near my mediport so they are taking it out 5/16/2019- put me on Xarelto    Hyperlipidemia 2009    Hypertension 2009    Low back pain     \"better than it use to be- had therapy in the past- originally hurt back at work 20+ yrs ago\"    Lung mass     rul- scheduled to bronch 12/74/2017    Nausea & vomiting 01/22/2024    Panic attacks     Pneumonia Dx 1-17    Right Lung Cancer Dx 10-17    tx with chemo following with Dr Una Smith Dx 2014    \"Right Side\"    Shortness of breath on exertion     Teeth missing     Upper And Lower    Urinary tract infection In Past    No Current Symptoms    Wears dentures     Full Upper, Partial Lower    Wears glasses          Subjective:  Patient states: \"I have not 
right ureteral calculus is   identified. The right kidney has no masses. The right kidney has   evidence of extensive cortical scarring.            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     She underwent right upper lobectomy on 2018.     started adjuvant chemo carbo/taxol on 2018        PET scan 2022  1.  Metabolic activity associated with the new right hilar nodule described on the recent CT scan concerning for recurrent/metastatic disease.      started radiation 23 for 60Gy/30Fx via IMRT/VMAT.  Chemotherapy: carbo/taxol 23.   C3D1 3/6/23 allergic reaction to carbo requiring epi x 3, solumedrol and overnight hospital stay.     she is no longer agreeable to take chemotherapy.         2023 she completed radiation  6/15/2023 C1 of Keytruda                    PMH :   Squamous  lung cancer diagnosed in 2017-she underwent surgical resection, chemotherapy currently in immune checkpoint inhibitor mainly programmed death 1 receptor blocker.  Longstanding hypertension she was taking atenolol and hydrochlorothiazide looks like since March of this year  Chronic obstructive pulmonary disease she is not on any home oxygen  Prior history of anion gap metabolic acidosis           PSH :  Lung surgery due to squamous cell cancer  Also had foot surgery in the distant past        OB GYN Hx:   1, para 1, no history of eclampsia, preeclampsia, gestational diabetes or hypertension     Habits :   She quit smoking in  has roughly 15-pack-year history, no history of alcohol use or drug use     Soc Hx:  She was born in [facility], grew up there but moved to local area including in and in [place] and living here for the last 25 years.  She has been  for 45 years and has 1 child, she is to work for [employer] but retired now     [facility]   Cancer runs in the family             REVIEW OF 
left pulmonary arterial system is normal and no left-sided pulmonary arterial embolism. There is narrowing of the right pulmonary artery and branches with previous surgery and the fibrotic changes in the right hilar to paramediastinal region.  General reduced caliber of the remaining branches in the right lung but without a focal pulmonary arterial embolism identified..  Main pulmonary artery is normal in caliber.  The right pulmonary veins are also compressed/nonenhancing with the scarring in the right hilum and postsurgical changes. Mediastinum: Cardiac chambers are not enlarged.  There is a small pericardial effusion which is mildly increased from the recent study.  The thoracic aorta is not aneurysmal with scattered atherosclerotic calcifications.  Beam hardening artifact from high-density contrast in the SVC.  Scarring at the right paramediastinal margin and hilum with postsurgical changes and calcifications is redemonstrated.  The left hilar lymph nodes are not enlarged. Lungs/pleura: The scarring and fibrosis in the right hilar and suprahilar margin continuing along the paramediastinal margin.  A 4 mm nodule along the posterior right upper lung on series 302 image number 33 is unchanged.  3 mm nodule on series 302, image 50 and scarring in the right lower lung.  There is a small amount of right pleural effusion which is new from the recent exam.  There is no pneumothorax. Upper Abdomen: No free air or free fluid at the upper abdomen.  For full abdomen detail see the recent dedicated abdominal CT. Soft Tissues/Bones: No new fractures are identified.  Chronic features in the right ribs with post radiation changes.  Degenerative changes along the spine but no collapse of the vertebral bodies.     1.  No new pulmonary arterial embolism is identified. 2.  Fibrosis in the right perihilar and paramediastinal region with previous surgery and radiation.  Small caliber of the right pulmonary artery and general

## 2024-04-08 VITALS
HEART RATE: 83 BPM | OXYGEN SATURATION: 96 % | TEMPERATURE: 97.8 F | WEIGHT: 121.91 LBS | HEIGHT: 64 IN | BODY MASS INDEX: 20.81 KG/M2 | SYSTOLIC BLOOD PRESSURE: 147 MMHG | DIASTOLIC BLOOD PRESSURE: 93 MMHG | RESPIRATION RATE: 20 BRPM

## 2024-04-08 LAB
ALBUMIN SERPL-MCNC: 3 GM/DL (ref 3.4–5)
ALP BLD-CCNC: 305 IU/L (ref 40–128)
ALT SERPL-CCNC: 175 U/L (ref 10–40)
ANION GAP SERPL CALCULATED.3IONS-SCNC: 18 MMOL/L (ref 7–16)
AST SERPL-CCNC: 210 IU/L (ref 15–37)
BASOPHILS ABSOLUTE: 0 K/CU MM
BASOPHILS RELATIVE PERCENT: 0.2 % (ref 0–1)
BILIRUB SERPL-MCNC: 0.4 MG/DL (ref 0–1)
BUN SERPL-MCNC: 76 MG/DL (ref 6–23)
CALCIUM SERPL-MCNC: 8.3 MG/DL (ref 8.3–10.6)
CHLORIDE BLD-SCNC: 95 MMOL/L (ref 99–110)
CO2: 21 MMOL/L (ref 21–32)
CREAT SERPL-MCNC: 3.9 MG/DL (ref 0.6–1.1)
CULTURE: NORMAL
DIFFERENTIAL TYPE: ABNORMAL
EOSINOPHILS ABSOLUTE: 0 K/CU MM
EOSINOPHILS RELATIVE PERCENT: 0 % (ref 0–3)
GFR SERPL CREATININE-BSD FRML MDRD: 12 ML/MIN/1.73M2
GLUCOSE SERPL-MCNC: 134 MG/DL (ref 70–99)
HCT VFR BLD CALC: 28.7 % (ref 37–47)
HEMOGLOBIN: 9.5 GM/DL (ref 12.5–16)
HIGH SENSITIVE C-REACTIVE PROTEIN: 147.7 MG/L (ref 0–5)
IMMATURE NEUTROPHIL %: 1.7 % (ref 0–0.43)
LYMPHOCYTES ABSOLUTE: 0.3 K/CU MM
LYMPHOCYTES RELATIVE PERCENT: 4.9 % (ref 24–44)
Lab: NORMAL
MAGNESIUM: 2 MG/DL (ref 1.8–2.4)
MCH RBC QN AUTO: 27.9 PG (ref 27–31)
MCHC RBC AUTO-ENTMCNC: 33.1 % (ref 32–36)
MCV RBC AUTO: 84.2 FL (ref 78–100)
MONOCYTES ABSOLUTE: 0.2 K/CU MM
MONOCYTES RELATIVE PERCENT: 3.6 % (ref 0–4)
NUCLEATED RBC %: 0 %
PDW BLD-RTO: 16.9 % (ref 11.7–14.9)
PLATELET # BLD: 114 K/CU MM (ref 140–440)
PMV BLD AUTO: 10.5 FL (ref 7.5–11.1)
POTASSIUM SERPL-SCNC: 4 MMOL/L (ref 3.5–5.1)
RBC # BLD: 3.41 M/CU MM (ref 4.2–5.4)
SEGMENTED NEUTROPHILS ABSOLUTE COUNT: 4.7 K/CU MM
SEGMENTED NEUTROPHILS RELATIVE PERCENT: 89.6 % (ref 36–66)
SODIUM BLD-SCNC: 134 MMOL/L (ref 135–145)
SPECIMEN: NORMAL
TOTAL IMMATURE NEUTOROPHIL: 0.09 K/CU MM
TOTAL NUCLEATED RBC: 0 K/CU MM
TOTAL PROTEIN: 5.2 GM/DL (ref 6.4–8.2)
WBC # BLD: 5.3 K/CU MM (ref 4–10.5)

## 2024-04-08 PROCEDURE — 6370000000 HC RX 637 (ALT 250 FOR IP)

## 2024-04-08 PROCEDURE — 85018 HEMOGLOBIN: CPT

## 2024-04-08 PROCEDURE — 99233 SBSQ HOSP IP/OBS HIGH 50: CPT | Performed by: NURSE PRACTITIONER

## 2024-04-08 PROCEDURE — 6370000000 HC RX 637 (ALT 250 FOR IP): Performed by: NURSE PRACTITIONER

## 2024-04-08 PROCEDURE — 85014 HEMATOCRIT: CPT

## 2024-04-08 PROCEDURE — 97530 THERAPEUTIC ACTIVITIES: CPT

## 2024-04-08 PROCEDURE — 6370000000 HC RX 637 (ALT 250 FOR IP): Performed by: INTERNAL MEDICINE

## 2024-04-08 PROCEDURE — 80053 COMPREHEN METABOLIC PANEL: CPT

## 2024-04-08 PROCEDURE — 97112 NEUROMUSCULAR REEDUCATION: CPT

## 2024-04-08 PROCEDURE — 83735 ASSAY OF MAGNESIUM: CPT

## 2024-04-08 PROCEDURE — 6360000002 HC RX W HCPCS: Performed by: SPECIALIST

## 2024-04-08 PROCEDURE — 6360000002 HC RX W HCPCS: Performed by: INTERNAL MEDICINE

## 2024-04-08 PROCEDURE — C9113 INJ PANTOPRAZOLE SODIUM, VIA: HCPCS | Performed by: SPECIALIST

## 2024-04-08 PROCEDURE — 85025 COMPLETE CBC W/AUTO DIFF WBC: CPT

## 2024-04-08 PROCEDURE — 94761 N-INVAS EAR/PLS OXIMETRY MLT: CPT

## 2024-04-08 PROCEDURE — 6360000002 HC RX W HCPCS: Performed by: FAMILY MEDICINE

## 2024-04-08 PROCEDURE — 2580000003 HC RX 258: Performed by: INTERNAL MEDICINE

## 2024-04-08 PROCEDURE — 2580000003 HC RX 258: Performed by: NURSE PRACTITIONER

## 2024-04-08 PROCEDURE — 86141 C-REACTIVE PROTEIN HS: CPT

## 2024-04-08 RX ORDER — LEVOFLOXACIN 500 MG/1
500 TABLET, FILM COATED ORAL EVERY OTHER DAY
Qty: 3 TABLET | Refills: 0 | DISCHARGE
Start: 2024-04-08 | End: 2024-04-13

## 2024-04-08 RX ORDER — SULFAMETHOXAZOLE AND TRIMETHOPRIM 400; 80 MG/1; MG/1
1 TABLET ORAL
Qty: 10 TABLET | Refills: 0 | DISCHARGE
Start: 2024-04-08 | End: 2024-04-29

## 2024-04-08 RX ORDER — HEPARIN 100 UNIT/ML
100 SYRINGE INTRAVENOUS PRN
Status: DISCONTINUED | OUTPATIENT
Start: 2024-04-08 | End: 2024-04-09 | Stop reason: HOSPADM

## 2024-04-08 RX ORDER — FLUCONAZOLE 100 MG/1
100 TABLET ORAL DAILY
Status: DISCONTINUED | OUTPATIENT
Start: 2024-04-08 | End: 2024-04-09 | Stop reason: HOSPADM

## 2024-04-08 RX ORDER — HEPARIN SODIUM 1000 [USP'U]/ML
1000 INJECTION, SOLUTION INTRAVENOUS; SUBCUTANEOUS ONCE
Status: DISCONTINUED | OUTPATIENT
Start: 2024-04-08 | End: 2024-04-08

## 2024-04-08 RX ORDER — PREDNISONE 20 MG/1
TABLET ORAL
Qty: 33 TABLET | Refills: 0 | DISCHARGE
Start: 2024-04-09 | End: 2024-04-29

## 2024-04-08 RX ORDER — LINEZOLID 600 MG/1
600 TABLET, FILM COATED ORAL EVERY 12 HOURS SCHEDULED
Qty: 9 TABLET | Refills: 0 | DISCHARGE
Start: 2024-04-08 | End: 2024-04-13

## 2024-04-08 RX ORDER — FLUCONAZOLE 100 MG/1
100 TABLET ORAL DAILY
Status: DISCONTINUED | OUTPATIENT
Start: 2024-04-08 | End: 2024-04-08

## 2024-04-08 RX ORDER — LEVOFLOXACIN 500 MG/1
500 TABLET, FILM COATED ORAL EVERY OTHER DAY
Status: DISCONTINUED | OUTPATIENT
Start: 2024-04-08 | End: 2024-04-09 | Stop reason: HOSPADM

## 2024-04-08 RX ADMIN — CIPROFLOXACIN 1 DROP: 3 SOLUTION OPHTHALMIC at 17:20

## 2024-04-08 RX ADMIN — LINEZOLID 600 MG: 600 TABLET, FILM COATED ORAL at 08:27

## 2024-04-08 RX ADMIN — MEROPENEM 1000 MG: 1 INJECTION, POWDER, FOR SOLUTION INTRAVENOUS at 11:17

## 2024-04-08 RX ADMIN — FLUCONAZOLE 100 MG: 100 TABLET ORAL at 17:16

## 2024-04-08 RX ADMIN — ASPIRIN 81 MG: 81 TABLET, CHEWABLE ORAL at 08:27

## 2024-04-08 RX ADMIN — SODIUM CHLORIDE, PRESERVATIVE FREE 10 ML: 5 INJECTION INTRAVENOUS at 08:27

## 2024-04-08 RX ADMIN — PANTOPRAZOLE SODIUM 40 MG: 40 INJECTION, POWDER, FOR SOLUTION INTRAVENOUS at 08:26

## 2024-04-08 RX ADMIN — CIPROFLOXACIN 1 DROP: 3 SOLUTION OPHTHALMIC at 05:12

## 2024-04-08 RX ADMIN — PROCHLORPERAZINE EDISYLATE 5 MG: 5 INJECTION INTRAMUSCULAR; INTRAVENOUS at 01:15

## 2024-04-08 RX ADMIN — CLOPIDOGREL BISULFATE 75 MG: 75 TABLET ORAL at 08:26

## 2024-04-08 RX ADMIN — HYDRALAZINE HYDROCHLORIDE 25 MG: 25 TABLET ORAL at 05:12

## 2024-04-08 RX ADMIN — LEVOFLOXACIN 500 MG: 500 TABLET, FILM COATED ORAL at 17:16

## 2024-04-08 RX ADMIN — PREDNISONE 60 MG: 20 TABLET ORAL at 08:27

## 2024-04-08 RX ADMIN — METOPROLOL SUCCINATE 25 MG: 25 TABLET, FILM COATED, EXTENDED RELEASE ORAL at 08:26

## 2024-04-08 RX ADMIN — CIPROFLOXACIN 1 DROP: 3 SOLUTION OPHTHALMIC at 08:27

## 2024-04-08 NOTE — PROGRESS NOTES
Cardiology Progress Note     Admit Date:  3/22/2024      Subjective and  Overnight Events : Overnight patient remained stable.  She denies any chest pain, abdominal pain.        Physical Exam:  Vitals:    03/26/24 1116   BP:    Pulse:    Resp:    Temp:    SpO2: 100%        General Appearance:  No distress, conversant  Respiratory:  Normal breath sounds, No respiratory distress, No wheezing  Cardiovascular: S1, S2, no murmurs, gallops. JVD wnl  Abdomen /GI:  Bowel sounds normal, Soft, No tenderness   Integument:  Well hydrated, no rash   Neurologic:  Alert & oriented x 3, no focal deficits noted       Lab Data:  CBC:   Recent Labs     03/24/24  0610 03/25/24  0630 03/25/24  2041 03/26/24  0450   WBC 3.3* 4.0  --  7.1   HGB 7.2* 6.5* 8.2* 8.2*   HCT 22.6* 20.6* 25.1* 25.2*   MCV 85.3 86.2  --  85.7   * 344  --  307     BMP:   Recent Labs     03/25/24  0630 03/25/24  1450 03/25/24 2041 03/26/24  0450     --  134* 132*   K 2.6* 3.0* 3.6 3.4*   CL 98*  --  97* 97*   CO2 20*  --  18* 17*   PHOS 3.9  --  3.0 2.8   BUN 23  --  25* 27*   CREATININE 4.7*  --  4.6* 4.9*     PT/INR: No results for input(s): \"PROTIME\", \"INR\" in the last 72 hours.  BNP:  No results for input(s): \"PROBNP\" in the last 72 hours.  TROPONIN: No results for input(s): \"TROPONINT\" in the last 72 hours.       Assessment and Recommendations:     Elevated troponin  Acute kidney injury  Acute on chronic anemia  Coronary artery disease status post recent PCI to LAD  Severe cardiomyopathy with estimated ejection fraction of 15 to 20%.  Sepsis    As above her heart function is quite low.  At present we will start her on vasodilators utilizing low-dose hydralazine and Isordil.  We will uptitrate these medications as tolerated.  Please continue with aspirin and Plavix.  Her hemoglobin appears to be stable right now.    All labs, medications and tests reviewed by myself , 
                                             [place] [address]  Phone: [phone]    Fax [phone]                  Tera Livingston MD, [facility]       Alexys Morocho MD, [facility]  Bailee Ceballos MD, [facility]    MD Lili Machado MD Tariq Rizvi, MD Bilal Alam, MD Dr. Waseem Sajjad MD Melissa Kellis, APRN      Elissa White, APRSANTINO Tanner, APRN    Mohamud Carrillo, APRN  Ish Sanchez PA-C    CARDIOLOGY  NOTE      Name:  Adenike Harvey /Age/Sex: 1955  (69 y.o. female)   MRN & CSN:  [id number] & [id number] Admission Date/Time: 3/22/2024  9:53 AM   Location:  [address] [place] [address]- PCP: Iban Lou MD       Hospital Day: 8    - Cardiology consult is for:  Cardiomyopathy      ASSESSMENT/ PLAN:  New ischemic cardiomyopathy with reduced ejection fraction  Coronary artery disease s/p PCI LAD   -Troponin elevated however non-ACS trend.  -, Repeat echocardiogram showing EF of 15-20% with wall motion abnormalities.  -Patient will likely need a repeat left heart catheterization upon stabilization of renal function.  No active chest pain at this time  -GDMT: Continue Toprol-XL 25 mg daily.  Continue hydralazine 25 mg 3 times daily and Isordil 10 mg 3 times daily for afterload reduction.  Unable to tolerate ACE/ARB/Arni or Aldactone  -On IV lasix 40 mg twice daily  -Continue aspirin + Plavix.  -Continue crestor 10 mg daily. Liver function stable  Progressively worsening MELIDA  -Nephrology following.  Orthostatic hypotension  -No dizziness at this time.  -Jose Juan hose and adequate hydration  Chronic anemia: Hemoglobin 8.5  COPD  NSCLC s/p thoracotomy 2018  -Per primary care    Cardiology will sign off for now.  Upon stabilization of patient's renal status can consider repeat heart catheterization due to new reduced EF      Echo 3/25/2024    Left Ventricle: Severely reduced left ventricular systolic function with a visually estimated EF 
                                             [place] [address]  Phone: [phone]    Fax [phone]                  Tera Livingston MD, [facility]       Alexys Morocho MD, [facility]  Bailee Ceballos MD, [facility]    MD Lili Machado MD Tariq Rizvi, MD Bilal Alam, MD Dr. Waseem Sajjad MD Melissa Kellis, APRSANTINO White, APRSANTINO Tanner, APRSANTINO Carrillo, APRN  Ish Sanchez PA-C    CARDIOLOGY  NOTE      Name:  Adenike Harvey /Age/Sex: 1955  (69 y.o. female)   MRN & CSN:  [id number] & [id number] Admission Date/Time: 3/22/2024  9:53 AM   Location:  [address] [place] [address]-A PCP: Iban Lou MD       Hospital Day: 7    - Cardiology consult is for:  Cardiomyopathy      ASSESSMENT/ PLAN:  New ischemic cardiomyopathy with reduced ejection fraction  Coronary artery disease s/p PCI LAD   -Troponin elevated however non-ACS trend.  -, Repeat echocardiogram showing EF of 15-20% with wall motion abnormalities.  -Patient will likely need a repeat left heart catheterization upon stabilization of renal function.  No active chest pain at this time  -GDMT: Initiate on Toprol-XL 25 mg daily.  Continue hydralazine 25 mg 3 times daily and Isordil 10 mg 3 times daily for afterload reduction.  Unable to tolerate ACE/ARB/Arni or Aldactone  -On IV lasix 40 mg twice daily  -Continue aspirin + Plavix.  -Continue crestor 10 mg daily. Liver function stable  Progressively worsening MELIDA  -Nephrology following.  Orthostatic hypotension  -No dizziness at this time.  -Jose Juan hose and adequate hydration  Chronic anemia: Hemoglobin 8.5  COPD  NSCLC s/p thoracotomy 2018  -Per primary care        Echo 3/25/2024    Left Ventricle: Severely reduced left ventricular systolic function with a visually estimated EF of 15 - 20%. Mildly increased wall thickness. Akinetic mid to apical wall segments with hyperdynamic basal wall segments.  This is consistent 
             [facility] Gastroenterology and Hepatology             MD Rony Fernandez MD Carol Christensen, APRN-CNP       Elissa Banks APRN-CNP             [address] [place] [address]             [phone] fax [phone]      Gastroenterology Progress note . 4/5/2024  Reason for re consult:   Melenotic stools     Physician attestation:  I have seen and examined the patient personally.  I reviewed and agree with the assessment and treatment plan made by Elissa Banks CNP.    My documented MDM is a substantive portion of the supervisory visit.      Interval H/P  Nursing notified Dr. Maurer of concern for \"motor oil\" like stools. This provider examined the patient at bedside to evaluate. On depends was notice some melanotic stools     CC today: nausea, vomiting, melenotic stools        ROS: Per history of present illness. All other systems were reviewed and were negative.      Physical Exam  Blood pressure (!) 91/59, pulse 90, temperature 97.9 °F (36.6 °C), resp. rate 29, height 1.626 m (5' 4\"), weight 52.2 kg (115 lb 1.3 oz), last menstrual period 01/06/2009, SpO2 93 %, not currently breastfeeding.  Constitutional: Patient is in no distress.   Eyes: Pupils equal, round.  No icterus.  HENT: Oral mucosa is moist.   Pulmonary: on oxygen  Cardiovascular: tachycardic, liable blood pressures, no JVD.   Gastrointestinal: Bowel sounds are present in all four quadrants. Abdomen is soft, nontender, nondistended. Rectal examination had melena on gloved finger.   Skin: No jaundice, spider angiomas, or palmar erythema. No purpura.  Neuro: Awake,alert, and oriented x3. Appears sleepy No gross focal neurologic deficits.       Chart and labs reviewed       Impression/Plan  1)Acute anemia blood loss Anemia, secondary to GI bleed- hgb  currently 7.2  -continue to hold blood thinners as ordered  -continue IV PPI as ordered   -monitor for overt blood loss   -Trend 
             [facility] Gastroenterology and Hepatology             MD Rony Fernandez MD Carol Christensen, APRN-CNP       Elissa Banks, APRN-CNP             [address] [place] [address]             [phone] fax [phone]      Gastroenterology Progress note . 3/29/2024  Reason for consult:  Anemia      Physician attestation:  I have seen and examined the patient personally.  I reviewed and agree with the assessment and treatment plan made by Elissa Banks CNP.    My documented MDM is a substantive portion of the supervisory visit.      Interval H/P  Creatinine noted to be worsening and is now at 6.1  There are some abnormal electrolytes with sodiu 13, potassium elevate to 5.3  Hgb noted to be improved and stable at 8.7    Patient was examined at bedside.  is present.    CC today:nausea     GI symptoms: denies dysphagia, heartburn, reflux, abdominal pain, constipation, melena and hematochezia, positive for nausea and vomiting    ROS: Per history of present illness. All other systems were reviewed and were negative.      Physical Exam  Blood pressure (!) 121/92, pulse (!) 109, temperature 98.1 °F (36.7 °C), temperature source Oral, resp. rate 15, height 1.626 m (5' 4\"), weight 65.2 kg (143 lb 11.8 oz), last menstrual period 01/06/2009, SpO2 94 %, not currently breastfeeding.  Constitutional: Patient is in no distress.   Eyes: Pupils equal, round.  No icterus.  HENT: Oral mucosa is moist.   Pulmonary: No accessory muscle use. No localizing pulmonary findings.     Cardiovascular: tachycardia  no JVD.   Gastrointestinal: Bowel sounds are present in all four quadrants. Abdomen is soft, nontender, nondistended. Rectal examination deferred.   Skin: No jaundice, spider angiomas, or palmar erythema. No purpura.  Neuro: Awake,alert, and oriented x3. No gross focal neurologic deficits.       Chart and labs reviewed.     Impression/Plan  1) Acute on Chronic 
             [facility] Gastroenterology and Hepatology             MD Rony Fernandez MD Carol Christensen, APRN-CNP       Elissa Banks, APRN-CNP             [address] [place] [address]             [phone] fax [phone]      Gastroenterology Progress note . 3/27/2024  Reason for consult:   Anemia     Physician attestation:  I have seen and examined the patient personally.  I reviewed and agree with the assessment and treatment plan made by Elissa Banks CNP       Interval H/P    C diff and GI panel showing still needs to be collected- Spoke to nursing staff requesting those be obtained; Per Dr. Maurer lab has not received samples.  Noted to have low sodium at 128  Worsening creatinine at 5.3  Elevated troponin  Steady hgb at 8.2    Patient was examined at bedside.  is present. Patient and  have concerns about condition. Had a therapeutic discussion on goals of care as well as overall health wishes, think about quality vs. Quantity and what she/they would like to move forward. Patient and  to talk privately and discuss with hospital medicine team.     CC today: no new complaints     GI symptoms: Denies dysphagia, heartburn/reflux, nausea, vomiting, diarrhea, constipation, melena hematochezia.    ROS: Per history of present illness. All other systems were reviewed and were negative.      Physical Exam  Blood pressure 107/87, pulse (!) 115, temperature 98.4 °F (36.9 °C), temperature source Oral, resp. rate 17, height 1.626 m (5' 4\"), weight 67 kg (147 lb 11.3 oz), last menstrual period 01/06/2009, SpO2 100 %, not currently breastfeeding.  Constitutional: Patient is in no distress.   Eyes: Pupils equal, round.  No icterus.  HENT: Oral mucosa is moist.   Pulmonary: No accessory muscle use. No localizing pulmonary findings.     Cardiovascular:  no JVD.   Gastrointestinal: Bowel sounds are present in all four quadrants. Abdomen is soft, 
             [facility] Gastroenterology and Hepatology             MD Rony Fernandez MD Carol Christensen, APRN-CNP       Elissa Banks, APRN-CNP             [address] [place] [address]             [phone] fax [phone]      Gastroenterology Progress note . 3/26/2024  Reason for consult:   anemia    Physician attestation:  I have seen and examined the patient personally.  I reviewed and agree with the assessment and treatment plan made by Elissa Banks CNP      Interval H/P    Hgb noted to be 8.2 after 1 unit of PRBC and today is noted to be stable at 8.2    Occult blood negative, diarrhea reported to have returned. Now C-diff rule out     Patient was examined at bedside. She is now noted to be off oxygen with 100% sat on room air.  is at beside.    Hospital medicine is at beside. Discussed plan of care from their perspective with patient and myself.         CC today:diarrhea returned, otherwise no new complaints     GI symptoms: denies dysphagia, heartburn.reflux, abdominal pain, nausea, vomiting, constipation, melena and hematochezia. Positive for diarrhea.     ROS: Per history of present illness. All other systems were reviewed and were negative.      Physical Exam  Blood pressure 106/85, pulse (!) 113, temperature 97.6 °F (36.4 °C), temperature source Oral, resp. rate 20, height 1.626 m (5' 4\"), weight 65 kg (143 lb 4.8 oz), last menstrual period 01/06/2009, SpO2 100 %, not currently breastfeeding.  Constitutional: Patient is in no distress.   Eyes: Pupils equal, round.  No icterus.  HENT: Oral mucosa is moist.   Pulmonary: No accessory muscle use. No localizing pulmonary findings.   Now off oxygen   Cardiovascular: , no JVD.   Gastrointestinal: Bowel sounds are present in all four quadrants. Abdomen is soft, nontender, nondistended. Skin: No jaundice, spider angiomas, or palmar erythema. No purpura.  Neuro: Awake,alert, and oriented x3. No 
    V2.0  [facility] [place]      Name:  Adenike Havrey /Age/Sex: 1955  (69 y.o. female)   MRN & CSN:  [id number] & [id number] Encounter Date/Time: 3/31/2024 11:17 AM EDT    Location:  [facility]/3111-A PCP: Iban Lou MD       Hospital Day: 10    Assessment and Plan:   Adenike Harvey is a 69 y.o. 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  Severe global hypokinesis with reduced ejection fraction and heart failure: Cardiology on board.  Troponins and BNP more than 70,000.  On IV Zosyn for concern of pneumonia.  Most recent echocardiogram on 3/25/2024 showing EF 15 to 20%.  Echocardiogram on 2024 showed EF 45 to 50%.  Currently on metoprolol XL 25 mg daily, hydralazine 25 3 times daily and Isordil 10 mg 3 times daily.  Unable to tolerate ACE/ARB or Aldactone due to MELIDA.  IV Lasix intensified to 80 mg 3 times daily since 3/30.  Metolazone 5 mg daily also added by nephrology  Acute renal failure in the setting of above per nephrology on board.  Creatinine continues to worsen.  Good urine output so far.  Creatinine on presentation 3.5 Mg/DL, continues to increase and 6.1 mg/DL today.  Continue IV Lasix and metolazone as above.  Patient's family want to proceed with renal biopsy.  Nephrology notified.  Now plan for 2024.  IR consulted.  Anemia in the settuing of ACD: Hb 6.4 on admission, transfuse 1 PRBC keep Hb >7.    Recent LHC with PCI on DAPT. Continue DAPT.   Lactic acidosis in setting of cardiogenic shock, improved  Anion gap metabolic acidosis in the setting of above  Intractable diarrhea in the setting of above, improved  MELIDA in the setting of above improving with fluid hydration currently on pressors  History of COPD on 3 L nasal 
    V2.0  [facility] [place]      Name:  Adenike Harvey /Age/Sex: 1955  (69 y.o. female)   MRN & CSN:  [id number] & [id number] Encounter Date/Time: 2024 11:17 AM EDT    Location:  [facility]/3111-A PCP: Iban Lou MD       Hospital Day: 12    Assessment and Plan:   Adenike Harvey is a 69 y.o. 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  Severe global hypokinesis with reduced ejection fraction and heart failure: Cardiology on board.  Troponins and BNP more than 70,000.  On IV Zosyn for concern of pneumonia.  Most recent echocardiogram on 3/25/2024 showing EF 15 to 20%.  Echocardiogram on 2024 showed EF 45 to 50%.  Currently on metoprolol XL 25 mg daily, hydralazine 25 3 times daily and Isordil 10 mg 3 times daily.  Unable to tolerate ACE/ARB or Aldactone due to MELIDA.  IV Lasix intensified to 80 mg 3 times daily since 3/30.  Metolazone 5 mg daily also added by nephrology  Acute renal failure in the setting of above per nephrology on board.  Creatinine continues to worsen.  Good urine output so far.  Creatinine on presentation 3.5 Mg/DL, continues to increase and 6.1 mg/DL today.  Continue IV Lasix and metolazone as above.  Patient's family want to proceed with renal biopsy.  Unable to undergo renal biopsy as patient recently got stents and is on Plavix.  Patient was undecided about dialysis but now wants to pursue.  Nephrology notified.  Anemia in the settuing of ACD: Hb 6.4 on admission, transfused 1 PRBC keep Hb >7.  Patient had 2 episodes of coffee-ground emesis in the morning.  GI has been notified.  Continue DAPT.  Continue to monitor hemoglobin.  Hemoglobin obtained this morning 8.2G/DL but appears hemoconcentrated.  Hypoxia.  
    V2.0  [facility] Hospitalist Progress Note      Name:  Adenike Harvey /Age/Sex: 1955  (69 y.o. female)   MRN & CSN:  [id number] & [id number] Encounter Date/Time: 2024 11:17 AM EDT    Location:  [facility]/3111-A PCP: Iban Lou MD       Hospital Day: 16    Assessment and Plan:   Adenike Harvey is a 69 y.o. 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  Severe global hypokinesis with reduced ejection fraction and heart failure: Cardiology on board.  Troponins and BNP more than 70,000.  On IV Zosyn for concern of pneumonia.  Most recent echocardiogram on 3/25/2024 showing EF 15 to 20%.  Echocardiogram on 2024 showed EF 45 to 50%.  Currently on metoprolol XL 25 mg daily, hydralazine 25 3 times daily and Isordil 10 mg 3 times daily.  Unable to tolerate ACE/ARB or Aldactone due to MELIDA.  IV Lasix intensified to 80 mg 3 times daily since 3/30.  Metolazone 5 mg daily also added by nephrology--plan to hold diuretics on 4/3/2024 and monitor kidney function.  Limited echocardiogram repeated on 2024, EF appears to have improved to 55 to 60%.  Was  hypotensive since 2024.  Improved with IVF  Acute renal failure in the setting of above per nephrology on board.  Creatinine continues to worsen.  Good urine output so far.  Creatinine on presentation 3.5 Mg/DL, continues to increase and 6.1 mg/DL today.  Continue IV Lasix and metolazone as above.  Patient's family want to proceed with renal biopsy.  Unable to undergo renal biopsy as patient recently got stents and is on Plavix.  Currently off of diuretics.  Creatinine today 4.7 mg/DL.  Anemia in the settuing of ACD: Hb 6.4 on admission, transfused 1 PRBC keep Hb >7.  Patient had 2 episodes of coffee-ground 
    V2.0  [facility] [place] [facility] [place]      Name:  Adenike Harvey /Age/Sex: 1955  (69 y.o. female)   MRN & CSN:  [id number] & [id number] Encounter Date/Time: 3/24/2024 8:09 AM EDT    Location:  -A PCP: Iban Lou MD       Hospital Day: 3    Assessment and Plan:   Adenike Harvey is a 69 y.o. 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 Participating in Hx.      Cv - Off levo since yesterday evening.  Weaned SDS  Hold home metop and ACEI     Resp - no active issue. On RA. Hx of COPD. Maintain O2 sats > 88     GI - diet as tolerated. GI ppx. Stool sample rejected by lab for insufficient quantity again.       - MELIDA likely multifactorial initially and now appears more like ATN-  Nephro consulted. Renal US ordered. Maintain burgess to monitor UOP. Replete lytes as needed. HCO3 Dced. .      ID - continue broad spectrum abx at this time. Follow up cultures.      Heme - monitor slow downtrend in Hb. Iron studies and Stool occult ordered. dvt ppx  Mild leukopenia - monitor.  Thombocytosis improving.      No further critical care needs. OK to downgrade. Discussed with Dr. Rony Harris ADULT DIET; Regular; 5 carb choices (75 gm/meal); Low Fat/Low Chol/High Fiber/DARRELL   DVT Prophylaxis [] Lovenox, [x]  Heparin, [] SCDs, [] Ambulation,  [] Eliquis, [] Xarelto  [] Coumadin   GI Prophylaxis [] Yes          [] No   Code Status Full Code    Disposition Patient doesn't requires continued ICU care      Subjective:     No overnight evetns. Remains off levo since yesterday evening. Awake and alert.   Worsening renal function, making urine. Nephro consulted.          Review of Systems:  
    V2.0  [facility] [place] Hospital[place] Progress Note      Name:  Adenike Harvey /Age/Sex: 1955  (69 y.o. female)   MRN & CSN:  [id number] & [id number] Encounter Date/Time: 3/27/2024 11:17 AM EDT    Location:  [facility]/3111-A PCP: Iban Lou MD       Hospital Day: 6    Assessment and Plan:   Adenike Harvey is a 69 y.o. 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 On antibiotics.  2 more days of Zosyn to complete the course  Consistent with cardiogenic shock?.  Severe global hypokinesis with reduced visual fraction and heart failure: Urology on board.  Troponins and BNP more than 70,000.  Patient drinking plenty of water encouraged with fluid restriction.  Also plan for the patient to be started on Lasix 40 mg IV twice daily  Anemia in the settuing of ACD: Hb 6.4, transfuse 1 PRBC keep Hb >7  Recent LHC with PCI on DAPT. Continue DAPT. Cardiology consulted due to concerns for low EF. Tele in place  Lactic acidosis in setting of above  Anion gap metabolic acidosis in the setting of above  Intractable diarrhea in the setting of above, improved  MELIDA in the setting of above improving with fluid hydration currently on pressors  History of COPD on 3 L nasal cannula      Comment: Please note this report has been produced using speech recognition software and may contain errors related to that system including errors in grammar, punctuation, and spelling, as well as words and phrases that may be inappropriate. If there are any questions or concerns please feel free to contact the dictating provider for clarification.   Diet ADULT DIET; Regular; 5 carb choices (75 gm/meal); Low Fat/Low Chol/High Fiber/DARRELL   DVT Prophylaxis [] Lovenox, []  Heparin, 
    V2.0  [facility] Hospitalist Progress Note      Name:  Adenike Harvey /Age/Sex: 1955  (69 y.o. female)   MRN & CSN:  [id number] & [id number] Encounter Date/Time: 2024 11:17 AM EDT    Location:  [facility]/3111-A PCP: Iban Lou MD       Hospital Day: 14    Assessment and Plan:   Adenike Harvey is a 69 y.o. 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  Severe global hypokinesis with reduced ejection fraction and heart failure: Cardiology on board.  Troponins and BNP more than 70,000.  On IV Zosyn for concern of pneumonia.  Most recent echocardiogram on 3/25/2024 showing EF 15 to 20%.  Echocardiogram on 2024 showed EF 45 to 50%.  Currently on metoprolol XL 25 mg daily, hydralazine 25 3 times daily and Isordil 10 mg 3 times daily.  Unable to tolerate ACE/ARB or Aldactone due to MELIDA.  IV Lasix intensified to 80 mg 3 times daily since 3/30.  Metolazone 5 mg daily also added by nephrology--plan to hold diuretics on 4/3/2024 and monitor kidney function.  Limited echocardiogram repeated on 2024, EF appears to have improved to 55 to 60%.  Acute renal failure in the setting of above per nephrology on board.  Creatinine continues to worsen.  Good urine output so far.  Creatinine on presentation 3.5 Mg/DL, continues to increase and 6.1 mg/DL today.  Continue IV Lasix and metolazone as above.  Patient's family want to proceed with renal biopsy.  Unable to undergo renal biopsy as patient recently got stents and is on Plavix.  Creatinine stable and 7 mg/DL today.  Plan to continue to monitor off diuretics.  Anemia in the settuing of ACD: Hb 6.4 on admission, transfused 1 PRBC keep Hb >7.  Patient had 2 episodes of coffee-ground emesis in the morning.  GI 
    V2.0  [facility] [place] Hospitalist Progress Note      Name:  Adenike Harvey /Age/Sex: 1955  (69 y.o. female)   MRN & CSN:  [id number] & [id number] Encounter Date/Time: 3/28/2024 11:17 AM EDT    Location:  [facility]/3111-A PCP: Iban Lou MD       Hospital Day: 7    Assessment and Plan:   Adenike Harvey is a 69 y.o. 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 On antibiotics.  2 more days of Zosyn to complete the course  Consistent with cardiogenic shock?.  Severe global hypokinesis with reduced ejection fraction and heart failure: Cardiology on board.  Troponins and BNP more than 70,000.  Patient drinking plenty of water encouraged with fluid restriction.  Also plan for the patient to be started on Lasix 40 mg IV twice daily  Acute renal failure in the setting of above per nephrology on board.  Plan for likely kidney biopsy versus renal replacement therapy if no further improvement in kidney function noticed in the next couple of days  Anemia in the settuing of ACD: Hb 6.4 on admission, transfuse 1 PRBC keep Hb >7.  Patient actually has hemoglobin above 9 now medically cleared from GI standpoint  Recent LHC with PCI on DAPT. Continue DAPT. Cardiology consulted due to concerns for low EF. Tele in place  Lactic acidosis in setting of above  Anion gap metabolic acidosis in the setting of above  Intractable diarrhea in the setting of above, improved  MELIDA in the setting of above improving with fluid hydration currently on pressors  History of COPD on 3 L nasal cannula      Comment: Please note this report has been produced using speech recognition software and may contain errors related to that system including errors in grammar, 
    V2.0  [facility] [place] Hospitalist Progress Note      Name:  Adenike Harvey /Age/Sex: 1955  (69 y.o. female)   MRN & CSN:  [id number] & [id number] Encounter Date/Time: 3/24/2024 10:54 AM EDT    Location:  -A PCP: Iban Lou MD       Hospital Day: 3    Assessment and Plan:   Adenike Harvey is a 69 y.o. 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  Currently under primarily ICU care.  Chest x-ray shows signs of pneumonia and hypoventilation.  UA is negative.   Anemia in the setting of anemia of chronic disease: Hemoglobin dropping to 7.2 on Protonix.  Hold Plavix start the steroids.  GI to be consulted  Hypokalemia replete as needed  Lactic acidosis in setting of above  Anion gap metabolic acidosis in the setting of above  Intractable diarrhea in the setting of above, improved  MELIDA in the setting of above improving with fluid hydration currently on pressors  History of COPD on 3 L nasal cannula    Comment: Please note this report has been produced using speech recognition software and may contain errors related to that system including errors in grammar, punctuation, and spelling, as well as words and phrases that may be inappropriate. If there are any questions or concerns please feel free to contact the dictating provider for clarification.   Diet ADULT DIET; Regular; 5 carb choices (75 gm/meal); Low Fat/Low Chol/High Fiber/DARRELL   DVT Prophylaxis [] Lovenox, []  Heparin, [] SCDs, [] Ambulation,  [] Eliquis, [] Xarelto  [] Coumadin   Code Status Full Code   Disposition From: Home  Expected Disposition: To be declared likely SNF  Estimated Date of Discharge: 2 to 3 days  Patient requires continued admission due to 
    V2.0  [facility] [place] Critical Care Progress Note      Name:  Adenike Harvey /Age/Sex: 1955  (69 y.o. female)   MRN & CSN:  [id number] & [id number] Encounter Date/Time: 3/23/2024 8:09 AM EDT    Location:  -A PCP: Iban Lou MD       Hospital Day: 2    Assessment and Plan:   Adenike Harvey is a 69 y.o. 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 Participating in Hx.      Cv - on low dose levo. Wean to MAP > 65. Add vaso if up-trending requirements. Repeat Albumin.  SDS  Hold home metop and ACEI     Resp - no active issue. On 3L NC, wean as tolerated. Hx of COPD. Maintain O2 sats > 88     GI - ADAT. GI ppx      - MELIDA likely multifactorial -  pre-renal (concentrated urine and hx of n/v/diarrhea) + ATN from shock.  Maintain burgess to monitor UOP. Replete lytes as needed. Added hco3 gtt and PO hco3.      ID - continue broad spectrum abx at this time. Follow up cultures. Follow up Cdiff.      Heme - dvt ppx     I have performed 50 minutes of critical care time, excluding and separately billable procedures       Diet Diet NPO   DVT Prophylaxis [] Lovenox, [x]  Heparin, [] SCDs, [] Ambulation,  [] Eliquis, [] Xarelto  [] Coumadin   GI Prophylaxis [] Yes          [] No   Code Status Full Code    Disposition Patient requires continued ICU care due to pressors     Subjective:     No overnight evetns. Remains on low dose levo. Awake and alert.   Still with diarrhea per reports. Stool culture pending         Review of Systems:    Review of Systems   Constitutional:  Positive for fatigue.   HENT: Negative.     Eyes:  Positive for discharge.   Respiratory: Negative.     Cardiovascular: Negative. 
    V2.0  [facility] [place] Hospitalist Progress Note      Name:  Adenike Harvey /Age/Sex: 1955  (69 y.o. female)   MRN & CSN:  [id number] & [id number] Encounter Date/Time: 2024 11:17 AM EDT    Location:  [facility]/3111-A PCP: Iban Lou MD       Hospital Day: 17    Assessment and Plan:   Adenike Harvey is a 69 y.o. 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  Severe global hypokinesis with reduced ejection fraction and heart failure: Cardiology on board.  Troponins and BNP more than 70,000.  On IV Zosyn for concern of pneumonia.  Most recent echocardiogram on 3/25/2024 showing EF 15 to 20%.  Echocardiogram on 2024 showed EF 45 to 50%.  Currently on metoprolol XL 25 mg daily, hydralazine 25 3 times daily and Isordil 10 mg 3 times daily.  Unable to tolerate ACE/ARB or Aldactone due to MELIDA.  IV Lasix intensified to 80 mg 3 times daily since 3/30.  Metolazone 5 mg daily also added by nephrology--plan to hold diuretics on 4/3/2024 and monitor kidney function.  Limited echocardiogram repeated on 2024, EF appears to have improved to 55 to 60%.  Was  hypotensive since 2024.  Improved with IVF      Acute renal failure in the setting of above per nephrology on board.  Creatinine continues to worsen.  Good urine output so far.  Creatinine on presentation 3.5 Mg/DL, continues to increase and 6.1 mg/DL today.  Continue IV Lasix and metolazone as above.  Patient's family want to proceed with renal biopsy.  Unable to undergo renal biopsy as patient recently got stents and is on Plavix.  Currently off of diuretics.  Creatinine slowly improving now.  Today at 4.1 mg/DL.      Anemia in the settuing of ACD: Hb 6.4 on admission, transfused 1 PRBC keep Hb >7.  
    V2.0  [facility] [place]      Name:  Adenike Harvey /Age/Sex: 1955  (69 y.o. female)   MRN & CSN:  [id number] & [id number] Encounter Date/Time: 3/30/2024 11:17 AM EDT    Location:  [facility]/3111-A PCP: Iban Lou MD       Hospital Day: 9    Assessment and Plan:   Adenike Harvey is a 69 y.o. 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  Severe global hypokinesis with reduced ejection fraction and heart failure: Cardiology on board.  Troponins and BNP more than 70,000.  On IV Zosyn for concern of pneumonia.  Most recent echocardiogram on 3/25/2024 showing EF 15 to 20%.  Echocardiogram on 2024 showed EF 45 to 50%.  Currently on metoprolol XL 25 mg daily, hydralazine 25 3 times daily and Isordil 10 mg 3 times daily.  Unable to tolerate ACE/ARB or Aldactone due to MELIDA.  IV Lasix intensified to 80 mg 3 times daily.  Metolazone 5 mg daily also added by nephrology.  Acute renal failure in the setting of above per nephrology on board.  Creatinine continues to worsen.  Good urine output so far.  Creatinine on presentation 3.5 Mg/DL, continues to increase and 6.1 mg/DL today.  Continue IV Lasix and metolazone as above.  Patient's family want to proceed with renal biopsy.  Nephrology notified.  Anemia in the settuing of ACD: Hb 6.4 on admission, transfuse 1 PRBC keep Hb >7.    Recent LHC with PCI on DAPT. Continue DAPT.   Lactic acidosis in setting of cardiogenic shock, improved  Anion gap metabolic acidosis in the setting of above  Intractable diarrhea in the setting of above, improved  MELIDA in the setting of above improving with fluid hydration currently on pressors  History of COPD on 3 L nasal cannula      Comment: Please note this report has 
    V2.0  [facility] [place]      Name:  Adenike Harvey /Age/Sex: 1955  (69 y.o. female)   MRN & CSN:  [id number] & [id number] Encounter Date/Time: 2024 2:49 PM EDT    [facility]  [facility] PCP: Iban Lou MD       Hospital Day: 15      I have Personally reviewed Lab Studies, Imaging, and discussed with Dr. Ga       Physician Location: [place] from home   Patient Location: [facility]     Assessment and Plan:   Adenike Harvey is a 69 y.o. female whom ID is consulted for Leukocytosis w/ pmh + for R-NSCLC.      Assessment/Plan:  Leukocytosis:  CTAP notable for cystitis and RLL PNA. No evidence of colitis to indicate C diff, which would explain the wt ct that seems to have peaked 48.4. Potentially Leukemoid in the setting of GI bleed.   MELIDA: AIN vs ATN, nephrology following  Acute Blood Loss Anemia: GI onboard, plan for conservative mgmt      Bld Cx NGTD  MRSA -  Urine Cx pending  D/c Vancomycin  C/w Meropenem  Fluconazole per primary for suspected thrush  C/w Bactrim prophylaxis    ID will continue to follow, Dr. Hopkins will be back Monday  Please page/call with any further questions      Subjective:     Chief Complaint: Nausea (X1 week ), Emesis (X1 week ), and Diarrhea (X3 days )     Remains afebrile with white ct trending down.            Objective:     Intake/Output Summary (Last 24 hours) at 2024 1449  Last data filed at 2024 2102  Gross per 24 hour   Intake 10 ml   Output --   Net 10 ml        Vitals:   Vitals:    24 1257   BP: (!) 99/58   Pulse: 92   Resp: 22   Temp: 97.9 °F (36.6 °C)   SpO2:        Physical Exam:     Physical Exam not performed as this was a Tele-Consult    Medications:   Medications:    pantoprazole  40 mg IntraVENous BID    meropenem  1,000 mg IntraVENous Q24H    fluconazole  200 mg IntraVENous Q24H    vancomycin (VANCOCIN) intermittent dosing (placeholder)   Other RX Placeholder    potassium 
    V2.0  [place] Hospitalist Progress Note      Name:  Adenike Harvey /Age/Sex: 1955  (69 y.o. female)   MRN & CSN:  [id number] & [id number] Encounter Date/Time: 3/25/2024 11:17 AM EDT    Location:  -A PCP: Iban Lou MD       Hospital Day: 4    Assessment and Plan:   Adenike Harvey is a 69 y.o. 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 On antibiotics.  Anemia in the settuing of ACD: Hb 6.4, transfuse 1 PRBC keep Hb >7  Recent LHC with PCI on DAPT. Continue DAPT. Cardiology consulted due to concerns for low EF. Tele in place  Lactic acidosis in setting of above  Anion gap metabolic acidosis in the setting of above  Intractable diarrhea in the setting of above, improved  MELIDA in the setting of above improving with fluid hydration currently on pressors  History of COPD on 3 L nasal cannula      Comment: Please note this report has been produced using speech recognition software and may contain errors related to that system including errors in grammar, punctuation, and spelling, as well as words and phrases that may be inappropriate. If there are any questions or concerns please feel free to contact the dictating provider for clarification.   Diet ADULT DIET; Regular; 5 carb choices (75 gm/meal); Low Fat/Low Chol/High Fiber/DARRELL   DVT Prophylaxis [] Lovenox, []  Heparin, [] SCDs, [] Ambulation,  [] Eliquis, [] Xarelto  [] Coumadin   Code Status Full Code   Disposition From: home  Expected Disposition: TBD  Estimated Date of Discharge: TBD  Patient requires continued admission due to anemia and low EF   Surrogate Decision Maker/ POA      Subjective:     Chief Complaint: Nausea (X1 week ), Emesis (X1 week ), and 
    V2.0  [facility] [place] Hospitalist Progress Note      Name:  Adenike Harvey /Age/Sex: 1955  (69 y.o. female)   MRN & CSN:  [id number] & [id number] Encounter Date/Time: 2024 11:17 AM EDT    Location:  [facility]/3111-A PCP: Iban Lou MD       Hospital Day: 15    Assessment and Plan:   Adenike Harvey is a 69 y.o. 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  Severe global hypokinesis with reduced ejection fraction and heart failure: Cardiology on board.  Troponins and BNP more than 70,000.  On IV Zosyn for concern of pneumonia.  Most recent echocardiogram on 3/25/2024 showing EF 15 to 20%.  Echocardiogram on 2024 showed EF 45 to 50%.  Currently on metoprolol XL 25 mg daily, hydralazine 25 3 times daily and Isordil 10 mg 3 times daily.  Unable to tolerate ACE/ARB or Aldactone due to MELIDA.  IV Lasix intensified to 80 mg 3 times daily since 3/30.  Metolazone 5 mg daily also added by nephrology--plan to hold diuretics on 4/3/2024 and monitor kidney function.  Limited echocardiogram repeated on 2024, EF appears to have improved to 55 to 60%.  Has been hypotensive since 2024.  Received 2 L boluses.  Will continue with gentle hydration today.  Acute renal failure in the setting of above per nephrology on board.  Creatinine continues to worsen.  Good urine output so far.  Creatinine on presentation 3.5 Mg/DL, continues to increase and 6.1 mg/DL today.  Continue IV Lasix and metolazone as above.  Patient's family want to proceed with renal biopsy.  Unable to undergo renal biopsy as patient recently got stents and is on Plavix.  Currently off of diuretics.  Creatinine today 5.7 mg/DL.  Anemia in the settuing of ACD: Hb 6.4 on admission, transfused 1 PRBC 
    V2.0  [facility] [place] Hospitalist Progress Note      Name:  Adenike Harvey /Age/Sex: 1955  (69 y.o. female)   MRN & CSN:  [id number] & [id number] Encounter Date/Time: 3/26/2024 11:17 AM EDT    Location:  [facility]/3111-A PCP: Iban Lou MD       Hospital Day: 6    Assessment and Plan:   Adenike Harvey is a 69 y.o. 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 On antibiotics.  2 more days of Zosyn to complete the course  Consistent with cardiogenic shock?.  Severe global hypokinesis with reduced visual fraction and heart failure: Urology on board.  Troponins and BNP more than 70,000.  Patient drinking plenty of water encouraged with fluid restriction.  Also plan for the patient to be started on Lasix 40 mg IV twice daily  Anemia in the settuing of ACD: Hb 6.4, transfuse 1 PRBC keep Hb >7  Recent LHC with PCI on DAPT. Continue DAPT. Cardiology consulted due to concerns for low EF. Tele in place  Lactic acidosis in setting of above  Anion gap metabolic acidosis in the setting of above  Intractable diarrhea in the setting of above, improved  MELIDA in the setting of above improving with fluid hydration currently on pressors  History of COPD on 3 L nasal cannula      Comment: Please note this report has been produced using speech recognition software and may contain errors related to that system including errors in grammar, punctuation, and spelling, as well as words and phrases that may be inappropriate. If there are any questions or concerns please feel free to contact the dictating provider for clarification.   Diet ADULT DIET; Regular; 5 carb choices (75 gm/meal); Low Fat/Low Chol/High Fiber/DARRELL   DVT Prophylaxis [] Lovenox, []  Heparin, 
    V2.0  [facility] [place] Hospitalist Progress Note      Name:  Adenike Harvey /Age/Sex: 1955  (69 y.o. female)   MRN & CSN:  [id number] & [id number] Encounter Date/Time: 2024 11:17 AM EDT    Location:  [facility]/3111-A PCP: Iban Lou MD       Hospital Day: 11    Assessment and Plan:   Adenike Harvey is a 69 y.o. 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  Severe global hypokinesis with reduced ejection fraction and heart failure: Cardiology on board.  Troponins and BNP more than 70,000.  On IV Zosyn for concern of pneumonia.  Most recent echocardiogram on 3/25/2024 showing EF 15 to 20%.  Echocardiogram on 2024 showed EF 45 to 50%.  Currently on metoprolol XL 25 mg daily, hydralazine 25 3 times daily and Isordil 10 mg 3 times daily.  Unable to tolerate ACE/ARB or Aldactone due to MELIDA.  IV Lasix intensified to 80 mg 3 times daily since 3/30.  Metolazone 5 mg daily also added by nephrology  Acute renal failure in the setting of above per nephrology on board.  Creatinine continues to worsen.  Good urine output so far.  Creatinine on presentation 3.5 Mg/DL, continues to increase and 6.1 mg/DL today.  Continue IV Lasix and metolazone as above.  Patient's family want to proceed with renal biopsy.  Unable to undergo renal biopsy as patient recently got stents and is on Plavix.  Patient was undecided about dialysis but now wants to pursue.  Nephrology notified.  Anemia in the settuing of ACD: Hb 6.4 on admission, transfuse 1 PRBC keep Hb >7.    Recent LHC with PCI on DAPT. Continue DAPT.   Lactic acidosis in setting of cardiogenic shock, improved  Anion gap metabolic acidosis in the setting of above  Intractable diarrhea in the setting of above, improved  MELIDA in 
   03/25/24 1136   Encounter Summary   Encounter Overview/Reason  Initial Encounter   Service Provided For: Patient   Referral/Consult From: [employer]   Support System Spouse   Last Encounter  03/25/24  (referral visit. Cancer diagnosis. Provided emotional support)   Complexity of Encounter Low   Begin Time 1020   End Time  1035   Total Time Calculated 15 min   Spiritual/Emotional needs   Type Spiritual Support   Assessment/Intervention/Outcome   Assessment Anxious;Concerns with suffering   Intervention Empowerment;Active listening;Sustaining Presence/Ministry of presence;Prayer (assurance of)/[place]   Outcome Encouraged;Expressed Gratitude   Plan and Referrals   Plan/Referrals No future visits requested       
   03/26/24 1023   Encounter Summary   Encounter Overview/Reason  Attempted Encounter   Service Provided For: Patient not available   Last Encounter  03/26/24  (Attempted follow up on a Consult for spiritual Health.)   Begin Time 1013   End Time  1024   Total Time Calculated 11 min   Spiritual/Emotional needs   Type Spiritual Support   Grief, Loss, and Adjustments   Type Adjustment to illness   Plan and Referrals   Plan/Referrals Continue Support (comment)  (as needed)       
   03/26/24 1456   Encounter Summary   Encounter Overview/Reason  Follow-up   Service Provided For: Patient   Referral/Consult From: [facility]   Support System Spouse   Last Encounter  03/26/24  (Referral visit. This  received a consult for [facility] health. Patient was deeply sleeping during the visit. This  will continue to follow up.)   Complexity of Encounter Low   Begin Time 1445   End Time  1452   Total Time Calculated 7 min   Spiritual/Emotional needs   Type Spiritual Support   Plan and Referrals   Plan/Referrals Continue Support (comment)  (as needed)       
  Nephrology Progress Note  4/7/2024 10:40 AM  Subjective:     Interval History: Adenike Harvey is a 69 y.o. female overall doing okay today no distress    Data:   Scheduled Meds:   aspirin  81 mg Oral Daily    pantoprazole  40 mg IntraVENous BID    meropenem  1,000 mg IntraVENous Q24H    fluconazole  200 mg IntraVENous Q24H    predniSONE  60 mg Oral Daily    [Held by provider] pantoprazole  40 mg Oral BID AC    [Held by provider] spironolactone  25 mg Oral Daily    clopidogrel  75 mg Oral Daily    [Held by provider] metOLazone  5 mg Oral Daily    sulfamethoxazole-trimethoprim  1 tablet Oral Q48H    metoprolol succinate  25 mg Oral Daily    [Held by provider] hydrALAZINE  25 mg Oral 3 times per day    [Held by provider] isosorbide dinitrate  10 mg Oral TID    rosuvastatin  10 mg Oral Nightly    ciprofloxacin  1 drop Left Eye Q4H While awake    sodium chloride flush  5-40 mL IntraVENous 2 times per day    [Held by provider] heparin (porcine)  5,000 Units SubCUTAneous 3 times per day     Continuous Infusions:   sodium chloride      sodium chloride      sodium chloride      sodium chloride 10 mL/hr (03/29/24 0022)         CBC   Recent Labs     04/05/24  0456 04/05/24  0920 04/06/24  0451 04/06/24  1237 04/06/24  2148 04/07/24  0500   WBC 37.2*  --  17.5*  --   --  7.3   HGB 7.2*   < > 9.2* 9.3* 8.6* 8.6*   HCT 21.9*   < > 27.2* 27.9* 25.6* 26.1*     --  114*  --   --  110*    < > = values in this interval not displayed.      BMP   Recent Labs     04/05/24  0456 04/06/24  0451    131*   K 3.2* 3.4*   CL 92* 91*   CO2 25 22   PHOS 4.2 3.6   BUN 89* 76*   CREATININE 5.7* 4.7*     Hepatic:   Recent Labs     04/05/24  0456 04/06/24  0451   AST 22 461*   ALT 14 167*   BILITOT 0.3 0.6   ALKPHOS 69 191*     Troponin: No results for input(s): \"TROPONINI\" in the last 72 hours.  BNP: No results for input(s): \"BNP\" in the last 72 hours.  Lipids: No results for input(s): \"CHOL\", \"HDL\" in the last 72 hours.    Invalid 
  Occupational Therapy Treatment Note  Name: Adenike Harvey MRN: [id number] :   1955   Date:  2024   Admission Date: 3/22/2024 Room:  [facility]     Primary Problem:  The primary encounter diagnosis was Acute renal failure, unspecified acute renal failure type (HCC). Diagnoses of Sepsis with acute renal failure and septic shock, due to unspecified organism, unspecified acute renal failure type (HCC), Pneumonia of right lung due to infectious organism, unspecified part of lung, Sepsis with acute renal failure, tubular necrosis, and septic shock, due to unspecified organism (HCC), and Acute systolic heart failure (HCC) were also pertinent to this visit.  Past Medical History:   Diagnosis Date    Anemia     Anxiety     Arthritis     \"Fingers, Back\"    Bronchitis Last Episode     Chronic back pain     COPD (chronic obstructive pulmonary disease) (Formerly [facility])     Sees Dr. Hernandez    Depression     Diverticulosis 2013    Extensive per CT    Hemoptysis 2017    History of external beam radiation therapy 2023    RIGHT LUNG 6,000 cGy in 30 fractions    Hx of blood clots     \"found I have a clot near my mediport so they are taking it out 2019- put me on Xarelto    Hyperlipidemia 2009    Hypertension 2009    Low back pain     \"better than it use to be- had therapy in the past- originally hurt back at work 20+ yrs ago\"    Lung mass     rul- scheduled to bronch     Nausea & vomiting 2024    Panic attacks     Pneumonia Dx 1-17    Right Lung Cancer Dx 10-17    tx with chemo following with Dr Una Smith Dx     \"Right Side\"    Shortness of breath on exertion     Teeth missing     Upper And Lower    Urinary tract infection In Past    No Current Symptoms    Wears dentures     Full Upper, Partial Lower    Wears glasses          Communication with other providers:  RN, PT     Subjective:  Patient states:  \"I just need to stop coughing\"  Pain: denied   Restrictions: general, fall 
  Occupational Therapy Treatment Note  Name: Adenike Harvey MRN: [id number] :   1955   Date:  4/3/2024   Admission Date: 3/22/2024 Room:  [facility]     Primary Problem:  The primary encounter diagnosis was Acute renal failure, unspecified acute renal failure type (HCC). Diagnoses of Sepsis with acute renal failure and septic shock, due to unspecified organism, unspecified acute renal failure type (HCC), Pneumonia of right lung due to infectious organism, unspecified part of lung, and Sepsis with acute renal failure, tubular necrosis, and septic shock, due to unspecified organism (HCC) were also pertinent to this visit.  Past Medical History:   Diagnosis Date    Anemia     Anxiety     Arthritis     \"Fingers, Back\"    Bronchitis Last Episode -    Chronic back pain     COPD (chronic obstructive pulmonary disease) (HCC)     Sees Dr. Hernandez    Depression     Diverticulosis 2013    Extensive per CT    Hemoptysis 2017    History of external beam radiation therapy 2023    RIGHT LUNG 6,000 cGy in 30 fractions    Hx of blood clots     \"found I have a clot near my mediport so they are taking it out 2019- put me on Xarelto    Hyperlipidemia 2009    Hypertension 2009    Low back pain     \"better than it use to be- had therapy in the past- originally hurt back at work 20+ yrs ago\"    Lung mass     rul- scheduled to bronch     Nausea & vomiting 2024    Panic attacks     Pneumonia Dx 1-17    Right Lung Cancer Dx 10-17    tx with chemo following with Dr Una Smith Dx     \"Right Side\"    Shortness of breath on exertion     Teeth missing     Upper And Lower    Urinary tract infection In Past    No Current Symptoms    Wears dentures     Full Upper, Partial Lower    Wears glasses          Communication with other providers:  RN, PT for pt safety and tolerance     Subjective:  Patient states:  \"I don't want to but I will try\"  Pain: denied   Restrictions: general, fall   RN at 
  Physical Therapy Treatment Note  Name: Adenike Harvey MRN: [id number] :   1955   Date:  4/3/2024   Admission Date: 3/22/2024 Room:  [address] [place], [place] [address]A     Restrictions/Precautions:          general precautions, fall risk    Communication with other providers:  RN, OT    Subjective:  Patient states:  \"I can't I'm nauseas\"  Pain:   Location, Type, Intensity (0/10 to 10/10):  denies pain    Objective:    Observation:  pt supine in bed with RN present upon entry and agreeable to session after encouragement    Treatment, including education/measures:    Bed mobility: pt completed supine to sitting EOB SBA with significantly increased time to complete and cues for sequencing    Transfers: Pt completed stand pivot transfer from EOB to chair with RW min A with cues for sequencing and poor eccentric control upon descent    Balance: pt sat EOB CGA progressing to close SBA with L lateral lean and intermittent elbow propping. Cues provided for upright posture throughout      Assessment / Impression:    Pt up in chair at end of session with needs in reach and alarm on    Patient's tolerance of treatment:  fair   Adverse Reaction: n/a  Significant change in status and impact:  n/a  Barriers to improvement:  decreased endurance, impaired gait    Plan for Next Session:    Continue to address transfer training and gait training in future sessions    Time in:  922  Time out:  942  Timed treatment minutes:  10  Total treatment time:  20    Previously filed items:           Short Term Goals  Time Frame for Short Term Goals: 1 week  Short Term Goal 1: pt to complete all bed mobility mod I  Short Term Goal 2: pt to complete all STS transfers to/from bed, commode, and chair SBA  Short Term Goal 3: pt to ambulate 25' with LRAD CGA    Electronically signed by:    Delmis Chau, PT  4/3/2024, 1:12 PM   
4 Eyes Skin Assessment     NAME:  Adenike Harvey  YOB: 1955  MEDICAL RECORD NUMBER:  [id number]    The patient is being assessed for  Transfer to New Unit    I agree that at least one RN has performed a thorough Head to Toe Skin Assessment on the patient. ALL assessment sites listed below have been assessed.      Areas assessed by both nurses:    Head, Face, Ears, Shoulders, Back, Chest, Arms, Elbows, Hands, Sacrum. Buttock, Coccyx, Ischium, Legs. Feet and Heels, and Under Medical Devices         Does the Patient have a Wound? No noted wound(s)       Mustapha Prevention initiated by RN: No  Wound Care Orders initiated by RN: No    Pressure Injury (Stage 3,4, Unstageable, DTI, NWPT, and Complex wounds) if present, place Wound referral order by RN under : No    New Ostomies, if present place, Ostomy referral order under : No     Nurse 1 eSignature: Electronically signed by Donovan Terry RN on 3/26/24 at 10:49 AM EDT    **SHARE this note so that the co-signing nurse can place an eSignature**    Nurse 2 eSignature: Electronically signed by Minal Buchanan RN on 3/26/24 at 10:50 AM EDT    
4 Eyes Skin Assessment     NAME:  Adenike Harvey  YOB: 1955  MEDICAL RECORD NUMBER:  [id number]    The patient is being assessed for  Admission    I agree that at least one RN has performed a thorough Head to Toe Skin Assessment on the patient. ALL assessment sites listed below have been assessed.      Areas assessed by both nurses:    Head, Face, Ears, Shoulders, Back, Chest, Arms, Elbows, Hands, Sacrum. Buttock, Coccyx, Ischium, Legs. Feet and Heels, Under Medical Devices , and Other         Does the Patient have a Wound? No noted wound(s)       Mustapha Prevention initiated by RN: No  Wound Care Orders initiated by RN: No    Pressure Injury (Stage 3,4, Unstageable, DTI, NWPT, and Complex wounds) if present, place Wound referral order by RN under : No    New Ostomies, if present place, Ostomy referral order under : No     Nurse 1 eSignature: Electronically signed by Artemio Arguello RN on 3/22/24 at 3:55 PM EDT    **SHARE this note so that the co-signing nurse can place an eSignature**    Nurse 2 eSignature: Electronically signed by Myles Marin RN on 3/22/24 at 3:57 PM EDT    
After discussion with Dr Johnston and Dr Chen, IR will hold on kidney biopsy at this time due to inability to hold plavix and ASA because of recent stent placement. Notified Helen ADEN on [address]. IR to complete consult at this time.   
Attempting to obtain stool samples. Pt has had 2 small bowel movements on bedpan, but continues to urinate at the same time, contaminating the sample. Will continue trying to obtain.   
CHART REVIEWED AND D/W PT AND RN  PT KNOWN TO DR SAWYER WILL REQUEST HIM TO FOLLOW THE PT   PT ADMITTED WITH SEPSIS  AND NOW ANEMIC WITH NO GROSS GIT BLEEDING  FULL CONSULT NOTE PER DR SAWYER IN AM  
Comprehensive Nutrition Assessment    Type and Reason for Visit:  Initial, RD Nutrition Re-Screen/LOS    Nutrition Recommendations/Plan:   Continue current diet  Start standard supplements     Malnutrition Assessment:  Malnutrition Status:  Severe malnutrition (03/29/24 1900)    Context:  Chronic Illness     Findings of the 6 clinical characteristics of malnutrition:  Energy Intake:  75% or less estimated energy requirements for 1 month or longer  Weight Loss:  Greater than 7.5% over 3 months     Body Fat Loss:  Unable to assess     Muscle Mass Loss:  Unable to assess    Fluid Accumulation:  No significant fluid accumulation Extremities   Strength:  Not Performed    Nutrition Assessment:    Pt has been readmitted to the hospital with reduced EF rate, renal dysfunction, and diarrhea. Pt has been on a 5 carb, low fat diet and has not been consuming more than 25% of her meals. Pt is at high risk for malnutrition. Will order supplements    Nutrition Related Findings:    . Wound Type: Surgical Incision       Current Nutrition Intake & Therapies:    Average Meal Intake: 26-50%, 0%  Average Supplements Intake: Unable to assess  ADULT DIET; Regular; 5 carb choices (75 gm/meal); Low Fat/Low Chol/High Fiber/DARRELL; Low Caffeine    Anthropometric Measures:  Height: 162.6 cm (5' 4.02\")  Ideal Body Weight (IBW): 120 lbs (55 kg)    Admission Body Weight: 60.8 kg (134 lb 0.6 oz)  Current Body Weight: 65.2 kg (143 lb 11.8 oz), 119.8 % IBW. Weight Source: Bed Scale  Current BMI (kg/m2): 24.7  Usual Body Weight: 62.2 kg (137 lb 2 oz)  % Weight Change (Calculated): 4.8                    BMI Categories: Normal Weight (BMI 22.0 to 24.9) age over 65    Estimated Daily Nutrient Needs:  Energy Requirements Based On: Formula  Weight Used for Energy Requirements: Current  Energy (kcal/day): 1760  Weight Used for Protein Requirements: Current  Protein (g/day): 78  Method Used for Fluid Requirements: 1 ml/kcal  Fluid (ml/day): 
Comprehensive Nutrition Assessment    Type and Reason for Visit:  Reassess    Nutrition Recommendations/Plan:   Continue Renal diet with oral nutrition supplements TID   Recommend antiemetic regimen prn   If unable to tolerate PO intake due to GI distress, consider nutrition support initiation      Malnutrition Assessment:  Malnutrition Status:  Severe malnutrition (04/02/24 1445)    Context:  Chronic Illness     Findings of the 6 clinical characteristics of malnutrition:  Energy Intake:  75% or less estimated energy requirements for 1 month or longer  Weight Loss:  Greater than 10% over 6 months (18% in 5 months)     Body Fat Loss:  Unable to assess     Muscle Mass Loss:  Unable to assess    Fluid Accumulation:  No significant fluid accumulation Extremities   Strength:  Not Performed    Nutrition Assessment:    Currently on low sodium, low potassium, low phosphorus diet. Started on renal supplements. Ongoing poor oral intakes over the past 5 days (0-25%). C/o N/V. Currently on 6L oxygen. Plans to pursue dialysis. Noted pt has hx of dx severe protein-malnutrition on 1/23, 2/22, 3/6, 3/29. Recommend to follow up tomorrow, as high nutrition risk.    Nutrition Related Findings:    WBC 14.6, Hgb 8.2, Na 134, GFR 6, Cr 7.1, BUN 87 Rx: lasix, solu-medrol, protonix, aldactone; renal bx taken Wound Type: Surgical Incision       Current Nutrition Intake & Therapies:    Average Meal Intake: 0%, 1-25%  Average Supplements Intake: Unable to assess  ADULT DIET; Regular; Low Sodium (2 gm); Low Potassium (Less than 3000 mg/day); Low Phosphorus (Less than 1000 mg)  ADULT ORAL NUTRITION SUPPLEMENT; Breakfast, Lunch, Dinner; Renal Oral Supplement    Anthropometric Measures:  Height: 162.6 cm (5' 4.02\")  Ideal Body Weight (IBW): 120 lbs (55 kg)    Admission Body Weight: 60.8 kg (134 lb 0.6 oz)  Current Body Weight: 65.5 kg (144 lb 6.4 oz), 120.3 % IBW. Weight Source: Bed Scale  Current BMI (kg/m2): 24.8  Usual Body Weight: 80 kg 
Dr Ga notified of pt potassium and magnesium being low with no PRN orders for replacement, also made aware of liver panel being elevated  
I had good and long discussion with  and Mrs. Harvey-Mrs. Harvey decided to discuss with the family-and let me know-will continue current treatment for now.  
Infectious Disease Progress Note  2024   Patient Name: Adenike Harvey : 1955   mpression  Leukocytosis Secondary to:  Enterococcus faecium and Candida albicans UTI:  RLL Pneumonia:  Suspected Oral Thrush:  No reported allergies to ABX. CrCl 7 on stage III MELIDA, Afebrile, no leukocytosis from admission until it started on  of 17.2, now 47.8. Steroid therapy onboard, Solu-Medrol from 3/24-, then prednisone from  still onboard. Pct 2.46, 2.02, 2.11 in the setting of MELIDA. CRP on DWT from 243 to 147. COVID-1, Influenza A/B and MRSA by PCR negative.   3/22-BC 0/2 NGTD  4/3-BC 0 NGTD  -UA and urine culture: Enterococcus faecium (sensi pending), Candida albicans   3/22-CT A/P WO Contrast: No acute intra-abdominopelvic abnormality.   -PCXR: interval decrease in size of the right mediastinal/suprahilar opacity. Small left pleural effusion.   -CT A&P WO Contrast: 1.  New. Right lower lobe pneumonia.   2.  New. Air within the urinary bladder lumen. Evaluate for cystitis.   Elevated Troponin/ CAD s/p PCI on DAPT/ HFrEF:  Dr. Josue onboard  MELIDA/ Hyponatremia/ Hypokalemia:  Dr. Chen onboard  Right NSCLC:  Follows as OP with Dr. Heart, s/p chemo/ radiation, last was Keytruda in 2023.   Mediport Placement 2018:  COPD on Room Air at Baseline:  Schatzki's Ring/ Gastritis:   Dr. Maurer onboard  Multi-morbidity: per PMHx: recurrrent right NSCLC s/p RUL resection, chemo and radiation 2018 per Dr. Heart, chronic anemia, depression/ anxiety, COPD, diverticulosis, HLD, HTN, CAD s/p PCI on DAPT  Plan:  Continue po Bactrim 400-80 mg q48h for pneumocystis jiroveci prophylaxis per oncology  DC IV fluconazole and IV meropenem   Ordered 12 lead EKG to eval QTc in order to give fluconazole  Continue po linezolid 600 mg bid x 7 days (end date 2024)  Start po levofloxacin 500 mg q48h (as CrCl 12) x 7 days (end date 2024)  Start po fluconazole 100 mg q24h as CrCl 12 for a 7 day course (end date 
Nephrology Progress Note  3/25/2024 10:02 AM        Subjective:   Admit Date: 3/22/2024  PCP: Iban Lou MD    Interval History:  patient seen in early morning this is a late entry  She was sleeping but did wake up      Diet: Probably not much    ROS: Blood pressure did improve systolic was more than 100, no fever  Unfortunately urine output dropped to 375 cc for the last 24 hours  No vasopressor  Norepinephrine has been stopped    Data:     Current meds:    potassium alternative oral replacement  40 mEq Oral BID    potassium chloride  10 mEq IntraVENous Q2H    methylPREDNISolone  60 mg IntraVENous Daily    [Held by provider] sodium bicarbonate  650 mg Oral 4x Daily    aspirin  81 mg Oral Daily    clopidogrel  75 mg Oral Daily    [Held by provider] gabapentin  100 mg Oral TID    pantoprazole  40 mg Oral BID AC    [Held by provider] rosuvastatin  40 mg Oral Nightly    sodium chloride flush  5-40 mL IntraVENous 2 times per day    [Held by provider] heparin (porcine)  5,000 Units SubCUTAneous 3 times per day    piperacillin-tazobactam  3,375 mg IntraVENous Q12H    ciprofloxacin  1 drop Left Eye Q2H While awake      sodium chloride      sodium chloride      norepinephrine Stopped (03/23/24 1720)    sodium chloride           I/O last 3 completed shifts:  In: -   Out: 725 [Urine:725]    CBC:   Recent Labs     03/23/24  0620 03/24/24  0610 03/25/24  0630   WBC 3.1* 3.3* 4.0   HGB 8.2* 7.2* 6.5*   * 453* 344          Recent Labs     03/23/24  0620 03/24/24  0630 03/25/24  0630    137 137   K 3.3* 3.0* 2.6*    100 98*   CO2 12* 20* 20*   BUN 17 20 23   CREATININE 3.7* 4.3* 4.7*   GLUCOSE 93 130* 175*       Lab Results   Component Value Date    CALCIUM 8.2 (L) 03/25/2024    PHOS 3.9 03/25/2024       Objective:     Vitals: /87   Pulse (!) 120   Temp 97.9 °F (36.6 °C) (Oral)   Resp 22   Ht 1.626 m (5' 4\")   Wt 68.1 kg (150 lb 2.5 oz)   LMP 01/06/2009 (LMP Unknown)   SpO2 100%   BMI 
Nephrology Progress Note  3/27/2024 10:53 AM        Subjective:   Admit Date: 3/22/2024 which PCP: Iban Lou MD    Interval History:    Transferred to medical floor    She is alert awake and oriented  No overt shortness of breath, PND or orthopnea      Diet: Still poor oral intake    ROS: No acute distress  Made only 350 cc of urine  No fever blood pressure little bit in the lower side    Data:     Current meds:    isosorbide dinitrate  5 mg Oral TID    hydrALAZINE  10 mg Oral 3 times per day    ciprofloxacin  1 drop Left Eye Q4H While awake    methylPREDNISolone  60 mg IntraVENous Daily    [Held by provider] sodium bicarbonate  650 mg Oral 4x Daily    aspirin  81 mg Oral Daily    clopidogrel  75 mg Oral Daily    [Held by provider] gabapentin  100 mg Oral TID    pantoprazole  40 mg Oral BID AC    [Held by provider] rosuvastatin  40 mg Oral Nightly    sodium chloride flush  5-40 mL IntraVENous 2 times per day    [Held by provider] heparin (porcine)  5,000 Units SubCUTAneous 3 times per day    piperacillin-tazobactam  3,375 mg IntraVENous Q12H      sodium chloride      sodium chloride      potassium chloride 40 mEq in dextrose 5 % 1,000 mL IVPB 50 mL/hr at 03/26/24 1604    sodium chloride 25 mL (03/26/24 1249)         I/O last 3 completed shifts:  In: 3380.6 [P.O.:480; I.V.:2108.9; IV Piggyback:791.8]  Out: 350 [Urine:350]    CBC:   Recent Labs     03/25/24  0630 03/25/24 2041 03/26/24  0450 03/27/24  0530   WBC 4.0  --  7.1 10.2   HGB 6.5* 8.2* 8.2* 8.2*     --  307 281          Recent Labs     03/25/24  2041 03/26/24  0450 03/27/24  0530   * 132* 128*   K 3.6 3.4* 4.3   CL 97* 97* 97*   CO2 18* 17* 20*   BUN 25* 27* 33*   CREATININE 4.6* 4.9* 5.3*   GLUCOSE 185* 206* 131*       Lab Results   Component Value Date    CALCIUM 7.9 (L) 03/27/2024    PHOS 2.1 (L) 03/27/2024       Objective:     Vitals: /87   Pulse (!) 118   Temp 98.4 °F (36.9 °C) (Oral)   Resp 11   Ht 1.626 m (5' 4\")  
Nephrology Progress Note  3/29/2024 8:47 AM        Subjective:   Admit Date: 3/22/2024  PCP: Iban Lou MD    Interval History: Patient seen in early morning, this late entry  She was alert awake and oriented without any apparent distress    Diet: Reported eating some    ROS: No overt evidence of uremia  She actually making urine with intravenous loop made 1.65 L for the last 24 hours  No fever and acceptable blood pressure heart rate still up    Data:     Current meds:    metoprolol succinate  25 mg Oral Daily    furosemide  40 mg IntraVENous BID    hydrALAZINE  25 mg Oral 3 times per day    isosorbide dinitrate  10 mg Oral TID    rosuvastatin  10 mg Oral Nightly    ciprofloxacin  1 drop Left Eye Q4H While awake    methylPREDNISolone  60 mg IntraVENous Daily    [Held by provider] sodium bicarbonate  650 mg Oral 4x Daily    aspirin  81 mg Oral Daily    clopidogrel  75 mg Oral Daily    [Held by provider] gabapentin  100 mg Oral TID    pantoprazole  40 mg Oral BID AC    sodium chloride flush  5-40 mL IntraVENous 2 times per day    [Held by provider] heparin (porcine)  5,000 Units SubCUTAneous 3 times per day    piperacillin-tazobactam  3,375 mg IntraVENous Q12H      sodium chloride      sodium chloride      sodium chloride 10 mL/hr (03/29/24 0022)         I/O last 3 completed shifts:  In: -   Out: 2200 [Urine:2200]    CBC:   Recent Labs     03/27/24  0530 03/28/24  0525 03/29/24  0430   WBC 10.2 8.0 6.9   HGB 8.2* 8.5* 8.7*    362 370          Recent Labs     03/27/24  0530 03/28/24  0525 03/29/24  0430   * 130* 130*   K 4.3 5.1 5.3*   CL 97* 96* 97*   CO2 20* 17* 19*   BUN 33* 42* 53*   CREATININE 5.3* 5.8* 6.1*   GLUCOSE 131* 123* 128*       Lab Results   Component Value Date    CALCIUM 8.3 03/29/2024    PHOS 4.5 03/29/2024       Objective:     Vitals: BP (!) 121/92   Pulse (!) 109   Temp 98.1 °F (36.7 °C) (Oral)   Resp 15   Ht 1.626 m (5' 4\")   Wt 65.2 kg (143 lb 11.8 oz)   LMP 
Nephrology Progress Note  3/30/2024 11:17 AM        Subjective:   Admit Date: 3/22/2024  PCP: Iban Lou MD    Interval History: Patient seen in early morning  He was alert awake and oriented  No apparent distress    Diet: Reported eating little bit better    ROS: No overt shortness of breath PND orthopnea  Leg edema seems to be little bit better  Urine output recorded only 650 cc although may not have been accurately recorded    No fever and acceptable blood pressure, persistent tachycardia    Data:     Current meds:    metoprolol succinate  25 mg Oral Daily    furosemide  40 mg IntraVENous BID    hydrALAZINE  25 mg Oral 3 times per day    isosorbide dinitrate  10 mg Oral TID    rosuvastatin  10 mg Oral Nightly    ciprofloxacin  1 drop Left Eye Q4H While awake    methylPREDNISolone  60 mg IntraVENous Daily    [Held by provider] sodium bicarbonate  650 mg Oral 4x Daily    aspirin  81 mg Oral Daily    clopidogrel  75 mg Oral Daily    [Held by provider] gabapentin  100 mg Oral TID    pantoprazole  40 mg Oral BID AC    sodium chloride flush  5-40 mL IntraVENous 2 times per day    [Held by provider] heparin (porcine)  5,000 Units SubCUTAneous 3 times per day      sodium chloride      sodium chloride      sodium chloride 10 mL/hr (03/29/24 0022)         I/O last 3 completed shifts:  In: 100 [P.O.:100]  Out: 1650 [Urine:1650]    CBC:   Recent Labs     03/28/24  0525 03/29/24  0430 03/30/24  0755   WBC 8.0 6.9 8.7   HGB 8.5* 8.7* 7.7*    370 300          Recent Labs     03/28/24  0525 03/29/24  0430 03/30/24  0755   * 130* 134*   K 5.1 5.3* 4.8   CL 96* 97* 97*   CO2 17* 19* 18*   BUN 42* 53* 62*   CREATININE 5.8* 6.1* 6.3*   GLUCOSE 123* 128* 118*       Lab Results   Component Value Date    CALCIUM 7.9 (L) 03/30/2024    PHOS 4.5 03/29/2024       Objective:     Vitals: BP (!) 108/94   Pulse (!) 110   Temp 97.9 °F (36.6 °C) (Rectal)   Resp 10   Ht 1.626 m (5' 4.02\")   Wt 65.2 kg (143 lb 11.8 
Nephrology Progress Note  3/31/2024 1:24 PM        Subjective:   Admit Date: 3/22/2024  PCP: Iban Lou MD    Interval History: Patient seen in early morning  I had a discussion with her daughter yesterday-and they wanted to pursue kidney biopsy  Also had discussion with Mr. and Mrs. Harvey this morning  She was alert awake and oriented    Diet: Reported eating some    ROS: No overt uremia  No overt shortness of breath, PND or orthopnea-  Urine per recorded 1.75 L for the last 24 hours  No fever and acceptable blood pressure    Data:     Current meds:    furosemide  80 mg IntraVENous TID    metOLazone  5 mg Oral Daily    sulfamethoxazole-trimethoprim  1 tablet Oral Q48H    metoprolol succinate  25 mg Oral Daily    hydrALAZINE  25 mg Oral 3 times per day    isosorbide dinitrate  10 mg Oral TID    rosuvastatin  10 mg Oral Nightly    ciprofloxacin  1 drop Left Eye Q4H While awake    methylPREDNISolone  60 mg IntraVENous Daily    sodium bicarbonate  650 mg Oral 4x Daily    aspirin  81 mg Oral Daily    clopidogrel  75 mg Oral Daily    [Held by provider] gabapentin  100 mg Oral TID    pantoprazole  40 mg Oral BID AC    sodium chloride flush  5-40 mL IntraVENous 2 times per day    [Held by provider] heparin (porcine)  5,000 Units SubCUTAneous 3 times per day      sodium chloride      sodium chloride      sodium chloride 10 mL/hr (03/29/24 0022)         I/O last 3 completed shifts:  In: 75 [P.O.:75]  Out: 1750 [Urine:1750]    CBC:   Recent Labs     03/29/24  0430 03/30/24  0755 03/31/24  0350   WBC 6.9 8.7 7.6   HGB 8.7* 7.7* 7.7*    300 319          Recent Labs     03/29/24  0430 03/30/24  0755 03/31/24  0350   * 134* 134*   K 5.3* 4.8 4.2   CL 97* 97* 97*   CO2 19* 18* 20*   BUN 53* 62* 72*   CREATININE 6.1* 6.3* 6.7*   GLUCOSE 128* 118* 116*       Lab Results   Component Value Date    CALCIUM 8.0 (L) 03/31/2024    PHOS 4.5 03/29/2024       Objective:     Vitals: /88   Pulse 98   Temp 97.6 
Nephrology Progress Note  4/1/2024 7:45 AM        Subjective:   Admit Date: 3/22/2024  PCP: Iban Lou MD    Interval History: Still alert awake and oriented    Diet: Reported eating little bit better although I am unsure about oral intake    ROS: No overt shortness of breath or asterixis or PND or orthopnea  Urine output recorded only 700 cc, she has a Vieira catheter  No fever and blood pressure little bit on the lower side but acceptable and heart rate is better now    Data:     Current meds:    furosemide  80 mg IntraVENous TID    metOLazone  5 mg Oral Daily    sulfamethoxazole-trimethoprim  1 tablet Oral Q48H    metoprolol succinate  25 mg Oral Daily    hydrALAZINE  25 mg Oral 3 times per day    isosorbide dinitrate  10 mg Oral TID    rosuvastatin  10 mg Oral Nightly    ciprofloxacin  1 drop Left Eye Q4H While awake    methylPREDNISolone  60 mg IntraVENous Daily    sodium bicarbonate  650 mg Oral 4x Daily    aspirin  81 mg Oral Daily    clopidogrel  75 mg Oral Daily    [Held by provider] gabapentin  100 mg Oral TID    pantoprazole  40 mg Oral BID AC    sodium chloride flush  5-40 mL IntraVENous 2 times per day    [Held by provider] heparin (porcine)  5,000 Units SubCUTAneous 3 times per day      sodium chloride      sodium chloride      sodium chloride 10 mL/hr (03/29/24 0022)         I/O last 3 completed shifts:  In: -   Out: 1200 [Urine:1200]    CBC:   Recent Labs     03/30/24  0755 03/31/24  0350 04/01/24  0122   WBC 8.7 7.6 8.7   HGB 7.7* 7.7* 7.6*    319 291          Recent Labs     03/30/24  0755 03/31/24  0350 04/01/24  0122   * 134* 130*   K 4.8 4.2 4.3   CL 97* 97* 92*   CO2 18* 20* 20*   BUN 62* 72* 78*   CREATININE 6.3* 6.7* 6.9*   GLUCOSE 118* 116* 109*       Lab Results   Component Value Date    CALCIUM 7.9 (L) 04/01/2024    PHOS 4.5 03/29/2024       Objective:     Vitals: /61   Pulse 89   Temp 97.6 °F (36.4 °C) (Oral)   Resp 14   Ht 1.626 m (5' 4.02\")   Wt 65.2 
Nephrology Progress Note  4/2/2024 7:32 AM        Subjective:   Admit Date: 3/22/2024  PCP: Iban Lou MD    Interval History: Kidney biopsy could not be done due to necessity of antiplatelet therapy-given her recent angioplasty and stent placement coronary artery    Diet: Unsure how much she is eating    ROS: Still alert and awake  No overt uremic symptoms  I am excited to see if she had 2.7 L of urine  No fever blood pressure little bit in the lower side      Data:     Current meds:    clopidogrel  75 mg Oral Daily    aspirin  81 mg Oral Daily    furosemide  80 mg IntraVENous TID    metOLazone  5 mg Oral Daily    sulfamethoxazole-trimethoprim  1 tablet Oral Q48H    metoprolol succinate  25 mg Oral Daily    hydrALAZINE  25 mg Oral 3 times per day    isosorbide dinitrate  10 mg Oral TID    rosuvastatin  10 mg Oral Nightly    ciprofloxacin  1 drop Left Eye Q4H While awake    methylPREDNISolone  60 mg IntraVENous Daily    sodium bicarbonate  650 mg Oral 4x Daily    [Held by provider] gabapentin  100 mg Oral TID    pantoprazole  40 mg Oral BID AC    sodium chloride flush  5-40 mL IntraVENous 2 times per day    [Held by provider] heparin (porcine)  5,000 Units SubCUTAneous 3 times per day      sodium chloride      sodium chloride      sodium chloride 10 mL/hr (03/29/24 0022)         I/O last 3 completed shifts:  In: -   Out: 2700 [Urine:2700]    CBC:   Recent Labs     03/30/24  0755 03/31/24  0350 04/01/24  0122   WBC 8.7 7.6 8.7   HGB 7.7* 7.7* 7.6*    319 291          Recent Labs     03/31/24  0350 04/01/24  0122 04/02/24  0530   * 130* 134*   K 4.2 4.3 3.5   CL 97* 92* 91*   CO2 20* 20* 25   BUN 72* 78* 87*   CREATININE 6.7* 6.9* 7.1*   GLUCOSE 116* 109* 112*       Lab Results   Component Value Date    CALCIUM 8.0 (L) 04/02/2024    PHOS 4.5 03/29/2024       Objective:     Vitals: BP 94/61   Pulse 93   Temp 97.2 °F (36.2 °C) (Oral)   Resp 13   Ht 1.626 m (5' 4.02\")   Wt 65.5 kg (144 lb 
Nephrology Progress Note  4/3/2024 9:02 AM        Subjective:   Admit Date: 3/22/2024  PCP: Iban Lou MD    Interval History: According to  had an episode of vomiting and dark sputum-    Diet: Eating some    ROS: No overt nausea or vomiting or shortness of breath now  Chest x-ray yesterday did not reveal any overt pulmonary edema  She has no asterixis    Urine output 2.35 L but this is with high-dose of intravenous loop and oral thiazide as well as mineralocorticoid receptor antagonist  No fever and blood pressure will be in the lower side    Data:     Current meds:    potassium alternative oral replacement  40 mEq Oral BID    predniSONE  60 mg Oral Daily    pantoprazole  40 mg Oral BID AC    spironolactone  25 mg Oral Daily    clopidogrel  75 mg Oral Daily    aspirin  81 mg Oral Daily    [Held by provider] furosemide  80 mg IntraVENous TID    [Held by provider] metOLazone  5 mg Oral Daily    sulfamethoxazole-trimethoprim  1 tablet Oral Q48H    metoprolol succinate  25 mg Oral Daily    hydrALAZINE  25 mg Oral 3 times per day    isosorbide dinitrate  10 mg Oral TID    rosuvastatin  10 mg Oral Nightly    ciprofloxacin  1 drop Left Eye Q4H While awake    [Held by provider] gabapentin  100 mg Oral TID    sodium chloride flush  5-40 mL IntraVENous 2 times per day    [Held by provider] heparin (porcine)  5,000 Units SubCUTAneous 3 times per day      sodium chloride      sodium chloride      sodium chloride 10 mL/hr (03/29/24 0022)         I/O last 3 completed shifts:  In: -   Out: 2950 [Urine:2950]    CBC:   Recent Labs     04/01/24  0122 04/02/24  1020 04/03/24  0620   WBC 8.7 14.6* 17.2*   HGB 7.6* 8.2* 8.3*    316 277          Recent Labs     04/01/24  0122 04/02/24  0530 04/03/24  0620   * 134* 130*   K 4.3 3.5 3.2*   CL 92* 91* 84*   CO2 20* 25 26   BUN 78* 87* 91*   CREATININE 6.9* 7.1* 7.1*   GLUCOSE 109* 112* 100*       Lab Results   Component Value Date    CALCIUM 8.1 (L) 
Nephrology Progress Note  4/5/2024 8:16 AM        Subjective:   Admit Date: 3/22/2024  PCP: Iban Lou MD    Interval History: Patient seen in early morning.  She is much more alert awake oriented and interactive    According to tech she does have some diarrhea-i.e. loose stool-which would raise suspicion for Clostridioides infection    Diet: Apparently eating better    ROS: No overt shortness of breath, PND or orthopnea  Her urine output was not recorded as Vieira is out-she reported making good urine and also verified with the tech-usually they are a good source of information    As I mentioned had some watery diarrhea  Her blood pressure remained low, intermittent tachycardia, no fever    Data:     Current meds:    meropenem  1,000 mg IntraVENous Q24H    fluconazole  200 mg IntraVENous Q24H    vancomycin (VANCOCIN) intermittent dosing (placeholder)   Other RX Placeholder    potassium alternative oral replacement  40 mEq Oral BID    predniSONE  60 mg Oral Daily    pantoprazole  40 mg Oral BID AC    spironolactone  25 mg Oral Daily    clopidogrel  75 mg Oral Daily    aspirin  81 mg Oral Daily    [Held by provider] furosemide  80 mg IntraVENous TID    [Held by provider] metOLazone  5 mg Oral Daily    sulfamethoxazole-trimethoprim  1 tablet Oral Q48H    metoprolol succinate  25 mg Oral Daily    hydrALAZINE  25 mg Oral 3 times per day    isosorbide dinitrate  10 mg Oral TID    rosuvastatin  10 mg Oral Nightly    ciprofloxacin  1 drop Left Eye Q4H While awake    [Held by provider] gabapentin  100 mg Oral TID    sodium chloride flush  5-40 mL IntraVENous 2 times per day    [Held by provider] heparin (porcine)  5,000 Units SubCUTAneous 3 times per day      sodium chloride      sodium chloride      sodium chloride      sodium chloride 10 mL/hr (03/29/24 0022)         I/O last 3 completed shifts:  In: 10 [I.V.:10]  Out: -     CBC:   Recent Labs     04/04/24  0615 04/04/24  0835 04/05/24  0456   WBC 47.8* 48.4* 
Nephrology Progress Note  4/8/2024 9:01 AM        Subjective:   Admit Date: 3/22/2024  PCP: Iban Lou MD    Interval History:  weekend events reviewed   I am glad that white count dramatically decreased- same way as is dramatically increased     also had additional imaging study which include computed tomography of abdomen and pelvis without administration of intravenous contrast material which suggested right lobe pneumonitis, of course this is the radiologist interpretation- also new air within the urinary bladder lumen- obviously she had Vieira catheter recently-    Diet:  reported eating better    ROS:   no shortness of breath, PND or orthopnea     excellent urine output 2.4 L for the last 24 hours   no fever and acceptable blood pressure     Data:     Current meds:    linezolid  600 mg Oral 2 times per day    aspirin  81 mg Oral Daily    pantoprazole  40 mg IntraVENous BID    meropenem  1,000 mg IntraVENous Q24H    [Held by provider] fluconazole  200 mg IntraVENous Q24H    predniSONE  60 mg Oral Daily    [Held by provider] pantoprazole  40 mg Oral BID AC    [Held by provider] spironolactone  25 mg Oral Daily    clopidogrel  75 mg Oral Daily    [Held by provider] metOLazone  5 mg Oral Daily    [Held by provider] sulfamethoxazole-trimethoprim  1 tablet Oral Q48H    metoprolol succinate  25 mg Oral Daily    hydrALAZINE  25 mg Oral 3 times per day    [Held by provider] isosorbide dinitrate  10 mg Oral TID    [Held by provider] rosuvastatin  10 mg Oral Nightly    ciprofloxacin  1 drop Left Eye Q4H While awake    sodium chloride flush  5-40 mL IntraVENous 2 times per day    [Held by provider] heparin (porcine)  5,000 Units SubCUTAneous 3 times per day      sodium chloride      sodium chloride      sodium chloride      sodium chloride 10 mL/hr (03/29/24 0022)         I/O last 3 completed shifts:  In: -   Out: 3600 [Urine:3600]    CBC:   Recent Labs     04/06/24  0451 04/06/24  1237 04/07/24  0500 
PHARMACY VANCOMYCIN MONITORING SERVICE  Pharmacy consulted by GRACE Madera for monitoring and adjustment.    Indication for treatment: Vancomycin indication: Sepsis unknown source likely secondary to  vs GI  Goal trough: Trough Goal: 15-20 mcg/mL  AUC/ALEJANDRO: 400-600    Risk Factors for MRSA Identified:   Hospitalization x 13 days    Pertinent Laboratory Values:   Temp Readings from Last 3 Encounters:   04/04/24 98.6 °F (37 °C) (Oral)   03/09/24 98.1 °F (36.7 °C)   03/02/24 97.9 °F (36.6 °C) (Oral)     Recent Labs     04/03/24  0620 04/04/24  0615 04/04/24  0835 04/04/24  1057   WBC 17.2* 47.8* 48.4*  --    LACTATE  --   --   --  1.5     Recent Labs     04/02/24  0530 04/03/24  0620 04/04/24  0615   BUN 87* 91* 101*   CREATININE 7.1* 7.1* 7.0*     Estimated Creatinine Clearance: 7 mL/min (A) (based on SCr of 7 mg/dL (H)).    Intake/Output Summary (Last 24 hours) at 4/4/2024 1307  Last data filed at 4/3/2024 1430  Gross per 24 hour   Intake --   Output 450 ml   Net -450 ml     Last Encounter Weight:  Wt Readings from Last 3 Encounters:   04/04/24 57.2 kg (126 lb)   03/08/24 62.2 kg (137 lb 2 oz)   03/01/24 67.3 kg (148 lb 5.9 oz)       Pertinent Cultures:   Date    Source    Results  03/22   Blood    NG at 5 days  04/03   Blood    NG at 24 hours  04/04   MRSA Nasal   Ordered      Vancomycin level:   TROUGH:  No results for input(s): \"VANCOTROUGH\" in the last 72 hours.  RANDOM:  No results for input(s): \"VANCORANDOM\" in the last 72 hours.    Assessment:  HPI: NSCLC s/p RUL resection, chemo and radiation per Dr. Heart, s/p mediport placement 2018, gastritis, Schatzki's Ring, CAD s/p PCI on DAPT, chronic anemia, depression/ anxiety, COPD, diverticulosis, HLD and HTN with recent multiple admissions in the past 2 months with pneumonia and influenza A who was admitted 3/22/2024 for further evaluation and management of weakness and hypotension. WBC increased to [address] and emperic antibiotics were started.  SCr, BUN, and 
PHARMACY VANCOMYCIN MONITORING SERVICE  Pharmacy consulted by GRACE Madera for monitoring and adjustment.    Indication for treatment: Vancomycin indication: Sepsis unknown source likely secondary to  vs GI  Goal trough: Trough Goal: 15-20 mcg/mL  AUC/ALEJANDRO: 400-600    Risk Factors for MRSA Identified:   Hospitalization x 13 days    Pertinent Laboratory Values:   Temp Readings from Last 3 Encounters:   04/05/24 97.9 °F (36.6 °C) (Oral)   03/09/24 98.1 °F (36.7 °C)   03/02/24 97.9 °F (36.6 °C) (Oral)     Recent Labs     04/04/24  0615 04/04/24  0835 04/04/24  1057 04/05/24  0456   WBC 47.8* 48.4*  --  37.2*   LACTATE  --   --  1.5  --        Recent Labs     04/03/24  0620 04/04/24  0615 04/05/24  0456   BUN 91* 101* 89*   CREATININE 7.1* 7.0* 5.7*       Estimated Creatinine Clearance: 8 mL/min (A) (based on SCr of 5.7 mg/dL (H)).    Intake/Output Summary (Last 24 hours) at 4/5/2024 1320  Last data filed at 4/4/2024 2102  Gross per 24 hour   Intake 10 ml   Output --   Net 10 ml       Last Encounter Weight:  Wt Readings from Last 3 Encounters:   04/05/24 52.2 kg (115 lb 1.3 oz)   03/08/24 62.2 kg (137 lb 2 oz)   03/01/24 67.3 kg (148 lb 5.9 oz)       Pertinent Cultures:   Date    Source    Results  03/22   Blood    NGTD  04/03   Blood    NGTD  04/04   MRSA Nasal   Negative  4/4                              Urine                                       In process      Vancomycin level:   TROUGH:  No results for input(s): \"VANCOTROUGH\" in the last 72 hours.  RANDOM:    Recent Labs     04/05/24  0456   VANCORANDOM 27.7       Assessment:  HPI: NSCLC s/p RUL resection, chemo and radiation per Dr. Heart, s/p mediport placement 2018, gastritis, Schatzki's Ring, CAD s/p PCI on DAPT, chronic anemia, depression/ anxiety, COPD, diverticulosis, HLD and HTN with recent multiple admissions in the past 2 months with pneumonia and influenza A who was admitted 3/22/2024 for further evaluation and management of weakness and 
Physical Therapy    Physical Therapy Treatment Note  Name: Adenike Harvey MRN: [id number] :   1955   Date:  2024   Admission Date: 3/22/2024 Room:  [address] [place] [address]   Restrictions/Precautions: general precautions, fall risk     Communication with other providers:  RN reports pt is appropriate to participate with therapy. Co-treatment with OT, Natalie to maximize safety and for tolerance     Subjective:  Patient states:  \"I wish I could stop coughing\"  Pain:   Location, Type, Intensity (0/10 to 10/10):  pt reports pain to toes on bilat feet, did not rate.     Objective:    Observation:  pt found resting in bed with  at bedside upon arrival, agreeable to participate with PT   Objective Measures:  tele, on RA, vitals stable throughout     Treatment, including education/measures:  Supine to sit: Ruchi using bed rail with HOB elevated. Pt demo difficulty scooting to EOB requiring maxA. Pt required increased time and effort to complete task. Required encouragement to initiate movement d/t coughing fit.     Static sit balance: good, CGA for safety. No noted retropulsion or instability/LOB.    Sit to stand/stand to sit: maxAx2 using FWW from bed. Max verbal/tactile cues provided for hand placement, push through BLE, and extend trunk/hips with poor carryover. Pt was able to stand erect intermittently however continued to remain significantly kyphotic with flexed BLE. Second stand from bed, pt required therapy hug with OT in front and PT assisting with hips with maxAx2.    Stand balance: poor, using FWW with maxAx2. Significantly kyphotic with flexed BLE demo difficulty standing erect despite max tactile/verbal cues to assist.     Stand pivot transfer: maxAx2, utilizing therapy hug technique with OT in front and PT assist with hips/pelvic alignment.     Education: importance and benefits of participating with PT, importance to get OOB for improved breathing and assistance with coughing up sputum    Safety: gait 
Physical Therapy  Name: Adenike Harvey MRN: [id number] :   1955   Date:  2024   Admission Date: 3/22/2024 Room:  [address] [place] [address]   Restrictions/Precautions:        general precautions, fall risk     Communication with other providers:  Mariella ADEN states pt is ok to see for therapy. Notified RN student that the patient is struggling with coughing episodes   COTX with DEL ROSARIO Julien per patient tolerance and safety with rehab and unable to tolerate multiple sessions.     Subjective:  Patient states:  Patient agreeable for limited activity today. Needs encouragement to perform STS trials. \"I can try\". She is A&O x 4.   Pain:   Location, Type, Intensity (0/10 to 10/10):  c/o jessica feet soreness and pain, 7/10    Objective:    Observation:  Patient is supine in bed upon arrival.    Vitals : Patient is on room air   HR - ranges from  during activities  BP - 115/80  SpO2 - ranges from 91-97% but has episodes of coughing     Treatment, including education/measures:    Transfers with line management of Tele Monitor, IV, BP Cuff, Pulse Ox  Supine to sit :Mod A, with HOB elevated. Assist for trunk and BLE management  Seated balance: Patient requires CGA for static sitting, CGA for dynamic sitting   Neuro-yanique: Verbal and tactile cues for seated upright posture. Manual assist needed for trunk adjustment to midline. Patient CGA during seated activities with occupational training  Sit to supine :SBA   Scooting :Seated scooting Mod A to edge of bed  Sit to stand :Mod A from EOB x 2 trials  Stand to sit :Max A to EOB d/t poor eccentric control  Standing balance:Patient requires Min-Max A for static standing   Neuro-yanique: Verbal and tactile cues for standing upright posture. Manual assist needed for trunk adjustment to midline. Patient Min-Max A during standing activities with occupational training. She tolerates 30 sec-2 mins of standing. She needs encouragement to maintain standing posture    Safety  Patient left safely 
Pt noted to be in sinus tach, Dr. Uribe and REGINA Sanchez notified.  
Pt transferred to ICU 2105 at this time on 4L O2 via NC. Levo gtt  infusing at 21mcg/min. Pt awake and on phone with family  
Pt won't take any PO meds or anything orally. Attending provider is notified.  
RENAL DOSE ADJUSTMENT MADE PER P/T PROTOCOL    PREVIOUS ORDER:  Meropenem 1g q24 hours    Estimated Creatinine Clearance: 12 mL/min (A) (based on SCr of 3.9 mg/dL (H)).  Recent Labs     04/06/24  0451 04/07/24  0500 04/07/24  1033 04/08/24  0515   BUN 76*  --  74* 76*   CREATININE 4.7*  --  4.1* 3.9*   *   < >  --  114*    < > = values in this interval not displayed.     NEW RENALLY ADJUSTED ORDER:  Meropenem 500 mg IV EI q12 hours.    Patricia Ramos RPH  4/8/2024 1:38 PM      
Report called to [facility] at this time  
Weekly port needle and dressing dressing change completed per protocol. Patient tolerated well.   Patient continues to meet the following requirement for continued central vascular access device placement: Limited/no vessel suitable for conventional peripheral access    Consult the Vascular Access Team for questions, concerns, or change in patient's condition.    
112* 100* 99       Lab Results   Component Value Date    CALCIUM 8.4 04/04/2024    PHOS 4.4 04/04/2024       Objective:     Vitals: /67   Pulse (!) 101   Temp 98.1 °F (36.7 °C) (Oral)   Resp 19   Ht 1.626 m (5' 4.02\")   Wt 57.6 kg (126 lb 15.8 oz)   LMP 01/06/2009 (LMP Unknown)   SpO2 96%   BMI 21.79 kg/m² ,    General appearance: Thin, alert, awake seems oriented  HEENT: Positive conjunctival pallor no scleral icterus  Neck: Supple  Lungs: Decreased breath sound at right base and few rhonchi  Heart: Tachycardia but seems regular  Abdomen: Soft  Extremities: Leg edema has resolved      Problem List :         Impression :     Acute kidney disease-fortunately creatinine looks plateau ed-disproportionately higher blood urea nitrogen partly from steroid-will make sure that she does not have any occult bleeding or other etiology-I still think she has acute interstitial nephritis-likely from checkpoint inhibitor therapy-other possibility would be acute tubular injury-other etiology would be less likely or unlikely-but I am glad creatinine has plateaued  Immune checkpoint inhibitor multiorgan dysfunction or suspected-but I do not have any way to prove or disprove it-acceptable clinical course and low ACTH level although-cortisol level was high  Underlying cardiomyopathy again could be explained by 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 all the problem came from  I just realized she has significant leukocytosis-white count started going up mainly since April 2-although steroid can raise the white count but not to 47,000-I would worry about Clostridioides infection-along with other infection especially if she has diarrhea    Recommendation/Plan  :     I would keep oral prednisone at least for 30 days unless more data becomes 
BMI 25.35 kg/m² ,    General appearance: Thin, without any acute distress, she was alert awake and oriented  HEENT: At least 3+ conjunctival pallor-she is a dentulous  Neck: Supple  Lungs: Coarse breath sound  Heart: Tachycardia  Abdomen: Soft  Extremities: No has at least 2+ leg edema      Problem List :         Impression :     Stage III acute kidney  injury-oliguric and progressively worsening-no sign of recovery-potential etiology-renal venous congestion,-severe tubular injury and acute illness nephritis  Severe cardiomyopathy with fluid overload now-history of lung cancer last immune checkpoint inhibitor was in November 2023.  Underlying atherosclerotic cardiovascular disease    Recommendation/Plan  :     Trial with 80 mg of intravenous loop-recheck beta-hydroxybutyrate level tomorrow-she needs some oral food-her daughter will let me know that next step-I will redo the urine analysis and urine indicis-I also offered kidney biopsy-in discussion about potential needing kidney replacement therapy.  She is not overtly uremic now, does not have overt pulmonary edema, I will wait for family's decision.  Also hoping and praying that perhaps all she has a severe tubular injury-and she will start recovering, will keep empirical steroid for now      Bailee Chen MD MD    
Patient had 2 episodes of coffee-ground emesis in the morning of 4/2.  Seen by GI on 4/5/2024.  Hemoglobin dropped to 6.9G/DL on 4/5/2024.  Received 1 unit PRBC.  DAPT were held.  No more bloody bowel movements since.  Aspirin resumed on 4/6/2024.  Plavix resumed on 4/7/2024.  Was on clear liquid diet.  Will advance to regular today.  Hemoglobin has been stable now.      Leukocytosis.  WBC 14.6 on 4/2/2024.  Repeat 17.2 on 4/4/2024.  Patient without any source of infection.  Leukocytosis continue to worse and was up to 48.4 on 4/4/2024.  CT abdomen and pelvis obtained which showed a right lower lobe pneumonia which could have been due to aspiration.  UA also suggestive of UTI.  Currently on broad-spectrum antibiotics.  However UTI and pneumonia probably not because of such high WBC.  Probable leukemoid reaction.  Urine culture growing Enterococcus faecium.  Currently on meropenem and fluconazole (for suspected thrush) .  Will continue with IV meropenem.  Hold fluconazole due to cholestasis/transaminitis.      Hypoxia.  Patient became hypoxic after emesis on 4/2/2024.  Required 6 L O2.  Continue to wean off.  Chest x-ray appears unremarkable.  Weaned off.      Transaminitis.  Elevated AST, ALT and alkaline phosphatase on 4/6/2024.  Unremarkable ultrasound.  Patient asymptomatic.  Fluconazole DC'd.  Bactrim was held, will resume. Continue to trend.  LFTs finally improving      Recent LHC with PCI on DAPT.   Lactic acidosis in setting of cardiogenic shock, improved  Anion gap metabolic acidosis in the setting of above  Intractable diarrhea in the setting of above, improved  History of COPD on 3 L nasal cannula  Hypokalemia, replete      Comment: Please note this report has been produced using speech recognition software and may contain errors related to that system including errors in grammar, punctuation, and spelling, as well as words and phrases that may be inappropriate. If there are any questions or concerns 
awake and oriented  HEENT: Striking 3+ conjunctival pallor, she is a dentulous  Neck: Seems supple  Lungs: Coarse breath sound  Heart: Tachycardia  Abdomen: Right lower quadrant pain no rebound rigidity, positive bowel sounds  Extremities: Trace ankle edema      Problem List :         Impression :     Stage III acute kidney injury-oliguric tubular injury were suspected from hypotension-and perhaps systemic inflammatory response syndrome-also has risk for acute interstitial nephritis-due to checkpoint inhibitor therapy-although she cannot have other glomerular disease specially with significant proteinuria about 2.6 g/g of creatinine-if the kidney function does not improve-option would be to do kidney biopsy-tubular injury is a presumptive diagnosis  Systemic inflammatory response syndrome-she also likely had starvation ketosis-I do not see any precise source of infection  Metabolic acidosis and low potassium-mainly from acute kidney injury and recent diarrhea-concurrent dextrose infusion-she of course has profound anemia-underlying lung cancer  She also have severe cardiomyopathy-and has risk for pulmonary edema-of course immune checkpoint therapy also can affect myocardiocytes with dysrhythmia now-but she does have known atherosclerotic cardiovascular disease.  Her ejection fraction was low back in February 2024 but it was 40%    Recommendation/Plan  :     She is empirically with high-dose of steroid-will replete the potassium keep the dextrose infusion until she can eat and drink-check a proBNP level tomorrow morning-I will go back and try to discuss with her and her family-she does have risk for pulmonary edema-with severe cardiomyopathy-she obviously has multiple organ malfunction-and underlying malignancy-probably have to have a good family discussion and discussion with the patient, to decide our intensity of therapeutic approach      Bailee Chen MD MD    
kg (176 lb 5.9 oz) (176 lb 5.9 oz 11/28/2023 per RD notes, chart review: 173# 1/2023, 167# 7/6/23, 174# 11/9/23, 156# 1/11/23)  % Weight Change (Calculated): -18.1  Weight Adjustment For: No Adjustment                 BMI Categories: Underweight (BMI less than 22) age over 65    Estimated Daily Nutrient Needs:  Energy Requirements Based On: Kcal/kg  Weight Used for Energy Requirements: Ideal  Energy (kcal/day): 2730-6888 (30-35 kcal/kg IBW)  Weight Used for Protein Requirements: Ideal  Protein (g/day): 55-65 (1-1.2 g/kg)  Method Used for Fluid Requirements: Standard Renal  Fluid (ml/day): 1400    Nutrition Diagnosis:   Severe malnutrition related to inadequate protein-energy intake, renal dysfunction, increase demand for energy/nutrients as evidenced by NPO or clear liquid status due to medical condition, poor intake prior to admission, weight loss greater than or equal to 10% in 6 months, severe muscle loss, mild loss of subcutaneous fat    Nutrition Interventions:   Food and/or Nutrient Delivery: Continue Current Diet, Modify Oral Nutrition Supplement  Nutrition Education/Counseling: Education completed, Survival skills/brief education completed (Malnutrition Bag sent)  Coordination of Nutrition Care: Continue to monitor while inpatient, Feeding Assistance/Environment Change, Coordination of Care    Goals:  Previous Goal Met: Progress towards Goal(s) Declining  Goals: PO intake 50% or greater       Nutrition Monitoring and Evaluation:   Behavioral-Environmental Outcomes: None Identified  Food/Nutrient Intake Outcomes: Vitamin/Mineral Intake, Diet Advancement/Tolerance, Food and Nutrient Intake, Supplement Intake  Physical Signs/Symptoms Outcomes: Biochemical Data, Nausea or Vomiting, Meal Time Behavior, Nutrition Focused Physical Findings, Weight    Discharge Planning:    Too soon to determine     Miguelina Daniels RD, LD  Contact: [phone]    
tolerance to facilitate IND c ADL tasks, func transfers / mobility with G safety awareness carryover      Assessment / Impression:    Patient's tolerance of treatment: Fair  Adverse Reaction: None  Significant change in status and impact: Improved from initial evaluation  Barriers to improvement: strength, activity tolerance and medical status    Safety  Patient educated on role of OT , benefits of OT and rationale for therapeutic intervention. Patient safely in bed + alarm set at end of session, with call light/phone in reach, and nursing aware. Gait belt was used for func transfers / mobility.    Plan for Next Session:    Continue OT POC    Time in:  1107  Time out:  1137  Timed treatment minutes:  30  Total treatment time:  30      Electronically signed by:    SWATI Cortez,   4/6/2024, 12:41 PM        
-results pending     4) Elevated troponin- will defer to cardiology and appreciate recommendations     5)Acute kidney injury, worsening creatinine- will defer to hospital medicine and appreciate recommendations     6)Hx of Severe cardiomyopathy with est. Ejection fraction 15-20%- will defer to cardiology and appreciative recommendations      7) Septic Shock (resolved)- will defer to hospital medicine and appreciate recommendations     8)COPD, off oxygen at this time, will defer to hospital medicine and appreciate their recommendations       Patient's history, exam, and plan of care were discussed with the patient and Dr. Maurer  . All questions and concerns were discussed with the patient. Patient agreed to plan.        Thank you for allowing us to be involved in the management of this patient. We will follow this patient along with you. Please feel free to call with any questions.      GRACE Nuñez - [facility]  Gastroenterology   3/28/2024   10:19 AM       Recent Labs     03/27/24  1831   AST 21   ALT 11   ALKPHOS 55   BILITOT 0.4   BILIDIR 0.2      
03/24/2024 11:50 AM     FERRITIN:    Lab Results   Component Value Date/Time    FERRITIN 1,390 03/24/2024 11:50 AM     RPR:  No results found for: \"RPR\"  RHONDA:  No results found for: \"ANATITER\", \"RHODNA\"  24 Hour Urine for Creatinine Clearance:  No components found for: \"CREAT4\", \"UHRS10\", \"UTV10\"      Objective:   I/O: 04/05 0701 - 04/06 0700  In: 345.6 [I.V.:10]  Out: 300 [Urine:300]  I/O last 3 completed shifts:  In: 355.6 [I.V.:20; Blood:335.6]  Out: 300 [Urine:300]  I/O this shift:  In: -   Out: 250 [Urine:250]  Vitals: /78   Pulse (!) 102   Temp 99 °F (37.2 °C) (Oral)   Resp 21   Ht 1.626 m (5' 4\")   Wt 55.3 kg (121 lb 14.6 oz)   LMP 01/06/2009 (LMP Unknown)   SpO2 94%   BMI 20.93 kg/m²  {  General appearance: awake weak  HEENT: Head: Normal, normocephalic, atraumatic.  Neck: supple, symmetrical, trachea midline  Lungs: diminished breath sounds bilaterally  Heart: S1, S2 normal  Abdomen: abnormal findings:  soft nt  Extremities: edema trace  Neurologic: Mental status: alertness: Awake weak family at bedside        Assessment and Plan:      IMP:  #1 acute renal failure from ATN/prerenal versus AIN  #2 diarrhea possible C. Difficile  #3 anemia  #4 hypertension  #5 electrolyte changes low sodium low potassium  #6 UTI with Enterococcus as well as Candida    Plan     #1 creatinine down to 4.7 and about 250 cc urine output supportive care monitor no acute need for dialysis  #2 follow-up stool for C. difficile supportive care  #3 hemoglobin improved with transfusion supportive care monitor  #4 blood pressure holding stable  #5 monitor sodium potassium electrolytes and magnesium as well in setting of diarrhea  #6 treat UTI  Will follow supportive care discussed with family             BRIE HUDSON MD, MD    
Evaluation:   Behavioral-Environmental Outcomes: None Identified  Food/Nutrient Intake Outcomes: Vitamin/Mineral Intake, Diet Advancement/Tolerance, Food and Nutrient Intake, Supplement Intake  Physical Signs/Symptoms Outcomes: Biochemical Data, Nausea or Vomiting, Meal Time Behavior, Nutrition Focused Physical Findings, Weight    Discharge Planning:    Too soon to determine     Paolo Traore RD, LD  Contact: [phone]      
    LIVER PROFILE: No results for input(s): \"AST\", \"ALT\", \"LIPASE\", \"AMYLASE\", \"ALB\", \"BILIDIR\", \"BILITOT\", \"ALKPHOS\" in the last 72 hours.  PT/INR: No results for input(s): \"PROTIME\", \"INR\" in the last 72 hours.  APTT: No results for input(s): \"APTT\" in the last 72 hours.  BNP:  No results for input(s): \"BNP\" in the last 72 hours.  TROPONIN: No results for input(s): \"TROPONINT\" in the last 72 hours.  Labs, consult, tests reviewed          Ish Sanchez PA-C, 3/27/2024 4:49 PM     CARDIOLOGY ATTENDING ADDENDUM    MEDICAL DECISION MAKING:    Elevated troponin  Acute kidney injury  Acute on chronic anemia  Coronary artery disease status post recent PCI to LAD  Severe cardiomyopathy with estimated ejection fraction of 15 to 20%.  Sepsis    Patient continues to have worsening kidney function.  From heart failure standpoint she looks stable in terms of her volume and hemodynamics.  I would increase her hydralazine and isosorbide dinitrate dosing as above.  Please continue with aspirin and Plavix as well.  If she is tolerating these doses we will then slowly add beta-blockers.    HPI:  I have reviewed the HPI  And agree with above   Adenike is a 69 y.o.year old who and presents with had concerns including Nausea (X1 week ), Emesis (X1 week ), and Diarrhea (X3 days ).  Chief Complaint   Patient presents with    Nausea     X1 week     Emesis     X1 week     Diarrhea     X3 days      Please review addendum/changes made to note above   Interval history: Clinically stable.    Physical Exam:  General:  Awake, alert, NAD  Chest: respiration easy  Cardiovascular: Regular pulse   Abdomen:   nontender  Extremities: No edema  Pulses; palpable  Neuro: grossly normal        I agree with the plan, which was planned by myself and discussed with advanced level provider.  My documented MDM is a substantive portion of the supervisory note.    I have seen ,spoken to  and examined this patient personally, independently of the 
mediastinal/suprahilar density redemonstrated with subtle associated architectural distortion/traction. Subtle increased attenuation at right lung base. Small right pleural effusion noted. Pulmonary vascularity appears within normal limits. Bony thorax appears unchanged.     Small right pleural effusion. Right basilar compressive atelectasis, atelectasis or pneumonia. Right hilar changes are described in CT report dated 2/26/2024 and likely represent radiation pneumonitis. Electronically signed by MD Jose Song    US RETROPERITONEAL LIMITED    Result Date: 3/24/2024  US RETROPERITONEAL LIMITED REASON FOR EXAM: worsenig MELIDA COMPARISON: Recent CT of the abdomen and pelvis from March 22, 2024 FINDINGS: Exam limited by the patient's ability to position. Both the left and right kidney are again normal in size measuring at least 10.2 cm on the right and 10.6 cm on the left. No hydronephrosis. No perinephric fluid collections or calculi. Urinary bladder not imaged.     Negative for obstruction. Electronically signed by Jearrdo Leavitt MD      Electronically signed by Lauren Ga MD on 3/29/2024 at 4:04 PM    
radiation pneumonitis. Electronically signed by MD Jose Song    US RETROPERITONEAL LIMITED    Result Date: 3/24/2024  US RETROPERITONEAL LIMITED REASON FOR EXAM: worsenig MELIDA COMPARISON: Recent CT of the abdomen and pelvis from March 22, 2024 FINDINGS: Exam limited by the patient's ability to position. Both the left and right kidney are again normal in size measuring at least 10.2 cm on the right and 10.6 cm on the left. No hydronephrosis. No perinephric fluid collections or calculi. Urinary bladder not imaged.     Negative for obstruction. Electronically signed by Jerardo Leavitt MD      Electronically signed by Lauren Ga MD on 4/3/2024 at 1:24 PM

## 2024-04-08 NOTE — CARE COORDINATION
Transport arranged with [facility].  The next available transport time is 1845 per [facility].  Notified pt and family at bedside.  Notified pt's nurse and Agusto/Valentino.  TE    PAS/DONE

## 2024-04-08 NOTE — CARE COORDINATION
[facility]. Notified Dr Ga via PS.  D/C INSTRUCTIONS ARE ON FRONT OF PACKET LOCATED WITH THE SOFT CHART.  TE

## 2024-04-08 NOTE — CARE COORDINATION
[facility] is agreeable to accept pt when a pre-cert is obtained. Dr to notify CM when to start the pre-cert. Updated pt, spouse and daughter at bedside.  D/c instructions are on front of packet located with the soft chart. Requested updated PT/OT notes via WB.   TE     DO NOT SEND PT TO SNF UNTIL A PRE-CERT IS OBTAINED     Pt returning/new to [facility] at discharge. Please call report to [phone] and fax orders, AVS, and discharge summaries to [phone].

## 2024-04-08 NOTE — CARE COORDINATION
Precert pending at this time. Pending auth ID [id number] . NOT APPROVED YET    UPDATE: Precert approved for 5 days per call from [facility]. Auth # [id number]

## 2024-04-09 ENCOUNTER — HOSPITAL ENCOUNTER (OUTPATIENT)
Age: 69
Setting detail: SPECIMEN
Discharge: HOME | End: 2024-04-09

## 2024-04-09 LAB
ALBUMIN SERPL-MCNC: 3.3 GM/DL (ref 3.4–5)
ALP BLD-CCNC: 265 IU/L (ref 40–128)
ALT SERPL-CCNC: 115 U/L (ref 10–40)
ANION GAP SERPL CALCULATED.3IONS-SCNC: 23 MMOL/L (ref 7–16)
AST SERPL-CCNC: 96 IU/L (ref 15–37)
BILIRUB SERPL-MCNC: 0.5 MG/DL (ref 0–1)
BUN SERPL-MCNC: 77 MG/DL (ref 6–23)
CALCIUM SERPL-MCNC: 8.7 MG/DL (ref 8.3–10.6)
CHLORIDE BLD-SCNC: 94 MMOL/L (ref 99–110)
CO2: 17 MMOL/L (ref 21–32)
CREAT SERPL-MCNC: 3.3 MG/DL (ref 0.6–1.1)
CULTURE: ABNORMAL
GFR SERPL CREATININE-BSD FRML MDRD: 15 ML/MIN/1.73M2
GLUCOSE SERPL-MCNC: 102 MG/DL (ref 70–99)
HCT VFR BLD CALC: 29.3 % (ref 37–47)
HEMOGLOBIN: 9.7 GM/DL (ref 12.5–16)
Lab: ABNORMAL
MCH RBC QN AUTO: 28.6 PG (ref 27–31)
MCHC RBC AUTO-ENTMCNC: 33.1 % (ref 32–36)
MCV RBC AUTO: 86.4 FL (ref 78–100)
PDW BLD-RTO: 17.1 % (ref 11.7–14.9)
PLATELET # BLD: 100 K/CU MM (ref 140–440)
PMV BLD AUTO: 11 FL (ref 7.5–11.1)
POTASSIUM SERPL-SCNC: 4.4 MMOL/L (ref 3.5–5.1)
RBC # BLD: 3.39 M/CU MM (ref 4.2–5.4)
SODIUM BLD-SCNC: 134 MMOL/L (ref 135–145)
SPECIMEN: ABNORMAL
TOTAL PROTEIN: 5.5 GM/DL (ref 6.4–8.2)
WBC # BLD: 5 K/CU MM (ref 4–10.5)

## 2024-04-09 PROCEDURE — 80053 COMPREHEN METABOLIC PANEL: CPT

## 2024-04-09 PROCEDURE — 36415 COLL VENOUS BLD VENIPUNCTURE: CPT

## 2024-04-09 PROCEDURE — 85027 COMPLETE CBC AUTOMATED: CPT

## 2024-04-09 NOTE — DISCHARGE SUMMARY
Discharge Summary    Name:  Adenike Harvey /Age/Sex: 1955  (69 y.o. female)   MRN & CSN:  [id number] & [id number] Admission Date/Time: 3/22/2024  9:53 AM   Attending:  No att. providers found Discharging Physician: Lauren Ga MD         Discharge Exam  Physical Exam  Vitals:    24 0030 24 0100 24 0512 24 0821   BP: (!) 129/95 (!) 145/90 137/86 (!) 147/93   Pulse: 79 76  83   Resp: 24 13  20   Temp:  97.8 °F (36.6 °C)  97.8 °F (36.6 °C)   TempSrc:  Oral  Oral   SpO2: 98% 95%  96%   Weight:       Height:        Vitals reviewed.   Constitutional:       Appearance: Normal appearance. She is normal weight.   HENT:      Head: Normocephalic.      Right Ear: Tympanic membrane normal.      Left Ear: Tympanic membrane normal.      Nose: Nose normal.      Mouth/Throat:      Mouth: Mucous membranes are moist.   Eyes:      Conjunctiva/sclera: Conjunctivae normal.      Pupils: Pupils are equal, round, and reactive to light.   Cardiovascular:      Rate and Rhythm: Normal rate and regular rhythm.      Pulses: Normal pulses.      Heart sounds: Normal heart sounds. No murmur heard.  Pulmonary:      Effort: Pulmonary effort is normal.      Breath sounds: Normal breath sounds. No wheezing, rhonchi or rales.   Abdominal:      General: Abdomen is flat. Bowel sounds are normal. There is no distension.      Palpations: Abdomen is soft.      Tenderness: There is no abdominal tenderness.   Musculoskeletal:         General: No deformity. Normal range of motion.      Cervical back: Normal range of motion and neck supple.      Right lower leg: No edema.      Left lower leg: No edema.   Skin:     Coloration: Skin is not jaundiced or pale.   Neurological:      General: No focal deficit present.      Mental Status: She is alert and oriented to person, place, and time. Mental status is at baseline.      Motor: Weakness present.   Psychiatric:         Mood and Affect: Mood normal.         Behavior: Behavior

## 2024-04-10 ENCOUNTER — TELEPHONE (OUTPATIENT)
Dept: FAMILY MEDICINE CLINIC | Age: 69
End: 2024-04-10

## 2024-04-10 NOTE — TELEPHONE ENCOUNTER
Care Transitions Initial Follow Up Call    Outreach made within 2 business days of discharge: Yes    Patient: Adenike Harvey Patient : 1955   MRN: [id number]  Reason for Admission: There are no discharge diagnoses documented for the most recent discharge.  Discharge Date: 24       Spoke with: unable to lm - mail box is full.    Discharge department/facility: [facility]

## 2024-04-11 ENCOUNTER — TELEPHONE (OUTPATIENT)
Dept: FAMILY MEDICINE CLINIC | Age: 69
End: 2024-04-11

## 2024-04-11 ENCOUNTER — HOSPITAL ENCOUNTER (OUTPATIENT)
Age: 69
Setting detail: SPECIMEN
Discharge: HOME | End: 2024-04-11

## 2024-04-11 LAB
ALBUMIN SERPL-MCNC: 3.4 GM/DL (ref 3.4–5)
ALP BLD-CCNC: 212 IU/L (ref 40–129)
ALT SERPL-CCNC: 52 U/L (ref 10–40)
ANION GAP SERPL CALCULATED.3IONS-SCNC: 16 MMOL/L (ref 7–16)
AST SERPL-CCNC: 37 IU/L (ref 15–37)
BILIRUB SERPL-MCNC: 0.4 MG/DL (ref 0–1)
BUN SERPL-MCNC: 78 MG/DL (ref 6–23)
CALCIUM SERPL-MCNC: 8.7 MG/DL (ref 8.3–10.6)
CHLORIDE BLD-SCNC: 99 MMOL/L (ref 99–110)
CO2: 21 MMOL/L (ref 21–32)
CREAT SERPL-MCNC: 3 MG/DL (ref 0.6–1.1)
GAMMA GLUTAMYL TRANSFERASE: 125 IU/L (ref 5–36)
GFR SERPL CREATININE-BSD FRML MDRD: 17 ML/MIN/1.73M2
GLUCOSE SERPL-MCNC: 120 MG/DL (ref 70–99)
HCT VFR BLD CALC: 30.5 % (ref 37–47)
HEMOGLOBIN: 9.7 GM/DL (ref 12.5–16)
MCH RBC QN AUTO: 28.6 PG (ref 27–31)
MCHC RBC AUTO-ENTMCNC: 31.8 % (ref 32–36)
MCV RBC AUTO: 90 FL (ref 78–100)
PDW BLD-RTO: 17.1 % (ref 11.7–14.9)
PLATELET # BLD: 111 K/CU MM (ref 140–440)
PMV BLD AUTO: 12.4 FL (ref 7.5–11.1)
POTASSIUM SERPL-SCNC: 4.6 MMOL/L (ref 3.5–5.1)
RBC # BLD: 3.39 M/CU MM (ref 4.2–5.4)
SODIUM BLD-SCNC: 136 MMOL/L (ref 135–145)
TOTAL PROTEIN: 5.2 GM/DL (ref 6.4–8.2)
WBC # BLD: 5.7 K/CU MM (ref 4–10.5)

## 2024-04-11 PROCEDURE — 85027 COMPLETE CBC AUTOMATED: CPT

## 2024-04-11 PROCEDURE — 80053 COMPREHEN METABOLIC PANEL: CPT

## 2024-04-11 PROCEDURE — 36415 COLL VENOUS BLD VENIPUNCTURE: CPT

## 2024-04-11 PROCEDURE — 82977 ASSAY OF GGT: CPT

## 2024-04-11 PROCEDURE — 80061 LIPID PANEL: CPT

## 2024-04-11 NOTE — TELEPHONE ENCOUNTER
.Care Transitions Initial Follow Up Call    Outreach made within 2 business days of discharge: No    Patient: Adenike Harvey Patient : 1955   MRN: [id number]  Reason for Admission: There are no discharge diagnoses documented for the most recent discharge.  Discharge Date: 24       Spoke with: patient    Discharge department/facility: [facility]  TCM Interactive Patient Contact:  Was patient able to fill all prescriptions: Yes  Was patient instructed to bring all medications to the follow-up visit: Yes, patient does not know what medications she is on.   Is patient taking all medications as directed in the discharge summary? Yes  Does patient understand their discharge instructions: Yes  Does patient have questions or concerns that need addressed prior to 7-14 day follow up office visit: yes - no      Scheduled appointment with PCP within 7-14 days    Follow Up  Future Appointments   Date Time Provider Department Center   2024 11:20 AM Iban Lou MD [facility]   2024 11:00 AM Iban Lou MD [facility]       Lala Park MA

## 2024-04-12 ENCOUNTER — HOSPITAL ENCOUNTER (OUTPATIENT)
Age: 69
Setting detail: SPECIMEN
Discharge: HOME | End: 2024-04-12
Payer: MEDICARE

## 2024-04-12 LAB
CHOLESTEROL, FASTING: 214 MG/DL
HCT VFR BLD CALC: 31.9 % (ref 37–47)
HDLC SERPL-MCNC: 60 MG/DL
HEMOGLOBIN: 10 GM/DL (ref 12.5–16)
LDLC SERPL CALC-MCNC: 116 MG/DL
TRIGLYCERIDE, FASTING: 189 MG/DL

## 2024-04-12 PROCEDURE — 85018 HEMOGLOBIN: CPT

## 2024-04-12 PROCEDURE — 36415 COLL VENOUS BLD VENIPUNCTURE: CPT

## 2024-04-12 PROCEDURE — 85014 HEMATOCRIT: CPT

## 2024-04-15 ENCOUNTER — HOSPITAL ENCOUNTER (OUTPATIENT)
Age: 69
Setting detail: SPECIMEN
Discharge: HOME | End: 2024-04-15

## 2024-04-15 LAB
ALBUMIN SERPL-MCNC: 3.4 GM/DL (ref 3.4–5)
ALP BLD-CCNC: 135 IU/L (ref 40–129)
ALT SERPL-CCNC: 27 U/L (ref 10–40)
ANION GAP SERPL CALCULATED.3IONS-SCNC: 21 MMOL/L (ref 7–16)
AST SERPL-CCNC: 30 IU/L (ref 15–37)
BILIRUB SERPL-MCNC: 0.4 MG/DL (ref 0–1)
BUN SERPL-MCNC: 98 MG/DL (ref 6–23)
CALCIUM SERPL-MCNC: 8.7 MG/DL (ref 8.3–10.6)
CHLORIDE BLD-SCNC: 108 MMOL/L (ref 99–110)
CO2: 17 MMOL/L (ref 21–32)
CREAT SERPL-MCNC: 2.8 MG/DL (ref 0.6–1.1)
GFR SERPL CREATININE-BSD FRML MDRD: 18 ML/MIN/1.73M2
GLUCOSE SERPL-MCNC: 81 MG/DL (ref 70–99)
HCT VFR BLD CALC: 24.8 % (ref 37–47)
HEMOGLOBIN: 7.3 GM/DL (ref 12.5–16)
MCH RBC QN AUTO: 28.4 PG (ref 27–31)
MCHC RBC AUTO-ENTMCNC: 29.4 % (ref 32–36)
MCV RBC AUTO: 96.5 FL (ref 78–100)
PDW BLD-RTO: 17.7 % (ref 11.7–14.9)
PLATELET # BLD: 81 K/CU MM (ref 140–440)
PMV BLD AUTO: 12 FL (ref 7.5–11.1)
POTASSIUM SERPL-SCNC: 4.2 MMOL/L (ref 3.5–5.1)
RBC # BLD: 2.57 M/CU MM (ref 4.2–5.4)
SODIUM BLD-SCNC: 146 MMOL/L (ref 135–145)
TOTAL PROTEIN: 5.3 GM/DL (ref 6.4–8.2)
WBC # BLD: 15.1 K/CU MM (ref 4–10.5)

## 2024-04-15 PROCEDURE — 36415 COLL VENOUS BLD VENIPUNCTURE: CPT

## 2024-04-15 PROCEDURE — 80053 COMPREHEN METABOLIC PANEL: CPT

## 2024-04-15 PROCEDURE — 85027 COMPLETE CBC AUTOMATED: CPT

## 2024-04-16 ENCOUNTER — HOSPITAL ENCOUNTER (OUTPATIENT)
Age: 69
Setting detail: SPECIMEN
Discharge: HOME | End: 2024-04-16

## 2024-04-16 LAB
BACTERIA: NEGATIVE /HPF
BILIRUBIN URINE: NEGATIVE MG/DL
BLOOD, URINE: ABNORMAL
CLARITY: CLEAR
COLOR: YELLOW
GLUCOSE, URINE: NEGATIVE MG/DL
KETONES, URINE: NEGATIVE MG/DL
LEUKOCYTE ESTERASE, URINE: NEGATIVE
NITRITE URINE, QUANTITATIVE: NEGATIVE
NON SQUAM EPI CELLS: <1 /HPF
PH, URINE: 5.5 (ref 5–8)
PROTEIN UA: 30 MG/DL
RBC URINE: <1 /HPF (ref 0–6)
SPECIFIC GRAVITY UA: 1.01 (ref 1–1.03)
TRICHOMONAS: NORMAL /HPF
UROBILINOGEN, URINE: 0.2 MG/DL (ref 0.2–1)
WBC UA: 2 /HPF (ref 0–5)

## 2024-04-16 PROCEDURE — 81001 URINALYSIS AUTO W/SCOPE: CPT

## 2024-04-16 PROCEDURE — 87086 URINE CULTURE/COLONY COUNT: CPT

## 2024-04-17 ENCOUNTER — TELEPHONE (OUTPATIENT)
Dept: FAMILY MEDICINE CLINIC | Age: 69
End: 2024-04-17

## 2024-04-17 ENCOUNTER — HOSPITAL ENCOUNTER (OUTPATIENT)
Age: 69
Setting detail: SPECIMEN
Discharge: HOME | End: 2024-04-17
Payer: MEDICARE

## 2024-04-17 LAB
CULTURE: NORMAL
HEMOCCULT SP1 STL QL: POSITIVE
Lab: NORMAL
OCCULT BLOOD 2: POSITIVE
SPECIMEN: NORMAL

## 2024-04-17 PROCEDURE — 82270 OCCULT BLOOD FECES: CPT

## 2024-04-17 NOTE — TELEPHONE ENCOUNTER
LM for patient to reschedule appt----Provider out of office -----KT (ADRIAN on Godwin's phone, her )

## 2024-04-18 ENCOUNTER — HOSPITAL ENCOUNTER (OUTPATIENT)
Age: 69
Setting detail: SPECIMEN
Discharge: HOME | End: 2024-04-18

## 2024-04-18 ENCOUNTER — HOSPITAL ENCOUNTER (INPATIENT)
Age: 69
LOS: 3 days | DRG: 377 | End: 2024-04-22
Attending: EMERGENCY MEDICINE | Admitting: STUDENT IN AN ORGANIZED HEALTH CARE EDUCATION/TRAINING PROGRAM
Payer: MEDICARE

## 2024-04-18 DIAGNOSIS — D64.9 ACUTE ON CHRONIC ANEMIA: Primary | ICD-10-CM

## 2024-04-18 DIAGNOSIS — K92.2 GASTROINTESTINAL HEMORRHAGE, UNSPECIFIED GASTROINTESTINAL HEMORRHAGE TYPE: ICD-10-CM

## 2024-04-18 LAB
ALBUMIN SERPL-MCNC: 3.6 GM/DL (ref 3.4–5)
ALBUMIN SERPL-MCNC: 3.7 GM/DL (ref 3.4–5)
ALP BLD-CCNC: 133 IU/L (ref 40–128)
ALP BLD-CCNC: 138 IU/L (ref 40–128)
ALT SERPL-CCNC: 22 U/L (ref 10–40)
ALT SERPL-CCNC: 25 U/L (ref 10–40)
ANION GAP SERPL CALCULATED.3IONS-SCNC: 21 MMOL/L (ref 7–16)
ANION GAP SERPL CALCULATED.3IONS-SCNC: 21 MMOL/L (ref 7–16)
AST SERPL-CCNC: 32 IU/L (ref 15–37)
AST SERPL-CCNC: 36 IU/L (ref 15–37)
BASOPHILS ABSOLUTE: 0 K/CU MM
BASOPHILS RELATIVE PERCENT: 0.1 % (ref 0–1)
BILIRUB SERPL-MCNC: 0.3 MG/DL (ref 0–1)
BILIRUB SERPL-MCNC: 0.3 MG/DL (ref 0–1)
BUN SERPL-MCNC: 104 MG/DL (ref 6–23)
BUN SERPL-MCNC: 107 MG/DL (ref 6–23)
CALCIUM SERPL-MCNC: 8.4 MG/DL (ref 8.3–10.6)
CALCIUM SERPL-MCNC: 8.8 MG/DL (ref 8.3–10.6)
CHLORIDE BLD-SCNC: 112 MMOL/L (ref 99–110)
CHLORIDE BLD-SCNC: 113 MMOL/L (ref 99–110)
CO2: 15 MMOL/L (ref 21–32)
CO2: 17 MMOL/L (ref 21–32)
CREAT SERPL-MCNC: 3.3 MG/DL (ref 0.6–1.1)
CREAT SERPL-MCNC: 3.4 MG/DL (ref 0.6–1.1)
DIFFERENTIAL TYPE: ABNORMAL
EOSINOPHILS ABSOLUTE: 0 K/CU MM
EOSINOPHILS RELATIVE PERCENT: 0 % (ref 0–3)
GFR SERPL CREATININE-BSD FRML MDRD: 14 ML/MIN/1.73M2
GFR SERPL CREATININE-BSD FRML MDRD: 15 ML/MIN/1.73M2
GLUCOSE SERPL-MCNC: 117 MG/DL (ref 70–99)
GLUCOSE SERPL-MCNC: 122 MG/DL (ref 70–99)
HCT VFR BLD CALC: 20.1 % (ref 37–47)
HCT VFR BLD CALC: 23.5 % (ref 37–47)
HEMOGLOBIN: 6.2 GM/DL (ref 12.5–16)
HEMOGLOBIN: 6.8 GM/DL (ref 12.5–16)
IMMATURE NEUTROPHIL %: 0.8 % (ref 0–0.43)
LYMPHOCYTES ABSOLUTE: 0.3 K/CU MM
LYMPHOCYTES RELATIVE PERCENT: 2.5 % (ref 24–44)
MCH RBC QN AUTO: 28.6 PG (ref 27–31)
MCH RBC QN AUTO: 28.7 PG (ref 27–31)
MCHC RBC AUTO-ENTMCNC: 28.9 % (ref 32–36)
MCHC RBC AUTO-ENTMCNC: 30.8 % (ref 32–36)
MCV RBC AUTO: 93.1 FL (ref 78–100)
MCV RBC AUTO: 98.7 FL (ref 78–100)
MONOCYTES ABSOLUTE: 0.2 K/CU MM
MONOCYTES RELATIVE PERCENT: 2.3 % (ref 0–4)
NEUTROPHILS RELATIVE PERCENT: 94.3 % (ref 36–66)
NUCLEATED RBC %: 0 %
PDW BLD-RTO: 17.6 % (ref 11.7–14.9)
PDW BLD-RTO: 18.5 % (ref 11.7–14.9)
PLATELET # BLD: 80 K/CU MM (ref 140–440)
PLATELET # BLD: 85 K/CU MM (ref 140–440)
PMV BLD AUTO: 13.5 FL (ref 7.5–11.1)
POTASSIUM SERPL-SCNC: 4.4 MMOL/L (ref 3.5–5.1)
POTASSIUM SERPL-SCNC: 5.4 MMOL/L (ref 3.5–5.1)
RBC # BLD: 2.16 M/CU MM (ref 4.2–5.4)
RBC # BLD: 2.38 M/CU MM (ref 4.2–5.4)
SEGMENTED NEUTROPHILS ABSOLUTE COUNT: 9.6 K/CU MM
SODIUM BLD-SCNC: 149 MMOL/L (ref 135–145)
SODIUM BLD-SCNC: 150 MMOL/L (ref 135–145)
TOTAL IMMATURE NEUTOROPHIL: 0.08 K/CU MM
TOTAL NUCLEATED RBC: 0 K/CU MM
TOTAL PROTEIN: 5.5 GM/DL (ref 6.4–8.2)
TOTAL PROTEIN: 6.1 GM/DL (ref 6.4–8.2)
WBC # BLD: 10.1 K/CU MM (ref 4–10.5)
WBC # BLD: 8.9 K/CU MM (ref 4–10.5)

## 2024-04-18 PROCEDURE — 80053 COMPREHEN METABOLIC PANEL: CPT

## 2024-04-18 PROCEDURE — 86922 COMPATIBILITY TEST ANTIGLOB: CPT

## 2024-04-18 PROCEDURE — 86901 BLOOD TYPING SEROLOGIC RH(D): CPT

## 2024-04-18 PROCEDURE — 36415 COLL VENOUS BLD VENIPUNCTURE: CPT

## 2024-04-18 PROCEDURE — 83605 ASSAY OF LACTIC ACID: CPT

## 2024-04-18 PROCEDURE — 85027 COMPLETE CBC AUTOMATED: CPT

## 2024-04-18 PROCEDURE — 99285 EMERGENCY DEPT VISIT HI MDM: CPT

## 2024-04-18 PROCEDURE — 85025 COMPLETE CBC W/AUTO DIFF WBC: CPT

## 2024-04-18 PROCEDURE — 86850 RBC ANTIBODY SCREEN: CPT

## 2024-04-18 PROCEDURE — 86900 BLOOD TYPING SEROLOGIC ABO: CPT

## 2024-04-18 RX ORDER — SODIUM CHLORIDE 9 MG/ML
INJECTION, SOLUTION INTRAVENOUS PRN
Status: DISCONTINUED | OUTPATIENT
Start: 2024-04-18 | End: 2024-04-21

## 2024-04-19 PROBLEM — D64.9 ACUTE ON CHRONIC ANEMIA: Status: ACTIVE | Noted: 2024-04-19

## 2024-04-19 LAB
ALBUMIN SERPL-MCNC: 3.8 GM/DL (ref 3.4–5)
ALP BLD-CCNC: 142 IU/L (ref 40–128)
ALT SERPL-CCNC: 27 U/L (ref 10–40)
ANION GAP SERPL CALCULATED.3IONS-SCNC: 18 MMOL/L (ref 7–16)
AST SERPL-CCNC: 39 IU/L (ref 15–37)
BASOPHILS ABSOLUTE: 0 K/CU MM
BASOPHILS RELATIVE PERCENT: 0.2 % (ref 0–1)
BILIRUB SERPL-MCNC: 0.3 MG/DL (ref 0–1)
BUN SERPL-MCNC: 103 MG/DL (ref 6–23)
CALCIUM SERPL-MCNC: 8.6 MG/DL (ref 8.3–10.6)
CHLORIDE BLD-SCNC: 116 MMOL/L (ref 99–110)
CO2: 19 MMOL/L (ref 21–32)
CREAT SERPL-MCNC: 3.4 MG/DL (ref 0.6–1.1)
DIFFERENTIAL TYPE: ABNORMAL
EOSINOPHILS ABSOLUTE: 0 K/CU MM
EOSINOPHILS RELATIVE PERCENT: 0 % (ref 0–3)
GFR SERPL CREATININE-BSD FRML MDRD: 14 ML/MIN/1.73M2
GLUCOSE SERPL-MCNC: 152 MG/DL (ref 70–99)
HCT VFR BLD CALC: 22.8 % (ref 37–47)
HCT VFR BLD CALC: 23.8 % (ref 37–47)
HCT VFR BLD CALC: 25.1 % (ref 37–47)
HEMOGLOBIN: 6.6 GM/DL (ref 12.5–16)
HEMOGLOBIN: 7.2 GM/DL (ref 12.5–16)
HEMOGLOBIN: 7.8 GM/DL (ref 12.5–16)
IMMATURE NEUTROPHIL %: 0.7 % (ref 0–0.43)
INR BLD: 1.2 INDEX
LACTATE: 1.5 MMOL/L (ref 0.5–1.9)
LYMPHOCYTES ABSOLUTE: 0.5 K/CU MM
LYMPHOCYTES RELATIVE PERCENT: 4.3 % (ref 24–44)
MCH RBC QN AUTO: 28.9 PG (ref 27–31)
MCHC RBC AUTO-ENTMCNC: 31.1 % (ref 32–36)
MCV RBC AUTO: 93 FL (ref 78–100)
MONOCYTES ABSOLUTE: 0.5 K/CU MM
MONOCYTES RELATIVE PERCENT: 4.4 % (ref 0–4)
NEUTROPHILS RELATIVE PERCENT: 90.4 % (ref 36–66)
NUCLEATED RBC %: 0 %
PDW BLD-RTO: 17.2 % (ref 11.7–14.9)
PLATELET # BLD: 67 K/CU MM (ref 140–440)
PMV BLD AUTO: 13.3 FL (ref 7.5–11.1)
POTASSIUM SERPL-SCNC: 4 MMOL/L (ref 3.5–5.1)
PROTHROMBIN TIME: 15.8 SECONDS (ref 11.7–14.5)
RBC # BLD: 2.7 M/CU MM (ref 4.2–5.4)
SEGMENTED NEUTROPHILS ABSOLUTE COUNT: 9.4 K/CU MM
SODIUM BLD-SCNC: 153 MMOL/L (ref 135–145)
TOTAL IMMATURE NEUTOROPHIL: 0.07 K/CU MM
TOTAL NUCLEATED RBC: 0 K/CU MM
TOTAL PROTEIN: 5.8 GM/DL (ref 6.4–8.2)
TOTAL RETICULOCYTE COUNT: 0.02 K/CU MM
WBC # BLD: 10.4 K/CU MM (ref 4–10.5)

## 2024-04-19 PROCEDURE — 1200000000 HC SEMI PRIVATE

## 2024-04-19 PROCEDURE — 85018 HEMOGLOBIN: CPT

## 2024-04-19 PROCEDURE — 99223 1ST HOSP IP/OBS HIGH 75: CPT | Performed by: INTERNAL MEDICINE

## 2024-04-19 PROCEDURE — 6370000000 HC RX 637 (ALT 250 FOR IP): Performed by: STUDENT IN AN ORGANIZED HEALTH CARE EDUCATION/TRAINING PROGRAM

## 2024-04-19 PROCEDURE — 6360000002 HC RX W HCPCS: Performed by: EMERGENCY MEDICINE

## 2024-04-19 PROCEDURE — C9113 INJ PANTOPRAZOLE SODIUM, VIA: HCPCS | Performed by: STUDENT IN AN ORGANIZED HEALTH CARE EDUCATION/TRAINING PROGRAM

## 2024-04-19 PROCEDURE — 85014 HEMATOCRIT: CPT

## 2024-04-19 PROCEDURE — APPNB180 APP NON BILLABLE TIME > 60 MINS: Performed by: LICENSED PRACTICAL NURSE

## 2024-04-19 PROCEDURE — 85610 PROTHROMBIN TIME: CPT

## 2024-04-19 PROCEDURE — 36415 COLL VENOUS BLD VENIPUNCTURE: CPT

## 2024-04-19 PROCEDURE — 94761 N-INVAS EAR/PLS OXIMETRY MLT: CPT

## 2024-04-19 PROCEDURE — P9016 RBC LEUKOCYTES REDUCED: HCPCS

## 2024-04-19 PROCEDURE — 2580000003 HC RX 258: Performed by: STUDENT IN AN ORGANIZED HEALTH CARE EDUCATION/TRAINING PROGRAM

## 2024-04-19 PROCEDURE — 85025 COMPLETE CBC W/AUTO DIFF WBC: CPT

## 2024-04-19 PROCEDURE — 30233N1 TRANSFUSION OF NONAUTOLOGOUS RED BLOOD CELLS INTO PERIPHERAL VEIN, PERCUTANEOUS APPROACH: ICD-10-PCS | Performed by: INTERNAL MEDICINE

## 2024-04-19 PROCEDURE — 6360000002 HC RX W HCPCS: Performed by: STUDENT IN AN ORGANIZED HEALTH CARE EDUCATION/TRAINING PROGRAM

## 2024-04-19 PROCEDURE — A4216 STERILE WATER/SALINE, 10 ML: HCPCS | Performed by: STUDENT IN AN ORGANIZED HEALTH CARE EDUCATION/TRAINING PROGRAM

## 2024-04-19 PROCEDURE — 80053 COMPREHEN METABOLIC PANEL: CPT

## 2024-04-19 PROCEDURE — 96374 THER/PROPH/DIAG INJ IV PUSH: CPT

## 2024-04-19 RX ORDER — DEXTROSE MONOHYDRATE 50 MG/ML
INJECTION, SOLUTION INTRAVENOUS CONTINUOUS
Status: DISCONTINUED | OUTPATIENT
Start: 2024-04-19 | End: 2024-04-21

## 2024-04-19 RX ORDER — METOPROLOL SUCCINATE 50 MG/1
50 TABLET, EXTENDED RELEASE ORAL DAILY
Status: DISCONTINUED | OUTPATIENT
Start: 2024-04-19 | End: 2024-04-22 | Stop reason: HOSPADM

## 2024-04-19 RX ORDER — SODIUM CHLORIDE 9 MG/ML
INJECTION, SOLUTION INTRAVENOUS PRN
Status: DISCONTINUED | OUTPATIENT
Start: 2024-04-19 | End: 2024-04-22 | Stop reason: HOSPADM

## 2024-04-19 RX ORDER — GABAPENTIN 100 MG/1
100 CAPSULE ORAL 3 TIMES DAILY
Status: DISCONTINUED | OUTPATIENT
Start: 2024-04-19 | End: 2024-04-22 | Stop reason: HOSPADM

## 2024-04-19 RX ORDER — PANTOPRAZOLE SODIUM 40 MG/10ML
80 INJECTION, POWDER, LYOPHILIZED, FOR SOLUTION INTRAVENOUS ONCE
Status: DISCONTINUED | OUTPATIENT
Start: 2024-04-19 | End: 2024-04-19

## 2024-04-19 RX ORDER — SODIUM CHLORIDE 0.9 % (FLUSH) 0.9 %
5-40 SYRINGE (ML) INJECTION EVERY 12 HOURS SCHEDULED
Status: DISCONTINUED | OUTPATIENT
Start: 2024-04-19 | End: 2024-04-22 | Stop reason: HOSPADM

## 2024-04-19 RX ORDER — ROSUVASTATIN CALCIUM 5 MG/1
10 TABLET, COATED ORAL NIGHTLY
Status: DISCONTINUED | OUTPATIENT
Start: 2024-04-19 | End: 2024-04-22 | Stop reason: HOSPADM

## 2024-04-19 RX ORDER — PANTOPRAZOLE SODIUM 40 MG/10ML
40 INJECTION, POWDER, LYOPHILIZED, FOR SOLUTION INTRAVENOUS 2 TIMES DAILY
Status: DISCONTINUED | OUTPATIENT
Start: 2024-04-19 | End: 2024-04-19

## 2024-04-19 RX ORDER — ASPIRIN 81 MG/1
81 TABLET, CHEWABLE ORAL DAILY
Status: DISCONTINUED | OUTPATIENT
Start: 2024-04-19 | End: 2024-04-22 | Stop reason: HOSPADM

## 2024-04-19 RX ORDER — ACETAMINOPHEN 325 MG/1
650 TABLET ORAL EVERY 6 HOURS PRN
Status: DISCONTINUED | OUTPATIENT
Start: 2024-04-19 | End: 2024-04-22 | Stop reason: HOSPADM

## 2024-04-19 RX ORDER — ONDANSETRON 2 MG/ML
4 INJECTION INTRAMUSCULAR; INTRAVENOUS EVERY 6 HOURS PRN
Status: DISCONTINUED | OUTPATIENT
Start: 2024-04-19 | End: 2024-04-22 | Stop reason: HOSPADM

## 2024-04-19 RX ORDER — SODIUM CHLORIDE 9 MG/ML
INJECTION, SOLUTION INTRAVENOUS PRN
Status: DISCONTINUED | OUTPATIENT
Start: 2024-04-19 | End: 2024-04-21

## 2024-04-19 RX ORDER — POLYETHYLENE GLYCOL 3350 17 G/17G
17 POWDER, FOR SOLUTION ORAL DAILY PRN
Status: DISCONTINUED | OUTPATIENT
Start: 2024-04-19 | End: 2024-04-22 | Stop reason: HOSPADM

## 2024-04-19 RX ORDER — ONDANSETRON 4 MG/1
4 TABLET, ORALLY DISINTEGRATING ORAL EVERY 8 HOURS PRN
Status: DISCONTINUED | OUTPATIENT
Start: 2024-04-19 | End: 2024-04-22 | Stop reason: HOSPADM

## 2024-04-19 RX ORDER — SODIUM CHLORIDE 0.9 % (FLUSH) 0.9 %
5-40 SYRINGE (ML) INJECTION PRN
Status: DISCONTINUED | OUTPATIENT
Start: 2024-04-19 | End: 2024-04-22 | Stop reason: HOSPADM

## 2024-04-19 RX ORDER — ONDANSETRON 2 MG/ML
8 INJECTION INTRAMUSCULAR; INTRAVENOUS ONCE
Status: COMPLETED | OUTPATIENT
Start: 2024-04-19 | End: 2024-04-19

## 2024-04-19 RX ADMIN — ONDANSETRON 8 MG: 2 INJECTION INTRAMUSCULAR; INTRAVENOUS at 00:51

## 2024-04-19 RX ADMIN — GABAPENTIN 100 MG: 100 CAPSULE ORAL at 20:33

## 2024-04-19 RX ADMIN — GABAPENTIN 100 MG: 100 CAPSULE ORAL at 11:38

## 2024-04-19 RX ADMIN — PANTOPRAZOLE SODIUM 80 MG: 40 INJECTION, POWDER, FOR SOLUTION INTRAVENOUS at 05:44

## 2024-04-19 RX ADMIN — SODIUM CHLORIDE, PRESERVATIVE FREE 5 ML: 5 INJECTION INTRAVENOUS at 20:36

## 2024-04-19 RX ADMIN — DEXTROSE MONOHYDRATE: 50 INJECTION, SOLUTION INTRAVENOUS at 05:54

## 2024-04-19 RX ADMIN — ROSUVASTATIN CALCIUM 10 MG: 20 TABLET, COATED ORAL at 20:33

## 2024-04-19 ASSESSMENT — PAIN SCALES - GENERAL: PAINLEVEL_OUTOF10: 0

## 2024-04-19 ASSESSMENT — PAIN SCALES - WONG BAKER: WONGBAKER_NUMERICALRESPONSE: NO HURT

## 2024-04-19 NOTE — DISCHARGE INSTR - COC
Continuity of Care Form    Patient Name: Adenike Harvey   :  1955  MRN:  [id number]    Admit date:  2024  Discharge date:  ***    Code Status Order: DNR-CCA   Advance Directives:     Admitting Physician:  Konstantin Butler MD  PCP: Iban Lou MD    Discharging Nurse: ***  Discharging Hospital Unit/Room#: 4108/4108-A  Discharging Unit Phone Number: ***    Emergency Contact:   Extended Emergency Contact Information  Primary Emergency Contact: [place] [address] [facility]  Home Phone: [phone]  Mobile Phone: [phone]  Relation: Spouse  Secondary Emergency Contact: Corie Tidwell  Home Phone: [phone]  Relation: Child    Past Surgical History:  Past Surgical History:   Procedure Laterality Date    BRONCHOSCOPY  2017- done     BUNIONECTOMY Bilateral     \"Done At Different Times\"    CARDIAC PROCEDURE N/A 3/1/2024    Left heart cath / coronary angiography performed by Tera Livingston MD at [facility] CARDIAC CATH LAB    CARDIAC PROCEDURE N/A 3/1/2024    Percutaneous coronary intervention performed by Tera Livingston MD at [facility] CARDIAC CATH LAB    CATHETER REMOVAL Left 2019    PORT REMOVAL performed by Pepe Tripathi MD at [facility] OR    COLONOSCOPY  's    DILATION AND CURETTAGE OF UTERUS      ELBOW SURGERY Left     \"Tendon Repair\"    LUNG SURGERY  2018    per old chart had thoracoscopy and RUL lobectomy , bronchoscopy and lymph node bx     MEDIASTINOSCOPY  2018    Bronchoscopy    TONSILLECTOMY  1978    TUBAL LIGATION  1988    TUNNELED VENOUS PORT PLACEMENT Left 2018    UPPER GASTROINTESTINAL ENDOSCOPY N/A 2024    EGD DILATION BALLOON with 12-15 balloon up to 15 wtih biopsies performed by Rony Radford MD at [facility] ENDOSCOPY       Immunization History:   Immunization History   Administered Date(s) Administered    COVID-19, J&J, (age 18y+), IM, 0.5 mL 2021, 2021    Influenza A ([id number])

## 2024-04-19 NOTE — ED PROVIDER NOTES
Triage Chief Complaint:   Abnormal Lab (Hgb 6.8, potassium 5.4)    [place]:  Adenike Harvey is a 69 y.o. female that presents via EMS from nursing facility for hemoglobin of 6.8, reported melena.  Patient states that she has felt very fatigued.  States that she has seen some blood in her stools.  Denies any abdominal pain, nausea, vomiting, fevers or other acute complaints.  She does have recent admission for cardiogenic shock, anemia requiring transfusion and EGD showing gastritis.  Patient is on Plavix secondary to recent PCI with stent placement.    ROS:  At least 10 systems reviewed and otherwise acutely negative except as in the [place].    Past Medical History:   Diagnosis Date    Anemia     Anxiety     Arthritis     \"Fingers, Back\"    Bronchitis Last Episode 11-17    Chronic back pain     COPD (chronic obstructive pulmonary disease) ([facility])     Sees Dr. Hernandez    Depression     Diverticulosis 06/2013    Extensive per CT    Hemoptysis 12/06/2017    History of external beam radiation therapy 04/2023    RIGHT LUNG 6,000 cGy in 30 fractions    Hx of blood clots     \"found I have a clot near my mediport so they are taking it out 5/16/2019- put me on Xarelto    Hyperlipidemia 2009    Hypertension 2009    Low back pain     \"better than it use to be- had therapy in the past- originally hurt back at work 20+ yrs ago\"    Lung mass     rul- scheduled to bronch 12/74/2017    Nausea & vomiting 01/22/2024    Panic attacks     Pneumonia Dx 1-17    Right Lung Cancer Dx 10-17    tx with chemo following with Dr Una Smith Dx 2014    \"Right Side\"    Shortness of breath on exertion     Teeth missing     Upper And Lower    Urinary tract infection In Past    No Current Symptoms    Wears dentures     Full Upper, Partial Lower    Wears glasses      Past Surgical History:   Procedure Laterality Date    BRONCHOSCOPY  12/07/2017 2/21/2018- done     BUNIONECTOMY Bilateral 1990's    \"Done At Different Times\"    CARDIAC PROCEDURE N/A

## 2024-04-19 NOTE — H&P
0.3   ALKPHOS 133* 138*   AST 32 36   ALT 22 25     Lipids:   Lab Results   Component Value Date/Time    CHOL 137 02/29/2024 05:40 AM    CHOL 274 02/08/2023 08:57 AM    HDL 60 04/11/2024 07:02 AM    TRIG 149 02/29/2024 05:40 AM     Hemoglobin A1C:   Lab Results   Component Value Date/Time    LABA1C 5.2 01/22/2024 06:00 AM     TSH:   Lab Results   Component Value Date/Time    TSH 2.34 02/01/2017 09:51 AM     Troponin:   Lab Results   Component Value Date/Time    TROPONINT <0.010 03/06/2023 06:14 PM     BNP: No results for input(s): \"PROBNP\" in the last 72 hours.  Lactic Acid: No results for input(s): \"LACTA\" in the last 72 hours.  UA:  Lab Results   Component Value Date/Time    NITRU NEGATIVE 04/16/2024 06:00 AM    COLORU YELLOW 04/16/2024 06:00 AM    PHUR 6.0 02/10/2020 10:12 AM    WBCUA 2 04/16/2024 06:00 AM    RBCUA <1 04/16/2024 06:00 AM    MUCUS RARE 03/24/2024 11:50 AM    TRICHOMONAS NONE SEEN 04/16/2024 06:00 AM    YEAST RARE 03/31/2024 09:32 PM    BACTERIA NEGATIVE 04/16/2024 06:00 AM    CLARITYU CLEAR 04/16/2024 06:00 AM    SPECGRAV 1.015 04/16/2024 06:00 AM    LEUKOCYTESUR NEGATIVE 04/16/2024 06:00 AM    UROBILINOGEN 0.2 04/16/2024 06:00 AM    BILIRUBINUR NEGATIVE 04/16/2024 06:00 AM    BILIRUBINUR small 02/10/2020 10:12 AM    BLOODU MODERATE NUMBER OR AMOUNT OBSERVED 04/16/2024 06:00 AM    GLUCOSEU negative 02/10/2020 10:12 AM    KETUA NEGATIVE 04/16/2024 06:00 AM    AMORPHOUS RARE 03/24/2024 11:50 AM     Urine Cultures:   Lab Results   Component Value Date/Time    LABURIN >100,000 CFU/ml 02/10/2020 10:30 AM     Blood Cultures: No results found for: \"BC\"  No results found for: \"BLOODCULT2\"  Organism:   Lab Results   Component Value Date/Time    ORG Escherichia coli 02/10/2020 10:30 AM       Radiology results:  No orders to display       Konstantin Butler MD  04/19/24 1:04 AM

## 2024-04-19 NOTE — ED NOTES
ED TO INPATIENT SBAR HANDOFF    Patient Name: Adenike Harvey   :  1955  69 y.o.   Preferred Name  [facility]   Family/Caregiver Present yes   Restraints no   C-SSRS: Risk of Suicide: No Risk  Sitter no   Sepsis Risk Score Sepsis Risk Score: 3.02      Situation  Chief Complaint   Patient presents with    Abnormal Lab     Hgb 6.8, potassium 5.4     Brief Description of Patient's Condition: pt to ED with abnormal labs (hbg 6.8, potassium 5.4).  Mental Status: alert, coherent, logical, thought processes intact, and able to concentrate and follow conversation  GCS 14  Arrived from: nursing home    Imaging:   No orders to display     Abnormal labs:   Abnormal Labs Reviewed   CBC WITH AUTO DIFFERENTIAL - Abnormal; Notable for the following components:       Result Value    RBC 2.16 (*)     Hemoglobin 6.2 (*)     Hematocrit 20.1 (*)     MCHC 30.8 (*)     RDW 17.6 (*)     Platelets 80 (*)     MPV 13.5 (*)     Neutrophils % 94.3 (*)     Lymphocytes % 2.5 (*)     Immature Neutrophil % 0.8 (*)     All other components within normal limits   COMPREHENSIVE METABOLIC PANEL W/ REFLEX TO MG FOR LOW K - Abnormal; Notable for the following components:    Sodium 150 (*)     Chloride 112 (*)     CO2 17 (*)     Anion Gap 21 (*)     Glucose 122 (*)      (*)     Creatinine 3.4 (*)     Est, Glom Filt Rate 14 (*)     Total Protein 6.1 (*)     Alkaline Phosphatase 138 (*)     All other components within normal limits       Background  History:   Past Medical History:   Diagnosis Date    Anemia     Anxiety     Arthritis     \"Fingers, Back\"    Bronchitis Last Episode     Chronic back pain     COPD (chronic obstructive pulmonary disease) (HCC)     Sees Dr. Hernandez    Depression     Diverticulosis 2013    Extensive per CT    Hemoptysis 2017    History of external beam radiation therapy 2023    RIGHT LUNG 6,000 cGy in 30 fractions    Hx of blood clots     \"found I have a clot near my mediport so they are taking it out

## 2024-04-19 NOTE — CONSENT
Informed Consent for Blood Component Transfusion Note    I have discussed with the patient the rationale for blood component transfusion; its benefits in treating or preventing fatigue, organ damage, or death; and its risk which includes mild transfusion reactions, rare risk of blood borne infection, or more serious but rare reactions. I have discussed the alternatives to transfusion, including the risk and consequences of not receiving transfusion. The patient had an opportunity to ask questions and had agreed to proceed with transfusion of blood components.    Electronically signed by Scooter King MD on 4/19/24 at 12:49 AM EDT

## 2024-04-19 NOTE — ED NOTES
Upon entering to stop pt's blood, bruising and swelling were noted around pt's IV site. IV removed, coban was applied.

## 2024-04-19 NOTE — CARE COORDINATION
04/19/24 1250   Service Assessment   Patient Orientation Other (see comment)  (Pt sleeping)   History Provided By Medical Record   Primary Caregiver Other (Comment)  (From [facility])   Support Systems Spouse/Significant Other;Children   PCP Verified by CM Yes   Last Visit to PCP Within last year   Prior Functional Level Assistance with the following:;Bathing;Dressing;Toileting;Feeding;Mobility   Current Functional Level Assistance with the following:;Bathing;Dressing;Toileting;Feeding;Mobility   Can patient return to prior living arrangement Unknown at present   Ability to make needs known: Fair   Family able to assist with home care needs: No   Financial Resources Medicare   Community Resources ECF/Home Care  ([facility])   Social/Functional History   Lives With Spouse     Reviewed chart, discussed in IDR, pt from [facility], confirmed with Manda and would need PT/OT for prior auth to return.  GI discussed feeding tube with , he is thinking about it. Also Dr Humphries to discuss Goals of Care with family and possible need for hospice.  CM Attempted to call  but had to leave a VM.     1500 Spoke with /family in room , they are wanting pt to return to [facility] skilled, they also want a feeding tube.   PS to Dr Humphries to update him on family's plan and request PT/OT orders. .

## 2024-04-20 ENCOUNTER — APPOINTMENT (OUTPATIENT)
Dept: GENERAL RADIOLOGY | Age: 69
DRG: 377 | End: 2024-04-20
Payer: MEDICARE

## 2024-04-20 ENCOUNTER — APPOINTMENT (OUTPATIENT)
Dept: CT IMAGING | Age: 69
DRG: 377 | End: 2024-04-20
Payer: MEDICARE

## 2024-04-20 ENCOUNTER — ANESTHESIA EVENT (OUTPATIENT)
Dept: ENDOSCOPY | Age: 69
End: 2024-04-20
Payer: MEDICARE

## 2024-04-20 ENCOUNTER — ANESTHESIA (OUTPATIENT)
Dept: ENDOSCOPY | Age: 69
End: 2024-04-20
Payer: MEDICARE

## 2024-04-20 PROBLEM — K92.1 MELENA: Status: ACTIVE | Noted: 2024-04-20

## 2024-04-20 LAB
ALBUMIN SERPL-MCNC: 3.2 GM/DL (ref 3.4–5)
ALP BLD-CCNC: 110 IU/L (ref 40–128)
ALT SERPL-CCNC: 25 U/L (ref 10–40)
ANION GAP SERPL CALCULATED.3IONS-SCNC: 17 MMOL/L (ref 7–16)
ANISOCYTOSIS: ABNORMAL
AST SERPL-CCNC: 40 IU/L (ref 15–37)
BANDED NEUTROPHILS ABSOLUTE COUNT: 0.26 K/CU MM
BANDED NEUTROPHILS RELATIVE PERCENT: 4 % (ref 5–11)
BASE EXCESS: 10 (ref 0–3)
BASOPHILS ABSOLUTE: 0 K/CU MM
BASOPHILS RELATIVE PERCENT: 0 % (ref 0–1)
BILIRUB SERPL-MCNC: 0.3 MG/DL (ref 0–1)
BUN SERPL-MCNC: 85 MG/DL (ref 6–23)
CALCIUM SERPL-MCNC: 7.8 MG/DL (ref 8.3–10.6)
CHLORIDE BLD-SCNC: 110 MMOL/L (ref 99–110)
CO2: 16 MMOL/L (ref 21–32)
COMMENT: ABNORMAL
CREAT SERPL-MCNC: 2.9 MG/DL (ref 0.6–1.1)
DIFFERENTIAL TYPE: ABNORMAL
DIFFERENTIAL TYPE: ABNORMAL
EOSINOPHILS ABSOLUTE: 0.1 K/CU MM
EOSINOPHILS RELATIVE PERCENT: 1.1 % (ref 0–3)
GFR SERPL CREATININE-BSD FRML MDRD: 17 ML/MIN/1.73M2
GLUCOSE BLD-MCNC: 146 MG/DL (ref 70–99)
GLUCOSE SERPL-MCNC: 92 MG/DL (ref 70–99)
HCO3 VENOUS: 18.4 MMOL/L (ref 22–29)
HCT VFR BLD CALC: 21.5 % (ref 37–47)
HCT VFR BLD CALC: 29 % (ref 37–47)
HCT VFR BLD CALC: 31.3 % (ref 37–47)
HEMOGLOBIN: 10.1 GM/DL (ref 12.5–16)
HEMOGLOBIN: 6.7 GM/DL (ref 12.5–16)
HEMOGLOBIN: 9.2 GM/DL (ref 12.5–16)
HYPOCHROMIA: ABNORMAL
IMMATURE NEUTROPHIL %: 0.6 % (ref 0–0.43)
LACTATE: 2.6 MMOL/L (ref 0.5–1.9)
LYMPHOCYTES ABSOLUTE: 0.5 K/CU MM
LYMPHOCYTES ABSOLUTE: 0.6 K/CU MM
LYMPHOCYTES RELATIVE PERCENT: 7 % (ref 24–44)
LYMPHOCYTES RELATIVE PERCENT: 8.4 % (ref 24–44)
MCH RBC QN AUTO: 27.7 PG (ref 27–31)
MCH RBC QN AUTO: 27.7 PG (ref 27–31)
MCH RBC QN AUTO: 29.1 PG (ref 27–31)
MCHC RBC AUTO-ENTMCNC: 31.2 % (ref 32–36)
MCHC RBC AUTO-ENTMCNC: 31.7 % (ref 32–36)
MCHC RBC AUTO-ENTMCNC: 32.3 % (ref 32–36)
MCV RBC AUTO: 85.8 FL (ref 78–100)
MCV RBC AUTO: 87.3 FL (ref 78–100)
MCV RBC AUTO: 93.5 FL (ref 78–100)
MONOCYTES ABSOLUTE: 0.2 K/CU MM
MONOCYTES ABSOLUTE: 0.3 K/CU MM
MONOCYTES RELATIVE PERCENT: 3.2 % (ref 0–4)
MONOCYTES RELATIVE PERCENT: 4 % (ref 0–4)
NEUTROPHILS RELATIVE PERCENT: 85 % (ref 36–66)
NEUTROPHILS RELATIVE PERCENT: 86.7 % (ref 36–66)
NUCLEATED RBC %: 0 %
O2 SAT, VEN: 84.7 % (ref 50–70)
PCO2, VEN: 47 MMHG (ref 41–51)
PDW BLD-RTO: 16.9 % (ref 11.7–14.9)
PDW BLD-RTO: 17.6 % (ref 11.7–14.9)
PDW BLD-RTO: 17.9 % (ref 11.7–14.9)
PH VENOUS: 7.2 (ref 7.32–7.43)
PLATELET # BLD: 101 K/CU MM (ref 140–440)
PLATELET # BLD: 53 K/CU MM (ref 140–440)
PLATELET # BLD: 95 K/CU MM (ref 140–440)
PMV BLD AUTO: 11.3 FL (ref 7.5–11.1)
PMV BLD AUTO: 11.4 FL (ref 7.5–11.1)
PMV BLD AUTO: 13 FL (ref 7.5–11.1)
PO2, VEN: 53 MMHG (ref 28–48)
POTASSIUM SERPL-SCNC: 3.6 MMOL/L (ref 3.5–5.1)
RBC # BLD: 2.3 M/CU MM (ref 4.2–5.4)
RBC # BLD: 3.32 M/CU MM (ref 4.2–5.4)
RBC # BLD: 3.65 M/CU MM (ref 4.2–5.4)
SEGMENTED NEUTROPHILS ABSOLUTE COUNT: 5.5 K/CU MM
SEGMENTED NEUTROPHILS ABSOLUTE COUNT: 6.2 K/CU MM
SODIUM BLD-SCNC: 143 MMOL/L (ref 135–145)
T4 FREE SERPL-MCNC: 0.93 NG/DL (ref 0.9–1.8)
TOTAL IMMATURE NEUTOROPHIL: 0.04 K/CU MM
TOTAL NUCLEATED RBC: 0 K/CU MM
TOTAL PROTEIN: 4.9 GM/DL (ref 6.4–8.2)
TROPONIN, HIGH SENSITIVITY: 238 NG/L (ref 0–14)
TSH SERPL DL<=0.005 MIU/L-ACNC: 10.81 UIU/ML (ref 0.27–4.2)
WBC # BLD: 6.6 K/CU MM (ref 4–10.5)
WBC # BLD: 7.1 K/CU MM (ref 4–10.5)
WBC # BLD: 8.3 K/CU MM (ref 4–10.5)

## 2024-04-20 PROCEDURE — 76937 US GUIDE VASCULAR ACCESS: CPT

## 2024-04-20 PROCEDURE — 82962 GLUCOSE BLOOD TEST: CPT

## 2024-04-20 PROCEDURE — P9034 PLATELETS, PHERESIS: HCPCS

## 2024-04-20 PROCEDURE — 84439 ASSAY OF FREE THYROXINE: CPT

## 2024-04-20 PROCEDURE — 2580000003 HC RX 258: Performed by: STUDENT IN AN ORGANIZED HEALTH CARE EDUCATION/TRAINING PROGRAM

## 2024-04-20 PROCEDURE — 83735 ASSAY OF MAGNESIUM: CPT

## 2024-04-20 PROCEDURE — 43235 EGD DIAGNOSTIC BRUSH WASH: CPT | Performed by: INTERNAL MEDICINE

## 2024-04-20 PROCEDURE — 2700000000 HC OXYGEN THERAPY PER DAY

## 2024-04-20 PROCEDURE — 71045 X-RAY EXAM CHEST 1 VIEW: CPT

## 2024-04-20 PROCEDURE — 3700000001 HC ADD 15 MINUTES (ANESTHESIA): Performed by: INTERNAL MEDICINE

## 2024-04-20 PROCEDURE — 51798 US URINE CAPACITY MEASURE: CPT

## 2024-04-20 PROCEDURE — 99233 SBSQ HOSP IP/OBS HIGH 50: CPT | Performed by: INTERNAL MEDICINE

## 2024-04-20 PROCEDURE — 2709999900 HC NON-CHARGEABLE SUPPLY

## 2024-04-20 PROCEDURE — A4216 STERILE WATER/SALINE, 10 ML: HCPCS | Performed by: STUDENT IN AN ORGANIZED HEALTH CARE EDUCATION/TRAINING PROGRAM

## 2024-04-20 PROCEDURE — 3700000000 HC ANESTHESIA ATTENDED CARE: Performed by: INTERNAL MEDICINE

## 2024-04-20 PROCEDURE — 2500000003 HC RX 250 WO HCPCS: Performed by: INTERNAL MEDICINE

## 2024-04-20 PROCEDURE — 80053 COMPREHEN METABOLIC PANEL: CPT

## 2024-04-20 PROCEDURE — 36410 VNPNXR 3YR/> PHY/QHP DX/THER: CPT

## 2024-04-20 PROCEDURE — 85007 BL SMEAR W/DIFF WBC COUNT: CPT

## 2024-04-20 PROCEDURE — 83605 ASSAY OF LACTIC ACID: CPT

## 2024-04-20 PROCEDURE — 6360000002 HC RX W HCPCS: Performed by: STUDENT IN AN ORGANIZED HEALTH CARE EDUCATION/TRAINING PROGRAM

## 2024-04-20 PROCEDURE — 85018 HEMOGLOBIN: CPT

## 2024-04-20 PROCEDURE — 80048 BASIC METABOLIC PNL TOTAL CA: CPT

## 2024-04-20 PROCEDURE — 36415 COLL VENOUS BLD VENIPUNCTURE: CPT

## 2024-04-20 PROCEDURE — 6360000002 HC RX W HCPCS: Performed by: NURSE ANESTHETIST, CERTIFIED REGISTERED

## 2024-04-20 PROCEDURE — 2000000000 HC ICU R&B

## 2024-04-20 PROCEDURE — 94761 N-INVAS EAR/PLS OXIMETRY MLT: CPT

## 2024-04-20 PROCEDURE — 85014 HEMATOCRIT: CPT

## 2024-04-20 PROCEDURE — 6360000004 HC RX CONTRAST MEDICATION: Performed by: INTERNAL MEDICINE

## 2024-04-20 PROCEDURE — 82805 BLOOD GASES W/O2 SATURATION: CPT

## 2024-04-20 PROCEDURE — 51701 INSERT BLADDER CATHETER: CPT

## 2024-04-20 PROCEDURE — 2500000003 HC RX 250 WO HCPCS: Performed by: STUDENT IN AN ORGANIZED HEALTH CARE EDUCATION/TRAINING PROGRAM

## 2024-04-20 PROCEDURE — 87040 BLOOD CULTURE FOR BACTERIA: CPT

## 2024-04-20 PROCEDURE — 2709999900 HC NON-CHARGEABLE SUPPLY: Performed by: INTERNAL MEDICINE

## 2024-04-20 PROCEDURE — 51702 INSERT TEMP BLADDER CATH: CPT

## 2024-04-20 PROCEDURE — 0DJ08ZZ INSPECTION OF UPPER INTESTINAL TRACT, VIA NATURAL OR ARTIFICIAL OPENING ENDOSCOPIC: ICD-10-PCS | Performed by: INTERNAL MEDICINE

## 2024-04-20 PROCEDURE — 82533 TOTAL CORTISOL: CPT

## 2024-04-20 PROCEDURE — C9113 INJ PANTOPRAZOLE SODIUM, VIA: HCPCS | Performed by: STUDENT IN AN ORGANIZED HEALTH CARE EDUCATION/TRAINING PROGRAM

## 2024-04-20 PROCEDURE — 81003 URINALYSIS AUTO W/O SCOPE: CPT

## 2024-04-20 PROCEDURE — 3609017100 HC EGD: Performed by: INTERNAL MEDICINE

## 2024-04-20 PROCEDURE — 2580000003 HC RX 258: Performed by: INTERNAL MEDICINE

## 2024-04-20 PROCEDURE — 36430 TRANSFUSION BLD/BLD COMPNT: CPT

## 2024-04-20 PROCEDURE — 05H933Z INSERTION OF INFUSION DEVICE INTO RIGHT BRACHIAL VEIN, PERCUTANEOUS APPROACH: ICD-10-PCS | Performed by: INTERNAL MEDICINE

## 2024-04-20 PROCEDURE — 85025 COMPLETE CBC W/AUTO DIFF WBC: CPT

## 2024-04-20 PROCEDURE — 84484 ASSAY OF TROPONIN QUANT: CPT

## 2024-04-20 PROCEDURE — 30233R1 TRANSFUSION OF NONAUTOLOGOUS PLATELETS INTO PERIPHERAL VEIN, PERCUTANEOUS APPROACH: ICD-10-PCS | Performed by: INTERNAL MEDICINE

## 2024-04-20 PROCEDURE — 84443 ASSAY THYROID STIM HORMONE: CPT

## 2024-04-20 PROCEDURE — 74174 CTA ABD&PLVS W/CONTRAST: CPT

## 2024-04-20 PROCEDURE — P9016 RBC LEUKOCYTES REDUCED: HCPCS

## 2024-04-20 PROCEDURE — 2500000003 HC RX 250 WO HCPCS: Performed by: NURSE ANESTHETIST, CERTIFIED REGISTERED

## 2024-04-20 PROCEDURE — 85027 COMPLETE CBC AUTOMATED: CPT

## 2024-04-20 PROCEDURE — 2580000003 HC RX 258: Performed by: NURSE ANESTHETIST, CERTIFIED REGISTERED

## 2024-04-20 RX ORDER — SODIUM CHLORIDE 9 MG/ML
INJECTION, SOLUTION INTRAVENOUS PRN
Status: DISCONTINUED | OUTPATIENT
Start: 2024-04-20 | End: 2024-04-22 | Stop reason: HOSPADM

## 2024-04-20 RX ORDER — POLYETHYLENE GLYCOL 3350 17 G/17G
17 POWDER, FOR SOLUTION ORAL DAILY PRN
COMMUNITY

## 2024-04-20 RX ORDER — ONDANSETRON 4 MG/1
4 TABLET, FILM COATED ORAL EVERY 6 HOURS PRN
COMMUNITY

## 2024-04-20 RX ORDER — IPRATROPIUM BROMIDE AND ALBUTEROL SULFATE 2.5; .5 MG/3ML; MG/3ML
1 SOLUTION RESPIRATORY (INHALATION) 4 TIMES DAILY
COMMUNITY

## 2024-04-20 RX ORDER — LIDOCAINE HYDROCHLORIDE 20 MG/ML
INJECTION, SOLUTION EPIDURAL; INFILTRATION; INTRACAUDAL; PERINEURAL PRN
Status: DISCONTINUED | OUTPATIENT
Start: 2024-04-20 | End: 2024-04-20 | Stop reason: SDUPTHER

## 2024-04-20 RX ORDER — SODIUM CHLORIDE, SODIUM LACTATE, POTASSIUM CHLORIDE, AND CALCIUM CHLORIDE .6; .31; .03; .02 G/100ML; G/100ML; G/100ML; G/100ML
500 INJECTION, SOLUTION INTRAVENOUS ONCE
Status: COMPLETED | OUTPATIENT
Start: 2024-04-20 | End: 2024-04-20

## 2024-04-20 RX ORDER — 0.9 % SODIUM CHLORIDE 0.9 %
1000 INTRAVENOUS SOLUTION INTRAVENOUS ONCE
Status: COMPLETED | OUTPATIENT
Start: 2024-04-20 | End: 2024-04-20

## 2024-04-20 RX ORDER — NOREPINEPHRINE BITARTRATE 0.06 MG/ML
1-100 INJECTION, SOLUTION INTRAVENOUS CONTINUOUS
Status: DISCONTINUED | OUTPATIENT
Start: 2024-04-20 | End: 2024-04-22 | Stop reason: HOSPADM

## 2024-04-20 RX ORDER — SODIUM CHLORIDE 9 MG/ML
INJECTION, SOLUTION INTRAVENOUS CONTINUOUS PRN
Status: DISCONTINUED | OUTPATIENT
Start: 2024-04-20 | End: 2024-04-20 | Stop reason: SDUPTHER

## 2024-04-20 RX ORDER — MIRTAZAPINE 15 MG/1
15 TABLET, FILM COATED ORAL NIGHTLY
COMMUNITY

## 2024-04-20 RX ORDER — SODIUM CHLORIDE 9 MG/ML
INJECTION, SOLUTION INTRAVENOUS PRN
Status: DISCONTINUED | OUTPATIENT
Start: 2024-04-20 | End: 2024-04-21

## 2024-04-20 RX ORDER — PROPOFOL 10 MG/ML
INJECTION, EMULSION INTRAVENOUS PRN
Status: DISCONTINUED | OUTPATIENT
Start: 2024-04-20 | End: 2024-04-20 | Stop reason: SDUPTHER

## 2024-04-20 RX ORDER — ALBUTEROL SULFATE 2.5 MG/3ML
2.5 SOLUTION RESPIRATORY (INHALATION) EVERY 4 HOURS PRN
COMMUNITY

## 2024-04-20 RX ORDER — PROPOFOL 10 MG/ML
INJECTION, EMULSION INTRAVENOUS PRN
Status: DISCONTINUED | OUTPATIENT
Start: 2024-04-20 | End: 2024-04-20

## 2024-04-20 RX ORDER — ACETAMINOPHEN 325 MG/1
650 TABLET ORAL EVERY 6 HOURS PRN
COMMUNITY

## 2024-04-20 RX ORDER — FLUTICASONE PROPIONATE AND SALMETEROL 100; 50 UG/1; UG/1
1 POWDER RESPIRATORY (INHALATION) 2 TIMES DAILY
COMMUNITY

## 2024-04-20 RX ORDER — ALBUTEROL SULFATE 2.5 MG/3ML
2.5 SOLUTION RESPIRATORY (INHALATION) EVERY 6 HOURS PRN
Status: DISCONTINUED | OUTPATIENT
Start: 2024-04-20 | End: 2024-04-22 | Stop reason: HOSPADM

## 2024-04-20 RX ADMIN — PHENYLEPHRINE HYDROCHLORIDE 100 MCG: 10 INJECTION INTRAVENOUS at 09:49

## 2024-04-20 RX ADMIN — IOPAMIDOL 75 ML: 755 INJECTION, SOLUTION INTRAVENOUS at 12:06

## 2024-04-20 RX ADMIN — PHENYLEPHRINE HYDROCHLORIDE 100 MCG: 10 INJECTION INTRAVENOUS at 09:52

## 2024-04-20 RX ADMIN — NOREPINEPHRINE BITARTRATE 5 MCG/MIN: 0.06 INJECTION, SOLUTION INTRAVENOUS at 10:19

## 2024-04-20 RX ADMIN — SODIUM CHLORIDE 1000 ML: 9 INJECTION, SOLUTION INTRAVENOUS at 20:56

## 2024-04-20 RX ADMIN — LIDOCAINE HYDROCHLORIDE 50 MG: 20 INJECTION, SOLUTION EPIDURAL; INFILTRATION; INTRACAUDAL; PERINEURAL at 09:50

## 2024-04-20 RX ADMIN — PANTOPRAZOLE SODIUM 40 MG: 40 INJECTION, POWDER, FOR SOLUTION INTRAVENOUS at 01:25

## 2024-04-20 RX ADMIN — SODIUM CHLORIDE, POTASSIUM CHLORIDE, SODIUM LACTATE AND CALCIUM CHLORIDE 500 ML: 600; 310; 30; 20 INJECTION, SOLUTION INTRAVENOUS at 01:59

## 2024-04-20 RX ADMIN — SODIUM CHLORIDE, PRESERVATIVE FREE 10 ML: 5 INJECTION INTRAVENOUS at 08:37

## 2024-04-20 RX ADMIN — PHENYLEPHRINE HYDROCHLORIDE 100 MCG: 10 INJECTION INTRAVENOUS at 09:48

## 2024-04-20 RX ADMIN — PHENYLEPHRINE HYDROCHLORIDE 100 MCG: 10 INJECTION INTRAVENOUS at 09:59

## 2024-04-20 RX ADMIN — ONDANSETRON 4 MG: 2 INJECTION INTRAMUSCULAR; INTRAVENOUS at 08:37

## 2024-04-20 RX ADMIN — SODIUM CHLORIDE 1000 ML: 9 INJECTION, SOLUTION INTRAVENOUS at 04:38

## 2024-04-20 RX ADMIN — SODIUM CHLORIDE: 9 INJECTION, SOLUTION INTRAVENOUS at 09:38

## 2024-04-20 RX ADMIN — PANTOPRAZOLE SODIUM 40 MG: 40 INJECTION, POWDER, FOR SOLUTION INTRAVENOUS at 08:36

## 2024-04-20 RX ADMIN — PROPOFOL 20 MG: 10 INJECTION, EMULSION INTRAVENOUS at 09:50

## 2024-04-20 RX ADMIN — SODIUM BICARBONATE: 84 INJECTION, SOLUTION INTRAVENOUS at 22:02

## 2024-04-20 ASSESSMENT — ENCOUNTER SYMPTOMS: SHORTNESS OF BREATH: 1

## 2024-04-20 NOTE — FLOWSHEET NOTE
Call to CT to notify this RN can come down with patient anytime, they are putting patient on transport list to transport to CT

## 2024-04-20 NOTE — SIGNIFICANT EVENT
Informed by nursing staff of hypothermia and hypotension. Patient admitted for UGIB with drop in Hg to 6.6 following 1u pRBC. Second transfusion completed with improvement in temp however patient remained hypotensive. Small IVF bolus of 500cc ordered with patient becoming hypoxic requiring 2L NC. On bedside evaluation, patient remains hypotensive with MAPs in low 60s and melena in diaper. Concern at this time is hemorrhagic shock given melena and hypotension. Unable to give more IVF due to hypoxia following pRBC and small IVF bolus. Discussed case with CC who accepted patient for transfer to ICU.      Konstantin Butler MD  Night Physician

## 2024-04-20 NOTE — ANESTHESIA PRE PROCEDURE
Department of Anesthesiology  Preprocedure Note       Name:  Adenike Harvey   Age:  69 y.o.  :  1955                                          MRN:  [id number]         Date:  2024      Surgeon: Surgeon(s):  Rony Radford MD    Procedure: Procedure(s):  ESOPHAGOGASTRODUODENOSCOPY DIAGNOSTIC ONLY    Medications prior to admission:   Prior to Admission medications    Medication Sig Start Date End Date Taking? Authorizing Provider   predniSONE (DELTASONE) 20 MG tablet Take 3 tablets by mouth daily for 5 days, THEN 2 tablets daily for 5 days, THEN 1 tablet daily for 5 days, THEN 0.5 tablets daily for 5 days. 24  Lauren Ga MD   sulfamethoxazole-trimethoprim (BACTRIM;SEPTRA) 400-80 MG per tablet Take 1 tablet by mouth every 48 hours for 21 days 24  Lauren Ga MD   aspirin 81 MG chewable tablet Take 1 tablet by mouth daily 3/3/24   Stevie Humphries MD   clopidogrel (PLAVIX) 75 MG tablet Take 1 tablet by mouth daily 3/3/24   Stevie Humphries MD   metoprolol succinate (TOPROL XL) 50 MG extended release tablet Take 1 tablet by mouth daily 3/3/24   Stevie Humphries MD   rosuvastatin (CRESTOR) 40 MG tablet Take 1 tablet by mouth nightly 3/2/24   Stevie Humphries MD   pantoprazole (PROTONIX) 40 MG tablet Take 1 tablet by mouth 2 times daily (before meals) 24   Iban Lou MD   gabapentin (NEURONTIN) 100 MG capsule Take 1 capsule by mouth 3 times daily for 90 days. 1/11/24 4/10/24  Iban Lou MD       Current medications:    Current Facility-Administered Medications   Medication Dose Route Frequency Provider Last Rate Last Admin    0.9 % sodium chloride infusion   IntraVENous PRN Maty Desir MD        0.9 % sodium chloride infusion   IntraVENous PRN Maty Desir MD        0.9 % sodium chloride infusion   IntraVENous PRN Maty Desir MD        albuterol (PROVENTIL) (2.5 MG/3ML) 0.083% nebulizer

## 2024-04-20 NOTE — ANESTHESIA POSTPROCEDURE EVALUATION
Department of Anesthesiology  Postprocedure Note    Patient: Adenike Harvey  MRN: [id number]  YOB: 1955  Date of evaluation: 4/20/2024    Procedure Summary       Date: 04/20/24 Room / Location: [address]    Anesthesia Start: 0938 Anesthesia Stop: 1004    Procedure: ESOPHAGOGASTRODUODENOSCOPY DIAGNOSTIC ONLY Diagnosis:       Melena      (Melena [K92.1])    Surgeons: Rony Radford MD Responsible Provider: Leonard Cancino MD    Anesthesia Type: MAC ASA Status: 4 - Emergent            Anesthesia Type: No value filed.    Papo Phase I:      Papo Phase II:      Anesthesia Post Evaluation    Patient location during evaluation: bedside  Patient participation: complete - patient participated  Level of consciousness: awake and alert  Pain score: 0  Airway patency: patent  Nausea & Vomiting: no nausea and no vomiting  Cardiovascular status: hemodynamically stable  Respiratory status: acceptable, spontaneous ventilation, nonlabored ventilation and nasal cannula  Hydration status: euvolemic  Pain management: adequate        No notable events documented.

## 2024-04-20 NOTE — BRIEF OP NOTE
Brief Postoperative Note      Patient: Adenike Harvey  YOB: 1955  MRN: [id number]    Date of Procedure: 4/20/2024    Pre-Op Diagnosis Codes:     * Melena [K92.1]    Post-Op Diagnosis:  No bleeding noted.        Procedure(s):  ESOPHAGOGASTRODUODENOSCOPY DIAGNOSTIC ONLY    Surgeon(s):  Rony Radford MD    Assistant:  * No surgical staff found *    Anesthesia: Monitor Anesthesia Care    Estimated Blood Loss (mL): None     Complications: None    Specimens:   * No specimens in log *    Implants:  * No implants in log *      Drains:   [REMOVED] Urinary Catheter 03/22/24 Vieira (Removed)   Catheter Indications Need for fluid volume management of the critically ill patient in a critical care setting 03/25/24 0830   Site Assessment Pink 03/25/24 1047   Urine Color Yellow 03/25/24 1047   Urine Appearance Mucous 03/25/24 1047   Urine Odor Malodorous 03/25/24 1047   Collection Container Standard 03/25/24 1047   Securement Method Securing device (Describe) 03/25/24 1047   Catheter Care  Perineal wipes 03/25/24 1047   Catheter Best Practices  Drainage tube clipped to bed;Tamper seal intact;Catheter secured to thigh;Bag below bladder;Bag not on floor;Lack of dependent loop in tubing;Drainage bag less than half full 03/25/24 1047   Status Draining;Patent 03/25/24 1047   Output (mL) 175 mL 03/25/24 1047       [REMOVED] Urinary Catheter 03/31/24 Vieira (Removed)   Catheter Indications Urinary retention (acute or chronic), continuous bladder irrigation or bladder outlet obstruction 04/03/24 0755   Site Assessment No urethral drainage 04/03/24 0755   Urine Color Yellow 04/03/24 0755   Urine Appearance Clear 04/03/24 0755   Urine Odor Other (Comment) 04/03/24 0755   Collection Container Standard 04/03/24 0755   Securement Method Securing device (Describe) 04/03/24 0755   Catheter Care  Other (comment) 04/03/24 0755   Output (mL) 450 mL 04/03/24 1430       [REMOVED] External Urinary Catheter (Removed)   Site Assessment

## 2024-04-21 ENCOUNTER — APPOINTMENT (OUTPATIENT)
Dept: GENERAL RADIOLOGY | Age: 69
DRG: 377 | End: 2024-04-21
Payer: MEDICARE

## 2024-04-21 LAB
ABO/RH: NORMAL
ALBUMIN SERPL-MCNC: 2.9 GM/DL (ref 3.4–5)
ALP BLD-CCNC: 125 IU/L (ref 40–128)
ALT SERPL-CCNC: 44 U/L (ref 10–40)
AMMONIA: 17 UMOL/L (ref 11–51)
ANION GAP SERPL CALCULATED.3IONS-SCNC: 14 MMOL/L (ref 7–16)
ANION GAP SERPL CALCULATED.3IONS-SCNC: 17 MMOL/L (ref 7–16)
ANION GAP SERPL CALCULATED.3IONS-SCNC: 20 MMOL/L (ref 7–16)
ANTIBODY SCREEN: NEGATIVE
AST SERPL-CCNC: 61 IU/L (ref 15–37)
BACTERIA: ABNORMAL /HPF
BANDED NEUTROPHILS ABSOLUTE COUNT: 0.32 K/CU MM
BANDED NEUTROPHILS ABSOLUTE COUNT: 0.38 K/CU MM
BANDED NEUTROPHILS RELATIVE PERCENT: 4 % (ref 5–11)
BANDED NEUTROPHILS RELATIVE PERCENT: 5 % (ref 5–11)
BASE EXCESS: 7 (ref 0–3)
BASOPHILS ABSOLUTE: 0 K/CU MM
BASOPHILS ABSOLUTE: 0 K/CU MM
BASOPHILS RELATIVE PERCENT: 0.2 % (ref 0–1)
BASOPHILS RELATIVE PERCENT: 0.2 % (ref 0–1)
BILIRUB SERPL-MCNC: 0.4 MG/DL (ref 0–1)
BILIRUBIN URINE: NEGATIVE MG/DL
BLOOD, URINE: ABNORMAL
BUN SERPL-MCNC: 60 MG/DL (ref 6–23)
BUN SERPL-MCNC: 61 MG/DL (ref 6–23)
BUN SERPL-MCNC: 68 MG/DL (ref 6–23)
CALCIUM IONIZED: 4.8 MG/DL (ref 4.48–5.28)
CALCIUM SERPL-MCNC: 7.8 MG/DL (ref 8.3–10.6)
CALCIUM SERPL-MCNC: 7.8 MG/DL (ref 8.3–10.6)
CALCIUM SERPL-MCNC: 7.9 MG/DL (ref 8.3–10.6)
CHLORIDE BLD-SCNC: 112 MMOL/L (ref 99–110)
CHLORIDE BLD-SCNC: 113 MMOL/L (ref 99–110)
CHLORIDE BLD-SCNC: 117 MMOL/L (ref 99–110)
CLARITY: CLEAR
CO2: 16 MMOL/L (ref 21–32)
CO2: 17 MMOL/L (ref 21–32)
CO2: 19 MMOL/L (ref 21–32)
COLOR: YELLOW
COMMENT: ABNORMAL
COMPONENT: NORMAL
CREAT SERPL-MCNC: 2.3 MG/DL (ref 0.6–1.1)
CREAT SERPL-MCNC: 2.4 MG/DL (ref 0.6–1.1)
CREAT SERPL-MCNC: 2.5 MG/DL (ref 0.6–1.1)
CROSSMATCH RESULT: NORMAL
CRP SERPL HS-MCNC: 260.3 MG/L
DIFFERENTIAL TYPE: ABNORMAL
EKG ATRIAL RATE: 70 BPM
EKG DIAGNOSIS: NORMAL
EKG Q-T INTERVAL: 368 MS
EKG QRS DURATION: 92 MS
EKG QTC CALCULATION (BAZETT): 567 MS
EKG R AXIS: -51 DEGREES
EKG T AXIS: 78 DEGREES
EKG VENTRICULAR RATE: 143 BPM
EOSINOPHILS ABSOLUTE: 0.1 K/CU MM
EOSINOPHILS ABSOLUTE: 0.2 K/CU MM
EOSINOPHILS ABSOLUTE: 0.2 K/CU MM
EOSINOPHILS ABSOLUTE: 0.3 K/CU MM
EOSINOPHILS RELATIVE PERCENT: 1 % (ref 0–3)
EOSINOPHILS RELATIVE PERCENT: 2 % (ref 0–3)
EOSINOPHILS RELATIVE PERCENT: 2.3 % (ref 0–3)
EOSINOPHILS RELATIVE PERCENT: 4 % (ref 0–3)
ERYTHROCYTE SEDIMENTATION RATE: 11 MM/HR (ref 0–30)
GFR SERPL CREATININE-BSD FRML MDRD: 21 ML/MIN/1.73M2
GFR SERPL CREATININE-BSD FRML MDRD: 22 ML/MIN/1.73M2
GFR SERPL CREATININE-BSD FRML MDRD: 23 ML/MIN/1.73M2
GLUCOSE BLD-MCNC: 68 MG/DL (ref 70–99)
GLUCOSE BLD-MCNC: 92 MG/DL (ref 70–99)
GLUCOSE SERPL-MCNC: 107 MG/DL (ref 70–99)
GLUCOSE SERPL-MCNC: 117 MG/DL (ref 70–99)
GLUCOSE SERPL-MCNC: 62 MG/DL (ref 70–99)
GLUCOSE, URINE: NEGATIVE MG/DL
HCO3 VENOUS: 18.3 MMOL/L (ref 22–29)
HCT VFR BLD CALC: 27.8 % (ref 37–47)
HCT VFR BLD CALC: 28.7 % (ref 37–47)
HCT VFR BLD CALC: 29.6 % (ref 37–47)
HCT VFR BLD CALC: 29.8 % (ref 37–47)
HCT VFR BLD CALC: 30.2 % (ref 37–47)
HEMOGLOBIN: 9.2 GM/DL (ref 12.5–16)
HEMOGLOBIN: 9.4 GM/DL (ref 12.5–16)
HEMOGLOBIN: 9.7 GM/DL (ref 12.5–16)
HEMOGLOBIN: 9.8 GM/DL (ref 12.5–16)
HEMOGLOBIN: 9.8 GM/DL (ref 12.5–16)
HYALINE CASTS: 10 /LPF
IMMATURE NEUTROPHIL %: 0.6 % (ref 0–0.43)
IMMATURE NEUTROPHIL %: 0.7 % (ref 0–0.43)
IONIZED CA: 1.2 MMOL/L (ref 1.12–1.32)
KETONES, URINE: NEGATIVE MG/DL
LACTATE: 6.1 MMOL/L (ref 0.5–1.9)
LACTIC ACID, SEPSIS: 1.5 MMOL/L (ref 0.4–2)
LACTIC ACID, SEPSIS: 1.6 MMOL/L (ref 0.4–2)
LACTIC ACID, SEPSIS: 2.7 MMOL/L (ref 0.4–2)
LACTIC ACID, SEPSIS: 2.7 MMOL/L (ref 0.4–2)
LEUKOCYTE ESTERASE, URINE: ABNORMAL
LYMPHOCYTES ABSOLUTE: 0.5 K/CU MM
LYMPHOCYTES ABSOLUTE: 0.7 K/CU MM
LYMPHOCYTES ABSOLUTE: 0.8 K/CU MM
LYMPHOCYTES ABSOLUTE: 0.9 K/CU MM
LYMPHOCYTES RELATIVE PERCENT: 10 % (ref 24–44)
LYMPHOCYTES RELATIVE PERCENT: 7 % (ref 24–44)
LYMPHOCYTES RELATIVE PERCENT: 7.6 % (ref 24–44)
LYMPHOCYTES RELATIVE PERCENT: 9.1 % (ref 24–44)
MAGNESIUM: 1.2 MG/DL (ref 1.8–2.4)
MAGNESIUM: 1.3 MG/DL (ref 1.8–2.4)
MAGNESIUM: 1.3 MG/DL (ref 1.8–2.4)
MCH RBC QN AUTO: 27.8 PG (ref 27–31)
MCH RBC QN AUTO: 28 PG (ref 27–31)
MCH RBC QN AUTO: 28.1 PG (ref 27–31)
MCH RBC QN AUTO: 28.3 PG (ref 27–31)
MCHC RBC AUTO-ENTMCNC: 32.5 % (ref 32–36)
MCHC RBC AUTO-ENTMCNC: 32.6 % (ref 32–36)
MCHC RBC AUTO-ENTMCNC: 33.1 % (ref 32–36)
MCHC RBC AUTO-ENTMCNC: 33.1 % (ref 32–36)
MCV RBC AUTO: 84.8 FL (ref 78–100)
MCV RBC AUTO: 85.5 FL (ref 78–100)
MCV RBC AUTO: 85.8 FL (ref 78–100)
MCV RBC AUTO: 85.9 FL (ref 78–100)
MONOCYTES ABSOLUTE: 0.1 K/CU MM
MONOCYTES ABSOLUTE: 0.2 K/CU MM
MONOCYTES RELATIVE PERCENT: 1 % (ref 0–4)
MONOCYTES RELATIVE PERCENT: 2 % (ref 0–4)
MONOCYTES RELATIVE PERCENT: 2 % (ref 0–4)
MONOCYTES RELATIVE PERCENT: 2.5 % (ref 0–4)
NEUTROPHILS RELATIVE PERCENT: 82 % (ref 36–66)
NEUTROPHILS RELATIVE PERCENT: 84 % (ref 36–66)
NEUTROPHILS RELATIVE PERCENT: 86.1 % (ref 36–66)
NEUTROPHILS RELATIVE PERCENT: 86.7 % (ref 36–66)
NITRITE URINE, QUANTITATIVE: POSITIVE
NUCLEATED RBC %: 0 %
NUCLEATED RBC %: 0.2 %
O2 SAT, VEN: 83.2 % (ref 50–70)
PCO2, VEN: 34 MMHG (ref 41–51)
PDW BLD-RTO: 18.1 % (ref 11.7–14.9)
PDW BLD-RTO: 18.1 % (ref 11.7–14.9)
PDW BLD-RTO: 18.3 % (ref 11.7–14.9)
PDW BLD-RTO: 18.4 % (ref 11.7–14.9)
PH VENOUS: 7.34 (ref 7.32–7.43)
PH, URINE: 6 (ref 5–8)
PHOSPHORUS: 3.8 MG/DL (ref 2.5–4.9)
PHOSPHORUS: 4.7 MG/DL (ref 2.5–4.9)
PLATELET # BLD: 100 K/CU MM (ref 140–440)
PLATELET # BLD: 105 K/CU MM (ref 140–440)
PLATELET # BLD: 84 K/CU MM (ref 140–440)
PLATELET # BLD: 96 K/CU MM (ref 140–440)
PLT MORPHOLOGY: ABNORMAL
PMV BLD AUTO: 11.1 FL (ref 7.5–11.1)
PMV BLD AUTO: 11.3 FL (ref 7.5–11.1)
PMV BLD AUTO: 11.4 FL (ref 7.5–11.1)
PMV BLD AUTO: 12.7 FL (ref 7.5–11.1)
PO2, VEN: 48 MMHG (ref 28–48)
POTASSIUM SERPL-SCNC: 3.2 MMOL/L (ref 3.5–5.1)
POTASSIUM SERPL-SCNC: 3.6 MMOL/L (ref 3.5–5.1)
POTASSIUM SERPL-SCNC: 3.6 MMOL/L (ref 3.5–5.1)
PROTEIN UA: 100 MG/DL
RBC # BLD: 3.25 M/CU MM (ref 4.2–5.4)
RBC # BLD: 3.47 M/CU MM (ref 4.2–5.4)
RBC # BLD: 3.49 M/CU MM (ref 4.2–5.4)
RBC # BLD: 3.52 M/CU MM (ref 4.2–5.4)
RBC # BLD: ABNORMAL 10*6/UL
RBC # BLD: ABNORMAL 10*6/UL
RBC URINE: 12 /HPF (ref 0–6)
SEGMENTED NEUTROPHILS ABSOLUTE COUNT: 6.1 K/CU MM
SEGMENTED NEUTROPHILS ABSOLUTE COUNT: 6.8 K/CU MM
SEGMENTED NEUTROPHILS ABSOLUTE COUNT: 8.2 K/CU MM
SEGMENTED NEUTROPHILS ABSOLUTE COUNT: 8.4 K/CU MM
SODIUM BLD-SCNC: 146 MMOL/L (ref 135–145)
SODIUM BLD-SCNC: 149 MMOL/L (ref 135–145)
SODIUM BLD-SCNC: 150 MMOL/L (ref 135–145)
SPECIFIC GRAVITY UA: 1.01 (ref 1–1.03)
STATUS: NORMAL
T4 FREE SERPL-MCNC: 0.95 NG/DL (ref 0.9–1.8)
TOTAL IMMATURE NEUTOROPHIL: 0.06 K/CU MM
TOTAL IMMATURE NEUTOROPHIL: 0.07 K/CU MM
TOTAL NUCLEATED RBC: 0 K/CU MM
TOTAL NUCLEATED RBC: 0 K/CU MM
TOTAL PROTEIN: 5.2 GM/DL (ref 6.4–8.2)
TRANSFUSION STATUS: NORMAL
TRICHOMONAS: ABNORMAL /HPF
TROPONIN, HIGH SENSITIVITY: 289 NG/L (ref 0–14)
TSH SERPL DL<=0.005 MIU/L-ACNC: 8.75 UIU/ML (ref 0.27–4.2)
UNIT DIVISION: 0
UNIT NUMBER: NORMAL
UROBILINOGEN, URINE: 0.2 MG/DL (ref 0.2–1)
WBC # BLD: 7.5 K/CU MM (ref 4–10.5)
WBC # BLD: 8.1 K/CU MM (ref 4–10.5)
WBC # BLD: 9.4 K/CU MM (ref 4–10.5)
WBC # BLD: 9.7 K/CU MM (ref 4–10.5)
WBC UA: 14 /HPF (ref 0–5)
YEAST: ABNORMAL /HPF

## 2024-04-21 PROCEDURE — 82533 TOTAL CORTISOL: CPT

## 2024-04-21 PROCEDURE — 84443 ASSAY THYROID STIM HORMONE: CPT

## 2024-04-21 PROCEDURE — 85014 HEMATOCRIT: CPT

## 2024-04-21 PROCEDURE — 83605 ASSAY OF LACTIC ACID: CPT

## 2024-04-21 PROCEDURE — 84439 ASSAY OF FREE THYROXINE: CPT

## 2024-04-21 PROCEDURE — 6370000000 HC RX 637 (ALT 250 FOR IP): Performed by: STUDENT IN AN ORGANIZED HEALTH CARE EDUCATION/TRAINING PROGRAM

## 2024-04-21 PROCEDURE — 2700000000 HC OXYGEN THERAPY PER DAY

## 2024-04-21 PROCEDURE — 94761 N-INVAS EAR/PLS OXIMETRY MLT: CPT

## 2024-04-21 PROCEDURE — 2500000003 HC RX 250 WO HCPCS: Performed by: INTERNAL MEDICINE

## 2024-04-21 PROCEDURE — 2580000003 HC RX 258: Performed by: PHYSICIAN ASSISTANT

## 2024-04-21 PROCEDURE — 80048 BASIC METABOLIC PNL TOTAL CA: CPT

## 2024-04-21 PROCEDURE — 82805 BLOOD GASES W/O2 SATURATION: CPT

## 2024-04-21 PROCEDURE — 2000000000 HC ICU R&B

## 2024-04-21 PROCEDURE — A4216 STERILE WATER/SALINE, 10 ML: HCPCS | Performed by: STUDENT IN AN ORGANIZED HEALTH CARE EDUCATION/TRAINING PROGRAM

## 2024-04-21 PROCEDURE — 2580000003 HC RX 258: Performed by: INTERNAL MEDICINE

## 2024-04-21 PROCEDURE — 80053 COMPREHEN METABOLIC PANEL: CPT

## 2024-04-21 PROCEDURE — 84100 ASSAY OF PHOSPHORUS: CPT

## 2024-04-21 PROCEDURE — C9113 INJ PANTOPRAZOLE SODIUM, VIA: HCPCS | Performed by: PHYSICIAN ASSISTANT

## 2024-04-21 PROCEDURE — 93005 ELECTROCARDIOGRAM TRACING: CPT | Performed by: INTERNAL MEDICINE

## 2024-04-21 PROCEDURE — 99233 SBSQ HOSP IP/OBS HIGH 50: CPT | Performed by: INTERNAL MEDICINE

## 2024-04-21 PROCEDURE — 71045 X-RAY EXAM CHEST 1 VIEW: CPT

## 2024-04-21 PROCEDURE — 85007 BL SMEAR W/DIFF WBC COUNT: CPT

## 2024-04-21 PROCEDURE — 6360000002 HC RX W HCPCS: Performed by: INTERNAL MEDICINE

## 2024-04-21 PROCEDURE — 82140 ASSAY OF AMMONIA: CPT

## 2024-04-21 PROCEDURE — 85027 COMPLETE CBC AUTOMATED: CPT

## 2024-04-21 PROCEDURE — 86140 C-REACTIVE PROTEIN: CPT

## 2024-04-21 PROCEDURE — 85018 HEMOGLOBIN: CPT

## 2024-04-21 PROCEDURE — 81001 URINALYSIS AUTO W/SCOPE: CPT

## 2024-04-21 PROCEDURE — 6360000002 HC RX W HCPCS: Performed by: PHYSICIAN ASSISTANT

## 2024-04-21 PROCEDURE — 87086 URINE CULTURE/COLONY COUNT: CPT

## 2024-04-21 PROCEDURE — 82962 GLUCOSE BLOOD TEST: CPT

## 2024-04-21 PROCEDURE — 2580000003 HC RX 258: Performed by: STUDENT IN AN ORGANIZED HEALTH CARE EDUCATION/TRAINING PROGRAM

## 2024-04-21 PROCEDURE — 85025 COMPLETE CBC W/AUTO DIFF WBC: CPT

## 2024-04-21 PROCEDURE — 6360000002 HC RX W HCPCS: Performed by: STUDENT IN AN ORGANIZED HEALTH CARE EDUCATION/TRAINING PROGRAM

## 2024-04-21 PROCEDURE — 83735 ASSAY OF MAGNESIUM: CPT

## 2024-04-21 PROCEDURE — 85652 RBC SED RATE AUTOMATED: CPT

## 2024-04-21 PROCEDURE — 82330 ASSAY OF CALCIUM: CPT

## 2024-04-21 PROCEDURE — A4216 STERILE WATER/SALINE, 10 ML: HCPCS | Performed by: PHYSICIAN ASSISTANT

## 2024-04-21 PROCEDURE — 93010 ELECTROCARDIOGRAM REPORT: CPT | Performed by: INTERNAL MEDICINE

## 2024-04-21 PROCEDURE — 6360000002 HC RX W HCPCS

## 2024-04-21 PROCEDURE — 84484 ASSAY OF TROPONIN QUANT: CPT

## 2024-04-21 PROCEDURE — C9113 INJ PANTOPRAZOLE SODIUM, VIA: HCPCS | Performed by: STUDENT IN AN ORGANIZED HEALTH CARE EDUCATION/TRAINING PROGRAM

## 2024-04-21 PROCEDURE — 84481 FREE ASSAY (FT-3): CPT

## 2024-04-21 PROCEDURE — 2580000003 HC RX 258

## 2024-04-21 RX ORDER — MAGNESIUM SULFATE IN WATER 40 MG/ML
2000 INJECTION, SOLUTION INTRAVENOUS ONCE
Status: COMPLETED | OUTPATIENT
Start: 2024-04-21 | End: 2024-04-21

## 2024-04-21 RX ORDER — POTASSIUM CHLORIDE 7.45 MG/ML
10 INJECTION INTRAVENOUS
Status: COMPLETED | OUTPATIENT
Start: 2024-04-21 | End: 2024-04-21

## 2024-04-21 RX ORDER — DEXTROSE MONOHYDRATE 100 MG/ML
INJECTION, SOLUTION INTRAVENOUS CONTINUOUS PRN
Status: DISCONTINUED | OUTPATIENT
Start: 2024-04-21 | End: 2024-04-22 | Stop reason: HOSPADM

## 2024-04-21 RX ORDER — GLUCAGON 1 MG/ML
1 KIT INJECTION PRN
Status: DISCONTINUED | OUTPATIENT
Start: 2024-04-21 | End: 2024-04-22 | Stop reason: HOSPADM

## 2024-04-21 RX ORDER — SODIUM CHLORIDE, SODIUM LACTATE, POTASSIUM CHLORIDE, AND CALCIUM CHLORIDE .6; .31; .03; .02 G/100ML; G/100ML; G/100ML; G/100ML
500 INJECTION, SOLUTION INTRAVENOUS ONCE
Status: COMPLETED | OUTPATIENT
Start: 2024-04-21 | End: 2024-04-21

## 2024-04-21 RX ORDER — MAGNESIUM SULFATE IN WATER 40 MG/ML
2000 INJECTION, SOLUTION INTRAVENOUS ONCE
Status: COMPLETED | OUTPATIENT
Start: 2024-04-21 | End: 2024-04-22

## 2024-04-21 RX ADMIN — SODIUM BICARBONATE: 84 INJECTION, SOLUTION INTRAVENOUS at 11:33

## 2024-04-21 RX ADMIN — SODIUM CHLORIDE 25 ML: 9 INJECTION, SOLUTION INTRAVENOUS at 20:18

## 2024-04-21 RX ADMIN — POTASSIUM CHLORIDE 10 MEQ: 7.46 INJECTION, SOLUTION INTRAVENOUS at 08:34

## 2024-04-21 RX ADMIN — MAGNESIUM SULFATE HEPTAHYDRATE 2000 MG: 40 INJECTION, SOLUTION INTRAVENOUS at 20:18

## 2024-04-21 RX ADMIN — SODIUM CHLORIDE, PRESERVATIVE FREE 10 ML: 5 INJECTION INTRAVENOUS at 08:36

## 2024-04-21 RX ADMIN — MAGNESIUM SULFATE HEPTAHYDRATE 2000 MG: 40 INJECTION, SOLUTION INTRAVENOUS at 22:43

## 2024-04-21 RX ADMIN — PANTOPRAZOLE SODIUM 40 MG: 40 INJECTION, POWDER, FOR SOLUTION INTRAVENOUS at 20:16

## 2024-04-21 RX ADMIN — DEXTROSE MONOHYDRATE 125 ML: 100 INJECTION, SOLUTION INTRAVENOUS at 04:40

## 2024-04-21 RX ADMIN — SODIUM CHLORIDE, POTASSIUM CHLORIDE, SODIUM LACTATE AND CALCIUM CHLORIDE 500 ML: 600; 310; 30; 20 INJECTION, SOLUTION INTRAVENOUS at 02:29

## 2024-04-21 RX ADMIN — POTASSIUM CHLORIDE 10 MEQ: 7.46 INJECTION, SOLUTION INTRAVENOUS at 05:29

## 2024-04-21 RX ADMIN — VANCOMYCIN HYDROCHLORIDE 1250 MG: 1.25 INJECTION, POWDER, LYOPHILIZED, FOR SOLUTION INTRAVENOUS at 06:41

## 2024-04-21 RX ADMIN — PHENYLEPHRINE HYDROCHLORIDE 30 MCG/MIN: 10 INJECTION INTRAVENOUS at 08:43

## 2024-04-21 RX ADMIN — PHENYLEPHRINE HYDROCHLORIDE 90 MCG/MIN: 10 INJECTION INTRAVENOUS at 20:11

## 2024-04-21 RX ADMIN — VASOPRESSIN 0.03 UNITS/MIN: 0.2 INJECTION INTRAVENOUS at 21:55

## 2024-04-21 RX ADMIN — ACETAMINOPHEN 650 MG: 325 SUPPOSITORY RECTAL at 05:00

## 2024-04-21 RX ADMIN — CALCIUM CHLORIDE 1000 MG: 100 INJECTION, SOLUTION INTRAVENOUS at 05:25

## 2024-04-21 RX ADMIN — CEFEPIME 2000 MG: 2 INJECTION, POWDER, FOR SOLUTION INTRAVENOUS at 06:03

## 2024-04-21 RX ADMIN — VASOPRESSIN 0.03 UNITS/MIN: 0.2 INJECTION INTRAVENOUS at 16:13

## 2024-04-21 RX ADMIN — SODIUM CHLORIDE, PRESERVATIVE FREE 10 ML: 5 INJECTION INTRAVENOUS at 20:19

## 2024-04-21 RX ADMIN — POTASSIUM CHLORIDE 10 MEQ: 7.46 INJECTION, SOLUTION INTRAVENOUS at 06:35

## 2024-04-21 RX ADMIN — PIPERACILLIN AND TAZOBACTAM 4500 MG: 4; .5 INJECTION, POWDER, FOR SOLUTION INTRAVENOUS at 18:35

## 2024-04-21 RX ADMIN — PANTOPRAZOLE SODIUM 40 MG: 40 INJECTION, POWDER, FOR SOLUTION INTRAVENOUS at 08:35

## 2024-04-21 RX ADMIN — VASOPRESSIN 0.03 UNITS/MIN: 0.2 INJECTION INTRAVENOUS at 05:22

## 2024-04-21 NOTE — PLAN OF CARE
Problem: Discharge Planning  Goal: Discharge to home or other facility with appropriate resources  4/21/2024 0854 by Magdalena Loera, RN  Outcome: Not Progressing  4/21/2024 0643 by Danyel Aguirre, RN  Outcome: Progressing  Flowsheets (Taken 4/20/2024 2000)  Discharge to home or other facility with appropriate resources:   Identify barriers to discharge with patient and caregiver   Arrange for needed discharge resources and transportation as appropriate   Identify discharge learning needs (meds, wound care, etc)     Problem: Nutrition Deficit:  Goal: Optimize nutritional status  4/21/2024 0854 by Magdalena Loera, RN  Outcome: Not Progressing  4/21/2024 0643 by Danyel Aguirre, RN  Outcome: Progressing

## 2024-04-21 NOTE — CONSULTS
[facility]             MD Rony Fernandez MD Carol Christensen, APRN-CNP       Elissaalexsander Banks, APRN-CNP             [address] [place] [address]             [phone] fax [phone]      Physician attestation:  I have personally seen and examined the patient independently. I have reviewed the patient's available data,including medical history and recent test results. Reviewed and discussed note as documented by EMELY.  I agree with the physical exam findings, assessment and plan.     Briefly   69-year-old female with past medical history of GERD, prediabetes, hypertension, history of squamous cell lung cancer status post resection, CAD status post recent PCI, ischemic cardiomyopathy who presented to the hospital with concern for abnormal labs and weakness.    Currently the patient is noted to be quite malnourished, history is mostly obtained from the patient's  was present at bedside.  She was noted to have severe electrolyte abnormalities including elevated sodium, BUN, creatinine as well as anemic on presentation.  She has had a decline in her overall status since I had evaluated her past in January and was recently evaluated by my partner 2 to 3 weeks ago.  Her last EGD was with me on 1/23/2024 where she was noted to have low-grade narrowing Schatzki's ring, dilated, gastritis with adherent blood, normal examined duodenum.  Patient's  at bedside endorses that she has been more lethargic, having significant weight loss and had minimal activity with rehab since her discharge last time and wanted to be evaluated for her low hemoglobin as well as for possible malnutrition.    Gen: Weak emaciated  ENT: normal TJ  Cardiovascular: RRR, No M/R/G  Respiratory: CTA BL  Gastrointestinal: Soft,NT ND  Genitourinary: no suprapubic tenderness  Musculoskeletal: no scars  Skin:moist  Neuro: No focal deficit    My 
    INPATIENT CARDIOLOGY CONSULT NOTE         Reason for consultation: Antiplatelets, cardiac clearance    History of present illness:Adenike is a 69 y.o.year old who is admitted with   Chief Complaint   Patient presents with    Abnormal Lab     Hgb 6.8, potassium 5.4       Patient is a 69-year-old female who was admitted to the hospital with failure to thrive, noted to have severe anemia.  Cardio consulted to evaluate patient for antiplatelet needs and cardiac risk assessment prior to PEG tube placement      Patient was seen 2 months ago at the hospital with symptoms of chest pain and new wall motion abnormalities with severe cardiomyopathy on echocardiogram, at the time patient underwent coronary angiogram revealing significant LAD stenosis requiring percutaneous intervention with drug-eluting stent.    A follow-up echocardiogram showed recovered LV ejection fraction in early April 2024.    Patient has been compliant with her medications now presenting with severe anemia    She denies any bleeding disorder, denies any rectal bleed hematemesis hematochezia vaginal bleed or trauma.    EKG shows sinus tachycardia with T wave inversions in lateral leads  Patient has chronic tachycardia    Troponin is elevated however this chronic.  Not drawn during this admission.           Pertinent Lab Personally Review     Recent Labs     04/19/24  0845   WBC 10.4   HGB 7.8*   HCT 25.1*   PLT 67*      Recent Labs     04/19/24  0845   *   K 4.0   *   CO2 19*   *   CREATININE 3.4*     Recent Labs     04/19/24  0845   AST 39*   ALT 27   BILITOT 0.3   ALKPHOS 142*     Lab Results   Component Value Date    PROBNP >70,000 (H) 03/27/2024    PROBNP 2,744 (H) 03/07/2024    PROBNP 10,084 (H) 03/04/2024         Past medical history:    has a past medical history of Anemia, Anxiety, Arthritis, Bronchitis, Chronic back pain, COPD (chronic obstructive pulmonary disease) (HCC), Depression, Diverticulosis, Hemoptysis, History 
Central line rounding completed. No dressing change indicated. Dressing is clean, dry, and intact. Patient continues to meet the following criteria for central vascular access: Limited/no vessel suitable for conventional peripheral access. EPIC updated.     Consult the Vascular Access Team for questions, concerns, or change in patient's condition.    
Midline meets therapeutic needs at this time.  BARD PowerGlide PRO inserted in RUE Brachial vein  x 1 attempts using sterile ultrasound guided technique.  Brisk blood return, flushes without resistance.    Patient tolerated well. Primary nurse KEIKO Montaño notified.     Consult the Vascular Access Team for questions, concerns, or change in patient's condition.          
of the circulation,  [] Frequent vasoactive agent adjustment,  [] Renal replacement therapy,  [] For rapid decompensation,  [] Electrolyte instability  [] Suctioning every 2 hours  [] Every hour neuro checks  [] Every hour neurovascular checks  [] Frequent evaluation of patient's response to treatment and titration of therapies,  [] Interpretation of laboratory and radiological data,  [] Application and interpretation of advanced monitoring technologies,  [] Extensive interpretation of multiple databases,  [] Development of treatment plan with patient, surrogate, or consultants.  [] Others:     Electronically signed by Maty Desir MD on 4/20/2024 at 4:06 AM

## 2024-04-21 NOTE — FLOWSHEET NOTE
This nurse at bedside when patient oxygen decreased to 75%, first tried turning up nasal cannula with no results, placed on NRB mask at 15L and alerted RT.  RT into room, attempted deep suction.  Patients oxygen not increasing.  Alerted intensivist whom states to put patient on vapotherm    3930  Patient given breathing tx x2 per RT and placed on airvo.  Oxygen saturation currently up to 93%.  Family remains at bedside.  Will continue to monitor.

## 2024-04-21 NOTE — PLAN OF CARE
Problem: Discharge Planning  Goal: Discharge to home or other facility with appropriate resources  Outcome: Progressing  Flowsheets (Taken 4/20/2024 2000)  Discharge to home or other facility with appropriate resources:   Identify barriers to discharge with patient and caregiver   Arrange for needed discharge resources and transportation as appropriate   Identify discharge learning needs (meds, wound care, etc)     Problem: Safety - Adult  Goal: Free from fall injury  Outcome: Progressing     Problem: Nutrition Deficit:  Goal: Optimize nutritional status  Outcome: Progressing     Problem: Skin/Tissue Integrity  Goal: Absence of new skin breakdown  Description: 1.  Monitor for areas of redness and/or skin breakdown  2.  Assess vascular access sites hourly  3.  Every 4-6 hours minimum:  Change oxygen saturation probe site  4.  Every 4-6 hours:  If on nasal continuous positive airway pressure, respiratory therapy assess nares and determine need for appliance change or resting period.  Outcome: Progressing

## 2024-04-22 VITALS
DIASTOLIC BLOOD PRESSURE: 54 MMHG | WEIGHT: 114.2 LBS | OXYGEN SATURATION: 83 % | SYSTOLIC BLOOD PRESSURE: 72 MMHG | BODY MASS INDEX: 19.5 KG/M2 | TEMPERATURE: 99.9 F | HEIGHT: 64 IN

## 2024-04-22 LAB
CORTISOL, PLASMA: 10.31
CULTURE: NORMAL
Lab: NORMAL
SPECIMEN: NORMAL
T3 FREE: 0.9 PG/ML (ref 2.3–4.2)

## 2024-04-22 RX ORDER — ALBUMIN, HUMAN INJ 5% 5 %
25 SOLUTION INTRAVENOUS ONCE
Status: DISCONTINUED | OUTPATIENT
Start: 2024-04-22 | End: 2024-04-22 | Stop reason: HOSPADM

## 2024-04-22 NOTE — PROGRESS NOTES
[facility] Gastroenterology and Hepatology             MD Rony Fernandez MD Carol Christensen, APRN-CNP             [address] [place] [address]             [phone] fax [phone]      Gastroenterology Progress note . 4/20/2024  Reason for consult:   Anemia, concern for PEG tube          Interval H/P  CC today: Melena     Overnight significant events, patient was transferred to ICU after she had an episode of melena overnight as well as became anemic and hypotensive.  Patient was transfused with repeat hemoglobin being 6.6.  She continues to be hypotensive in the 80s however off pressors.   present at bedside.  We had a long discussion about her melena and continued anemia.  She is also receiving 1 more unit PRBC as well as 1 more unit of platelets.     Physical Exam  Blood pressure (!) 85/57, pulse 96, temperature 97.7 °F (36.5 °C), resp. rate 23, height 1.626 m (5' 4\"), weight 51.2 kg (112 lb 12.8 oz), last menstrual period 01/06/2009, SpO2 100 %, not currently breastfeeding.  Constitutional: Patient is in weak and emaciated.   Eyes: Pupils equal, round.  No icterus.  HENT: Oral mucosa is moist.   Pulmonary: No accessory muscle use. No localizing pulmonary findings.     Cardiovascular: Heart has a regular rate and rhythm, no JVD.   Gastrointestinal: Bowel sounds are present in all four quadrants. Abdomen is soft, nontender, nondistended.   Skin: No jaundice, spider angiomas, or palmar erythema. No purpura.  Neuro: No gross focal neurologic deficits.       Chart and labs reviewed.     Impression/Plan  1. Melena concern for upper GI bleed  -Overnight patient noted to have melanotic stool with drop in hemoglobin transfuse 1 unit PRBC as well as hypotension.  -Transferred to medical ICU  -I had a long discussion with the patient's ICU attending, anesthesiologist as well as the patient's .  Due to her continued bleed and anemia we will 
             [facility] Hepatology             MD Rony Fernandez MD Carol Christensen, APRN-CNP             [address] [place] [address]             [phone] fax [phone]      Gastroenterology Progress note . 4/21/2024  Reason for consult:   Anemia, concern for PEG tube          Interval H/P  CC today: Melena     Patient was noted to have a drop in hemoglobin as well as hypotension yesterday, underwent emergent EGD with me that was negative.  Post EGD had a stat CTA which was negative.  Currently her hemoglobin appears to be a stable.  Noted to be febrile, antibiotic spectrum broadened.     Physical Exam  Blood pressure 93/74, pulse (!) 111, temperature 97 °F (36.1 °C), temperature source Rectal, resp. rate 19, height 1.626 m (5' 4\"), weight 51.8 kg (114 lb 3.2 oz), last menstrual period 01/06/2009, SpO2 (!) 89 %, not currently breastfeeding.  Constitutional: Patient is in weak and emaciated.   Eyes: Pupils equal, round.  No icterus.  HENT: Oral mucosa is moist.   Pulmonary: On supplemental O2  Cardiovascular: Heart has a regular rate and rhythm, no JVD.   Gastrointestinal: Bowel sounds are present in all four quadrants. Abdomen is soft, nontender, nondistended.   Skin: No jaundice, spider angiomas, or palmar erythema. No purpura.  Neuro: No gross focal neurologic deficits.       Chart and labs reviewed.     Impression/Plan  1. Melena concern for upper GI bleed  -Overnight patient noted to have melanotic stool with drop in hemoglobin transfuse 1 unit PRBC as well as hypotension.  -Transferred to medical ICU  -Underwent emergent EGD yesterday without any overt bleeding  -CTA negative  -Continue to trend hemoglobin every 6 hours and transfuse for globin less than 7  - Hold Plavix   -Patient is currently febrile with poor functional status, guarded prognosis.  Hold off on colonoscopy with negative CTA and hemoglobin now stable    #2 
    V2.0  [facility] Hospitalist Progress Note      Name:  Adenike Harvey /Age/Sex: 1955  (69 y.o. female)   MRN & CSN:  [id number] & [id number] Encounter Date/Time: 2024 12:19 PM EDT    Location:  -A PCP: Iban Lou MD       Hospital Day: 3    Assessment and Plan:   Adenike Harvey is a 69 y.o. female  who presents with Acute on chronic anemia      Plan:    Acute on Chronic Anemia  Hemorrhagic shock  Hb 6.2 on admission continue to bleeding.  Recent stents was on dual antiplatelet therapy.  Monitor H&H and transfuse less than 7  Pressors to maintain MAP  GI consulted: EGD on     CAD  Patient with recent PCI and on dual antiplatelet therapy.  Cardiology consulted  Unfortunate patient not able to even tolerate aspirin with no bleeding.  Monitor for signs of rethrombosis    Hyponatremia  Sodium of 150 likely from decreased p.o. intake  D5 drip  Will need definitive means of feeding    MELIDA  This has been ongoing since prior admission.  Creatinine have been trending down.  Monitor kidney function  Avoid nephrotoxic agents    Thrombocytopenia  - in setting of ASA and Plavix use  -Appears to have been slowly declining on last admit  -Will monitor off DAPT  -SCD     HLD  -Continue statin      Mood Disorder   -Continue home meds     COPD on 3L NC  -Continue supplemental oxygen         Diet ADULT DIET; Regular; Low Fiber  ADULT ORAL NUTRITION SUPPLEMENT; Breakfast; Clear Liquid Oral Supplement  ADULT ORAL NUTRITION SUPPLEMENT; Lunch, Dinner; Frozen Oral Supplement   DVT Prophylaxis [] Lovenox, []  Heparin, [x] SCDs, [x] Ambulation,    [] Eliquis, [] Xarelto  [] Coumadin [] other   Code Status DNR-CCA   Disposition From: home  Expected Disposition: TBD  Estimated Date of Discharge: TBD  Patient requires continued admission due to bleeding   Surrogate Decision Maker/ POA      Personally reviewed Lab Studies and Imaging     Discussed management of the case with GI who recommended EGD    EKG 
    V2.0  [facility] Hospitalist Progress Note      Name:  Adenike Harvey /Age/Sex: 1955  (69 y.o. female)   MRN & CSN:  [id number] & [id number] Encounter Date/Time: 2024 12:19 PM EDT    Location:  -A PCP: Iban Lou MD       Hospital Day: 4    Assessment and Plan:   Adenike Harvey is a 69 y.o. female  who presents with Acute on chronic anemia      Plan:    Acute on Chronic Anemia  Hemorrhagic shock  Hb 6.2 on admission continue to bleeding.  Recent stents was on dual antiplatelet therapy.  Monitor H&H and transfuse less than 7  Pressors to maintain MAP  GI consulted: EGD on     CAD  Patient with recent PCI and on dual antiplatelet therapy.  Cardiology consulted  Unfortunate patient not able to even tolerate aspirin with no bleeding.  Monitor for signs of rethrombosis    Hyponatremia  Sodium of 150 likely from decreased p.o. intake  D5 drip  Will need definitive means of feeding    MELIDA  This has been ongoing since prior admission.  Creatinine have been trending down.  Monitor kidney function  Avoid nephrotoxic agents    Thrombocytopenia  - in setting of ASA and Plavix use  -Appears to have been slowly declining on last admit  -Will monitor off DAPT  -SCD     HLD  -Continue statin      Mood Disorder   -Continue home meds     COPD on 3L NC  -Continue supplemental oxygen         Diet ADULT DIET; Regular; Low Fiber  ADULT ORAL NUTRITION SUPPLEMENT; Breakfast; Clear Liquid Oral Supplement  ADULT ORAL NUTRITION SUPPLEMENT; Lunch, Dinner; Frozen Oral Supplement   DVT Prophylaxis [] Lovenox, []  Heparin, [x] SCDs, [x] Ambulation,    [] Eliquis, [] Xarelto  [] Coumadin [] other   Code Status Limited   Disposition From: home  Expected Disposition: TBD  Estimated Date of Discharge: TBD  Patient requires continued admission due to bleeding   Surrogate Decision Maker/ POA      Personally reviewed Lab Studies and Imaging     Discussed management of the case with GI who recommended EGD    EKG 
4 Eyes Skin Assessment     NAME:  Adenike Harvey  YOB: 1955  MEDICAL RECORD NUMBER:  [id number]    The patient is being assessed for  Admission    I agree that at least one RN has performed a thorough Head to Toe Skin Assessment on the patient. ALL assessment sites listed below have been assessed.      Areas assessed by both nurses:    Head, Face, Ears, Shoulders, Back, Chest, Arms, Elbows, Hands, Sacrum. Buttock, Coccyx, Ischium, Legs. Feet and Heels, and Under Medical Devices         Does the Patient have a Wound? Yes wound(s) were present on assessment. LDA wound assessment was Initiated and completed by RN       Mustapha Prevention initiated by RN: Yes  Wound Care Orders initiated by RN: Yes    Pressure Injury (Stage 3,4, Unstageable, DTI, NWPT, and Complex wounds) if present, place Wound referral order by RN under : No    New Ostomies, if present place, Ostomy referral order under : No     Nurse 1 eSignature: Electronically signed by Olesya Bah RN on 4/19/24 at 2:09 AM EDT    **SHARE this note so that the co-signing nurse can place an eSignature**    Nurse 2 eSignature: Electronically signed by Francesco Huang LPN on 4/19/24 at 5:41 AM EDT   
Called by patient nurse for concern of worsening pressure and status. Patient was seen and evaluated, vasoactive agents adjusted, repeat labs drawn.     Patient was noted to be lethargic on kulwant and vasopressin with noted persistent tachycardia. Labs drawn from earlier in the evening showing worsening lactic acidosis with evidence of worsening state.     Patient status Limited.   
Comprehensive Nutrition Assessment    Type and Reason for Visit:  Initial (low BMI for age)    Nutrition Recommendations/Plan:   Modify Diet to Low Fiber  Begin clear liquid and frozen oral nutrition supplements  Assist with feeding as needed  Should EN be desired/appropriate, please consult RD for TF order/mgt     Malnutrition Assessment:  Malnutrition Status:  Severe malnutrition (04/19/24 1115)    Context:  Chronic Illness     Findings of the 6 clinical characteristics of malnutrition:  Energy Intake:  75% or less estimated energy requirements for 1 month or longer  Weight Loss:  Greater than 10% over 6 months (31% x 6 mos)     Body Fat Loss:  Mild body fat loss Triceps, Orbital   Muscle Mass Loss:  Severe muscle mass loss Clavicles (pectoralis & deltoids), Calf (gastrocnemius), Hand (interosseous)  Fluid Accumulation:  No significant fluid accumulation Extremities   Strength:  Not Performed    Nutrition Assessment:    Pt admitted with failure to thrive, noted to have severe anemia, concern for UGIB, black stools per family, FOBT positive. H/O CAD s/p PCI, systolic CHF, COPD on 3L NC, recurrent NSLC, GERD, prediabetes, HTN, malnutrition. Plan for PEG tube placement noted. Spoke with SO and Pt re EN options such as bolus, continuous, nocturnal, etc. Poor po intake continues here, no breakfast today despite attempts. Will modify diet to Low Fiber with concern for GIB. Has lost 31% over the past 6 mos. Pt continues to meet criteria for severe malnutrition. Will order oral supplements and continue to follow as high nutrition risk.    Nutrition Related Findings:    Na 153, , Cr 3.4, GFR 14, Glu 152, A1c 5.2   Wound Type:  (blancable redness and two open areas to left buttocks and coccyx)       Current Nutrition Intake & Therapies:    Average Meal Intake: 0%  Average Supplements Intake: None Ordered  ADULT DIET; Regular    Anthropometric Measures:  Height: 162.6 cm (5' 4\")  Ideal Body Weight (IBW): 120 lbs 
Critical Care update note    S/p 2prbcs and 1 plts overnight.   S/p EGD in AM - no obvious bleeding. Normal esophagus, stomach and duodenum with some gastritis. STAT CTA without evidence of bleeding. Protonix switched to daily.   On low dose levo to maintain MAP > 65.   Continue serial CBC and transfuse for Hb < 7 or if actively bleeding.  Hx of CAD s/p PCI - holding DAPT for now.     Discussed with Dr. Radford    I have performed 33 minutes of critical care time, excluding and separately billable procedures      
PHARMACY VANCOMYCIN MONITORING SERVICE  Pharmacy consulted by Dr. Maty Desir for monitoring and adjustment.    Indication for treatment: Sepsis  Goal trough: Trough Goal: 15-20 mcg/mL  AUC/ALEJANDRO: 400-600    Risk Factors for MRSA Identified:   Hospitalization within the past 90 days, Received IV antibiotics within the past 90 days    Pertinent Laboratory Values:   Temp Readings from Last 3 Encounters:   04/21/24 (!) 95.7 °F (35.4 °C) (Rectal)   04/08/24 97.8 °F (36.6 °C) (Oral)   03/09/24 98.1 °F (36.7 °C)     Recent Labs     04/18/24  2321 04/19/24  0845 04/20/24  0401 04/20/24  1245 04/20/24  2030 04/21/24  0000 04/21/24  0330   WBC 10.1   < > 6.6   < > 8.3 8.1 7.5   LACTATE 1.5  --  2.6*  --   --   --   --     < > = values in this interval not displayed.     Recent Labs     04/19/24  0845 04/20/24  0401 04/21/24  0330   * 85* 68*   CREATININE 3.4* 2.9* 2.5*     Estimated Creatinine Clearance: 18 mL/min (A) (based on SCr of 2.5 mg/dL (H)).    Intake/Output Summary (Last 24 hours) at 4/21/2024 0532  Last data filed at 4/21/2024 0000  Gross per 24 hour   Intake 5302.08 ml   Output 1100 ml   Net 4202.08 ml     Last Encounter Weight:  Wt Readings from Last 3 Encounters:   04/20/24 51.2 kg (112 lb 12.8 oz)   04/06/24 55.3 kg (121 lb 14.6 oz)   03/08/24 62.2 kg (137 lb 2 oz)       Pertinent Cultures:   Date    Source    Results  04/20   Blood    In process    Assessment:  HPI: 69 y.o. female admitted on 04/18/2024 with acute on chronic anemia.  Patient developed tachycardia and new onset fever on morning of 04/21/2024.  Broad spectrum antibiotics and vasopressin started for worsening shock.  SCr = 2.5, BUN = 68, and 1,100 mL output  MELIDA (ongoing since prior to admission); SCr is trending down  Day(s) of therapy: 1 of 7  Vancomycin concentration:   04/22 - To be collected    Plan:  Vancomycin 1,250 mg IV initial dose; Plan for intermittent dosing based on levels secondary to renal impairment.    Pharmacy will 
Patient mated earlier this morning my colleague agree with the assessment and plan as documented.  She has had no further bleeding.  On discussion with the patient and her  she has not been eating very much at home, but has been compliant with her medications.  Did have a dark tarry stool is noted.    I did discuss CODE STATUS with the patient and she would not want CPR/chest compressions.  Patient is okay with intubation and all other interventions if necessary.  Patient CODE STATUS updated to DNR CCA.    I discussed the case with GI who are okay with her resuming aspirin today.  Plan will also be for PEG tube placement when she has been off Plavix for 5 days.    Palomo Lin MD  Hospitalist    
Pt , Godwin Harvey, notified at 0025 of pt unresponsiveness, low oxygen saturation, and agonal breaths. At this time pt noted with no heart tones, verified with Jay Jay ADEN and Dr. Reece at bedside, TOD 0043. , Godwin, notified of pt passing and thanked staff for caring for pt,  states he will in to visit shortly.  
Pt presented to unit with blancable redness and two open areas to left buttocks and coccyx. Treatment progressing and no further concerns at this time. Mustapha Protocol initiated.   
Pt with tachycardia and new onset fever  EKG shows sinus tach  CT reviewed, right lower lobe infiltrate appears to be improved compared to 4/4.  Will recheck UA  No leukocytosis  Worsening shock, add Vasopressin   Will start broad-spectrum antibiotics empirically  
3/1/2024); and Cardiac procedure (N/A, 3/1/2024).  Social History:   reports that she quit smoking about 6 years ago. Her smoking use included cigarettes. She started smoking about 37 years ago. She has a 15.3 pack-year smoking history. She has never used smokeless tobacco. She reports that she does not drink alcohol and does not use drugs.  Family history:  family history includes Cancer in her mother; Depression in her mother; Diabetes in her mother; Heart Disease in her father; High Blood Pressure in her mother; High Cholesterol in her mother.    Allergies   Allergen Reactions    Lipitor      \"Makes Me Hurt So Bad I Can't Stand It\"    Vicodin [Hydrocodone-Acetaminophen] Nausea Only    Carboplatin Other (See Comments)     Required epi x 3, hospitalization overnight     Hydrocodone      Other reaction(s): Gastrointestinal Upset    Adhesive Tape Rash       Review of Systems:    All 14 systems were reviewed and are negative  Except for the positive findings  which as documented     /80   Pulse (!) 110   Temp 97 °F (36.1 °C) (Rectal)   Resp 21   Ht 1.626 m (5' 4\")   Wt 51.8 kg (114 lb 3.2 oz)   LMP 01/06/2009 (LMP Unknown)   SpO2 91%   BMI 19.60 kg/m²     Intake/Output Summary (Last 24 hours) at 4/21/2024 1415  Last data filed at 4/21/2024 0836  Gross per 24 hour   Intake 5658.16 ml   Output 1225 ml   Net 4433.16 ml     Physical Exam:  Constitutional:  Well developed, Well nourished, No acute distress, Non-toxic appearance.   HENT:  Normocephalic, Atraumatic, Bilateral external ears normal, Oropharynx moist, No oral exudates, Nose normal. Neck-  Supple, No stridor.   Eyes:  PERRL, EOMI, Conjunctiva normal, No discharge.   Respiratory:  Normal breath sounds, No respiratory distress, No wheezing, No chest tenderness.   Cardiovascular:  Normal heart rate, Normal rhythm, No murmurs, No rubs, No gallops, JVP not elevated  Abdomen/GI:  Bowel sounds normal, Soft, No tenderness, No masses, No pulsatile masses. 
smoking use included cigarettes. She started smoking about 37 years ago. She has a 15.3 pack-year smoking history. She has never used smokeless tobacco. She reports that she does not drink alcohol and does not use drugs.  Family history:  family history includes Cancer in her mother; Depression in her mother; Diabetes in her mother; Heart Disease in her father; High Blood Pressure in her mother; High Cholesterol in her mother.    Allergies   Allergen Reactions    Lipitor      \"Makes Me Hurt So Bad I Can't Stand It\"    Vicodin [Hydrocodone-Acetaminophen] Nausea Only    Carboplatin Other (See Comments)     Required epi x 3, hospitalization overnight     Hydrocodone      Other reaction(s): Gastrointestinal Upset    Adhesive Tape Rash       Review of Systems:    All 14 systems were reviewed and are negative  Except for the positive findings  which as documented     /76   Pulse 95   Temp 97.7 °F (36.5 °C) (Oral)   Resp 18   Ht 1.626 m (5' 4\")   Wt 51.2 kg (112 lb 12.8 oz)   LMP 01/06/2009 (LMP Unknown)   SpO2 94%   BMI 19.36 kg/m²     Intake/Output Summary (Last 24 hours) at 4/20/2024 1442  Last data filed at 4/20/2024 1055  Gross per 24 hour   Intake 1379.19 ml   Output 750 ml   Net 629.19 ml     Physical Exam:  Constitutional:  Well developed, Well nourished, No acute distress, Non-toxic appearance.   HENT:  Normocephalic, Atraumatic, Bilateral external ears normal, Oropharynx moist, No oral exudates, Nose normal. Neck-  Supple, No stridor.   Eyes:  PERRL, EOMI, Conjunctiva normal, No discharge.   Respiratory:  Normal breath sounds, No respiratory distress, No wheezing, No chest tenderness.   Cardiovascular:  Normal heart rate, Normal rhythm, No murmurs, No rubs, No gallops, JVP not elevated  Abdomen/GI:  Bowel sounds normal, Soft, No tenderness, No masses, No pulsatile masses.     Musculoskeletal:  No edema, No tenderness, No cyanosis, No clubbing. Good range of motion in all major joints. No tenderness 
Sonographic Carcamo sign: None. PANCREAS: Poorly visualized due to overlying bowel gas. RIGHT KIDNEY: 10.1 x 5.9 x 4.9 cm length.  Normal.     1. No acute abnormalities detected Electronically signed by Kemal Alvarado MD    Echo (TTE) limited (PRN contrast/bubble/strain/3D)    Result Date: 4/5/2024    Left Ventricle: Normal left ventricular systolic function with a visually estimated EF of 55 - 60%. Left ventricle size is normal. Normal wall thickness. Normal wall motion.   Mitral Valve: Mild regurgitation.   Pericardium: No pericardial effusion.     CT ABDOMEN PELVIS WO CONTRAST Additional Contrast? Radiologist Recommendation    Result Date: 4/4/2024  EXAM: CT ABDOMEN PELVIS WO CONTRAST INDICATION:  evaluate for infectious process COMPARISON: CT abdomen pelvis March 22, 2024 TECHNIQUE: Axial CT imaging obtained through the abdomen and pelvis. Axial images and multiplanar reformatted images were reviewed.   Up-to-date CT equipment and radiation dose reduction techniques were employed. CONTRAST: NONE  CT ABDOMEN AND PELVIS: LOWER CHEST: Partially imaged right lung. Numerous pulmonary centrilobular airspace opacities. Mild right-sided pleural thickening. Coronary stent. LIVER: No change. Normal. GALLBLADDER AND BILIARY DUCTS: No change. No calcified gallstones. Nondistended gallbladder.  No intra- or extrahepatic biliary dilatation. PANCREAS: No change. Normal. SPLEEN: No change. Normal. ADRENAL GLANDS: No change. Normal. KIDNEYS AND URETERS: No change. Normal. URINARY BLADDER: New. Air within the urinary bladder lumen. REPRODUCTIVE ORGANS: No change. No associated masses. BOWEL:  No change. Colonic diverticula. No acute inflammation. Normal diameter, nonobstructed.  Appendix without thickening or enlargement. LYMPH NODES: No change. No abnormally enlarged nodes. PERITONEUM / RETROPERITONEUM: No change. No significant ascites or free air. New. Trace free pelvic fluid. VESSELS: No change. Atherosclerosis. No aortic 
Creatinine 2.3 (H) 0.6 - 1.1 MG/DL    Est, Glom Filt Rate 23 (L) >60 mL/min/1.73m2    Calcium 7.9 (L) 8.3 - 10.6 MG/DL    Phosphorus 3.8 2.5 - 4.9 MG/DL    Magnesium 1.2 (L) 1.8 - 2.4 mg/dl   Ammonia    Collection Time: 04/21/24 12:00 PM   Result Value Ref Range    Ammonia 17 11 - 51 UMOL/L        Imaging/Diagnostics Last 24 Hours   XR CHEST PORTABLE    Result Date: 4/21/2024  EXAM: PORTABLE AP CHEST X-RAY, 5:39 AM INDICATION: shortness of breath, COMPARISON: April 20 FINDINGS: HEART / MEDIASTINUM: Normal in size. LUNGS/PLEURA: Right-sided volume loss with diaphragmatic tenting and parenchymal density radiating from hilum and within the right apex unchanged. BONES / SOFT TISSUES: No acute abnormality. OTHER: Left-sided port unchanged.     1. Stable chest. Electronically signed by Carl Robles MD    CTA ABDOMEN PELVIS W WO CONTRAST    Result Date: 4/20/2024  EXAM: CTA ABDOMEN PELVIS W WO CONTRAST INDICATION: Lower GI bleed COMPARISON: April 4, 2024 TECHNIQUE: Standard CT angiogram of the abdomen and pelvis prior to and following the administration of iodinated IV contrast. Multiplanar 3-D MIPS reformats were performed on a separate dedicated 3-D workstation. Up-to-date CT equipment and radiation dose reduction techniques were employed. FINDINGS: Lung bases: Tree-in-bud nodular opacities in the bilateral lower lobes. Mediastinum: Normal. Liver and biliary system: Normal. Pancreas: Normal. Spleen: Normal. Adrenal glands: Normal. Genitourinary System: Normal. Bowel: The small and large bowel are normal in caliber. No bowel wall thickening. Normal appendix. Abdominal wall: Normal. Peritoneal cavity: No free or loculated fluid collections are seen. Vasculature: Severe atherosclerotic plaque without aneurysm. Lymph nodes: No lymphadenopathy is appreciated. Osseous structures: No suspicious abnormality.     No evidence of an active GI bleed. Bilateral lower lobe pneumonia. Electronically signed by Hugo Gaona

## 2024-04-22 NOTE — DEATH NOTES
Death Pronouncement Note  Patient's Name: Adenike Harvey   Patient's YOB: 1955  MRN Number: [id number]    Admitting Provider: Konstantin Butler MD  Attending Provider: Argenis Babcock MD    Patient was examined and the following were absent: Pulses, Blood Pressure, and Respiratory effort    I declared the patient dead on 4/22/2024 at 1243 AM     Preliminary Cause of Death:   Cardiopulmonary collapse  Acute on chronic systolic heart failure      Electronically signed by Mariella Reece MD on 4/22/24 at 12:48 AM EDT

## 2024-04-22 NOTE — DISCHARGE SUMMARY
Discharge Summary    Name:  Adenike Harvey /Age/Sex: 1955  (69 y.o. female)   MRN & CSN:  [id number] & [id number] Admission Date/Time: 2024 11:03 PM   Attending:  Argenis Babcock MD Discharging Physician: Mariella Reece MD     Hospital Course:   Adenike Harvey is a 69 y.o.  female  who presents with Acute on chronic anemia, found to have a hemoglobin of 6.2 on admission from a baseline of 9.5 being hemodynamically stable with evidence of anemia on chronic disease based on hematological evaluation.  Patient previously had an EGD done on previous admission on 124 showing low-grade of narrowing of Schatzki ring status post dilation and gastritis.  She was also on dual antiplatelet therapy for PCI on 3/2024 which were held in the setting of acute on chronic anemia.  Patient was admitted to stepdown for transfusion and further evaluation by gastroenterology.  Patient's hospital course was further complicated by hypotension requiring transfer to the ICU for which she was started on vasoactive agents and received additional units of PRBCs and platelets.  Review at that time patient had received 2 units of PRBC with hemoglobin continued to be less than 7, given her ischemic heart disease she was transfused with 2 additional units to maintain a hemoglobin closer to 8 g/dL, along with unit of platelets for noted thrombocytopenia.  Patient underwent urgent EGD showing no obvious bleeding, normal esophagus, normal stomach and duodenum with some gastritis.  CTA was also performed without any evidence of bleeding.  Patient remained on vasoactive agents for MAP goal greater than 65.  Her hospital course was further complicated by persistent tachycardia with new onset fever with concern for shock of unclear etiology and persistent hypotension, antibiotics were started and vasoactive agents were added.  Despite all of the above patient continued to deteriorate with noted worsening mentation, worsening hypotension,

## 2024-04-25 LAB
CULTURE: NORMAL
CULTURE: NORMAL
Lab: NORMAL
Lab: NORMAL
SPECIMEN: NORMAL
SPECIMEN: NORMAL
